# Patient Record
Sex: MALE | Race: OTHER | NOT HISPANIC OR LATINO
[De-identification: names, ages, dates, MRNs, and addresses within clinical notes are randomized per-mention and may not be internally consistent; named-entity substitution may affect disease eponyms.]

---

## 2018-11-19 ENCOUNTER — TRANSCRIPTION ENCOUNTER (OUTPATIENT)
Age: 48
End: 2018-11-19

## 2018-11-28 ENCOUNTER — TRANSCRIPTION ENCOUNTER (OUTPATIENT)
Age: 48
End: 2018-11-28

## 2018-11-29 ENCOUNTER — APPOINTMENT (OUTPATIENT)
Dept: CARDIOTHORACIC SURGERY | Facility: HOSPITAL | Age: 48
End: 2018-11-29
Payer: MEDICAID

## 2018-11-29 ENCOUNTER — INPATIENT (INPATIENT)
Facility: HOSPITAL | Age: 48
LOS: 42 days | Discharge: HOME | DRG: 3 | End: 2019-01-11
Attending: THORACIC SURGERY (CARDIOTHORACIC VASCULAR SURGERY) | Admitting: INTERNAL MEDICINE
Payer: MEDICAID

## 2018-11-29 VITALS
DIASTOLIC BLOOD PRESSURE: 76 MMHG | TEMPERATURE: 94 F | HEIGHT: 69 IN | WEIGHT: 199.96 LBS | HEART RATE: 108 BPM | SYSTOLIC BLOOD PRESSURE: 113 MMHG | OXYGEN SATURATION: 94 % | RESPIRATION RATE: 22 BRPM

## 2018-11-29 DIAGNOSIS — I49.01 VENTRICULAR FIBRILLATION: ICD-10-CM

## 2018-11-29 DIAGNOSIS — I21.3 ST ELEVATION (STEMI) MYOCARDIAL INFARCTION OF UNSPECIFIED SITE: ICD-10-CM

## 2018-11-29 DIAGNOSIS — I25.110 ATHEROSCLEROTIC HEART DISEASE OF NATIVE CORONARY ARTERY WITH UNSTABLE ANGINA PECTORIS: ICD-10-CM

## 2018-11-29 DIAGNOSIS — I25.42 CORONARY ARTERY DISSECTION: ICD-10-CM

## 2018-11-29 DIAGNOSIS — I21.02 ST ELEVATION (STEMI) MYOCARDIAL INFARCTION INVOLVING LEFT ANTERIOR DESCENDING CORONARY ARTERY: ICD-10-CM

## 2018-11-29 PROBLEM — Z00.00 ENCOUNTER FOR PREVENTIVE HEALTH EXAMINATION: Status: ACTIVE | Noted: 2018-11-29

## 2018-11-29 LAB
ABO RH CONFIRMATION: SIGNIFICANT CHANGE UP
ANION GAP SERPL CALC-SCNC: 13 MMOL/L — SIGNIFICANT CHANGE UP (ref 5–17)
APTT BLD: 28.1 SEC — SIGNIFICANT CHANGE UP (ref 27.5–36.3)
APTT BLD: 29.9 SEC — SIGNIFICANT CHANGE UP (ref 27.5–36.3)
BLD GP AB SCN SERPL QL: SIGNIFICANT CHANGE UP
BUN SERPL-MCNC: 11 MG/DL — SIGNIFICANT CHANGE UP (ref 8–20)
CALCIUM SERPL-MCNC: 9.3 MG/DL — SIGNIFICANT CHANGE UP (ref 8.6–10.2)
CHLORIDE SERPL-SCNC: 99 MMOL/L — SIGNIFICANT CHANGE UP (ref 98–107)
CO2 SERPL-SCNC: 24 MMOL/L — SIGNIFICANT CHANGE UP (ref 22–29)
CREAT SERPL-MCNC: 0.95 MG/DL — SIGNIFICANT CHANGE UP (ref 0.5–1.3)
FIBRINOGEN PPP-MCNC: 444 MG/DL — SIGNIFICANT CHANGE UP (ref 350–510)
GAS PNL BLDA: SIGNIFICANT CHANGE UP
GLUCOSE SERPL-MCNC: 141 MG/DL — HIGH (ref 70–115)
HCT VFR BLD CALC: 25.5 % — LOW (ref 42–52)
HCT VFR BLD CALC: 47.1 % — SIGNIFICANT CHANGE UP (ref 42–52)
HGB BLD-MCNC: 16.6 G/DL — SIGNIFICANT CHANGE UP (ref 14–18)
HGB BLD-MCNC: 8.7 G/DL — LOW (ref 14–18)
INR BLD: 0.98 RATIO — SIGNIFICANT CHANGE UP (ref 0.88–1.16)
INR BLD: 1.24 RATIO — HIGH (ref 0.88–1.16)
MCHC RBC-ENTMCNC: 29.6 PG — SIGNIFICANT CHANGE UP (ref 27–31)
MCHC RBC-ENTMCNC: 29.8 PG — SIGNIFICANT CHANGE UP (ref 27–31)
MCHC RBC-ENTMCNC: 34.1 G/DL — SIGNIFICANT CHANGE UP (ref 32–36)
MCHC RBC-ENTMCNC: 35.2 G/DL — SIGNIFICANT CHANGE UP (ref 32–36)
MCV RBC AUTO: 84.6 FL — SIGNIFICANT CHANGE UP (ref 80–94)
MCV RBC AUTO: 86.7 FL — SIGNIFICANT CHANGE UP (ref 80–94)
PLATELET # BLD AUTO: 209 K/UL — SIGNIFICANT CHANGE UP (ref 150–400)
PLATELET # BLD AUTO: 262 K/UL — SIGNIFICANT CHANGE UP (ref 150–400)
POTASSIUM SERPL-MCNC: 3.8 MMOL/L — SIGNIFICANT CHANGE UP (ref 3.5–5.3)
POTASSIUM SERPL-SCNC: 3.8 MMOL/L — SIGNIFICANT CHANGE UP (ref 3.5–5.3)
PROTHROM AB SERPL-ACNC: 11.3 SEC — SIGNIFICANT CHANGE UP (ref 10–12.9)
PROTHROM AB SERPL-ACNC: 14.4 SEC — HIGH (ref 10–12.9)
RBC # BLD: 2.94 M/UL — LOW (ref 4.6–6.2)
RBC # BLD: 5.57 M/UL — SIGNIFICANT CHANGE UP (ref 4.6–6.2)
RBC # FLD: 12.8 % — SIGNIFICANT CHANGE UP (ref 11–15.6)
RBC # FLD: 14.2 % — SIGNIFICANT CHANGE UP (ref 11–15.6)
SODIUM SERPL-SCNC: 136 MMOL/L — SIGNIFICANT CHANGE UP (ref 135–145)
TYPE + AB SCN PNL BLD: SIGNIFICANT CHANGE UP
WBC # BLD: 10.1 K/UL — SIGNIFICANT CHANGE UP (ref 4.8–10.8)
WBC # BLD: 6.3 K/UL — SIGNIFICANT CHANGE UP (ref 4.8–10.8)
WBC # FLD AUTO: 10.1 K/UL — SIGNIFICANT CHANGE UP (ref 4.8–10.8)
WBC # FLD AUTO: 6.3 K/UL — SIGNIFICANT CHANGE UP (ref 4.8–10.8)

## 2018-11-29 PROCEDURE — 33967 INSERT I-AORT PERCUT DEVICE: CPT | Mod: AS

## 2018-11-29 PROCEDURE — 92941 PRQ TRLML REVSC TOT OCCL AMI: CPT | Mod: LD

## 2018-11-29 PROCEDURE — 99223 1ST HOSP IP/OBS HIGH 75: CPT | Mod: 57

## 2018-11-29 PROCEDURE — 33990 INSJ PERQ VAD L HRT ARTERIAL: CPT

## 2018-11-29 PROCEDURE — 35500 HARVEST VEIN FOR BYPASS: CPT | Mod: AS

## 2018-11-29 PROCEDURE — 33956 ECMO/ECLS INSJ CTR CANNULA: CPT

## 2018-11-29 PROCEDURE — 71045 X-RAY EXAM CHEST 1 VIEW: CPT | Mod: 26,76

## 2018-11-29 PROCEDURE — 76937 US GUIDE VASCULAR ACCESS: CPT | Mod: 26

## 2018-11-29 PROCEDURE — 35500 HARVEST VEIN FOR BYPASS: CPT

## 2018-11-29 PROCEDURE — 33514 CABG VEIN FIVE: CPT | Mod: AS

## 2018-11-29 PROCEDURE — 92929: CPT | Mod: LD

## 2018-11-29 PROCEDURE — 99291 CRITICAL CARE FIRST HOUR: CPT

## 2018-11-29 PROCEDURE — 92960 CARDIOVERSION ELECTRIC EXT: CPT

## 2018-11-29 PROCEDURE — 33967 INSERT I-AORT PERCUT DEVICE: CPT

## 2018-11-29 PROCEDURE — 93458 L HRT ARTERY/VENTRICLE ANGIO: CPT | Mod: 26,59

## 2018-11-29 PROCEDURE — 93010 ELECTROCARDIOGRAM REPORT: CPT

## 2018-11-29 PROCEDURE — 33956 ECMO/ECLS INSJ CTR CANNULA: CPT | Mod: AS

## 2018-11-29 PROCEDURE — 33514 CABG VEIN FIVE: CPT

## 2018-11-29 PROCEDURE — 99152 MOD SED SAME PHYS/QHP 5/>YRS: CPT

## 2018-11-29 RX ORDER — CIPROFLOXACIN LACTATE 400MG/40ML
VIAL (ML) INTRAVENOUS
Qty: 0 | Refills: 0 | Status: DISCONTINUED | OUTPATIENT
Start: 2018-11-30 | End: 2018-12-04

## 2018-11-29 RX ORDER — SODIUM BICARBONATE 1 MEQ/ML
50 SYRINGE (ML) INTRAVENOUS
Qty: 0 | Refills: 0 | Status: COMPLETED | OUTPATIENT
Start: 2018-11-29 | End: 2018-11-30

## 2018-11-29 RX ORDER — POTASSIUM CHLORIDE 20 MEQ
10 PACKET (EA) ORAL
Qty: 0 | Refills: 0 | Status: DISCONTINUED | OUTPATIENT
Start: 2018-11-29 | End: 2018-12-06

## 2018-11-29 RX ORDER — VANCOMYCIN HCL 1 G
1000 VIAL (EA) INTRAVENOUS EVERY 12 HOURS
Qty: 0 | Refills: 0 | Status: DISCONTINUED | OUTPATIENT
Start: 2018-11-30 | End: 2018-12-04

## 2018-11-29 RX ORDER — CIPROFLOXACIN LACTATE 400MG/40ML
400 VIAL (ML) INTRAVENOUS EVERY 12 HOURS
Qty: 0 | Refills: 0 | Status: DISCONTINUED | OUTPATIENT
Start: 2018-11-30 | End: 2018-12-04

## 2018-11-29 RX ORDER — SODIUM CHLORIDE 9 MG/ML
1000 INJECTION INTRAMUSCULAR; INTRAVENOUS; SUBCUTANEOUS
Qty: 0 | Refills: 0 | Status: DISCONTINUED | OUTPATIENT
Start: 2018-11-29 | End: 2018-12-06

## 2018-11-29 RX ORDER — CLOPIDOGREL BISULFATE 75 MG/1
600 TABLET, FILM COATED ORAL ONCE
Qty: 0 | Refills: 0 | Status: DISCONTINUED | OUTPATIENT
Start: 2018-11-29 | End: 2018-11-29

## 2018-11-29 RX ORDER — POTASSIUM CHLORIDE 20 MEQ
10 PACKET (EA) ORAL
Qty: 0 | Refills: 0 | Status: COMPLETED | OUTPATIENT
Start: 2018-11-29 | End: 2018-11-30

## 2018-11-29 RX ORDER — PANTOPRAZOLE SODIUM 20 MG/1
40 TABLET, DELAYED RELEASE ORAL EVERY 12 HOURS
Qty: 0 | Refills: 0 | Status: DISCONTINUED | OUTPATIENT
Start: 2018-11-29 | End: 2018-11-30

## 2018-11-29 RX ORDER — AMIODARONE HYDROCHLORIDE 400 MG/1
1 TABLET ORAL
Qty: 900 | Refills: 0 | Status: DISCONTINUED | OUTPATIENT
Start: 2018-11-29 | End: 2018-11-30

## 2018-11-29 RX ORDER — MILRINONE LACTATE 1 MG/ML
0.38 INJECTION, SOLUTION INTRAVENOUS
Qty: 20 | Refills: 0 | Status: DISCONTINUED | OUTPATIENT
Start: 2018-11-29 | End: 2018-12-06

## 2018-11-29 RX ORDER — AMIODARONE HYDROCHLORIDE 400 MG/1
1 TABLET ORAL
Qty: 900 | Refills: 0 | Status: DISCONTINUED | OUTPATIENT
Start: 2018-11-29 | End: 2018-11-29

## 2018-11-29 RX ORDER — MEPERIDINE HYDROCHLORIDE 50 MG/ML
25 INJECTION INTRAMUSCULAR; INTRAVENOUS; SUBCUTANEOUS ONCE
Qty: 0 | Refills: 0 | Status: DISCONTINUED | OUTPATIENT
Start: 2018-11-29 | End: 2018-12-01

## 2018-11-29 RX ORDER — ASPIRIN/CALCIUM CARB/MAGNESIUM 324 MG
325 TABLET ORAL ONCE
Qty: 0 | Refills: 0 | Status: DISCONTINUED | OUTPATIENT
Start: 2018-11-29 | End: 2018-11-29

## 2018-11-29 RX ORDER — VECURONIUM BROMIDE 20 MG/1
10 INJECTION, POWDER, FOR SOLUTION INTRAVENOUS ONCE
Qty: 0 | Refills: 0 | Status: COMPLETED | OUTPATIENT
Start: 2018-11-29 | End: 2018-11-30

## 2018-11-29 RX ORDER — CHLORHEXIDINE GLUCONATE 213 G/1000ML
5 SOLUTION TOPICAL EVERY 4 HOURS
Qty: 0 | Refills: 0 | Status: DISCONTINUED | OUTPATIENT
Start: 2018-11-29 | End: 2018-12-06

## 2018-11-29 RX ORDER — SODIUM CHLORIDE 9 MG/ML
3 INJECTION INTRAMUSCULAR; INTRAVENOUS; SUBCUTANEOUS ONCE
Qty: 0 | Refills: 0 | Status: COMPLETED | OUTPATIENT
Start: 2018-11-29 | End: 2018-11-29

## 2018-11-29 RX ORDER — PETROLATUM,WHITE
1 JELLY (GRAM) TOPICAL
Qty: 0 | Refills: 0 | Status: DISCONTINUED | OUTPATIENT
Start: 2018-11-29 | End: 2018-12-06

## 2018-11-29 RX ORDER — EPINEPHRINE 0.3 MG/.3ML
0.03 INJECTION INTRAMUSCULAR; SUBCUTANEOUS
Qty: 4 | Refills: 0 | Status: DISCONTINUED | OUTPATIENT
Start: 2018-11-29 | End: 2018-12-01

## 2018-11-29 RX ORDER — NOREPINEPHRINE BITARTRATE/D5W 8 MG/250ML
0.05 PLASTIC BAG, INJECTION (ML) INTRAVENOUS
Qty: 8 | Refills: 0 | Status: DISCONTINUED | OUTPATIENT
Start: 2018-11-29 | End: 2018-12-04

## 2018-11-29 RX ORDER — AMIODARONE HYDROCHLORIDE 400 MG/1
150 TABLET ORAL ONCE
Qty: 0 | Refills: 0 | Status: COMPLETED | OUTPATIENT
Start: 2018-11-29 | End: 2018-11-29

## 2018-11-29 RX ORDER — PROPOFOL 10 MG/ML
30 INJECTION, EMULSION INTRAVENOUS
Qty: 1000 | Refills: 0 | Status: DISCONTINUED | OUTPATIENT
Start: 2018-11-29 | End: 2018-12-06

## 2018-11-29 RX ORDER — VANCOMYCIN HCL 1 G
1000 VIAL (EA) INTRAVENOUS EVERY 12 HOURS
Qty: 0 | Refills: 0 | Status: DISCONTINUED | OUTPATIENT
Start: 2018-11-29 | End: 2018-11-29

## 2018-11-29 RX ORDER — AMIODARONE HYDROCHLORIDE 400 MG/1
0.5 TABLET ORAL
Qty: 900 | Refills: 0 | Status: DISCONTINUED | OUTPATIENT
Start: 2018-11-29 | End: 2018-12-06

## 2018-11-29 RX ORDER — CIPROFLOXACIN LACTATE 400MG/40ML
400 VIAL (ML) INTRAVENOUS ONCE
Qty: 0 | Refills: 0 | Status: COMPLETED | OUTPATIENT
Start: 2018-11-29 | End: 2018-11-30

## 2018-11-29 RX ADMIN — Medication 50 MILLIEQUIVALENT(S): at 23:38

## 2018-11-29 RX ADMIN — Medication 50 MILLIEQUIVALENT(S): at 23:30

## 2018-11-29 NOTE — ED ADULT NURSE NOTE - OBJECTIVE STATEMENT
Patient c/o of chest pain and "pressure and heaviness to both shoulders" pt states onset this AM reports taking last dose of ABX for staph infection to buttocks and left groin area

## 2018-11-29 NOTE — ED ADULT NURSE REASSESSMENT NOTE - NS ED NURSE REASSESS COMMENT FT1
1113- Aspirin 324mg po administered as per order  1114- late entry Cath lab team at bedside   1116- Plavix 600 mg po administered as per order  1120- Pt transported to cath lab with primary RN , VELMA and cath lab team-

## 2018-11-29 NOTE — H&P PST ADULT - ASSESSMENT
This is a 48 y/o male with acute chest pain (10/10), found to have significant EKG changes; STEMI called; pt taken to the cath lab.

## 2018-11-29 NOTE — ED ADULT TRIAGE NOTE - CHIEF COMPLAINT QUOTE
"I am having chest pain and my chest feels heavy. " Pt states chest pain started an hour. Pt denies cardiac hx. Pt states he took a medication for itching before the chest pain started.

## 2018-11-29 NOTE — ED PROVIDER NOTE - CRITICAL CARE PROVIDED
documentation/direct patient care (not related to procedure)/35 MINUTE/interpretation of diagnostic studies/consultation with other physicians

## 2018-11-29 NOTE — ED PROVIDER NOTE - CONSTITUTIONAL, MLM
normal... ILL appearing, well nourished, awake, alert, oriented to person, place, time/situation and in MODERATE apparent distress.

## 2018-11-29 NOTE — ED PROVIDER NOTE - CRITICAL CARE INDICATION, MLM
patient was critically ill... Patient was critically ill with a high probability of imminent or life threatening deterioration. ACUTE STEMI  STAT IV LABS EKG CXR CARDIAC CONSULT MEDS ADMIT CATH LAB

## 2018-11-29 NOTE — CONSULT NOTE ADULT - SUBJECTIVE AND OBJECTIVE BOX
Surgeon: Arnold    Consult requesting by: Hermelinda    HISTORY OF PRESENT ILLNESS:  47M, no pmhx, recently treated with antibiotics (doxy)  for a groin cellulitis, p/w mid sternal CP x 2-3 days, became 10/10 this morning, pt vomited x1 drank water thinking it was reflux, however no improvement so wife brought pt to ED. In ED ST elevations in V1-V5 with reciprocal changes in inferior leads. ass and plavix 600 given. Code STEMI activated and pt taken to cath lab emergently. Attempted bifurcated stent to LAD and D2 with development of acute thrombus in LAD stent, attempted to re balloon with subsequent dissection of LM. Impella placed. VF arrest, shock x1 with return of NSR. pt to be emergently intubated by anesthesia and to be taken directly to cath lab to OR with Dr. Barrett.     PAST MEDICAL & SURGICAL HISTORY:  Staph infection  No significant past surgical history    MEDICATIONS  (STANDING):  aspirin 325 milliGRAM(s) Oral Once  clopidogrel Tablet 600 milliGRAM(s) Oral Once  sodium chloride 0.9% lock flush 3 milliLiter(s) IV Push once    MEDICATIONS  (PRN):    Antiplatelet therapy:   plavix 600, asa 325    Allergies: penicillins (Unknown)    SOCIAL HISTORY: (limited obtained due to patients acuity)  Smoker: denies   ETOH use: denies  Occupation:   Live with: wife    FAMILY HISTORY:  Family history of myocardial infarction (Mother): mother;     Review of Systems: UNABLE TO OBTAIN ROS due to acuity of pt (pt actively being intubated)   CONSTITUTIONAL:  Fevers[ ] chills[ ] sweats[ ] fatigue[ ] weight loss[ ] weight gain [ ]                                     NEGATIVE [ ]   NEURO:  parathesias[ ] seizures [ ]  syncope [ ]  confusion [ ]                                                                                NEGATIVE[ ]   EYES: glasses[ ]  blurry vision[ ]  discharge[ ] pain[ ] glaucoma [ ]                                                                          NEGATIVE[ ]   ENMT:  difficulty hearing [ ]  vertigo[ ]  dysphagia[ ] epistaxis[ ] recent dental work [ ]                                    NEGATIVE[ ]   CV:  chest pain[ ] palpitations[ ] SAMAYOA [ ] diaphoresis [ ]                                                                                           NEGATIVE[ ]   RESPIRATORY:  wheezing[ ] SOB[ ] cough [ ] sputum[ ] hemoptysis[ ]                                                                  NEGATIVE[ ]   GI:  nausea[ ]  vomiting [ ]  diarrhea[ ] constipation [ ] melena [ ]                                                                      NEGATIVE[ ]   : hematuria[ ]  dysuria[ ] urgency[ ] incontinence[ ]                                                                                            NEGATIVE[ ]   MUSKULOSKELETAL:  arthritis[ ]  joint swelling [ ] muscle weakness [ ]                                                                NEGATIVE[ ]   SKIN/BREAST:  rash[ ] itching [ ]  hair loss[ ] masses[ ]                                                                                              NEGATIVE[ ]   PSYCH:  dementia [ ] depression [ ] anxiety[ ]                                                                                                               NEGATIVE[ ]   HEME/LYMPH:  bruises easily[ ] enlarged lymph nodes[ ] tender lymph nodes[ ]                                               NEGATIVE[ ]   ENDOCRINE:  cold intolerance[ ] heat intolerance[ ] polydipsia[ ]                                                                          NEGATIVE[ ]     PHYSICAL EXAM  Vital Signs Last 24 Hrs  T(C): 34.7 (2018 10:50), Max: 34.7 (2018 10:50)  T(F): 94.4 (2018 10:50), Max: 94.4 (2018 10:50)  HR: 108 (2018 10:50) (108 - 108)  BP: 113/76 (2018 10:50) (113/76 - 113/76)  BP(mean): --  RR: 22 (2018 10:50) (22 - 22)  SpO2: 94% (2018 10:50) (94% - 94%)    UNABLE TO OBTAIN physical exam, pt is actively being intubated and on cath table with cath in progress still)  CONSTITUTIONAL:                                                           WNL[ ]   Neuro: WNL[ ] Normal exam oriented to person/place & time with no focal motor or sensory  deficits. Other __________                    Eyes: WNL[ ]   Normal exam of conjunctiva & lids, pupils equally reactive. Other______________________________     ENT: WNL[ ]    Normal exam of nasal/oral mucosa with absence of cyanosis. Other_____________________________  Neck: WNL[ ]  Normal exam of jugular veins, trachea & thyroid. Other_________________________________________  Chest: WNL[ ] Normal lung exam with good air movement absence of wheezes, rales, or rhonchi: Other_________________________________________                                                                                CV:  Auscultation: normal [ ] S3[ ] S4[ ] Irregular [ ] Rub[ ] Clicks[ ]    Murmurs none:[ ]systolic [ ]  diastolic [ ] holosystolic [ ]  Carotids: No Bruits[ ] Other____________ Abdominal Aorta: normal [ ] nonpalpable[ ]Other___________                                                                                      GI: WNL[ ] Normal exam of abdomen, liver & spleen with no noted masses or tenderness. Other______________________                                                                                                        Extremities: WNL[ ] Normal no evidence of cyanosis or deformity Edema: none[ ]trace[ ]1+[ ]2+[ ]3+[ ]4+[ ]  Lower Extremity Pulses: Right[ ] Left[ ]Varicosities[ ]  SKIN :WNL[ ] Normal exam to inspection & palation. Other:____________________                                                          LABS:                        16.6   6.3   )-----------( 209      ( 2018 11:19 )             47.1         136  |  99  |  11.0  ----------------------------<  141<H>  3.8   |  24.0  |  0.95    Ca    9.3      2018 11:19      PT/INR - ( 2018 11:19 )   PT: 11.3 sec;   INR: 0.98 ratio         PTT - ( 2018 11:19 )  PTT:29.9 sec

## 2018-11-29 NOTE — ED PROVIDER NOTE - OBJECTIVE STATEMENT
48 YO MALE WITH ABOVE CC. SUDDEN ONSET CP WHILE AT REST. PT DESCRIBES AS A HEAVY PRESSURE ON HIS CHEST ASSOCIATED WITH SOB AND RADIATION TO HIS SHOULDERS AND NECK. NO SMOKING OR FAMILY HISTORY OF CARDIAC ISSUE. HTN+ NO DM OR CHOLESTEROL.  PT ADMITS TO RECENT "STAPH" INFECTION IN GROIN REGION. ON AB X 10 DAYS.  MED HX HTN

## 2018-11-29 NOTE — BRIEF OPERATIVE NOTE - PROCEDURE
<<-----Click on this checkbox to enter Procedure ECMO (extracorporeal membrane oxygenation)  11/29/2018  Secondary to pre-op cardiogenic shock and inability to wean from CPB  Active  [email or web address]  CABG  11/29/2018  Emergent CABG X 4 all vein to the LAD, Diag, OM and PDA with inability to wean from CPB therefore requiring ECMO and IABP  Active  GLENN

## 2018-11-29 NOTE — PROGRESS NOTE ADULT - SUBJECTIVE AND OBJECTIVE BOX
This is a 48 y/o male no significant PMH; recent ABT (doxycycline) r/t cellulitis to right groin per pt. Pt presented to the ED with 10/10 chest pain that he reported started at 0930; he drank water with no relief; pain started to progress to B/L shoulders. Per pt spouse, she reports he has had chest pain x2-3 days and this morning he vomited which he attributed to reflux.  She brought him into the emergency department.    Pt has significant EKG changes ST elevations in leads V1-V5 with reciprocal changes in II, III, AVF.  Pt rec'd asa and plavix load in ED.     Pt found to have multiple occlusions; stent to LAD and D2; stent to LAD thrombosed, left main dissected; impella placed for support. CT surgeon, Dr. Wills called.  CT surgery team activated. Pt being brought to OR for CABG. This is a 46 y/o male no significant PMH; recent ABT (doxycycline) r/t cellulitis to right groin per pt. Pt presented to the ED with 10/10 chest pain that he reported started at 0930; he drank water with no relief; pain started to progress to B/L shoulders. Per pt spouse, she reports he has had chest pain x2-3 days and this morning he vomited which he attributed to reflux.  She brought him into the emergency department.    Pt has significant EKG changes ST elevations in leads V1-V5 with reciprocal changes in II, III, AVF.  Pt rec'd asa 325mg and plavix 600mg in ED.     Pt found to have multiple occlusions; stent to LAD and D2; stent to LAD thrombosed, left main dissected; impella placed for support. CT surgeon, Dr. Wills called.  CT surgery team activated. Pt being brought to OR for CABG. This is a 46 y/o male no significant PMH; recent ABT (doxycycline) r/t cellulitis to right groin per pt. Pt presented to the ED with 10/10 chest pain that he reported started at 0930; he drank water with no relief; pain started to progress to B/L shoulders. Per pt spouse, she reports he has had chest pain x2-3 days and this morning he vomited which he attributed to reflux.  She brought him into the emergency department.    Pt has significant EKG changes ST elevations in leads V1-V5 with reciprocal changes in II, III, AVF.  Pt rec'd asa 325mg and plavix 600mg in ED.     Pt found to have multiple occlusions; stent to LAD and D2; stent to LAD thrombosed ,LAD stent was recrossed with a 0.014 run through wire and angioplasty performed with no reflow however. Filling defect appeared in the left main that appeared to be a thrombus (likely proximal thrombus extension from LAD).  Impella placed for support. CT surgeon, Dr. Wills called.  CT surgery team activated. Pt being brought to OR for CABG.

## 2018-11-29 NOTE — CONSULT NOTE ADULT - ASSESSMENT
47M, no pmhx p/w 2-3 day h/o CP, code STEMI in ED, during cath, bifurcating stent of LAD/D2 with acute development of thrombus in LAD, during attempted opening of LAD thrombus, dissection of LM occurred, CT surgery called for emergent consult, impella placed, anesthesia called for emergent intubation, VF, shock x1 return to SR, pt to go emergently to OR for CABG;

## 2018-11-29 NOTE — H&P PST ADULT - HISTORY OF PRESENT ILLNESS
This is a 46 y/o male no significant PMH; recent ABT r/t cellulitis to right groin per pt. Pt presented to the ED with 10/10 chest pain that he reported started at 0930; he drank water with no relief; pain started to progress to B/L shoulders. Pt has significant EKG changes ST elevations in leads V1-V5 with reciprocal changes in II, III, AVF.  Pt rec'd asa and plavix load in ED. This is a 48 y/o male no significant PMH; recent ABT r/t cellulitis to right groin per pt. Pt presented to the ED with 10/10 chest pain that he reported started at 0930; he drank water with no relief; pain started to progress to B/L shoulders. Per pt spouse, she reports he has had chest pain x2-3 days and this morning he vomited which he attributed to reflux.  She brought him into the emergency department.    Pt has significant EKG changes ST elevations in leads V1-V5 with reciprocal changes in II, III, AVF.  Pt rec'd asa and plavix load in ED. This is a 46 y/o male no significant PMH; recent ABT (doxycycline) r/t cellulitis to right groin per pt. Pt presented to the ED with 10/10 chest pain that he reported started at 0930; he drank water with no relief; pain started to progress to B/L shoulders. Per pt spouse, she reports he has had chest pain x2-3 days and this morning he vomited which he attributed to reflux.  She brought him into the emergency department.    Pt has significant EKG changes ST elevations in leads V1-V5 with reciprocal changes in II, III, AVF.  Pt rec'd asa and plavix load in ED.

## 2018-11-30 DIAGNOSIS — B95.8 UNSPECIFIED STAPHYLOCOCCUS AS THE CAUSE OF DISEASES CLASSIFIED ELSEWHERE: ICD-10-CM

## 2018-11-30 LAB
ALBUMIN SERPL ELPH-MCNC: 3 G/DL — LOW (ref 3.3–5.2)
ALBUMIN SERPL ELPH-MCNC: 3.1 G/DL — LOW (ref 3.3–5.2)
ALP SERPL-CCNC: 32 U/L — LOW (ref 40–120)
ALP SERPL-CCNC: 43 U/L — SIGNIFICANT CHANGE UP (ref 40–120)
ALT FLD-CCNC: 35 U/L — SIGNIFICANT CHANGE UP
ALT FLD-CCNC: 42 U/L — HIGH
ANION GAP SERPL CALC-SCNC: 14 MMOL/L — SIGNIFICANT CHANGE UP (ref 5–17)
ANION GAP SERPL CALC-SCNC: 18 MMOL/L — HIGH (ref 5–17)
ANION GAP SERPL CALC-SCNC: 23 MMOL/L — HIGH (ref 5–17)
ANION GAP SERPL CALC-SCNC: 26 MMOL/L — HIGH (ref 5–17)
APTT BLD: 29.3 SEC — SIGNIFICANT CHANGE UP (ref 27.5–36.3)
APTT BLD: 30.5 SEC — SIGNIFICANT CHANGE UP (ref 27.5–36.3)
APTT BLD: 37 SEC — HIGH (ref 27.5–36.3)
AST SERPL-CCNC: 297 U/L — HIGH
AST SERPL-CCNC: 325 U/L — HIGH
BILIRUB SERPL-MCNC: 1.3 MG/DL — SIGNIFICANT CHANGE UP (ref 0.4–2)
BILIRUB SERPL-MCNC: 2 MG/DL — SIGNIFICANT CHANGE UP (ref 0.4–2)
BUN SERPL-MCNC: 12 MG/DL — SIGNIFICANT CHANGE UP (ref 8–20)
BUN SERPL-MCNC: 13 MG/DL — SIGNIFICANT CHANGE UP (ref 8–20)
CALCIUM SERPL-MCNC: 8.2 MG/DL — LOW (ref 8.6–10.2)
CALCIUM SERPL-MCNC: 8.4 MG/DL — LOW (ref 8.6–10.2)
CALCIUM SERPL-MCNC: 9 MG/DL — SIGNIFICANT CHANGE UP (ref 8.6–10.2)
CALCIUM SERPL-MCNC: 9.1 MG/DL — SIGNIFICANT CHANGE UP (ref 8.6–10.2)
CHLORIDE SERPL-SCNC: 104 MMOL/L — SIGNIFICANT CHANGE UP (ref 98–107)
CHLORIDE SERPL-SCNC: 104 MMOL/L — SIGNIFICANT CHANGE UP (ref 98–107)
CHLORIDE SERPL-SCNC: 106 MMOL/L — SIGNIFICANT CHANGE UP (ref 98–107)
CHLORIDE SERPL-SCNC: 106 MMOL/L — SIGNIFICANT CHANGE UP (ref 98–107)
CK MB CFR SERPL CALC: 514.9 NG/ML — HIGH (ref 0–6.7)
CK SERPL-CCNC: 4403 U/L — HIGH (ref 30–200)
CO2 SERPL-SCNC: 16 MMOL/L — LOW (ref 22–29)
CO2 SERPL-SCNC: 17 MMOL/L — LOW (ref 22–29)
CO2 SERPL-SCNC: 21 MMOL/L — LOW (ref 22–29)
CO2 SERPL-SCNC: 23 MMOL/L — SIGNIFICANT CHANGE UP (ref 22–29)
CREAT SERPL-MCNC: 0.94 MG/DL — SIGNIFICANT CHANGE UP (ref 0.5–1.3)
CREAT SERPL-MCNC: 0.97 MG/DL — SIGNIFICANT CHANGE UP (ref 0.5–1.3)
CREAT SERPL-MCNC: 1.04 MG/DL — SIGNIFICANT CHANGE UP (ref 0.5–1.3)
CREAT SERPL-MCNC: 1.07 MG/DL — SIGNIFICANT CHANGE UP (ref 0.5–1.3)
GAS PNL BLDA: SIGNIFICANT CHANGE UP
GAS PNL BLDV: SIGNIFICANT CHANGE UP
GLUCOSE BLDC GLUCOMTR-MCNC: 150 MG/DL — HIGH (ref 70–99)
GLUCOSE BLDC GLUCOMTR-MCNC: 151 MG/DL — HIGH (ref 70–99)
GLUCOSE BLDC GLUCOMTR-MCNC: 163 MG/DL — HIGH (ref 70–99)
GLUCOSE BLDC GLUCOMTR-MCNC: 166 MG/DL — HIGH (ref 70–99)
GLUCOSE BLDC GLUCOMTR-MCNC: 182 MG/DL — HIGH (ref 70–99)
GLUCOSE BLDC GLUCOMTR-MCNC: 188 MG/DL — HIGH (ref 70–99)
GLUCOSE BLDC GLUCOMTR-MCNC: 190 MG/DL — HIGH (ref 70–99)
GLUCOSE BLDC GLUCOMTR-MCNC: 190 MG/DL — HIGH (ref 70–99)
GLUCOSE BLDC GLUCOMTR-MCNC: 205 MG/DL — HIGH (ref 70–99)
GLUCOSE BLDC GLUCOMTR-MCNC: 217 MG/DL — HIGH (ref 70–99)
GLUCOSE BLDC GLUCOMTR-MCNC: 218 MG/DL — HIGH (ref 70–99)
GLUCOSE BLDC GLUCOMTR-MCNC: 220 MG/DL — HIGH (ref 70–99)
GLUCOSE BLDC GLUCOMTR-MCNC: 235 MG/DL — HIGH (ref 70–99)
GLUCOSE BLDC GLUCOMTR-MCNC: 236 MG/DL — HIGH (ref 70–99)
GLUCOSE BLDC GLUCOMTR-MCNC: 237 MG/DL — HIGH (ref 70–99)
GLUCOSE BLDC GLUCOMTR-MCNC: 247 MG/DL — HIGH (ref 70–99)
GLUCOSE SERPL-MCNC: 188 MG/DL — HIGH (ref 70–115)
GLUCOSE SERPL-MCNC: 195 MG/DL — HIGH (ref 70–115)
GLUCOSE SERPL-MCNC: 195 MG/DL — HIGH (ref 70–115)
GLUCOSE SERPL-MCNC: 269 MG/DL — HIGH (ref 70–115)
HBA1C BLD-MCNC: 5.7 % — HIGH (ref 4–5.6)
HCT VFR BLD CALC: 26.2 % — LOW (ref 42–52)
HCT VFR BLD CALC: 28.9 % — LOW (ref 42–52)
HCT VFR BLD CALC: 34.1 % — LOW (ref 42–52)
HGB BLD-MCNC: 10.2 G/DL — LOW (ref 14–18)
HGB BLD-MCNC: 11.5 G/DL — LOW (ref 14–18)
HGB BLD-MCNC: 9.5 G/DL — LOW (ref 14–18)
INR BLD: 1.27 RATIO — HIGH (ref 0.88–1.16)
INR BLD: 1.39 RATIO — HIGH (ref 0.88–1.16)
LDH SERPL L TO P-CCNC: 1010 U/L — HIGH (ref 98–192)
MAGNESIUM SERPL-MCNC: 1.6 MG/DL — LOW (ref 1.8–2.6)
MAGNESIUM SERPL-MCNC: 1.8 MG/DL — SIGNIFICANT CHANGE UP (ref 1.6–2.6)
MAGNESIUM SERPL-MCNC: 2 MG/DL — SIGNIFICANT CHANGE UP (ref 1.6–2.6)
MCHC RBC-ENTMCNC: 28.7 PG — SIGNIFICANT CHANGE UP (ref 27–31)
MCHC RBC-ENTMCNC: 29 PG — SIGNIFICANT CHANGE UP (ref 27–31)
MCHC RBC-ENTMCNC: 29.3 PG — SIGNIFICANT CHANGE UP (ref 27–31)
MCHC RBC-ENTMCNC: 33.7 G/DL — SIGNIFICANT CHANGE UP (ref 32–36)
MCHC RBC-ENTMCNC: 35.3 G/DL — SIGNIFICANT CHANGE UP (ref 32–36)
MCHC RBC-ENTMCNC: 36.3 G/DL — HIGH (ref 32–36)
MCV RBC AUTO: 80.9 FL — SIGNIFICANT CHANGE UP (ref 80–94)
MCV RBC AUTO: 81.4 FL — SIGNIFICANT CHANGE UP (ref 80–94)
MCV RBC AUTO: 86.1 FL — SIGNIFICANT CHANGE UP (ref 80–94)
PHOSPHATE SERPL-MCNC: 2.6 MG/DL — SIGNIFICANT CHANGE UP (ref 2.4–4.7)
PHOSPHATE SERPL-MCNC: 2.7 MG/DL — SIGNIFICANT CHANGE UP (ref 2.4–4.7)
PLATELET # BLD AUTO: 104 K/UL — LOW (ref 150–400)
PLATELET # BLD AUTO: 165 K/UL — SIGNIFICANT CHANGE UP (ref 150–400)
PLATELET # BLD AUTO: 205 K/UL — SIGNIFICANT CHANGE UP (ref 150–400)
POTASSIUM SERPL-MCNC: 3.3 MMOL/L — LOW (ref 3.5–5.3)
POTASSIUM SERPL-MCNC: 4 MMOL/L — SIGNIFICANT CHANGE UP (ref 3.5–5.3)
POTASSIUM SERPL-MCNC: 4.5 MMOL/L — SIGNIFICANT CHANGE UP (ref 3.5–5.3)
POTASSIUM SERPL-MCNC: 4.6 MMOL/L — SIGNIFICANT CHANGE UP (ref 3.5–5.3)
POTASSIUM SERPL-SCNC: 3.3 MMOL/L — LOW (ref 3.5–5.3)
POTASSIUM SERPL-SCNC: 4 MMOL/L — SIGNIFICANT CHANGE UP (ref 3.5–5.3)
POTASSIUM SERPL-SCNC: 4.5 MMOL/L — SIGNIFICANT CHANGE UP (ref 3.5–5.3)
POTASSIUM SERPL-SCNC: 4.6 MMOL/L — SIGNIFICANT CHANGE UP (ref 3.5–5.3)
PROT SERPL-MCNC: 4.7 G/DL — LOW (ref 6.6–8.7)
PROT SERPL-MCNC: 4.9 G/DL — LOW (ref 6.6–8.7)
PROTHROM AB SERPL-ACNC: 14.7 SEC — HIGH (ref 10–12.9)
PROTHROM AB SERPL-ACNC: 16.1 SEC — HIGH (ref 10–12.9)
RBC # BLD: 3.24 M/UL — LOW (ref 4.6–6.2)
RBC # BLD: 3.55 M/UL — LOW (ref 4.6–6.2)
RBC # BLD: 3.96 M/UL — LOW (ref 4.6–6.2)
RBC # FLD: 13.9 % — SIGNIFICANT CHANGE UP (ref 11–15.6)
RBC # FLD: 14.4 % — SIGNIFICANT CHANGE UP (ref 11–15.6)
RBC # FLD: 17.1 % — HIGH (ref 11–15.6)
SODIUM SERPL-SCNC: 143 MMOL/L — SIGNIFICANT CHANGE UP (ref 135–145)
SODIUM SERPL-SCNC: 144 MMOL/L — SIGNIFICANT CHANGE UP (ref 135–145)
SODIUM SERPL-SCNC: 145 MMOL/L — SIGNIFICANT CHANGE UP (ref 135–145)
SODIUM SERPL-SCNC: 146 MMOL/L — HIGH (ref 135–145)
TROPONIN T SERPL-MCNC: >25 NG/ML — SIGNIFICANT CHANGE UP (ref 0–0.06)
WBC # BLD: 10.4 K/UL — SIGNIFICANT CHANGE UP (ref 4.8–10.8)
WBC # BLD: 10.7 K/UL — SIGNIFICANT CHANGE UP (ref 4.8–10.8)
WBC # BLD: 13.3 K/UL — HIGH (ref 4.8–10.8)
WBC # FLD AUTO: 10.4 K/UL — SIGNIFICANT CHANGE UP (ref 4.8–10.8)
WBC # FLD AUTO: 10.7 K/UL — SIGNIFICANT CHANGE UP (ref 4.8–10.8)
WBC # FLD AUTO: 13.3 K/UL — HIGH (ref 4.8–10.8)

## 2018-11-30 PROCEDURE — 71045 X-RAY EXAM CHEST 1 VIEW: CPT | Mod: 26

## 2018-11-30 PROCEDURE — 71045 X-RAY EXAM CHEST 1 VIEW: CPT | Mod: 26,77

## 2018-11-30 PROCEDURE — 99291 CRITICAL CARE FIRST HOUR: CPT

## 2018-11-30 PROCEDURE — 93503 INSERT/PLACE HEART CATHETER: CPT

## 2018-11-30 PROCEDURE — 93010 ELECTROCARDIOGRAM REPORT: CPT

## 2018-11-30 PROCEDURE — 36556 INSERT NON-TUNNEL CV CATH: CPT | Mod: 58,59

## 2018-11-30 RX ORDER — FENTANYL CITRATE 50 UG/ML
50 INJECTION INTRAVENOUS
Qty: 0 | Refills: 0 | Status: DISCONTINUED | OUTPATIENT
Start: 2018-11-30 | End: 2018-11-30

## 2018-11-30 RX ORDER — POTASSIUM CHLORIDE 20 MEQ
10 PACKET (EA) ORAL
Qty: 0 | Refills: 0 | Status: COMPLETED | OUTPATIENT
Start: 2018-11-30 | End: 2018-11-30

## 2018-11-30 RX ORDER — HEPARIN SODIUM 5000 [USP'U]/ML
500 INJECTION INTRAVENOUS; SUBCUTANEOUS
Qty: 25000 | Refills: 0 | Status: DISCONTINUED | OUTPATIENT
Start: 2018-11-30 | End: 2018-12-03

## 2018-11-30 RX ORDER — SODIUM BICARBONATE 1 MEQ/ML
50 SYRINGE (ML) INTRAVENOUS
Qty: 0 | Refills: 0 | Status: DISCONTINUED | OUTPATIENT
Start: 2018-11-30 | End: 2018-11-30

## 2018-11-30 RX ORDER — PANTOPRAZOLE SODIUM 20 MG/1
8 TABLET, DELAYED RELEASE ORAL
Qty: 80 | Refills: 0 | Status: DISCONTINUED | OUTPATIENT
Start: 2018-11-30 | End: 2018-11-30

## 2018-11-30 RX ORDER — CALCIUM CHLORIDE
1000 POWDER (GRAM) MISCELLANEOUS ONCE
Qty: 0 | Refills: 0 | Status: COMPLETED | OUTPATIENT
Start: 2018-11-30 | End: 2018-11-30

## 2018-11-30 RX ORDER — PANTOPRAZOLE SODIUM 20 MG/1
8 TABLET, DELAYED RELEASE ORAL
Qty: 80 | Refills: 0 | Status: DISCONTINUED | OUTPATIENT
Start: 2018-11-30 | End: 2018-12-01

## 2018-11-30 RX ORDER — ALBUMIN HUMAN 25 %
250 VIAL (ML) INTRAVENOUS ONCE
Qty: 0 | Refills: 0 | Status: COMPLETED | OUTPATIENT
Start: 2018-11-30 | End: 2018-11-30

## 2018-11-30 RX ORDER — DEXTROSE 50 % IN WATER 50 %
50 SYRINGE (ML) INTRAVENOUS
Qty: 0 | Refills: 0 | Status: DISCONTINUED | OUTPATIENT
Start: 2018-11-30 | End: 2018-12-06

## 2018-11-30 RX ORDER — MAGNESIUM SULFATE 500 MG/ML
2 VIAL (ML) INJECTION
Qty: 0 | Refills: 0 | Status: COMPLETED | OUTPATIENT
Start: 2018-11-30 | End: 2018-11-30

## 2018-11-30 RX ORDER — MEPERIDINE HYDROCHLORIDE 50 MG/ML
25 INJECTION INTRAMUSCULAR; INTRAVENOUS; SUBCUTANEOUS ONCE
Qty: 0 | Refills: 0 | Status: DISCONTINUED | OUTPATIENT
Start: 2018-11-30 | End: 2018-11-30

## 2018-11-30 RX ORDER — POTASSIUM CHLORIDE 20 MEQ
20 PACKET (EA) ORAL
Qty: 0 | Refills: 0 | Status: COMPLETED | OUTPATIENT
Start: 2018-11-30 | End: 2018-11-30

## 2018-11-30 RX ORDER — FENTANYL CITRATE 50 UG/ML
50 INJECTION INTRAVENOUS ONCE
Qty: 0 | Refills: 0 | Status: DISCONTINUED | OUTPATIENT
Start: 2018-11-30 | End: 2018-11-30

## 2018-11-30 RX ORDER — MEPERIDINE HYDROCHLORIDE 50 MG/ML
50 INJECTION INTRAMUSCULAR; INTRAVENOUS; SUBCUTANEOUS ONCE
Qty: 0 | Refills: 0 | Status: DISCONTINUED | OUTPATIENT
Start: 2018-11-30 | End: 2018-11-30

## 2018-11-30 RX ORDER — SODIUM BICARBONATE 1 MEQ/ML
50 SYRINGE (ML) INTRAVENOUS
Qty: 0 | Refills: 0 | Status: COMPLETED | OUTPATIENT
Start: 2018-11-30 | End: 2018-11-30

## 2018-11-30 RX ORDER — PANTOPRAZOLE SODIUM 20 MG/1
40 TABLET, DELAYED RELEASE ORAL EVERY 12 HOURS
Qty: 0 | Refills: 0 | Status: DISCONTINUED | OUTPATIENT
Start: 2018-11-30 | End: 2018-11-30

## 2018-11-30 RX ORDER — SODIUM CHLORIDE 9 MG/ML
1000 INJECTION, SOLUTION INTRAVENOUS
Qty: 0 | Refills: 0 | Status: DISCONTINUED | OUTPATIENT
Start: 2018-11-30 | End: 2018-12-01

## 2018-11-30 RX ORDER — INSULIN HUMAN 100 [IU]/ML
2 INJECTION, SOLUTION SUBCUTANEOUS
Qty: 250 | Refills: 0 | Status: DISCONTINUED | OUTPATIENT
Start: 2018-11-30 | End: 2018-12-06

## 2018-11-30 RX ORDER — VECURONIUM BROMIDE 20 MG/1
10 INJECTION, POWDER, FOR SOLUTION INTRAVENOUS ONCE
Qty: 0 | Refills: 0 | Status: COMPLETED | OUTPATIENT
Start: 2018-11-30 | End: 2018-11-30

## 2018-11-30 RX ADMIN — Medication 50 MILLIEQUIVALENT(S): at 03:34

## 2018-11-30 RX ADMIN — VECURONIUM BROMIDE 10 MILLIGRAM(S): 20 INJECTION, POWDER, FOR SOLUTION INTRAVENOUS at 02:45

## 2018-11-30 RX ADMIN — Medication 50 MILLIEQUIVALENT(S): at 01:40

## 2018-11-30 RX ADMIN — CHLORHEXIDINE GLUCONATE 5 MILLILITER(S): 213 SOLUTION TOPICAL at 05:22

## 2018-11-30 RX ADMIN — MEPERIDINE HYDROCHLORIDE 50 MILLIGRAM(S): 50 INJECTION INTRAMUSCULAR; INTRAVENOUS; SUBCUTANEOUS at 11:10

## 2018-11-30 RX ADMIN — Medication 100 MILLIEQUIVALENT(S): at 02:08

## 2018-11-30 RX ADMIN — Medication 100 MILLIEQUIVALENT(S): at 08:12

## 2018-11-30 RX ADMIN — Medication 1 APPLICATION(S): at 22:11

## 2018-11-30 RX ADMIN — Medication 100 MILLIEQUIVALENT(S): at 21:45

## 2018-11-30 RX ADMIN — Medication 100 MILLIEQUIVALENT(S): at 02:23

## 2018-11-30 RX ADMIN — Medication 250 MILLIGRAM(S): at 01:45

## 2018-11-30 RX ADMIN — PROPOFOL 16.33 MICROGRAM(S)/KG/MIN: 10 INJECTION, EMULSION INTRAVENOUS at 00:43

## 2018-11-30 RX ADMIN — Medication 1000 MILLIGRAM(S): at 02:22

## 2018-11-30 RX ADMIN — Medication 50 GRAM(S): at 04:51

## 2018-11-30 RX ADMIN — Medication 50 GRAM(S): at 04:05

## 2018-11-30 RX ADMIN — VECURONIUM BROMIDE 10 MILLIGRAM(S): 20 INJECTION, POWDER, FOR SOLUTION INTRAVENOUS at 06:15

## 2018-11-30 RX ADMIN — CHLORHEXIDINE GLUCONATE 5 MILLILITER(S): 213 SOLUTION TOPICAL at 02:18

## 2018-11-30 RX ADMIN — Medication 100 MILLIEQUIVALENT(S): at 11:00

## 2018-11-30 RX ADMIN — Medication 125 MILLILITER(S): at 11:30

## 2018-11-30 RX ADMIN — Medication 200 MILLIGRAM(S): at 05:22

## 2018-11-30 RX ADMIN — FENTANYL CITRATE 50 MICROGRAM(S): 50 INJECTION INTRAVENOUS at 20:14

## 2018-11-30 RX ADMIN — Medication 100 MILLIEQUIVALENT(S): at 00:59

## 2018-11-30 RX ADMIN — FENTANYL CITRATE 50 MICROGRAM(S): 50 INJECTION INTRAVENOUS at 06:15

## 2018-11-30 RX ADMIN — FENTANYL CITRATE 50 MICROGRAM(S): 50 INJECTION INTRAVENOUS at 21:45

## 2018-11-30 RX ADMIN — Medication 100 MILLIEQUIVALENT(S): at 03:19

## 2018-11-30 RX ADMIN — Medication 100 MILLIEQUIVALENT(S): at 00:42

## 2018-11-30 RX ADMIN — Medication 8.5 MICROGRAM(S)/KG/MIN: at 00:43

## 2018-11-30 RX ADMIN — Medication 1000 MILLIGRAM(S): at 21:44

## 2018-11-30 RX ADMIN — Medication 100 MILLIEQUIVALENT(S): at 11:45

## 2018-11-30 RX ADMIN — Medication 1 DROP(S): at 05:22

## 2018-11-30 RX ADMIN — FENTANYL CITRATE 50 MICROGRAM(S): 50 INJECTION INTRAVENOUS at 19:59

## 2018-11-30 RX ADMIN — Medication 1 DROP(S): at 13:00

## 2018-11-30 RX ADMIN — Medication 100 MILLIEQUIVALENT(S): at 06:46

## 2018-11-30 RX ADMIN — Medication 200 MILLIGRAM(S): at 00:45

## 2018-11-30 RX ADMIN — CHLORHEXIDINE GLUCONATE 5 MILLILITER(S): 213 SOLUTION TOPICAL at 10:14

## 2018-11-30 RX ADMIN — AMIODARONE HYDROCHLORIDE 33.33 MG/MIN: 400 TABLET ORAL at 00:44

## 2018-11-30 RX ADMIN — Medication 100 MILLIEQUIVALENT(S): at 22:09

## 2018-11-30 RX ADMIN — HEPARIN SODIUM 5 UNIT(S)/HR: 5000 INJECTION INTRAVENOUS; SUBCUTANEOUS at 15:47

## 2018-11-30 RX ADMIN — FENTANYL CITRATE 50 MICROGRAM(S): 50 INJECTION INTRAVENOUS at 21:44

## 2018-11-30 RX ADMIN — MILRINONE LACTATE 13.61 MICROGRAM(S)/KG/MIN: 1 INJECTION, SOLUTION INTRAVENOUS at 00:43

## 2018-11-30 RX ADMIN — CHLORHEXIDINE GLUCONATE 5 MILLILITER(S): 213 SOLUTION TOPICAL at 22:09

## 2018-11-30 RX ADMIN — Medication 50 MILLIEQUIVALENT(S): at 03:33

## 2018-11-30 RX ADMIN — Medication 100 MILLIEQUIVALENT(S): at 20:43

## 2018-11-30 RX ADMIN — CHLORHEXIDINE GLUCONATE 5 MILLILITER(S): 213 SOLUTION TOPICAL at 14:43

## 2018-11-30 RX ADMIN — FENTANYL CITRATE 50 MICROGRAM(S): 50 INJECTION INTRAVENOUS at 03:04

## 2018-11-30 RX ADMIN — Medication 50 MILLIEQUIVALENT(S): at 01:41

## 2018-11-30 RX ADMIN — MEPERIDINE HYDROCHLORIDE 25 MILLIGRAM(S): 50 INJECTION INTRAMUSCULAR; INTRAVENOUS; SUBCUTANEOUS at 14:00

## 2018-11-30 RX ADMIN — Medication 1 DROP(S): at 01:00

## 2018-11-30 RX ADMIN — EPINEPHRINE 10 MICROGRAM(S)/KG/MIN: 0.3 INJECTION INTRAMUSCULAR; SUBCUTANEOUS at 00:43

## 2018-11-30 RX ADMIN — Medication 1 DROP(S): at 17:17

## 2018-11-30 RX ADMIN — Medication 1 DROP(S): at 22:13

## 2018-11-30 RX ADMIN — Medication 200 MILLIGRAM(S): at 17:14

## 2018-11-30 RX ADMIN — Medication 125 MILLILITER(S): at 04:04

## 2018-11-30 RX ADMIN — Medication 125 MILLILITER(S): at 14:40

## 2018-11-30 RX ADMIN — Medication 1 APPLICATION(S): at 19:04

## 2018-11-30 RX ADMIN — FENTANYL CITRATE 50 MICROGRAM(S): 50 INJECTION INTRAVENOUS at 02:48

## 2018-11-30 RX ADMIN — CHLORHEXIDINE GLUCONATE 5 MILLILITER(S): 213 SOLUTION TOPICAL at 17:16

## 2018-11-30 RX ADMIN — Medication 100 MILLIEQUIVALENT(S): at 10:16

## 2018-11-30 RX ADMIN — Medication 250 MILLIGRAM(S): at 14:39

## 2018-11-30 NOTE — PROCEDURE NOTE - PROCEDURE
<<-----Click on this checkbox to enter Procedure Royce Samuelz placement  11/30/2018    Active  [email or web address]  Central line placement  11/30/2018    Active  [email or web address]

## 2018-11-30 NOTE — DIETITIAN INITIAL EVALUATION ADULT. - NS AS NUTRI INTERV MEALS SNACK
If able to extubate advance po diet as medically feasible: clear liquid, cho cons -> cho cons, DASH/TLC.

## 2018-11-30 NOTE — PROGRESS NOTE ADULT - PROBLEM SELECTOR PLAN 1
CABG x 4 CABG x 4  ECMO and IABP in place, chest open  Continue paralytic and sedation while chest open  Continue inotropic support with epi, primacor and levophed  Continue amiodarone gtt, pt remains in NSR CABG x 5  ECMO and IABP in place, chest open  Continue paralytic and sedation while chest open  Continue inotropic support with epi, primacor and levophed  Continue amiodarone gtt, pt remains in NSR

## 2018-11-30 NOTE — DIETITIAN INITIAL EVALUATION ADULT. - NS AS NUTRI INTERV ENTERAL NUTRITION
If unable to extubate, initiate enteral feeds of Glucerna 1.5cal at 30ml/hr and increase by 10ml/hr every 8 hours to goal rate 80ml/hr x 20 hours to provide 2400kcal and 132 grams protein daily.

## 2018-11-30 NOTE — PROGRESS NOTE ADULT - PROBLEM SELECTOR PLAN 2
Repaired.  CABG x 4 with 2 SVGs to LAD  Monitor troponins, CKs, ECGs Repaired.  CABG x 5 with 2 SVGs to LAD  Monitor troponins, CKs, ECGs Repaired.  CABG x 4  Monitor troponins, CKs, ECGs

## 2018-11-30 NOTE — PROGRESS NOTE ADULT - SUBJECTIVE AND OBJECTIVE BOX
CARDIOLOGY PROGRESS NOTE   ([facility])                             Reason for follow up: Anterior STEMI , multivessel disease, s/p cardiac arrest, cardiogenic danae                            Overnight: CABG x 3 LAD,OM, Diagonal , RPDA all SVG, open chest, ECMO and IABP    Subjective: followed commands off sedation as per report, sedated currently  Review of symptoms: unable to obtain      Past medical history: No updates.     	  Vitals:  T(C): 37.2 (11-30-18 @ 16:00), Max: 37.2 (11-30-18 @ 16:00)  HR: 88 (11-30-18 @ 16:45) (55 - 104)  BP: 103/71 (11-30-18 @ 15:30) (89/55 - 121/83)  RR: 14 (11-30-18 @ 16:00) (7 - 20)  SpO2: 100% (11-30-18 @ 17:29) (98% - 100%)  Wt(kg): --  I&O's Summary    29 Nov 2018 07:01  -  30 Nov 2018 07:00  --------------------------------------------------------  IN: 3273.3 mL / OUT: 5865 mL / NET: -2591.7 mL    30 Nov 2018 07:01  -  30 Nov 2018 17:34  --------------------------------------------------------  IN: 2843.6 mL / OUT: 1825 mL / NET: 1018.6 mL      Weight (kg): 90.7 (11-29 @ 10:50)    PHYSICAL EXAM:  Appearance: intubated , sedated  HEENT:  Head and neck: Atraumatic. Normocephalic.  Normal oral mucosa  Neck: swan being placed.  Neurologic: sedated,   Lymphatic: No cervical lymphadenopathy  Cardiovascular: Open chest   Respiratory: unable to exam, open chest  Gastrointestinal:  soft , non distended  Lower Extremities: palpable pulses x 4   Psychiatry: Patient is calm. No agitation. Mood & affect appropriate  Skin: No rashes, No ecchymoses, No cyanosis    CURRENT MEDICATIONS:  amiodarone Infusion 1 mG/Min IV Continuous <Continuous>  amiodarone Infusion 0.5 mG/Min IV Continuous <Continuous>  amiodarone IVPB 150 milliGRAM(s) IV Intermittent once  EPINEPHrine    Infusion 0.029 MICROgram(s)/kG/Min IV Continuous <Continuous>  milrinone Infusion 0.5 MICROgram(s)/kG/Min IV Continuous <Continuous>  norepinephrine Infusion 0.05 MICROgram(s)/kG/Min IV Continuous <Continuous>    ciprofloxacin   IVPB  ciprofloxacin   IVPB  vancomycin  IVPB  meperidine     Injectable  propofol Infusion  pantoprazole Infusion  dextrose 50% Injectable  insulin Infusion  artificial  tears Solution  chlorhexidine 0.12% Liquid  heparin  Infusion  petrolatum Ophthalmic Ointment  potassium chloride  10 mEq/50 mL IVPB  potassium chloride  10 mEq/50 mL IVPB  potassium chloride  10 mEq/50 mL IVPB  sodium chloride 0.45%.  sodium chloride 0.9%.      LABS:	 	                            9.5    13.3  )-----------( 165      ( 30 Nov 2018 13:19 )             26.2     11-30    145  |  106  |  13.0  ----------------------------<  195<H>  4.5   |  21.0<L>  |  1.04    Ca    8.4<L>      30 Nov 2018 13:19  Phos  2.7     11-30  Mg     2.0     11-30    TPro  4.9<L>  /  Alb  3.0<L>  /  TBili  2.0  /  DBili  x   /  AST  325<H>  /  ALT  35  /  AlkPhos  43  11-29    proBNP:   Lipid Profile:   HgA1c: Hemoglobin A1C, Whole Blood: 5.7 %  TSH:       DIAGNOSTIC TESTING:  [ ] Echocardiogram:  [ ]  Catheterization:  LM 40%, %, CX 99%, RCA distal 99% , s/p PCI and ITA to LAD and D2 but LAD stent thrombosed following deployment of the diagonal stent. Then a proximal extension of LAD thrombus to the left main

## 2018-11-30 NOTE — DIETITIAN INITIAL EVALUATION ADULT. - OTHER INFO
Pt s/p emergent CABG, currently paralyzed and sedated with open chest, ECMO and IABP. NPO with NGT to suction.

## 2018-11-30 NOTE — PROGRESS NOTE ADULT - ASSESSMENT
46 y/o male presented with anterior STEMI , s/p coronary angiogram showing severe multivessel disease with LAD being culprit 100%.   Primary PCI to LAD and D2 attempted but LAD stent thrombosed following deployment of diagonal stent. This was followed by proximal thrombus extension into the left main   Impella CP placed and patient sent for emergency CABG . Had Vfib arrest in the lab shocked x 1 and another vfib arrest in OR managed by CPR followed by sternotomy, direct cardiac massage and then placed on Bypass  CABG with SVG x 4 to LAD, D2, OM and RPDA   ACute pulmonary edema during PCI and in OR ,   not able to come off pump and placed on ECMO with open chest (central cannulation)   Currently on epi, levophed, milrinone, amiodarone. and sedation/paralysis   Good UOP   Lactate 5.9  Followed commands in the morning   ECMO flow 4.5 L  Echo preliminary EF 15-20%   Has IABP through the 14Fr sheath    Plan:   continue ECMO , ventilatory support and inotropic/pressors support for the next 1-2 days  will take to OR on Monday for wash and probable decanulation if hemodynamically stable +/- Imeplla support if needed to wean from ECMO ( CP or 5.0 impella)   No ACEI or BB due to cardiogenic  ASA 81 if possible   discussed with Dr. Wills and CTICU team.  discussed with family critical prognosis and update on development since yesterday

## 2018-11-30 NOTE — PROGRESS NOTE ADULT - PROBLEM SELECTOR PLAN 4
Pt was being treated for groin staph infection with doxy prior to admission.  Rx was close to completion.  Currently no visible active groin infection.

## 2018-11-30 NOTE — PROGRESS NOTE ADULT - ASSESSMENT
48 yo Male c/o CP for 2-3 days prior to today, then 10/10 chest pain approximately 9:30 am today, followed by vomiting.  Wife drove pt to hospital, ruled in for STEMI, urgent cath with attempted bifurcating stent to LAD/D2.  Stent clotted, attempt to re stent 48 yo Male c/o CP for 2-3 days prior to today, then 10/10 chest pain approximately 9:30 am today, followed by vomiting.  Wife drove pt to hospital, ruled in for STEMI. Urgent Cath, pt found to have multiple occlusions; stent to LAD and D2; stent to LAD thrombosed, left main dissected; impella placed for support.  Pt then had episode of unstable VT, requiring CPR, Shock x 1 46 yo Male c/o CP for 2-3 days prior to today, then 10/10 chest pain approximately 9:30 am today, followed by vomiting.  Wife drove pt to hospital, ruled in for STEMI. Urgent Cath, pt found to have multiple occlusions; stent to LAD and D2; stent to LAD thrombosed, left main dissected; impella placed for support.  Pt then had VF arrest, shock x 1, return to NSR.  Upon transfer to OR for Urgent CABG, pt again VF arrest, chest opened intra-op with CPR in progress. 46 yo Male c/o CP for 2-3 days prior to today, then 10/10 chest pain approximately 9:30 am today, followed by vomiting.  Wife drove pt to hospital, ruled in for STEMI. Urgent Cath, pt found to have multiple occlusions; stent to LAD and D2; stent to LAD thrombosed, left main dissected; impella placed for support.  Pt then had VF arrest, shock x 1, return to NSR.  Upon transfer to OR for Urgent CABG, pt again VF arrest, chest opened intra-op with CPR in progress.    Patient then underwent ECMO (extracorporeal membrane oxygenation)  11/29/2018  Secondary to pre-op cardiogenic shock and inability to wean from CPB. In addition to 11/29/2018  Emergent CABG X 4 all vein to the LAD, Diag, OM and PDA with inability to wean from CPB therefore requiring ECMO and IABP.  Post-operatively, he has been stable.  4 units RBCs transfused for Hct 25.  Coags WNL, pt currently not bleeding, therefore no products given in ICU.  He is currently maintained on Epi, Primacor, Levophed and Amio gtts in NSR.  A persistent lactermia is now trending down.  Insulin gtt for glucose management delayed initially until severe hypokalemia was adequately treated.  Renal function remains normal with brisk urine output.  NGT output is consistent with dark blood, protonix gtt started. 48 yo Male c/o CP for 2-3 days prior to today, then 10/10 chest pain approximately 9:30 am today, followed by vomiting.  Wife drove pt to hospital, ruled in for STEMI. Urgent Cath, pt found to have multiple occlusions; stent to LAD and D2; stent to LAD thrombosed, left main dissected; impella placed for support.  Pt then had VF arrest, shock x 1, return to NSR.  Upon transfer to OR for Urgent CABG, pt again VF arrest, chest opened intra-op with CPR in progress.    Patient then underwent ECMO (extracorporeal membrane oxygenation)  11/29/2018  Secondary to pre-op cardiogenic shock and inability to wean from CPB. In addition to 11/29/2018  Emergent CABG X 4 all vein to the LAD, Diag, OM and PDA with inability to wean from CPB therefore requiring ECMO and IABP, followed by second SVG to LAD for total CABG x 5.  Post-operatively, he has been stable.  4 units RBCs transfused for Hct 25.  Coags WNL, pt currently not bleeding, therefore no products given in ICU.  He is currently maintained on Epi, Primacor, Levophed and Amio gtts in NSR.  A persistent lactermia is now trending down.  Insulin gtt for glucose management delayed initially until severe hypokalemia was adequately treated.  Renal function remains normal with brisk urine output.  NGT output is consistent with dark blood, protonix gtt started.

## 2018-11-30 NOTE — PROGRESS NOTE ADULT - SUBJECTIVE AND OBJECTIVE BOX
Subjective:  48yo Male s/p Emergent CABG today currently paralyzed and sedated with open chest, ECMO and IABP.    Past Medical History:  ST elevation myocardial infarction (STEMI)  Family history of myocardial infarction (Mother)  No pertinent family history in first degree relatives  MEWS Score  Staph infection  No pertinent past medical history  Cardiogenic shock  ST elevation myocardial infarction (STEMI), unspecified artery  Ventricular fibrillation  Coronary artery disease involving native coronary artery of native heart with unstable angina pectoris  Dissection of left main coronary artery  ST elevation myocardial infarction involving left anterior descending (LAD) coronary artery  STEMI (ST elevation myocardial infarction)  CABG  ECMO (extracorporeal membrane oxygenation)  No significant past surgical history  CHEST PAIN  90+        amiodarone Infusion 1 mG/Min IV Continuous <Continuous>  amiodarone Infusion 0.5 mG/Min IV Continuous <Continuous>  amiodarone IVPB 150 milliGRAM(s) IV Intermittent once  artificial  tears Solution 1 Drop(s) Both EYES four times a day  chlorhexidine 0.12% Liquid 5 milliLiter(s) Oral Mucosa every 4 hours  ciprofloxacin   IVPB 400 milliGRAM(s) IV Intermittent every 12 hours  ciprofloxacin   IVPB      dextrose 50% Injectable 50 milliLiter(s) IV Push every 15 minutes  EPINEPHrine    Infusion 0.029 MICROgram(s)/kG/Min IV Continuous <Continuous>  fentaNYL    Injectable 50 MICROGram(s) IV Push every 1 hour PRN  insulin Infusion 2 Unit(s)/Hr IV Continuous <Continuous>  meperidine     Injectable 25 milliGRAM(s) IV Push once  milrinone Infusion 0.5 MICROgram(s)/kG/Min IV Continuous <Continuous>  norepinephrine Infusion 0.05 MICROgram(s)/kG/Min IV Continuous <Continuous>  pantoprazole Infusion 8 mG/Hr IV Continuous <Continuous>  petrolatum white Ointment 1 Application(s) Topical four times a day PRN  potassium chloride  10 mEq/50 mL IVPB 10 milliEquivalent(s) IV Intermittent every 1 hour  potassium chloride  10 mEq/50 mL IVPB 10 milliEquivalent(s) IV Intermittent every 1 hour  potassium chloride  10 mEq/50 mL IVPB 10 milliEquivalent(s) IV Intermittent every 1 hour  propofol Infusion 30 MICROgram(s)/kG/Min IV Continuous <Continuous>  sodium chloride 0.9%. 1000 milliLiter(s) IV Continuous <Continuous>  vancomycin  IVPB 1000 milliGRAM(s) IV Intermittent every 12 hours  MEDICATIONS  (PRN):  fentaNYL    Injectable 50 MICROGram(s) IV Push every 1 hour PRN on request  petrolatum white Ointment 1 Application(s) Topical four times a day PRN dry lips    Height (cm): 175.26 (11-29 @ 10:50)  Weight (kg): 90.7 (11-29 @ 10:50)  BMI (kg/m2): 29.5 (11-29 @ 10:50)  BSA (m2): 2.07 (11-29 @ 10:50)Mode: AC/ CMV (Assist Control/ Continuous Mandatory Ventilation), RR (machine): 14, TV (machine): 800, FiO2: 40, PEEP: 5, MAP: 11, PIP: 29  Daily Height in cm: 175.26 (29 Nov 2018 10:50)    Daily     Mode: AC/ CMV (Assist Control/ Continuous Mandatory Ventilation), RR (machine): 14, TV (machine): 800, FiO2: 40, PEEP: 5, MAP: 11, PIP: 29    ABG - ( 30 Nov 2018 03:15 )  pH, Arterial: 7.30  pH, Blood: x     /  pCO2: 36    /  pO2: 376   / HCO3: 18    / Base Excess: -8.0  /  SaO2: 100                                     11.5   10.4  )-----------( 205      ( 30 Nov 2018 03:15 )             34.1   11-30    144  |  104  |  12.0  ----------------------------<  269<H>  4.6   |  17.0<L>  |  1.07    Ca    9.1      30 Nov 2018 03:15  Phos  2.6     11-29  Mg     1.6     11-30    TPro  4.9<L>  /  Alb  3.0<L>  /  TBili  2.0  /  DBili  x   /  AST  325<H>  /  ALT  35  /  AlkPhos  43  11-29    CARDIAC MARKERS ( 29 Nov 2018 23:24 )  x     / >25.00 ng/mL / 4403 U/L / x     / 514.9 ng/mL    PT/INR - ( 30 Nov 2018 03:15 )   PT: 14.7 sec;   INR: 1.27 ratio         PTT - ( 30 Nov 2018 03:15 )  PTT:30.5 sec      Objective:  T(C): 36.6 (11-30-18 @ 04:00), Max: 36.7 (11-29-18 @ 22:47)  HR: 73 (11-30-18 @ 04:30) (68 - 108)  BP: 111/77 (11-30-18 @ 04:15) (111/77 - 121/83)  RR: 14 (11-30-18 @ 04:30) (14 - 22)  SpO2: 100% (11-30-18 @ 04:30) (94% - 100%)  Wt(kg): --CAPILLARY BLOOD GLUCOSE      POCT Blood Glucose.: 220 mg/dL (30 Nov 2018 04:17)  POCT Blood Glucose.: 205 mg/dL (30 Nov 2018 00:01)  I&O's Summary    29 Nov 2018 07:01  -  30 Nov 2018 05:09  --------------------------------------------------------  IN: 2644.3 mL / OUT: 4675 mL / NET: -2030.7 mL        Lines:  Central ECMO, R Fem IABP through 14F Impella introducer, R Fem Venous sheath, L Fem intoducer, A-line transducing off IABP sideport    Linda:  yes    Physical Exam:  Neuro: Paralyzed and sedated, unable to assess  Pulm:  +BS  CV: ECMO, open chest, NSR  Abd: soft nt/nd  Ext: + palpable pulses x 4 exts,

## 2018-11-30 NOTE — PROCEDURE NOTE - PROCEDURE
<<-----Click on this checkbox to enter Procedure Arterial cannulation  11/30/2018  brandy right radial  Active  MPICCININI

## 2018-12-01 LAB
ALBUMIN SERPL ELPH-MCNC: 2.8 G/DL — LOW (ref 3.3–5.2)
ALP SERPL-CCNC: 37 U/L — LOW (ref 40–120)
ALT FLD-CCNC: 39 U/L — SIGNIFICANT CHANGE UP
ANION GAP SERPL CALC-SCNC: 11 MMOL/L — SIGNIFICANT CHANGE UP (ref 5–17)
ANION GAP SERPL CALC-SCNC: 14 MMOL/L — SIGNIFICANT CHANGE UP (ref 5–17)
APTT BLD: 34.7 SEC — SIGNIFICANT CHANGE UP (ref 27.5–36.3)
APTT BLD: 45.6 SEC — HIGH (ref 27.5–36.3)
AST SERPL-CCNC: 200 U/L — HIGH
BASE EXCESS BLDV CALC-SCNC: 3.2 MMOL/L — HIGH (ref -2–2)
BILIRUB DIRECT SERPL-MCNC: 0.3 MG/DL — SIGNIFICANT CHANGE UP (ref 0–0.3)
BILIRUB INDIRECT FLD-MCNC: 0.6 MG/DL — SIGNIFICANT CHANGE UP (ref 0.2–1)
BILIRUB SERPL-MCNC: 0.9 MG/DL — SIGNIFICANT CHANGE UP (ref 0.4–2)
BUN SERPL-MCNC: 11 MG/DL — SIGNIFICANT CHANGE UP (ref 8–20)
BUN SERPL-MCNC: 13 MG/DL — SIGNIFICANT CHANGE UP (ref 8–20)
CA-I SERPL-SCNC: 1.18 MMOL/L — SIGNIFICANT CHANGE UP (ref 1.15–1.33)
CALCIUM SERPL-MCNC: 8.1 MG/DL — LOW (ref 8.6–10.2)
CALCIUM SERPL-MCNC: 8.6 MG/DL — SIGNIFICANT CHANGE UP (ref 8.6–10.2)
CHLORIDE BLDV-SCNC: 106 MMOL/L — SIGNIFICANT CHANGE UP (ref 98–107)
CHLORIDE SERPL-SCNC: 104 MMOL/L — SIGNIFICANT CHANGE UP (ref 98–107)
CHLORIDE SERPL-SCNC: 107 MMOL/L — SIGNIFICANT CHANGE UP (ref 98–107)
CO2 SERPL-SCNC: 22 MMOL/L — SIGNIFICANT CHANGE UP (ref 22–29)
CO2 SERPL-SCNC: 24 MMOL/L — SIGNIFICANT CHANGE UP (ref 22–29)
CREAT SERPL-MCNC: 0.62 MG/DL — SIGNIFICANT CHANGE UP (ref 0.5–1.3)
CREAT SERPL-MCNC: 0.83 MG/DL — SIGNIFICANT CHANGE UP (ref 0.5–1.3)
GAS PNL BLDA: SIGNIFICANT CHANGE UP
GAS PNL BLDV: 139 MMOL/L — SIGNIFICANT CHANGE UP (ref 135–145)
GAS PNL BLDV: SIGNIFICANT CHANGE UP
GAS PNL BLDV: SIGNIFICANT CHANGE UP
GLUCOSE BLDC GLUCOMTR-MCNC: 115 MG/DL — HIGH (ref 70–99)
GLUCOSE BLDC GLUCOMTR-MCNC: 115 MG/DL — HIGH (ref 70–99)
GLUCOSE BLDC GLUCOMTR-MCNC: 118 MG/DL — HIGH (ref 70–99)
GLUCOSE BLDC GLUCOMTR-MCNC: 118 MG/DL — HIGH (ref 70–99)
GLUCOSE BLDC GLUCOMTR-MCNC: 126 MG/DL — HIGH (ref 70–99)
GLUCOSE BLDC GLUCOMTR-MCNC: 137 MG/DL — HIGH (ref 70–99)
GLUCOSE BLDC GLUCOMTR-MCNC: 140 MG/DL — HIGH (ref 70–99)
GLUCOSE BLDC GLUCOMTR-MCNC: 141 MG/DL — HIGH (ref 70–99)
GLUCOSE BLDC GLUCOMTR-MCNC: 149 MG/DL — HIGH (ref 70–99)
GLUCOSE BLDC GLUCOMTR-MCNC: 149 MG/DL — HIGH (ref 70–99)
GLUCOSE BLDC GLUCOMTR-MCNC: 153 MG/DL — HIGH (ref 70–99)
GLUCOSE BLDC GLUCOMTR-MCNC: 154 MG/DL — HIGH (ref 70–99)
GLUCOSE BLDC GLUCOMTR-MCNC: 156 MG/DL — HIGH (ref 70–99)
GLUCOSE BLDC GLUCOMTR-MCNC: 158 MG/DL — HIGH (ref 70–99)
GLUCOSE BLDC GLUCOMTR-MCNC: 170 MG/DL — HIGH (ref 70–99)
GLUCOSE BLDC GLUCOMTR-MCNC: 92 MG/DL — SIGNIFICANT CHANGE UP (ref 70–99)
GLUCOSE BLDV-MCNC: 148 MG/DL — HIGH (ref 70–99)
GLUCOSE SERPL-MCNC: 117 MG/DL — HIGH (ref 70–115)
GLUCOSE SERPL-MCNC: 153 MG/DL — HIGH (ref 70–115)
HCO3 BLDV-SCNC: 27 MMOL/L — SIGNIFICANT CHANGE UP (ref 21–29)
HCT VFR BLD CALC: 27.6 % — LOW (ref 42–52)
HCT VFR BLD CALC: 28.4 % — LOW (ref 42–52)
HCT VFR BLD CALC: 29.3 % — LOW (ref 42–52)
HCT VFR BLDA CALC: 31 — LOW (ref 39–50)
HGB BLD CALC-MCNC: 10.1 G/DL — LOW (ref 13–17)
HGB BLD-MCNC: 10.2 G/DL — LOW (ref 14–18)
HGB BLD-MCNC: 9.6 G/DL — LOW (ref 14–18)
HGB BLD-MCNC: 9.8 G/DL — LOW (ref 14–18)
INR BLD: 1.34 RATIO — HIGH (ref 0.88–1.16)
LACTATE BLDV-MCNC: 2.2 MMOL/L — HIGH (ref 0.5–2)
MAGNESIUM SERPL-MCNC: 1.9 MG/DL — SIGNIFICANT CHANGE UP (ref 1.6–2.6)
MAGNESIUM SERPL-MCNC: 2.3 MG/DL — SIGNIFICANT CHANGE UP (ref 1.8–2.6)
MCHC RBC-ENTMCNC: 28.3 PG — SIGNIFICANT CHANGE UP (ref 27–31)
MCHC RBC-ENTMCNC: 28.4 PG — SIGNIFICANT CHANGE UP (ref 27–31)
MCHC RBC-ENTMCNC: 29.1 PG — SIGNIFICANT CHANGE UP (ref 27–31)
MCHC RBC-ENTMCNC: 34.5 G/DL — SIGNIFICANT CHANGE UP (ref 32–36)
MCHC RBC-ENTMCNC: 34.8 G/DL — SIGNIFICANT CHANGE UP (ref 32–36)
MCHC RBC-ENTMCNC: 34.8 G/DL — SIGNIFICANT CHANGE UP (ref 32–36)
MCV RBC AUTO: 81.2 FL — SIGNIFICANT CHANGE UP (ref 80–94)
MCV RBC AUTO: 82.3 FL — SIGNIFICANT CHANGE UP (ref 80–94)
MCV RBC AUTO: 83.6 FL — SIGNIFICANT CHANGE UP (ref 80–94)
OTHER CELLS CSF MANUAL: 11 ML/DL — LOW (ref 18–22)
PCO2 BLDV: 44 MMHG — SIGNIFICANT CHANGE UP (ref 35–50)
PH BLDV: 7.42 — SIGNIFICANT CHANGE UP (ref 7.32–7.43)
PLATELET # BLD AUTO: 67 K/UL — LOW (ref 150–400)
PLATELET # BLD AUTO: 75 K/UL — LOW (ref 150–400)
PLATELET # BLD AUTO: 98 K/UL — LOW (ref 150–400)
PO2 BLDV: 42 MMHG — SIGNIFICANT CHANGE UP (ref 25–45)
POTASSIUM BLDV-SCNC: 4.1 MMOL/L — SIGNIFICANT CHANGE UP (ref 3.4–4.5)
POTASSIUM SERPL-MCNC: 4.1 MMOL/L — SIGNIFICANT CHANGE UP (ref 3.5–5.3)
POTASSIUM SERPL-MCNC: 4.6 MMOL/L — SIGNIFICANT CHANGE UP (ref 3.5–5.3)
POTASSIUM SERPL-SCNC: 4.1 MMOL/L — SIGNIFICANT CHANGE UP (ref 3.5–5.3)
POTASSIUM SERPL-SCNC: 4.6 MMOL/L — SIGNIFICANT CHANGE UP (ref 3.5–5.3)
PROT SERPL-MCNC: 4.7 G/DL — LOW (ref 6.6–8.7)
PROTHROM AB SERPL-ACNC: 15.6 SEC — HIGH (ref 10–12.9)
RBC # BLD: 3.3 M/UL — LOW (ref 4.6–6.2)
RBC # BLD: 3.45 M/UL — LOW (ref 4.6–6.2)
RBC # BLD: 3.61 M/UL — LOW (ref 4.6–6.2)
RBC # FLD: 17.2 % — HIGH (ref 11–15.6)
RBC # FLD: 17.3 % — HIGH (ref 11–15.6)
RBC # FLD: 17.3 % — HIGH (ref 11–15.6)
SAO2 % BLDV: 80 % — SIGNIFICANT CHANGE UP
SODIUM SERPL-SCNC: 139 MMOL/L — SIGNIFICANT CHANGE UP (ref 135–145)
SODIUM SERPL-SCNC: 143 MMOL/L — SIGNIFICANT CHANGE UP (ref 135–145)
TROPONIN T SERPL-MCNC: 5.51 NG/ML — HIGH (ref 0–0.06)
WBC # BLD: 10 K/UL — SIGNIFICANT CHANGE UP (ref 4.8–10.8)
WBC # BLD: 11.3 K/UL — HIGH (ref 4.8–10.8)
WBC # BLD: 9.1 K/UL — SIGNIFICANT CHANGE UP (ref 4.8–10.8)
WBC # FLD AUTO: 10 K/UL — SIGNIFICANT CHANGE UP (ref 4.8–10.8)
WBC # FLD AUTO: 11.3 K/UL — HIGH (ref 4.8–10.8)
WBC # FLD AUTO: 9.1 K/UL — SIGNIFICANT CHANGE UP (ref 4.8–10.8)

## 2018-12-01 PROCEDURE — 71045 X-RAY EXAM CHEST 1 VIEW: CPT | Mod: 26

## 2018-12-01 RX ORDER — MAGNESIUM SULFATE 500 MG/ML
2 VIAL (ML) INJECTION ONCE
Qty: 0 | Refills: 0 | Status: COMPLETED | OUTPATIENT
Start: 2018-12-01 | End: 2018-12-01

## 2018-12-01 RX ORDER — POTASSIUM CHLORIDE 20 MEQ
10 PACKET (EA) ORAL
Qty: 0 | Refills: 0 | Status: COMPLETED | OUTPATIENT
Start: 2018-12-01 | End: 2018-12-01

## 2018-12-01 RX ORDER — PANTOPRAZOLE SODIUM 20 MG/1
40 TABLET, DELAYED RELEASE ORAL EVERY 12 HOURS
Qty: 0 | Refills: 0 | Status: DISCONTINUED | OUTPATIENT
Start: 2018-12-01 | End: 2018-12-06

## 2018-12-01 RX ORDER — FENTANYL CITRATE 50 UG/ML
50 INJECTION INTRAVENOUS ONCE
Qty: 0 | Refills: 0 | Status: DISCONTINUED | OUTPATIENT
Start: 2018-12-01 | End: 2018-12-01

## 2018-12-01 RX ORDER — SODIUM CHLORIDE 9 MG/ML
1000 INJECTION INTRAMUSCULAR; INTRAVENOUS; SUBCUTANEOUS
Qty: 0 | Refills: 0 | Status: DISCONTINUED | OUTPATIENT
Start: 2018-12-01 | End: 2018-12-06

## 2018-12-01 RX ORDER — ASPIRIN/CALCIUM CARB/MAGNESIUM 324 MG
325 TABLET ORAL DAILY
Qty: 0 | Refills: 0 | Status: DISCONTINUED | OUTPATIENT
Start: 2018-12-01 | End: 2018-12-06

## 2018-12-01 RX ORDER — FENTANYL CITRATE 50 UG/ML
50 INJECTION INTRAVENOUS
Qty: 0 | Refills: 0 | Status: DISCONTINUED | OUTPATIENT
Start: 2018-12-01 | End: 2018-12-06

## 2018-12-01 RX ORDER — ATORVASTATIN CALCIUM 80 MG/1
80 TABLET, FILM COATED ORAL AT BEDTIME
Qty: 0 | Refills: 0 | Status: DISCONTINUED | OUTPATIENT
Start: 2018-12-01 | End: 2018-12-06

## 2018-12-01 RX ADMIN — Medication 250 MILLIGRAM(S): at 02:15

## 2018-12-01 RX ADMIN — Medication 1 DROP(S): at 05:06

## 2018-12-01 RX ADMIN — FENTANYL CITRATE 50 MICROGRAM(S): 50 INJECTION INTRAVENOUS at 18:05

## 2018-12-01 RX ADMIN — Medication 1 APPLICATION(S): at 14:14

## 2018-12-01 RX ADMIN — FENTANYL CITRATE 50 MICROGRAM(S): 50 INJECTION INTRAVENOUS at 18:45

## 2018-12-01 RX ADMIN — CHLORHEXIDINE GLUCONATE 5 MILLILITER(S): 213 SOLUTION TOPICAL at 18:52

## 2018-12-01 RX ADMIN — Medication 1 DROP(S): at 18:44

## 2018-12-01 RX ADMIN — Medication 250 MILLIGRAM(S): at 14:13

## 2018-12-01 RX ADMIN — FENTANYL CITRATE 50 MICROGRAM(S): 50 INJECTION INTRAVENOUS at 10:18

## 2018-12-01 RX ADMIN — PANTOPRAZOLE SODIUM 40 MILLIGRAM(S): 20 TABLET, DELAYED RELEASE ORAL at 18:49

## 2018-12-01 RX ADMIN — Medication 1 APPLICATION(S): at 05:08

## 2018-12-01 RX ADMIN — ATORVASTATIN CALCIUM 80 MILLIGRAM(S): 80 TABLET, FILM COATED ORAL at 22:15

## 2018-12-01 RX ADMIN — CHLORHEXIDINE GLUCONATE 5 MILLILITER(S): 213 SOLUTION TOPICAL at 05:09

## 2018-12-01 RX ADMIN — CHLORHEXIDINE GLUCONATE 5 MILLILITER(S): 213 SOLUTION TOPICAL at 02:15

## 2018-12-01 RX ADMIN — Medication 1 DROP(S): at 12:22

## 2018-12-01 RX ADMIN — Medication 50 GRAM(S): at 04:19

## 2018-12-01 RX ADMIN — FENTANYL CITRATE 50 MICROGRAM(S): 50 INJECTION INTRAVENOUS at 00:45

## 2018-12-01 RX ADMIN — FENTANYL CITRATE 50 MICROGRAM(S): 50 INJECTION INTRAVENOUS at 22:24

## 2018-12-01 RX ADMIN — Medication 100 MILLIEQUIVALENT(S): at 05:08

## 2018-12-01 RX ADMIN — FENTANYL CITRATE 50 MICROGRAM(S): 50 INJECTION INTRAVENOUS at 15:18

## 2018-12-01 RX ADMIN — FENTANYL CITRATE 50 MICROGRAM(S): 50 INJECTION INTRAVENOUS at 10:30

## 2018-12-01 RX ADMIN — FENTANYL CITRATE 50 MICROGRAM(S): 50 INJECTION INTRAVENOUS at 15:50

## 2018-12-01 RX ADMIN — FENTANYL CITRATE 50 MICROGRAM(S): 50 INJECTION INTRAVENOUS at 20:24

## 2018-12-01 RX ADMIN — Medication 325 MILLIGRAM(S): at 18:49

## 2018-12-01 RX ADMIN — Medication 100 MILLIEQUIVALENT(S): at 06:34

## 2018-12-01 RX ADMIN — Medication 100 MILLIEQUIVALENT(S): at 07:00

## 2018-12-01 RX ADMIN — Medication 100 MILLIEQUIVALENT(S): at 04:18

## 2018-12-01 RX ADMIN — Medication 200 MILLIGRAM(S): at 17:23

## 2018-12-01 RX ADMIN — Medication 200 MILLIGRAM(S): at 05:09

## 2018-12-01 RX ADMIN — FENTANYL CITRATE 50 MICROGRAM(S): 50 INJECTION INTRAVENOUS at 20:19

## 2018-12-01 RX ADMIN — CHLORHEXIDINE GLUCONATE 5 MILLILITER(S): 213 SOLUTION TOPICAL at 22:16

## 2018-12-01 RX ADMIN — CHLORHEXIDINE GLUCONATE 5 MILLILITER(S): 213 SOLUTION TOPICAL at 14:13

## 2018-12-01 RX ADMIN — Medication 1 APPLICATION(S): at 22:15

## 2018-12-01 RX ADMIN — CHLORHEXIDINE GLUCONATE 5 MILLILITER(S): 213 SOLUTION TOPICAL at 10:07

## 2018-12-01 RX ADMIN — FENTANYL CITRATE 50 MICROGRAM(S): 50 INJECTION INTRAVENOUS at 23:47

## 2018-12-01 NOTE — PROGRESS NOTE ADULT - PROBLEM SELECTOR PLAN 1
CABG x 4  ECMO and IABP in place, chest open  Continue paralytic and sedation while chest open  Continue inotropic support with epi, primacor and levophed  Continue amiodarone gtt, pt remains in NSR CABG X 4  ECMO and IABP in place, chest open  Paralytic d/c'd, continue gentle sedation while chest open  Continue inotropic support with epi, primacor, levophed, no plans to wean as yet  Continue Amiodarone gtt, patient remains in NSR

## 2018-12-01 NOTE — PROGRESS NOTE ADULT - PROBLEM SELECTOR PLAN 3
As above, continue to monitor closely. Pt was being treated for groin staph infection with doxy prior to admission.  Rx was close to completion.  No visible signs of groin infection at this time.

## 2018-12-01 NOTE — PROGRESS NOTE ADULT - SUBJECTIVE AND OBJECTIVE BOX
Subjective:  48yo Male s/p CABG/ECMO Post op day #1.  Pt has awoken, moved all extremities and followed commands    Past Medical History:  ST elevation myocardial infarction (STEMI)  Family history of myocardial infarction (Mother)  No pertinent family history in first degree relatives  Staph infection  No pertinent past medical history  Cardiogenic shock  ST elevation myocardial infarction (STEMI), unspecified artery  Staph infection  Ventricular fibrillation  Coronary artery disease involving native coronary artery of native heart with unstable angina pectoris  Dissection of left main coronary artery  ST elevation myocardial infarction involving left anterior descending (LAD) coronary artery  STEMI (ST elevation myocardial infarction)  Arterial cannulation  Central line placement  [place] Michelle placement  CABG  ECMO (extracorporeal membrane oxygenation)  No significant past surgical history  CHEST PAIN  90+        amiodarone Infusion 0.5 mG/Min IV Continuous <Continuous>  artificial  tears Solution 1 Drop(s) Both EYES four times a day  chlorhexidine 0.12% Liquid 5 milliLiter(s) Oral Mucosa every 4 hours  ciprofloxacin   IVPB 400 milliGRAM(s) IV Intermittent every 12 hours  ciprofloxacin   IVPB      dextrose 50% Injectable 50 milliLiter(s) IV Push every 15 minutes  EPINEPHrine    Infusion 0.029 MICROgram(s)/kG/Min IV Continuous <Continuous>  heparin  Infusion 500 Unit(s)/Hr IV Continuous <Continuous>  insulin Infusion 2 Unit(s)/Hr IV Continuous <Continuous>  meperidine     Injectable 25 milliGRAM(s) IV Push once  milrinone Infusion 0.5 MICROgram(s)/kG/Min IV Continuous <Continuous>  norepinephrine Infusion 0.05 MICROgram(s)/kG/Min IV Continuous <Continuous>  pantoprazole Infusion 8 mG/Hr IV Continuous <Continuous>  petrolatum Ophthalmic Ointment 1 Application(s) Both EYES every 8 hours  petrolatum white Ointment 1 Application(s) Topical four times a day PRN  potassium chloride  10 mEq/50 mL IVPB 10 milliEquivalent(s) IV Intermittent every 1 hour  potassium chloride  10 mEq/50 mL IVPB 10 milliEquivalent(s) IV Intermittent every 1 hour  potassium chloride  10 mEq/50 mL IVPB 10 milliEquivalent(s) IV Intermittent every 1 hour  propofol Infusion 30 MICROgram(s)/kG/Min IV Continuous <Continuous>  sodium chloride 0.45%. 1000 milliLiter(s) IV Continuous <Continuous>  sodium chloride 0.9%. 1000 milliLiter(s) IV Continuous <Continuous>  vancomycin  IVPB 1000 milliGRAM(s) IV Intermittent every 12 hours  MEDICATIONS  (PRN):  petrolatum white Ointment 1 Application(s) Topical four times a day PRN dry lips    Mode: AC/ CMV (Assist Control/ Continuous Mandatory Ventilation), RR (machine): 12, TV (machine): 750, FiO2: 40, PEEP: 5, MAP: 12, PIP: 32  Daily     Daily Weight in k.2 (2018 13:48)    Mode: AC/ CMV (Assist Control/ Continuous Mandatory Ventilation), RR (machine): 12, TV (machine): 750, FiO2: 40, PEEP: 5, MAP: 12, PIP: 32    ABG - ( 2018 22:03 )  pH, Arterial: 7.51  pH, Blood: x     /  pCO2: 32    /  pO2: 207   / HCO3: 27    / Base Excess: 2.8   /  SaO2: 100                                     10.2   10.7  )-----------( 104      ( 2018 20:34 )             28.9       143  |  106  |  12.0  ----------------------------<  188<H>  4.0   |  23.0  |  0.97    Ca    8.2<L>      2018 20:34  Phos  2.7       Mg     2.0         TPro  4.7<L>  /  Alb  3.1<L>  /  TBili  1.3  /  DBili  x   /  AST  297<H>  /  ALT  42<H>  /  AlkPhos  32<L>      CARDIAC MARKERS ( 2018 23:24 )  x     / >25.00 ng/mL / 4403 U/L / x     / 514.9 ng/mL    PT/INR - ( 2018 13:19 )   PT: 16.1 sec;   INR: 1.39 ratio         PTT - ( 2018 20:34 )  PTT:37.0 sec      Objective:  T(C): 36.7 (18 @ 01:00), Max: 37.2 (18 @ 16:00)  HR: 94 (18 @ 01:00) (55 - 104)  BP: 103/71 (18 @ 15:30) (89/55 - 121/83)  RR: 12 (18 @ 01:00) (7 - 20)  SpO2: 100% (18 @ 01:00) (98% - 100%)  Wt(kg): --CAPILLARY BLOOD GLUCOSE      POCT Blood Glucose.: 156 mg/dL (01 Dec 2018 00:57)  POCT Blood Glucose.: 153 mg/dL (01 Dec 2018 00:06)  POCT Blood Glucose.: 163 mg/dL (2018 23:22)  POCT Blood Glucose.: 190 mg/dL (2018 21:11)  POCT Blood Glucose.: 217 mg/dL (2018 18:53)  POCT Blood Glucose.: 237 mg/dL (2018 18:11)  POCT Blood Glucose.: 205 mg/dL (2018 16:56)  POCT Blood Glucose.: 218 mg/dL (2018 16:07)  POCT Blood Glucose.: 182 mg/dL (2018 15:02)  POCT Blood Glucose.: 166 mg/dL (2018 14:14)  POCT Blood Glucose.: 190 mg/dL (2018 13:11)  POCT Blood Glucose.: 150 mg/dL (2018 11:50)  POCT Blood Glucose.: 151 mg/dL (2018 10:43)  POCT Blood Glucose.: 205 mg/dL (2018 09:59)  POCT Blood Glucose.: 188 mg/dL (2018 09:07)  POCT Blood Glucose.: 247 mg/dL (2018 07:52)  POCT Blood Glucose.: 235 mg/dL (2018 06:54)  POCT Blood Glucose.: 236 mg/dL (2018 05:16)  POCT Blood Glucose.: 220 mg/dL (2018 04:17)  I&O's Summary    2018 07:01  -  2018 07:00  --------------------------------------------------------  IN: 3273.3 mL / OUT: 5865 mL / NET: -2591.7 mL    2018 07:01  -  01 Dec 2018 01:05  --------------------------------------------------------  IN: 3824.4 mL / OUT: 2965 mL / NET: 859.4 mL        Lines:  R groin impella sheath with IABP in sheath, R radial A line, JOVON green and jl, central ECMO    Linda:  yes    Physical Exam:  Neuro: has awoken, was appropriate and moving all exts, currently sedated with propofol  Pulm: CtA b/l Unlabored  CV: NSR, ECMO central, open chest   Abd: soft nt/nd  Ext: warm, no edema, well perfused

## 2018-12-01 NOTE — PROGRESS NOTE ADULT - ASSESSMENT
48 yo Male c/o CP for 2-3 days prior to today, then 10/10 chest pain approximately 9:30 am today, followed by vomiting.  Wife drove pt to hospital, ruled in for STEMI. Urgent Cath, pt found to have multiple occlusions; stent to LAD and D2; stent to LAD thrombosed, left main dissected; impella placed for support.  Pt then had VF arrest, shock x 1, return to NSR.  Upon transfer to OR for Urgent CABG, pt again VF arrest, chest opened intra-op with CPR in progress.    Patient then underwent ECMO (extracorporeal membrane oxygenation)  11/29/2018  Secondary to pre-op cardiogenic shock and inability to wean from CPB. In addition to 11/29/2018  Emergent CABG X 4 all vein to the LAD, Diag, OM and PDA with inability to wean from CPB therefore requiring ECMO and IABP.  Post-operatively, he has been stable.  4 units RBCs transfused for Hct 25.  Coags WNL, pt currently not bleeding, therefore no products given in ICU.  He is currently maintained on Epi, Primacor, Levophed and Amio gtts in NSR.  A persistent lactermia is now trending down.  Insulin gtt for glucose management delayed initially until severe hypokalemia was adequately treated.  Renal function remains normal with brisk urine output.  NGT output is consistent with dark blood, protonix gtt started. 46 yo Male c/o CP for 2-3 days prior to admission, then 10/10 chest pain approximately 9:30 am yesterday, followed by vomiting.  Wife drove pt to hospital, ruled in for STEMI. Urgent Cath, pt found to have multiple occlusions; stent to LAD and D2; stent to LAD thrombosed, left main dissected; impella placed for support.  Pt then had VF arrest, shock x 1, return to NSR.  Upon transfer to OR for Urgent CABG, pt again VF arrest, chest opened intra-op with CPR in progress.    Patient then underwent ECMO (extracorporeal membrane oxygenation)  11/29/2018  Secondary to pre-op cardiogenic shock and inability to wean from CPB. In addition to 11/29/2018  Emergent CABG X 4 all vein to the LAD, Diag, OM and PDA with inability to wean from CPB therefore requiring ECMO and IABP.  Post-operatively, he has been stable.  He is currently maintained on Epi, Primacor, Levophed and Amio gtts in NSR.  Renal function remains normal with brisk urine output.  NGT output minimal, still bloody, protonix gtt continued.  Patient has woken, followed commands and interacted with family.  ECMO flows at 4.6 L, IABP at 1:1, PA pressures 23/14.

## 2018-12-02 ENCOUNTER — TRANSCRIPTION ENCOUNTER (OUTPATIENT)
Age: 48
End: 2018-12-02

## 2018-12-02 DIAGNOSIS — J96.90 RESPIRATORY FAILURE, UNSPECIFIED, UNSPECIFIED WHETHER WITH HYPOXIA OR HYPERCAPNIA: ICD-10-CM

## 2018-12-02 DIAGNOSIS — R57.0 CARDIOGENIC SHOCK: ICD-10-CM

## 2018-12-02 LAB
ALBUMIN SERPL ELPH-MCNC: 3 G/DL — LOW (ref 3.3–5.2)
ALP SERPL-CCNC: 33 U/L — LOW (ref 40–120)
ALT FLD-CCNC: 34 U/L — SIGNIFICANT CHANGE UP
ANION GAP SERPL CALC-SCNC: 10 MMOL/L — SIGNIFICANT CHANGE UP (ref 5–17)
ANION GAP SERPL CALC-SCNC: 7 MMOL/L — SIGNIFICANT CHANGE UP (ref 5–17)
ANION GAP SERPL CALC-SCNC: 8 MMOL/L — SIGNIFICANT CHANGE UP (ref 5–17)
ANION GAP SERPL CALC-SCNC: 9 MMOL/L — SIGNIFICANT CHANGE UP (ref 5–17)
APTT BLD: 34.7 SEC — SIGNIFICANT CHANGE UP (ref 27.5–36.3)
APTT BLD: 34.9 SEC — SIGNIFICANT CHANGE UP (ref 27.5–36.3)
APTT BLD: 35.9 SEC — SIGNIFICANT CHANGE UP (ref 27.5–36.3)
APTT BLD: 35.9 SEC — SIGNIFICANT CHANGE UP (ref 27.5–36.3)
APTT BLD: 36.8 SEC — HIGH (ref 27.5–36.3)
AST SERPL-CCNC: 122 U/L — HIGH
BASE EXCESS BLDV CALC-SCNC: 3.5 MMOL/L — HIGH (ref -2–2)
BASE EXCESS BLDV CALC-SCNC: 5.3 MMOL/L — HIGH (ref -2–2)
BASE EXCESS BLDV CALC-SCNC: 5.4 MMOL/L — HIGH (ref -2–2)
BILIRUB SERPL-MCNC: 0.7 MG/DL — SIGNIFICANT CHANGE UP (ref 0.4–2)
BLD GP AB SCN SERPL QL: SIGNIFICANT CHANGE UP
BLOOD GAS COMMENTS, VENOUS: SIGNIFICANT CHANGE UP
BUN SERPL-MCNC: 12 MG/DL — SIGNIFICANT CHANGE UP (ref 8–20)
BUN SERPL-MCNC: 14 MG/DL — SIGNIFICANT CHANGE UP (ref 8–20)
BUN SERPL-MCNC: 14 MG/DL — SIGNIFICANT CHANGE UP (ref 8–20)
BUN SERPL-MCNC: 15 MG/DL — SIGNIFICANT CHANGE UP (ref 8–20)
CA-I SERPL-SCNC: 1.13 MMOL/L — LOW (ref 1.15–1.33)
CA-I SERPL-SCNC: 1.15 MMOL/L — SIGNIFICANT CHANGE UP (ref 1.15–1.33)
CALCIUM SERPL-MCNC: 7.8 MG/DL — LOW (ref 8.6–10.2)
CALCIUM SERPL-MCNC: 7.8 MG/DL — LOW (ref 8.6–10.2)
CALCIUM SERPL-MCNC: 7.9 MG/DL — LOW (ref 8.6–10.2)
CALCIUM SERPL-MCNC: 8.3 MG/DL — LOW (ref 8.6–10.2)
CHLORIDE BLDV-SCNC: 102 MMOL/L — SIGNIFICANT CHANGE UP (ref 98–107)
CHLORIDE BLDV-SCNC: 106 MMOL/L — SIGNIFICANT CHANGE UP (ref 98–107)
CHLORIDE SERPL-SCNC: 100 MMOL/L — SIGNIFICANT CHANGE UP (ref 98–107)
CHLORIDE SERPL-SCNC: 101 MMOL/L — SIGNIFICANT CHANGE UP (ref 98–107)
CHLORIDE SERPL-SCNC: 103 MMOL/L — SIGNIFICANT CHANGE UP (ref 98–107)
CHLORIDE SERPL-SCNC: 98 MMOL/L — SIGNIFICANT CHANGE UP (ref 98–107)
CK MB CFR SERPL CALC: 33.8 NG/ML — HIGH (ref 0–6.7)
CK MB CFR SERPL CALC: 57.7 NG/ML — HIGH (ref 0–6.7)
CK SERPL-CCNC: 3158 U/L — HIGH (ref 30–200)
CK SERPL-CCNC: 3469 U/L — HIGH (ref 30–200)
CO2 SERPL-SCNC: 26 MMOL/L — SIGNIFICANT CHANGE UP (ref 22–29)
CO2 SERPL-SCNC: 26 MMOL/L — SIGNIFICANT CHANGE UP (ref 22–29)
CO2 SERPL-SCNC: 27 MMOL/L — SIGNIFICANT CHANGE UP (ref 22–29)
CO2 SERPL-SCNC: 27 MMOL/L — SIGNIFICANT CHANGE UP (ref 22–29)
CREAT SERPL-MCNC: 0.62 MG/DL — SIGNIFICANT CHANGE UP (ref 0.5–1.3)
CREAT SERPL-MCNC: 0.63 MG/DL — SIGNIFICANT CHANGE UP (ref 0.5–1.3)
CREAT SERPL-MCNC: 0.68 MG/DL — SIGNIFICANT CHANGE UP (ref 0.5–1.3)
CREAT SERPL-MCNC: 0.72 MG/DL — SIGNIFICANT CHANGE UP (ref 0.5–1.3)
GAS PNL BLDA: SIGNIFICANT CHANGE UP
GAS PNL BLDV: 137 MMOL/L — SIGNIFICANT CHANGE UP (ref 135–145)
GAS PNL BLDV: 139 MMOL/L — SIGNIFICANT CHANGE UP (ref 135–145)
GAS PNL BLDV: SIGNIFICANT CHANGE UP
GLUCOSE BLDC GLUCOMTR-MCNC: 102 MG/DL — HIGH (ref 70–99)
GLUCOSE BLDC GLUCOMTR-MCNC: 107 MG/DL — HIGH (ref 70–99)
GLUCOSE BLDC GLUCOMTR-MCNC: 107 MG/DL — HIGH (ref 70–99)
GLUCOSE BLDC GLUCOMTR-MCNC: 112 MG/DL — HIGH (ref 70–99)
GLUCOSE BLDC GLUCOMTR-MCNC: 114 MG/DL — HIGH (ref 70–99)
GLUCOSE BLDC GLUCOMTR-MCNC: 118 MG/DL — HIGH (ref 70–99)
GLUCOSE BLDC GLUCOMTR-MCNC: 125 MG/DL — HIGH (ref 70–99)
GLUCOSE BLDC GLUCOMTR-MCNC: 126 MG/DL — HIGH (ref 70–99)
GLUCOSE BLDC GLUCOMTR-MCNC: 129 MG/DL — HIGH (ref 70–99)
GLUCOSE BLDC GLUCOMTR-MCNC: 129 MG/DL — HIGH (ref 70–99)
GLUCOSE BLDC GLUCOMTR-MCNC: 138 MG/DL — HIGH (ref 70–99)
GLUCOSE BLDC GLUCOMTR-MCNC: 98 MG/DL — SIGNIFICANT CHANGE UP (ref 70–99)
GLUCOSE BLDV-MCNC: 100 MG/DL — HIGH (ref 70–99)
GLUCOSE BLDV-MCNC: 137 MG/DL — HIGH (ref 70–99)
GLUCOSE SERPL-MCNC: 117 MG/DL — HIGH (ref 70–115)
GLUCOSE SERPL-MCNC: 122 MG/DL — HIGH (ref 70–115)
GLUCOSE SERPL-MCNC: 135 MG/DL — HIGH (ref 70–115)
GLUCOSE SERPL-MCNC: 137 MG/DL — HIGH (ref 70–115)
HCO3 BLDV-SCNC: 27 MMOL/L — SIGNIFICANT CHANGE UP (ref 21–29)
HCO3 BLDV-SCNC: 29 MMOL/L — SIGNIFICANT CHANGE UP (ref 21–29)
HCO3 BLDV-SCNC: 29 MMOL/L — SIGNIFICANT CHANGE UP (ref 21–29)
HCT VFR BLD CALC: 26 % — LOW (ref 42–52)
HCT VFR BLD CALC: 26.7 % — LOW (ref 42–52)
HCT VFR BLD CALC: 27.8 % — LOW (ref 42–52)
HCT VFR BLDA CALC: 25 — LOW (ref 39–50)
HCT VFR BLDA CALC: 30 — LOW (ref 39–50)
HGB BLD CALC-MCNC: 8.1 G/DL — LOW (ref 13–17)
HGB BLD CALC-MCNC: 9.8 G/DL — LOW (ref 13–17)
HGB BLD-MCNC: 8.8 G/DL — LOW (ref 14–18)
HGB BLD-MCNC: 9.2 G/DL — LOW (ref 14–18)
HGB BLD-MCNC: 9.2 G/DL — LOW (ref 14–18)
HOROWITZ INDEX BLDV+IHG-RTO: 40 — SIGNIFICANT CHANGE UP
LACTATE BLDV-MCNC: 1 MMOL/L — SIGNIFICANT CHANGE UP (ref 0.5–2)
LACTATE BLDV-MCNC: 1.2 MMOL/L — SIGNIFICANT CHANGE UP (ref 0.5–2)
MAGNESIUM SERPL-MCNC: 1.9 MG/DL — SIGNIFICANT CHANGE UP (ref 1.6–2.6)
MAGNESIUM SERPL-MCNC: 2.2 MG/DL — SIGNIFICANT CHANGE UP (ref 1.6–2.6)
MAGNESIUM SERPL-MCNC: 2.2 MG/DL — SIGNIFICANT CHANGE UP (ref 1.8–2.6)
MCHC RBC-ENTMCNC: 27.8 PG — SIGNIFICANT CHANGE UP (ref 27–31)
MCHC RBC-ENTMCNC: 28.3 PG — SIGNIFICANT CHANGE UP (ref 27–31)
MCHC RBC-ENTMCNC: 29.1 PG — SIGNIFICANT CHANGE UP (ref 27–31)
MCHC RBC-ENTMCNC: 33.1 G/DL — SIGNIFICANT CHANGE UP (ref 32–36)
MCHC RBC-ENTMCNC: 33.8 G/DL — SIGNIFICANT CHANGE UP (ref 32–36)
MCHC RBC-ENTMCNC: 34.5 G/DL — SIGNIFICANT CHANGE UP (ref 32–36)
MCV RBC AUTO: 83.6 FL — SIGNIFICANT CHANGE UP (ref 80–94)
MCV RBC AUTO: 84 FL — SIGNIFICANT CHANGE UP (ref 80–94)
MCV RBC AUTO: 84.5 FL — SIGNIFICANT CHANGE UP (ref 80–94)
OTHER CELLS CSF MANUAL: 10 ML/DL — LOW (ref 18–22)
OTHER CELLS CSF MANUAL: 7 ML/DL — LOW (ref 18–22)
PCO2 BLDV: 50 MMHG — SIGNIFICANT CHANGE UP (ref 35–50)
PCO2 BLDV: 55 MMHG — HIGH (ref 35–50)
PCO2 BLDV: 56 MMHG — HIGH (ref 35–50)
PH BLDV: 7.34 — SIGNIFICANT CHANGE UP (ref 7.32–7.43)
PH BLDV: 7.36 — SIGNIFICANT CHANGE UP (ref 7.32–7.43)
PH BLDV: 7.4 — SIGNIFICANT CHANGE UP (ref 7.32–7.43)
PLATELET # BLD AUTO: 64 K/UL — LOW (ref 150–400)
PLATELET # BLD AUTO: 83 K/UL — LOW (ref 150–400)
PLATELET # BLD AUTO: 92 K/UL — LOW (ref 150–400)
PO2 BLDV: 33 MMHG — SIGNIFICANT CHANGE UP (ref 25–45)
PO2 BLDV: 36 MMHG — SIGNIFICANT CHANGE UP (ref 25–45)
PO2 BLDV: 38 MMHG — SIGNIFICANT CHANGE UP (ref 25–45)
POTASSIUM BLDV-SCNC: 4.2 MMOL/L — SIGNIFICANT CHANGE UP (ref 3.4–4.5)
POTASSIUM BLDV-SCNC: 4.4 MMOL/L — SIGNIFICANT CHANGE UP (ref 3.4–4.5)
POTASSIUM SERPL-MCNC: 4.1 MMOL/L — SIGNIFICANT CHANGE UP (ref 3.5–5.3)
POTASSIUM SERPL-MCNC: 4.2 MMOL/L — SIGNIFICANT CHANGE UP (ref 3.5–5.3)
POTASSIUM SERPL-MCNC: 4.8 MMOL/L — SIGNIFICANT CHANGE UP (ref 3.5–5.3)
POTASSIUM SERPL-MCNC: 5.8 MMOL/L — HIGH (ref 3.5–5.3)
POTASSIUM SERPL-SCNC: 4.1 MMOL/L — SIGNIFICANT CHANGE UP (ref 3.5–5.3)
POTASSIUM SERPL-SCNC: 4.2 MMOL/L — SIGNIFICANT CHANGE UP (ref 3.5–5.3)
POTASSIUM SERPL-SCNC: 4.8 MMOL/L — SIGNIFICANT CHANGE UP (ref 3.5–5.3)
POTASSIUM SERPL-SCNC: 5.8 MMOL/L — HIGH (ref 3.5–5.3)
PROT SERPL-MCNC: 5.1 G/DL — LOW (ref 6.6–8.7)
RBC # BLD: 3.11 M/UL — LOW (ref 4.6–6.2)
RBC # BLD: 3.16 M/UL — LOW (ref 4.6–6.2)
RBC # BLD: 3.31 M/UL — LOW (ref 4.6–6.2)
RBC # FLD: 16.5 % — HIGH (ref 11–15.6)
RBC # FLD: 16.6 % — HIGH (ref 11–15.6)
RBC # FLD: 17.2 % — HIGH (ref 11–15.6)
SAO2 % BLDV: 61 % — SIGNIFICANT CHANGE UP
SAO2 % BLDV: 69 % — SIGNIFICANT CHANGE UP
SAO2 % BLDV: 75 % — SIGNIFICANT CHANGE UP
SODIUM SERPL-SCNC: 134 MMOL/L — LOW (ref 135–145)
SODIUM SERPL-SCNC: 135 MMOL/L — SIGNIFICANT CHANGE UP (ref 135–145)
SODIUM SERPL-SCNC: 135 MMOL/L — SIGNIFICANT CHANGE UP (ref 135–145)
SODIUM SERPL-SCNC: 138 MMOL/L — SIGNIFICANT CHANGE UP (ref 135–145)
TROPONIN T SERPL-MCNC: 3.68 NG/ML — HIGH (ref 0–0.06)
TROPONIN T SERPL-MCNC: 4.33 NG/ML — HIGH (ref 0–0.06)
TYPE + AB SCN PNL BLD: SIGNIFICANT CHANGE UP
VANCOMYCIN TROUGH SERPL-MCNC: 15.2 UG/ML — SIGNIFICANT CHANGE UP (ref 10–20)
WBC # BLD: 6.2 K/UL — SIGNIFICANT CHANGE UP (ref 4.8–10.8)
WBC # BLD: 6.6 K/UL — SIGNIFICANT CHANGE UP (ref 4.8–10.8)
WBC # BLD: 8.3 K/UL — SIGNIFICANT CHANGE UP (ref 4.8–10.8)
WBC # FLD AUTO: 6.2 K/UL — SIGNIFICANT CHANGE UP (ref 4.8–10.8)
WBC # FLD AUTO: 6.6 K/UL — SIGNIFICANT CHANGE UP (ref 4.8–10.8)
WBC # FLD AUTO: 8.3 K/UL — SIGNIFICANT CHANGE UP (ref 4.8–10.8)

## 2018-12-02 PROCEDURE — 71045 X-RAY EXAM CHEST 1 VIEW: CPT | Mod: 26

## 2018-12-02 RX ORDER — MAGNESIUM SULFATE 500 MG/ML
2 VIAL (ML) INJECTION ONCE
Qty: 0 | Refills: 0 | Status: COMPLETED | OUTPATIENT
Start: 2018-12-02 | End: 2018-12-02

## 2018-12-02 RX ORDER — POTASSIUM CHLORIDE 20 MEQ
10 PACKET (EA) ORAL
Qty: 0 | Refills: 0 | Status: COMPLETED | OUTPATIENT
Start: 2018-12-02 | End: 2018-12-02

## 2018-12-02 RX ORDER — MEPERIDINE HYDROCHLORIDE 50 MG/ML
25 INJECTION INTRAMUSCULAR; INTRAVENOUS; SUBCUTANEOUS ONCE
Qty: 0 | Refills: 0 | Status: DISCONTINUED | OUTPATIENT
Start: 2018-12-02 | End: 2018-12-02

## 2018-12-02 RX ORDER — ALBUTEROL 90 UG/1
2.5 AEROSOL, METERED ORAL ONCE
Qty: 0 | Refills: 0 | Status: DISCONTINUED | OUTPATIENT
Start: 2018-12-02 | End: 2018-12-02

## 2018-12-02 RX ORDER — DEXTROSE 50 % IN WATER 50 %
25 SYRINGE (ML) INTRAVENOUS ONCE
Qty: 0 | Refills: 0 | Status: DISCONTINUED | OUTPATIENT
Start: 2018-12-02 | End: 2018-12-02

## 2018-12-02 RX ORDER — SODIUM BICARBONATE 1 MEQ/ML
50 SYRINGE (ML) INTRAVENOUS ONCE
Qty: 0 | Refills: 0 | Status: COMPLETED | OUTPATIENT
Start: 2018-12-02 | End: 2018-12-02

## 2018-12-02 RX ORDER — INSULIN HUMAN 100 [IU]/ML
10 INJECTION, SOLUTION SUBCUTANEOUS ONCE
Qty: 0 | Refills: 0 | Status: DISCONTINUED | OUTPATIENT
Start: 2018-12-02 | End: 2018-12-02

## 2018-12-02 RX ORDER — CALCIUM GLUCONATE 100 MG/ML
1 VIAL (ML) INTRAVENOUS ONCE
Qty: 0 | Refills: 0 | Status: DISCONTINUED | OUTPATIENT
Start: 2018-12-02 | End: 2018-12-02

## 2018-12-02 RX ORDER — MIDAZOLAM HYDROCHLORIDE 1 MG/ML
2 INJECTION, SOLUTION INTRAMUSCULAR; INTRAVENOUS ONCE
Qty: 0 | Refills: 0 | Status: DISCONTINUED | OUTPATIENT
Start: 2018-12-02 | End: 2018-12-02

## 2018-12-02 RX ORDER — FUROSEMIDE 40 MG
2.5 TABLET ORAL
Qty: 500 | Refills: 0 | Status: DISCONTINUED | OUTPATIENT
Start: 2018-12-02 | End: 2018-12-02

## 2018-12-02 RX ORDER — POTASSIUM CHLORIDE 20 MEQ
10 PACKET (EA) ORAL
Qty: 0 | Refills: 0 | Status: DISCONTINUED | OUTPATIENT
Start: 2018-12-02 | End: 2018-12-02

## 2018-12-02 RX ORDER — FENTANYL CITRATE 50 UG/ML
100 INJECTION INTRAVENOUS ONCE
Qty: 0 | Refills: 0 | Status: DISCONTINUED | OUTPATIENT
Start: 2018-12-02 | End: 2018-12-02

## 2018-12-02 RX ORDER — FENTANYL CITRATE 50 UG/ML
0.5 INJECTION INTRAVENOUS
Qty: 2500 | Refills: 0 | Status: DISCONTINUED | OUTPATIENT
Start: 2018-12-02 | End: 2018-12-04

## 2018-12-02 RX ADMIN — PANTOPRAZOLE SODIUM 40 MILLIGRAM(S): 20 TABLET, DELAYED RELEASE ORAL at 17:09

## 2018-12-02 RX ADMIN — HEPARIN SODIUM 8 UNIT(S)/HR: 5000 INJECTION INTRAVENOUS; SUBCUTANEOUS at 12:45

## 2018-12-02 RX ADMIN — FENTANYL CITRATE 100 MICROGRAM(S): 50 INJECTION INTRAVENOUS at 09:57

## 2018-12-02 RX ADMIN — HEPARIN SODIUM 7 UNIT(S)/HR: 5000 INJECTION INTRAVENOUS; SUBCUTANEOUS at 08:19

## 2018-12-02 RX ADMIN — Medication 1 DROP(S): at 18:22

## 2018-12-02 RX ADMIN — MEPERIDINE HYDROCHLORIDE 25 MILLIGRAM(S): 50 INJECTION INTRAMUSCULAR; INTRAVENOUS; SUBCUTANEOUS at 11:46

## 2018-12-02 RX ADMIN — MIDAZOLAM HYDROCHLORIDE 2 MILLIGRAM(S): 1 INJECTION, SOLUTION INTRAMUSCULAR; INTRAVENOUS at 04:30

## 2018-12-02 RX ADMIN — MEPERIDINE HYDROCHLORIDE 25 MILLIGRAM(S): 50 INJECTION INTRAMUSCULAR; INTRAVENOUS; SUBCUTANEOUS at 01:01

## 2018-12-02 RX ADMIN — MILRINONE LACTATE 13.61 MICROGRAM(S)/KG/MIN: 1 INJECTION, SOLUTION INTRAVENOUS at 09:57

## 2018-12-02 RX ADMIN — Medication 200 MILLIGRAM(S): at 17:09

## 2018-12-02 RX ADMIN — FENTANYL CITRATE 100 MICROGRAM(S): 50 INJECTION INTRAVENOUS at 18:40

## 2018-12-02 RX ADMIN — Medication 1 DROP(S): at 00:28

## 2018-12-02 RX ADMIN — MEPERIDINE HYDROCHLORIDE 25 MILLIGRAM(S): 50 INJECTION INTRAMUSCULAR; INTRAVENOUS; SUBCUTANEOUS at 01:53

## 2018-12-02 RX ADMIN — Medication 2.5 MG/HR: at 07:40

## 2018-12-02 RX ADMIN — Medication 325 MILLIGRAM(S): at 12:05

## 2018-12-02 RX ADMIN — FENTANYL CITRATE 4.54 MICROGRAM(S)/KG/HR: 50 INJECTION INTRAVENOUS at 06:19

## 2018-12-02 RX ADMIN — Medication 50 GRAM(S): at 12:40

## 2018-12-02 RX ADMIN — MILRINONE LACTATE 13.61 MICROGRAM(S)/KG/MIN: 1 INJECTION, SOLUTION INTRAVENOUS at 16:01

## 2018-12-02 RX ADMIN — FENTANYL CITRATE 50 MICROGRAM(S): 50 INJECTION INTRAVENOUS at 03:20

## 2018-12-02 RX ADMIN — FENTANYL CITRATE 50 MICROGRAM(S): 50 INJECTION INTRAVENOUS at 02:35

## 2018-12-02 RX ADMIN — CHLORHEXIDINE GLUCONATE 5 MILLILITER(S): 213 SOLUTION TOPICAL at 11:43

## 2018-12-02 RX ADMIN — Medication 1 APPLICATION(S): at 13:14

## 2018-12-02 RX ADMIN — Medication 1 APPLICATION(S): at 23:00

## 2018-12-02 RX ADMIN — FENTANYL CITRATE 50 MICROGRAM(S): 50 INJECTION INTRAVENOUS at 20:25

## 2018-12-02 RX ADMIN — Medication 50 MILLIEQUIVALENT(S): at 18:10

## 2018-12-02 RX ADMIN — AMIODARONE HYDROCHLORIDE 16.67 MG/MIN: 400 TABLET ORAL at 16:01

## 2018-12-02 RX ADMIN — CHLORHEXIDINE GLUCONATE 5 MILLILITER(S): 213 SOLUTION TOPICAL at 17:09

## 2018-12-02 RX ADMIN — FENTANYL CITRATE 100 MICROGRAM(S): 50 INJECTION INTRAVENOUS at 10:15

## 2018-12-02 RX ADMIN — Medication 50 MILLIEQUIVALENT(S): at 18:36

## 2018-12-02 RX ADMIN — PROPOFOL 16.33 MICROGRAM(S)/KG/MIN: 10 INJECTION, EMULSION INTRAVENOUS at 12:06

## 2018-12-02 RX ADMIN — Medication 8.5 MICROGRAM(S)/KG/MIN: at 13:13

## 2018-12-02 RX ADMIN — PROPOFOL 16.33 MICROGRAM(S)/KG/MIN: 10 INJECTION, EMULSION INTRAVENOUS at 17:38

## 2018-12-02 RX ADMIN — Medication 200 MILLIGRAM(S): at 06:16

## 2018-12-02 RX ADMIN — MEPERIDINE HYDROCHLORIDE 25 MILLIGRAM(S): 50 INJECTION INTRAMUSCULAR; INTRAVENOUS; SUBCUTANEOUS at 04:40

## 2018-12-02 RX ADMIN — INSULIN HUMAN 2 UNIT(S)/HR: 100 INJECTION, SOLUTION SUBCUTANEOUS at 16:01

## 2018-12-02 RX ADMIN — Medication 250 MILLIGRAM(S): at 03:30

## 2018-12-02 RX ADMIN — HEPARIN SODIUM 9 UNIT(S)/HR: 5000 INJECTION INTRAVENOUS; SUBCUTANEOUS at 18:56

## 2018-12-02 RX ADMIN — Medication 1 DROP(S): at 15:18

## 2018-12-02 RX ADMIN — CHLORHEXIDINE GLUCONATE 5 MILLILITER(S): 213 SOLUTION TOPICAL at 06:17

## 2018-12-02 RX ADMIN — FENTANYL CITRATE 100 MICROGRAM(S): 50 INJECTION INTRAVENOUS at 03:35

## 2018-12-02 RX ADMIN — PROPOFOL 16.33 MICROGRAM(S)/KG/MIN: 10 INJECTION, EMULSION INTRAVENOUS at 16:01

## 2018-12-02 RX ADMIN — CHLORHEXIDINE GLUCONATE 5 MILLILITER(S): 213 SOLUTION TOPICAL at 22:41

## 2018-12-02 RX ADMIN — MEPERIDINE HYDROCHLORIDE 25 MILLIGRAM(S): 50 INJECTION INTRAMUSCULAR; INTRAVENOUS; SUBCUTANEOUS at 05:10

## 2018-12-02 RX ADMIN — Medication 250 MILLIGRAM(S): at 13:58

## 2018-12-02 RX ADMIN — FENTANYL CITRATE 100 MICROGRAM(S): 50 INJECTION INTRAVENOUS at 04:14

## 2018-12-02 RX ADMIN — PROPOFOL 16.33 MICROGRAM(S)/KG/MIN: 10 INJECTION, EMULSION INTRAVENOUS at 18:10

## 2018-12-02 RX ADMIN — PANTOPRAZOLE SODIUM 40 MILLIGRAM(S): 20 TABLET, DELAYED RELEASE ORAL at 06:19

## 2018-12-02 RX ADMIN — CHLORHEXIDINE GLUCONATE 5 MILLILITER(S): 213 SOLUTION TOPICAL at 01:50

## 2018-12-02 RX ADMIN — Medication 1 APPLICATION(S): at 06:17

## 2018-12-02 RX ADMIN — Medication 50 MILLIEQUIVALENT(S): at 10:03

## 2018-12-02 RX ADMIN — PROPOFOL 16.33 MICROGRAM(S)/KG/MIN: 10 INJECTION, EMULSION INTRAVENOUS at 07:41

## 2018-12-02 RX ADMIN — CHLORHEXIDINE GLUCONATE 5 MILLILITER(S): 213 SOLUTION TOPICAL at 13:14

## 2018-12-02 RX ADMIN — PROPOFOL 16.33 MICROGRAM(S)/KG/MIN: 10 INJECTION, EMULSION INTRAVENOUS at 23:49

## 2018-12-02 RX ADMIN — PROPOFOL 16.33 MICROGRAM(S)/KG/MIN: 10 INJECTION, EMULSION INTRAVENOUS at 09:57

## 2018-12-02 RX ADMIN — FENTANYL CITRATE 100 MICROGRAM(S): 50 INJECTION INTRAVENOUS at 18:39

## 2018-12-02 RX ADMIN — Medication 1 DROP(S): at 05:32

## 2018-12-02 RX ADMIN — PROPOFOL 16.33 MICROGRAM(S)/KG/MIN: 10 INJECTION, EMULSION INTRAVENOUS at 21:01

## 2018-12-02 RX ADMIN — ATORVASTATIN CALCIUM 80 MILLIGRAM(S): 80 TABLET, FILM COATED ORAL at 22:42

## 2018-12-02 RX ADMIN — FENTANYL CITRATE 50 MICROGRAM(S): 50 INJECTION INTRAVENOUS at 20:47

## 2018-12-02 RX ADMIN — FENTANYL CITRATE 50 MICROGRAM(S): 50 INJECTION INTRAVENOUS at 00:28

## 2018-12-02 NOTE — PROGRESS NOTE ADULT - PROBLEM SELECTOR PLAN 1
CABG X 4  ECMO and IABP in place, chest open  Continue gentle sedation while chest open  Continue inotropic support with epi, primacor, levophed, no plans to wean as yet  Continue Amiodarone gtt, patient remains in NSR  Plan for return to the OR Monday for washout and full cardiac assessment.

## 2018-12-02 NOTE — PROGRESS NOTE ADULT - PROBLEM SELECTOR PLAN 3
Pt was being treated for groin staph infection with doxy prior to admission.  Rx was close to completion.  No visible signs of groin infection at this time.

## 2018-12-02 NOTE — PROGRESS NOTE ADULT - ASSESSMENT
48 yo Male c/o CP for 2-3 days prior to admission, then 10/10 chest pain approximately 9:30 am yesterday, followed by vomiting.  Wife drove pt to hospital, ruled in for STEMI. Urgent Cath, pt found to have multiple occlusions; stent to LAD and D2; stent to LAD thrombosed,  impella placed for support.  Pt then had VF arrest, shock x 1, return to NSR.  Upon transfer to OR for Urgent CABG, pt again VF arrest, chest opened intra-op with CPR in progress.    Patient then underwent ECMO (extracorporeal membrane oxygenation)  11/29/2018  Secondary to pre-op cardiogenic shock and inability to wean from CPB. In addition to 11/29/2018  Emergent CABG X 4 all vein to the LAD, Diag, OM and PDA with inability to wean from CPB therefore requiring ECMO and IABP.  Post-operatively, he has been stable.  He is currently maintained on Epi, Primacor, Levophed and Amio gtts in NSR.  Renal function remains normal with brisk urine output.  NGT output minimal, still bloody, protonix gtt continued.  Patient has woken, followed commands and interacted with family.  ECMO flows at 4.6 L, IABP at 1:1, PA pressures 26/17.  Today patient remains stable, no change in hemodynamics.

## 2018-12-02 NOTE — PROGRESS NOTE ADULT - SUBJECTIVE AND OBJECTIVE BOX
Subjective:  48yo Male continues to be sedated with open chest on ECMO.    Past Medical History:  ST elevation myocardial infarction (STEMI)  Family history of myocardial infarction (Mother)  No pertinent family history in first degree relatives  Handoff  MEWS Score  Staph infection  No pertinent past medical history  Cardiogenic shock  Cardiogenic shock  ST elevation myocardial infarction (STEMI), unspecified artery  Staph infection  Ventricular fibrillation  Coronary artery disease involving native coronary artery of native heart with unstable angina pectoris  Dissection of left main coronary artery  ST elevation myocardial infarction involving left anterior descending (LAD) coronary artery  STEMI (ST elevation myocardial infarction)  Arterial cannulation  Central line placement  [place] Michelle placement  CABG  ECMO (extracorporeal membrane oxygenation)  No significant past surgical history  CHEST PAIN  90+        amiodarone Infusion 0.5 mG/Min IV Continuous <Continuous>  artificial  tears Solution 1 Drop(s) Both EYES four times a day  aspirin 325 milliGRAM(s) Oral daily  atorvastatin 80 milliGRAM(s) Oral at bedtime  chlorhexidine 0.12% Liquid 5 milliLiter(s) Oral Mucosa every 4 hours  ciprofloxacin   IVPB 400 milliGRAM(s) IV Intermittent every 12 hours  ciprofloxacin   IVPB      dextrose 50% Injectable 50 milliLiter(s) IV Push every 15 minutes  fentaNYL    Injectable 50 MICROGram(s) IV Push every 2 hours PRN  heparin  Infusion 500 Unit(s)/Hr IV Continuous <Continuous>  insulin Infusion 2 Unit(s)/Hr IV Continuous <Continuous>  milrinone Infusion 0.5 MICROgram(s)/kG/Min IV Continuous <Continuous>  norepinephrine Infusion 0.05 MICROgram(s)/kG/Min IV Continuous <Continuous>  pantoprazole  Injectable 40 milliGRAM(s) IV Push every 12 hours  petrolatum Ophthalmic Ointment 1 Application(s) Both EYES every 8 hours  petrolatum white Ointment 1 Application(s) Topical four times a day PRN  potassium chloride  10 mEq/50 mL IVPB 10 milliEquivalent(s) IV Intermittent every 1 hour  potassium chloride  10 mEq/50 mL IVPB 10 milliEquivalent(s) IV Intermittent every 1 hour  potassium chloride  10 mEq/50 mL IVPB 10 milliEquivalent(s) IV Intermittent every 1 hour  propofol Infusion 30 MICROgram(s)/kG/Min IV Continuous <Continuous>  sodium chloride 0.9%. 1000 milliLiter(s) IV Continuous <Continuous>  sodium chloride 0.9%. 1000 milliLiter(s) IV Continuous <Continuous>  vancomycin  IVPB 1000 milliGRAM(s) IV Intermittent every 12 hours  MEDICATIONS  (PRN):  fentaNYL    Injectable 50 MICROGram(s) IV Push every 2 hours PRN Moderate Pain (4 - 6)  petrolatum white Ointment 1 Application(s) Topical four times a day PRN dry lips    Mode: AC/ CMV (Assist Control/ Continuous Mandatory Ventilation), RR (machine): 10, TV (machine): 600, FiO2: 40, PEEP: 5, PS: 40, MAP: 9, PIP: 17  Daily     Daily     Mode: AC/ CMV (Assist Control/ Continuous Mandatory Ventilation), RR (machine): 10, TV (machine): 600, FiO2: 40, PEEP: 5, PS: 40, MAP: 9, PIP: 17    ABG - ( 02 Dec 2018 02:31 )  pH, Arterial: 7.46  pH, Blood: x     /  pCO2: 41    /  pO2: 177   / HCO3: 29    / Base Excess: 4.9   /  SaO2: 100                                     9.6    9.1   )-----------( 67       ( 01 Dec 2018 20:00 )             27.6   12-01    139  |  104  |  11.0  ----------------------------<  117<H>  4.6   |  24.0  |  0.62    Ca    8.1<L>      01 Dec 2018 20:00  Phos  2.7     11-30  Mg     2.3     12-01    TPro  4.7<L>  /  Alb  2.8<L>  /  TBili  0.9  /  DBili  0.3  /  AST  200<H>  /  ALT  39  /  AlkPhos  37<L>  12-01    CARDIAC MARKERS ( 01 Dec 2018 08:05 )  x     / 5.51 ng/mL / x     / x     / x        PT/INR - ( 01 Dec 2018 03:09 )   PT: 15.6 sec;   INR: 1.34 ratio         PTT - ( 02 Dec 2018 00:17 )  PTT:35.9 sec      Objective:  T(C): 36.9 (12-02-18 @ 01:00), Max: 37 (12-01-18 @ 05:00)  HR: 87 (12-02-18 @ 01:30) (81 - 94)  BP: --  RR: 17 (12-02-18 @ 01:30) (12 - 22)  SpO2: 100% (12-02-18 @ 01:30) (99% - 100%)  Wt(kg): --CAPILLARY BLOOD GLUCOSE      POCT Blood Glucose.: 118 mg/dL (02 Dec 2018 00:55)  POCT Blood Glucose.: 118 mg/dL (01 Dec 2018 23:54)  POCT Blood Glucose.: 118 mg/dL (01 Dec 2018 22:58)  POCT Blood Glucose.: 115 mg/dL (01 Dec 2018 22:02)  POCT Blood Glucose.: 115 mg/dL (01 Dec 2018 20:42)  POCT Blood Glucose.: 141 mg/dL (01 Dec 2018 18:38)  POCT Blood Glucose.: 126 mg/dL (01 Dec 2018 16:02)  POCT Blood Glucose.: 137 mg/dL (01 Dec 2018 14:48)  POCT Blood Glucose.: 140 mg/dL (01 Dec 2018 13:01)  POCT Blood Glucose.: 154 mg/dL (01 Dec 2018 11:55)  POCT Blood Glucose.: 158 mg/dL (01 Dec 2018 10:51)  POCT Blood Glucose.: 149 mg/dL (01 Dec 2018 10:04)  POCT Blood Glucose.: 92 mg/dL (01 Dec 2018 08:55)  POCT Blood Glucose.: 170 mg/dL (01 Dec 2018 05:48)  POCT Blood Glucose.: 149 mg/dL (01 Dec 2018 03:56)  I&O's Summary    30 Nov 2018 07:01  -  01 Dec 2018 07:00  --------------------------------------------------------  IN: 5352.2 mL / OUT: 4050 mL / NET: 1302.2 mL    01 Dec 2018 07:01  -  02 Dec 2018 03:05  --------------------------------------------------------  IN: 2142.4 mL / OUT: 3430 mL / NET: -1287.6 mL        Physical Exam:  Neuro:  Sedated, appears to follow commands with lightened sedation  Pulm: CtA b/l on full vent support  CV: s1/s2  reg, ECMO at 4000 rpm, 4.6 lpm  Abd: soft nt/nd  Ext: warm, 1-2+ edema, well perfused  chest open with central ECMO canulation

## 2018-12-03 ENCOUNTER — RESULT REVIEW (OUTPATIENT)
Age: 48
End: 2018-12-03

## 2018-12-03 ENCOUNTER — APPOINTMENT (OUTPATIENT)
Dept: CARDIOTHORACIC SURGERY | Facility: HOSPITAL | Age: 48
End: 2018-12-03
Payer: MEDICAID

## 2018-12-03 LAB
ALBUMIN SERPL ELPH-MCNC: 2.7 G/DL — LOW (ref 3.3–5.2)
ALBUMIN SERPL ELPH-MCNC: 3 G/DL — LOW (ref 3.3–5.2)
ALP SERPL-CCNC: 43 U/L — SIGNIFICANT CHANGE UP (ref 40–120)
ALP SERPL-CCNC: 57 U/L — SIGNIFICANT CHANGE UP (ref 40–120)
ALT FLD-CCNC: 23 U/L — SIGNIFICANT CHANGE UP
ALT FLD-CCNC: 24 U/L — SIGNIFICANT CHANGE UP
ANION GAP SERPL CALC-SCNC: 12 MMOL/L — SIGNIFICANT CHANGE UP (ref 5–17)
ANION GAP SERPL CALC-SCNC: 8 MMOL/L — SIGNIFICANT CHANGE UP (ref 5–17)
ANION GAP SERPL CALC-SCNC: 9 MMOL/L — SIGNIFICANT CHANGE UP (ref 5–17)
APTT BLD: 28.3 SEC — SIGNIFICANT CHANGE UP (ref 27.5–36.3)
APTT BLD: 35.8 SEC — SIGNIFICANT CHANGE UP (ref 27.5–36.3)
APTT BLD: 38.8 SEC — HIGH (ref 27.5–36.3)
APTT BLD: 40 SEC — HIGH (ref 27.5–36.3)
AST SERPL-CCNC: 54 U/L — HIGH
AST SERPL-CCNC: 60 U/L — HIGH
BASE EXCESS BLDA CALC-SCNC: 3.9 MMOL/L — HIGH (ref -2–2)
BASE EXCESS BLDV CALC-SCNC: 0.8 MMOL/L — SIGNIFICANT CHANGE UP (ref -2–2)
BASE EXCESS BLDV CALC-SCNC: 4.4 MMOL/L — HIGH (ref -2–2)
BILIRUB DIRECT SERPL-MCNC: 0.4 MG/DL — HIGH (ref 0–0.3)
BILIRUB INDIRECT FLD-MCNC: 0.5 MG/DL — SIGNIFICANT CHANGE UP (ref 0.2–1)
BILIRUB SERPL-MCNC: 0.9 MG/DL — SIGNIFICANT CHANGE UP (ref 0.4–2)
BILIRUB SERPL-MCNC: 1.3 MG/DL — SIGNIFICANT CHANGE UP (ref 0.4–2)
BLOOD GAS COMMENTS ARTERIAL: SIGNIFICANT CHANGE UP
BLOOD GAS COMMENTS, VENOUS: SIGNIFICANT CHANGE UP
BUN SERPL-MCNC: 16 MG/DL — SIGNIFICANT CHANGE UP (ref 8–20)
BUN SERPL-MCNC: 18 MG/DL — SIGNIFICANT CHANGE UP (ref 8–20)
BUN SERPL-MCNC: 18 MG/DL — SIGNIFICANT CHANGE UP (ref 8–20)
CA-I SERPL-SCNC: 1.13 MMOL/L — LOW (ref 1.15–1.33)
CALCIUM SERPL-MCNC: 7.8 MG/DL — LOW (ref 8.6–10.2)
CALCIUM SERPL-MCNC: 7.8 MG/DL — LOW (ref 8.6–10.2)
CALCIUM SERPL-MCNC: 8.3 MG/DL — LOW (ref 8.6–10.2)
CHLORIDE BLDV-SCNC: 103 MMOL/L — SIGNIFICANT CHANGE UP (ref 98–107)
CHLORIDE SERPL-SCNC: 100 MMOL/L — SIGNIFICANT CHANGE UP (ref 98–107)
CHLORIDE SERPL-SCNC: 103 MMOL/L — SIGNIFICANT CHANGE UP (ref 98–107)
CHLORIDE SERPL-SCNC: 103 MMOL/L — SIGNIFICANT CHANGE UP (ref 98–107)
CO2 SERPL-SCNC: 25 MMOL/L — SIGNIFICANT CHANGE UP (ref 22–29)
CO2 SERPL-SCNC: 26 MMOL/L — SIGNIFICANT CHANGE UP (ref 22–29)
CO2 SERPL-SCNC: 26 MMOL/L — SIGNIFICANT CHANGE UP (ref 22–29)
CREAT SERPL-MCNC: 0.68 MG/DL — SIGNIFICANT CHANGE UP (ref 0.5–1.3)
CREAT SERPL-MCNC: 0.75 MG/DL — SIGNIFICANT CHANGE UP (ref 0.5–1.3)
CREAT SERPL-MCNC: 0.8 MG/DL — SIGNIFICANT CHANGE UP (ref 0.5–1.3)
GAS PNL BLDA: SIGNIFICANT CHANGE UP
GAS PNL BLDV: 137 MMOL/L — SIGNIFICANT CHANGE UP (ref 135–145)
GAS PNL BLDV: SIGNIFICANT CHANGE UP
GLUCOSE BLDC GLUCOMTR-MCNC: 100 MG/DL — HIGH (ref 70–99)
GLUCOSE BLDC GLUCOMTR-MCNC: 103 MG/DL — HIGH (ref 70–99)
GLUCOSE BLDC GLUCOMTR-MCNC: 110 MG/DL — HIGH (ref 70–99)
GLUCOSE BLDC GLUCOMTR-MCNC: 85 MG/DL — SIGNIFICANT CHANGE UP (ref 70–99)
GLUCOSE BLDC GLUCOMTR-MCNC: 91 MG/DL — SIGNIFICANT CHANGE UP (ref 70–99)
GLUCOSE BLDC GLUCOMTR-MCNC: 95 MG/DL — SIGNIFICANT CHANGE UP (ref 70–99)
GLUCOSE BLDV-MCNC: 89 MG/DL — SIGNIFICANT CHANGE UP (ref 70–99)
GLUCOSE SERPL-MCNC: 104 MG/DL — SIGNIFICANT CHANGE UP (ref 70–115)
GLUCOSE SERPL-MCNC: 178 MG/DL — HIGH (ref 70–115)
GLUCOSE SERPL-MCNC: 93 MG/DL — SIGNIFICANT CHANGE UP (ref 70–115)
HCO3 BLDA-SCNC: 28 MMOL/L — HIGH (ref 20–26)
HCO3 BLDV-SCNC: 25 MMOL/L — SIGNIFICANT CHANGE UP (ref 21–29)
HCO3 BLDV-SCNC: 28 MMOL/L — SIGNIFICANT CHANGE UP (ref 21–29)
HCT VFR BLD CALC: 25.1 % — LOW (ref 42–52)
HCT VFR BLD CALC: 26.4 % — LOW (ref 42–52)
HCT VFR BLD CALC: 27 % — LOW (ref 42–52)
HCT VFR BLDA CALC: 28 — LOW (ref 39–50)
HGB BLD CALC-MCNC: 9 G/DL — LOW (ref 13–17)
HGB BLD-MCNC: 8.7 G/DL — LOW (ref 14–18)
HGB BLD-MCNC: 9.1 G/DL — LOW (ref 14–18)
HGB BLD-MCNC: 9.2 G/DL — LOW (ref 14–18)
INR BLD: 1.13 RATIO — SIGNIFICANT CHANGE UP (ref 0.88–1.16)
INR BLD: 1.33 RATIO — HIGH (ref 0.88–1.16)
LACTATE BLDV-MCNC: 0.8 MMOL/L — SIGNIFICANT CHANGE UP (ref 0.5–2)
MAGNESIUM SERPL-MCNC: 1.6 MG/DL — LOW (ref 1.8–2.6)
MAGNESIUM SERPL-MCNC: 2.1 MG/DL — SIGNIFICANT CHANGE UP (ref 1.8–2.6)
MCHC RBC-ENTMCNC: 28.2 PG — SIGNIFICANT CHANGE UP (ref 27–31)
MCHC RBC-ENTMCNC: 29 PG — SIGNIFICANT CHANGE UP (ref 27–31)
MCHC RBC-ENTMCNC: 29.3 PG — SIGNIFICANT CHANGE UP (ref 27–31)
MCHC RBC-ENTMCNC: 33.7 G/DL — SIGNIFICANT CHANGE UP (ref 32–36)
MCHC RBC-ENTMCNC: 34.7 G/DL — SIGNIFICANT CHANGE UP (ref 32–36)
MCHC RBC-ENTMCNC: 34.8 G/DL — SIGNIFICANT CHANGE UP (ref 32–36)
MCV RBC AUTO: 83.6 FL — SIGNIFICANT CHANGE UP (ref 80–94)
MCV RBC AUTO: 83.7 FL — SIGNIFICANT CHANGE UP (ref 80–94)
MCV RBC AUTO: 84.1 FL — SIGNIFICANT CHANGE UP (ref 80–94)
OTHER CELLS CSF MANUAL: 10 ML/DL — LOW (ref 18–22)
PCO2 BLDA: 45 MMHG — SIGNIFICANT CHANGE UP (ref 35–45)
PCO2 BLDV: 48 MMHG — SIGNIFICANT CHANGE UP (ref 35–50)
PCO2 BLDV: 48 MMHG — SIGNIFICANT CHANGE UP (ref 35–50)
PH BLDA: 7.42 — SIGNIFICANT CHANGE UP (ref 7.35–7.45)
PH BLDV: 7.35 — SIGNIFICANT CHANGE UP (ref 7.32–7.43)
PH BLDV: 7.4 — SIGNIFICANT CHANGE UP (ref 7.32–7.43)
PLATELET # BLD AUTO: 105 K/UL — LOW (ref 150–400)
PLATELET # BLD AUTO: 62 K/UL — LOW (ref 150–400)
PLATELET # BLD AUTO: 64 K/UL — LOW (ref 150–400)
PO2 BLDA: 382 MMHG — HIGH (ref 83–108)
PO2 BLDV: 40 MMHG — SIGNIFICANT CHANGE UP (ref 25–45)
PO2 BLDV: 43 MMHG — SIGNIFICANT CHANGE UP (ref 25–45)
POTASSIUM BLDV-SCNC: 3.8 MMOL/L — SIGNIFICANT CHANGE UP (ref 3.4–4.5)
POTASSIUM SERPL-MCNC: 3.9 MMOL/L — SIGNIFICANT CHANGE UP (ref 3.5–5.3)
POTASSIUM SERPL-MCNC: 4 MMOL/L — SIGNIFICANT CHANGE UP (ref 3.5–5.3)
POTASSIUM SERPL-MCNC: 4.2 MMOL/L — SIGNIFICANT CHANGE UP (ref 3.5–5.3)
POTASSIUM SERPL-SCNC: 3.9 MMOL/L — SIGNIFICANT CHANGE UP (ref 3.5–5.3)
POTASSIUM SERPL-SCNC: 4 MMOL/L — SIGNIFICANT CHANGE UP (ref 3.5–5.3)
POTASSIUM SERPL-SCNC: 4.2 MMOL/L — SIGNIFICANT CHANGE UP (ref 3.5–5.3)
PROT SERPL-MCNC: 4.8 G/DL — LOW (ref 6.6–8.7)
PROT SERPL-MCNC: 5.2 G/DL — LOW (ref 6.6–8.7)
PROTHROM AB SERPL-ACNC: 13 SEC — HIGH (ref 10–12.9)
PROTHROM AB SERPL-ACNC: 15.4 SEC — HIGH (ref 10–12.9)
RBC # BLD: 3 M/UL — LOW (ref 4.6–6.2)
RBC # BLD: 3.14 M/UL — LOW (ref 4.6–6.2)
RBC # BLD: 3.23 M/UL — LOW (ref 4.6–6.2)
RBC # FLD: 15.5 % — SIGNIFICANT CHANGE UP (ref 11–15.6)
RBC # FLD: 15.9 % — HIGH (ref 11–15.6)
RBC # FLD: 16.1 % — HIGH (ref 11–15.6)
SAO2 % BLDV: 71 % — SIGNIFICANT CHANGE UP
SAO2 % BLDV: 80 % — SIGNIFICANT CHANGE UP
SODIUM SERPL-SCNC: 137 MMOL/L — SIGNIFICANT CHANGE UP (ref 135–145)
SODIUM SERPL-SCNC: 137 MMOL/L — SIGNIFICANT CHANGE UP (ref 135–145)
SODIUM SERPL-SCNC: 138 MMOL/L — SIGNIFICANT CHANGE UP (ref 135–145)
TROPONIN T SERPL-MCNC: 4.65 NG/ML — HIGH (ref 0–0.06)
WBC # BLD: 10.2 K/UL — SIGNIFICANT CHANGE UP (ref 4.8–10.8)
WBC # BLD: 6.6 K/UL — SIGNIFICANT CHANGE UP (ref 4.8–10.8)
WBC # BLD: 6.8 K/UL — SIGNIFICANT CHANGE UP (ref 4.8–10.8)
WBC # FLD AUTO: 10.2 K/UL — SIGNIFICANT CHANGE UP (ref 4.8–10.8)
WBC # FLD AUTO: 6.6 K/UL — SIGNIFICANT CHANGE UP (ref 4.8–10.8)
WBC # FLD AUTO: 6.8 K/UL — SIGNIFICANT CHANGE UP (ref 4.8–10.8)

## 2018-12-03 PROCEDURE — 76376 3D RENDER W/INTRP POSTPROCES: CPT | Mod: 26

## 2018-12-03 PROCEDURE — 71045 X-RAY EXAM CHEST 1 VIEW: CPT | Mod: 26,77

## 2018-12-03 PROCEDURE — 36620 INSERTION CATHETER ARTERY: CPT | Mod: 58

## 2018-12-03 PROCEDURE — 93312 ECHO TRANSESOPHAGEAL: CPT | Mod: 26

## 2018-12-03 PROCEDURE — 93325 DOPPLER ECHO COLOR FLOW MAPG: CPT | Mod: 26

## 2018-12-03 PROCEDURE — 35820 EXPLORE CHEST VESSELS: CPT | Mod: 78

## 2018-12-03 PROCEDURE — 33990 INSJ PERQ VAD L HRT ARTERIAL: CPT | Mod: AS,78

## 2018-12-03 PROCEDURE — 33990 INSJ PERQ VAD L HRT ARTERIAL: CPT

## 2018-12-03 PROCEDURE — 33990 INSJ PERQ VAD L HRT ARTERIAL: CPT | Mod: 58

## 2018-12-03 PROCEDURE — 88300 SURGICAL PATH GROSS: CPT | Mod: 26

## 2018-12-03 PROCEDURE — 71045 X-RAY EXAM CHEST 1 VIEW: CPT | Mod: 26

## 2018-12-03 PROCEDURE — 93320 DOPPLER ECHO COMPLETE: CPT | Mod: 26

## 2018-12-03 PROCEDURE — 35820 EXPLORE CHEST VESSELS: CPT | Mod: AS,78

## 2018-12-03 RX ORDER — VASOPRESSIN 20 [USP'U]/ML
0.03 INJECTION INTRAVENOUS
Qty: 100 | Refills: 0 | Status: DISCONTINUED | OUTPATIENT
Start: 2018-12-03 | End: 2018-12-06

## 2018-12-03 RX ORDER — NOREPINEPHRINE BITARTRATE/D5W 8 MG/250ML
0.05 PLASTIC BAG, INJECTION (ML) INTRAVENOUS
Qty: 8 | Refills: 0 | Status: DISCONTINUED | OUTPATIENT
Start: 2018-12-03 | End: 2018-12-06

## 2018-12-03 RX ORDER — SODIUM CHLORIDE 9 MG/ML
500 INJECTION, SOLUTION INTRAVENOUS
Qty: 0 | Refills: 0 | Status: DISCONTINUED | OUTPATIENT
Start: 2018-12-03 | End: 2018-12-04

## 2018-12-03 RX ORDER — MEPERIDINE HYDROCHLORIDE 50 MG/ML
25 INJECTION INTRAMUSCULAR; INTRAVENOUS; SUBCUTANEOUS ONCE
Qty: 0 | Refills: 0 | Status: DISCONTINUED | OUTPATIENT
Start: 2018-12-03 | End: 2018-12-03

## 2018-12-03 RX ADMIN — PROPOFOL 16.33 MICROGRAM(S)/KG/MIN: 10 INJECTION, EMULSION INTRAVENOUS at 14:21

## 2018-12-03 RX ADMIN — FENTANYL CITRATE 50 MICROGRAM(S): 50 INJECTION INTRAVENOUS at 03:11

## 2018-12-03 RX ADMIN — PROPOFOL 16.33 MICROGRAM(S)/KG/MIN: 10 INJECTION, EMULSION INTRAVENOUS at 10:40

## 2018-12-03 RX ADMIN — Medication 1 DROP(S): at 00:58

## 2018-12-03 RX ADMIN — Medication 250 MILLIGRAM(S): at 01:46

## 2018-12-03 RX ADMIN — Medication 1 DROP(S): at 13:09

## 2018-12-03 RX ADMIN — Medication 200 MILLIGRAM(S): at 05:23

## 2018-12-03 RX ADMIN — Medication 250 MILLIGRAM(S): at 14:19

## 2018-12-03 RX ADMIN — FENTANYL CITRATE 50 MICROGRAM(S): 50 INJECTION INTRAVENOUS at 11:13

## 2018-12-03 RX ADMIN — Medication 1 APPLICATION(S): at 05:24

## 2018-12-03 RX ADMIN — CHLORHEXIDINE GLUCONATE 5 MILLILITER(S): 213 SOLUTION TOPICAL at 05:25

## 2018-12-03 RX ADMIN — HEPARIN SODIUM 9.5 UNIT(S)/HR: 5000 INJECTION INTRAVENOUS; SUBCUTANEOUS at 01:46

## 2018-12-03 RX ADMIN — FENTANYL CITRATE 4.54 MICROGRAM(S)/KG/HR: 50 INJECTION INTRAVENOUS at 05:24

## 2018-12-03 RX ADMIN — MILRINONE LACTATE 13.61 MICROGRAM(S)/KG/MIN: 1 INJECTION, SOLUTION INTRAVENOUS at 00:59

## 2018-12-03 RX ADMIN — CHLORHEXIDINE GLUCONATE 5 MILLILITER(S): 213 SOLUTION TOPICAL at 10:40

## 2018-12-03 RX ADMIN — PANTOPRAZOLE SODIUM 40 MILLIGRAM(S): 20 TABLET, DELAYED RELEASE ORAL at 05:24

## 2018-12-03 RX ADMIN — Medication 8.5 MICROGRAM(S)/KG/MIN: at 11:11

## 2018-12-03 RX ADMIN — MILRINONE LACTATE 13.61 MICROGRAM(S)/KG/MIN: 1 INJECTION, SOLUTION INTRAVENOUS at 09:06

## 2018-12-03 RX ADMIN — PROPOFOL 16.33 MICROGRAM(S)/KG/MIN: 10 INJECTION, EMULSION INTRAVENOUS at 05:23

## 2018-12-03 RX ADMIN — FENTANYL CITRATE 4.54 MICROGRAM(S)/KG/HR: 50 INJECTION INTRAVENOUS at 17:04

## 2018-12-03 RX ADMIN — CHLORHEXIDINE GLUCONATE 5 MILLILITER(S): 213 SOLUTION TOPICAL at 22:00

## 2018-12-03 RX ADMIN — FENTANYL CITRATE 50 MICROGRAM(S): 50 INJECTION INTRAVENOUS at 10:43

## 2018-12-03 RX ADMIN — PROPOFOL 16.33 MICROGRAM(S)/KG/MIN: 10 INJECTION, EMULSION INTRAVENOUS at 07:26

## 2018-12-03 RX ADMIN — MEPERIDINE HYDROCHLORIDE 25 MILLIGRAM(S): 50 INJECTION INTRAMUSCULAR; INTRAVENOUS; SUBCUTANEOUS at 14:11

## 2018-12-03 RX ADMIN — CHLORHEXIDINE GLUCONATE 5 MILLILITER(S): 213 SOLUTION TOPICAL at 02:59

## 2018-12-03 RX ADMIN — Medication 325 MILLIGRAM(S): at 11:06

## 2018-12-03 RX ADMIN — FENTANYL CITRATE 50 MICROGRAM(S): 50 INJECTION INTRAVENOUS at 02:56

## 2018-12-03 RX ADMIN — ATORVASTATIN CALCIUM 80 MILLIGRAM(S): 80 TABLET, FILM COATED ORAL at 23:00

## 2018-12-03 RX ADMIN — MEPERIDINE HYDROCHLORIDE 25 MILLIGRAM(S): 50 INJECTION INTRAMUSCULAR; INTRAVENOUS; SUBCUTANEOUS at 14:41

## 2018-12-03 RX ADMIN — Medication 1 APPLICATION(S): at 14:20

## 2018-12-03 RX ADMIN — CHLORHEXIDINE GLUCONATE 5 MILLILITER(S): 213 SOLUTION TOPICAL at 14:20

## 2018-12-03 NOTE — BRIEF OPERATIVE NOTE - OPERATION/FINDINGS
patient successfully weaned from ECMO to IMPELLA CP   official report pending; DARION intra op also performed

## 2018-12-03 NOTE — PROGRESS NOTE ADULT - SUBJECTIVE AND OBJECTIVE BOX
Pt s/p return to OR for sternal washout and closure, ecmo explant, impella implant. Aftab'd well. No obv anesth complications

## 2018-12-03 NOTE — PROCEDURE NOTE - NSINFORMCONSENT_GEN_A_CORE
son at bedside/Benefits, risks, and possible complications of procedure explained to patient/caregiver who verbalized understanding and gave verbal consent.

## 2018-12-03 NOTE — PROGRESS NOTE ADULT - PROBLEM SELECTOR PLAN 1
CABG X 4  ECMO and IABP in place, chest open  Continue gentle sedation while chest open  Continue inotropic support with  primacor, no plans to wean as yet  Continue Amiodarone gtt, patient remains in NSR  Plan for return to the OR Monday for washout and full cardiac assessment.

## 2018-12-03 NOTE — PROGRESS NOTE ADULT - ASSESSMENT
48 yo Male c/o CP for 2-3 days prior to admission, then 10/10 chest pain approximately 9:30 am yesterday, followed by vomiting.  Wife drove pt to hospital, ruled in for STEMI. Urgent Cath, pt found to have multiple occlusions; stent to LAD and D2; stent to LAD thrombosed,  impella placed for support.  Pt then had VF arrest, shock x 1, return to NSR.  Upon transfer to OR for Urgent CABG, pt again VF arrest, chest opened intra-op with CPR in progress.    Patient then underwent ECMO (extracorporeal membrane oxygenation)  11/29/2018  Secondary to pre-op cardiogenic shock and inability to wean from CPB. In addition to 11/29/2018  Emergent CABG X 4 all vein to the LAD, Diag, OM and PDA with inability to wean from CPB therefore requiring ECMO and IABP.     12/2 PRBC x1 and plt x1, levo weaned off    Pt is currently maintained on Primacor, and Amio gtts in NSR.  Renal function remains normal with brisk urine output.  NGT output minimal, still bloody.  Patient has woken, followed commands and interacted with family.  ECMO flows at 4.6 L, IABP at 1:1, PA pressures 21/11.  Today patient remains stable, no change in hemodynamics.

## 2018-12-03 NOTE — PROGRESS NOTE ADULT - SUBJECTIVE AND OBJECTIVE BOX
ECMO Day# 4    Subjective: Pt intubated and sedated unable to obtain       Vital Signs Last 24 Hrs  T(C): 37.2 (03 Dec 2018 00:00), Max: 37.9 (02 Dec 2018 08:00)  T(F): 99 (03 Dec 2018 00:00), Max: 100.2 (02 Dec 2018 08:00)  HR: 77 (03 Dec 2018 00:00) (77 - 98)  BP: --  BP(mean): --  RR: 10 (03 Dec 2018 00:00) (10 - 29)  SpO2: 100% (03 Dec 2018 00:00) (100% - 110%)      Adult Advanced Hemodynamics Last 24 Hrs  CVP(mm Hg): 9 (03 Dec 2018 00:00) (8 - 36)  CVP(cm H2O): --  CO: --  CI: --  PA: 19/11 (03 Dec 2018 00:00) (19/9 - 36/21)  PA(mean): 14 (03 Dec 2018 00:00) (13 - 27)  PCWP: --  SVR: --  SVRI: --  PVR: --  PVRI: --    I&O's Summary    01 Dec 2018 07:01  -  02 Dec 2018 07:00  --------------------------------------------------------  IN: 3055.4 mL / OUT: 4055 mL / NET: -999.6 mL    02 Dec 2018 07:01  -  03 Dec 2018 00:19  --------------------------------------------------------  IN: 2869.2 mL / OUT: 4110 mL / NET: -1240.8 mL          ECMO SETTINGS:  Type:				[ x ] Venoarterial  Cannulation Site(s): right femoral venous, left femoral arterial     Flow: 4.5 l/m  	RPM: 4000		    ABG - ( 02 Dec 2018 23:59 )  pH: 7.48  /  pCO2: 40    /  pO2: 245   / HCO3: 30    / Base Excess: 5.6   /  SaO2: 100                 Oxygenator:	Sweep:  3   L/min	FiO2:  100        VENTILATOR SETTINGS:     Mode: AC/ CMV (Assist Control/ Continuous Mandatory Ventilation)  RR (machine): 10  TV (machine): 600  FiO2: 40  PEEP: 5  MAP: 8  PIP: 27        LABS:                          8.8    6.2   )-----------( 83       ( 02 Dec 2018 18:17 )             26.0                          12-02    135  |  100  |  15.0  ----------------------------<  122<H>  4.2   |  27.0  |  0.72    Ca    7.8<L>      02 Dec 2018 18:17  Mg     2.2     12-02    TPro  5.1<L>  /  Alb  3.0<L>  /  TBili  0.7  /  DBili  x   /  AST  122<H>  /  ALT  34  /  AlkPhos  33<L>  12-02      LIVER FUNCTIONS - ( 02 Dec 2018 08:37 )  Alb: 3.0 g/dL / Pro: 5.1 g/dL / ALK PHOS: 33 U/L / ALT: 34 U/L / AST: 122 U/L / GGT: x             PT/INR - ( 01 Dec 2018 03:09 )   PT: 15.6 sec;   INR: 1.34 ratio         PTT - ( 02 Dec 2018 18:17 )  PTT:34.9 sec          ACTIVE MEDS:    MEDICATIONS  (STANDING):  amiodarone Infusion 0.5 mG/Min (16.667 mL/Hr) IV Continuous <Continuous>  artificial  tears Solution 1 Drop(s) Both EYES four times a day  aspirin 325 milliGRAM(s) Oral daily  atorvastatin 80 milliGRAM(s) Oral at bedtime  chlorhexidine 0.12% Liquid 5 milliLiter(s) Oral Mucosa every 4 hours  ciprofloxacin   IVPB 400 milliGRAM(s) IV Intermittent every 12 hours  ciprofloxacin   IVPB      dextrose 50% Injectable 50 milliLiter(s) IV Push every 15 minutes  fentaNYL   Infusion 0.5 MICROgram(s)/kG/Hr (4.535 mL/Hr) IV Continuous <Continuous>  heparin  Infusion 500 Unit(s)/Hr (9 mL/Hr) IV Continuous <Continuous>  insulin Infusion 2 Unit(s)/Hr (2 mL/Hr) IV Continuous <Continuous>  milrinone Infusion 0.5 MICROgram(s)/kG/Min (13.605 mL/Hr) IV Continuous <Continuous>  norepinephrine Infusion 0.05 MICROgram(s)/kG/Min (8.503 mL/Hr) IV Continuous <Continuous>  pantoprazole  Injectable 40 milliGRAM(s) IV Push every 12 hours  petrolatum Ophthalmic Ointment 1 Application(s) Both EYES every 8 hours  potassium chloride  10 mEq/50 mL IVPB 10 milliEquivalent(s) IV Intermittent every 1 hour  potassium chloride  10 mEq/50 mL IVPB 10 milliEquivalent(s) IV Intermittent every 1 hour  potassium chloride  10 mEq/50 mL IVPB 10 milliEquivalent(s) IV Intermittent every 1 hour  propofol Infusion 30 MICROgram(s)/kG/Min (16.326 mL/Hr) IV Continuous <Continuous>  sodium chloride 0.9%. 1000 milliLiter(s) (10 mL/Hr) IV Continuous <Continuous>  sodium chloride 0.9%. 1000 milliLiter(s) (5 mL/Hr) IV Continuous <Continuous>  vancomycin  IVPB 1000 milliGRAM(s) IV Intermittent every 12 hours          Physical Exam  Neuro: Sedated to RASS-2 at this time   HEENT:   PERRL, EOMI. No conjuctival edema or iIcterus, no thrush.  + ETT +OGT  Neck:  ROM intact, no JVD, no nodes or masses palpable, trachea midline, no tracheostomy  Pulm: CTA, good air entry, equal bilaterally, no rales/rhonchi/wheezing, no accessory muscle use noted  Chest:        mediastinal CT and right and left pleural CT all with dressings intact and no air leak, no subQ emphysema       +PW  CV:Unable to auscultate as ioban dressing and protective dressing covering open chest cavity   Abd: +BSx4. Soft, ondistended.  : augustin in situ to bedside drainage  Ext: 1+ edema, no cyanosis or clubbing, distal motor/neuro/circ intact  Skin: warm, dry, no rashes  Incisions: open chest cavity with protective dressing C/D/I/stable w/ dressing, LLE C/D/I w/ dressing and Ace wrap

## 2018-12-03 NOTE — BRIEF OPERATIVE NOTE - PROCEDURE
Intra-aortic balloon pump removal  12/03/2018  removed from R Femoral artery through 14 F sheath   Impella in R femoral artery through new 14 F sheath  Active  MHOERNING  Exploration and washout of mediastinum  12/03/2018    Active  MHOERNING  ECMO decannulation  12/03/2018  removal for central ECMO cannulation  Active  MHOERNING  Insertion of Impella device  12/03/2018  IMPELlA CP via R femoral artery via 14 F sheath (***purse string around sheath)  IABP removal from R femoral artery 14 F sheath   **performed by Dr Shen  Active  MHOERNING <<-----Click on this checkbox to enter Procedure

## 2018-12-04 PROBLEM — B95.8 UNSPECIFIED STAPHYLOCOCCUS AS THE CAUSE OF DISEASES CLASSIFIED ELSEWHERE: Chronic | Status: ACTIVE | Noted: 2018-11-29

## 2018-12-04 LAB
ALBUMIN SERPL ELPH-MCNC: 2.7 G/DL — LOW (ref 3.3–5.2)
ALBUMIN SERPL ELPH-MCNC: 2.8 G/DL — LOW (ref 3.3–5.2)
ALBUMIN SERPL ELPH-MCNC: 2.9 G/DL — LOW (ref 3.3–5.2)
ALBUMIN SERPL ELPH-MCNC: 2.9 G/DL — LOW (ref 3.3–5.2)
ALP SERPL-CCNC: 55 U/L — SIGNIFICANT CHANGE UP (ref 40–120)
ALP SERPL-CCNC: 56 U/L — SIGNIFICANT CHANGE UP (ref 40–120)
ALP SERPL-CCNC: 64 U/L — SIGNIFICANT CHANGE UP (ref 40–120)
ALP SERPL-CCNC: 70 U/L — SIGNIFICANT CHANGE UP (ref 40–120)
ALT FLD-CCNC: 141 U/L — HIGH
ALT FLD-CCNC: 151 U/L — HIGH
ALT FLD-CCNC: 22 U/L — SIGNIFICANT CHANGE UP
ALT FLD-CCNC: 35 U/L — SIGNIFICANT CHANGE UP
ANION GAP SERPL CALC-SCNC: 10 MMOL/L — SIGNIFICANT CHANGE UP (ref 5–17)
ANION GAP SERPL CALC-SCNC: 11 MMOL/L — SIGNIFICANT CHANGE UP (ref 5–17)
ANION GAP SERPL CALC-SCNC: 12 MMOL/L — SIGNIFICANT CHANGE UP (ref 5–17)
ANION GAP SERPL CALC-SCNC: 12 MMOL/L — SIGNIFICANT CHANGE UP (ref 5–17)
ANION GAP SERPL CALC-SCNC: 9 MMOL/L — SIGNIFICANT CHANGE UP (ref 5–17)
APPEARANCE UR: CLEAR — SIGNIFICANT CHANGE UP
APTT BLD: 30.2 SEC — SIGNIFICANT CHANGE UP (ref 27.5–36.3)
APTT BLD: 32.8 SEC — SIGNIFICANT CHANGE UP (ref 27.5–36.3)
APTT BLD: 45.8 SEC — HIGH (ref 27.5–36.3)
APTT BLD: 55 SEC — HIGH (ref 27.5–36.3)
AST SERPL-CCNC: 312 U/L — HIGH
AST SERPL-CCNC: 390 U/L — HIGH
AST SERPL-CCNC: 62 U/L — HIGH
AST SERPL-CCNC: 90 U/L — HIGH
BASE EXCESS BLDA CALC-SCNC: 0.2 MMOL/L — SIGNIFICANT CHANGE UP (ref -2–2)
BASE EXCESS BLDV CALC-SCNC: 0 MMOL/L — SIGNIFICANT CHANGE UP (ref -2–2)
BASE EXCESS BLDV CALC-SCNC: 0.1 MMOL/L — SIGNIFICANT CHANGE UP (ref -2–2)
BASE EXCESS BLDV CALC-SCNC: 0.7 MMOL/L — SIGNIFICANT CHANGE UP (ref -2–2)
BASE EXCESS BLDV CALC-SCNC: 2.4 MMOL/L — HIGH (ref -2–2)
BASE EXCESS BLDV CALC-SCNC: 3.4 MMOL/L — HIGH (ref -2–2)
BASE EXCESS BLDV CALC-SCNC: 3.7 MMOL/L — HIGH (ref -2–2)
BASE EXCESS BLDV CALC-SCNC: 4 MMOL/L — HIGH (ref -2–2)
BASE EXCESS BLDV CALC-SCNC: 5.1 MMOL/L — HIGH (ref -2–2)
BILIRUB DIRECT SERPL-MCNC: 0.7 MG/DL — HIGH (ref 0–0.3)
BILIRUB DIRECT SERPL-MCNC: 0.8 MG/DL — HIGH (ref 0–0.3)
BILIRUB INDIRECT FLD-MCNC: 0.7 MG/DL — SIGNIFICANT CHANGE UP (ref 0.2–1)
BILIRUB INDIRECT FLD-MCNC: 1.1 MG/DL — HIGH (ref 0.2–1)
BILIRUB SERPL-MCNC: 1.2 MG/DL — SIGNIFICANT CHANGE UP (ref 0.4–2)
BILIRUB SERPL-MCNC: 1.5 MG/DL — SIGNIFICANT CHANGE UP (ref 0.4–2)
BILIRUB SERPL-MCNC: 1.7 MG/DL — SIGNIFICANT CHANGE UP (ref 0.4–2)
BILIRUB SERPL-MCNC: 1.8 MG/DL — SIGNIFICANT CHANGE UP (ref 0.4–2)
BILIRUB UR-MCNC: NEGATIVE — SIGNIFICANT CHANGE UP
BLOOD GAS COMMENTS ARTERIAL: SIGNIFICANT CHANGE UP
BLOOD GAS COMMENTS, VENOUS: SIGNIFICANT CHANGE UP
BUN SERPL-MCNC: 17 MG/DL — SIGNIFICANT CHANGE UP (ref 8–20)
BUN SERPL-MCNC: 18 MG/DL — SIGNIFICANT CHANGE UP (ref 8–20)
BUN SERPL-MCNC: 19 MG/DL — SIGNIFICANT CHANGE UP (ref 8–20)
CA-I SERPL-SCNC: 0.97 MMOL/L — LOW (ref 1.15–1.33)
CA-I SERPL-SCNC: 1.01 MMOL/L — LOW (ref 1.15–1.33)
CA-I SERPL-SCNC: 1.02 MMOL/L — LOW (ref 1.15–1.33)
CA-I SERPL-SCNC: 1.03 MMOL/L — LOW (ref 1.15–1.33)
CA-I SERPL-SCNC: 1.04 MMOL/L — LOW (ref 1.15–1.33)
CA-I SERPL-SCNC: 1.08 MMOL/L — LOW (ref 1.15–1.33)
CALCIUM SERPL-MCNC: 7.3 MG/DL — LOW (ref 8.6–10.2)
CALCIUM SERPL-MCNC: 7.4 MG/DL — LOW (ref 8.6–10.2)
CALCIUM SERPL-MCNC: 7.5 MG/DL — LOW (ref 8.6–10.2)
CALCIUM SERPL-MCNC: 7.6 MG/DL — LOW (ref 8.6–10.2)
CALCIUM SERPL-MCNC: 7.9 MG/DL — LOW (ref 8.6–10.2)
CHLORIDE BLDV-SCNC: 101 MMOL/L — SIGNIFICANT CHANGE UP (ref 98–107)
CHLORIDE BLDV-SCNC: 102 MMOL/L — SIGNIFICANT CHANGE UP (ref 98–107)
CHLORIDE BLDV-SCNC: 103 MMOL/L — SIGNIFICANT CHANGE UP (ref 98–107)
CHLORIDE BLDV-SCNC: 104 MMOL/L — SIGNIFICANT CHANGE UP (ref 98–107)
CHLORIDE SERPL-SCNC: 100 MMOL/L — SIGNIFICANT CHANGE UP (ref 98–107)
CHLORIDE SERPL-SCNC: 102 MMOL/L — SIGNIFICANT CHANGE UP (ref 98–107)
CHLORIDE SERPL-SCNC: 97 MMOL/L — LOW (ref 98–107)
CK MB CFR SERPL CALC: 5.2 NG/ML — SIGNIFICANT CHANGE UP (ref 0–6.7)
CK MB CFR SERPL CALC: 6.8 NG/ML — HIGH (ref 0–6.7)
CK SERPL-CCNC: 1263 U/L — HIGH (ref 30–200)
CK SERPL-CCNC: 1356 U/L — HIGH (ref 30–200)
CO2 SERPL-SCNC: 23 MMOL/L — SIGNIFICANT CHANGE UP (ref 22–29)
CO2 SERPL-SCNC: 24 MMOL/L — SIGNIFICANT CHANGE UP (ref 22–29)
CO2 SERPL-SCNC: 25 MMOL/L — SIGNIFICANT CHANGE UP (ref 22–29)
COLOR SPEC: YELLOW — SIGNIFICANT CHANGE UP
CREAT SERPL-MCNC: 0.63 MG/DL — SIGNIFICANT CHANGE UP (ref 0.5–1.3)
CREAT SERPL-MCNC: 0.73 MG/DL — SIGNIFICANT CHANGE UP (ref 0.5–1.3)
CREAT SERPL-MCNC: 0.76 MG/DL — SIGNIFICANT CHANGE UP (ref 0.5–1.3)
CREAT SERPL-MCNC: 0.77 MG/DL — SIGNIFICANT CHANGE UP (ref 0.5–1.3)
CREAT SERPL-MCNC: 0.77 MG/DL — SIGNIFICANT CHANGE UP (ref 0.5–1.3)
DIFF PNL FLD: ABNORMAL
EPI CELLS # UR: SIGNIFICANT CHANGE UP
GAS PNL BLDA: SIGNIFICANT CHANGE UP
GAS PNL BLDV: 135 MMOL/L — SIGNIFICANT CHANGE UP (ref 135–145)
GAS PNL BLDV: 136 MMOL/L — SIGNIFICANT CHANGE UP (ref 135–145)
GAS PNL BLDV: 138 MMOL/L — SIGNIFICANT CHANGE UP (ref 135–145)
GAS PNL BLDV: SIGNIFICANT CHANGE UP
GLUCOSE BLDC GLUCOMTR-MCNC: 107 MG/DL — HIGH (ref 70–99)
GLUCOSE BLDC GLUCOMTR-MCNC: 116 MG/DL — HIGH (ref 70–99)
GLUCOSE BLDC GLUCOMTR-MCNC: 118 MG/DL — HIGH (ref 70–99)
GLUCOSE BLDC GLUCOMTR-MCNC: 120 MG/DL — HIGH (ref 70–99)
GLUCOSE BLDC GLUCOMTR-MCNC: 151 MG/DL — HIGH (ref 70–99)
GLUCOSE BLDV-MCNC: 101 MG/DL — HIGH (ref 70–99)
GLUCOSE BLDV-MCNC: 113 MG/DL — HIGH (ref 70–99)
GLUCOSE BLDV-MCNC: 122 MG/DL — HIGH (ref 70–99)
GLUCOSE BLDV-MCNC: 138 MG/DL — HIGH (ref 70–99)
GLUCOSE BLDV-MCNC: 160 MG/DL — HIGH (ref 70–99)
GLUCOSE BLDV-MCNC: 162 MG/DL — HIGH (ref 70–99)
GLUCOSE SERPL-MCNC: 102 MG/DL — SIGNIFICANT CHANGE UP (ref 70–115)
GLUCOSE SERPL-MCNC: 130 MG/DL — HIGH (ref 70–115)
GLUCOSE SERPL-MCNC: 137 MG/DL — HIGH (ref 70–115)
GLUCOSE SERPL-MCNC: 158 MG/DL — HIGH (ref 70–115)
GLUCOSE SERPL-MCNC: 159 MG/DL — HIGH (ref 70–115)
GLUCOSE UR QL: NEGATIVE MG/DL — SIGNIFICANT CHANGE UP
GRAM STN FLD: SIGNIFICANT CHANGE UP
HCO3 BLDA-SCNC: 25 MMOL/L — SIGNIFICANT CHANGE UP (ref 20–26)
HCO3 BLDV-SCNC: 24 MMOL/L — SIGNIFICANT CHANGE UP (ref 21–29)
HCO3 BLDV-SCNC: 26 MMOL/L — SIGNIFICANT CHANGE UP (ref 21–29)
HCO3 BLDV-SCNC: 27 MMOL/L — SIGNIFICANT CHANGE UP (ref 21–29)
HCO3 BLDV-SCNC: 28 MMOL/L — SIGNIFICANT CHANGE UP (ref 21–29)
HCT VFR BLD CALC: 24.7 % — LOW (ref 42–52)
HCT VFR BLD CALC: 25.1 % — LOW (ref 42–52)
HCT VFR BLD CALC: 27.6 % — LOW (ref 42–52)
HCT VFR BLD CALC: 28.8 % — LOW (ref 42–52)
HCT VFR BLDA CALC: 24 — LOW (ref 39–50)
HCT VFR BLDA CALC: 28 — LOW (ref 39–50)
HCT VFR BLDA CALC: 29 — LOW (ref 39–50)
HCT VFR BLDA CALC: 30 — LOW (ref 39–50)
HGB BLD CALC-MCNC: 7.8 G/DL — LOW (ref 13–17)
HGB BLD CALC-MCNC: 9.2 G/DL — LOW (ref 13–17)
HGB BLD CALC-MCNC: 9.4 G/DL — LOW (ref 13–17)
HGB BLD CALC-MCNC: 9.7 G/DL — LOW (ref 13–17)
HGB BLD CALC-MCNC: 9.7 G/DL — LOW (ref 13–17)
HGB BLD CALC-MCNC: 9.9 G/DL — LOW (ref 13–17)
HGB BLD-MCNC: 10.2 G/DL — LOW (ref 14–18)
HGB BLD-MCNC: 8.6 G/DL — LOW (ref 14–18)
HGB BLD-MCNC: 8.7 G/DL — LOW (ref 14–18)
HGB BLD-MCNC: 9.5 G/DL — LOW (ref 14–18)
HOROWITZ INDEX BLDA+IHG-RTO: 0.7 — SIGNIFICANT CHANGE UP
HOROWITZ INDEX BLDV+IHG-RTO: 0.7 — SIGNIFICANT CHANGE UP
HOROWITZ INDEX BLDV+IHG-RTO: 70 — SIGNIFICANT CHANGE UP
HOROWITZ INDEX BLDV+IHG-RTO: SIGNIFICANT CHANGE UP
HOROWITZ INDEX BLDV+IHG-RTO: SIGNIFICANT CHANGE UP
INR BLD: 1.24 RATIO — HIGH (ref 0.88–1.16)
INR BLD: 1.4 RATIO — HIGH (ref 0.88–1.16)
KETONES UR-MCNC: NEGATIVE — SIGNIFICANT CHANGE UP
LACTATE BLDV-MCNC: 1.3 MMOL/L — SIGNIFICANT CHANGE UP (ref 0.5–2)
LACTATE BLDV-MCNC: 1.5 MMOL/L — SIGNIFICANT CHANGE UP (ref 0.5–2)
LACTATE BLDV-MCNC: 1.6 MMOL/L — SIGNIFICANT CHANGE UP (ref 0.5–2)
LACTATE BLDV-MCNC: 1.7 MMOL/L — SIGNIFICANT CHANGE UP (ref 0.5–2)
LACTATE BLDV-MCNC: 1.8 MMOL/L — SIGNIFICANT CHANGE UP (ref 0.5–2)
LACTATE BLDV-MCNC: 1.9 MMOL/L — SIGNIFICANT CHANGE UP (ref 0.5–2)
LEUKOCYTE ESTERASE UR-ACNC: ABNORMAL
MAGNESIUM SERPL-MCNC: 1.9 MG/DL — SIGNIFICANT CHANGE UP (ref 1.6–2.6)
MAGNESIUM SERPL-MCNC: 2 MG/DL — SIGNIFICANT CHANGE UP (ref 1.6–2.6)
MAGNESIUM SERPL-MCNC: 2.3 MG/DL — SIGNIFICANT CHANGE UP (ref 1.6–2.6)
MAGNESIUM SERPL-MCNC: 2.3 MG/DL — SIGNIFICANT CHANGE UP (ref 1.6–2.6)
MCHC RBC-ENTMCNC: 28.7 PG — SIGNIFICANT CHANGE UP (ref 27–31)
MCHC RBC-ENTMCNC: 28.8 PG — SIGNIFICANT CHANGE UP (ref 27–31)
MCHC RBC-ENTMCNC: 29.4 PG — SIGNIFICANT CHANGE UP (ref 27–31)
MCHC RBC-ENTMCNC: 29.7 PG — SIGNIFICANT CHANGE UP (ref 27–31)
MCHC RBC-ENTMCNC: 34.3 G/DL — SIGNIFICANT CHANGE UP (ref 32–36)
MCHC RBC-ENTMCNC: 34.4 G/DL — SIGNIFICANT CHANGE UP (ref 32–36)
MCHC RBC-ENTMCNC: 35.2 G/DL — SIGNIFICANT CHANGE UP (ref 32–36)
MCHC RBC-ENTMCNC: 35.4 G/DL — SIGNIFICANT CHANGE UP (ref 32–36)
MCV RBC AUTO: 83.4 FL — SIGNIFICANT CHANGE UP (ref 80–94)
MCV RBC AUTO: 83.6 FL — SIGNIFICANT CHANGE UP (ref 80–94)
MCV RBC AUTO: 83.7 FL — SIGNIFICANT CHANGE UP (ref 80–94)
MCV RBC AUTO: 84 FL — SIGNIFICANT CHANGE UP (ref 80–94)
NITRITE UR-MCNC: NEGATIVE — SIGNIFICANT CHANGE UP
OTHER CELLS CSF MANUAL: 6 ML/DL — LOW (ref 18–22)
OTHER CELLS CSF MANUAL: 7 ML/DL — LOW (ref 18–22)
PCO2 BLDA: 43 MMHG — SIGNIFICANT CHANGE UP (ref 35–45)
PCO2 BLDV: 41 MMHG — SIGNIFICANT CHANGE UP (ref 35–50)
PCO2 BLDV: 42 MMHG — SIGNIFICANT CHANGE UP (ref 35–50)
PCO2 BLDV: 43 MMHG — SIGNIFICANT CHANGE UP (ref 35–50)
PCO2 BLDV: 46 MMHG — SIGNIFICANT CHANGE UP (ref 35–50)
PCO2 BLDV: 47 MMHG — SIGNIFICANT CHANGE UP (ref 35–50)
PCO2 BLDV: 48 MMHG — SIGNIFICANT CHANGE UP (ref 35–50)
PCO2 BLDV: 51 MMHG — HIGH (ref 35–50)
PCO2 BLDV: 52 MMHG — HIGH (ref 35–50)
PH BLDA: 7.38 — SIGNIFICANT CHANGE UP (ref 7.35–7.45)
PH BLDV: 7.34 — SIGNIFICANT CHANGE UP (ref 7.32–7.43)
PH BLDV: 7.34 — SIGNIFICANT CHANGE UP (ref 7.32–7.43)
PH BLDV: 7.35 — SIGNIFICANT CHANGE UP (ref 7.32–7.43)
PH BLDV: 7.35 — SIGNIFICANT CHANGE UP (ref 7.32–7.43)
PH BLDV: 7.4 — SIGNIFICANT CHANGE UP (ref 7.32–7.43)
PH BLDV: 7.43 — SIGNIFICANT CHANGE UP (ref 7.32–7.43)
PH BLDV: 7.45 — HIGH (ref 7.32–7.43)
PH BLDV: 7.46 — HIGH (ref 7.32–7.43)
PH UR: 6 — SIGNIFICANT CHANGE UP (ref 5–8)
PHOSPHATE SERPL-MCNC: 4 MG/DL — SIGNIFICANT CHANGE UP (ref 2.4–4.7)
PLATELET # BLD AUTO: 107 K/UL — LOW (ref 150–400)
PLATELET # BLD AUTO: 120 K/UL — LOW (ref 150–400)
PLATELET # BLD AUTO: 63 K/UL — LOW (ref 150–400)
PLATELET # BLD AUTO: 73 K/UL — LOW (ref 150–400)
PO2 BLDA: 125 MMHG — HIGH (ref 83–108)
PO2 BLDV: 26 MMHG — SIGNIFICANT CHANGE UP (ref 25–45)
PO2 BLDV: 28 MMHG — SIGNIFICANT CHANGE UP (ref 25–45)
PO2 BLDV: 28 MMHG — SIGNIFICANT CHANGE UP (ref 25–45)
PO2 BLDV: 29 MMHG — SIGNIFICANT CHANGE UP (ref 25–45)
PO2 BLDV: 29 MMHG — SIGNIFICANT CHANGE UP (ref 25–45)
PO2 BLDV: 31 MMHG — SIGNIFICANT CHANGE UP (ref 25–45)
POTASSIUM BLDV-SCNC: 3.2 MMOL/L — LOW (ref 3.4–4.5)
POTASSIUM BLDV-SCNC: 3.8 MMOL/L — SIGNIFICANT CHANGE UP (ref 3.4–4.5)
POTASSIUM BLDV-SCNC: 4 MMOL/L — SIGNIFICANT CHANGE UP (ref 3.4–4.5)
POTASSIUM BLDV-SCNC: 4 MMOL/L — SIGNIFICANT CHANGE UP (ref 3.4–4.5)
POTASSIUM BLDV-SCNC: 4.1 MMOL/L — SIGNIFICANT CHANGE UP (ref 3.4–4.5)
POTASSIUM BLDV-SCNC: 4.9 MMOL/L — HIGH (ref 3.4–4.5)
POTASSIUM SERPL-MCNC: 3.7 MMOL/L — SIGNIFICANT CHANGE UP (ref 3.5–5.3)
POTASSIUM SERPL-MCNC: 4.1 MMOL/L — SIGNIFICANT CHANGE UP (ref 3.5–5.3)
POTASSIUM SERPL-MCNC: 4.4 MMOL/L — SIGNIFICANT CHANGE UP (ref 3.5–5.3)
POTASSIUM SERPL-MCNC: 5.1 MMOL/L — SIGNIFICANT CHANGE UP (ref 3.5–5.3)
POTASSIUM SERPL-MCNC: 5.2 MMOL/L — SIGNIFICANT CHANGE UP (ref 3.5–5.3)
POTASSIUM SERPL-SCNC: 3.7 MMOL/L — SIGNIFICANT CHANGE UP (ref 3.5–5.3)
POTASSIUM SERPL-SCNC: 4.1 MMOL/L — SIGNIFICANT CHANGE UP (ref 3.5–5.3)
POTASSIUM SERPL-SCNC: 4.4 MMOL/L — SIGNIFICANT CHANGE UP (ref 3.5–5.3)
POTASSIUM SERPL-SCNC: 5.1 MMOL/L — SIGNIFICANT CHANGE UP (ref 3.5–5.3)
POTASSIUM SERPL-SCNC: 5.2 MMOL/L — SIGNIFICANT CHANGE UP (ref 3.5–5.3)
PROT SERPL-MCNC: 4.8 G/DL — LOW (ref 6.6–8.7)
PROT SERPL-MCNC: 4.9 G/DL — LOW (ref 6.6–8.7)
PROT SERPL-MCNC: 5.1 G/DL — LOW (ref 6.6–8.7)
PROT SERPL-MCNC: 5.1 G/DL — LOW (ref 6.6–8.7)
PROT UR-MCNC: 30 MG/DL
PROTHROM AB SERPL-ACNC: 14.3 SEC — HIGH (ref 10–12.9)
PROTHROM AB SERPL-ACNC: 16.3 SEC — HIGH (ref 10–12.9)
RBC # BLD: 2.96 M/UL — LOW (ref 4.6–6.2)
RBC # BLD: 3 M/UL — LOW (ref 4.6–6.2)
RBC # BLD: 3.3 M/UL — LOW (ref 4.6–6.2)
RBC # BLD: 3.43 M/UL — LOW (ref 4.6–6.2)
RBC # FLD: 14.9 % — SIGNIFICANT CHANGE UP (ref 11–15.6)
RBC # FLD: 14.9 % — SIGNIFICANT CHANGE UP (ref 11–15.6)
RBC # FLD: 15.3 % — SIGNIFICANT CHANGE UP (ref 11–15.6)
RBC # FLD: 15.3 % — SIGNIFICANT CHANGE UP (ref 11–15.6)
SAO2 % BLDA: 99 % — SIGNIFICANT CHANGE UP (ref 95–99)
SAO2 % BLDV: 46 % — SIGNIFICANT CHANGE UP
SAO2 % BLDV: 47 % — SIGNIFICANT CHANGE UP
SAO2 % BLDV: 50 % — SIGNIFICANT CHANGE UP
SAO2 % BLDV: 51 % — SIGNIFICANT CHANGE UP
SAO2 % BLDV: 53 % — SIGNIFICANT CHANGE UP
SAO2 % BLDV: 54 % — SIGNIFICANT CHANGE UP
SAO2 % BLDV: 56 % — SIGNIFICANT CHANGE UP
SAO2 % BLDV: 58 % — SIGNIFICANT CHANGE UP
SODIUM SERPL-SCNC: 134 MMOL/L — LOW (ref 135–145)
SODIUM SERPL-SCNC: 136 MMOL/L — SIGNIFICANT CHANGE UP (ref 135–145)
SODIUM SERPL-SCNC: 137 MMOL/L — SIGNIFICANT CHANGE UP (ref 135–145)
SP GR SPEC: 1.02 — SIGNIFICANT CHANGE UP (ref 1.01–1.02)
SPECIMEN SOURCE: SIGNIFICANT CHANGE UP
TROPONIN T SERPL-MCNC: 5.57 NG/ML — HIGH (ref 0–0.06)
TROPONIN T SERPL-MCNC: 7.22 NG/ML — HIGH (ref 0–0.06)
UROBILINOGEN FLD QL: 4 MG/DL
VANCOMYCIN TROUGH SERPL-MCNC: 5.2 UG/ML — LOW (ref 10–20)
WBC # BLD: 11.3 K/UL — HIGH (ref 4.8–10.8)
WBC # BLD: 8.1 K/UL — SIGNIFICANT CHANGE UP (ref 4.8–10.8)
WBC # BLD: 9.4 K/UL — SIGNIFICANT CHANGE UP (ref 4.8–10.8)
WBC # BLD: 9.8 K/UL — SIGNIFICANT CHANGE UP (ref 4.8–10.8)
WBC # FLD AUTO: 11.3 K/UL — HIGH (ref 4.8–10.8)
WBC # FLD AUTO: 8.1 K/UL — SIGNIFICANT CHANGE UP (ref 4.8–10.8)
WBC # FLD AUTO: 9.4 K/UL — SIGNIFICANT CHANGE UP (ref 4.8–10.8)
WBC # FLD AUTO: 9.8 K/UL — SIGNIFICANT CHANGE UP (ref 4.8–10.8)
WBC UR QL: SIGNIFICANT CHANGE UP

## 2018-12-04 PROCEDURE — 93010 ELECTROCARDIOGRAM REPORT: CPT | Mod: 76

## 2018-12-04 PROCEDURE — 99223 1ST HOSP IP/OBS HIGH 75: CPT

## 2018-12-04 PROCEDURE — 99291 CRITICAL CARE FIRST HOUR: CPT

## 2018-12-04 PROCEDURE — 71045 X-RAY EXAM CHEST 1 VIEW: CPT | Mod: 26

## 2018-12-04 RX ORDER — DOBUTAMINE HCL 250MG/20ML
2.5 VIAL (ML) INTRAVENOUS
Qty: 500 | Refills: 0 | Status: DISCONTINUED | OUTPATIENT
Start: 2018-12-04 | End: 2018-12-05

## 2018-12-04 RX ORDER — LACTOBACILLUS ACIDOPHILUS 100MM CELL
1 CAPSULE ORAL EVERY 8 HOURS
Qty: 0 | Refills: 0 | Status: DISCONTINUED | OUTPATIENT
Start: 2018-12-04 | End: 2018-12-06

## 2018-12-04 RX ORDER — ONDANSETRON 8 MG/1
4 TABLET, FILM COATED ORAL ONCE
Qty: 0 | Refills: 0 | Status: COMPLETED | OUTPATIENT
Start: 2018-12-04 | End: 2018-12-04

## 2018-12-04 RX ORDER — FENTANYL CITRATE 50 UG/ML
100 INJECTION INTRAVENOUS ONCE
Qty: 0 | Refills: 0 | Status: DISCONTINUED | OUTPATIENT
Start: 2018-12-04 | End: 2018-12-04

## 2018-12-04 RX ORDER — VASOPRESSIN 20 [USP'U]/ML
0.03 INJECTION INTRAVENOUS
Qty: 100 | Refills: 0 | Status: DISCONTINUED | OUTPATIENT
Start: 2018-12-04 | End: 2018-12-04

## 2018-12-04 RX ORDER — ACETAMINOPHEN 500 MG
1000 TABLET ORAL ONCE
Qty: 0 | Refills: 0 | Status: COMPLETED | OUTPATIENT
Start: 2018-12-04 | End: 2018-12-04

## 2018-12-04 RX ORDER — FUROSEMIDE 40 MG
2.5 TABLET ORAL
Qty: 500 | Refills: 0 | Status: DISCONTINUED | OUTPATIENT
Start: 2018-12-04 | End: 2018-12-06

## 2018-12-04 RX ORDER — ENOXAPARIN SODIUM 100 MG/ML
40 INJECTION SUBCUTANEOUS DAILY
Qty: 0 | Refills: 0 | Status: DISCONTINUED | OUTPATIENT
Start: 2018-12-04 | End: 2018-12-06

## 2018-12-04 RX ORDER — POTASSIUM CHLORIDE 20 MEQ
10 PACKET (EA) ORAL
Qty: 0 | Refills: 0 | Status: COMPLETED | OUTPATIENT
Start: 2018-12-04 | End: 2018-12-04

## 2018-12-04 RX ORDER — SODIUM CHLORIDE 9 MG/ML
500 INJECTION, SOLUTION INTRAVENOUS
Qty: 0 | Refills: 0 | Status: DISCONTINUED | OUTPATIENT
Start: 2018-12-04 | End: 2018-12-06

## 2018-12-04 RX ORDER — PIPERACILLIN AND TAZOBACTAM 4; .5 G/20ML; G/20ML
3.38 INJECTION, POWDER, LYOPHILIZED, FOR SOLUTION INTRAVENOUS EVERY 8 HOURS
Qty: 0 | Refills: 0 | Status: DISCONTINUED | OUTPATIENT
Start: 2018-12-04 | End: 2018-12-06

## 2018-12-04 RX ORDER — MAGNESIUM SULFATE 500 MG/ML
2 VIAL (ML) INJECTION ONCE
Qty: 0 | Refills: 0 | Status: COMPLETED | OUTPATIENT
Start: 2018-12-04 | End: 2018-12-04

## 2018-12-04 RX ORDER — VANCOMYCIN HCL 1 G
1000 VIAL (EA) INTRAVENOUS
Qty: 0 | Refills: 0 | Status: DISCONTINUED | OUTPATIENT
Start: 2018-12-04 | End: 2018-12-06

## 2018-12-04 RX ORDER — EPINEPHRINE 0.3 MG/.3ML
0.03 INJECTION INTRAMUSCULAR; SUBCUTANEOUS
Qty: 4 | Refills: 0 | Status: DISCONTINUED | OUTPATIENT
Start: 2018-12-04 | End: 2018-12-06

## 2018-12-04 RX ADMIN — PANTOPRAZOLE SODIUM 40 MILLIGRAM(S): 20 TABLET, DELAYED RELEASE ORAL at 06:00

## 2018-12-04 RX ADMIN — PROPOFOL 16.33 MICROGRAM(S)/KG/MIN: 10 INJECTION, EMULSION INTRAVENOUS at 08:05

## 2018-12-04 RX ADMIN — Medication 6.8 MICROGRAM(S)/KG/MIN: at 20:48

## 2018-12-04 RX ADMIN — Medication 1 DROP(S): at 06:00

## 2018-12-04 RX ADMIN — AMIODARONE HYDROCHLORIDE 16.67 MG/MIN: 400 TABLET ORAL at 03:17

## 2018-12-04 RX ADMIN — FENTANYL CITRATE 50 MICROGRAM(S): 50 INJECTION INTRAVENOUS at 19:59

## 2018-12-04 RX ADMIN — ATORVASTATIN CALCIUM 80 MILLIGRAM(S): 80 TABLET, FILM COATED ORAL at 20:49

## 2018-12-04 RX ADMIN — Medication 50 GRAM(S): at 01:15

## 2018-12-04 RX ADMIN — CHLORHEXIDINE GLUCONATE 5 MILLILITER(S): 213 SOLUTION TOPICAL at 20:50

## 2018-12-04 RX ADMIN — PROPOFOL 16.33 MICROGRAM(S)/KG/MIN: 10 INJECTION, EMULSION INTRAVENOUS at 03:18

## 2018-12-04 RX ADMIN — FENTANYL CITRATE 100 MICROGRAM(S): 50 INJECTION INTRAVENOUS at 23:05

## 2018-12-04 RX ADMIN — ENOXAPARIN SODIUM 40 MILLIGRAM(S): 100 INJECTION SUBCUTANEOUS at 13:30

## 2018-12-04 RX ADMIN — Medication 1 APPLICATION(S): at 13:31

## 2018-12-04 RX ADMIN — CHLORHEXIDINE GLUCONATE 5 MILLILITER(S): 213 SOLUTION TOPICAL at 12:13

## 2018-12-04 RX ADMIN — FENTANYL CITRATE 50 MICROGRAM(S): 50 INJECTION INTRAVENOUS at 21:35

## 2018-12-04 RX ADMIN — Medication 200 MILLIGRAM(S): at 06:00

## 2018-12-04 RX ADMIN — Medication 250 MILLIGRAM(S): at 20:48

## 2018-12-04 RX ADMIN — Medication 50 GRAM(S): at 00:15

## 2018-12-04 RX ADMIN — ONDANSETRON 4 MILLIGRAM(S): 8 TABLET, FILM COATED ORAL at 21:17

## 2018-12-04 RX ADMIN — Medication 100 MILLIEQUIVALENT(S): at 01:26

## 2018-12-04 RX ADMIN — FENTANYL CITRATE 50 MICROGRAM(S): 50 INJECTION INTRAVENOUS at 16:17

## 2018-12-04 RX ADMIN — Medication 325 MILLIGRAM(S): at 13:30

## 2018-12-04 RX ADMIN — Medication 400 MILLIGRAM(S): at 03:16

## 2018-12-04 RX ADMIN — FENTANYL CITRATE 50 MICROGRAM(S): 50 INJECTION INTRAVENOUS at 19:57

## 2018-12-04 RX ADMIN — FENTANYL CITRATE 50 MICROGRAM(S): 50 INJECTION INTRAVENOUS at 15:47

## 2018-12-04 RX ADMIN — CHLORHEXIDINE GLUCONATE 5 MILLILITER(S): 213 SOLUTION TOPICAL at 13:31

## 2018-12-04 RX ADMIN — MILRINONE LACTATE 13.61 MICROGRAM(S)/KG/MIN: 1 INJECTION, SOLUTION INTRAVENOUS at 12:12

## 2018-12-04 RX ADMIN — PANTOPRAZOLE SODIUM 40 MILLIGRAM(S): 20 TABLET, DELAYED RELEASE ORAL at 17:20

## 2018-12-04 RX ADMIN — CHLORHEXIDINE GLUCONATE 5 MILLILITER(S): 213 SOLUTION TOPICAL at 17:19

## 2018-12-04 RX ADMIN — Medication 8.5 MICROGRAM(S)/KG/MIN: at 08:04

## 2018-12-04 RX ADMIN — Medication 1 APPLICATION(S): at 01:16

## 2018-12-04 RX ADMIN — Medication 1 APPLICATION(S): at 06:00

## 2018-12-04 RX ADMIN — Medication 250 MILLIGRAM(S): at 13:42

## 2018-12-04 RX ADMIN — Medication 1 TABLET(S): at 21:17

## 2018-12-04 RX ADMIN — SODIUM CHLORIDE 10 MILLILITER(S): 9 INJECTION, SOLUTION INTRAVENOUS at 03:17

## 2018-12-04 RX ADMIN — Medication 100 MILLIEQUIVALENT(S): at 21:17

## 2018-12-04 RX ADMIN — Medication 100 MILLIEQUIVALENT(S): at 06:00

## 2018-12-04 RX ADMIN — Medication 1 DROP(S): at 12:58

## 2018-12-04 RX ADMIN — Medication 100 MILLIEQUIVALENT(S): at 03:00

## 2018-12-04 RX ADMIN — SODIUM CHLORIDE 10 MILLILITER(S): 9 INJECTION, SOLUTION INTRAVENOUS at 01:31

## 2018-12-04 RX ADMIN — FENTANYL CITRATE 50 MICROGRAM(S): 50 INJECTION INTRAVENOUS at 21:56

## 2018-12-04 RX ADMIN — PIPERACILLIN AND TAZOBACTAM 25 GRAM(S): 4; .5 INJECTION, POWDER, LYOPHILIZED, FOR SOLUTION INTRAVENOUS at 21:17

## 2018-12-04 RX ADMIN — CHLORHEXIDINE GLUCONATE 5 MILLILITER(S): 213 SOLUTION TOPICAL at 01:26

## 2018-12-04 RX ADMIN — FENTANYL CITRATE 4.54 MICROGRAM(S)/KG/HR: 50 INJECTION INTRAVENOUS at 03:18

## 2018-12-04 RX ADMIN — CHLORHEXIDINE GLUCONATE 5 MILLILITER(S): 213 SOLUTION TOPICAL at 08:55

## 2018-12-04 RX ADMIN — SODIUM CHLORIDE 10 MILLILITER(S): 9 INJECTION, SOLUTION INTRAVENOUS at 12:13

## 2018-12-04 RX ADMIN — Medication 250 MILLIGRAM(S): at 01:31

## 2018-12-04 RX ADMIN — Medication 1000 MILLIGRAM(S): at 03:16

## 2018-12-04 NOTE — PROGRESS NOTE ADULT - PROBLEM SELECTOR PLAN 1
CABG X 4  ECMO and IABP in place12/29, chest open since explanted and closed sternum 12/3  Continue gentle sedation while chest open  Continue inotropic support with epi, primacor, will discuss when okay to wean  Continue Amiodarone gtt, patient remains in NSR

## 2018-12-04 NOTE — CHART NOTE - NSCHARTNOTEFT_GEN_A_CORE
2:30 AM    pt acutely drop blood pressure 50/30 from 100/50 remained in  with PA pressures 25-30/15-20 mean 20-25 CVP 13 with impella flowing at 3.4 l/m. Pt found to have defect in manifold was not receiving IV gtt's at appropriate dose. Pt had levo connected to bolus line with albumin bolus x1 and pressure improved until new manifold connected. Pt pressure and hemodynamics improved shortly after.

## 2018-12-04 NOTE — PROGRESS NOTE ADULT - SUBJECTIVE AND OBJECTIVE BOX
Subjective: no c/o incisional pain at this time. Denies CP, SOB, palpitations, N/V, other c/o.    T(C): 38.4 (12-04-18 @ 01:00), Max: 38.4 (12-04-18 @ 01:00)  HR: 110 (12-04-18 @ 01:10) (64 - 110)  BP: --  ABP: 90/54 (12-04-18 @ 01:10) (81/59 - 125/79)  ABP(mean): 66 (12-04-18 @ 01:10) (66 - 91)  RR: 12 (12-04-18 @ 01:00) (10 - 13)  SpO2: 100% (12-04-18 @ 01:10) (70% - 100%)  Wt(kg): --  CVP(mm Hg): 15 (12-04-18 @ 01:10) (9 - 32)  CO: 7.5 (12-04-18 @ 01:10) (6.5 - 7.5)  CI: 3.6 (12-04-18 @ 01:10) (3.1 - 3.6)  PA: 30/16 (12-04-18 @ 01:10) (17/9 - 35/14)   Mode: AC/ CMV (Assist Control/ Continuous Mandatory Ventilation)  RR (machine): 12  TV (machine): 750  FiO2: 100  PEEP: 5  MAP: 12  PIP: 33      I&O's Detail    02 Dec 2018 07:01  -  03 Dec 2018 07:00  --------------------------------------------------------  IN:    amiodarone Infusion: 367.4 mL    fentaNYL  Infusion: 250.7 mL    furosemide Infusion: 7.5 mL    furosemide Infusion: 2.5 mL    heparin Infusion: 193 mL    insulin Infusion: 69 mL    milrinone  Infusion: 316.6 mL    norepinephrine Infusion: 52 mL    Packed Red Blood Cells: 534 mL    Platelets - Single Donor: 236 mL    propofol Infusion: 814.2 mL    sodium chloride 0.9%.: 230 mL    sodium chloride 0.9%.: 115 mL    Solution: 500 mL    Solution: 200 mL    Solution: 50 mL    Solution: 100 mL  Total IN: 4037.9 mL    OUT:    Chest Tube: 180 mL    Chest Tube: 170 mL    Chest Tube: 230 mL    Indwelling Catheter - Urethral: 4450 mL  Total OUT: 5030 mL    Total NET: -992.1 mL      03 Dec 2018 07:01  -  04 Dec 2018 01:13  --------------------------------------------------------  IN:    amiodarone Infusion: 233.8 mL    dextrose 5%: 180 mL    fentaNYL  Infusion: 199 mL    heparin Infusion: 19 mL    insulin Infusion: 33 mL    milrinone  Infusion: 192 mL    norepinephrine Infusion: 121 mL    Packed Red Blood Cells: 350 mL    propofol Infusion: 495.6 mL    sodium chloride 0.9%.: 70 mL    sodium chloride 0.9%.: 140 mL    Solution: 150 mL    Solution: 100 mL  Total IN: 2283.4 mL    OUT:    Chest Tube: 195 mL    Chest Tube: 250 mL    Chest Tube: 510 mL    Indwelling Catheter - Urethral: 3100 mL  Total OUT: 4055 mL    Total NET: -1771.6 mL          LABS: All Lab data reviewed and analyzed                        8.7    11.3  )-----------( 120      ( 04 Dec 2018 00:58 )             24.7     12-03    137  |  100  |  18.0  ----------------------------<  178<H>  3.9   |  25.0  |  0.68    Ca    8.3<L>      03 Dec 2018 20:54  Mg     1.6     12-03    TPro  5.2<L>  /  Alb  3.0<L>  /  TBili  1.3  /  DBili  x   /  AST  54<H>  /  ALT  23  /  AlkPhos  57  12-03    PT/INR - ( 03 Dec 2018 20:54 )   PT: 15.4 sec;   INR: 1.33 ratio         PTT - ( 03 Dec 2018 20:54 )  PTT:28.3 sec  CARDIAC MARKERS ( 03 Dec 2018 20:54 )  CKx     / CKMBx     / Troponin T4.65 ng/mL /      ABG - ( 04 Dec 2018 00:44 )  pH, Arterial: 7.41  pH, Blood: x     /  pCO2: 43    /  pO2: 148   / HCO3: 26    / Base Excess: 2.3   /  SaO2: 100               CAPILLARY BLOOD GLUCOSE      POCT Blood Glucose.: 91 mg/dL (03 Dec 2018 12:55)  POCT Blood Glucose.: 85 mg/dL (03 Dec 2018 12:11)  POCT Blood Glucose.: 103 mg/dL (03 Dec 2018 09:58)  POCT Blood Glucose.: 110 mg/dL (03 Dec 2018 07:55)  POCT Blood Glucose.: 95 mg/dL (03 Dec 2018 06:30)  POCT Blood Glucose.: 100 mg/dL (03 Dec 2018 04:45)           RADIOLOGY: - Reviewed and analyzed   CXR: pending    HOSPITAL MEDICATIONS: All medications reviewed and analyzed  MEDICATIONS  (STANDING):  amiodarone Infusion 0.5 mG/Min (16.667 mL/Hr) IV Continuous <Continuous>  artificial  tears Solution 1 Drop(s) Both EYES four times a day  aspirin 325 milliGRAM(s) Oral daily  atorvastatin 80 milliGRAM(s) Oral at bedtime  chlorhexidine 0.12% Liquid 5 milliLiter(s) Oral Mucosa every 4 hours  ciprofloxacin   IVPB 400 milliGRAM(s) IV Intermittent every 12 hours  ciprofloxacin   IVPB      dextrose 5% 500 milliLiter(s) (10 mL/Hr) IV Continuous <Continuous>  dextrose 50% Injectable 50 milliLiter(s) IV Push every 15 minutes  fentaNYL   Infusion 0.5 MICROgram(s)/kG/Hr (4.535 mL/Hr) IV Continuous <Continuous>  insulin Infusion 2 Unit(s)/Hr (2 mL/Hr) IV Continuous <Continuous>  magnesium sulfate  IVPB 2 Gram(s) IV Intermittent once  magnesium sulfate  IVPB 2 Gram(s) IV Intermittent once  milrinone Infusion 0.5 MICROgram(s)/kG/Min (13.605 mL/Hr) IV Continuous <Continuous>  norepinephrine Infusion 0.05 MICROgram(s)/kG/Min (8.503 mL/Hr) IV Continuous <Continuous>  norepinephrine Infusion 0.05 MICROgram(s)/kG/Min (8.503 mL/Hr) IV Continuous <Continuous>  pantoprazole  Injectable 40 milliGRAM(s) IV Push every 12 hours  petrolatum Ophthalmic Ointment 1 Application(s) Both EYES every 8 hours  potassium chloride  10 mEq/50 mL IVPB 10 milliEquivalent(s) IV Intermittent every 1 hour  potassium chloride  10 mEq/50 mL IVPB 10 milliEquivalent(s) IV Intermittent every 1 hour  potassium chloride  10 mEq/50 mL IVPB 10 milliEquivalent(s) IV Intermittent every 1 hour  propofol Infusion 30 MICROgram(s)/kG/Min (16.326 mL/Hr) IV Continuous <Continuous>  sodium chloride 0.9%. 1000 milliLiter(s) (10 mL/Hr) IV Continuous <Continuous>  sodium chloride 0.9%. 1000 milliLiter(s) (5 mL/Hr) IV Continuous <Continuous>  vancomycin  IVPB 1000 milliGRAM(s) IV Intermittent every 12 hours  vasopressin Infusion 0.033 Unit(s)/Min (2 mL/Hr) IV Continuous <Continuous>    MEDICATIONS  (PRN):  fentaNYL    Injectable 50 MICROGram(s) IV Push every 2 hours PRN Moderate Pain (4 - 6)  petrolatum white Ointment 1 Application(s) Topical four times a day PRN dry lips        Physical Exam  Neuro: Sedated to RASS-2 at this time   HEENT:   PERRL, EOMI. No conjuctival edema or iIcterus, no thrush.  + ETT +OGT  Neck:  ROM intact, no JVD, no nodes or masses palpable, trachea midline, no tracheostomy  Pulm: CTA, good air entry, equal bilaterally, no rales/rhonchi/wheezing, no accessory muscle use noted  Chest:        left , mediastinal CT and right and left pleural CT all with dressings intact and no air leak, no subQ emphysema       +PW  CV: RRR. positive S1/S2 pt flippin from NSR to bigeminy intermittently on tele  Abd: +BSx4. Soft, nondistended  : augustin in situ to bedside drainage  Ext: 1+ edema, no cyanosis or clubbing, distal motor/neuro/circ intact  Skin: warm, dry, no rashes  Incisions: sternal dressing C/D/I/stable  b/l lowe extremities C/D/I w/ dressing and Ace wrap    ==============================================================================================================  Critical care time spent: _35___minutes (reviewing chart including medications, labs and imaging results, discussions with interdisciplinary team, discussing goals of care/advanced directives, counseling patient and/or family, non-inclusive of procedures)    Case including assessment/plan of care discussed with   CT surgery attending.        47yMale with PMH     Insertion of Impella device  ECMO decannulation  Exploration and washout of mediastinum  Intra-aortic balloon pump removal  Arterial cannulation  Central line placement  [place] Michelle placement  CABG  ECMO (extracorporeal membrane oxygenation)      PAST MEDICAL & SURGICAL HISTORY:  Staph infection  No significant past surgical history

## 2018-12-04 NOTE — CONSULT NOTE ADULT - SUBJECTIVE AND OBJECTIVE BOX
[facility]  INFECTIOUS DISEASES AND INTERNAL MEDICINE at [place]  =======================================================  Perfecto Santizo MD  Diplomates American Board of Internal Medicine and Infectious Diseases  =======================================================    [id number]  PATRICK LYLE   This is a 47y  Male with no significant PMH admitted on 18 for CP for 2-3 days prior to admission, then 10/10 chest pain approximately 9:30 am 1 day before admission. He presented and found with STEMI. Urgent Cath, pt found to have multiple occlusions; stent to LAD and D2; stent to LAD thrombosed,  impella placed for support.  Pt then had VF arrest, shock x 1, return to NSR.  Upon transfer to OR for Urgent CABG, pt again VF arrest, chest opened intra-op with CPR in progress.    Patient then underwent ECMO (extracorporeal membrane oxygenation)  2018  Secondary to pre-op cardiogenic shock and inability to wean from CPB, also required IABP.     18  PRBC x1 and plt x1, levo weaned off; 12/3 ECMO explanted and Impella placed via right groin sheath.  Pt is currently maintained on Epi, Primacor, and Amio gtts in NSR intermittently with runs of bigemeny .    Post operative fever noted on 18 at 8AM, then 18 2AM onwards.  TEMP MAX in last 24h is 101.8. Blood culture x 2 sent and Sputum cx sent.   Patient had been on vanco and Cipro since 18.    He is currently intubated and cannot provide additional history.      =======================================================  Past Medical & Surgical Hx:  =====================  PAST MEDICAL & SURGICAL HISTORY:  Staph infection  No significant past surgical history      Problem List:  ==========  HEALTH ISSUES - PROBLEM Dx:  Respiratory failure: Respiratory failure  Cardiogenic shock: Cardiogenic shock  Staph infection: Staph infection  Ventricular fibrillation: Ventricular fibrillation  Coronary artery disease involving native coronary artery of native heart with unstable angina pectoris: Coronary artery disease involving native coronary artery of native heart with unstable angina pectoris  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  pt intubated.   =======================================================  Allergies  penicillins (Unknown)  ============    Antibiotics:  piperacillin/tazobactam IVPB. 3.375 Gram(s) IV Intermittent every 8 hours  vancomycin  IVPB 1000 milliGRAM(s) IV Intermittent <User Schedule>    Other medications:  amiodarone Infusion 0.5 mG/Min IV Continuous <Continuous>  artificial  tears Solution 1 Drop(s) Both EYES four times a day  aspirin 325 milliGRAM(s) Oral daily  atorvastatin 80 milliGRAM(s) Oral at bedtime  chlorhexidine 0.12% Liquid 5 milliLiter(s) Oral Mucosa every 4 hours  dextrose 5% 500 milliLiter(s) IV Continuous <Continuous>  dextrose 50% Injectable 50 milliLiter(s) IV Push every 15 minutes  DOBUTamine Infusion 2.5 MICROgram(s)/kG/Min IV Continuous <Continuous>  enoxaparin Injectable 40 milliGRAM(s) SubCutaneous daily  EPINEPHrine    Infusion 0.029 MICROgram(s)/kG/Min IV Continuous <Continuous>  furosemide Infusion 2.5 mG/Hr IV Continuous <Continuous>  insulin Infusion 2 Unit(s)/Hr IV Continuous <Continuous>  lactobacillus acidophilus 1 Tablet(s) Oral every 8 hours  milrinone Infusion 0.375 MICROgram(s)/kG/Min IV Continuous <Continuous>  norepinephrine Infusion 0.05 MICROgram(s)/kG/Min IV Continuous <Continuous>  pantoprazole  Injectable 40 milliGRAM(s) IV Push every 12 hours  petrolatum Ophthalmic Ointment 1 Application(s) Both EYES every 8 hours  potassium chloride  10 mEq/50 mL IVPB 10 milliEquivalent(s) IV Intermittent every 1 hour  potassium chloride  10 mEq/50 mL IVPB 10 milliEquivalent(s) IV Intermittent every 1 hour  potassium chloride  10 mEq/50 mL IVPB 10 milliEquivalent(s) IV Intermittent every 1 hour  potassium chloride  10 mEq/50 mL IVPB 10 milliEquivalent(s) IV Intermittent every 1 hour  propofol Infusion 30 MICROgram(s)/kG/Min IV Continuous <Continuous>  sodium chloride 0.9%. 1000 milliLiter(s) IV Continuous <Continuous>  sodium chloride 0.9%. 1000 milliLiter(s) IV Continuous <Continuous>  vasopressin Infusion 0.033 Unit(s)/Min IV Continuous <Continuous>     ciprofloxacin   IVPB   200 mL/Hr IV Intermittent (18 @ 00:45)    ciprofloxacin   IVPB   200 mL/Hr IV Intermittent (18 @ 05:22)   200 mL/Hr IV Intermittent (18 @ 17:14)   200 mL/Hr IV Intermittent (18 @ 05:09)   200 mL/Hr IV Intermittent (18 17:23)   200 mL/Hr IV Intermittent (18 @ 06:16)   200 mL/Hr IV Intermittent (18 @ 17:09)   200 mL/Hr IV Intermittent (18 @ 05:23)   200 mL/Hr IV Intermittent (18 @ 06:00)    vancomycin  IVPB   250 mL/Hr IV Intermittent (18 @ 01:45)   250 mL/Hr IV Intermittent (18 @ 14:39)   250 mL/Hr IV Intermittent (18 @ 02:15)   250 mL/Hr IV Intermittent (18 @ 14:13)   250 mL/Hr IV Intermittent (18 @ 03:30)   250 mL/Hr IV Intermittent (18 @ 13:58)   250 mL/Hr IV Intermittent (18 @ 01:46)   250 mL/Hr IV Intermittent (18 @ 14:19)   250 mL/Hr IV Intermittent (18 @ 01:31)   250 mL/Hr IV Intermittent (18 @ 13:42)        ======================================================  Physical Exam:  ============  T(F): 100.6 (04 Dec 2018 15:54), Max: 101.8 (04 Dec 2018 02:00)  HR: 111 (04 Dec 2018 17:00)  RR: 17 (04 Dec 2018 17:00)  SpO2: 97% (04 Dec 2018 17:00) (70% - 100%)    General:  intubated, able to track   Eye: Pupils are equal, round and reactive to light, Extraocular movements are intact, Normal conjunctiva.  HENT: Normocephalic, Oral mucosa is moist, ET Tube in place  Neck: Supple, No lymphadenopathy.  RIGHT neck IJ line  Respiratory: Lungs with fair air entry  midline chest with dressing in place.   + CHEST tubes  Cardiovascular: Normal rate, Regular rhythm, No murmur, Good pulses equal in all extremities, No edema.  Gastrointestinal: Soft, Non-tender, Non-distended, Normal bowel sounds.  Genitourinary: + Mckeon  RIGHT inguinal impella acess  Lymphatics: No lymphadenopathy neck,   Musculoskeletal: Normal range of motion,   Integumentary: No rash.  Neurologic: Alert, Cranial Nerves II-XII are grossly intact.      =======================================================  Labs:  ====  Labs:                        8.6    8.1   )-----------( 107      ( 04 Dec 2018 04:42 )             25.1         136  |  102  |  18.0  ----------------------------<  137<H>  4.4   |  24.0  |  0.73    Ca    7.3<L>      04 Dec 2018 12:38  Mg     2.3         TPro  4.9<L>  /  Alb  2.9<L>  /  TBili  1.7  /  DBili  x   /  AST  390<H>  /  ALT  141<H>  /  AlkPhos  64          Culture - Sputum (collected 18 @ 10:21)  Source: .Sputum  Gram Stain (18 @ 11:59):    Few White blood cells    No organisms seen    < from: Xray Chest 1 View- PORTABLE-Urgent (18 @ 05:57) >   EXAM:  XR CHEST PORTABLE URGENT 1V                          PROCEDURE DATE:  2018          INTERPRETATION:  CHEST AP PORTABLE:    History: s/p OHS.     Date and time of exam: 2018 4:59 AM    Technique: A single AP view of the chest was obtained.    Comparison exam: 12/3/2018 7:46 PM.    Findings:  No change in lines and tubes. Persistent pulmonary vascular congestion.   No evidence of pneumothorax..    Impression:  Stable exam without significant change since the previous study..        ZIA ELIZABETH M.D., ATTENDING RADIOLOGIST  This document has been electronically signed. Dec  4 2018  1:17PM            < end of copied text >

## 2018-12-04 NOTE — PROGRESS NOTE ADULT - ASSESSMENT
48 yo Male c/o CP for 2-3 days prior to admission, then 10/10 chest pain approximately 9:30 am yesterday, followed by vomiting.  Wife drove pt to hospital, ruled in for STEMI. Urgent Cath, pt found to have multiple occlusions; stent to LAD and D2; stent to LAD thrombosed,  impella placed for support.  Pt then had VF arrest, shock x 1, return to NSR.  Upon transfer to OR for Urgent CABG, pt again VF arrest, chest opened intra-op with CPR in progress.    Patient then underwent ECMO (extracorporeal membrane oxygenation)  11/29/2018  Secondary to pre-op cardiogenic shock and inability to wean from CPB. In addition to 11/29/2018  Emergent CABG X 4 all vein to the LAD, Diag, OM and PDA with inability to wean from CPB therefore requiring ECMO and IABP.     12/2 PRBC x1 and plt x1, levo weaned off  12/3 ECMO explanted and Impella placed via right groin sheath     Pt is currently maintained on Epi, Primacor, and Amio gtts in NSR intermittently with runs of bigemeny .

## 2018-12-04 NOTE — PROGRESS NOTE ADULT - ASSESSMENT
48 y/o male presented with anterior STEMI , s/p coronary angiogram showing severe multivessel disease with LAD being culprit 100%.   Primary PCI to LAD and D2 attempted but LAD stent thrombosed following deployment of diagonal stent. This was followed by proximal thrombus extension into the left main   Impella CP placed and patient sent for emergency CABG . Had Vfib arrest in the lab shocked x 1 and another vfib arrest in OR managed by CPR followed by sternotomy, direct cardiac massage and then placed on Bypass  CABG with SVG x 4 to LAD, D2, OM and RPDA   not able to come off pump after CABG and placed on ECMO with open chest (central cannulation)     12/2 ,   mediastinal wash , chest closure, placement of Impella CP throught 14 Fr right groin sheath  Had transient hypotension overnight due to interruption of pressors/inotropes drip due to defective mannifold        Plan:   continue Imeplla suport P8, weaning off pressors   wean sedation today to evaluate responsiveness   Ok for lasix drip to maintain PA diastolic pressure 15-18 (CXR showed evidence of pulmnary congestion  Limited echo tomorrow to evaluate impella location  No ACEI or BB due to cardiogenic shock requiring pressors.   Elevated AST/ALT presumably from transient hypotension

## 2018-12-04 NOTE — PROGRESS NOTE ADULT - SUBJECTIVE AND OBJECTIVE BOX
CARDIOLOGY PROGRESS NOTE   ([facility])                             Reason for follow up: Acute anterolateral STEMI, s/p Primary PCI, s/p cardiac arrest, cardiogenic shock                            Overnight: ECMO decanulation in OR , Placement of impella CP 3.5 , chest closure  Subjective: Open eyes , but still waking up from sedation   Review of symptoms: unable to obtain  Respiratory: on respiratory   Gastrointestinal: No diarrhea.     Past medical history: No updates.     	  Vitals:  T(C): 38.7 (12-04-18 @ 12:00), Max: 38.8 (12-04-18 @ 02:00)  HR: 114 (12-04-18 @ 12:30) (67 - 114)  BP: --  RR: 17 (12-04-18 @ 12:30) (10 - 20)  SpO2: 98% (12-04-18 @ 12:30) (70% - 100%)  Wt(kg): --  I&O's Summary    03 Dec 2018 07:01  -  04 Dec 2018 07:00  --------------------------------------------------------  IN: 4261.1 mL / OUT: 4685 mL / NET: -423.9 mL    04 Dec 2018 07:01  -  04 Dec 2018 13:23  --------------------------------------------------------  IN: 783 mL / OUT: 1145 mL / NET: -362 mL          PHYSICAL EXAM:  Appearance: intubated, waking up from sedation  HEENT:  intubated, right IJ  Neck: right IJ  Neurologic: waking up from sedation  Cardiovascular: Normal S1 S2, No murmur, rubs/gallops  Respiratory: coarse breath sounds anteriorly  Psychiatry: unable to obtain  Skin: No rashes, No ecchymoses, No cyanosis    CURRENT MEDICATIONS:  amiodarone Infusion 0.5 mG/Min IV Continuous <Continuous>  DOBUTamine Infusion 5 MICROgram(s)/kG/Min IV Continuous <Continuous>  furosemide Infusion 2.5 mG/Hr IV Continuous <Continuous>  milrinone Infusion 0.5 MICROgram(s)/kG/Min IV Continuous <Continuous>  norepinephrine Infusion 0.05 MICROgram(s)/kG/Min IV Continuous <Continuous>  cciprofloxacin   IVPB  ciprofloxacin   IVPB  vancomycin  IVPB  aspirin  propofol Infusion  pantoprazole  Injectable  atorvastatin  dextrose 50% Injectable  insulin Infusion  vasopressin Infusion  artificial  tears Solution  chlorhexidine 0.12% Liquid  dextrose 5%  enoxaparin Injectable  petrolatum Ophthalmic Ointment  potassium chloride  10 mEq/50 mL IVPB  potassium chloride  10 mEq/50 mL IVPB  potassium chloride  10 mEq/50 mL IVPB  potassium chloride  10 mEq/50 mL IVPB  sodium chloride 0.9%.  sodium chloride 0.9%.      LABS:	 	                          8.6    8.1   )-----------( 107      ( 04 Dec 2018 04:42 )             25.1     12-04    136  |  102  |  18.0  ----------------------------<  137<H>  4.4   |  24.0  |  0.73    Ca    7.3<L>      04 Dec 2018 12:38  Mg     2.3     12-04    TPro  4.9<L>  /  Alb  2.9<L>  /  TBili  1.7  /  DBili  x   /  AST  390<H>  /  ALT  141<H>  /  AlkPhos  64  12-04          DIAGNOSTIC TESTING:  [ ] Echocardiogram:  DARION in OR EF 25-30%   RV function borderline    [ ]  Catheterization:  < from: Cardiac Cath Lab - Adult (11.29.18 @ 11:22) >  LM:   --  Ostial LM: There was a 20 % stenosis.  LAD:   --  Proximal LAD: There was a tubular 80 % stenosis at a site with  no prior intervention, in the distal third of the vessel segment. The  lesion was hazy, eccentric, and associated with a moderate filling defect  consistent with thrombus. There was FAZAL grade 0 flow through the vessel  (no flow) and a large vascular territory distal to the lesion. This lesion  is a likely culprit for the patient's recent myocardial infarction. It  appears amenable to percutaneous intervention.  --  Mid LAD: There was a 100 % stenosis.  --  D1: The vessel was small to medium sized. There was a 100 % stenosis.  --  D2: The vessel was large sized. There was a 100 % stenosis.  CX:   --  Circumflex: The vessel was medium sized. There was a tubular 80 %  stenosis in the middle third of the vessel segment. The lesion was  eccentric.  --  OM1: The vessel was medium to large sized. There was a tubular 90 %  stenosis in the middle third of the vessel segment. The lesion was hazy,  concentric, and without evidence of thrombus. There was FAZAL grade 1 flow  through the vessel (slow flow without perfusion).  RCA:   --  RCA: The vessel was large sized (dominant).  --  Proximal RCA: There was a tubular 80 % stenosis in the middle third of  the vessel segment.  --  Distal RCA: There was a tubular 99 % stenosis.  --  RPDA: The vessel was medium sized. Angiography showed mild  atherosclerosis with no flow limiting lesions.  --  RPLS: The vessel was medium to large sized. Angiography showed mild  atherosclerosis with no flow limiting lesions. There was a tubular 99 %  stenosis in the middle third of thevessel segment.  COMPLICATIONS: Ventricular fibrillation occurred, developed after no reflow  in the LAD and after appearance of filling defect in the left main.  Impella CP 3.5 was already in placed and patient was shocked with 300 J to  NSR  DIAGNOSTIC IMPRESSIONS: Severe Multivessel CAD with LAD being culprit for  anterior STEMI  Severe LV systolic dysfunction  Elevated left ventricular filling pressure  DIAGNOSTIC RECOMMENDATIONS: Given anterior STEMI, decision was to proceed  with primary PCI of the LAD and diagonal 2 for more timely reperfusion of  the LAD territory.  INTERVENTIONAL IMPRESSIONS: 100% LAD lesion was crossed using 0.014  Runthrough wire, 100% D2 lesion was crossed using a 0.014 BMW universal  wire . Multiple balloon angioplasty of the LAD and D2 was performed using  a 1.5 , 2.0 (LAD and diagonal 2) and 2.5 balllon (LAD) . This appeared to  be a bifurcation lesion Mejia 1,1,1 with both LAD and diagonal 2 being  equally sized vessel. Kissing balloon angioplasty was performed. There  were temporary flow in both vessels but then no reflow.  ITA stenting of LAD using 2.75 x 28 mm stent was done resulting in flow in  the LAD but absence of flow D2. D2 lesion was recrossed with 0.014 whisper  wire and ITA stenting was placed in the D2 restoring Diagonal flow but  resulted in loss of flow in the LAD stent. LAD stent was recrossed with a  0.014 runthrough wire and angioplasty performed with no reflow however.  Filling defect appeared in the left main that appeared to be a thrombus (  likely proximal thrombus extension from LAD) . Decision was to placed  Impella CP 3.5 L support and transfer the patient to OR for emergency CABG  INTERVENTIONAL RECOMMENDATIONS: Emergency CABG with grafts to the LAD, D2,  OM1 and RPDA.    < end of copied text >    [ ] Stress Test:

## 2018-12-04 NOTE — CONSULT NOTE ADULT - ASSESSMENT
This is a 47y  Male with no significant PMH admitted on 11/29/18 for CP for 2-3 days prior to admission,   found with STEMI. Urgent Cath, pt found to have multiple occlusions; VF arrest, shock x 1, return to NSR.  Upon transfer to OR for Urgent CABG, pt again VF arrest, chest opened intra-op with CPR in progress.  s/p ECMO and Impella; 12/3 ECMO explanted and Impella placed via right groin sheath.    now developed fevers.    patient in hospital for > 48 hours, multiple interventions.   will broaden coverage for empiric hospital acquired infections    blood cultures and sputum cx sent  - will check urine legionella  - continue Vancomycin 1 gram Q8H  - d/c Cipro  - added Zosyn 3.375 gram Q8H; aware of prior unknown Penicillin allergy, intubated currently, will watch for side effects    - follow up all outstanding cultures  - trend temperature and WBC curve    - repeat cultures from blood and all sources if febrile.       will follow with you.

## 2018-12-05 ENCOUNTER — TRANSCRIPTION ENCOUNTER (OUTPATIENT)
Age: 48
End: 2018-12-05

## 2018-12-05 DIAGNOSIS — D50.0 IRON DEFICIENCY ANEMIA SECONDARY TO BLOOD LOSS (CHRONIC): ICD-10-CM

## 2018-12-05 DIAGNOSIS — D69.6 THROMBOCYTOPENIA, UNSPECIFIED: ICD-10-CM

## 2018-12-05 DIAGNOSIS — I50.21 ACUTE SYSTOLIC (CONGESTIVE) HEART FAILURE: ICD-10-CM

## 2018-12-05 DIAGNOSIS — R50.9 FEVER, UNSPECIFIED: ICD-10-CM

## 2018-12-05 LAB
ALBUMIN SERPL ELPH-MCNC: 2.6 G/DL — LOW (ref 3.3–5.2)
ALP SERPL-CCNC: 80 U/L — SIGNIFICANT CHANGE UP (ref 40–120)
ALT FLD-CCNC: 153 U/L — HIGH
ANION GAP SERPL CALC-SCNC: 11 MMOL/L — SIGNIFICANT CHANGE UP (ref 5–17)
ANION GAP SERPL CALC-SCNC: 11 MMOL/L — SIGNIFICANT CHANGE UP (ref 5–17)
ANION GAP SERPL CALC-SCNC: 13 MMOL/L — SIGNIFICANT CHANGE UP (ref 5–17)
APTT BLD: 42 SEC — HIGH (ref 27.5–36.3)
APTT BLD: 44.5 SEC — HIGH (ref 27.5–36.3)
AST SERPL-CCNC: 262 U/L — HIGH
BASE EXCESS BLDV CALC-SCNC: 6.6 MMOL/L — HIGH (ref -2–2)
BILIRUB SERPL-MCNC: 1.6 MG/DL — SIGNIFICANT CHANGE UP (ref 0.4–2)
BLD GP AB SCN SERPL QL: SIGNIFICANT CHANGE UP
BLOOD GAS COMMENTS, VENOUS: SIGNIFICANT CHANGE UP
BUN SERPL-MCNC: 20 MG/DL — SIGNIFICANT CHANGE UP (ref 8–20)
BUN SERPL-MCNC: 20 MG/DL — SIGNIFICANT CHANGE UP (ref 8–20)
BUN SERPL-MCNC: 22 MG/DL — HIGH (ref 8–20)
CA-I SERPL-SCNC: 1.03 MMOL/L — LOW (ref 1.15–1.33)
CALCIUM SERPL-MCNC: 7.3 MG/DL — LOW (ref 8.6–10.2)
CALCIUM SERPL-MCNC: 7.5 MG/DL — LOW (ref 8.6–10.2)
CALCIUM SERPL-MCNC: 7.6 MG/DL — LOW (ref 8.6–10.2)
CHLORIDE BLDV-SCNC: 100 MMOL/L — SIGNIFICANT CHANGE UP (ref 98–107)
CHLORIDE SERPL-SCNC: 100 MMOL/L — SIGNIFICANT CHANGE UP (ref 98–107)
CHLORIDE SERPL-SCNC: 100 MMOL/L — SIGNIFICANT CHANGE UP (ref 98–107)
CHLORIDE SERPL-SCNC: 102 MMOL/L — SIGNIFICANT CHANGE UP (ref 98–107)
CK MB CFR SERPL CALC: 1.8 NG/ML — SIGNIFICANT CHANGE UP (ref 0–6.7)
CK SERPL-CCNC: 846 U/L — HIGH (ref 30–200)
CO2 SERPL-SCNC: 25 MMOL/L — SIGNIFICANT CHANGE UP (ref 22–29)
CO2 SERPL-SCNC: 25 MMOL/L — SIGNIFICANT CHANGE UP (ref 22–29)
CO2 SERPL-SCNC: 26 MMOL/L — SIGNIFICANT CHANGE UP (ref 22–29)
CREAT SERPL-MCNC: 0.79 MG/DL — SIGNIFICANT CHANGE UP (ref 0.5–1.3)
CREAT SERPL-MCNC: 0.8 MG/DL — SIGNIFICANT CHANGE UP (ref 0.5–1.3)
CREAT SERPL-MCNC: 0.8 MG/DL — SIGNIFICANT CHANGE UP (ref 0.5–1.3)
GAS PNL BLDA: SIGNIFICANT CHANGE UP
GAS PNL BLDV: 135 MMOL/L — SIGNIFICANT CHANGE UP (ref 135–145)
GAS PNL BLDV: SIGNIFICANT CHANGE UP
GAS PNL BLDV: SIGNIFICANT CHANGE UP
GLUCOSE BLDC GLUCOMTR-MCNC: 130 MG/DL — HIGH (ref 70–99)
GLUCOSE BLDC GLUCOMTR-MCNC: 142 MG/DL — HIGH (ref 70–99)
GLUCOSE BLDC GLUCOMTR-MCNC: 155 MG/DL — HIGH (ref 70–99)
GLUCOSE BLDC GLUCOMTR-MCNC: 162 MG/DL — HIGH (ref 70–99)
GLUCOSE BLDC GLUCOMTR-MCNC: 188 MG/DL — HIGH (ref 70–99)
GLUCOSE BLDV-MCNC: 134 MG/DL — HIGH (ref 70–99)
GLUCOSE SERPL-MCNC: 138 MG/DL — HIGH (ref 70–115)
GLUCOSE SERPL-MCNC: 158 MG/DL — HIGH (ref 70–115)
GLUCOSE SERPL-MCNC: 166 MG/DL — HIGH (ref 70–115)
HCO3 BLDV-SCNC: 30 MMOL/L — HIGH (ref 21–29)
HCT VFR BLD CALC: 25.8 % — LOW (ref 42–52)
HCT VFR BLD CALC: 28.3 % — LOW (ref 42–52)
HCT VFR BLDA CALC: 31 — LOW (ref 39–50)
HGB BLD CALC-MCNC: 10 G/DL — LOW (ref 13–17)
HGB BLD-MCNC: 9 G/DL — LOW (ref 14–18)
HGB BLD-MCNC: 9.8 G/DL — LOW (ref 14–18)
HOROWITZ INDEX BLDV+IHG-RTO: SIGNIFICANT CHANGE UP
INR BLD: 1.3 RATIO — HIGH (ref 0.88–1.16)
INR BLD: 1.38 RATIO — HIGH (ref 0.88–1.16)
LACTATE BLDV-MCNC: 1.3 MMOL/L — SIGNIFICANT CHANGE UP (ref 0.5–2)
LEGIONELLA AG UR QL: NEGATIVE — SIGNIFICANT CHANGE UP
MAGNESIUM SERPL-MCNC: 2 MG/DL — SIGNIFICANT CHANGE UP (ref 1.6–2.6)
MCHC RBC-ENTMCNC: 29.1 PG — SIGNIFICANT CHANGE UP (ref 27–31)
MCHC RBC-ENTMCNC: 29.6 PG — SIGNIFICANT CHANGE UP (ref 27–31)
MCHC RBC-ENTMCNC: 34.6 G/DL — SIGNIFICANT CHANGE UP (ref 32–36)
MCHC RBC-ENTMCNC: 34.9 G/DL — SIGNIFICANT CHANGE UP (ref 32–36)
MCV RBC AUTO: 84 FL — SIGNIFICANT CHANGE UP (ref 80–94)
MCV RBC AUTO: 84.9 FL — SIGNIFICANT CHANGE UP (ref 80–94)
OTHER CELLS CSF MANUAL: 7 ML/DL — LOW (ref 18–22)
PCO2 BLDV: 42 MMHG — SIGNIFICANT CHANGE UP (ref 35–50)
PH BLDV: 7.48 — HIGH (ref 7.32–7.43)
PLATELET # BLD AUTO: 73 K/UL — LOW (ref 150–400)
PLATELET # BLD AUTO: 84 K/UL — LOW (ref 150–400)
PO2 BLDV: 28 MMHG — SIGNIFICANT CHANGE UP (ref 25–45)
POTASSIUM BLDV-SCNC: 3.4 MMOL/L — SIGNIFICANT CHANGE UP (ref 3.4–4.5)
POTASSIUM SERPL-MCNC: 3.7 MMOL/L — SIGNIFICANT CHANGE UP (ref 3.5–5.3)
POTASSIUM SERPL-MCNC: 3.7 MMOL/L — SIGNIFICANT CHANGE UP (ref 3.5–5.3)
POTASSIUM SERPL-MCNC: 3.9 MMOL/L — SIGNIFICANT CHANGE UP (ref 3.5–5.3)
POTASSIUM SERPL-SCNC: 3.7 MMOL/L — SIGNIFICANT CHANGE UP (ref 3.5–5.3)
POTASSIUM SERPL-SCNC: 3.7 MMOL/L — SIGNIFICANT CHANGE UP (ref 3.5–5.3)
POTASSIUM SERPL-SCNC: 3.9 MMOL/L — SIGNIFICANT CHANGE UP (ref 3.5–5.3)
PROT SERPL-MCNC: 5 G/DL — LOW (ref 6.6–8.7)
PROTHROM AB SERPL-ACNC: 15.1 SEC — HIGH (ref 10–12.9)
PROTHROM AB SERPL-ACNC: 16 SEC — HIGH (ref 10–12.9)
RBC # BLD: 3.04 M/UL — LOW (ref 4.6–6.2)
RBC # BLD: 3.37 M/UL — LOW (ref 4.6–6.2)
RBC # FLD: 15 % — SIGNIFICANT CHANGE UP (ref 11–15.6)
RBC # FLD: 15.1 % — SIGNIFICANT CHANGE UP (ref 11–15.6)
SAO2 % BLDV: 53 % — SIGNIFICANT CHANGE UP
SODIUM SERPL-SCNC: 137 MMOL/L — SIGNIFICANT CHANGE UP (ref 135–145)
SODIUM SERPL-SCNC: 138 MMOL/L — SIGNIFICANT CHANGE UP (ref 135–145)
SODIUM SERPL-SCNC: 138 MMOL/L — SIGNIFICANT CHANGE UP (ref 135–145)
TROPONIN T SERPL-MCNC: 4.55 NG/ML — HIGH (ref 0–0.06)
TYPE + AB SCN PNL BLD: SIGNIFICANT CHANGE UP
WBC # BLD: 10.3 K/UL — SIGNIFICANT CHANGE UP (ref 4.8–10.8)
WBC # BLD: 13 K/UL — HIGH (ref 4.8–10.8)
WBC # FLD AUTO: 10.3 K/UL — SIGNIFICANT CHANGE UP (ref 4.8–10.8)
WBC # FLD AUTO: 13 K/UL — HIGH (ref 4.8–10.8)

## 2018-12-05 PROCEDURE — 71045 X-RAY EXAM CHEST 1 VIEW: CPT | Mod: 26

## 2018-12-05 PROCEDURE — 93306 TTE W/DOPPLER COMPLETE: CPT | Mod: 26

## 2018-12-05 PROCEDURE — 99291 CRITICAL CARE FIRST HOUR: CPT | Mod: 24

## 2018-12-05 PROCEDURE — 99233 SBSQ HOSP IP/OBS HIGH 50: CPT

## 2018-12-05 RX ORDER — POTASSIUM CHLORIDE 20 MEQ
10 PACKET (EA) ORAL
Qty: 0 | Refills: 0 | Status: COMPLETED | OUTPATIENT
Start: 2018-12-05 | End: 2018-12-05

## 2018-12-05 RX ORDER — POTASSIUM CHLORIDE 20 MEQ
20 PACKET (EA) ORAL
Qty: 0 | Refills: 0 | Status: COMPLETED | OUTPATIENT
Start: 2018-12-05 | End: 2018-12-05

## 2018-12-05 RX ORDER — POTASSIUM CHLORIDE 20 MEQ
10 PACKET (EA) ORAL
Qty: 0 | Refills: 0 | Status: COMPLETED | OUTPATIENT
Start: 2018-12-04 | End: 2018-12-05

## 2018-12-05 RX ORDER — POTASSIUM CHLORIDE 20 MEQ
40 PACKET (EA) ORAL ONCE
Qty: 0 | Refills: 0 | Status: COMPLETED | OUTPATIENT
Start: 2018-12-05 | End: 2018-12-05

## 2018-12-05 RX ORDER — ACETAMINOPHEN 500 MG
1000 TABLET ORAL ONCE
Qty: 0 | Refills: 0 | Status: COMPLETED | OUTPATIENT
Start: 2018-12-05 | End: 2018-12-05

## 2018-12-05 RX ADMIN — Medication 100 MILLIEQUIVALENT(S): at 00:47

## 2018-12-05 RX ADMIN — Medication 100 MILLIEQUIVALENT(S): at 10:56

## 2018-12-05 RX ADMIN — Medication 325 MILLIGRAM(S): at 12:00

## 2018-12-05 RX ADMIN — FENTANYL CITRATE 50 MICROGRAM(S): 50 INJECTION INTRAVENOUS at 19:55

## 2018-12-05 RX ADMIN — FENTANYL CITRATE 50 MICROGRAM(S): 50 INJECTION INTRAVENOUS at 23:30

## 2018-12-05 RX ADMIN — FENTANYL CITRATE 50 MICROGRAM(S): 50 INJECTION INTRAVENOUS at 17:30

## 2018-12-05 RX ADMIN — FENTANYL CITRATE 50 MICROGRAM(S): 50 INJECTION INTRAVENOUS at 13:27

## 2018-12-05 RX ADMIN — PANTOPRAZOLE SODIUM 40 MILLIGRAM(S): 20 TABLET, DELAYED RELEASE ORAL at 17:29

## 2018-12-05 RX ADMIN — CHLORHEXIDINE GLUCONATE 5 MILLILITER(S): 213 SOLUTION TOPICAL at 17:30

## 2018-12-05 RX ADMIN — Medication 50 MILLIEQUIVALENT(S): at 20:50

## 2018-12-05 RX ADMIN — Medication 1 TABLET(S): at 13:55

## 2018-12-05 RX ADMIN — FENTANYL CITRATE 50 MICROGRAM(S): 50 INJECTION INTRAVENOUS at 10:00

## 2018-12-05 RX ADMIN — FENTANYL CITRATE 50 MICROGRAM(S): 50 INJECTION INTRAVENOUS at 15:30

## 2018-12-05 RX ADMIN — FENTANYL CITRATE 50 MICROGRAM(S): 50 INJECTION INTRAVENOUS at 03:20

## 2018-12-05 RX ADMIN — PIPERACILLIN AND TAZOBACTAM 25 GRAM(S): 4; .5 INJECTION, POWDER, LYOPHILIZED, FOR SOLUTION INTRAVENOUS at 13:52

## 2018-12-05 RX ADMIN — FENTANYL CITRATE 50 MICROGRAM(S): 50 INJECTION INTRAVENOUS at 16:50

## 2018-12-05 RX ADMIN — Medication 50 MILLIEQUIVALENT(S): at 20:10

## 2018-12-05 RX ADMIN — PIPERACILLIN AND TAZOBACTAM 25 GRAM(S): 4; .5 INJECTION, POWDER, LYOPHILIZED, FOR SOLUTION INTRAVENOUS at 07:53

## 2018-12-05 RX ADMIN — ENOXAPARIN SODIUM 40 MILLIGRAM(S): 100 INJECTION SUBCUTANEOUS at 12:00

## 2018-12-05 RX ADMIN — ATORVASTATIN CALCIUM 80 MILLIGRAM(S): 80 TABLET, FILM COATED ORAL at 21:44

## 2018-12-05 RX ADMIN — FENTANYL CITRATE 50 MICROGRAM(S): 50 INJECTION INTRAVENOUS at 07:22

## 2018-12-05 RX ADMIN — Medication 100 MILLIEQUIVALENT(S): at 07:55

## 2018-12-05 RX ADMIN — FENTANYL CITRATE 50 MICROGRAM(S): 50 INJECTION INTRAVENOUS at 17:25

## 2018-12-05 RX ADMIN — Medication 1 TABLET(S): at 10:54

## 2018-12-05 RX ADMIN — Medication 40 MILLIEQUIVALENT(S): at 21:02

## 2018-12-05 RX ADMIN — FENTANYL CITRATE 50 MICROGRAM(S): 50 INJECTION INTRAVENOUS at 19:40

## 2018-12-05 RX ADMIN — Medication 1 APPLICATION(S): at 13:53

## 2018-12-05 RX ADMIN — CHLORHEXIDINE GLUCONATE 5 MILLILITER(S): 213 SOLUTION TOPICAL at 13:51

## 2018-12-05 RX ADMIN — CHLORHEXIDINE GLUCONATE 5 MILLILITER(S): 213 SOLUTION TOPICAL at 21:18

## 2018-12-05 RX ADMIN — CHLORHEXIDINE GLUCONATE 5 MILLILITER(S): 213 SOLUTION TOPICAL at 06:26

## 2018-12-05 RX ADMIN — CHLORHEXIDINE GLUCONATE 5 MILLILITER(S): 213 SOLUTION TOPICAL at 03:20

## 2018-12-05 RX ADMIN — Medication 1 APPLICATION(S): at 21:45

## 2018-12-05 RX ADMIN — FENTANYL CITRATE 50 MICROGRAM(S): 50 INJECTION INTRAVENOUS at 03:00

## 2018-12-05 RX ADMIN — Medication 250 MILLIGRAM(S): at 13:55

## 2018-12-05 RX ADMIN — PANTOPRAZOLE SODIUM 40 MILLIGRAM(S): 20 TABLET, DELAYED RELEASE ORAL at 06:26

## 2018-12-05 RX ADMIN — PIPERACILLIN AND TAZOBACTAM 25 GRAM(S): 4; .5 INJECTION, POWDER, LYOPHILIZED, FOR SOLUTION INTRAVENOUS at 22:30

## 2018-12-05 RX ADMIN — Medication 400 MILLIGRAM(S): at 21:18

## 2018-12-05 RX ADMIN — FENTANYL CITRATE 50 MICROGRAM(S): 50 INJECTION INTRAVENOUS at 13:07

## 2018-12-05 RX ADMIN — Medication 1 TABLET(S): at 21:45

## 2018-12-05 RX ADMIN — Medication 1 DROP(S): at 13:51

## 2018-12-05 RX ADMIN — Medication 50 MILLIEQUIVALENT(S): at 23:05

## 2018-12-05 RX ADMIN — FENTANYL CITRATE 50 MICROGRAM(S): 50 INJECTION INTRAVENOUS at 10:57

## 2018-12-05 RX ADMIN — FENTANYL CITRATE 50 MICROGRAM(S): 50 INJECTION INTRAVENOUS at 12:17

## 2018-12-05 RX ADMIN — Medication 250 MILLIGRAM(S): at 05:25

## 2018-12-05 RX ADMIN — CHLORHEXIDINE GLUCONATE 5 MILLILITER(S): 213 SOLUTION TOPICAL at 10:55

## 2018-12-05 RX ADMIN — Medication 1000 MILLIGRAM(S): at 21:55

## 2018-12-05 RX ADMIN — FENTANYL CITRATE 50 MICROGRAM(S): 50 INJECTION INTRAVENOUS at 23:12

## 2018-12-05 RX ADMIN — Medication 100 MILLIEQUIVALENT(S): at 01:11

## 2018-12-05 RX ADMIN — Medication 250 MILLIGRAM(S): at 21:02

## 2018-12-05 NOTE — PROGRESS NOTE ADULT - PROBLEM SELECTOR PLAN 1
CABG X 4  ECMO and IABP in place12/29, chest open since explanted and closed sternum 12/3  Continue gentle sedation while chest open  Continue inotropic support with epi, primacor, will discuss when okay to wean  Continue Amiodarone gtt, patient remains in NSR s/p CABG  PRN fentanyl for pain/sedation as well as low dose diprivan  passive ROM of upper extremities and LLE by RNs (RLE with impella)  cont primacor and epi, slowly wean  off, levophed to maintain MAP 65-75,   cont glucerna tube feeds (goal 60), protonix BID for GI ppx  insulin gtt per protocol for tight glycemic control (no reported h/o DM)  lasix gtt for diuresis, maintain augustin for strict Is/Os, follow lytes closely and replete PRN  d/w Dr. Barrett in AM rounds

## 2018-12-05 NOTE — PROGRESS NOTE ADULT - SUBJECTIVE AND OBJECTIVE BOX
[facility]  INFECTIOUS DISEASES AND INTERNAL MEDICINE at [place]  =======================================================  Perfecto Santizo MD  Diplomates American Board of Internal Medicine and Infectious Diseases  =======================================================    [id number]  PATRICK LYLE   follow up for:  post operative fevers  patient seen and examined.  still with fevers.   cultures in process.   repeat cultures order  pt remains intubated.   No reactions to zosyn    ===================================================  REVIEW OF SYSTEMS:  as above  all other ROS negative    =======================================================  Allergies  penicillins (Unknown)    Antibiotics:  piperacillin/tazobactam IVPB. 3.375 Gram(s) IV Intermittent every 8 hours  vancomycin  IVPB 1000 milliGRAM(s) IV Intermittent <User Schedule>    Other medications:  amiodarone Infusion 0.5 mG/Min IV Continuous <Continuous>  aspirin 325 milliGRAM(s) Oral daily  atorvastatin 80 milliGRAM(s) Oral at bedtime  chlorhexidine 0.12% Liquid 5 milliLiter(s) Oral Mucosa every 4 hours  dextrose 5% 500 milliLiter(s) IV Continuous <Continuous>  dextrose 50% Injectable 50 milliLiter(s) IV Push every 15 minutes  enoxaparin Injectable 40 milliGRAM(s) SubCutaneous daily  EPINEPHrine    Infusion 0.029 MICROgram(s)/kG/Min IV Continuous <Continuous>  furosemide Infusion 2.5 mG/Hr IV Continuous <Continuous>  insulin Infusion 2 Unit(s)/Hr IV Continuous <Continuous>  lactobacillus acidophilus 1 Tablet(s) Oral every 8 hours  milrinone Infusion 0.375 MICROgram(s)/kG/Min IV Continuous <Continuous>  norepinephrine Infusion 0.05 MICROgram(s)/kG/Min IV Continuous <Continuous>  pantoprazole  Injectable 40 milliGRAM(s) IV Push every 12 hours  petrolatum Ophthalmic Ointment 1 Application(s) Both EYES every 8 hours  potassium chloride  10 mEq/50 mL IVPB 10 milliEquivalent(s) IV Intermittent every 1 hour  potassium chloride  10 mEq/50 mL IVPB 10 milliEquivalent(s) IV Intermittent every 1 hour  potassium chloride  10 mEq/50 mL IVPB 10 milliEquivalent(s) IV Intermittent every 1 hour  potassium chloride  20 mEq/100 mL IVPB 20 milliEquivalent(s) IV Intermittent every 1 hour  propofol Infusion 30 MICROgram(s)/kG/Min IV Continuous <Continuous>  sodium chloride 0.9%. 1000 milliLiter(s) IV Continuous <Continuous>  sodium chloride 0.9%. 1000 milliLiter(s) IV Continuous <Continuous>  vasopressin Infusion 0.033 Unit(s)/Min IV Continuous <Continuous>    ======================================================  Physical Exam:  ============  T(F): 100.4 (05 Dec 2018 07:00), Max: 101.8 (04 Dec 2018 02:00)  HR: 109 (05 Dec 2018 08:30)  RR: 25 (05 Dec 2018 08:30)  SpO2: 95% (05 Dec 2018 08:30) (70% - 100%)    General:  intubated, no distress  Eye: Pupils are equal, round and reactive to light, Extraocular movements are intact, Normal conjunctiva.  HENT: Normocephalic, Oral mucosa is moist, ET Tube in place  Neck: Supple, No lymphadenopathy.  RIGHT neck IJ line  Respiratory: Lungs with fair air entry  midline chest with dressing in place.   + CHEST tubes  Cardiovascular: Normal rate, Regular rhythm, No murmur, Good pulses equal in all extremities,  No edema.  Gastrointestinal: Soft, Non-tender, Non-distended, Normal bowel sounds.  Genitourinary: + Mckeon  RIGHT inguinal impella access  Lymphatics: No lymphadenopathy neck,   Musculoskeletal: Normal range of motion,   Integumentary: No rash.  Neurologic: Alert, Cranial Nerves II-XII are grossly intact.    =======================================================  Labs:  ====                        9.8    10.3  )-----------( 73       ( 05 Dec 2018 05:10 )             28.3     12-05    138  |  100  |  20.0  ----------------------------<  138<H>  3.7   |  25.0  |  0.80    Ca    7.6<L>      05 Dec 2018 05:10  Phos  4.0     12-04  Mg     2.0     12-05    TPro  5.0<L>  /  Alb  2.6<L>  /  TBili  1.6  /  DBili  x   /  AST  262<H>  /  ALT  153<H>  /  AlkPhos  80  12-05      Culture - Sputum (collected 12-04-18 @ 10:21)  Source: .Sputum  Gram Stain (12-04-18 @ 11:59):    Few White blood cells    No organisms seen

## 2018-12-05 NOTE — PROGRESS NOTE ADULT - ASSESSMENT
46 yo Male c/o CP for 2-3 days prior to admission, then 10/10 chest pain approximately 9:30 am yesterday, followed by vomiting.  Wife drove pt to hospital, ruled in for STEMI. Urgent Cath, pt found to have multiple occlusions; stent to LAD and D2; stent to LAD thrombosed,  impella placed for support.  Pt then had VF arrest, shock x 1, return to NSR.  Upon transfer to OR for Urgent CABG, pt with asystolic arrest, chest opened intra-op with CPR in progress. Pt underwent C4V (LAD, Diag, OM and PDA) with inability to wean from CPB therefore requiring VA ECMO (central cannulatin) and IABP. , he was transferred to CICU on multiple gtts, now s/p explant 12/3 with placement of impella via R groin sheath; 1

## 2018-12-05 NOTE — PROGRESS NOTE ADULT - PROBLEM SELECTOR PLAN 8
likely multifactoral in setting of ECMO, IABP and now impella  d/w Dr. Barrett if and when to send HIT/NEETU

## 2018-12-05 NOTE — PROGRESS NOTE ADULT - PROBLEM SELECTOR PLAN 4
pt with CP impella via right groin   Full inotropic support with Primacor and Epi improving  able to wean of VA ECMO improving  able to wean of VA ECMO 12/3  now on impella CP @ P8, possible decrease today, dobutamine slowly being weaned off, maintian epi and primacor at current rates  levo +/_ for MAP 65-75 improving  able to wean of VA ECMO 12/3  now on impella CP @ P8, possible decrease today, dobutamine slowly being weaned off, maintian epi and primacor at current rates to prevent end organ damage  levo +/_ for MAP 65-75

## 2018-12-05 NOTE — PROGRESS NOTE ADULT - ASSESSMENT
This is a 47y  Male with no significant PMH admitted on 11/29/18 for CP for 2-3 days prior to admission,   found with STEMI. Urgent Cath, pt found to have multiple occlusions; VF arrest, shock x 1, return to NSR.  Upon transfer to OR for Urgent CABG, pt again VF arrest, chest opened intra-op with CPR in progress.  s/p ECMO and Impella; 12/3 ECMO explanted and Impella placed via right groin sheath.    now developed fevers.    patient in hospital for > 48 hours, multiple interventions.   CONTINUE broaden coverage of antibiotics for empiric hospital acquired infections    blood cultures and sputum cx sent  - will check urine legionella  - continue Vancomycin 1 gram Q8H  - continue Zosyn 3.375 gram Q8H; aware of prior unknown Penicillin allergy, intubated currently, will watch for side effects    - follow up all outstanding cultures  - REPEAT cultuers today  - trend temperature and WBC curve    - repeat cultures from blood and all sources if febrile.       will follow with you.

## 2018-12-05 NOTE — PROGRESS NOTE ADULT - PROBLEM SELECTOR PLAN 9
pan cultured yesterday morning  f/u results  remains low grade temps overnight  ID consulted, cipro changed to zosyn, cont vanco

## 2018-12-05 NOTE — PROGRESS NOTE ADULT - PROBLEM SELECTOR PLAN 5
Full ventilator support intubated initially for airway protection in the setting of STEMI and emergent need for surgery  currently on vent support due ot HD instability, minimal o2 requirements  emil can start CPAP trials with plan to extubate when hemodynamic issues improve

## 2018-12-05 NOTE — PROGRESS NOTE ADULT - SUBJECTIVE AND OBJECTIVE BOX
Brief Hospital Course:   Admitted  as code STEMI, attempted to stent LAD, developed thrombus in LM, impella placed, VF requiring defib x1, intubated, taken to OR and emergently placed on cardiopulmonary bypass, underwent C4V, removal of impella (was not functioning), and transported to CICU on VA ECMO with central cannulation, 12/3 taken back to OR for chest wash out, ECMO decannulation, and placement of impella via R groin (sheath already in place from impella on ).    Significant recent/past 24 hr events:  improving hemodynamics, pt awake and attempting to follow commands, but having difficulty due to generalized weakness and remains on low dose diprivan    Subjective: unable     Review of Systems         Unable to obtain due to: intubated on low dose propofol    Constitutional: denies fever, chills, general malaise, fatigue, weight loss, weight gain, diaphoresis   HEENT: denies dry mouth, sore throat, runny nose, photophobia, blurry vision, double vision, discharge, eye pain, difficulty hearing, vertigo, dysphagia, epistaxis, recent dental work    Neck: denies pain or stiffness  Breasts: denies pain, masses, or nipple discharge  Respiratory: denies SOB, SAMAYOA, cough, phlegm, wheezing, hemoptysis  Cardiovascular: denies CP, palpitations, edema, diaphoresis   Gastrointestinal: denies nausea, vomiting or hematemesis, diarrhea, constipation, abdominal or epigastric pain, melena or hematochezia   Genitourinary: denies dysuria, frequency, urgency, incontinence, hematuria   Skin: denies rash, hives, itching, burning, masses  Musculoskeletal: denies myalgias, arthritis, joint swelling, muscle weakness  Neurologic: denies syncope, LOC, headache, weakness, dizziness, parasthesias, numbness, seizures, confusion, dementia   Psychiatric: denies feeling anxious, depressed, suicidal, homicidal  Endocrine: denies increased fingerstick glucoses, cold or heat intolerance, polydipsia, polyuria, polyphagia, hair loss  Hematology/Oncology: denies bruising, tender or enlarged lymph nodes   Allergy/immunologic: denies hives or eczema  ROS negative x 10 systems except as noted above    Patient is a 47y old  Male who presents with a chief complaint of Acute anterolateral STEMI (04 Dec 2018 13:22)    HPI:  This is a 46 y/o male no significant PMH; recent ABT (doxycycline) r/t cellulitis to right groin per pt. Pt presented to the ED with 10/10 chest pain that he reported started at 0930; he drank water with no relief; pain started to progress to B/L shoulders. Per pt spouse, she reports he has had chest pain x2-3 days and this morning he vomited which he attributed to reflux.  She brought him into the emergency department.    Pt has significant EKG changes ST elevations in leads V1-V5 with reciprocal changes in II, III, AVF.  Pt rec'd asa and plavix load in ED. (2018 11:32)    PAST MEDICAL & SURGICAL HISTORY:  Staph infection  No significant past surgical history    FAMILY HISTORY:  Family history of myocardial infarction (Mother): mother;     Vitals   ICU Vital Signs Last 24 Hrs  T(C): 38.3 (05 Dec 2018 01:00), Max: 38.8 (04 Dec 2018 02:00)  T(F): 100.9 (05 Dec 2018 01:00), Max: 101.8 (04 Dec 2018 02:00)  HR: 108 (05 Dec 2018 01:00) (99 - 121)  BP: --  BP(mean): --  ABP: 102/59 (05 Dec 2018 01:00) (64/46 - 134/83)  ABP(mean): 70 (05 Dec 2018 01:00) (52 - 96)  RR: 16 (05 Dec 2018 01:00) (12 - 20)  SpO2: 98% (05 Dec 2018 01:00) (96% - 100%)    VENT SETTINGS   Mode: AC/ CMV (Assist Control/ Continuous Mandatory Ventilation)  RR (machine): 12  TV (machine): 750  FiO2: 40  PEEP: 5  MAP: 12  PIP: 32    ABG - ( 04 Dec 2018 23:29 )  pH, Arterial: 7.49  pH, Blood: x     /  pCO2: 38    /  pO2: 100   / HCO3: 29    / Base Excess: 5.0   /  SaO2: 99        I&O's Detail    03 Dec 2018 07:01  -  04 Dec 2018 07:00  --------------------------------------------------------  IN:    amiodarone Infusion: 334 mL    dextrose 5%: 205.5 mL    DOBUTamine Infusion: 20.4 mL    fentaNYL  Infusion: 307.6 mL    furosemide Infusion: 10 mL    heparin Infusion: 19 mL    insulin Infusion: 54 mL    milrinone  Infusion: 273.6 mL    norepinephrine Infusion: 223 mL    norepinephrine Infusion: 70 mL    Packed Red Blood Cells: 912 mL    propofol Infusion: 708 mL    sodium chloride 0.9%.: 200 mL    sodium chloride 0.9%.: 100 mL    Solution: 200 mL    Solution: 150 mL    Solution: 100 mL    Solution: 100 mL    Solution: 250 mL    vasopressin Infusion: 24 mL  Total IN: 4261.1 mL    OUT:    Chest Tube: 195 mL    Chest Tube: 300 mL    Chest Tube: 630 mL    Indwelling Catheter - Urethral: 3460 mL    Nasoenteral Tube: 100 mL  Total OUT: 4685 mL    Total NET: -423.9 mL    04 Dec 2018 07:01  -  05 Dec 2018 01:36  --------------------------------------------------------  IN:    amiodarone Infusion: 446.9 mL    dextrose 5%: 192 mL    DOBUTamine Infusion: 54.4 mL    DOBUTamine Infusion: 55.8 mL    EPINEPHrine Infusion: 172 mL    furosemide Infusion: 42.5 mL    Glucerna 1.5: 20 mL    Glucerna 1.5: 180 mL    insulin Infusion: 85 mL    milrinone  Infusion: 95.2 mL    milrinone  Infusion: 115.8 mL    norepinephrine Infusion: 95 mL    ns in tub fed  [id number]: 20 mL    Packed Red Blood Cells: 612 mL    propofol Infusion: 21 mL    sodium chloride 0.9%.: 180 mL    sodium chloride 0.9%.: 90 mL    Solution: 500 mL    Solution: 100 mL    Solution: 200 mL    Solution: 100 mL    vasopressin Infusion: 30 mL  Total IN: 3407.6 mL    OUT:    Chest Tube: 270 mL    Chest Tube: 150 mL    Chest Tube: 120 mL    Indwelling Catheter - Urethral: 3075 mL  Total OUT: 3615 mL    Total NET: -207.4 mL    LABS                        10.2   9.4   )-----------( 63       ( 04 Dec 2018 23:29 )             28.8     12-04    134<L>  |  97<L>  |  19.0  ----------------------------<  130<H>  3.7   |  25.0  |  0.77    Ca    7.6<L>      04 Dec 2018 23:29  Phos  4.0       Mg     2.0         TPro  5.1<L>  /  Alb  2.8<L>  /  TBili  1.8  /  DBili  0.7<H>  /  AST  312<H>  /  ALT  151<H>  /  AlkPhos  70      LIVER FUNCTIONS - ( 04 Dec 2018 23:29 )  Alb: 2.8 g/dL / Pro: 5.1 g/dL / ALK PHOS: 70 U/L / ALT: 151 U/L / AST: 312 U/L / GGT: x         PT/INR - ( 04 Dec 2018 23:29 )   PT: 16.3 sec;   INR: 1.40 ratio    PTT - ( 04 Dec 2018 23:29 )  PTT:45.8 sec  CARDIAC MARKERS ( 04 Dec 2018 04:42 )  [id number] U/L / CKMB5.2 ng/mL / Troponin T7.22 ng/mL /    Urinalysis Basic - ( 04 Dec 2018 10:55 )    Color: Yellow / Appearance: Clear / S.020 / pH: x  Gluc: x / Ketone: Negative  / Bili: Negative / Urobili: 4 mg/dL   Blood: x / Protein: 30 mg/dL / Nitrite: Negative   Leuk Esterase: Trace / RBC: x / WBC 0-2   Sq Epi: x / Non Sq Epi: Occasional / Bacteria: x    POCT Blood Glucose.: 130 mg/dL (18 @ 00:04)  POCT Blood Glucose.: 151 mg/dL (18 @ 22:05)  POCT Blood Glucose.: 116 mg/dL (18 @ 20:15)  POCT Blood Glucose.: 120 mg/dL (18 @ 18:38)  POCT Blood Glucose.: 107 mg/dL (18 @ 15:59)  POCT Blood Glucose.: 118 mg/dL (18 @ 13:04)    MEDICATIONS  (STANDING):  amiodarone Infusion 0.5 mG/Min (16.667 mL/Hr) IV Continuous <Continuous>  aspirin 325 milliGRAM(s) Oral daily  atorvastatin 80 milliGRAM(s) Oral at bedtime  chlorhexidine 0.12% Liquid 5 milliLiter(s) Oral Mucosa every 4 hours  dextrose 5% 500 milliLiter(s) (10 mL/Hr) IV Continuous <Continuous>  dextrose 50% Injectable 50 milliLiter(s) IV Push every 15 minutes  DOBUTamine Infusion 2.5 MICROgram(s)/kG/Min (6.803 mL/Hr) IV Continuous <Continuous>  enoxaparin Injectable 40 milliGRAM(s) SubCutaneous daily  EPINEPHrine    Infusion 0.029 MICROgram(s)/kG/Min (10 mL/Hr) IV Continuous <Continuous>  furosemide Infusion 2.5 mG/Hr (1.25 mL/Hr) IV Continuous <Continuous>  insulin Infusion 2 Unit(s)/Hr (2 mL/Hr) IV Continuous <Continuous>  lactobacillus acidophilus 1 Tablet(s) Oral every 8 hours  milrinone Infusion 0.375 MICROgram(s)/kG/Min (10.204 mL/Hr) IV Continuous <Continuous>  norepinephrine Infusion 0.05 MICROgram(s)/kG/Min (8.503 mL/Hr) IV Continuous <Continuous>  pantoprazole  Injectable 40 milliGRAM(s) IV Push every 12 hours  petrolatum Ophthalmic Ointment 1 Application(s) Both EYES every 8 hours  piperacillin/tazobactam IVPB. 3.375 Gram(s) IV Intermittent every 8 hours  potassium chloride  10 mEq/50 mL IVPB 10 milliEquivalent(s) IV Intermittent every 1 hour  potassium chloride  10 mEq/50 mL IVPB 10 milliEquivalent(s) IV Intermittent every 1 hour  potassium chloride  10 mEq/50 mL IVPB 10 milliEquivalent(s) IV Intermittent every 1 hour  propofol Infusion 30 MICROgram(s)/kG/Min (16.326 mL/Hr) IV Continuous <Continuous>  sodium chloride 0.9%. 1000 milliLiter(s) (10 mL/Hr) IV Continuous <Continuous>  sodium chloride 0.9%. 1000 milliLiter(s) (5 mL/Hr) IV Continuous <Continuous>  vancomycin  IVPB 1000 milliGRAM(s) IV Intermittent <User Schedule>  vasopressin Infusion 0.033 Unit(s)/Min (2 mL/Hr) IV Continuous <Continuous>    MEDICATIONS  (PRN):  artificial  tears Solution 1 Drop(s) Both EYES four times a day PRN Dry Eyes  fentaNYL    Injectable 50 MICROGram(s) IV Push every 2 hours PRN Moderate Pain (4 - 6)  petrolatum white Ointment 1 Application(s) Topical four times a day PRN dry lips    Allergies:  penicillins (Unknown)    Physical Exam:   HEENT: PERRLA  Neck: No JVD  Respiratory: coarse BS b/l, no wheezing  Cardiovascular: S1S2 RRR  Gastrointestinal: hypoactive BS soft NT ND  Extremities: moderate pedal edema b/l, WWP,   Vascular: Equal and normal pulses: 2+ peripheral pulses throughout  Neurological: Alert, opens and closes eyes to commands, attempts to squeeze hands,   Skin: warm, dry, excoriations to anterior chest  lines/tubes: RIJ intro with swan, L radial brandy, V wires, augustin, ETT 7.5, OGT with TF running, R, med, L CT all to suction, no air leak, R groin impella, dsg with serosang drainage,     Code Status: full code    Critical care time spent: 35minutes (reviewing chart including medication, labs and imaging results, discussions with interdisciplinary team, discussing goals of care/advanced directives, counseling patient and/or family, non-inclusive of procedures)    Case including assessment/plan of care to be discussed with CT surgery attending in AM rounds Brief Hospital Course:   Admitted  as code STEMI, attempted to stent LAD, developed thrombus in LM, impella placed, VF requiring defib x1, intubated, taken to OR and emergently placed on cardiopulmonary bypass, underwent C4V, removal of impella (was not functioning), and transported to CICU on VA ECMO with central cannulation, 12/3 taken back to OR for chest wash out, ECMO decannulation, and placement of impella via R groin (sheath already in place from impella on ).    Significant recent/past 24 hr events:  improving hemodynamics, pt awake and attempting to follow commands, but having difficulty due to generalized weakness and remains on low dose diprivan    Subjective: unable     Review of Systems         Unable to obtain due to: intubated on low dose propofol    Patient is a 47y old  Male who presents with a chief complaint of Acute anterolateral STEMI (04 Dec 2018 13:22)    HPI:  This is a 46 y/o male no significant PMH; recent ABT (doxycycline) r/t cellulitis to right groin per pt. Pt presented to the ED with 10/10 chest pain that he reported started at 0930; he drank water with no relief; pain started to progress to B/L shoulders. Per pt spouse, she reports he has had chest pain x2-3 days and this morning he vomited which he attributed to reflux.  She brought him into the emergency department.    Pt has significant EKG changes ST elevations in leads V1-V5 with reciprocal changes in II, III, AVF.  Pt rec'd asa and plavix load in ED. (2018 11:32)    PAST MEDICAL & SURGICAL HISTORY:  Staph infection  No significant past surgical history    FAMILY HISTORY:  Family history of myocardial infarction (Mother): mother;     Vitals   ICU Vital Signs Last 24 Hrs  T(C): 38.3 (05 Dec 2018 01:00), Max: 38.8 (04 Dec 2018 02:00)  T(F): 100.9 (05 Dec 2018 01:00), Max: 101.8 (04 Dec 2018 02:00)  HR: 108 (05 Dec 2018 01:00) (99 - 121)  BP: --  BP(mean): --  ABP: 102/59 (05 Dec 2018 01:00) (64/46 - 134/83)  ABP(mean): 70 (05 Dec 2018 01:00) (52 - 96)  RR: 16 (05 Dec 2018 01:00) (12 - 20)  SpO2: 98% (05 Dec 2018 01:00) (96% - 100%)    VENT SETTINGS   Mode: AC/ CMV (Assist Control/ Continuous Mandatory Ventilation)  RR (machine): 12  TV (machine): 750  FiO2: 40  PEEP: 5  MAP: 12  PIP: 32    ABG - ( 04 Dec 2018 23:29 )  pH, Arterial: 7.49  pH, Blood: x     /  pCO2: 38    /  pO2: 100   / HCO3: 29    / Base Excess: 5.0   /  SaO2: 99        I&O's Detail    03 Dec 2018 07:01  -  04 Dec 2018 07:00  --------------------------------------------------------  IN:    amiodarone Infusion: 334 mL    dextrose 5%: 205.5 mL    DOBUTamine Infusion: 20.4 mL    fentaNYL  Infusion: 307.6 mL    furosemide Infusion: 10 mL    heparin Infusion: 19 mL    insulin Infusion: 54 mL    milrinone  Infusion: 273.6 mL    norepinephrine Infusion: 223 mL    norepinephrine Infusion: 70 mL    Packed Red Blood Cells: 912 mL    propofol Infusion: 708 mL    sodium chloride 0.9%.: 200 mL    sodium chloride 0.9%.: 100 mL    Solution: 200 mL    Solution: 150 mL    Solution: 100 mL    Solution: 100 mL    Solution: 250 mL    vasopressin Infusion: 24 mL  Total IN: 4261.1 mL    OUT:    Chest Tube: 195 mL    Chest Tube: 300 mL    Chest Tube: 630 mL    Indwelling Catheter - Urethral: 3460 mL    Nasoenteral Tube: 100 mL  Total OUT: 4685 mL    Total NET: -423.9 mL    04 Dec 2018 07:01  -  05 Dec 2018 01:36  --------------------------------------------------------  IN:    amiodarone Infusion: 446.9 mL    dextrose 5%: 192 mL    DOBUTamine Infusion: 54.4 mL    DOBUTamine Infusion: 55.8 mL    EPINEPHrine Infusion: 172 mL    furosemide Infusion: 42.5 mL    Glucerna 1.5: 20 mL    Glucerna 1.5: 180 mL    insulin Infusion: 85 mL    milrinone  Infusion: 95.2 mL    milrinone  Infusion: 115.8 mL    norepinephrine Infusion: 95 mL    ns in tub fed  [id number]: 20 mL    Packed Red Blood Cells: 612 mL    propofol Infusion: 21 mL    sodium chloride 0.9%.: 180 mL    sodium chloride 0.9%.: 90 mL    Solution: 500 mL    Solution: 100 mL    Solution: 200 mL    Solution: 100 mL    vasopressin Infusion: 30 mL  Total IN: 3407.6 mL    OUT:    Chest Tube: 270 mL    Chest Tube: 150 mL    Chest Tube: 120 mL    Indwelling Catheter - Urethral: 3075 mL  Total OUT: 3615 mL    Total NET: -207.4 mL    LABS                        10.2   9.4   )-----------( 63       ( 04 Dec 2018 23:29 )             28.8     12-    134<L>  |  97<L>  |  19.0  ----------------------------<  130<H>  3.7   |  25.0  |  0.77    Ca    7.6<L>      04 Dec 2018 23:29  Phos  4.0     12-  Mg     2.0     12-    TPro  5.1<L>  /  Alb  2.8<L>  /  TBili  1.8  /  DBili  0.7<H>  /  AST  312<H>  /  ALT  151<H>  /  AlkPhos  70  12-04    LIVER FUNCTIONS - ( 04 Dec 2018 23:29 )  Alb: 2.8 g/dL / Pro: 5.1 g/dL / ALK PHOS: 70 U/L / ALT: 151 U/L / AST: 312 U/L / GGT: x         PT/INR - ( 04 Dec 2018 23:29 )   PT: 16.3 sec;   INR: 1.40 ratio    PTT - ( 04 Dec 2018 23:29 )  PTT:45.8 sec  CARDIAC MARKERS ( 04 Dec 2018 04:42 )  [id number] U/L / CKMB5.2 ng/mL / Troponin T7.22 ng/mL /    Urinalysis Basic - ( 04 Dec 2018 10:55 )    Color: Yellow / Appearance: Clear / S.020 / pH: x  Gluc: x / Ketone: Negative  / Bili: Negative / Urobili: 4 mg/dL   Blood: x / Protein: 30 mg/dL / Nitrite: Negative   Leuk Esterase: Trace / RBC: x / WBC 0-2   Sq Epi: x / Non Sq Epi: Occasional / Bacteria: x    POCT Blood Glucose.: 130 mg/dL (18 @ 00:04)  POCT Blood Glucose.: 151 mg/dL (18 @ 22:05)  POCT Blood Glucose.: 116 mg/dL (18 @ 20:15)  POCT Blood Glucose.: 120 mg/dL (18 @ 18:38)  POCT Blood Glucose.: 107 mg/dL (18 @ 15:59)  POCT Blood Glucose.: 118 mg/dL (18 @ 13:04)    MEDICATIONS  (STANDING):  amiodarone Infusion 0.5 mG/Min (16.667 mL/Hr) IV Continuous <Continuous>  aspirin 325 milliGRAM(s) Oral daily  atorvastatin 80 milliGRAM(s) Oral at bedtime  chlorhexidine 0.12% Liquid 5 milliLiter(s) Oral Mucosa every 4 hours  dextrose 5% 500 milliLiter(s) (10 mL/Hr) IV Continuous <Continuous>  dextrose 50% Injectable 50 milliLiter(s) IV Push every 15 minutes  DOBUTamine Infusion 2.5 MICROgram(s)/kG/Min (6.803 mL/Hr) IV Continuous <Continuous>  enoxaparin Injectable 40 milliGRAM(s) SubCutaneous daily  EPINEPHrine    Infusion 0.029 MICROgram(s)/kG/Min (10 mL/Hr) IV Continuous <Continuous>  furosemide Infusion 2.5 mG/Hr (1.25 mL/Hr) IV Continuous <Continuous>  insulin Infusion 2 Unit(s)/Hr (2 mL/Hr) IV Continuous <Continuous>  lactobacillus acidophilus 1 Tablet(s) Oral every 8 hours  milrinone Infusion 0.375 MICROgram(s)/kG/Min (10.204 mL/Hr) IV Continuous <Continuous>  norepinephrine Infusion 0.05 MICROgram(s)/kG/Min (8.503 mL/Hr) IV Continuous <Continuous>  pantoprazole  Injectable 40 milliGRAM(s) IV Push every 12 hours  petrolatum Ophthalmic Ointment 1 Application(s) Both EYES every 8 hours  piperacillin/tazobactam IVPB. 3.375 Gram(s) IV Intermittent every 8 hours  potassium chloride  10 mEq/50 mL IVPB 10 milliEquivalent(s) IV Intermittent every 1 hour  potassium chloride  10 mEq/50 mL IVPB 10 milliEquivalent(s) IV Intermittent every 1 hour  potassium chloride  10 mEq/50 mL IVPB 10 milliEquivalent(s) IV Intermittent every 1 hour  propofol Infusion 30 MICROgram(s)/kG/Min (16.326 mL/Hr) IV Continuous <Continuous>  sodium chloride 0.9%. 1000 milliLiter(s) (10 mL/Hr) IV Continuous <Continuous>  sodium chloride 0.9%. 1000 milliLiter(s) (5 mL/Hr) IV Continuous <Continuous>  vancomycin  IVPB 1000 milliGRAM(s) IV Intermittent <User Schedule>  vasopressin Infusion 0.033 Unit(s)/Min (2 mL/Hr) IV Continuous <Continuous>    MEDICATIONS  (PRN):  artificial  tears Solution 1 Drop(s) Both EYES four times a day PRN Dry Eyes  fentaNYL    Injectable 50 MICROGram(s) IV Push every 2 hours PRN Moderate Pain (4 - 6)  petrolatum white Ointment 1 Application(s) Topical four times a day PRN dry lips    Allergies:  penicillins (Unknown)    Physical Exam:   HEENT: PERRLA  Neck: No JVD  Respiratory: coarse BS b/l, no wheezing  Cardiovascular: S1S2 RRR  Gastrointestinal: hypoactive BS soft NT ND  Extremities: moderate pedal edema b/l, WWP,   Vascular: Equal and normal pulses: 2+ peripheral pulses throughout  Neurological: Alert, opens and closes eyes to commands, attempts to squeeze hands,   Skin: warm, dry, excoriations to anterior chest  lines/tubes: RIJ intro with swan, L radial brandy, V wires, augustin, ETT 7.5, OGT with TF running, R, med, L CT all to suction, no air leak, R groin impella, dsg with serosang drainage,     Code Status: full code    Critical care time spent: 35minutes (reviewing chart including medication, labs and imaging results, discussions with interdisciplinary team, discussing goals of care/advanced directives, counseling patient and/or family, non-inclusive of procedures)    Case including assessment/plan of care to be discussed with CT surgery attending in AM rounds

## 2018-12-06 LAB
ALBUMIN SERPL ELPH-MCNC: 2.8 G/DL — LOW (ref 3.3–5.2)
ALP SERPL-CCNC: 168 U/L — HIGH (ref 40–120)
ALT FLD-CCNC: 201 U/L — HIGH
ANION GAP SERPL CALC-SCNC: 11 MMOL/L — SIGNIFICANT CHANGE UP (ref 5–17)
ANION GAP SERPL CALC-SCNC: 12 MMOL/L — SIGNIFICANT CHANGE UP (ref 5–17)
AST SERPL-CCNC: 229 U/L — HIGH
BASE EXCESS BLDV CALC-SCNC: 3.6 MMOL/L — HIGH (ref -2–2)
BILIRUB SERPL-MCNC: 1.7 MG/DL — SIGNIFICANT CHANGE UP (ref 0.4–2)
BLOOD GAS COMMENTS, VENOUS: SIGNIFICANT CHANGE UP
BUN SERPL-MCNC: 20 MG/DL — SIGNIFICANT CHANGE UP (ref 8–20)
BUN SERPL-MCNC: 21 MG/DL — HIGH (ref 8–20)
CA-I SERPL-SCNC: 1 MMOL/L — LOW (ref 1.15–1.33)
CALCIUM SERPL-MCNC: 7.3 MG/DL — LOW (ref 8.6–10.2)
CALCIUM SERPL-MCNC: 7.5 MG/DL — LOW (ref 8.6–10.2)
CHLORIDE BLDV-SCNC: 105 MMOL/L — SIGNIFICANT CHANGE UP (ref 98–107)
CHLORIDE SERPL-SCNC: 102 MMOL/L — SIGNIFICANT CHANGE UP (ref 98–107)
CHLORIDE SERPL-SCNC: 104 MMOL/L — SIGNIFICANT CHANGE UP (ref 98–107)
CO2 SERPL-SCNC: 23 MMOL/L — SIGNIFICANT CHANGE UP (ref 22–29)
CO2 SERPL-SCNC: 25 MMOL/L — SIGNIFICANT CHANGE UP (ref 22–29)
CREAT SERPL-MCNC: 0.79 MG/DL — SIGNIFICANT CHANGE UP (ref 0.5–1.3)
CREAT SERPL-MCNC: 0.96 MG/DL — SIGNIFICANT CHANGE UP (ref 0.5–1.3)
CULTURE RESULTS: SIGNIFICANT CHANGE UP
GAS PNL BLDA: SIGNIFICANT CHANGE UP
GAS PNL BLDV: 140 MMOL/L — SIGNIFICANT CHANGE UP (ref 135–145)
GAS PNL BLDV: SIGNIFICANT CHANGE UP
GAS PNL BLDV: SIGNIFICANT CHANGE UP
GLUCOSE BLDC GLUCOMTR-MCNC: 112 MG/DL — HIGH (ref 70–99)
GLUCOSE BLDC GLUCOMTR-MCNC: 112 MG/DL — HIGH (ref 70–99)
GLUCOSE BLDC GLUCOMTR-MCNC: 119 MG/DL — HIGH (ref 70–99)
GLUCOSE BLDC GLUCOMTR-MCNC: 125 MG/DL — HIGH (ref 70–99)
GLUCOSE BLDC GLUCOMTR-MCNC: 132 MG/DL — HIGH (ref 70–99)
GLUCOSE BLDC GLUCOMTR-MCNC: 133 MG/DL — HIGH (ref 70–99)
GLUCOSE BLDC GLUCOMTR-MCNC: 139 MG/DL — HIGH (ref 70–99)
GLUCOSE BLDC GLUCOMTR-MCNC: 140 MG/DL — HIGH (ref 70–99)
GLUCOSE BLDC GLUCOMTR-MCNC: 142 MG/DL — HIGH (ref 70–99)
GLUCOSE BLDC GLUCOMTR-MCNC: 162 MG/DL — HIGH (ref 70–99)
GLUCOSE BLDC GLUCOMTR-MCNC: 185 MG/DL — HIGH (ref 70–99)
GLUCOSE BLDV-MCNC: 145 MG/DL — HIGH (ref 70–99)
GLUCOSE SERPL-MCNC: 134 MG/DL — HIGH (ref 70–115)
GLUCOSE SERPL-MCNC: 145 MG/DL — HIGH (ref 70–115)
HCO3 BLDV-SCNC: 27 MMOL/L — SIGNIFICANT CHANGE UP (ref 21–29)
HCT VFR BLD CALC: 26.6 % — LOW (ref 42–52)
HCT VFR BLD CALC: 27.6 % — LOW (ref 42–52)
HCT VFR BLDA CALC: 12 — CRITICAL LOW (ref 39–50)
HGB BLD CALC-MCNC: <4.8 G/DL — SIGNIFICANT CHANGE UP (ref 13–17)
HGB BLD-MCNC: 8.8 G/DL — LOW (ref 14–18)
HGB BLD-MCNC: 9.2 G/DL — LOW (ref 14–18)
HOROWITZ INDEX BLDV+IHG-RTO: 0.5 — SIGNIFICANT CHANGE UP
LACTATE BLDV-MCNC: 1 MMOL/L — SIGNIFICANT CHANGE UP (ref 0.5–2)
MAGNESIUM SERPL-MCNC: 2 MG/DL — SIGNIFICANT CHANGE UP (ref 1.6–2.6)
MAGNESIUM SERPL-MCNC: 2.1 MG/DL — SIGNIFICANT CHANGE UP (ref 1.6–2.6)
MCHC RBC-ENTMCNC: 28.5 PG — SIGNIFICANT CHANGE UP (ref 27–31)
MCHC RBC-ENTMCNC: 28.7 PG — SIGNIFICANT CHANGE UP (ref 27–31)
MCHC RBC-ENTMCNC: 33.1 G/DL — SIGNIFICANT CHANGE UP (ref 32–36)
MCHC RBC-ENTMCNC: 33.3 G/DL — SIGNIFICANT CHANGE UP (ref 32–36)
MCV RBC AUTO: 85.4 FL — SIGNIFICANT CHANGE UP (ref 80–94)
MCV RBC AUTO: 86.6 FL — SIGNIFICANT CHANGE UP (ref 80–94)
OTHER CELLS CSF MANUAL: 2 ML/DL — LOW (ref 18–22)
PCO2 BLDV: 39 MMHG — SIGNIFICANT CHANGE UP (ref 35–50)
PH BLDV: 7.46 — HIGH (ref 7.32–7.43)
PLATELET # BLD AUTO: 101 K/UL — LOW (ref 150–400)
PLATELET # BLD AUTO: 109 K/UL — LOW (ref 150–400)
PO2 BLDV: 25 MMHG — SIGNIFICANT CHANGE UP (ref 25–45)
POTASSIUM BLDV-SCNC: 3.4 MMOL/L — SIGNIFICANT CHANGE UP (ref 3.4–4.5)
POTASSIUM SERPL-MCNC: 4 MMOL/L — SIGNIFICANT CHANGE UP (ref 3.5–5.3)
POTASSIUM SERPL-MCNC: 4.1 MMOL/L — SIGNIFICANT CHANGE UP (ref 3.5–5.3)
POTASSIUM SERPL-SCNC: 4 MMOL/L — SIGNIFICANT CHANGE UP (ref 3.5–5.3)
POTASSIUM SERPL-SCNC: 4.1 MMOL/L — SIGNIFICANT CHANGE UP (ref 3.5–5.3)
PROT SERPL-MCNC: 5.3 G/DL — LOW (ref 6.6–8.7)
RBC # BLD: 3.07 M/UL — LOW (ref 4.6–6.2)
RBC # BLD: 3.23 M/UL — LOW (ref 4.6–6.2)
RBC # FLD: 15.2 % — SIGNIFICANT CHANGE UP (ref 11–15.6)
RBC # FLD: 15.2 % — SIGNIFICANT CHANGE UP (ref 11–15.6)
SAO2 % BLDV: 44 % — SIGNIFICANT CHANGE UP
SODIUM SERPL-SCNC: 138 MMOL/L — SIGNIFICANT CHANGE UP (ref 135–145)
SODIUM SERPL-SCNC: 139 MMOL/L — SIGNIFICANT CHANGE UP (ref 135–145)
SPECIMEN SOURCE: SIGNIFICANT CHANGE UP
VANCOMYCIN TROUGH SERPL-MCNC: 12 UG/ML — SIGNIFICANT CHANGE UP (ref 10–20)
WBC # BLD: 15.1 K/UL — HIGH (ref 4.8–10.8)
WBC # BLD: 16.9 K/UL — HIGH (ref 4.8–10.8)
WBC # FLD AUTO: 15.1 K/UL — HIGH (ref 4.8–10.8)
WBC # FLD AUTO: 16.9 K/UL — HIGH (ref 4.8–10.8)

## 2018-12-06 PROCEDURE — 36140 INTRO NDL ICATH UPR/LXTR ART: CPT | Mod: 59

## 2018-12-06 PROCEDURE — 37221: CPT

## 2018-12-06 PROCEDURE — 75630 X-RAY AORTA LEG ARTERIES: CPT | Mod: 26,59

## 2018-12-06 PROCEDURE — 71045 X-RAY EXAM CHEST 1 VIEW: CPT | Mod: 26

## 2018-12-06 PROCEDURE — 34201 REMOVAL OF ARTERY CLOT: CPT

## 2018-12-06 PROCEDURE — 36200 PLACE CATHETER IN AORTA: CPT | Mod: 59

## 2018-12-06 PROCEDURE — 37220: CPT | Mod: 59

## 2018-12-06 RX ORDER — CLOPIDOGREL BISULFATE 75 MG/1
75 TABLET, FILM COATED ORAL DAILY
Qty: 0 | Refills: 0 | Status: DISCONTINUED | OUTPATIENT
Start: 2018-12-07 | End: 2018-12-10

## 2018-12-06 RX ORDER — FUROSEMIDE 40 MG
2.5 TABLET ORAL
Qty: 500 | Refills: 0 | Status: DISCONTINUED | OUTPATIENT
Start: 2018-12-06 | End: 2018-12-07

## 2018-12-06 RX ORDER — VASOPRESSIN 20 [USP'U]/ML
0.03 INJECTION INTRAVENOUS
Qty: 100 | Refills: 0 | Status: DISCONTINUED | OUTPATIENT
Start: 2018-12-06 | End: 2018-12-07

## 2018-12-06 RX ORDER — ASPIRIN/CALCIUM CARB/MAGNESIUM 324 MG
325 TABLET ORAL DAILY
Qty: 0 | Refills: 0 | Status: DISCONTINUED | OUTPATIENT
Start: 2018-12-06 | End: 2018-12-10

## 2018-12-06 RX ORDER — ARGATROBAN 50 MG/50ML
0.05 INJECTION, SOLUTION INTRAVENOUS
Qty: 250 | Refills: 0 | Status: DISCONTINUED | OUTPATIENT
Start: 2018-12-06 | End: 2018-12-06

## 2018-12-06 RX ORDER — POTASSIUM CHLORIDE 20 MEQ
40 PACKET (EA) ORAL ONCE
Qty: 0 | Refills: 0 | Status: COMPLETED | OUTPATIENT
Start: 2018-12-06 | End: 2018-12-06

## 2018-12-06 RX ORDER — SODIUM CHLORIDE 9 MG/ML
1000 INJECTION INTRAMUSCULAR; INTRAVENOUS; SUBCUTANEOUS
Qty: 0 | Refills: 0 | Status: DISCONTINUED | OUTPATIENT
Start: 2018-12-06 | End: 2018-12-10

## 2018-12-06 RX ORDER — MIDAZOLAM HYDROCHLORIDE 1 MG/ML
4 INJECTION, SOLUTION INTRAMUSCULAR; INTRAVENOUS ONCE
Qty: 0 | Refills: 0 | Status: DISCONTINUED | OUTPATIENT
Start: 2018-12-06 | End: 2018-12-06

## 2018-12-06 RX ORDER — MAGNESIUM SULFATE 500 MG/ML
2 VIAL (ML) INJECTION ONCE
Qty: 0 | Refills: 0 | Status: COMPLETED | OUTPATIENT
Start: 2018-12-06 | End: 2018-12-06

## 2018-12-06 RX ORDER — PETROLATUM,WHITE
1 JELLY (GRAM) TOPICAL
Qty: 0 | Refills: 0 | Status: DISCONTINUED | OUTPATIENT
Start: 2018-12-06 | End: 2018-12-10

## 2018-12-06 RX ORDER — DEXTROSE 50 % IN WATER 50 %
50 SYRINGE (ML) INTRAVENOUS
Qty: 0 | Refills: 0 | Status: DISCONTINUED | OUTPATIENT
Start: 2018-12-06 | End: 2018-12-10

## 2018-12-06 RX ORDER — EPINEPHRINE 0.3 MG/.3ML
0.02 INJECTION INTRAMUSCULAR; SUBCUTANEOUS
Qty: 4 | Refills: 0 | Status: DISCONTINUED | OUTPATIENT
Start: 2018-12-06 | End: 2018-12-10

## 2018-12-06 RX ORDER — VECURONIUM BROMIDE 20 MG/1
10 INJECTION, POWDER, FOR SOLUTION INTRAVENOUS ONCE
Qty: 0 | Refills: 0 | Status: DISCONTINUED | OUTPATIENT
Start: 2018-12-06 | End: 2018-12-07

## 2018-12-06 RX ORDER — PIPERACILLIN AND TAZOBACTAM 4; .5 G/20ML; G/20ML
3.38 INJECTION, POWDER, LYOPHILIZED, FOR SOLUTION INTRAVENOUS EVERY 8 HOURS
Qty: 0 | Refills: 0 | Status: DISCONTINUED | OUTPATIENT
Start: 2018-12-06 | End: 2018-12-08

## 2018-12-06 RX ORDER — ATORVASTATIN CALCIUM 80 MG/1
80 TABLET, FILM COATED ORAL AT BEDTIME
Qty: 0 | Refills: 0 | Status: DISCONTINUED | OUTPATIENT
Start: 2018-12-06 | End: 2018-12-10

## 2018-12-06 RX ORDER — SODIUM CHLORIDE 9 MG/ML
500 INJECTION, SOLUTION INTRAVENOUS
Qty: 0 | Refills: 0 | Status: DISCONTINUED | OUTPATIENT
Start: 2018-12-06 | End: 2018-12-07

## 2018-12-06 RX ORDER — PANTOPRAZOLE SODIUM 20 MG/1
40 TABLET, DELAYED RELEASE ORAL EVERY 12 HOURS
Qty: 0 | Refills: 0 | Status: DISCONTINUED | OUTPATIENT
Start: 2018-12-06 | End: 2018-12-10

## 2018-12-06 RX ORDER — AMIODARONE HYDROCHLORIDE 400 MG/1
0.5 TABLET ORAL
Qty: 900 | Refills: 0 | Status: DISCONTINUED | OUTPATIENT
Start: 2018-12-06 | End: 2018-12-07

## 2018-12-06 RX ORDER — PROPOFOL 10 MG/ML
30 INJECTION, EMULSION INTRAVENOUS
Qty: 1000 | Refills: 0 | Status: DISCONTINUED | OUTPATIENT
Start: 2018-12-06 | End: 2018-12-10

## 2018-12-06 RX ORDER — CALCIUM CHLORIDE
1000 POWDER (GRAM) MISCELLANEOUS ONCE
Qty: 0 | Refills: 0 | Status: COMPLETED | OUTPATIENT
Start: 2018-12-06 | End: 2018-12-06

## 2018-12-06 RX ORDER — VANCOMYCIN HCL 1 G
1000 VIAL (EA) INTRAVENOUS EVERY 12 HOURS
Qty: 0 | Refills: 0 | Status: DISCONTINUED | OUTPATIENT
Start: 2018-12-06 | End: 2018-12-07

## 2018-12-06 RX ORDER — ENOXAPARIN SODIUM 100 MG/ML
40 INJECTION SUBCUTANEOUS DAILY
Qty: 0 | Refills: 0 | Status: DISCONTINUED | OUTPATIENT
Start: 2018-12-07 | End: 2018-12-10

## 2018-12-06 RX ORDER — NOREPINEPHRINE BITARTRATE/D5W 8 MG/250ML
0.05 PLASTIC BAG, INJECTION (ML) INTRAVENOUS
Qty: 8 | Refills: 0 | Status: DISCONTINUED | OUTPATIENT
Start: 2018-12-06 | End: 2018-12-10

## 2018-12-06 RX ORDER — ACETAZOLAMIDE 250 MG/1
500 TABLET ORAL ONCE
Qty: 0 | Refills: 0 | Status: COMPLETED | OUTPATIENT
Start: 2018-12-06 | End: 2018-12-06

## 2018-12-06 RX ORDER — POTASSIUM CHLORIDE 20 MEQ
10 PACKET (EA) ORAL
Qty: 0 | Refills: 0 | Status: DISCONTINUED | OUTPATIENT
Start: 2018-12-06 | End: 2018-12-09

## 2018-12-06 RX ORDER — CLOPIDOGREL BISULFATE 75 MG/1
75 TABLET, FILM COATED ORAL ONCE
Qty: 0 | Refills: 0 | Status: COMPLETED | OUTPATIENT
Start: 2018-12-06 | End: 2018-12-06

## 2018-12-06 RX ORDER — FENTANYL CITRATE 50 UG/ML
50 INJECTION INTRAVENOUS
Qty: 0 | Refills: 0 | Status: DISCONTINUED | OUTPATIENT
Start: 2018-12-06 | End: 2018-12-10

## 2018-12-06 RX ORDER — INSULIN HUMAN 100 [IU]/ML
2 INJECTION, SOLUTION SUBCUTANEOUS
Qty: 250 | Refills: 0 | Status: DISCONTINUED | OUTPATIENT
Start: 2018-12-06 | End: 2018-12-10

## 2018-12-06 RX ORDER — POTASSIUM CHLORIDE 20 MEQ
20 PACKET (EA) ORAL ONCE
Qty: 0 | Refills: 0 | Status: COMPLETED | OUTPATIENT
Start: 2018-12-06 | End: 2018-12-06

## 2018-12-06 RX ORDER — CHLORHEXIDINE GLUCONATE 213 G/1000ML
5 SOLUTION TOPICAL EVERY 4 HOURS
Qty: 0 | Refills: 0 | Status: DISCONTINUED | OUTPATIENT
Start: 2018-12-06 | End: 2018-12-10

## 2018-12-06 RX ORDER — MILRINONE LACTATE 1 MG/ML
0.25 INJECTION, SOLUTION INTRAVENOUS
Qty: 20 | Refills: 0 | Status: DISCONTINUED | OUTPATIENT
Start: 2018-12-06 | End: 2018-12-10

## 2018-12-06 RX ORDER — POTASSIUM CHLORIDE 20 MEQ
10 PACKET (EA) ORAL
Qty: 0 | Refills: 0 | Status: COMPLETED | OUTPATIENT
Start: 2018-12-06 | End: 2018-12-06

## 2018-12-06 RX ORDER — LACTOBACILLUS ACIDOPHILUS 100MM CELL
1 CAPSULE ORAL EVERY 8 HOURS
Qty: 0 | Refills: 0 | Status: DISCONTINUED | OUTPATIENT
Start: 2018-12-06 | End: 2018-12-10

## 2018-12-06 RX ADMIN — Medication 40 MILLIEQUIVALENT(S): at 05:47

## 2018-12-06 RX ADMIN — ATORVASTATIN CALCIUM 80 MILLIGRAM(S): 80 TABLET, FILM COATED ORAL at 21:46

## 2018-12-06 RX ADMIN — Medication 1000 MILLIGRAM(S): at 19:00

## 2018-12-06 RX ADMIN — Medication 100 MILLIEQUIVALENT(S): at 01:45

## 2018-12-06 RX ADMIN — Medication 1 TABLET(S): at 21:46

## 2018-12-06 RX ADMIN — CHLORHEXIDINE GLUCONATE 5 MILLILITER(S): 213 SOLUTION TOPICAL at 08:16

## 2018-12-06 RX ADMIN — FENTANYL CITRATE 50 MICROGRAM(S): 50 INJECTION INTRAVENOUS at 12:44

## 2018-12-06 RX ADMIN — PIPERACILLIN AND TAZOBACTAM 25 GRAM(S): 4; .5 INJECTION, POWDER, LYOPHILIZED, FOR SOLUTION INTRAVENOUS at 21:47

## 2018-12-06 RX ADMIN — Medication 100 MILLIEQUIVALENT(S): at 19:00

## 2018-12-06 RX ADMIN — Medication 1 APPLICATION(S): at 18:06

## 2018-12-06 RX ADMIN — Medication 250 MILLIGRAM(S): at 04:47

## 2018-12-06 RX ADMIN — FENTANYL CITRATE 50 MICROGRAM(S): 50 INJECTION INTRAVENOUS at 21:55

## 2018-12-06 RX ADMIN — Medication 1 TABLET(S): at 05:48

## 2018-12-06 RX ADMIN — FENTANYL CITRATE 50 MICROGRAM(S): 50 INJECTION INTRAVENOUS at 08:15

## 2018-12-06 RX ADMIN — FENTANYL CITRATE 50 MICROGRAM(S): 50 INJECTION INTRAVENOUS at 04:15

## 2018-12-06 RX ADMIN — PANTOPRAZOLE SODIUM 40 MILLIGRAM(S): 20 TABLET, DELAYED RELEASE ORAL at 18:07

## 2018-12-06 RX ADMIN — FENTANYL CITRATE 50 MICROGRAM(S): 50 INJECTION INTRAVENOUS at 04:30

## 2018-12-06 RX ADMIN — PIPERACILLIN AND TAZOBACTAM 25 GRAM(S): 4; .5 INJECTION, POWDER, LYOPHILIZED, FOR SOLUTION INTRAVENOUS at 05:48

## 2018-12-06 RX ADMIN — CHLORHEXIDINE GLUCONATE 5 MILLILITER(S): 213 SOLUTION TOPICAL at 12:55

## 2018-12-06 RX ADMIN — CHLORHEXIDINE GLUCONATE 5 MILLILITER(S): 213 SOLUTION TOPICAL at 18:07

## 2018-12-06 RX ADMIN — Medication 50 GRAM(S): at 21:38

## 2018-12-06 RX ADMIN — PROPOFOL 16.33 MICROGRAM(S)/KG/MIN: 10 INJECTION, EMULSION INTRAVENOUS at 23:15

## 2018-12-06 RX ADMIN — FENTANYL CITRATE 50 MICROGRAM(S): 50 INJECTION INTRAVENOUS at 21:38

## 2018-12-06 RX ADMIN — Medication 100 MILLIEQUIVALENT(S): at 03:14

## 2018-12-06 RX ADMIN — Medication 40 MILLIEQUIVALENT(S): at 21:38

## 2018-12-06 RX ADMIN — CHLORHEXIDINE GLUCONATE 5 MILLILITER(S): 213 SOLUTION TOPICAL at 21:46

## 2018-12-06 RX ADMIN — ENOXAPARIN SODIUM 40 MILLIGRAM(S): 100 INJECTION SUBCUTANEOUS at 12:50

## 2018-12-06 RX ADMIN — Medication 325 MILLIGRAM(S): at 12:50

## 2018-12-06 RX ADMIN — Medication 1 APPLICATION(S): at 12:55

## 2018-12-06 RX ADMIN — Medication 100 MILLIEQUIVALENT(S): at 01:00

## 2018-12-06 RX ADMIN — CHLORHEXIDINE GLUCONATE 5 MILLILITER(S): 213 SOLUTION TOPICAL at 05:48

## 2018-12-06 RX ADMIN — PANTOPRAZOLE SODIUM 40 MILLIGRAM(S): 20 TABLET, DELAYED RELEASE ORAL at 05:48

## 2018-12-06 RX ADMIN — FENTANYL CITRATE 50 MICROGRAM(S): 50 INJECTION INTRAVENOUS at 12:30

## 2018-12-06 RX ADMIN — FENTANYL CITRATE 50 MICROGRAM(S): 50 INJECTION INTRAVENOUS at 02:00

## 2018-12-06 RX ADMIN — PIPERACILLIN AND TAZOBACTAM 25 GRAM(S): 4; .5 INJECTION, POWDER, LYOPHILIZED, FOR SOLUTION INTRAVENOUS at 12:55

## 2018-12-06 RX ADMIN — Medication 325 MILLIGRAM(S): at 21:46

## 2018-12-06 RX ADMIN — Medication 1 APPLICATION(S): at 05:48

## 2018-12-06 RX ADMIN — Medication 1 DROP(S): at 12:55

## 2018-12-06 RX ADMIN — FENTANYL CITRATE 50 MICROGRAM(S): 50 INJECTION INTRAVENOUS at 01:45

## 2018-12-06 RX ADMIN — CHLORHEXIDINE GLUCONATE 5 MILLILITER(S): 213 SOLUTION TOPICAL at 01:36

## 2018-12-06 RX ADMIN — ACETAZOLAMIDE 110 MILLIGRAM(S): 250 TABLET ORAL at 08:16

## 2018-12-06 RX ADMIN — Medication 1 APPLICATION(S): at 21:46

## 2018-12-06 NOTE — PROGRESS NOTE ADULT - SUBJECTIVE AND OBJECTIVE BOX
46 yo Male c/o CP for 2-3 days prior to admission, then 10/10 chest pain approximately 9:30 am yesterday, followed by vomiting.  Wife drove pt to hospital, ruled in for STEMI. Urgent Cath, pt found to have multiple occlusions; stent to LAD and D2; stent to LAD thrombosed,  impella placed for support.  Pt then had VF arrest, shock x 1, return to NSR.  Upon transfer to OR for Urgent CABG, pt with asystolic arrest, chest opened intra-op with CPR in progress. Pt underwent C4V (LAD, Diag, OM and PDA) with inability to wean from CPB therefore requiring VA ECMO (central cannulatin) and IABP. Patient is s/p removal of Right femoral arterial sheath repair of arteriotomy, angiogram and placement of right external iliac stent for dissection    Intubated sedated.   remain On levophed , amiodarone and milrinone gtt.  RLE: Groin with dressing c/d/i, no hematoma or swelling appreciated, compartments are soft, +2 DP pulses.     H/H stable     A/P 47Ym with STEMI s/p removal of Right femoral arterial sheath repair of arteriotomy, angiogram and placement of right external iliac stent for dissection    - would leave dressing for 4 days, relayed that to the nurse at bedside.  - continue with ASA and plavix.   - Rest of care per primary team.

## 2018-12-06 NOTE — PROGRESS NOTE ADULT - ASSESSMENT
46 yo Male c/o CP for 2-3 days prior to admission, then 10/10 chest pain approximately 9:30 am yesterday, followed by vomiting.  Wife drove pt to hospital, ruled in for STEMI. Urgent Cath, pt found to have multiple occlusions; stent to LAD and D2; stent to LAD thrombosed,  impella placed for support.  Pt then had VF arrest, shock x 1, return to NSR.  Upon transfer to OR for Urgent CABG, pt with asystolic arrest, chest opened intra-op with CPR in progress. Pt underwent C4V (LAD, Diag, OM and PDA) with inability to wean from CPB therefore requiring VA ECMO (central cannulatin) and IABP. , he was transferred to CICU on multiple gtts, now s/p explant 12/3 with placement of impella via R groin sheath;    12/4 ID consulted started on broad spectrum abx, TF started  12/5 Impella weaned, echo unchanged with plan for removal of impella 12/6

## 2018-12-06 NOTE — BRIEF OPERATIVE NOTE - PROCEDURE
<<-----Click on this checkbox to enter Procedure Exploration of femoral artery  12/06/2018  Right side with removal of sheath , repair of enterotomy, angiogram and stent placement of right external iliac artery  Active  JRIAD

## 2018-12-06 NOTE — PROGRESS NOTE ADULT - PROBLEM SELECTOR PLAN 1
s/p CABG  PRN fentanyl for pain/sedation as well as low dose diprivan  passive ROM of upper extremities and LLE by RNs (RLE with impella)  cont primacor and epi, slowly wean  off, levophed to maintain MAP 65-75,   cont glucerna tube feeds (goal 60), protonix BID for GI ppx  insulin gtt per protocol for tight glycemic control (no reported h/o DM)  lasix gtt for diuresis, maintain augustin for strict Is/Os, follow lytes closely and replete PRN  d/w Dr. Barrett in AM rounds

## 2018-12-06 NOTE — BRIEF OPERATIVE NOTE - OPERATION/FINDINGS
sheath removed and right femoral arteriotomy was repaired primarily, weak pulses and flow found , angiogram performed that showed dissection in right external iliac artery most likely from the previous emergent impalla placement. we placed a stent and final angiogram showed adequate flow. sheath removed and right femoral arteriotomy was repaired primarily, weak pulses and flow found , angiogram performed that showed dissection in right external iliac artery. we placed a stent and final angiogram showed adequate flow.

## 2018-12-06 NOTE — PROGRESS NOTE ADULT - ASSESSMENT
A/P: 47 year old man s/p CABG x4 off of ECMO and IABP currently scheduled for Impala removal today.    -OR with Vascular Surgery for Impala removal  -Groin incision checks  -will continue to monitor postoperatively  -rest of care per primary team

## 2018-12-06 NOTE — PROGRESS NOTE ADULT - SUBJECTIVE AND OBJECTIVE BOX
Subjective: Pt resting in bed comfortably. No acute events overnight. Pt denies any headache syncope, chest pain, palpitations, SOB, difficulty breathing, nausea, vomiting, fevers, chills, abdominal discomfort, urinary retention.     T(C): 38.3 (12-06-18 @ 01:00), Max: 38.8 (12-05-18 @ 20:00)  HR: 103 (12-06-18 @ 01:15) (101 - 114)  BP: --  ABP: 115/58 (12-06-18 @ 01:15) (79/49 - 121/69)  ABP(mean): 74 (12-06-18 @ 01:15) (56 - 82)  RR: 8 (12-06-18 @ 01:15) (8 - 35)  SpO2: 96% (12-06-18 @ 01:15) (90% - 100%)  Wt(kg): --  CVP(mm Hg): 11 (12-06-18 @ 01:15) (5 - 13)  CO: 7.5 (12-05-18 @ 23:00) (6.3 - 8)  CI: 3.6 (12-05-18 @ 23:00) (3 - 3.9)  PA: 32/5 (12-06-18 @ 01:15) (21/8 - 36/6)  Mode: AC/ CMV (Assist Control/ Continuous Mandatory Ventilation)  RR (machine): 10  TV (machine): 600  FiO2: 50  PEEP: 5  MAP: 13  PIP: 30      12-05    138  |  102  |  22.0<H>  ----------------------------<  166<H>  3.9   |  25.0  |  0.79    Ca    7.3<L>      05 Dec 2018 20:39  Phos  4.0     12-04  Mg     2.0     12-05    TPro  5.0<L>  /  Alb  2.6<L>  /  TBili  1.6  /  DBili  x   /  AST  262<H>  /  ALT  153<H>  /  AlkPhos  80  12-05                               9.0    13.0  )-----------( 84       ( 05 Dec 2018 20:39 )             25.8        PT/INR - ( 05 Dec 2018 21:33 )   PT: 15.1 sec;   INR: 1.30 ratio         PTT - ( 05 Dec 2018 21:33 )  PTT:44.5 sec  ABG - ( 06 Dec 2018 00:05 )  pH, Arterial: 7.54  pH, Blood: x     /  pCO2: 34    /  pO2: 84    / HCO3: 30    / Base Excess: 5.8   /  SaO2: 98                                   CAPILLARY BLOOD GLUCOSE      POCT Blood Glucose.: 162 mg/dL (06 Dec 2018 01:11)  POCT Blood Glucose.: 185 mg/dL (05 Dec 2018 23:05)  POCT Blood Glucose.: 162 mg/dL (05 Dec 2018 22:06)  POCT Blood Glucose.: 155 mg/dL (05 Dec 2018 11:24)  POCT Blood Glucose.: 188 mg/dL (05 Dec 2018 08:04)  POCT Blood Glucose.: 142 mg/dL (05 Dec 2018 02:10)       CXR: pending imaging and review by radiology in AM     I&O's Detail    04 Dec 2018 07:01  -  05 Dec 2018 07:00  --------------------------------------------------------  IN:    amiodarone Infusion: 547.1 mL    dextrose 5%: 300 mL    DOBUTamine Infusion: 61.8 mL    DOBUTamine Infusion: 54.4 mL    EPINEPHrine Infusion: 220 mL    furosemide Infusion: 57.5 mL    Glucerna 1.5: 20 mL    Glucerna 1.5: 390 mL    insulin Infusion: 115 mL    milrinone  Infusion: 177 mL    milrinone  Infusion: 95.2 mL    norepinephrine Infusion: 95 mL    ns in tub fed  [id number]: 20 mL    Packed Red Blood Cells: 612 mL    propofol Infusion: 51 mL    sodium chloride 0.9%.: 240 mL    sodium chloride 0.9%.: 120 mL    Solution: 500 mL    Solution: 100 mL    Solution: 200 mL    Solution: 100 mL    vasopressin Infusion: 30 mL  Total IN: 4106 mL    OUT:    Chest Tube: 180 mL    Chest Tube: 330 mL    Chest Tube: 150 mL    Indwelling Catheter - Urethral: 3900 mL  Total OUT: 4560 mL    Total NET: -454 mL      05 Dec 2018 07:01  -  06 Dec 2018 01:32  --------------------------------------------------------  IN:    Albumin 5%  - 250 mL: 250 mL    amiodarone Infusion: 317.3 mL    dextrose 5%: 342 mL    EPINEPHrine Infusion: 152 mL    furosemide Infusion: 47.5 mL    Glucerna 1.5: 1030 mL    insulin Infusion: 95 mL    milrinone  Infusion: 183.6 mL    norepinephrine Infusion: 82.5 mL    Oral Fluid: 120 mL    propofol Infusion: 228.6 mL    sodium chloride 0.9%.: 95 mL    sodium chloride 0.9%.: 190 mL    Solution: 300 mL    Solution: 500 mL    Solution: 350 mL    Solution: 100 mL  Total IN: 4383.5 mL    OUT:    Chest Tube: 10 mL    Chest Tube: 150 mL    Chest Tube: 20 mL    Chest Tube: 35 mL    Indwelling Catheter - Urethral: 2130 mL  Total OUT: 2345 mL    Total NET: 2038.5 mL        Drug Dosing Weight  Height (cm): 175.26 (29 Nov 2018 10:50)  Weight (kg): 90.7 (29 Nov 2018 10:50)  BMI (kg/m2): 29.5 (29 Nov 2018 10:50)  BSA (m2): 2.07 (29 Nov 2018 10:50)    MEDICATIONS  (STANDING):  amiodarone Infusion 0.5 mG/Min (16.667 mL/Hr) IV Continuous <Continuous>  aspirin 325 milliGRAM(s) Oral daily  atorvastatin 80 milliGRAM(s) Oral at bedtime  chlorhexidine 0.12% Liquid 5 milliLiter(s) Oral Mucosa every 4 hours  dextrose 5% 500 milliLiter(s) (10 mL/Hr) IV Continuous <Continuous>  dextrose 50% Injectable 50 milliLiter(s) IV Push every 15 minutes  enoxaparin Injectable 40 milliGRAM(s) SubCutaneous daily  EPINEPHrine    Infusion 0.029 MICROgram(s)/kG/Min (10 mL/Hr) IV Continuous <Continuous>  furosemide Infusion 2.5 mG/Hr (1.25 mL/Hr) IV Continuous <Continuous>  insulin Infusion 2 Unit(s)/Hr (2 mL/Hr) IV Continuous <Continuous>  lactobacillus acidophilus 1 Tablet(s) Oral every 8 hours  milrinone Infusion 0.375 MICROgram(s)/kG/Min (10.204 mL/Hr) IV Continuous <Continuous>  norepinephrine Infusion 0.05 MICROgram(s)/kG/Min (8.503 mL/Hr) IV Continuous <Continuous>  pantoprazole  Injectable 40 milliGRAM(s) IV Push every 12 hours  petrolatum Ophthalmic Ointment 1 Application(s) Both EYES every 8 hours  piperacillin/tazobactam IVPB. 3.375 Gram(s) IV Intermittent every 8 hours  potassium chloride  10 mEq/50 mL IVPB 10 milliEquivalent(s) IV Intermittent every 1 hour  potassium chloride  10 mEq/50 mL IVPB 10 milliEquivalent(s) IV Intermittent every 1 hour  potassium chloride  10 mEq/50 mL IVPB 10 milliEquivalent(s) IV Intermittent every 1 hour  potassium chloride  10 mEq/50 mL IVPB 10 milliEquivalent(s) IV Intermittent every 1 hour  propofol Infusion 30 MICROgram(s)/kG/Min (16.326 mL/Hr) IV Continuous <Continuous>  sodium chloride 0.9%. 1000 milliLiter(s) (10 mL/Hr) IV Continuous <Continuous>  sodium chloride 0.9%. 1000 milliLiter(s) (5 mL/Hr) IV Continuous <Continuous>  vancomycin  IVPB 1000 milliGRAM(s) IV Intermittent <User Schedule>  vasopressin Infusion 0.033 Unit(s)/Min (2 mL/Hr) IV Continuous <Continuous>    MEDICATIONS  (PRN):  artificial  tears Solution 1 Drop(s) Both EYES four times a day PRN Dry Eyes  fentaNYL    Injectable 50 MICROGram(s) IV Push every 2 hours PRN Moderate Pain (4 - 6)  petrolatum white Ointment 1 Application(s) Topical four times a day PRN dry lips      Physical Exam  Neuro: AAOx3 in NAD  Pulm: Coarse breath sounds b/l   CV: RRR, positive S1/S2  Abd: NT/ND, positive bowel sounds  Extremities: pulses 2+ b/l lower extremities , no pitting edema   Incision(s): sternal wound c/d/i , right groin impella site c.d.i Subjective: Pt sedated to RASS-2    T(C): 38.3 (12-06-18 @ 01:00), Max: 38.8 (12-05-18 @ 20:00)  HR: 103 (12-06-18 @ 01:15) (101 - 114)  BP: --  ABP: 115/58 (12-06-18 @ 01:15) (79/49 - 121/69)  ABP(mean): 74 (12-06-18 @ 01:15) (56 - 82)  RR: 8 (12-06-18 @ 01:15) (8 - 35)  SpO2: 96% (12-06-18 @ 01:15) (90% - 100%)  Wt(kg): --  CVP(mm Hg): 11 (12-06-18 @ 01:15) (5 - 13)  CO: 7.5 (12-05-18 @ 23:00) (6.3 - 8)  CI: 3.6 (12-05-18 @ 23:00) (3 - 3.9)  PA: 32/5 (12-06-18 @ 01:15) (21/8 - 36/6)  Mode: AC/ CMV (Assist Control/ Continuous Mandatory Ventilation)  RR (machine): 10  TV (machine): 600  FiO2: 50  PEEP: 5  MAP: 13  PIP: 30      12-05    138  |  102  |  22.0<H>  ----------------------------<  166<H>  3.9   |  25.0  |  0.79    Ca    7.3<L>      05 Dec 2018 20:39  Phos  4.0     12-04  Mg     2.0     12-05    TPro  5.0<L>  /  Alb  2.6<L>  /  TBili  1.6  /  DBili  x   /  AST  262<H>  /  ALT  153<H>  /  AlkPhos  80  12-05                               9.0    13.0  )-----------( 84       ( 05 Dec 2018 20:39 )             25.8        PT/INR - ( 05 Dec 2018 21:33 )   PT: 15.1 sec;   INR: 1.30 ratio         PTT - ( 05 Dec 2018 21:33 )  PTT:44.5 sec  ABG - ( 06 Dec 2018 00:05 )  pH, Arterial: 7.54  pH, Blood: x     /  pCO2: 34    /  pO2: 84    / HCO3: 30    / Base Excess: 5.8   /  SaO2: 98                                   CAPILLARY BLOOD GLUCOSE      POCT Blood Glucose.: 162 mg/dL (06 Dec 2018 01:11)  POCT Blood Glucose.: 185 mg/dL (05 Dec 2018 23:05)  POCT Blood Glucose.: 162 mg/dL (05 Dec 2018 22:06)  POCT Blood Glucose.: 155 mg/dL (05 Dec 2018 11:24)  POCT Blood Glucose.: 188 mg/dL (05 Dec 2018 08:04)  POCT Blood Glucose.: 142 mg/dL (05 Dec 2018 02:10)       CXR: pending imaging and review by radiology in AM     I&O's Detail    04 Dec 2018 07:01  -  05 Dec 2018 07:00  --------------------------------------------------------  IN:    amiodarone Infusion: 547.1 mL    dextrose 5%: 300 mL    DOBUTamine Infusion: 61.8 mL    DOBUTamine Infusion: 54.4 mL    EPINEPHrine Infusion: 220 mL    furosemide Infusion: 57.5 mL    Glucerna 1.5: 20 mL    Glucerna 1.5: 390 mL    insulin Infusion: 115 mL    milrinone  Infusion: 177 mL    milrinone  Infusion: 95.2 mL    norepinephrine Infusion: 95 mL    ns in tub fed  [id number]: 20 mL    Packed Red Blood Cells: 612 mL    propofol Infusion: 51 mL    sodium chloride 0.9%.: 240 mL    sodium chloride 0.9%.: 120 mL    Solution: 500 mL    Solution: 100 mL    Solution: 200 mL    Solution: 100 mL    vasopressin Infusion: 30 mL  Total IN: 4106 mL    OUT:    Chest Tube: 180 mL    Chest Tube: 330 mL    Chest Tube: 150 mL    Indwelling Catheter - Urethral: 3900 mL  Total OUT: 4560 mL    Total NET: -454 mL      05 Dec 2018 07:01  -  06 Dec 2018 01:32  --------------------------------------------------------  IN:    Albumin 5%  - 250 mL: 250 mL    amiodarone Infusion: 317.3 mL    dextrose 5%: 342 mL    EPINEPHrine Infusion: 152 mL    furosemide Infusion: 47.5 mL    Glucerna 1.5: 1030 mL    insulin Infusion: 95 mL    milrinone  Infusion: 183.6 mL    norepinephrine Infusion: 82.5 mL    Oral Fluid: 120 mL    propofol Infusion: 228.6 mL    sodium chloride 0.9%.: 95 mL    sodium chloride 0.9%.: 190 mL    Solution: 300 mL    Solution: 500 mL    Solution: 350 mL    Solution: 100 mL  Total IN: 4383.5 mL    OUT:    Chest Tube: 10 mL    Chest Tube: 150 mL    Chest Tube: 20 mL    Chest Tube: 35 mL    Indwelling Catheter - Urethral: 2130 mL  Total OUT: 2345 mL    Total NET: 2038.5 mL        Drug Dosing Weight  Height (cm): 175.26 (29 Nov 2018 10:50)  Weight (kg): 90.7 (29 Nov 2018 10:50)  BMI (kg/m2): 29.5 (29 Nov 2018 10:50)  BSA (m2): 2.07 (29 Nov 2018 10:50)    MEDICATIONS  (STANDING):  amiodarone Infusion 0.5 mG/Min (16.667 mL/Hr) IV Continuous <Continuous>  aspirin 325 milliGRAM(s) Oral daily  atorvastatin 80 milliGRAM(s) Oral at bedtime  chlorhexidine 0.12% Liquid 5 milliLiter(s) Oral Mucosa every 4 hours  dextrose 5% 500 milliLiter(s) (10 mL/Hr) IV Continuous <Continuous>  dextrose 50% Injectable 50 milliLiter(s) IV Push every 15 minutes  enoxaparin Injectable 40 milliGRAM(s) SubCutaneous daily  EPINEPHrine    Infusion 0.029 MICROgram(s)/kG/Min (10 mL/Hr) IV Continuous <Continuous>  furosemide Infusion 2.5 mG/Hr (1.25 mL/Hr) IV Continuous <Continuous>  insulin Infusion 2 Unit(s)/Hr (2 mL/Hr) IV Continuous <Continuous>  lactobacillus acidophilus 1 Tablet(s) Oral every 8 hours  milrinone Infusion 0.375 MICROgram(s)/kG/Min (10.204 mL/Hr) IV Continuous <Continuous>  norepinephrine Infusion 0.05 MICROgram(s)/kG/Min (8.503 mL/Hr) IV Continuous <Continuous>  pantoprazole  Injectable 40 milliGRAM(s) IV Push every 12 hours  petrolatum Ophthalmic Ointment 1 Application(s) Both EYES every 8 hours  piperacillin/tazobactam IVPB. 3.375 Gram(s) IV Intermittent every 8 hours  potassium chloride  10 mEq/50 mL IVPB 10 milliEquivalent(s) IV Intermittent every 1 hour  potassium chloride  10 mEq/50 mL IVPB 10 milliEquivalent(s) IV Intermittent every 1 hour  potassium chloride  10 mEq/50 mL IVPB 10 milliEquivalent(s) IV Intermittent every 1 hour  potassium chloride  10 mEq/50 mL IVPB 10 milliEquivalent(s) IV Intermittent every 1 hour  propofol Infusion 30 MICROgram(s)/kG/Min (16.326 mL/Hr) IV Continuous <Continuous>  sodium chloride 0.9%. 1000 milliLiter(s) (10 mL/Hr) IV Continuous <Continuous>  sodium chloride 0.9%. 1000 milliLiter(s) (5 mL/Hr) IV Continuous <Continuous>  vancomycin  IVPB 1000 milliGRAM(s) IV Intermittent <User Schedule>  vasopressin Infusion 0.033 Unit(s)/Min (2 mL/Hr) IV Continuous <Continuous>    MEDICATIONS  (PRN):  artificial  tears Solution 1 Drop(s) Both EYES four times a day PRN Dry Eyes  fentaNYL    Injectable 50 MICROGram(s) IV Push every 2 hours PRN Moderate Pain (4 - 6)  petrolatum white Ointment 1 Application(s) Topical four times a day PRN dry lips      Physical Exam  Neuro: AAOx3 in NAD  Pulm: Coarse breath sounds b/l   CV: RRR, positive S1/S2  Abd: NT/ND, positive bowel sounds  Extremities: pulses 2+ b/l lower extremities , no pitting edema   Incision(s): sternal wound c/d/i , right groin impella site c.d.i

## 2018-12-06 NOTE — PROGRESS NOTE ADULT - SUBJECTIVE AND OBJECTIVE BOX
Patient off of ECMO and IABP currently awaiting removal of Impala which is scheduled for today.    MEDICATIONS  (STANDING):  amiodarone Infusion 0.5 mG/Min (16.667 mL/Hr) IV Continuous <Continuous>  aspirin 325 milliGRAM(s) Oral daily  atorvastatin 80 milliGRAM(s) Oral at bedtime  chlorhexidine 0.12% Liquid 5 milliLiter(s) Oral Mucosa every 4 hours  dextrose 5% 500 milliLiter(s) (10 mL/Hr) IV Continuous <Continuous>  dextrose 50% Injectable 50 milliLiter(s) IV Push every 15 minutes  enoxaparin Injectable 40 milliGRAM(s) SubCutaneous daily  EPINEPHrine    Infusion 0.029 MICROgram(s)/kG/Min (10 mL/Hr) IV Continuous <Continuous>  furosemide Infusion 2.5 mG/Hr (1.25 mL/Hr) IV Continuous <Continuous>  insulin Infusion 2 Unit(s)/Hr (2 mL/Hr) IV Continuous <Continuous>  lactobacillus acidophilus 1 Tablet(s) Oral every 8 hours  midazolam Injectable 4 milliGRAM(s) IV Push once  milrinone Infusion 0.375 MICROgram(s)/kG/Min (10.204 mL/Hr) IV Continuous <Continuous>  norepinephrine Infusion 0.05 MICROgram(s)/kG/Min (8.503 mL/Hr) IV Continuous <Continuous>  pantoprazole  Injectable 40 milliGRAM(s) IV Push every 12 hours  petrolatum Ophthalmic Ointment 1 Application(s) Both EYES every 8 hours  piperacillin/tazobactam IVPB. 3.375 Gram(s) IV Intermittent every 8 hours  potassium chloride  10 mEq/50 mL IVPB 10 milliEquivalent(s) IV Intermittent every 1 hour  potassium chloride  10 mEq/50 mL IVPB 10 milliEquivalent(s) IV Intermittent every 1 hour  potassium chloride  10 mEq/50 mL IVPB 10 milliEquivalent(s) IV Intermittent every 1 hour  propofol Infusion 30 MICROgram(s)/kG/Min (16.326 mL/Hr) IV Continuous <Continuous>  sodium chloride 0.9%. 1000 milliLiter(s) (10 mL/Hr) IV Continuous <Continuous>  sodium chloride 0.9%. 1000 milliLiter(s) (5 mL/Hr) IV Continuous <Continuous>  vancomycin  IVPB 1000 milliGRAM(s) IV Intermittent <User Schedule>  vasopressin Infusion 0.033 Unit(s)/Min (2 mL/Hr) IV Continuous <Continuous>    MEDICATIONS  (PRN):  artificial  tears Solution 1 Drop(s) Both EYES four times a day PRN Dry Eyes  fentaNYL    Injectable 50 MICROGram(s) IV Push every 2 hours PRN Moderate Pain (4 - 6)  petrolatum white Ointment 1 Application(s) Topical four times a day PRN dry lips      Vital Signs Last 24 Hrs  T(C): 38 (06 Dec 2018 13:16), Max: 38.8 (05 Dec 2018 20:00)  T(F): 100.4 (06 Dec 2018 13:16), Max: 101.8 (05 Dec 2018 20:00)  HR: 96 (06 Dec 2018 13:16) (95 - 114)  BP: 99/51 (06 Dec 2018 13:16) (99/51 - 99/51)  BP(mean): --  RR: 24 (06 Dec 2018 13:16) (19 - 38)  SpO2: 100% (06 Dec 2018 12:45) (90% - 100%)    Physical Exam  Neuro: AAOx3 in NAD  Pulm: Coarse breath sounds b/l   CV: RRR, positive S1/S2  Abd: Soft, non-distended and non-tender  Extremities: pulses 2+ b/l lower extremities , no pitting edema   Incision(s): sternal wound c/d/i , right groin impella site c.d.i       I&O's Detail    05 Dec 2018 07:01  -  06 Dec 2018 07:00  --------------------------------------------------------  IN:    Albumin 5%  - 250 mL: 250 mL    amiodarone Infusion: 400.8 mL    dextrose 5%: 426.7 mL    EPINEPHrine Infusion: 192 mL    furosemide Infusion: 60 mL    Glucerna 1.5: 1030 mL    insulin Infusion: 120 mL    milrinone  Infusion: 244.8 mL    norepinephrine Infusion: 183.5 mL    Oral Fluid: 200 mL    propofol Infusion: 343 mL    sodium chloride 0.9%.: 240 mL    sodium chloride 0.9%.: 120 mL    Solution: 750 mL    Solution: 400 mL    Solution: 500 mL    Solution: 100 mL  Total IN: 5560.8 mL    OUT:    Chest Tube: 30 mL    Chest Tube: 50 mL    Chest Tube: 270 mL    Chest Tube: 10 mL    Indwelling Catheter - Urethral: 3705 mL  Total OUT: 4065 mL    Total NET: 1495.8 mL      06 Dec 2018 07:01  -  06 Dec 2018 14:35  --------------------------------------------------------  IN:    Albumin 5%  - 250 mL: 250 mL    amiodarone Infusion: 100.2 mL    EPINEPHrine Infusion: 48 mL    furosemide Infusion: 15 mL    insulin Infusion: 17 mL    milrinone  Infusion: 61.2 mL    norepinephrine Infusion: 69 mL    Packed Red Blood Cells: 350 mL    propofol Infusion: 60 mL    sodium chloride 0.9%.: 30 mL    sodium chloride 0.9%.: 60 mL  Total IN: 1060.4 mL    OUT:    Chest Tube: 40 mL    Chest Tube: 30 mL    Indwelling Catheter - Urethral: 975 mL  Total OUT: 1045 mL    Total NET: 15.4 mL          LABS:                        9.2    16.9  )-----------( 101      ( 06 Dec 2018 03:39 )             27.6     12-06    139  |  102  |  20.0  ----------------------------<  145<H>  4.1   |  25.0  |  0.79    Ca    7.5<L>      06 Dec 2018 03:39  Phos  4.0     12-04  Mg     2.0     12-06    TPro  5.3<L>  /  Alb  2.8<L>  /  TBili  1.7  /  DBili  x   /  AST  229<H>  /  ALT  201<H>  /  AlkPhos  168<H>  12-06    PT/INR - ( 05 Dec 2018 21:33 )   PT: 15.1 sec;   INR: 1.30 ratio         PTT - ( 05 Dec 2018 21:33 )  PTT:44.5 sec      RADIOLOGY & ADDITIONAL STUDIES:

## 2018-12-06 NOTE — PROGRESS NOTE ADULT - PROBLEM SELECTOR PLAN 4
improving  able to wean of VA ECMO 12/3  now on impella CP @ P8, possible decrease today, dobutamine slowly being weaned off, maintian epi and primacor at current rates to prevent end organ damage  levo +/_ for MAP 65-75

## 2018-12-06 NOTE — PROGRESS NOTE ADULT - PROBLEM SELECTOR PLAN 5
intubated initially for airway protection in the setting of STEMI and emergent need for surgery  currently on vent support due ot HD instability, minimal o2 requirements  emil can start CPAP trials with plan to extubate when hemodynamic issues improve

## 2018-12-07 LAB
ALBUMIN SERPL ELPH-MCNC: 2.7 G/DL — LOW (ref 3.3–5.2)
ALP SERPL-CCNC: 148 U/L — HIGH (ref 40–120)
ALT FLD-CCNC: 140 U/L — HIGH
ANION GAP SERPL CALC-SCNC: 13 MMOL/L — SIGNIFICANT CHANGE UP (ref 5–17)
APPEARANCE UR: CLEAR — SIGNIFICANT CHANGE UP
AST SERPL-CCNC: 91 U/L — HIGH
BILIRUB DIRECT SERPL-MCNC: 1 MG/DL — HIGH (ref 0–0.3)
BILIRUB INDIRECT FLD-MCNC: 0.8 MG/DL — SIGNIFICANT CHANGE UP (ref 0.2–1)
BILIRUB SERPL-MCNC: 1.8 MG/DL — SIGNIFICANT CHANGE UP (ref 0.4–2)
BILIRUB UR-MCNC: NEGATIVE — SIGNIFICANT CHANGE UP
BUN SERPL-MCNC: 21 MG/DL — HIGH (ref 8–20)
CALCIUM SERPL-MCNC: 7.5 MG/DL — LOW (ref 8.6–10.2)
CHLORIDE SERPL-SCNC: 105 MMOL/L — SIGNIFICANT CHANGE UP (ref 98–107)
CO2 SERPL-SCNC: 19 MMOL/L — LOW (ref 22–29)
COLOR SPEC: YELLOW — SIGNIFICANT CHANGE UP
CREAT SERPL-MCNC: 0.89 MG/DL — SIGNIFICANT CHANGE UP (ref 0.5–1.3)
DIFF PNL FLD: ABNORMAL
EPI CELLS # UR: SIGNIFICANT CHANGE UP
GAS PNL BLDA: SIGNIFICANT CHANGE UP
GAS PNL BLDA: SIGNIFICANT CHANGE UP
GLUCOSE BLDC GLUCOMTR-MCNC: 100 MG/DL — HIGH (ref 70–99)
GLUCOSE BLDC GLUCOMTR-MCNC: 127 MG/DL — HIGH (ref 70–99)
GLUCOSE BLDC GLUCOMTR-MCNC: 128 MG/DL — HIGH (ref 70–99)
GLUCOSE BLDC GLUCOMTR-MCNC: 130 MG/DL — HIGH (ref 70–99)
GLUCOSE BLDC GLUCOMTR-MCNC: 132 MG/DL — HIGH (ref 70–99)
GLUCOSE BLDC GLUCOMTR-MCNC: 141 MG/DL — HIGH (ref 70–99)
GLUCOSE BLDC GLUCOMTR-MCNC: 141 MG/DL — HIGH (ref 70–99)
GLUCOSE BLDC GLUCOMTR-MCNC: 147 MG/DL — HIGH (ref 70–99)
GLUCOSE BLDC GLUCOMTR-MCNC: 152 MG/DL — HIGH (ref 70–99)
GLUCOSE BLDC GLUCOMTR-MCNC: 157 MG/DL — HIGH (ref 70–99)
GLUCOSE BLDC GLUCOMTR-MCNC: 167 MG/DL — HIGH (ref 70–99)
GLUCOSE BLDC GLUCOMTR-MCNC: 168 MG/DL — HIGH (ref 70–99)
GLUCOSE BLDC GLUCOMTR-MCNC: 177 MG/DL — HIGH (ref 70–99)
GLUCOSE BLDC GLUCOMTR-MCNC: 195 MG/DL — HIGH (ref 70–99)
GLUCOSE SERPL-MCNC: 173 MG/DL — HIGH (ref 70–115)
GLUCOSE UR QL: NEGATIVE MG/DL — SIGNIFICANT CHANGE UP
HCT VFR BLD CALC: 29.2 % — LOW (ref 42–52)
HGB BLD-MCNC: 9.5 G/DL — LOW (ref 14–18)
KETONES UR-MCNC: NEGATIVE — SIGNIFICANT CHANGE UP
LEUKOCYTE ESTERASE UR-ACNC: NEGATIVE — SIGNIFICANT CHANGE UP
MAGNESIUM SERPL-MCNC: 2.3 MG/DL — SIGNIFICANT CHANGE UP (ref 1.6–2.6)
MCHC RBC-ENTMCNC: 28.5 PG — SIGNIFICANT CHANGE UP (ref 27–31)
MCHC RBC-ENTMCNC: 32.5 G/DL — SIGNIFICANT CHANGE UP (ref 32–36)
MCV RBC AUTO: 87.7 FL — SIGNIFICANT CHANGE UP (ref 80–94)
NITRITE UR-MCNC: NEGATIVE — SIGNIFICANT CHANGE UP
PH UR: 6 — SIGNIFICANT CHANGE UP (ref 5–8)
PLATELET # BLD AUTO: 118 K/UL — LOW (ref 150–400)
POTASSIUM SERPL-MCNC: 4.2 MMOL/L — SIGNIFICANT CHANGE UP (ref 3.5–5.3)
POTASSIUM SERPL-SCNC: 4.2 MMOL/L — SIGNIFICANT CHANGE UP (ref 3.5–5.3)
PROT SERPL-MCNC: 5.2 G/DL — LOW (ref 6.6–8.7)
PROT UR-MCNC: 15 MG/DL
RBC # BLD: 3.33 M/UL — LOW (ref 4.6–6.2)
RBC # FLD: 15.1 % — SIGNIFICANT CHANGE UP (ref 11–15.6)
RBC CASTS # UR COMP ASSIST: ABNORMAL /HPF (ref 0–4)
SODIUM SERPL-SCNC: 137 MMOL/L — SIGNIFICANT CHANGE UP (ref 135–145)
SP GR SPEC: 1.01 — SIGNIFICANT CHANGE UP (ref 1.01–1.02)
UROBILINOGEN FLD QL: 1 MG/DL
VANCOMYCIN TROUGH SERPL-MCNC: 8.9 UG/ML — LOW (ref 10–20)
WBC # BLD: 12.8 K/UL — HIGH (ref 4.8–10.8)
WBC # FLD AUTO: 12.8 K/UL — HIGH (ref 4.8–10.8)
WBC UR QL: NEGATIVE — SIGNIFICANT CHANGE UP

## 2018-12-07 PROCEDURE — 71045 X-RAY EXAM CHEST 1 VIEW: CPT | Mod: 26

## 2018-12-07 PROCEDURE — 99233 SBSQ HOSP IP/OBS HIGH 50: CPT

## 2018-12-07 PROCEDURE — 71045 X-RAY EXAM CHEST 1 VIEW: CPT | Mod: 26,77

## 2018-12-07 RX ORDER — FENTANYL CITRATE 50 UG/ML
100 INJECTION INTRAVENOUS ONCE
Qty: 0 | Refills: 0 | Status: DISCONTINUED | OUTPATIENT
Start: 2018-12-07 | End: 2018-12-07

## 2018-12-07 RX ORDER — MIDAZOLAM HYDROCHLORIDE 1 MG/ML
2 INJECTION, SOLUTION INTRAMUSCULAR; INTRAVENOUS ONCE
Qty: 0 | Refills: 0 | Status: DISCONTINUED | OUTPATIENT
Start: 2018-12-07 | End: 2018-12-07

## 2018-12-07 RX ORDER — VANCOMYCIN HCL 1 G
1000 VIAL (EA) INTRAVENOUS
Qty: 0 | Refills: 0 | Status: DISCONTINUED | OUTPATIENT
Start: 2018-12-07 | End: 2018-12-08

## 2018-12-07 RX ORDER — FUROSEMIDE 40 MG
20 TABLET ORAL EVERY 12 HOURS
Qty: 0 | Refills: 0 | Status: DISCONTINUED | OUTPATIENT
Start: 2018-12-07 | End: 2018-12-08

## 2018-12-07 RX ORDER — AMIODARONE HYDROCHLORIDE 400 MG/1
200 TABLET ORAL DAILY
Qty: 0 | Refills: 0 | Status: DISCONTINUED | OUTPATIENT
Start: 2018-12-08 | End: 2018-12-10

## 2018-12-07 RX ORDER — AMIODARONE HYDROCHLORIDE 400 MG/1
400 TABLET ORAL EVERY 8 HOURS
Qty: 0 | Refills: 0 | Status: COMPLETED | OUTPATIENT
Start: 2018-12-07 | End: 2018-12-08

## 2018-12-07 RX ORDER — POTASSIUM CHLORIDE 20 MEQ
40 PACKET (EA) ORAL ONCE
Qty: 0 | Refills: 0 | Status: COMPLETED | OUTPATIENT
Start: 2018-12-07 | End: 2018-12-07

## 2018-12-07 RX ORDER — FENTANYL CITRATE 50 UG/ML
50 INJECTION INTRAVENOUS ONCE
Qty: 0 | Refills: 0 | Status: DISCONTINUED | OUTPATIENT
Start: 2018-12-07 | End: 2018-12-07

## 2018-12-07 RX ADMIN — Medication 325 MILLIGRAM(S): at 18:06

## 2018-12-07 RX ADMIN — Medication 250 MILLIGRAM(S): at 18:07

## 2018-12-07 RX ADMIN — Medication 1 APPLICATION(S): at 06:11

## 2018-12-07 RX ADMIN — FENTANYL CITRATE 50 MICROGRAM(S): 50 INJECTION INTRAVENOUS at 07:13

## 2018-12-07 RX ADMIN — FENTANYL CITRATE 50 MICROGRAM(S): 50 INJECTION INTRAVENOUS at 04:14

## 2018-12-07 RX ADMIN — PIPERACILLIN AND TAZOBACTAM 25 GRAM(S): 4; .5 INJECTION, POWDER, LYOPHILIZED, FOR SOLUTION INTRAVENOUS at 13:09

## 2018-12-07 RX ADMIN — FENTANYL CITRATE 50 MICROGRAM(S): 50 INJECTION INTRAVENOUS at 07:15

## 2018-12-07 RX ADMIN — FENTANYL CITRATE 50 MICROGRAM(S): 50 INJECTION INTRAVENOUS at 09:24

## 2018-12-07 RX ADMIN — FENTANYL CITRATE 100 MICROGRAM(S): 50 INJECTION INTRAVENOUS at 20:10

## 2018-12-07 RX ADMIN — ATORVASTATIN CALCIUM 80 MILLIGRAM(S): 80 TABLET, FILM COATED ORAL at 22:20

## 2018-12-07 RX ADMIN — PIPERACILLIN AND TAZOBACTAM 25 GRAM(S): 4; .5 INJECTION, POWDER, LYOPHILIZED, FOR SOLUTION INTRAVENOUS at 22:20

## 2018-12-07 RX ADMIN — MIDAZOLAM HYDROCHLORIDE 2 MILLIGRAM(S): 1 INJECTION, SOLUTION INTRAMUSCULAR; INTRAVENOUS at 11:00

## 2018-12-07 RX ADMIN — CHLORHEXIDINE GLUCONATE 5 MILLILITER(S): 213 SOLUTION TOPICAL at 09:24

## 2018-12-07 RX ADMIN — Medication 1 TABLET(S): at 06:11

## 2018-12-07 RX ADMIN — AMIODARONE HYDROCHLORIDE 400 MILLIGRAM(S): 400 TABLET ORAL at 18:05

## 2018-12-07 RX ADMIN — PANTOPRAZOLE SODIUM 40 MILLIGRAM(S): 20 TABLET, DELAYED RELEASE ORAL at 18:05

## 2018-12-07 RX ADMIN — CHLORHEXIDINE GLUCONATE 5 MILLILITER(S): 213 SOLUTION TOPICAL at 22:20

## 2018-12-07 RX ADMIN — PIPERACILLIN AND TAZOBACTAM 25 GRAM(S): 4; .5 INJECTION, POWDER, LYOPHILIZED, FOR SOLUTION INTRAVENOUS at 07:14

## 2018-12-07 RX ADMIN — CHLORHEXIDINE GLUCONATE 5 MILLILITER(S): 213 SOLUTION TOPICAL at 06:11

## 2018-12-07 RX ADMIN — Medication 250 MILLIGRAM(S): at 06:10

## 2018-12-07 RX ADMIN — Medication 250 MILLIGRAM(S): at 22:20

## 2018-12-07 RX ADMIN — FENTANYL CITRATE 100 MICROGRAM(S): 50 INJECTION INTRAVENOUS at 19:55

## 2018-12-07 RX ADMIN — FENTANYL CITRATE 50 MICROGRAM(S): 50 INJECTION INTRAVENOUS at 04:30

## 2018-12-07 RX ADMIN — Medication 1 TABLET(S): at 14:01

## 2018-12-07 RX ADMIN — FENTANYL CITRATE 100 MICROGRAM(S): 50 INJECTION INTRAVENOUS at 23:30

## 2018-12-07 RX ADMIN — FENTANYL CITRATE 50 MICROGRAM(S): 50 INJECTION INTRAVENOUS at 00:45

## 2018-12-07 RX ADMIN — PANTOPRAZOLE SODIUM 40 MILLIGRAM(S): 20 TABLET, DELAYED RELEASE ORAL at 06:11

## 2018-12-07 RX ADMIN — ENOXAPARIN SODIUM 40 MILLIGRAM(S): 100 INJECTION SUBCUTANEOUS at 12:01

## 2018-12-07 RX ADMIN — Medication 40 MILLIEQUIVALENT(S): at 06:14

## 2018-12-07 RX ADMIN — Medication 1 APPLICATION(S): at 16:02

## 2018-12-07 RX ADMIN — Medication 250 MILLIGRAM(S): at 00:32

## 2018-12-07 RX ADMIN — Medication 20 MILLIGRAM(S): at 14:05

## 2018-12-07 RX ADMIN — CHLORHEXIDINE GLUCONATE 5 MILLILITER(S): 213 SOLUTION TOPICAL at 04:14

## 2018-12-07 RX ADMIN — FENTANYL CITRATE 50 MICROGRAM(S): 50 INJECTION INTRAVENOUS at 05:10

## 2018-12-07 RX ADMIN — CLOPIDOGREL BISULFATE 75 MILLIGRAM(S): 75 TABLET, FILM COATED ORAL at 00:32

## 2018-12-07 RX ADMIN — CLOPIDOGREL BISULFATE 75 MILLIGRAM(S): 75 TABLET, FILM COATED ORAL at 18:06

## 2018-12-07 RX ADMIN — FENTANYL CITRATE 50 MICROGRAM(S): 50 INJECTION INTRAVENOUS at 05:25

## 2018-12-07 RX ADMIN — FENTANYL CITRATE 100 MICROGRAM(S): 50 INJECTION INTRAVENOUS at 23:45

## 2018-12-07 RX ADMIN — CHLORHEXIDINE GLUCONATE 5 MILLILITER(S): 213 SOLUTION TOPICAL at 18:06

## 2018-12-07 RX ADMIN — Medication 1 TABLET(S): at 22:20

## 2018-12-07 RX ADMIN — FENTANYL CITRATE 50 MICROGRAM(S): 50 INJECTION INTRAVENOUS at 00:30

## 2018-12-07 NOTE — CHART NOTE - NSCHARTNOTEFT_GEN_A_CORE
Source: Patient [ ]  Family [ ]   other [x ]    Enteral /Parenteral Nutrition: Diet, NPO with Tube Feed:   Tube Feeding Modality: Orogastric  Glucerna 1.5 Danielito  Total Volume for 24 Hours (mL): 1320  Continuous  Starting Tube Feed Rate {mL per Hour}: 10  Increase Tube Feed Rate by (mL): 10     Every 3 hours  Until Goal Tube Feed Rate (mL per Hour): 55  Tube Feed Duration (in Hours): 24  Tube Feed Start Time: 14:00 (12-06-18 @ 17:16)    Current Weight:   12/6: 90kg  11/29: 90kg     % Weight Change N/A    Pertinent Medications: MEDICATIONS  (STANDING):  amiodarone Infusion 0.5 mG/Min (16.667 mL/Hr) IV Continuous <Continuous>  aspirin 325 milliGRAM(s) Oral daily  atorvastatin 80 milliGRAM(s) Oral at bedtime  chlorhexidine 0.12% Liquid 5 milliLiter(s) Oral Mucosa every 4 hours  clopidogrel Tablet 75 milliGRAM(s) Oral daily  dextrose 5% 500 milliLiter(s) (10 mL/Hr) IV Continuous <Continuous>  dextrose 50% Injectable 50 milliLiter(s) IV Push every 15 minutes  enoxaparin Injectable 40 milliGRAM(s) SubCutaneous daily  EPINEPHrine    Infusion 0.029 MICROgram(s)/kG/Min (10 mL/Hr) IV Continuous <Continuous>  furosemide Infusion 2.5 mG/Hr (1.25 mL/Hr) IV Continuous <Continuous>  insulin Infusion 2 Unit(s)/Hr (2 mL/Hr) IV Continuous <Continuous>  lactobacillus acidophilus 1 Tablet(s) Oral every 8 hours  milrinone Infusion 0.375 MICROgram(s)/kG/Min (10.204 mL/Hr) IV Continuous <Continuous>  norepinephrine Infusion 0.05 MICROgram(s)/kG/Min (8.503 mL/Hr) IV Continuous <Continuous>  pantoprazole  Injectable 40 milliGRAM(s) IV Push every 12 hours  petrolatum Ophthalmic Ointment 1 Application(s) Both EYES every 8 hours  piperacillin/tazobactam IVPB. 3.375 Gram(s) IV Intermittent every 8 hours  potassium chloride  10 mEq/50 mL IVPB 10 milliEquivalent(s) IV Intermittent every 1 hour  potassium chloride  10 mEq/50 mL IVPB 10 milliEquivalent(s) IV Intermittent every 1 hour  potassium chloride  10 mEq/50 mL IVPB 10 milliEquivalent(s) IV Intermittent every 1 hour  propofol Infusion 30 MICROgram(s)/kG/Min (16.326 mL/Hr) IV Continuous <Continuous>  sodium chloride 0.9%. 1000 milliLiter(s) (5 mL/Hr) IV Continuous <Continuous>  sodium chloride 0.9%. 1000 milliLiter(s) (10 mL/Hr) IV Continuous <Continuous>  vancomycin  IVPB 1000 milliGRAM(s) IV Intermittent every 12 hours  vasopressin Infusion 0.033 Unit(s)/Min (2 mL/Hr) IV Continuous <Continuous>    MEDICATIONS  (PRN):  artificial  tears Solution 1 Drop(s) Both EYES four times a day PRN Dry Eyes  fentaNYL    Injectable 50 MICROGram(s) IV Push every 2 hours PRN Moderate Pain (4 - 6)  petrolatum white Ointment 1 Application(s) Topical four times a day PRN dry lips    Pertinent Labs: CBC Full  -  ( 07 Dec 2018 02:56 )  WBC Count : 12.8 K/uL  Hemoglobin : 9.5 g/dL  Hematocrit : 29.2 %  Platelet Count - Automated : 118 K/uL  Mean Cell Volume : 87.7 fl  Mean Cell Hemoglobin : 28.5 pg  Mean Cell Hemoglobin Concentration : 32.5 g/dL  Auto Neutrophil # : x  Auto Lymphocyte # : x  Auto Monocyte # : x  Auto Eosinophil # : x  Auto Basophil # : x  Auto Neutrophil % : x  Auto Lymphocyte % : x  Auto Monocyte % : x  Auto Eosinophil % : x  Auto Basophil % : x        Skin: Surgical sites     Nutrition focused physical exam conducted - found signs of malnutrition [x ]absent [ ]present    Subcutaneous fat loss: [ ] Orbital fat pads region, [ ]Buccal fat region, [ ]Triceps region,  [ ]Ribs region    Muscle wasting: [ ]Temples region, [ ]Clavicle region, [ ]Shoulder region, [ ]Scapula region, [ ]Interosseous region,  [ ]thigh region, [ ]Calf region    Estimated Needs:   [x ] no change since previous assessment  [ ] recalculated:     Current Nutrition Diagnosis:  Pt remains at high nutrition risk secondary to increased nutrient needs related to increased physiologic demand to promote healing as evidenced by recent STEMI, recently on ECMO and Impella (now explanted off ECMO and removal of Impella), remains on vent support. Pt receiving enteral feeds @ 50ml/hr (goal of 60ml/hr) at goal will provide x 20 hours 1200ml/day 1800kcal, 100gm protein, meets estimated nutrition needs at this time. Recommendations below:     Recommendations:   1. Rx: MVI and Vit. C daily (500mg)   2. Check wt daily to monitor trends   3. monitor glucose levels, H/H  4. Continue with enteral regime at this time.     Monitoring and Evaluation:   [ ] PO intake [x ] Tolerance to diet prescription [X] Weights  [X] Follow up per protocol [X] Labs:

## 2018-12-07 NOTE — PROGRESS NOTE ADULT - ASSESSMENT
This is a 47y  Male with no significant PMH admitted on 11/29/18 for CP for 2-3 days prior to admission,   found with STEMI. Urgent Cath, pt found to have multiple occlusions; VF arrest, shock x 1, return to NSR.  Upon transfer to OR for Urgent CABG, pt again VF arrest, chest opened intra-op with CPR in progress.  s/p ECMO and Impella; 12/3 ECMO explanted and Impella placed via right groin sheath.    now developed fevers.    patient in hospital for > 48 hours, multiple interventions.   CONTINUE broaden coverage of antibiotics for empiric hospital acquired infections    blood cultures and sputum cx sent  urine legionella is NEGATIVE  - continue Vancomycin 1 gram Q8H  - continue Zosyn 3.375 gram Q8H; aware of prior unknown Penicillin allergy, intubated currently, will watch for side effects    - follow up all outstanding cultures   - trend temperature and WBC curve  - repeat cultures from blood and all sources if febrile.       IF CULTURES REMAIN NEGATIVE, LIKELY 5 DAY COURSE OF ANTIBIOTICS AND STOP/ OBSERVE THEREAFTER  will follow with you.

## 2018-12-07 NOTE — PROGRESS NOTE ADULT - ASSESSMENT
48 yo Male c/o CP for 2-3 days prior to admission, then 10/10 chest pain approximately 9:30 am yesterday, followed by vomiting.  Wife drove pt to hospital, ruled in for STEMI. Urgent Cath, pt found to have multiple occlusions; stent to LAD and D2; stent to LAD thrombosed,  impella placed for support.  Pt then had VF arrest, shock x 1, return to NSR.  Upon transfer to OR for Urgent CABG, pt with asystolic arrest, chest opened intra-op with CPR in progress. Pt underwent C4V (LAD, Diag, OM and PDA) with inability to wean from CPB therefore requiring VA ECMO (central cannulatin) and IABP. , he was transferred to CICU on multiple gtts, now s/p explant 12/3 with placement of impella via R groin sheath;    12/4 ID consulted started on broad spectrum abx, TF started  12/5 Impella weaned, echo unchanged   12/6 Impella removal via right femoral cutdown with primary repair of femoral artery and stent placed to external illiac artery for dissection, PRBC x1

## 2018-12-07 NOTE — PROGRESS NOTE ADULT - SUBJECTIVE AND OBJECTIVE BOX
Subjective: Pt resting in bed comfortably. No acute events overnight. Pt denies any headache syncope, chest pain, palpitations, SOB, difficulty breathing, nausea, vomiting, fevers, chills, abdominal discomfort, urinary retention.     T(C): 37.9 (12-07-18 @ 01:00), Max: 38.4 (12-07-18 @ 00:00)  HR: 91 (12-07-18 @ 01:15) (89 - 109)  BP: 99/51 (12-06-18 @ 13:16) (99/51 - 99/51)  ABP: 95/53 (12-07-18 @ 01:15) (84/50 - 148/70)  ABP(mean): 65 (12-07-18 @ 01:15) (59 - 90)  RR: 25 (12-07-18 @ 01:15) (16 - 38)  SpO2: 99% (12-07-18 @ 01:15) (95% - 100%)  Wt(kg): --  CVP(mm Hg): 34 (12-07-18 @ 01:15) (6 - 34)  CO: 5.7 (12-06-18 @ 22:00) (4.7 - 5.7)  CI: 2.8 (12-06-18 @ 22:00) (2.3 - 2.8)  PA: 31/17 (12-07-18 @ 01:15) (24/10 - 136/15)  Mode: AC/ CMV (Assist Control/ Continuous Mandatory Ventilation)  RR (machine): 12  TV (machine): 600  FiO2: 60  PEEP: 5  MAP: 12  PIP: 29      12-06    138  |  104  |  21.0<H>  ----------------------------<  134<H>  4.0   |  23.0  |  0.96    Ca    7.3<L>      06 Dec 2018 18:22  Mg     2.1     12-06    TPro  5.3<L>  /  Alb  2.8<L>  /  TBili  1.7  /  DBili  x   /  AST  229<H>  /  ALT  201<H>  /  AlkPhos  168<H>  12-06                               8.8    15.1  )-----------( 109      ( 06 Dec 2018 18:22 )             26.6        PT/INR - ( 05 Dec 2018 21:33 )   PT: 15.1 sec;   INR: 1.30 ratio         PTT - ( 05 Dec 2018 21:33 )  PTT:44.5 sec  ABG - ( 06 Dec 2018 18:12 )  pH, Arterial: 7.45  pH, Blood: x     /  pCO2: 36    /  pO2: 243   / HCO3: 25    / Base Excess: 1.0   /  SaO2: 100                                  CAPILLARY BLOOD GLUCOSE      POCT Blood Glucose.: 157 mg/dL (07 Dec 2018 00:07)  POCT Blood Glucose.: 140 mg/dL (06 Dec 2018 23:13)  POCT Blood Glucose.: 119 mg/dL (06 Dec 2018 22:09)  POCT Blood Glucose.: 112 mg/dL (06 Dec 2018 21:05)  POCT Blood Glucose.: 112 mg/dL (06 Dec 2018 20:15)  POCT Blood Glucose.: 133 mg/dL (06 Dec 2018 17:58)  POCT Blood Glucose.: 132 mg/dL (06 Dec 2018 12:41)  POCT Blood Glucose.: 125 mg/dL (06 Dec 2018 05:05)  POCT Blood Glucose.: 139 mg/dL (06 Dec 2018 03:20)  POCT Blood Glucose.: 142 mg/dL (06 Dec 2018 02:10)       CXR: Pending imaging and review by radiology.     I&O's Detail    05 Dec 2018 07:01  -  06 Dec 2018 07:00  --------------------------------------------------------  IN:    Albumin 5%  - 250 mL: 250 mL    amiodarone Infusion: 400.8 mL    dextrose 5%: 426.7 mL    EPINEPHrine Infusion: 192 mL    furosemide Infusion: 60 mL    Glucerna 1.5: 1030 mL    insulin Infusion: 120 mL    milrinone  Infusion: 244.8 mL    norepinephrine Infusion: 183.5 mL    Oral Fluid: 200 mL    propofol Infusion: 343 mL    sodium chloride 0.9%: 240 mL    sodium chloride 0.9%: 120 mL    Solution: 750 mL    Solution: 400 mL    Solution: 500 mL    Solution: 100 mL  Total IN: 5560.8 mL    OUT:    Chest Tube: 30 mL    Chest Tube: 50 mL    Chest Tube: 270 mL    Chest Tube: 10 mL    Indwelling Catheter - Urethral: 3705 mL  Total OUT: 4065 mL    Total NET: 1495.8 mL      06 Dec 2018 07:01  -  07 Dec 2018 01:27  --------------------------------------------------------  IN:    Albumin 5%  - 250 mL: 250 mL    amiodarone Infusion: 100.2 mL    amiodarone Infusion: 133.6 mL    EPINEPHrine Infusion: 48 mL    EPINEPHrine Infusion: 92 mL    furosemide Infusion: 22.5 mL    furosemide Infusion: 15 mL    insulin Infusion: 17 mL    insulin Infusion: 12 mL    milrinone  Infusion: 61.2 mL    milrinone  Infusion: 91.4 mL    norepinephrine Infusion: 69 mL    norepinephrine Infusion: 48.9 mL    Oral Fluid: 100 mL    Packed Red Blood Cells: 700 mL    propofol Infusion: 106.1 mL    propofol Infusion: 60 mL    sodium chloride 0.9%: 60 mL    sodium chloride 0.9%: 30 mL    sodium chloride 0.9%.: 40 mL    sodium chloride 0.9%.: 80 mL    Solution: 100 mL    Solution: 50 mL    Solution: 100 mL    Solution: 250 mL  Total IN: 2636.9 mL    OUT:    Chest Tube: 55 mL    Chest Tube: 120 mL    Indwelling Catheter - Urethral: 3300 mL  Total OUT: 3475 mL    Total NET: -838.1 mL        Drug Dosing Weight  Height (cm): 175.26 (29 Nov 2018 10:50)  Weight (kg): 90.7 (06 Dec 2018 13:28)  BMI (kg/m2): 29.5 (06 Dec 2018 13:28)  BSA (m2): 2.07 (06 Dec 2018 13:28)    MEDICATIONS  (STANDING):  amiodarone Infusion 0.5 mG/Min (16.667 mL/Hr) IV Continuous <Continuous>  aspirin 325 milliGRAM(s) Oral daily  atorvastatin 80 milliGRAM(s) Oral at bedtime  chlorhexidine 0.12% Liquid 5 milliLiter(s) Oral Mucosa every 4 hours  clopidogrel Tablet 75 milliGRAM(s) Oral daily  dextrose 5% 500 milliLiter(s) (10 mL/Hr) IV Continuous <Continuous>  dextrose 50% Injectable 50 milliLiter(s) IV Push every 15 minutes  enoxaparin Injectable 40 milliGRAM(s) SubCutaneous daily  EPINEPHrine    Infusion 0.029 MICROgram(s)/kG/Min (10 mL/Hr) IV Continuous <Continuous>  furosemide Infusion 2.5 mG/Hr (1.25 mL/Hr) IV Continuous <Continuous>  insulin Infusion 2 Unit(s)/Hr (2 mL/Hr) IV Continuous <Continuous>  lactobacillus acidophilus 1 Tablet(s) Oral every 8 hours  milrinone Infusion 0.375 MICROgram(s)/kG/Min (10.204 mL/Hr) IV Continuous <Continuous>  norepinephrine Infusion 0.05 MICROgram(s)/kG/Min (8.503 mL/Hr) IV Continuous <Continuous>  pantoprazole  Injectable 40 milliGRAM(s) IV Push every 12 hours  petrolatum Ophthalmic Ointment 1 Application(s) Both EYES every 8 hours  piperacillin/tazobactam IVPB. 3.375 Gram(s) IV Intermittent every 8 hours  potassium chloride  10 mEq/50 mL IVPB 10 milliEquivalent(s) IV Intermittent every 1 hour  potassium chloride  10 mEq/50 mL IVPB 10 milliEquivalent(s) IV Intermittent every 1 hour  potassium chloride  10 mEq/50 mL IVPB 10 milliEquivalent(s) IV Intermittent every 1 hour  propofol Infusion 30 MICROgram(s)/kG/Min (16.326 mL/Hr) IV Continuous <Continuous>  sodium chloride 0.9%. 1000 milliLiter(s) (5 mL/Hr) IV Continuous <Continuous>  sodium chloride 0.9%. 1000 milliLiter(s) (10 mL/Hr) IV Continuous <Continuous>  vancomycin  IVPB 1000 milliGRAM(s) IV Intermittent every 12 hours  vasopressin Infusion 0.033 Unit(s)/Min (2 mL/Hr) IV Continuous <Continuous>  vecuronium Injectable 10 milliGRAM(s) IV Push once    MEDICATIONS  (PRN):  artificial  tears Solution 1 Drop(s) Both EYES four times a day PRN Dry Eyes  fentaNYL    Injectable 50 MICROGram(s) IV Push every 2 hours PRN Moderate Pain (4 - 6)  petrolatum white Ointment 1 Application(s) Topical four times a day PRN dry lips      Physical Exam  Neuro: AAOx3 in NAD  Pulm: CTA b/l  CV: RRR, normal S1/S2  Abd: NT/ND, positive bowel sounds  Extremities: DP 2+, no pitting edema   Incision(s): sternal wound c/d/i Subjective: Pt sedated to RASS-2    T(C): 37.9 (12-07-18 @ 01:00), Max: 38.4 (12-07-18 @ 00:00)  HR: 91 (12-07-18 @ 01:15) (89 - 109)  BP: 99/51 (12-06-18 @ 13:16) (99/51 - 99/51)  ABP: 95/53 (12-07-18 @ 01:15) (84/50 - 148/70)  ABP(mean): 65 (12-07-18 @ 01:15) (59 - 90)  RR: 25 (12-07-18 @ 01:15) (16 - 38)  SpO2: 99% (12-07-18 @ 01:15) (95% - 100%)  Wt(kg): --  CVP(mm Hg): 34 (12-07-18 @ 01:15) (6 - 34)  CO: 5.7 (12-06-18 @ 22:00) (4.7 - 5.7)  CI: 2.8 (12-06-18 @ 22:00) (2.3 - 2.8)  PA: 31/17 (12-07-18 @ 01:15) (24/10 - 136/15)  Mode: AC/ CMV (Assist Control/ Continuous Mandatory Ventilation)  RR (machine): 12  TV (machine): 600  FiO2: 60  PEEP: 5  MAP: 12  PIP: 29      12-06    138  |  104  |  21.0<H>  ----------------------------<  134<H>  4.0   |  23.0  |  0.96    Ca    7.3<L>      06 Dec 2018 18:22  Mg     2.1     12-06    TPro  5.3<L>  /  Alb  2.8<L>  /  TBili  1.7  /  DBili  x   /  AST  229<H>  /  ALT  201<H>  /  AlkPhos  168<H>  12-06                               8.8    15.1  )-----------( 109      ( 06 Dec 2018 18:22 )             26.6        PT/INR - ( 05 Dec 2018 21:33 )   PT: 15.1 sec;   INR: 1.30 ratio         PTT - ( 05 Dec 2018 21:33 )  PTT:44.5 sec  ABG - ( 06 Dec 2018 18:12 )  pH, Arterial: 7.45  pH, Blood: x     /  pCO2: 36    /  pO2: 243   / HCO3: 25    / Base Excess: 1.0   /  SaO2: 100                                  CAPILLARY BLOOD GLUCOSE      POCT Blood Glucose.: 157 mg/dL (07 Dec 2018 00:07)  POCT Blood Glucose.: 140 mg/dL (06 Dec 2018 23:13)  POCT Blood Glucose.: 119 mg/dL (06 Dec 2018 22:09)  POCT Blood Glucose.: 112 mg/dL (06 Dec 2018 21:05)  POCT Blood Glucose.: 112 mg/dL (06 Dec 2018 20:15)  POCT Blood Glucose.: 133 mg/dL (06 Dec 2018 17:58)  POCT Blood Glucose.: 132 mg/dL (06 Dec 2018 12:41)  POCT Blood Glucose.: 125 mg/dL (06 Dec 2018 05:05)  POCT Blood Glucose.: 139 mg/dL (06 Dec 2018 03:20)  POCT Blood Glucose.: 142 mg/dL (06 Dec 2018 02:10)       CXR: Pending imaging and review by radiology.     I&O's Detail    05 Dec 2018 07:01  -  06 Dec 2018 07:00  --------------------------------------------------------  IN:    Albumin 5%  - 250 mL: 250 mL    amiodarone Infusion: 400.8 mL    dextrose 5%: 426.7 mL    EPINEPHrine Infusion: 192 mL    furosemide Infusion: 60 mL    Glucerna 1.5: 1030 mL    insulin Infusion: 120 mL    milrinone  Infusion: 244.8 mL    norepinephrine Infusion: 183.5 mL    Oral Fluid: 200 mL    propofol Infusion: 343 mL    sodium chloride 0.9%: 240 mL    sodium chloride 0.9%: 120 mL    Solution: 750 mL    Solution: 400 mL    Solution: 500 mL    Solution: 100 mL  Total IN: 5560.8 mL    OUT:    Chest Tube: 30 mL    Chest Tube: 50 mL    Chest Tube: 270 mL    Chest Tube: 10 mL    Indwelling Catheter - Urethral: 3705 mL  Total OUT: 4065 mL    Total NET: 1495.8 mL      06 Dec 2018 07:01  -  07 Dec 2018 01:27  --------------------------------------------------------  IN:    Albumin 5%  - 250 mL: 250 mL    amiodarone Infusion: 100.2 mL    amiodarone Infusion: 133.6 mL    EPINEPHrine Infusion: 48 mL    EPINEPHrine Infusion: 92 mL    furosemide Infusion: 22.5 mL    furosemide Infusion: 15 mL    insulin Infusion: 17 mL    insulin Infusion: 12 mL    milrinone  Infusion: 61.2 mL    milrinone  Infusion: 91.4 mL    norepinephrine Infusion: 69 mL    norepinephrine Infusion: 48.9 mL    Oral Fluid: 100 mL    Packed Red Blood Cells: 700 mL    propofol Infusion: 106.1 mL    propofol Infusion: 60 mL    sodium chloride 0.9%: 60 mL    sodium chloride 0.9%: 30 mL    sodium chloride 0.9%.: 40 mL    sodium chloride 0.9%.: 80 mL    Solution: 100 mL    Solution: 50 mL    Solution: 100 mL    Solution: 250 mL  Total IN: 2636.9 mL    OUT:    Chest Tube: 55 mL    Chest Tube: 120 mL    Indwelling Catheter - Urethral: 3300 mL  Total OUT: 3475 mL    Total NET: -838.1 mL        Drug Dosing Weight  Height (cm): 175.26 (29 Nov 2018 10:50)  Weight (kg): 90.7 (06 Dec 2018 13:28)  BMI (kg/m2): 29.5 (06 Dec 2018 13:28)  BSA (m2): 2.07 (06 Dec 2018 13:28)    MEDICATIONS  (STANDING):  amiodarone Infusion 0.5 mG/Min (16.667 mL/Hr) IV Continuous <Continuous>  aspirin 325 milliGRAM(s) Oral daily  atorvastatin 80 milliGRAM(s) Oral at bedtime  chlorhexidine 0.12% Liquid 5 milliLiter(s) Oral Mucosa every 4 hours  clopidogrel Tablet 75 milliGRAM(s) Oral daily  dextrose 5% 500 milliLiter(s) (10 mL/Hr) IV Continuous <Continuous>  dextrose 50% Injectable 50 milliLiter(s) IV Push every 15 minutes  enoxaparin Injectable 40 milliGRAM(s) SubCutaneous daily  EPINEPHrine    Infusion 0.029 MICROgram(s)/kG/Min (10 mL/Hr) IV Continuous <Continuous>  furosemide Infusion 2.5 mG/Hr (1.25 mL/Hr) IV Continuous <Continuous>  insulin Infusion 2 Unit(s)/Hr (2 mL/Hr) IV Continuous <Continuous>  lactobacillus acidophilus 1 Tablet(s) Oral every 8 hours  milrinone Infusion 0.375 MICROgram(s)/kG/Min (10.204 mL/Hr) IV Continuous <Continuous>  norepinephrine Infusion 0.05 MICROgram(s)/kG/Min (8.503 mL/Hr) IV Continuous <Continuous>  pantoprazole  Injectable 40 milliGRAM(s) IV Push every 12 hours  petrolatum Ophthalmic Ointment 1 Application(s) Both EYES every 8 hours  piperacillin/tazobactam IVPB. 3.375 Gram(s) IV Intermittent every 8 hours  potassium chloride  10 mEq/50 mL IVPB 10 milliEquivalent(s) IV Intermittent every 1 hour  potassium chloride  10 mEq/50 mL IVPB 10 milliEquivalent(s) IV Intermittent every 1 hour  potassium chloride  10 mEq/50 mL IVPB 10 milliEquivalent(s) IV Intermittent every 1 hour  propofol Infusion 30 MICROgram(s)/kG/Min (16.326 mL/Hr) IV Continuous <Continuous>  sodium chloride 0.9%. 1000 milliLiter(s) (5 mL/Hr) IV Continuous <Continuous>  sodium chloride 0.9%. 1000 milliLiter(s) (10 mL/Hr) IV Continuous <Continuous>  vancomycin  IVPB 1000 milliGRAM(s) IV Intermittent every 12 hours  vasopressin Infusion 0.033 Unit(s)/Min (2 mL/Hr) IV Continuous <Continuous>  vecuronium Injectable 10 milliGRAM(s) IV Push once    MEDICATIONS  (PRN):  artificial  tears Solution 1 Drop(s) Both EYES four times a day PRN Dry Eyes  fentaNYL    Injectable 50 MICROGram(s) IV Push every 2 hours PRN Moderate Pain (4 - 6)  petrolatum white Ointment 1 Application(s) Topical four times a day PRN dry lips      Physical Exam  Neuro: AAOx3 in NAD  Pulm: CTA b/l, + ET in place .  CV: RRR, normal S1/S2  Abd: NT/ND, positive bowel sounds, + OGT in place with TF running   Extremities: lower extremity pulses 2+ warm and well perfused,  1+edema   Incision(s): sternal wound c/d/i , right femoral cutdown site c/d/i

## 2018-12-07 NOTE — PROGRESS NOTE ADULT - PROBLEM SELECTOR PLAN 4
improving  able to wean of VA ECMO 12/3 Impella removed 12/6   epi and primacor at current rates to prevent end organ damage  levo +/_ for MAP 65-75

## 2018-12-07 NOTE — PROGRESS NOTE ADULT - SUBJECTIVE AND OBJECTIVE BOX
[facility]  INFECTIOUS DISEASES AND INTERNAL MEDICINE at [place]  =======================================================  Perfecto Santizo MD  Diplomates American Board of Internal Medicine and Infectious Diseases  =======================================================    [facility]-[id number]  PATRICK LYLE   follow up for:  post operative fevers  patient seen and examined.  still with fevers.     repeat cultures order  pt remains intubated.   s/p removal of Impella on 12/6/18  cultures remain negative to date    ===================================================  REVIEW OF SYSTEMS:  as above  all other ROS negative  =======================================================  Antibiotics:  piperacillin/tazobactam IVPB. 3.375 Gram(s) IV Intermittent every 8 hours  vancomycin  IVPB 1000 milliGRAM(s) IV Intermittent <User Schedule>    Other medications:  amiodarone    Tablet 400 milliGRAM(s) Oral every 8 hours  aspirin 325 milliGRAM(s) Oral daily  atorvastatin 80 milliGRAM(s) Oral at bedtime  chlorhexidine 0.12% Liquid 5 milliLiter(s) Oral Mucosa every 4 hours  clopidogrel Tablet 75 milliGRAM(s) Oral daily  dextrose 50% Injectable 50 milliLiter(s) IV Push every 15 minutes  enoxaparin Injectable 40 milliGRAM(s) SubCutaneous daily  EPINEPHrine    Infusion 0.026 MICROgram(s)/kG/Min IV Continuous <Continuous>  furosemide   Injectable 20 milliGRAM(s) IV Push every 12 hours  insulin Infusion 2 Unit(s)/Hr IV Continuous <Continuous>  lactobacillus acidophilus 1 Tablet(s) Oral every 8 hours  milrinone Infusion 0.25 MICROgram(s)/kG/Min IV Continuous <Continuous>  norepinephrine Infusion 0.05 MICROgram(s)/kG/Min IV Continuous <Continuous>  pantoprazole  Injectable 40 milliGRAM(s) IV Push every 12 hours  petrolatum Ophthalmic Ointment 1 Application(s) Both EYES every 8 hours  potassium chloride  10 mEq/50 mL IVPB 10 milliEquivalent(s) IV Intermittent every 1 hour  potassium chloride  10 mEq/50 mL IVPB 10 milliEquivalent(s) IV Intermittent every 1 hour  potassium chloride  10 mEq/50 mL IVPB 10 milliEquivalent(s) IV Intermittent every 1 hour  propofol Infusion 30 MICROgram(s)/kG/Min IV Continuous <Continuous>  sodium chloride 0.9%. 1000 milliLiter(s) IV Continuous <Continuous>  sodium chloride 0.9%. 1000 milliLiter(s) IV Continuous <Continuous>    =======================================================    Physical Exam:  T(F): 100.4 (07 Dec 2018 13:30), Max: 101.8 (05 Dec 2018 20:00)  HR: 86 (07 Dec 2018 13:30)  BP: 99/51 (06 Dec 2018 13:16)  RR: 35 (07 Dec 2018 13:30)  SpO2: 99% (07 Dec 2018 13:30) (89% - 100%)    General:  intubated, no distress  Eye: Pupils are equal, round and reactive to light, Extraocular movements are intact, Normal conjunctiva.  HENT: Normocephalic, Oral mucosa is moist, ET Tube in place  Neck: Supple, No lymphadenopathy.  RIGHT neck IJ line  Respiratory: Lungs with fair air entry  midline chest with dressing in place.   + CHEST tubes in place  Cardiovascular: Normal rate, Regular rhythm, No murmur, Good pulses equal in all extremities,  No edema.  Gastrointestinal: Soft, Non-tender, Non-distended, Normal bowel sounds.  Genitourinary: + Mckeon  Lymphatics: No lymphadenopathy neck,   Musculoskeletal: Normal range of motion,   Integumentary: No rash.  Neurologic: Alert, Cranial Nerves II-XII are grossly intact.    =======================================================  Labs:  ====  Labs:                        9.5    12.8  )-----------( 118      ( 07 Dec 2018 02:56 )             29.2     12-07    137  |  105  |  21.0<H>  ----------------------------<  173<H>  4.2   |  19.0<L>  |  0.89    Ca    7.5<L>      07 Dec 2018 02:56  Mg     2.3     12-07    TPro  5.2<L>  /  Alb  2.7<L>  /  TBili  1.8  /  DBili  1.0<H>  /  AST  91<H>  /  ALT  140<H>  /  AlkPhos  148<H>  12-07        Culture - Blood (collected 12-05-18 @ 10:21)  Source: .Blood    Culture - Blood (collected 12-05-18 @ 10:21)  Source: .Blood    Culture - Blood (collected 12-04-18 @ 10:22)  Source: .Blood    Culture - Blood (collected 12-04-18 @ 10:22)  Source: .Blood    Culture - Sputum (collected 12-04-18 @ 10:21)  Source: .Sputum  Gram Stain (12-04-18 @ 11:59):    Few White blood cells    No organisms seen  Final Report (12-06-18 @ 14:00):    Few Candida albicans    No Routine respiratory keara present

## 2018-12-07 NOTE — PROGRESS NOTE ADULT - SUBJECTIVE AND OBJECTIVE BOX
INTERVAL HPI/OVERNIGHT EVENTS:  No acute overnight events reported. Patient seen sitting up on bed, intubated. Right Groin incision was with dressing over, c/d/i. Dressing will be removed on 12/10.    MEDICATIONS  (STANDING):  amiodarone    Tablet 400 milliGRAM(s) Oral every 8 hours  aspirin 325 milliGRAM(s) Oral daily  atorvastatin 80 milliGRAM(s) Oral at bedtime  chlorhexidine 0.12% Liquid 5 milliLiter(s) Oral Mucosa every 4 hours  clopidogrel Tablet 75 milliGRAM(s) Oral daily  dextrose 50% Injectable 50 milliLiter(s) IV Push every 15 minutes  enoxaparin Injectable 40 milliGRAM(s) SubCutaneous daily  EPINEPHrine    Infusion 0.026 MICROgram(s)/kG/Min (9 mL/Hr) IV Continuous <Continuous>  furosemide   Injectable 20 milliGRAM(s) IV Push every 12 hours  insulin Infusion 2 Unit(s)/Hr (2 mL/Hr) IV Continuous <Continuous>  lactobacillus acidophilus 1 Tablet(s) Oral every 8 hours  milrinone Infusion 0.25 MICROgram(s)/kG/Min (6.803 mL/Hr) IV Continuous <Continuous>  norepinephrine Infusion 0.05 MICROgram(s)/kG/Min (8.503 mL/Hr) IV Continuous <Continuous>  pantoprazole  Injectable 40 milliGRAM(s) IV Push every 12 hours  petrolatum Ophthalmic Ointment 1 Application(s) Both EYES every 8 hours  piperacillin/tazobactam IVPB. 3.375 Gram(s) IV Intermittent every 8 hours  potassium chloride  10 mEq/50 mL IVPB 10 milliEquivalent(s) IV Intermittent every 1 hour  potassium chloride  10 mEq/50 mL IVPB 10 milliEquivalent(s) IV Intermittent every 1 hour  potassium chloride  10 mEq/50 mL IVPB 10 milliEquivalent(s) IV Intermittent every 1 hour  propofol Infusion 30 MICROgram(s)/kG/Min (16.326 mL/Hr) IV Continuous <Continuous>  sodium chloride 0.9%. 1000 milliLiter(s) (5 mL/Hr) IV Continuous <Continuous>  sodium chloride 0.9%. 1000 milliLiter(s) (10 mL/Hr) IV Continuous <Continuous>  vancomycin  IVPB 1000 milliGRAM(s) IV Intermittent <User Schedule>    MEDICATIONS  (PRN):  artificial  tears Solution 1 Drop(s) Both EYES four times a day PRN Dry Eyes  fentaNYL    Injectable 50 MICROGram(s) IV Push every 2 hours PRN Moderate Pain (4 - 6)  petrolatum white Ointment 1 Application(s) Topical four times a day PRN dry lips      Vital Signs Last 24 Hrs  T(C): 38 (07 Dec 2018 13:30), Max: 38.4 (07 Dec 2018 00:00)  T(F): 100.4 (07 Dec 2018 13:30), Max: 101.1 (07 Dec 2018 00:00)  HR: 86 (07 Dec 2018 13:30) (85 - 119)  BP: --  BP(mean): --  RR: 35 (07 Dec 2018 13:30) (16 - 69)  SpO2: 99% (07 Dec 2018 13:30) (89% - 100%)    Physical Exam  Neuro: AAOx3 in NAD  Pulm: CTA b/l, + ET in place .  CV: RRR, normal S1/S2  Abd: NT/ND, positive bowel sounds, + OGT in place with TF running   Extremities:  right femoral cutdown site c/d/i, lower extremity pulses 2+ warm and well perfused,  1+edema         I&O's Detail    06 Dec 2018 07:01  -  07 Dec 2018 07:00  --------------------------------------------------------  IN:    Albumin 5%  - 250 mL: 250 mL    amiodarone Infusion: 100.2 mL    amiodarone Infusion: 217.1 mL    EPINEPHrine Infusion: 48 mL    EPINEPHrine Infusion: 142 mL    furosemide Infusion: 35 mL    furosemide Infusion: 15 mL    Glucerna 1.5: 400 mL    insulin Infusion: 41 mL    insulin Infusion: 17 mL    milrinone  Infusion: 142.4 mL    milrinone  Infusion: 61.2 mL    norepinephrine Infusion: 69 mL    norepinephrine Infusion: 91.4 mL    Oral Fluid: 100 mL    Packed Red Blood Cells: 700 mL    propofol Infusion: 168.6 mL    propofol Infusion: 60 mL    sodium chloride 0.9%: 60 mL    sodium chloride 0.9%: 30 mL    sodium chloride 0.9%.: 65 mL    sodium chloride 0.9%.: 130 mL    Solution: 100 mL    Solution: 50 mL    Solution: 100 mL    Solution: 250 mL  Total IN: 3442.9 mL    OUT:    Chest Tube: 70 mL    Chest Tube: 160 mL    Indwelling Catheter - Urethral: 4025 mL  Total OUT: 4255 mL    Total NET: -812.1 mL      07 Dec 2018 07:01  -  07 Dec 2018 16:01  --------------------------------------------------------  IN:    amiodarone Infusion: 100.2 mL    EPINEPHrine Infusion: 59 mL    EPINEPHrine Infusion: 9 mL    furosemide Infusion: 15 mL    Glucerna 1.5: 300 mL    insulin Infusion: 22 mL    milrinone  Infusion: 57.8 mL    milrinone  Infusion: 6.8 mL    norepinephrine Infusion: 10 mL    propofol Infusion: 60.2 mL    sodium chloride 0.9%.: 30 mL    sodium chloride 0.9%.: 60 mL  Total IN: 730 mL    OUT:    Chest Tube: 15 mL    Chest Tube: 20 mL    Indwelling Catheter - Urethral: 600 mL  Total OUT: 635 mL    Total NET: 95 mL          LABS:                        9.5    12.8  )-----------( 118      ( 07 Dec 2018 02:56 )             29.2     12-07    137  |  105  |  21.0<H>  ----------------------------<  173<H>  4.2   |  19.0<L>  |  0.89    Ca    7.5<L>      07 Dec 2018 02:56  Mg     2.3     12-07    TPro  5.2<L>  /  Alb  2.7<L>  /  TBili  1.8  /  DBili  1.0<H>  /  AST  91<H>  /  ALT  140<H>  /  AlkPhos  148<H>  12-07    PT/INR - ( 05 Dec 2018 21:33 )   PT: 15.1 sec;   INR: 1.30 ratio         PTT - ( 05 Dec 2018 21:33 )  PTT:44.5 sec      RADIOLOGY & ADDITIONAL STUDIES:

## 2018-12-07 NOTE — PROGRESS NOTE ADULT - ASSESSMENT
A/P 47Ym with STEMI s/p removal of Right femoral arterial sheath repair of arteriotomy, angiogram and placement of right external iliac stent for dissection    - Right groin dressing can be removed on 12/10  - continue with ASA and plavix.   - Rest of care per primary team.

## 2018-12-07 NOTE — CHART NOTE - NSCHARTNOTEFT_GEN_A_CORE
initial rounding done received pt on : ac12/400/70%/+8 sat 99 initial rounding done received pt on :ac 10/600/60/+5 initial rounding done received pt on :ac 10/600/40/+5

## 2018-12-08 LAB
ALBUMIN SERPL ELPH-MCNC: 2.8 G/DL — LOW (ref 3.3–5.2)
ALP SERPL-CCNC: 227 U/L — HIGH (ref 40–120)
ALT FLD-CCNC: 103 U/L — HIGH
ANION GAP SERPL CALC-SCNC: 11 MMOL/L — SIGNIFICANT CHANGE UP (ref 5–17)
ANION GAP SERPL CALC-SCNC: 13 MMOL/L — SIGNIFICANT CHANGE UP (ref 5–17)
AST SERPL-CCNC: 57 U/L — HIGH
BASE EXCESS BLDV CALC-SCNC: -0.3 MMOL/L — SIGNIFICANT CHANGE UP (ref -2–2)
BILIRUB SERPL-MCNC: 2.1 MG/DL — HIGH (ref 0.4–2)
BUN SERPL-MCNC: 23 MG/DL — HIGH (ref 8–20)
BUN SERPL-MCNC: 26 MG/DL — HIGH (ref 8–20)
CA-I SERPL-SCNC: 1.04 MMOL/L — LOW (ref 1.15–1.33)
CALCIUM SERPL-MCNC: 7.7 MG/DL — LOW (ref 8.6–10.2)
CALCIUM SERPL-MCNC: 7.7 MG/DL — LOW (ref 8.6–10.2)
CHLORIDE BLDV-SCNC: 111 MMOL/L — HIGH (ref 98–107)
CHLORIDE SERPL-SCNC: 106 MMOL/L — SIGNIFICANT CHANGE UP (ref 98–107)
CHLORIDE SERPL-SCNC: 107 MMOL/L — SIGNIFICANT CHANGE UP (ref 98–107)
CO2 SERPL-SCNC: 20 MMOL/L — LOW (ref 22–29)
CO2 SERPL-SCNC: 20 MMOL/L — LOW (ref 22–29)
CREAT SERPL-MCNC: 0.78 MG/DL — SIGNIFICANT CHANGE UP (ref 0.5–1.3)
CREAT SERPL-MCNC: 0.86 MG/DL — SIGNIFICANT CHANGE UP (ref 0.5–1.3)
CULTURE RESULTS: NO GROWTH — SIGNIFICANT CHANGE UP
GAS PNL BLDA: SIGNIFICANT CHANGE UP
GAS PNL BLDV: 139 MMOL/L — SIGNIFICANT CHANGE UP (ref 135–145)
GAS PNL BLDV: SIGNIFICANT CHANGE UP
GAS PNL BLDV: SIGNIFICANT CHANGE UP
GLUCOSE BLDC GLUCOMTR-MCNC: 114 MG/DL — HIGH (ref 70–99)
GLUCOSE BLDC GLUCOMTR-MCNC: 120 MG/DL — HIGH (ref 70–99)
GLUCOSE BLDC GLUCOMTR-MCNC: 121 MG/DL — HIGH (ref 70–99)
GLUCOSE BLDC GLUCOMTR-MCNC: 122 MG/DL — HIGH (ref 70–99)
GLUCOSE BLDC GLUCOMTR-MCNC: 124 MG/DL — HIGH (ref 70–99)
GLUCOSE BLDC GLUCOMTR-MCNC: 127 MG/DL — HIGH (ref 70–99)
GLUCOSE BLDC GLUCOMTR-MCNC: 127 MG/DL — HIGH (ref 70–99)
GLUCOSE BLDC GLUCOMTR-MCNC: 128 MG/DL — HIGH (ref 70–99)
GLUCOSE BLDC GLUCOMTR-MCNC: 131 MG/DL — HIGH (ref 70–99)
GLUCOSE BLDC GLUCOMTR-MCNC: 131 MG/DL — HIGH (ref 70–99)
GLUCOSE BLDC GLUCOMTR-MCNC: 143 MG/DL — HIGH (ref 70–99)
GLUCOSE BLDC GLUCOMTR-MCNC: 157 MG/DL — HIGH (ref 70–99)
GLUCOSE BLDC GLUCOMTR-MCNC: 163 MG/DL — HIGH (ref 70–99)
GLUCOSE BLDC GLUCOMTR-MCNC: 176 MG/DL — HIGH (ref 70–99)
GLUCOSE BLDV-MCNC: 149 MG/DL — HIGH (ref 70–99)
GLUCOSE SERPL-MCNC: 119 MG/DL — HIGH (ref 70–115)
GLUCOSE SERPL-MCNC: 132 MG/DL — HIGH (ref 70–115)
HCO3 BLDV-SCNC: 23 MMOL/L — SIGNIFICANT CHANGE UP (ref 21–29)
HCT VFR BLD CALC: 27.6 % — LOW (ref 42–52)
HCT VFR BLD CALC: 30.4 % — LOW (ref 42–52)
HCT VFR BLDA CALC: 49 — SIGNIFICANT CHANGE UP (ref 39–50)
HGB BLD CALC-MCNC: 16 G/DL — SIGNIFICANT CHANGE UP (ref 13–17)
HGB BLD-MCNC: 9.2 G/DL — LOW (ref 14–18)
HGB BLD-MCNC: 9.9 G/DL — LOW (ref 14–18)
LACTATE BLDV-MCNC: 1 MMOL/L — SIGNIFICANT CHANGE UP (ref 0.5–2)
MAGNESIUM SERPL-MCNC: 2.1 MG/DL — SIGNIFICANT CHANGE UP (ref 1.6–2.6)
MAGNESIUM SERPL-MCNC: 2.2 MG/DL — SIGNIFICANT CHANGE UP (ref 1.6–2.6)
MCHC RBC-ENTMCNC: 28.6 PG — SIGNIFICANT CHANGE UP (ref 27–31)
MCHC RBC-ENTMCNC: 29.2 PG — SIGNIFICANT CHANGE UP (ref 27–31)
MCHC RBC-ENTMCNC: 32.6 G/DL — SIGNIFICANT CHANGE UP (ref 32–36)
MCHC RBC-ENTMCNC: 33.3 G/DL — SIGNIFICANT CHANGE UP (ref 32–36)
MCV RBC AUTO: 87.6 FL — SIGNIFICANT CHANGE UP (ref 80–94)
MCV RBC AUTO: 87.9 FL — SIGNIFICANT CHANGE UP (ref 80–94)
OTHER CELLS CSF MANUAL: 12 ML/DL — LOW (ref 18–22)
PCO2 BLDV: 37 MMHG — SIGNIFICANT CHANGE UP (ref 35–50)
PH BLDV: 7.42 — SIGNIFICANT CHANGE UP (ref 7.32–7.43)
PHOSPHATE SERPL-MCNC: 3.4 MG/DL — SIGNIFICANT CHANGE UP (ref 2.4–4.7)
PLATELET # BLD AUTO: 159 K/UL — SIGNIFICANT CHANGE UP (ref 150–400)
PLATELET # BLD AUTO: 168 K/UL — SIGNIFICANT CHANGE UP (ref 150–400)
PO2 BLDV: 29 MMHG — SIGNIFICANT CHANGE UP (ref 25–45)
POTASSIUM BLDV-SCNC: 3.3 MMOL/L — LOW (ref 3.4–4.5)
POTASSIUM SERPL-MCNC: 3.7 MMOL/L — SIGNIFICANT CHANGE UP (ref 3.5–5.3)
POTASSIUM SERPL-MCNC: 3.8 MMOL/L — SIGNIFICANT CHANGE UP (ref 3.5–5.3)
POTASSIUM SERPL-SCNC: 3.7 MMOL/L — SIGNIFICANT CHANGE UP (ref 3.5–5.3)
POTASSIUM SERPL-SCNC: 3.8 MMOL/L — SIGNIFICANT CHANGE UP (ref 3.5–5.3)
PROT SERPL-MCNC: 5.8 G/DL — LOW (ref 6.6–8.7)
RBC # BLD: 3.15 M/UL — LOW (ref 4.6–6.2)
RBC # BLD: 3.46 M/UL — LOW (ref 4.6–6.2)
RBC # FLD: 15.1 % — SIGNIFICANT CHANGE UP (ref 11–15.6)
RBC # FLD: 15.2 % — SIGNIFICANT CHANGE UP (ref 11–15.6)
SAO2 % BLDV: 53 % — SIGNIFICANT CHANGE UP
SODIUM SERPL-SCNC: 138 MMOL/L — SIGNIFICANT CHANGE UP (ref 135–145)
SODIUM SERPL-SCNC: 139 MMOL/L — SIGNIFICANT CHANGE UP (ref 135–145)
SPECIMEN SOURCE: SIGNIFICANT CHANGE UP
WBC # BLD: 12 K/UL — HIGH (ref 4.8–10.8)
WBC # BLD: 13.3 K/UL — HIGH (ref 4.8–10.8)
WBC # FLD AUTO: 12 K/UL — HIGH (ref 4.8–10.8)
WBC # FLD AUTO: 13.3 K/UL — HIGH (ref 4.8–10.8)

## 2018-12-08 PROCEDURE — 71045 X-RAY EXAM CHEST 1 VIEW: CPT | Mod: 26

## 2018-12-08 PROCEDURE — 99233 SBSQ HOSP IP/OBS HIGH 50: CPT

## 2018-12-08 PROCEDURE — 71250 CT THORAX DX C-: CPT | Mod: 26

## 2018-12-08 PROCEDURE — 70450 CT HEAD/BRAIN W/O DYE: CPT | Mod: 26

## 2018-12-08 RX ORDER — FENTANYL CITRATE 50 UG/ML
100 INJECTION INTRAVENOUS ONCE
Qty: 0 | Refills: 0 | Status: DISCONTINUED | OUTPATIENT
Start: 2018-12-08 | End: 2018-12-08

## 2018-12-08 RX ORDER — POTASSIUM CHLORIDE 20 MEQ
10 PACKET (EA) ORAL
Qty: 0 | Refills: 0 | Status: COMPLETED | OUTPATIENT
Start: 2018-12-08 | End: 2018-12-08

## 2018-12-08 RX ORDER — CALCIUM GLUCONATE 100 MG/ML
2 VIAL (ML) INTRAVENOUS ONCE
Qty: 0 | Refills: 0 | Status: COMPLETED | OUTPATIENT
Start: 2018-12-08 | End: 2018-12-09

## 2018-12-08 RX ORDER — DEXMEDETOMIDINE HYDROCHLORIDE IN 0.9% SODIUM CHLORIDE 4 UG/ML
0.7 INJECTION INTRAVENOUS
Qty: 200 | Refills: 0 | Status: DISCONTINUED | OUTPATIENT
Start: 2018-12-08 | End: 2018-12-09

## 2018-12-08 RX ORDER — FUROSEMIDE 40 MG
20 TABLET ORAL EVERY 8 HOURS
Qty: 0 | Refills: 0 | Status: DISCONTINUED | OUTPATIENT
Start: 2018-12-08 | End: 2018-12-09

## 2018-12-08 RX ORDER — FERROUS SULFATE 325(65) MG
325 TABLET ORAL DAILY
Qty: 0 | Refills: 0 | Status: DISCONTINUED | OUTPATIENT
Start: 2018-12-08 | End: 2018-12-10

## 2018-12-08 RX ORDER — PIPERACILLIN AND TAZOBACTAM 4; .5 G/20ML; G/20ML
3.38 INJECTION, POWDER, LYOPHILIZED, FOR SOLUTION INTRAVENOUS EVERY 8 HOURS
Qty: 0 | Refills: 0 | Status: DISCONTINUED | OUTPATIENT
Start: 2018-12-08 | End: 2018-12-10

## 2018-12-08 RX ORDER — VANCOMYCIN HCL 1 G
1000 VIAL (EA) INTRAVENOUS
Qty: 0 | Refills: 0 | Status: DISCONTINUED | OUTPATIENT
Start: 2018-12-08 | End: 2018-12-09

## 2018-12-08 RX ORDER — ASCORBIC ACID 60 MG
500 TABLET,CHEWABLE ORAL DAILY
Qty: 0 | Refills: 0 | Status: DISCONTINUED | OUTPATIENT
Start: 2018-12-08 | End: 2018-12-10

## 2018-12-08 RX ORDER — FOLIC ACID 0.8 MG
1 TABLET ORAL DAILY
Qty: 0 | Refills: 0 | Status: DISCONTINUED | OUTPATIENT
Start: 2018-12-08 | End: 2018-12-10

## 2018-12-08 RX ADMIN — Medication 250 MILLIGRAM(S): at 15:58

## 2018-12-08 RX ADMIN — Medication 1 TABLET(S): at 22:29

## 2018-12-08 RX ADMIN — Medication 325 MILLIGRAM(S): at 11:41

## 2018-12-08 RX ADMIN — Medication 100 MILLIEQUIVALENT(S): at 07:05

## 2018-12-08 RX ADMIN — Medication 1 TABLET(S): at 05:02

## 2018-12-08 RX ADMIN — CHLORHEXIDINE GLUCONATE 5 MILLILITER(S): 213 SOLUTION TOPICAL at 02:32

## 2018-12-08 RX ADMIN — FENTANYL CITRATE 100 MICROGRAM(S): 50 INJECTION INTRAVENOUS at 04:25

## 2018-12-08 RX ADMIN — CHLORHEXIDINE GLUCONATE 5 MILLILITER(S): 213 SOLUTION TOPICAL at 05:01

## 2018-12-08 RX ADMIN — Medication 100 MILLIEQUIVALENT(S): at 22:42

## 2018-12-08 RX ADMIN — DEXMEDETOMIDINE HYDROCHLORIDE IN 0.9% SODIUM CHLORIDE 15.87 MICROGRAM(S)/KG/HR: 4 INJECTION INTRAVENOUS at 07:33

## 2018-12-08 RX ADMIN — EPINEPHRINE 9 MICROGRAM(S)/KG/MIN: 0.3 INJECTION INTRAMUSCULAR; SUBCUTANEOUS at 15:29

## 2018-12-08 RX ADMIN — Medication 8.5 MICROGRAM(S)/KG/MIN: at 15:28

## 2018-12-08 RX ADMIN — Medication 0.5 MILLIGRAM(S): at 22:28

## 2018-12-08 RX ADMIN — PANTOPRAZOLE SODIUM 40 MILLIGRAM(S): 20 TABLET, DELAYED RELEASE ORAL at 17:59

## 2018-12-08 RX ADMIN — Medication 100 MILLIEQUIVALENT(S): at 06:14

## 2018-12-08 RX ADMIN — Medication 20 MILLIGRAM(S): at 13:21

## 2018-12-08 RX ADMIN — EPINEPHRINE 9 MICROGRAM(S)/KG/MIN: 0.3 INJECTION INTRAMUSCULAR; SUBCUTANEOUS at 07:34

## 2018-12-08 RX ADMIN — PROPOFOL 16.33 MICROGRAM(S)/KG/MIN: 10 INJECTION, EMULSION INTRAVENOUS at 07:33

## 2018-12-08 RX ADMIN — CHLORHEXIDINE GLUCONATE 5 MILLILITER(S): 213 SOLUTION TOPICAL at 13:21

## 2018-12-08 RX ADMIN — INSULIN HUMAN 2 UNIT(S)/HR: 100 INJECTION, SOLUTION SUBCUTANEOUS at 15:28

## 2018-12-08 RX ADMIN — DEXMEDETOMIDINE HYDROCHLORIDE IN 0.9% SODIUM CHLORIDE 15.87 MICROGRAM(S)/KG/HR: 4 INJECTION INTRAVENOUS at 17:58

## 2018-12-08 RX ADMIN — PANTOPRAZOLE SODIUM 40 MILLIGRAM(S): 20 TABLET, DELAYED RELEASE ORAL at 05:01

## 2018-12-08 RX ADMIN — AMIODARONE HYDROCHLORIDE 400 MILLIGRAM(S): 400 TABLET ORAL at 02:22

## 2018-12-08 RX ADMIN — Medication 250 MILLIGRAM(S): at 05:01

## 2018-12-08 RX ADMIN — Medication 100 MILLIEQUIVALENT(S): at 20:19

## 2018-12-08 RX ADMIN — DEXMEDETOMIDINE HYDROCHLORIDE IN 0.9% SODIUM CHLORIDE 15.87 MICROGRAM(S)/KG/HR: 4 INJECTION INTRAVENOUS at 19:12

## 2018-12-08 RX ADMIN — MILRINONE LACTATE 6.8 MICROGRAM(S)/KG/MIN: 1 INJECTION, SOLUTION INTRAVENOUS at 02:48

## 2018-12-08 RX ADMIN — FENTANYL CITRATE 100 MICROGRAM(S): 50 INJECTION INTRAVENOUS at 10:47

## 2018-12-08 RX ADMIN — AMIODARONE HYDROCHLORIDE 200 MILLIGRAM(S): 400 TABLET ORAL at 11:41

## 2018-12-08 RX ADMIN — DEXMEDETOMIDINE HYDROCHLORIDE IN 0.9% SODIUM CHLORIDE 15.87 MICROGRAM(S)/KG/HR: 4 INJECTION INTRAVENOUS at 22:28

## 2018-12-08 RX ADMIN — CHLORHEXIDINE GLUCONATE 5 MILLILITER(S): 213 SOLUTION TOPICAL at 11:41

## 2018-12-08 RX ADMIN — PIPERACILLIN AND TAZOBACTAM 25 GRAM(S): 4; .5 INJECTION, POWDER, LYOPHILIZED, FOR SOLUTION INTRAVENOUS at 15:29

## 2018-12-08 RX ADMIN — CLOPIDOGREL BISULFATE 75 MILLIGRAM(S): 75 TABLET, FILM COATED ORAL at 11:41

## 2018-12-08 RX ADMIN — Medication 20 MILLIGRAM(S): at 22:43

## 2018-12-08 RX ADMIN — Medication 100 MILLIEQUIVALENT(S): at 05:10

## 2018-12-08 RX ADMIN — DEXMEDETOMIDINE HYDROCHLORIDE IN 0.9% SODIUM CHLORIDE 15.87 MICROGRAM(S)/KG/HR: 4 INJECTION INTRAVENOUS at 03:08

## 2018-12-08 RX ADMIN — INSULIN HUMAN 2 UNIT(S)/HR: 100 INJECTION, SOLUTION SUBCUTANEOUS at 02:48

## 2018-12-08 RX ADMIN — Medication 250 MILLIGRAM(S): at 22:28

## 2018-12-08 RX ADMIN — CHLORHEXIDINE GLUCONATE 5 MILLILITER(S): 213 SOLUTION TOPICAL at 18:03

## 2018-12-08 RX ADMIN — EPINEPHRINE 9 MICROGRAM(S)/KG/MIN: 0.3 INJECTION INTRAMUSCULAR; SUBCUTANEOUS at 02:49

## 2018-12-08 RX ADMIN — FENTANYL CITRATE 50 MICROGRAM(S): 50 INJECTION INTRAVENOUS at 07:04

## 2018-12-08 RX ADMIN — PROPOFOL 16.33 MICROGRAM(S)/KG/MIN: 10 INJECTION, EMULSION INTRAVENOUS at 17:58

## 2018-12-08 RX ADMIN — PROPOFOL 16.33 MICROGRAM(S)/KG/MIN: 10 INJECTION, EMULSION INTRAVENOUS at 15:26

## 2018-12-08 RX ADMIN — SODIUM CHLORIDE 5 MILLILITER(S): 9 INJECTION INTRAMUSCULAR; INTRAVENOUS; SUBCUTANEOUS at 15:28

## 2018-12-08 RX ADMIN — DEXMEDETOMIDINE HYDROCHLORIDE IN 0.9% SODIUM CHLORIDE 15.87 MICROGRAM(S)/KG/HR: 4 INJECTION INTRAVENOUS at 15:27

## 2018-12-08 RX ADMIN — INSULIN HUMAN 2 UNIT(S)/HR: 100 INJECTION, SOLUTION SUBCUTANEOUS at 07:33

## 2018-12-08 RX ADMIN — Medication 8.5 MICROGRAM(S)/KG/MIN: at 07:32

## 2018-12-08 RX ADMIN — FENTANYL CITRATE 100 MICROGRAM(S): 50 INJECTION INTRAVENOUS at 04:17

## 2018-12-08 RX ADMIN — PROPOFOL 16.33 MICROGRAM(S)/KG/MIN: 10 INJECTION, EMULSION INTRAVENOUS at 19:13

## 2018-12-08 RX ADMIN — CHLORHEXIDINE GLUCONATE 5 MILLILITER(S): 213 SOLUTION TOPICAL at 22:29

## 2018-12-08 RX ADMIN — SODIUM CHLORIDE 10 MILLILITER(S): 9 INJECTION INTRAMUSCULAR; INTRAVENOUS; SUBCUTANEOUS at 07:34

## 2018-12-08 RX ADMIN — PIPERACILLIN AND TAZOBACTAM 25 GRAM(S): 4; .5 INJECTION, POWDER, LYOPHILIZED, FOR SOLUTION INTRAVENOUS at 22:30

## 2018-12-08 RX ADMIN — ATORVASTATIN CALCIUM 80 MILLIGRAM(S): 80 TABLET, FILM COATED ORAL at 22:29

## 2018-12-08 RX ADMIN — Medication 1 TABLET(S): at 13:21

## 2018-12-08 RX ADMIN — Medication 100 MILLIEQUIVALENT(S): at 22:30

## 2018-12-08 RX ADMIN — MILRINONE LACTATE 6.8 MICROGRAM(S)/KG/MIN: 1 INJECTION, SOLUTION INTRAVENOUS at 15:28

## 2018-12-08 RX ADMIN — PIPERACILLIN AND TAZOBACTAM 25 GRAM(S): 4; .5 INJECTION, POWDER, LYOPHILIZED, FOR SOLUTION INTRAVENOUS at 05:02

## 2018-12-08 RX ADMIN — FENTANYL CITRATE 100 MICROGRAM(S): 50 INJECTION INTRAVENOUS at 10:51

## 2018-12-08 RX ADMIN — ENOXAPARIN SODIUM 40 MILLIGRAM(S): 100 INJECTION SUBCUTANEOUS at 11:41

## 2018-12-08 RX ADMIN — MILRINONE LACTATE 6.8 MICROGRAM(S)/KG/MIN: 1 INJECTION, SOLUTION INTRAVENOUS at 07:33

## 2018-12-08 RX ADMIN — SODIUM CHLORIDE 10 MILLILITER(S): 9 INJECTION INTRAMUSCULAR; INTRAVENOUS; SUBCUTANEOUS at 15:27

## 2018-12-08 RX ADMIN — Medication 8.5 MICROGRAM(S)/KG/MIN: at 02:48

## 2018-12-08 RX ADMIN — FENTANYL CITRATE 50 MICROGRAM(S): 50 INJECTION INTRAVENOUS at 07:31

## 2018-12-08 RX ADMIN — Medication 20 MILLIGRAM(S): at 05:01

## 2018-12-08 RX ADMIN — PROPOFOL 16.33 MICROGRAM(S)/KG/MIN: 10 INJECTION, EMULSION INTRAVENOUS at 02:47

## 2018-12-08 NOTE — PROGRESS NOTE ADULT - PROBLEM SELECTOR PLAN 5
intubated initially for airway protection in the setting of STEMI and emergent need for surgery  currently on vent support due ot HD instability, minimal o2 requirements  started CPAP trials with plan to extubate when hemodynamic issues improve

## 2018-12-08 NOTE — PROGRESS NOTE ADULT - ASSESSMENT
48 yo Male c/o CP for 2-3 days prior to admission, then 10/10 chest pain approximately 9:30 am yesterday, followed by vomiting.  Wife drove pt to hospital, ruled in for STEMI. Urgent Cath, pt found to have multiple occlusions; stent to LAD and D2; stent to LAD thrombosed,  impella placed for support.  Pt then had VF arrest, shock x 1, return to NSR.  Upon transfer to OR for Urgent CABG, pt with asystolic arrest, chest opened intra-op with CPR in progress. Pt underwent C4V (LAD, Diag, OM and PDA) with inability to wean from CPB therefore requiring VA ECMO (central cannulatin) and IABP. , he was transferred to CICU on multiple gtts, now s/p explant 12/3 with placement of impella via R groin sheath;    12/4 ID consulted started on broad spectrum abx, TF started  12/5 Impella weaned, echo unchanged   12/6 Impella removal via right femoral cutdown with primary repair of femoral artery and stent placed to external illiac artery for dissection, PRBC x1  12/8 Pt underwent head CT, no acute intracranial process noted, chest CT b/l pneumothoraces R-20%, L-10%, b/l chest tubes in place, confirmed infiltrates on right lower and left upper lung.  ABX Vanco and Zosyn course extended, f/u sputum Cx.

## 2018-12-08 NOTE — PROGRESS NOTE ADULT - PROBLEM SELECTOR PLAN 4
improving  VA ECMO removed 12/3, Impella removed 12/6  levo +/_ for MAP 65-75, continue primacor, slowly wean epi as above

## 2018-12-08 NOTE — PROGRESS NOTE ADULT - SUBJECTIVE AND OBJECTIVE BOX
[facility]  INFECTIOUS DISEASES AND INTERNAL MEDICINE at [place]  =======================================================  Perfecto Santizo MD  Diplomates American Board of Internal Medicine and Infectious Diseases  =======================================================    [id number]  PATRICK LYLE   follow up for:  post operative fevers  patient seen and examined.  still with fevers.   remains intubated.   CT chest and CT head done.   pending results.       ===================================================  REVIEW OF SYSTEMS:  as above  all other ROS negative  =======================================================  =======================================================  Antibiotics:  piperacillin/tazobactam IVPB. 3.375 Gram(s) IV Intermittent every 8 hours  vancomycin  IVPB 1000 milliGRAM(s) IV Intermittent <User Schedule>    Other medications:  amiodarone    Tablet 200 milliGRAM(s) Oral daily  aspirin 325 milliGRAM(s) Oral daily  atorvastatin 80 milliGRAM(s) Oral at bedtime  chlorhexidine 0.12% Liquid 5 milliLiter(s) Oral Mucosa every 4 hours  clopidogrel Tablet 75 milliGRAM(s) Oral daily  dexmedetomidine Infusion 0.7 MICROgram(s)/kG/Hr IV Continuous <Continuous>  dextrose 50% Injectable 50 milliLiter(s) IV Push every 15 minutes  enoxaparin Injectable 40 milliGRAM(s) SubCutaneous daily  EPINEPHrine    Infusion 0.026 MICROgram(s)/kG/Min IV Continuous <Continuous>  furosemide   Injectable 20 milliGRAM(s) IV Push every 8 hours  insulin Infusion 2 Unit(s)/Hr IV Continuous <Continuous>  lactobacillus acidophilus 1 Tablet(s) Oral every 8 hours  milrinone Infusion 0.25 MICROgram(s)/kG/Min IV Continuous <Continuous>  norepinephrine Infusion 0.05 MICROgram(s)/kG/Min IV Continuous <Continuous>  pantoprazole  Injectable 40 milliGRAM(s) IV Push every 12 hours  potassium chloride  10 mEq/50 mL IVPB 10 milliEquivalent(s) IV Intermittent every 1 hour  potassium chloride  10 mEq/50 mL IVPB 10 milliEquivalent(s) IV Intermittent every 1 hour  potassium chloride  10 mEq/50 mL IVPB 10 milliEquivalent(s) IV Intermittent every 1 hour  propofol Infusion 30 MICROgram(s)/kG/Min IV Continuous <Continuous>  sodium chloride 0.9%. 1000 milliLiter(s) IV Continuous <Continuous>  sodium chloride 0.9%. 1000 milliLiter(s) IV Continuous <Continuous>    =======================================================    Physical Exam:  T(F): 101.3 (08 Dec 2018 10:00), Max: 101.5 (08 Dec 2018 09:00)  HR: 89 (08 Dec 2018 10:45)  BP: 99/51 (06 Dec 2018 13:16)  RR: 23 (08 Dec 2018 10:45)  SpO2: 99% (08 Dec 2018 10:45) (89% - 100%)    General:  intubated, no distress  Eye: Pupils are equal, round and reactive to light,   HENT: Normocephalic, Oral mucosa is moist, ET Tube in place  Neck: Supple, No lymphadenopathy.  RIGHT neck IJ line  Respiratory: Lungs with fair air entry  midline chest with dressing in place.   + CHEST tubes in place  Cardiovascular: Normal rate, Regular rhythm, No murmur, Good pulses equal in all extremities,  No edema.  Gastrointestinal: Soft, Non-tender, Non-distended, Normal bowel sounds.  Genitourinary: + Mckeon  Lymphatics: No lymphadenopathy neck,   Musculoskeletal: Normal range of motion,   Integumentary: No rash.  Neurologic: Alert, Cranial Nerves II-XII are grossly intact.    =======================================================  Labs:                        9.9    12.0  )-----------( 159      ( 08 Dec 2018 04:35 )             30.4     12-08    138  |  107  |  26.0<H>  ----------------------------<  132<H>  3.8   |  20.0<L>  |  0.86    Ca    7.7<L>      08 Dec 2018 04:35  Mg     2.2     12-08    TPro  5.8<L>  /  Alb  2.8<L>  /  TBili  2.1<H>  /  DBili  x   /  AST  57<H>  /  ALT  103<H>  /  AlkPhos  227<H>  12-08        Culture - Blood (collected 12-05-18 @ 10:21)  Source: .Blood    Culture - Blood (collected 12-05-18 @ 10:21)  Source: .Blood    Culture - Blood (collected 12-04-18 @ 10:22)  Source: .Blood    Culture - Blood (collected 12-04-18 @ 10:22)  Source: .Blood    Culture - Sputum (collected 12-04-18 @ 10:21)  Source: .Sputum  Gram Stain (12-04-18 @ 11:59):    Few White blood cells    No organisms seen  Final Report (12-06-18 @ 14:00):    Few Candida albicans    No Routine respiratory keara present

## 2018-12-08 NOTE — PROGRESS NOTE ADULT - ASSESSMENT
This is a 47y  Male with no significant PMH admitted on 11/29/18 for CP for 2-3 days prior to admission,   found with STEMI. Urgent Cath, pt found to have multiple occlusions; VF arrest, shock x 1, return to NSR.  Upon transfer to OR for Urgent CABG, pt again VF arrest, chest opened intra-op with CPR in progress.  s/p ECMO and Impella; 12/3 ECMO explanted and Impella placed via right groin sheath.    now developed fevers.    patient in hospital for > 48 hours, multiple interventions.   blood cultures and sputum cx sent  urine legionella is NEGATIVE  - continue Vancomycin 1 gram Q8H  - continue Zosyn 3.375 gram Q8H; aware of prior unknown Penicillin allergy, intubated currently, will watch for side effects    CULTURES REMAIN NEGATIVE,  patient to complete 5 DAY COURSE OF ANTIBIOTICS today AND STOP/ OBSERVE THEREAFTER  Follow up on CT chest scan results     - trend temperature and WBC curve  - repeat cultures from blood and all sources if febrile.     will follow with you.

## 2018-12-08 NOTE — PROGRESS NOTE ADULT - SUBJECTIVE AND OBJECTIVE BOX
Subjective:  48yo Male more alert and responsive each day.  Currently with decreased mobility left arm and b/l legs.    Past Medical History:  ST elevation myocardial infarction (STEMI)  Family history of myocardial infarction (Mother)  No pertinent family history in first degree relatives  Handoff  MEWS Score  Staph infection  No pertinent past medical history  Cardiogenic shock  Cardiogenic shock  ST elevation myocardial infarction (STEMI), unspecified artery  Fever, unspecified fever cause  Thrombocytopenia  Anemia due to blood loss  Acute systolic heart failure  Respiratory failure  Cardiogenic shock  Staph infection  Ventricular fibrillation  Coronary artery disease involving native coronary artery of native heart with unstable angina pectoris  Dissection of left main coronary artery  ST elevation myocardial infarction involving left anterior descending (LAD) coronary artery  STEMI (ST elevation myocardial infarction)  Exploration of femoral artery  Insertion of Impella device  ECMO decannulation  Exploration and washout of mediastinum  Intra-aortic balloon pump removal  Arterial cannulation  Central line placement  [place] placement  CABG  ECMO (extracorporeal membrane oxygenation)  No significant past surgical history  CHEST PAIN  90+        amiodarone    Tablet 200 milliGRAM(s) Oral daily  artificial  tears Solution 1 Drop(s) Both EYES four times a day PRN  aspirin 325 milliGRAM(s) Oral daily  atorvastatin 80 milliGRAM(s) Oral at bedtime  chlorhexidine 0.12% Liquid 5 milliLiter(s) Oral Mucosa every 4 hours  clopidogrel Tablet 75 milliGRAM(s) Oral daily  dexmedetomidine Infusion 0.7 MICROgram(s)/kG/Hr IV Continuous <Continuous>  dextrose 50% Injectable 50 milliLiter(s) IV Push every 15 minutes  enoxaparin Injectable 40 milliGRAM(s) SubCutaneous daily  EPINEPHrine    Infusion 0.026 MICROgram(s)/kG/Min IV Continuous <Continuous>  fentaNYL    Injectable 50 MICROGram(s) IV Push every 2 hours PRN  furosemide   Injectable 20 milliGRAM(s) IV Push every 8 hours  insulin Infusion 2 Unit(s)/Hr IV Continuous <Continuous>  lactobacillus acidophilus 1 Tablet(s) Oral every 8 hours  milrinone Infusion 0.25 MICROgram(s)/kG/Min IV Continuous <Continuous>  norepinephrine Infusion 0.05 MICROgram(s)/kG/Min IV Continuous <Continuous>  pantoprazole  Injectable 40 milliGRAM(s) IV Push every 12 hours  petrolatum white Ointment 1 Application(s) Topical four times a day PRN  piperacillin/tazobactam IVPB. 3.375 Gram(s) IV Intermittent every 8 hours  potassium chloride  10 mEq/50 mL IVPB 10 milliEquivalent(s) IV Intermittent every 1 hour  potassium chloride  10 mEq/50 mL IVPB 10 milliEquivalent(s) IV Intermittent every 1 hour  potassium chloride  10 mEq/50 mL IVPB 10 milliEquivalent(s) IV Intermittent every 1 hour  propofol Infusion 30 MICROgram(s)/kG/Min IV Continuous <Continuous>  sodium chloride 0.9%. 1000 milliLiter(s) IV Continuous <Continuous>  sodium chloride 0.9%. 1000 milliLiter(s) IV Continuous <Continuous>  vancomycin  IVPB 1000 milliGRAM(s) IV Intermittent <User Schedule>  MEDICATIONS  (PRN):  artificial  tears Solution 1 Drop(s) Both EYES four times a day PRN Dry Eyes  fentaNYL    Injectable 50 MICROGram(s) IV Push every 2 hours PRN Moderate Pain (4 - 6)  petrolatum white Ointment 1 Application(s) Topical four times a day PRN dry lips    Mode: AC/ CMV (Assist Control/ Continuous Mandatory Ventilation), RR (machine): 10, TV (machine): 600, FiO2: 50, PEEP: 5, MAP: 13, PIP: 28  Daily     Daily     Mode: AC/ CMV (Assist Control/ Continuous Mandatory Ventilation), RR (machine): 10, TV (machine): 600, FiO2: 50, PEEP: 5, MAP: 13, PIP: 28    ABG - ( 08 Dec 2018 09:16 )  pH, Arterial: 7.48  pH, Blood: x     /  pCO2: 29    /  pO2: 108   / HCO3: 24    / Base Excess: -0.9  /  SaO2: 99                                      9.9    12.0  )-----------( 159      ( 08 Dec 2018 04:35 )             30.4   12-08    138  |  107  |  26.0<H>  ----------------------------<  132<H>  3.8   |  20.0<L>  |  0.86    Ca    7.7<L>      08 Dec 2018 04:35  Mg     2.2     12-08    TPro  5.8<L>  /  Alb  2.8<L>  /  TBili  2.1<H>  /  DBili  x   /  AST  57<H>  /  ALT  103<H>  /  AlkPhos  227<H>  12-08            Objective:  T(C): 38.1 (12-08-18 @ 13:00), Max: 38.6 (12-08-18 @ 09:00)  HR: 84 (12-08-18 @ 14:00) (76 - 106)  BP: --  RR: 23 (12-08-18 @ 14:00) (8 - 46)  SpO2: 99% (12-08-18 @ 14:00) (90% - 100%)  Wt(kg): --CAPILLARY BLOOD GLUCOSE      POCT Blood Glucose.: 157 mg/dL (08 Dec 2018 13:56)  POCT Blood Glucose.: 131 mg/dL (08 Dec 2018 12:09)  POCT Blood Glucose.: 131 mg/dL (08 Dec 2018 10:07)  POCT Blood Glucose.: 143 mg/dL (08 Dec 2018 08:17)  POCT Blood Glucose.: 163 mg/dL (08 Dec 2018 07:00)  POCT Blood Glucose.: 176 mg/dL (08 Dec 2018 06:06)  POCT Blood Glucose.: 128 mg/dL (08 Dec 2018 04:07)  POCT Blood Glucose.: 122 mg/dL (08 Dec 2018 03:19)  POCT Blood Glucose.: 114 mg/dL (08 Dec 2018 01:50)  POCT Blood Glucose.: 127 mg/dL (08 Dec 2018 00:58)  POCT Blood Glucose.: 147 mg/dL (07 Dec 2018 23:55)  POCT Blood Glucose.: 141 mg/dL (07 Dec 2018 22:12)  POCT Blood Glucose.: 168 mg/dL (07 Dec 2018 21:06)  POCT Blood Glucose.: 100 mg/dL (07 Dec 2018 16:55)  I&O's Summary    07 Dec 2018 07:01  -  08 Dec 2018 07:00  --------------------------------------------------------  IN: 3794.5 mL / OUT: 3470 mL / NET: 324.5 mL    08 Dec 2018 07:01  -  08 Dec 2018 15:22  --------------------------------------------------------  IN: 856.5 mL / OUT: 1112 mL / NET: -255.5 mL        Lines: CATRACHITA Briseno:  yes    Physical Exam:  Neuro: A0X3, non focal  Pulm: CtA b/l Unlabored  CV: s1/s2 + PW  reg  Abd: soft nt/nd  Ext: warm, no edema, well perfused  Inc: c/d/i Subjective:  46yo Male more alert and responsive each day.  Currently with decreased mobility left arm and b/l legs.    Past Medical History:  ST elevation myocardial infarction (STEMI)  Family history of myocardial infarction (Mother)  No pertinent family history in first degree relatives  Handoff  MEWS Score  Staph infection  No pertinent past medical history  Cardiogenic shock  Cardiogenic shock  ST elevation myocardial infarction (STEMI), unspecified artery  Fever, unspecified fever cause  Thrombocytopenia  Anemia due to blood loss  Acute systolic heart failure  Respiratory failure  Cardiogenic shock  Staph infection  Ventricular fibrillation  Coronary artery disease involving native coronary artery of native heart with unstable angina pectoris  Dissection of left main coronary artery  ST elevation myocardial infarction involving left anterior descending (LAD) coronary artery  STEMI (ST elevation myocardial infarction)  Exploration of femoral artery  Insertion of Impella device  ECMO decannulation  Exploration and washout of mediastinum  Intra-aortic balloon pump removal  Arterial cannulation  Central line placement  [place] Michelle placement  CABG  ECMO (extracorporeal membrane oxygenation)  No significant past surgical history  CHEST PAIN  90+        amiodarone    Tablet 200 milliGRAM(s) Oral daily  artificial  tears Solution 1 Drop(s) Both EYES four times a day PRN  aspirin 325 milliGRAM(s) Oral daily  atorvastatin 80 milliGRAM(s) Oral at bedtime  chlorhexidine 0.12% Liquid 5 milliLiter(s) Oral Mucosa every 4 hours  clopidogrel Tablet 75 milliGRAM(s) Oral daily  dexmedetomidine Infusion 0.7 MICROgram(s)/kG/Hr IV Continuous <Continuous>  dextrose 50% Injectable 50 milliLiter(s) IV Push every 15 minutes  enoxaparin Injectable 40 milliGRAM(s) SubCutaneous daily  EPINEPHrine    Infusion 0.026 MICROgram(s)/kG/Min IV Continuous <Continuous>  fentaNYL    Injectable 50 MICROGram(s) IV Push every 2 hours PRN  furosemide   Injectable 20 milliGRAM(s) IV Push every 8 hours  insulin Infusion 2 Unit(s)/Hr IV Continuous <Continuous>  lactobacillus acidophilus 1 Tablet(s) Oral every 8 hours  milrinone Infusion 0.25 MICROgram(s)/kG/Min IV Continuous <Continuous>  norepinephrine Infusion 0.05 MICROgram(s)/kG/Min IV Continuous <Continuous>  pantoprazole  Injectable 40 milliGRAM(s) IV Push every 12 hours  petrolatum white Ointment 1 Application(s) Topical four times a day PRN  piperacillin/tazobactam IVPB. 3.375 Gram(s) IV Intermittent every 8 hours  potassium chloride  10 mEq/50 mL IVPB 10 milliEquivalent(s) IV Intermittent every 1 hour  potassium chloride  10 mEq/50 mL IVPB 10 milliEquivalent(s) IV Intermittent every 1 hour  potassium chloride  10 mEq/50 mL IVPB 10 milliEquivalent(s) IV Intermittent every 1 hour  propofol Infusion 30 MICROgram(s)/kG/Min IV Continuous <Continuous>  sodium chloride 0.9%. 1000 milliLiter(s) IV Continuous <Continuous>  sodium chloride 0.9%. 1000 milliLiter(s) IV Continuous <Continuous>  vancomycin  IVPB 1000 milliGRAM(s) IV Intermittent <User Schedule>  MEDICATIONS  (PRN):  artificial  tears Solution 1 Drop(s) Both EYES four times a day PRN Dry Eyes  fentaNYL    Injectable 50 MICROGram(s) IV Push every 2 hours PRN Moderate Pain (4 - 6)  petrolatum white Ointment 1 Application(s) Topical four times a day PRN dry lips    Mode: AC/ CMV (Assist Control/ Continuous Mandatory Ventilation), RR (machine): 10, TV (machine): 600, FiO2: 50, PEEP: 5, MAP: 13, PIP: 28  Daily     Daily     Mode: AC/ CMV (Assist Control/ Continuous Mandatory Ventilation), RR (machine): 10, TV (machine): 600, FiO2: 50, PEEP: 5, MAP: 13, PIP: 28    ABG - ( 08 Dec 2018 09:16 )  pH, Arterial: 7.48  pH, Blood: x     /  pCO2: 29    /  pO2: 108   / HCO3: 24    / Base Excess: -0.9  /  SaO2: 99                                      9.9    12.0  )-----------( 159      ( 08 Dec 2018 04:35 )             30.4   12-08    138  |  107  |  26.0<H>  ----------------------------<  132<H>  3.8   |  20.0<L>  |  0.86    Ca    7.7<L>      08 Dec 2018 04:35  Mg     2.2     12-08    TPro  5.8<L>  /  Alb  2.8<L>  /  TBili  2.1<H>  /  DBili  x   /  AST  57<H>  /  ALT  103<H>  /  AlkPhos  227<H>  12-08            Objective:  T(C): 38.1 (12-08-18 @ 13:00), Max: 38.6 (12-08-18 @ 09:00)  HR: 84 (12-08-18 @ 14:00) (76 - 106)  BP: --  RR: 23 (12-08-18 @ 14:00) (8 - 46)  SpO2: 99% (12-08-18 @ 14:00) (90% - 100%)  Wt(kg): --CAPILLARY BLOOD GLUCOSE      POCT Blood Glucose.: 157 mg/dL (08 Dec 2018 13:56)  POCT Blood Glucose.: 131 mg/dL (08 Dec 2018 12:09)  POCT Blood Glucose.: 131 mg/dL (08 Dec 2018 10:07)  POCT Blood Glucose.: 143 mg/dL (08 Dec 2018 08:17)  POCT Blood Glucose.: 163 mg/dL (08 Dec 2018 07:00)  POCT Blood Glucose.: 176 mg/dL (08 Dec 2018 06:06)  POCT Blood Glucose.: 128 mg/dL (08 Dec 2018 04:07)  POCT Blood Glucose.: 122 mg/dL (08 Dec 2018 03:19)  POCT Blood Glucose.: 114 mg/dL (08 Dec 2018 01:50)  POCT Blood Glucose.: 127 mg/dL (08 Dec 2018 00:58)  POCT Blood Glucose.: 147 mg/dL (07 Dec 2018 23:55)  POCT Blood Glucose.: 141 mg/dL (07 Dec 2018 22:12)  POCT Blood Glucose.: 168 mg/dL (07 Dec 2018 21:06)  POCT Blood Glucose.: 100 mg/dL (07 Dec 2018 16:55)  I&O's Summary    07 Dec 2018 07:01  -  08 Dec 2018 07:00  --------------------------------------------------------  IN: 3794.5 mL / OUT: 3470 mL / NET: 324.5 mL    08 Dec 2018 07:01  -  08 Dec 2018 15:22  --------------------------------------------------------  IN: 856.5 mL / OUT: 1112 mL / NET: -255.5 mL        Lines: CATRACHITA Briseno:  yes    Physical Exam:  Neuro: Alert and appropriate at times. L arm weakness, shrugs shoulder, b/l lower ext weakness, intuated  Pulm: CtA b/l Unlabored, full vent support  CV: s1/s2   reg  Abd: soft nt/nd  Ext: warm, mild edema, well perfused  Inc: c/d/i

## 2018-12-08 NOTE — PROGRESS NOTE ADULT - SUBJECTIVE AND OBJECTIVE BOX
Brief note:    CT chest reviewed  confirmed infiltrates on right lower and left upper lung.   - will extend course of antibiotics - vanco and Zosyn.   - follow up on sputum culture.

## 2018-12-08 NOTE — PROGRESS NOTE ADULT - PROBLEM SELECTOR PLAN 1
s/p CABG  PRN fentanyl for pain/sedation as well as low dose diprivan  passive ROM of upper extremities and LLE by RNs   cont primacor, slow wean on epi 1cc/q 2 hrs to 4cc, levophed to maintain MAP 65-75,   cont glucerna tube feeds (goal 60), protonix BID for GI ppx  insulin gtt per protocol for tight glycemic control (no reported h/o DM)  lasix gtt for diuresis, maintain augustin for strict Is/Os, follow lytes closely and replete PRN  d/w Dr. Barrett in AM rounds

## 2018-12-09 ENCOUNTER — TRANSCRIPTION ENCOUNTER (OUTPATIENT)
Age: 48
End: 2018-12-09

## 2018-12-09 LAB
ALBUMIN SERPL ELPH-MCNC: 2.7 G/DL — LOW (ref 3.3–5.2)
ALBUMIN SERPL ELPH-MCNC: 2.7 G/DL — LOW (ref 3.3–5.2)
ALBUMIN SERPL ELPH-MCNC: 2.9 G/DL — LOW (ref 3.3–5.2)
ALP SERPL-CCNC: 204 U/L — HIGH (ref 40–120)
ALP SERPL-CCNC: 225 U/L — HIGH (ref 40–120)
ALP SERPL-CCNC: 274 U/L — HIGH (ref 40–120)
ALT FLD-CCNC: 60 U/L — HIGH
ALT FLD-CCNC: 74 U/L — HIGH
ALT FLD-CCNC: 78 U/L — HIGH
ANION GAP SERPL CALC-SCNC: 12 MMOL/L — SIGNIFICANT CHANGE UP (ref 5–17)
ANION GAP SERPL CALC-SCNC: 12 MMOL/L — SIGNIFICANT CHANGE UP (ref 5–17)
ANION GAP SERPL CALC-SCNC: 13 MMOL/L — SIGNIFICANT CHANGE UP (ref 5–17)
ANION GAP SERPL CALC-SCNC: 14 MMOL/L — SIGNIFICANT CHANGE UP (ref 5–17)
APTT BLD: 25.6 SEC — LOW (ref 27.5–36.3)
AST SERPL-CCNC: 43 U/L — HIGH
AST SERPL-CCNC: 44 U/L — HIGH
AST SERPL-CCNC: 74 U/L — HIGH
BASE EXCESS BLDV CALC-SCNC: 0.3 MMOL/L — SIGNIFICANT CHANGE UP (ref -2–2)
BASE EXCESS BLDV CALC-SCNC: 0.5 MMOL/L — SIGNIFICANT CHANGE UP (ref -2–2)
BASE EXCESS BLDV CALC-SCNC: 0.8 MMOL/L — SIGNIFICANT CHANGE UP (ref -2–2)
BASE EXCESS BLDV CALC-SCNC: 0.9 MMOL/L — SIGNIFICANT CHANGE UP (ref -2–2)
BASE EXCESS BLDV CALC-SCNC: 1.8 MMOL/L — SIGNIFICANT CHANGE UP (ref -2–2)
BASE EXCESS BLDV CALC-SCNC: 2 MMOL/L — SIGNIFICANT CHANGE UP (ref -2–2)
BILIRUB SERPL-MCNC: 2.2 MG/DL — HIGH (ref 0.4–2)
BILIRUB SERPL-MCNC: 2.3 MG/DL — HIGH (ref 0.4–2)
BILIRUB SERPL-MCNC: 2.7 MG/DL — HIGH (ref 0.4–2)
BLD GP AB SCN SERPL QL: SIGNIFICANT CHANGE UP
BLOOD GAS COMMENTS, VENOUS: SIGNIFICANT CHANGE UP
BLOOD GAS COMMENTS, VENOUS: SIGNIFICANT CHANGE UP
BUN SERPL-MCNC: 21 MG/DL — HIGH (ref 8–20)
BUN SERPL-MCNC: 25 MG/DL — HIGH (ref 8–20)
C DIFF BY PCR RESULT: SIGNIFICANT CHANGE UP
C DIFF TOX GENS STL QL NAA+PROBE: SIGNIFICANT CHANGE UP
CA-I SERPL-SCNC: 1.03 MMOL/L — LOW (ref 1.15–1.33)
CA-I SERPL-SCNC: 1.04 MMOL/L — LOW (ref 1.15–1.33)
CA-I SERPL-SCNC: 1.07 MMOL/L — LOW (ref 1.15–1.33)
CA-I SERPL-SCNC: 1.08 MMOL/L — LOW (ref 1.15–1.33)
CALCIUM SERPL-MCNC: 7.6 MG/DL — LOW (ref 8.6–10.2)
CALCIUM SERPL-MCNC: 7.7 MG/DL — LOW (ref 8.6–10.2)
CALCIUM SERPL-MCNC: 8.1 MG/DL — LOW (ref 8.6–10.2)
CALCIUM SERPL-MCNC: 8.1 MG/DL — LOW (ref 8.6–10.2)
CHLORIDE BLDV-SCNC: 106 MMOL/L — SIGNIFICANT CHANGE UP (ref 98–107)
CHLORIDE BLDV-SCNC: 108 MMOL/L — HIGH (ref 98–107)
CHLORIDE SERPL-SCNC: 101 MMOL/L — SIGNIFICANT CHANGE UP (ref 98–107)
CHLORIDE SERPL-SCNC: 104 MMOL/L — SIGNIFICANT CHANGE UP (ref 98–107)
CHLORIDE SERPL-SCNC: 105 MMOL/L — SIGNIFICANT CHANGE UP (ref 98–107)
CHLORIDE SERPL-SCNC: 105 MMOL/L — SIGNIFICANT CHANGE UP (ref 98–107)
CK MB CFR SERPL CALC: 2.2 NG/ML — SIGNIFICANT CHANGE UP (ref 0–6.7)
CK MB CFR SERPL CALC: 2.5 NG/ML — SIGNIFICANT CHANGE UP (ref 0–6.7)
CK SERPL-CCNC: 180 U/L — SIGNIFICANT CHANGE UP (ref 30–200)
CK SERPL-CCNC: 214 U/L — HIGH (ref 30–200)
CO2 SERPL-SCNC: 20 MMOL/L — LOW (ref 22–29)
CO2 SERPL-SCNC: 21 MMOL/L — LOW (ref 22–29)
CO2 SERPL-SCNC: 22 MMOL/L — SIGNIFICANT CHANGE UP (ref 22–29)
CO2 SERPL-SCNC: 22 MMOL/L — SIGNIFICANT CHANGE UP (ref 22–29)
CREAT SERPL-MCNC: 0.73 MG/DL — SIGNIFICANT CHANGE UP (ref 0.5–1.3)
CREAT SERPL-MCNC: 0.89 MG/DL — SIGNIFICANT CHANGE UP (ref 0.5–1.3)
CREAT SERPL-MCNC: 0.89 MG/DL — SIGNIFICANT CHANGE UP (ref 0.5–1.3)
CREAT SERPL-MCNC: 0.92 MG/DL — SIGNIFICANT CHANGE UP (ref 0.5–1.3)
CULTURE RESULTS: SIGNIFICANT CHANGE UP
CULTURE RESULTS: SIGNIFICANT CHANGE UP
GAS PNL BLDA: SIGNIFICANT CHANGE UP
GAS PNL BLDV: 138 MMOL/L — SIGNIFICANT CHANGE UP (ref 135–145)
GAS PNL BLDV: 138 MMOL/L — SIGNIFICANT CHANGE UP (ref 135–145)
GAS PNL BLDV: 139 MMOL/L — SIGNIFICANT CHANGE UP (ref 135–145)
GAS PNL BLDV: 140 MMOL/L — SIGNIFICANT CHANGE UP (ref 135–145)
GAS PNL BLDV: SIGNIFICANT CHANGE UP
GLUCOSE BLDC GLUCOMTR-MCNC: 117 MG/DL — HIGH (ref 70–99)
GLUCOSE BLDC GLUCOMTR-MCNC: 120 MG/DL — HIGH (ref 70–99)
GLUCOSE BLDC GLUCOMTR-MCNC: 126 MG/DL — HIGH (ref 70–99)
GLUCOSE BLDC GLUCOMTR-MCNC: 129 MG/DL — HIGH (ref 70–99)
GLUCOSE BLDC GLUCOMTR-MCNC: 131 MG/DL — HIGH (ref 70–99)
GLUCOSE BLDC GLUCOMTR-MCNC: 137 MG/DL — HIGH (ref 70–99)
GLUCOSE BLDC GLUCOMTR-MCNC: 138 MG/DL — HIGH (ref 70–99)
GLUCOSE BLDC GLUCOMTR-MCNC: 139 MG/DL — HIGH (ref 70–99)
GLUCOSE BLDC GLUCOMTR-MCNC: 145 MG/DL — HIGH (ref 70–99)
GLUCOSE BLDC GLUCOMTR-MCNC: 147 MG/DL — HIGH (ref 70–99)
GLUCOSE BLDC GLUCOMTR-MCNC: 158 MG/DL — HIGH (ref 70–99)
GLUCOSE BLDC GLUCOMTR-MCNC: 169 MG/DL — HIGH (ref 70–99)
GLUCOSE BLDC GLUCOMTR-MCNC: 172 MG/DL — HIGH (ref 70–99)
GLUCOSE BLDC GLUCOMTR-MCNC: 172 MG/DL — HIGH (ref 70–99)
GLUCOSE BLDV-MCNC: 116 MG/DL — HIGH (ref 70–99)
GLUCOSE BLDV-MCNC: 118 MG/DL — HIGH (ref 70–99)
GLUCOSE BLDV-MCNC: 118 MG/DL — HIGH (ref 70–99)
GLUCOSE BLDV-MCNC: 138 MG/DL — HIGH (ref 70–99)
GLUCOSE BLDV-MCNC: 140 MG/DL — HIGH (ref 70–99)
GLUCOSE BLDV-MCNC: 143 MG/DL — HIGH (ref 70–99)
GLUCOSE SERPL-MCNC: 136 MG/DL — HIGH (ref 70–115)
GLUCOSE SERPL-MCNC: 136 MG/DL — HIGH (ref 70–115)
GLUCOSE SERPL-MCNC: 141 MG/DL — HIGH (ref 70–115)
GLUCOSE SERPL-MCNC: 173 MG/DL — HIGH (ref 70–115)
GRAM STN FLD: SIGNIFICANT CHANGE UP
HCO3 BLDV-SCNC: 24 MMOL/L — SIGNIFICANT CHANGE UP (ref 21–29)
HCO3 BLDV-SCNC: 25 MMOL/L — SIGNIFICANT CHANGE UP (ref 21–29)
HCO3 BLDV-SCNC: 26 MMOL/L — SIGNIFICANT CHANGE UP (ref 21–29)
HCT VFR BLD CALC: 28.5 % — LOW (ref 42–52)
HCT VFR BLD CALC: 29.5 % — LOW (ref 42–52)
HCT VFR BLDA CALC: 26 — LOW (ref 39–50)
HCT VFR BLDA CALC: 28 — LOW (ref 39–50)
HCT VFR BLDA CALC: 29 — LOW (ref 39–50)
HCT VFR BLDA CALC: 29 — LOW (ref 39–50)
HCT VFR BLDA CALC: 31 — LOW (ref 39–50)
HCT VFR BLDA CALC: 31 — LOW (ref 39–50)
HGB BLD CALC-MCNC: 10.1 G/DL — LOW (ref 13–17)
HGB BLD CALC-MCNC: 10.2 G/DL — LOW (ref 13–17)
HGB BLD CALC-MCNC: 8.4 G/DL — LOW (ref 13–17)
HGB BLD CALC-MCNC: 9.2 G/DL — LOW (ref 13–17)
HGB BLD CALC-MCNC: 9.3 G/DL — LOW (ref 13–17)
HGB BLD CALC-MCNC: 9.5 G/DL — LOW (ref 13–17)
HGB BLD-MCNC: 9.3 G/DL — LOW (ref 14–18)
HGB BLD-MCNC: 9.6 G/DL — LOW (ref 14–18)
HOROWITZ INDEX BLDV+IHG-RTO: 100 — SIGNIFICANT CHANGE UP
HOROWITZ INDEX BLDV+IHG-RTO: 40 — SIGNIFICANT CHANGE UP
HOROWITZ INDEX BLDV+IHG-RTO: 50 — SIGNIFICANT CHANGE UP
HOROWITZ INDEX BLDV+IHG-RTO: 50 — SIGNIFICANT CHANGE UP
HOROWITZ INDEX BLDV+IHG-RTO: 60 — SIGNIFICANT CHANGE UP
HOROWITZ INDEX BLDV+IHG-RTO: 60 — SIGNIFICANT CHANGE UP
INR BLD: 1.33 RATIO — HIGH (ref 0.88–1.16)
LACTATE BLDV-MCNC: 0.8 MMOL/L — SIGNIFICANT CHANGE UP (ref 0.5–2)
LACTATE BLDV-MCNC: 0.9 MMOL/L — SIGNIFICANT CHANGE UP (ref 0.5–2)
MAGNESIUM SERPL-MCNC: 1.8 MG/DL — SIGNIFICANT CHANGE UP (ref 1.6–2.6)
MAGNESIUM SERPL-MCNC: 2.1 MG/DL — SIGNIFICANT CHANGE UP (ref 1.8–2.6)
MCHC RBC-ENTMCNC: 28.5 PG — SIGNIFICANT CHANGE UP (ref 27–31)
MCHC RBC-ENTMCNC: 28.7 PG — SIGNIFICANT CHANGE UP (ref 27–31)
MCHC RBC-ENTMCNC: 32.5 G/DL — SIGNIFICANT CHANGE UP (ref 32–36)
MCHC RBC-ENTMCNC: 32.6 G/DL — SIGNIFICANT CHANGE UP (ref 32–36)
MCV RBC AUTO: 87.4 FL — SIGNIFICANT CHANGE UP (ref 80–94)
MCV RBC AUTO: 88.1 FL — SIGNIFICANT CHANGE UP (ref 80–94)
OTHER CELLS CSF MANUAL: 5 ML/DL — LOW (ref 18–22)
OTHER CELLS CSF MANUAL: 7 ML/DL — LOW (ref 18–22)
OTHER CELLS CSF MANUAL: 8 ML/DL — LOW (ref 18–22)
OTHER CELLS CSF MANUAL: 9 ML/DL — LOW (ref 18–22)
PCO2 BLDV: 36 MMHG — SIGNIFICANT CHANGE UP (ref 35–50)
PCO2 BLDV: 36 MMHG — SIGNIFICANT CHANGE UP (ref 35–50)
PCO2 BLDV: 38 MMHG — SIGNIFICANT CHANGE UP (ref 35–50)
PCO2 BLDV: 40 MMHG — SIGNIFICANT CHANGE UP (ref 35–50)
PCO2 BLDV: 41 MMHG — SIGNIFICANT CHANGE UP (ref 35–50)
PCO2 BLDV: 44 MMHG — SIGNIFICANT CHANGE UP (ref 35–50)
PH BLDV: 7.38 — SIGNIFICANT CHANGE UP (ref 7.32–7.43)
PH BLDV: 7.4 — SIGNIFICANT CHANGE UP (ref 7.32–7.43)
PH BLDV: 7.41 — SIGNIFICANT CHANGE UP (ref 7.32–7.43)
PH BLDV: 7.44 — HIGH (ref 7.32–7.43)
PH BLDV: 7.44 — HIGH (ref 7.32–7.43)
PH BLDV: 7.46 — HIGH (ref 7.32–7.43)
PHOSPHATE SERPL-MCNC: 4 MG/DL — SIGNIFICANT CHANGE UP (ref 2.4–4.7)
PLATELET # BLD AUTO: 193 K/UL — SIGNIFICANT CHANGE UP (ref 150–400)
PLATELET # BLD AUTO: 240 K/UL — SIGNIFICANT CHANGE UP (ref 150–400)
PO2 BLDV: 26 MMHG — SIGNIFICANT CHANGE UP (ref 25–45)
PO2 BLDV: 29 MMHG — SIGNIFICANT CHANGE UP (ref 25–45)
PO2 BLDV: 30 MMHG — SIGNIFICANT CHANGE UP (ref 25–45)
PO2 BLDV: 32 MMHG — SIGNIFICANT CHANGE UP (ref 25–45)
PO2 BLDV: 35 MMHG — SIGNIFICANT CHANGE UP (ref 25–45)
PO2 BLDV: 36 MMHG — SIGNIFICANT CHANGE UP (ref 25–45)
POTASSIUM BLDV-SCNC: 3.4 MMOL/L — SIGNIFICANT CHANGE UP (ref 3.4–4.5)
POTASSIUM BLDV-SCNC: 3.7 MMOL/L — SIGNIFICANT CHANGE UP (ref 3.4–4.5)
POTASSIUM BLDV-SCNC: 3.8 MMOL/L — SIGNIFICANT CHANGE UP (ref 3.4–4.5)
POTASSIUM BLDV-SCNC: 3.9 MMOL/L — SIGNIFICANT CHANGE UP (ref 3.4–4.5)
POTASSIUM BLDV-SCNC: 3.9 MMOL/L — SIGNIFICANT CHANGE UP (ref 3.4–4.5)
POTASSIUM BLDV-SCNC: 4.2 MMOL/L — SIGNIFICANT CHANGE UP (ref 3.4–4.5)
POTASSIUM SERPL-MCNC: 3.4 MMOL/L — LOW (ref 3.5–5.3)
POTASSIUM SERPL-MCNC: 3.8 MMOL/L — SIGNIFICANT CHANGE UP (ref 3.5–5.3)
POTASSIUM SERPL-MCNC: 3.8 MMOL/L — SIGNIFICANT CHANGE UP (ref 3.5–5.3)
POTASSIUM SERPL-MCNC: 3.9 MMOL/L — SIGNIFICANT CHANGE UP (ref 3.5–5.3)
POTASSIUM SERPL-SCNC: 3.4 MMOL/L — LOW (ref 3.5–5.3)
POTASSIUM SERPL-SCNC: 3.8 MMOL/L — SIGNIFICANT CHANGE UP (ref 3.5–5.3)
POTASSIUM SERPL-SCNC: 3.8 MMOL/L — SIGNIFICANT CHANGE UP (ref 3.5–5.3)
POTASSIUM SERPL-SCNC: 3.9 MMOL/L — SIGNIFICANT CHANGE UP (ref 3.5–5.3)
PROT SERPL-MCNC: 5.7 G/DL — LOW (ref 6.6–8.7)
PROT SERPL-MCNC: 5.8 G/DL — LOW (ref 6.6–8.7)
PROT SERPL-MCNC: 6.1 G/DL — LOW (ref 6.6–8.7)
PROTHROM AB SERPL-ACNC: 15.4 SEC — HIGH (ref 10–12.9)
RBC # BLD: 3.26 M/UL — LOW (ref 4.6–6.2)
RBC # BLD: 3.35 M/UL — LOW (ref 4.6–6.2)
RBC # FLD: 15 % — SIGNIFICANT CHANGE UP (ref 11–15.6)
RBC # FLD: 15.3 % — SIGNIFICANT CHANGE UP (ref 11–15.6)
SAO2 % BLDV: 47 % — SIGNIFICANT CHANGE UP
SAO2 % BLDV: 54 % — SIGNIFICANT CHANGE UP
SAO2 % BLDV: 58 % — SIGNIFICANT CHANGE UP
SAO2 % BLDV: 60 % — SIGNIFICANT CHANGE UP
SAO2 % BLDV: 65 % — SIGNIFICANT CHANGE UP
SAO2 % BLDV: 65 % — SIGNIFICANT CHANGE UP
SODIUM SERPL-SCNC: 135 MMOL/L — SIGNIFICANT CHANGE UP (ref 135–145)
SODIUM SERPL-SCNC: 138 MMOL/L — SIGNIFICANT CHANGE UP (ref 135–145)
SODIUM SERPL-SCNC: 139 MMOL/L — SIGNIFICANT CHANGE UP (ref 135–145)
SODIUM SERPL-SCNC: 139 MMOL/L — SIGNIFICANT CHANGE UP (ref 135–145)
SPECIMEN SOURCE: SIGNIFICANT CHANGE UP
TROPONIN T SERPL-MCNC: 2.19 NG/ML — HIGH (ref 0–0.06)
TROPONIN T SERPL-MCNC: 3.24 NG/ML — HIGH (ref 0–0.06)
TYPE + AB SCN PNL BLD: SIGNIFICANT CHANGE UP
VANCOMYCIN TROUGH SERPL-MCNC: 12.6 UG/ML — SIGNIFICANT CHANGE UP (ref 10–20)
VANCOMYCIN TROUGH SERPL-MCNC: 8.9 UG/ML — LOW (ref 10–20)
WBC # BLD: 14 K/UL — HIGH (ref 4.8–10.8)
WBC # BLD: 18.2 K/UL — HIGH (ref 4.8–10.8)
WBC # FLD AUTO: 14 K/UL — HIGH (ref 4.8–10.8)
WBC # FLD AUTO: 18.2 K/UL — HIGH (ref 4.8–10.8)

## 2018-12-09 PROCEDURE — 71045 X-RAY EXAM CHEST 1 VIEW: CPT | Mod: 26,76

## 2018-12-09 PROCEDURE — 99233 SBSQ HOSP IP/OBS HIGH 50: CPT

## 2018-12-09 PROCEDURE — 93010 ELECTROCARDIOGRAM REPORT: CPT

## 2018-12-09 PROCEDURE — 93306 TTE W/DOPPLER COMPLETE: CPT | Mod: 26

## 2018-12-09 RX ORDER — FUROSEMIDE 40 MG
10 TABLET ORAL
Qty: 500 | Refills: 0 | Status: DISCONTINUED | OUTPATIENT
Start: 2018-12-09 | End: 2018-12-09

## 2018-12-09 RX ORDER — VECURONIUM BROMIDE 20 MG/1
10 INJECTION, POWDER, FOR SOLUTION INTRAVENOUS ONCE
Qty: 0 | Refills: 0 | Status: COMPLETED | OUTPATIENT
Start: 2018-12-09 | End: 2018-12-09

## 2018-12-09 RX ORDER — FUROSEMIDE 40 MG
40 TABLET ORAL ONCE
Qty: 0 | Refills: 0 | Status: COMPLETED | OUTPATIENT
Start: 2018-12-09 | End: 2018-12-09

## 2018-12-09 RX ORDER — FUROSEMIDE 40 MG
5 TABLET ORAL
Qty: 500 | Refills: 0 | Status: DISCONTINUED | OUTPATIENT
Start: 2018-12-09 | End: 2018-12-10

## 2018-12-09 RX ORDER — FENTANYL CITRATE 50 UG/ML
50 INJECTION INTRAVENOUS ONCE
Qty: 0 | Refills: 0 | Status: DISCONTINUED | OUTPATIENT
Start: 2018-12-09 | End: 2018-12-09

## 2018-12-09 RX ORDER — POTASSIUM CHLORIDE 20 MEQ
20 PACKET (EA) ORAL ONCE
Qty: 0 | Refills: 0 | Status: COMPLETED | OUTPATIENT
Start: 2018-12-09 | End: 2018-12-09

## 2018-12-09 RX ORDER — VANCOMYCIN HCL 1 G
1250 VIAL (EA) INTRAVENOUS
Qty: 0 | Refills: 0 | Status: DISCONTINUED | OUTPATIENT
Start: 2018-12-09 | End: 2018-12-10

## 2018-12-09 RX ORDER — ERYTHROMYCIN BASE 5 MG/GRAM
1 OINTMENT (GRAM) OPHTHALMIC (EYE)
Qty: 0 | Refills: 0 | Status: DISCONTINUED | OUTPATIENT
Start: 2018-12-09 | End: 2018-12-09

## 2018-12-09 RX ORDER — MAGNESIUM SULFATE 500 MG/ML
2 VIAL (ML) INJECTION ONCE
Qty: 0 | Refills: 0 | Status: COMPLETED | OUTPATIENT
Start: 2018-12-09 | End: 2018-12-09

## 2018-12-09 RX ORDER — POTASSIUM CHLORIDE 20 MEQ
10 PACKET (EA) ORAL
Qty: 0 | Refills: 0 | Status: COMPLETED | OUTPATIENT
Start: 2018-12-09 | End: 2018-12-09

## 2018-12-09 RX ORDER — ACETAMINOPHEN 500 MG
1000 TABLET ORAL ONCE
Qty: 0 | Refills: 0 | Status: COMPLETED | OUTPATIENT
Start: 2018-12-09 | End: 2018-12-09

## 2018-12-09 RX ORDER — VANCOMYCIN HCL 1 G
500 VIAL (EA) INTRAVENOUS EVERY 6 HOURS
Qty: 0 | Refills: 0 | Status: DISCONTINUED | OUTPATIENT
Start: 2018-12-09 | End: 2018-12-10

## 2018-12-09 RX ORDER — ALBUMIN HUMAN 25 %
250 VIAL (ML) INTRAVENOUS
Qty: 0 | Refills: 0 | Status: COMPLETED | OUTPATIENT
Start: 2018-12-09 | End: 2018-12-09

## 2018-12-09 RX ORDER — MIDAZOLAM HYDROCHLORIDE 1 MG/ML
4 INJECTION, SOLUTION INTRAMUSCULAR; INTRAVENOUS ONCE
Qty: 0 | Refills: 0 | Status: DISCONTINUED | OUTPATIENT
Start: 2018-12-09 | End: 2018-12-09

## 2018-12-09 RX ORDER — NITROGLYCERIN 6.5 MG
66.67 CAPSULE, EXTENDED RELEASE ORAL
Qty: 50 | Refills: 0 | Status: DISCONTINUED | OUTPATIENT
Start: 2018-12-09 | End: 2018-12-09

## 2018-12-09 RX ADMIN — PIPERACILLIN AND TAZOBACTAM 25 GRAM(S): 4; .5 INJECTION, POWDER, LYOPHILIZED, FOR SOLUTION INTRAVENOUS at 05:02

## 2018-12-09 RX ADMIN — ATORVASTATIN CALCIUM 80 MILLIGRAM(S): 80 TABLET, FILM COATED ORAL at 21:39

## 2018-12-09 RX ADMIN — ENOXAPARIN SODIUM 40 MILLIGRAM(S): 100 INJECTION SUBCUTANEOUS at 11:24

## 2018-12-09 RX ADMIN — FENTANYL CITRATE 50 MICROGRAM(S): 50 INJECTION INTRAVENOUS at 18:12

## 2018-12-09 RX ADMIN — FENTANYL CITRATE 50 MICROGRAM(S): 50 INJECTION INTRAVENOUS at 08:20

## 2018-12-09 RX ADMIN — Medication 500 MILLIGRAM(S): at 11:23

## 2018-12-09 RX ADMIN — VECURONIUM BROMIDE 10 MILLIGRAM(S): 20 INJECTION, POWDER, FOR SOLUTION INTRAVENOUS at 09:15

## 2018-12-09 RX ADMIN — PANTOPRAZOLE SODIUM 40 MILLIGRAM(S): 20 TABLET, DELAYED RELEASE ORAL at 17:18

## 2018-12-09 RX ADMIN — FENTANYL CITRATE 50 MICROGRAM(S): 50 INJECTION INTRAVENOUS at 20:18

## 2018-12-09 RX ADMIN — Medication 325 MILLIGRAM(S): at 11:23

## 2018-12-09 RX ADMIN — Medication 400 MILLIGRAM(S): at 18:45

## 2018-12-09 RX ADMIN — FENTANYL CITRATE 50 MICROGRAM(S): 50 INJECTION INTRAVENOUS at 22:56

## 2018-12-09 RX ADMIN — Medication 1 MILLIGRAM(S): at 11:23

## 2018-12-09 RX ADMIN — FENTANYL CITRATE 50 MICROGRAM(S): 50 INJECTION INTRAVENOUS at 19:23

## 2018-12-09 RX ADMIN — Medication 100 MILLIEQUIVALENT(S): at 12:27

## 2018-12-09 RX ADMIN — INSULIN HUMAN 2 UNIT(S)/HR: 100 INJECTION, SOLUTION SUBCUTANEOUS at 10:51

## 2018-12-09 RX ADMIN — EPINEPHRINE 8 MICROGRAM(S)/KG/MIN: 0.3 INJECTION INTRAMUSCULAR; SUBCUTANEOUS at 10:51

## 2018-12-09 RX ADMIN — Medication 100 MILLIEQUIVALENT(S): at 17:08

## 2018-12-09 RX ADMIN — Medication 20 MICROGRAM(S)/MIN: at 10:52

## 2018-12-09 RX ADMIN — Medication 500 MILLIGRAM(S): at 17:28

## 2018-12-09 RX ADMIN — Medication 100 MILLIEQUIVALENT(S): at 04:30

## 2018-12-09 RX ADMIN — CHLORHEXIDINE GLUCONATE 5 MILLILITER(S): 213 SOLUTION TOPICAL at 17:18

## 2018-12-09 RX ADMIN — Medication 400 MILLIGRAM(S): at 00:36

## 2018-12-09 RX ADMIN — FENTANYL CITRATE 50 MICROGRAM(S): 50 INJECTION INTRAVENOUS at 11:35

## 2018-12-09 RX ADMIN — MILRINONE LACTATE 10.2 MICROGRAM(S)/KG/MIN: 1 INJECTION, SOLUTION INTRAVENOUS at 12:27

## 2018-12-09 RX ADMIN — FENTANYL CITRATE 50 MICROGRAM(S): 50 INJECTION INTRAVENOUS at 11:38

## 2018-12-09 RX ADMIN — EPINEPHRINE 8 MICROGRAM(S)/KG/MIN: 0.3 INJECTION INTRAMUSCULAR; SUBCUTANEOUS at 12:27

## 2018-12-09 RX ADMIN — CHLORHEXIDINE GLUCONATE 5 MILLILITER(S): 213 SOLUTION TOPICAL at 21:39

## 2018-12-09 RX ADMIN — Medication 1 TABLET(S): at 13:06

## 2018-12-09 RX ADMIN — PIPERACILLIN AND TAZOBACTAM 25 GRAM(S): 4; .5 INJECTION, POWDER, LYOPHILIZED, FOR SOLUTION INTRAVENOUS at 14:26

## 2018-12-09 RX ADMIN — FENTANYL CITRATE 50 MICROGRAM(S): 50 INJECTION INTRAVENOUS at 09:14

## 2018-12-09 RX ADMIN — Medication 0.25 MILLIGRAM(S): at 08:08

## 2018-12-09 RX ADMIN — Medication 5 MG/HR: at 10:52

## 2018-12-09 RX ADMIN — Medication 1 TABLET(S): at 21:39

## 2018-12-09 RX ADMIN — FENTANYL CITRATE 50 MICROGRAM(S): 50 INJECTION INTRAVENOUS at 13:35

## 2018-12-09 RX ADMIN — MIDAZOLAM HYDROCHLORIDE 4 MILLIGRAM(S): 1 INJECTION, SOLUTION INTRAMUSCULAR; INTRAVENOUS at 22:01

## 2018-12-09 RX ADMIN — AMIODARONE HYDROCHLORIDE 200 MILLIGRAM(S): 400 TABLET ORAL at 05:01

## 2018-12-09 RX ADMIN — Medication 20 MILLIGRAM(S): at 05:01

## 2018-12-09 RX ADMIN — FENTANYL CITRATE 50 MICROGRAM(S): 50 INJECTION INTRAVENOUS at 09:51

## 2018-12-09 RX ADMIN — Medication 100 MILLIEQUIVALENT(S): at 18:46

## 2018-12-09 RX ADMIN — SODIUM CHLORIDE 10 MILLILITER(S): 9 INJECTION INTRAMUSCULAR; INTRAVENOUS; SUBCUTANEOUS at 10:51

## 2018-12-09 RX ADMIN — Medication 8.5 MICROGRAM(S)/KG/MIN: at 10:50

## 2018-12-09 RX ADMIN — Medication 1 TABLET(S): at 11:23

## 2018-12-09 RX ADMIN — Medication 1000 MILLIGRAM(S): at 18:46

## 2018-12-09 RX ADMIN — PROPOFOL 16.33 MICROGRAM(S)/KG/MIN: 10 INJECTION, EMULSION INTRAVENOUS at 01:13

## 2018-12-09 RX ADMIN — DEXMEDETOMIDINE HYDROCHLORIDE IN 0.9% SODIUM CHLORIDE 15.87 MICROGRAM(S)/KG/HR: 4 INJECTION INTRAVENOUS at 10:50

## 2018-12-09 RX ADMIN — Medication 125 MILLILITER(S): at 10:11

## 2018-12-09 RX ADMIN — CHLORHEXIDINE GLUCONATE 5 MILLILITER(S): 213 SOLUTION TOPICAL at 13:06

## 2018-12-09 RX ADMIN — FENTANYL CITRATE 50 MICROGRAM(S): 50 INJECTION INTRAVENOUS at 09:44

## 2018-12-09 RX ADMIN — PROPOFOL 16.33 MICROGRAM(S)/KG/MIN: 10 INJECTION, EMULSION INTRAVENOUS at 10:50

## 2018-12-09 RX ADMIN — PANTOPRAZOLE SODIUM 40 MILLIGRAM(S): 20 TABLET, DELAYED RELEASE ORAL at 05:01

## 2018-12-09 RX ADMIN — Medication 1 TABLET(S): at 05:14

## 2018-12-09 RX ADMIN — MILRINONE LACTATE 13.61 MICROGRAM(S)/KG/MIN: 1 INJECTION, SOLUTION INTRAVENOUS at 10:51

## 2018-12-09 RX ADMIN — CHLORHEXIDINE GLUCONATE 5 MILLILITER(S): 213 SOLUTION TOPICAL at 10:52

## 2018-12-09 RX ADMIN — Medication 50 GRAM(S): at 17:13

## 2018-12-09 RX ADMIN — CHLORHEXIDINE GLUCONATE 5 MILLILITER(S): 213 SOLUTION TOPICAL at 02:37

## 2018-12-09 RX ADMIN — Medication 100 MILLIEQUIVALENT(S): at 18:07

## 2018-12-09 RX ADMIN — Medication 40 MILLIGRAM(S): at 09:15

## 2018-12-09 RX ADMIN — Medication 250 MILLIGRAM(S): at 05:13

## 2018-12-09 RX ADMIN — Medication 166.67 MILLIGRAM(S): at 22:12

## 2018-12-09 RX ADMIN — Medication 100 MILLIEQUIVALENT(S): at 03:37

## 2018-12-09 RX ADMIN — PROPOFOL 16.33 MICROGRAM(S)/KG/MIN: 10 INJECTION, EMULSION INTRAVENOUS at 15:46

## 2018-12-09 RX ADMIN — FENTANYL CITRATE 50 MICROGRAM(S): 50 INJECTION INTRAVENOUS at 23:27

## 2018-12-09 RX ADMIN — DEXMEDETOMIDINE HYDROCHLORIDE IN 0.9% SODIUM CHLORIDE 15.87 MICROGRAM(S)/KG/HR: 4 INJECTION INTRAVENOUS at 05:02

## 2018-12-09 RX ADMIN — Medication 100 MILLIEQUIVALENT(S): at 10:58

## 2018-12-09 RX ADMIN — PIPERACILLIN AND TAZOBACTAM 25 GRAM(S): 4; .5 INJECTION, POWDER, LYOPHILIZED, FOR SOLUTION INTRAVENOUS at 23:44

## 2018-12-09 RX ADMIN — CHLORHEXIDINE GLUCONATE 5 MILLILITER(S): 213 SOLUTION TOPICAL at 05:03

## 2018-12-09 RX ADMIN — PROPOFOL 16.33 MICROGRAM(S)/KG/MIN: 10 INJECTION, EMULSION INTRAVENOUS at 12:27

## 2018-12-09 RX ADMIN — Medication 166.67 MILLIGRAM(S): at 13:07

## 2018-12-09 RX ADMIN — Medication 125 MILLILITER(S): at 10:07

## 2018-12-09 RX ADMIN — Medication 100 MILLIEQUIVALENT(S): at 11:24

## 2018-12-09 RX ADMIN — Medication 200 GRAM(S): at 00:34

## 2018-12-09 RX ADMIN — Medication 100 MILLIEQUIVALENT(S): at 03:40

## 2018-12-09 RX ADMIN — Medication 500 MILLIGRAM(S): at 13:28

## 2018-12-09 RX ADMIN — CLOPIDOGREL BISULFATE 75 MILLIGRAM(S): 75 TABLET, FILM COATED ORAL at 11:23

## 2018-12-09 RX ADMIN — SODIUM CHLORIDE 5 MILLILITER(S): 9 INJECTION INTRAMUSCULAR; INTRAVENOUS; SUBCUTANEOUS at 10:51

## 2018-12-09 RX ADMIN — Medication 1000 MILLIGRAM(S): at 00:36

## 2018-12-09 RX ADMIN — DEXMEDETOMIDINE HYDROCHLORIDE IN 0.9% SODIUM CHLORIDE 15.87 MICROGRAM(S)/KG/HR: 4 INJECTION INTRAVENOUS at 01:02

## 2018-12-09 RX ADMIN — FENTANYL CITRATE 50 MICROGRAM(S): 50 INJECTION INTRAVENOUS at 20:36

## 2018-12-09 RX ADMIN — FENTANYL CITRATE 50 MICROGRAM(S): 50 INJECTION INTRAVENOUS at 13:28

## 2018-12-09 NOTE — PROGRESS NOTE ADULT - SUBJECTIVE AND OBJECTIVE BOX
SUBJECTIVE       INTERIM HISTORY SIGNIFICANT FOR   STEMI  +cath LM thrombus   arrest w/ CPR   emergent impella placement for cardiogenic shock   Impella clot on way to OR --> emergent CABG   peripheral ECMO placed in OR   ecmo weaned / IABP changed to impella   dissection illiac artery s.p imella removal     of most recently CT head without acute findings  CT chest sig for b/l PTX/ b/l consolidation         Patient is a 47y old  Male who presents with a chief complaint of Chest Pain (08 Dec 2018 15:21)    HPI:  This is a 48 y/o male no significant PMH; recent ABT (doxycycline) r/t cellulitis to right groin per pt. Pt presented to the ED with 10/10 chest pain that he reported started at 0930; he drank water with no relief; pain started to progress to B/L shoulders. Per pt spouse, she reports he has had chest pain x2-3 days and this morning he vomited which he attributed to reflux.  She brought him into the emergency department.    Pt has significant EKG changes ST elevations in leads V1-V5 with reciprocal changes in II, III, AVF.  Pt rec'd asa and plavix load in ED. (29 Nov 2018 11:32)    OBJECTIVE  PAST MEDICAL & SURGICAL HISTORY:  Staph infection  No significant past surgical history    penicillins (Unknown)    Home Medications:    VITALS  Currently in sinus rhythm with vitals as below  ICU Vital Signs Last 24 Hrs  T(C): 37.5 (09 Dec 2018 05:00), Max: 38.8 (08 Dec 2018 23:00)  T(F): 99.5 (09 Dec 2018 05:00), Max: 101.8 (08 Dec 2018 23:00)  HR: 84 (09 Dec 2018 05:15) (77 - 106)  BP: --  BP(mean): --  ABP: 142/70 (09 Dec 2018 05:15) (103/55 - 151/70)  ABP(mean): 87 (09 Dec 2018 05:15) (58 - 129)  RR: 30 (09 Dec 2018 05:15) (12 - 40)  SpO2: 100% (09 Dec 2018 05:15) (96% - 100%)    Adult Advanced Hemodynamics Last 24 Hrs  CVP(mm Hg): 18 (09 Dec 2018 05:15) (-32 - 30)  CVP(cm H2O): --  CO: 4.7 (08 Dec 2018 08:00) (4.7 - 4.7)  CI: 2.2 (08 Dec 2018 08:00) (2.2 - 2.2)  PA: -8/-37 (08 Dec 2018 14:45) (-43/-43 - 59/34)  PA(mean): -26 (08 Dec 2018 14:45) (-43 - 43)  PCWP: --  SVR: 952 (08 Dec 2018 08:00) (952 - 952)  SVRI: 2033 (08 Dec 2018 08:00) (2033 - 2033)  PVR: --  PVRI: --  LABS                        9.6    14.0  )-----------( 193      ( 09 Dec 2018 01:44 )             29.5     Culture - Urine (collected 07 Dec 2018 22:28)  Source: .Urine  Final Report (08 Dec 2018 14:39):    No growth    12-09    139  |  105  |  25.0<H>  ----------------------------<  136<H>  3.8   |  22.0  |  0.89    Ca    8.1<L>      09 Dec 2018 01:44  Phos  3.4     12-08  Mg     2.1     12-09    TPro  5.7<L>  /  Alb  2.7<L>  /  TBili  2.3<H>  /  DBili  x   /  AST  44<H>  /  ALT  74<H>  /  AlkPhos  225<H>  12-09  CAPILLARY BLOOD GLUCOSE      POCT Blood Glucose.: 129 mg/dL (09 Dec 2018 02:54)      ABG - ( 09 Dec 2018 03:56 )  pH, Arterial: 7.48  pH, Blood: x     /  pCO2: 30    /  pO2: 156   / HCO3: 24    / Base Excess: -0.3  /  SaO2: 100               Mode: AC/ CMV (Assist Control/ Continuous Mandatory Ventilation)  RR (machine): 10  TV (machine): 500  FiO2: 50  PEEP: 5  MAP: 11  PIP: 20    IN/OUT    12-07-18 @ 07:01  -  12-08-18 @ 07:00  --------------------------------------------------------  IN: 3794.5 mL / OUT: 3470 mL / NET: 324.5 mL    12-08-18 @ 07:01  -  12-09-18 @ 05:48  --------------------------------------------------------  IN: 3496.5 mL / OUT: 3330 mL / NET: 166.5 mL      IMAGING  personally reviewed imaging   Xray Chest 1 View- PORTABLE-Urgent:    EXAM:  XR CHEST PORTABLE URGENT 1V                          PROCEDURE DATE:  12/08/2018          INTERPRETATION:  CHEST AP PORTABLE:    History: s/p central line.     Date and time of exam: 12/8/2018 1:48 PM.    Technique: A single AP view of the chest was obtained.    Comparison exam: 12/8/2018 4:23 AM.    Findings:  Small bilateral pneumothoraces, unchanged. The [place]-Michelle catheter has   been withdrawn from the right IJ central line. Left IJ central line has   been inserted. The tip is located in the region of the innominate vein.   Other lines and tubes are unchanged..    Impression:  Insertion of left IJ central line since the prior study. Small bilateral   pneumothoraces, unchanged..                ZIA ELIZABETH M.D., ATTENDING RADIOLOGIST  This document has been electronically signed. Dec  8 2018  2:37PM               (12-08-18 @ 14:31)    CURRENT MEDICATIONS  MEDICATIONS  (STANDING):  amiodarone    Tablet 200 milliGRAM(s) Oral daily  ascorbic acid 500 milliGRAM(s) Oral daily  aspirin 325 milliGRAM(s) Oral daily  atorvastatin 80 milliGRAM(s) Oral at bedtime  chlorhexidine 0.12% Liquid 5 milliLiter(s) Oral Mucosa every 4 hours  clopidogrel Tablet 75 milliGRAM(s) Oral daily  dexmedetomidine Infusion 0.7 MICROgram(s)/kG/Hr (15.872 mL/Hr) IV Continuous <Continuous>  dextrose 50% Injectable 50 milliLiter(s) IV Push every 15 minutes  enoxaparin Injectable 40 milliGRAM(s) SubCutaneous daily  EPINEPHrine    Infusion 0.026 MICROgram(s)/kG/Min (9 mL/Hr) IV Continuous <Continuous>  ferrous    sulfate 325 milliGRAM(s) Oral daily  folic acid 1 milliGRAM(s) Oral daily  furosemide   Injectable 20 milliGRAM(s) IV Push every 8 hours  insulin Infusion 2 Unit(s)/Hr (2 mL/Hr) IV Continuous <Continuous>  lactobacillus acidophilus 1 Tablet(s) Oral every 8 hours  milrinone Infusion 0.25 MICROgram(s)/kG/Min (6.803 mL/Hr) IV Continuous <Continuous>  multivitamin 1 Tablet(s) Oral daily  norepinephrine Infusion 0.05 MICROgram(s)/kG/Min (8.503 mL/Hr) IV Continuous <Continuous>  pantoprazole  Injectable 40 milliGRAM(s) IV Push every 12 hours  piperacillin/tazobactam IVPB. 3.375 Gram(s) IV Intermittent every 8 hours  potassium chloride  10 mEq/50 mL IVPB 10 milliEquivalent(s) IV Intermittent every 1 hour  potassium chloride  10 mEq/50 mL IVPB 10 milliEquivalent(s) IV Intermittent every 1 hour  potassium chloride  10 mEq/50 mL IVPB 10 milliEquivalent(s) IV Intermittent every 1 hour  propofol Infusion 30 MICROgram(s)/kG/Min (16.326 mL/Hr) IV Continuous <Continuous>  sodium chloride 0.9%. 1000 milliLiter(s) (5 mL/Hr) IV Continuous <Continuous>  sodium chloride 0.9%. 1000 milliLiter(s) (10 mL/Hr) IV Continuous <Continuous>  vancomycin  IVPB 1000 milliGRAM(s) IV Intermittent <User Schedule>    MEDICATIONS  (PRN):  artificial  tears Solution 1 Drop(s) Both EYES four times a day PRN Dry Eyes  fentaNYL    Injectable 50 MICROGram(s) IV Push every 2 hours PRN Moderate Pain (4 - 6)  petrolatum white Ointment 1 Application(s) Topical four times a day PRN dry lips      Invasive Lines & Indwelling Catheters  LIJ, L radial

## 2018-12-09 NOTE — PROGRESS NOTE ADULT - ASSESSMENT
This is a 47y  Male with no significant PMH admitted on 11/29/18 for CP for 2-3 days prior to admission,   found with STEMI. Urgent Cath, pt found to have multiple occlusions; VF arrest, shock x 1, return to NSR.  Upon transfer to OR for Urgent CABG, pt again VF arrest, chest opened intra-op with CPR in progress.  s/p ECMO and Impella; 12/3 ECMO explanted and Impella placed via right groin sheath.    now developed fevers.    patient in hospital for > 48 hours, multiple interventions.   blood cultures and sputum cx sent  urine legionella is NEGATIVE    CT Chest with PNA in right lower and left upper lung  - continue Vancomycin 1 gram Q8H  - continue Zosyn 3.375 gram Q8H;    Diarrhea today  - ? Cdiff; will check  - start empiric NGT vancomycin - will stop only if negative      - follow up all outstanding cultures  - trend temperature and WBC curve  - repeat cultures from blood and all sources if febrile.

## 2018-12-09 NOTE — CHART NOTE - NSCHARTNOTEFT_GEN_A_CORE
Patient is a 46 yo male s/p emergent cardiac cath for stemi, s/p VT arrest, CABG x 4, ECMO, Impella who had been weaned from all cardiac devices and was being weaned slowly from inotropic support with full ventilator assistance.  This am pt was placed on CPAP and appeared to be tolerating well for 1.5 hours then suddenly became agitated, hypertensive requiring transient nitroglycerin gtt and hypoxic with 02 sats dipping into the 70s.  Patient was then immediately bagged and placed back on full vent support.  CXR revealed fulminate pulmonary edema.  Inotropic support with primacor and epi infusions were increased.  Lasix gtt was initiated at 5cc/hr and swan lisha was placed.  CO/CI were 4.8/2.3 respectively with an SVR of 915.  ABG was 7.30/46/63/21/-0.3 on FIO2 100%.  Currently he is on Epi at 8cc, Primacor at 0.25, just reduced from 0.5 mcg/kg/min, Levo is titrating down to 11cc from 20cc and lasix gtt is at 5 mg/hr (2.5cc).  Hemodynamically, HR is  NSR, /45 with mean 71, PAs 31/12 and CVP 15.  Urgent echo performed, LV Fxn < 20%.  ECG unchanged.  CK and Trop 214/3.24 respectively.  Hct 29.5 and renal Fxn 25/0.89.     Patient is now gently sedated with intermittent doses of fentanyl 50 mg and diuresing 150cc/hr urine.  VS have been stable with CO/CI stable at 6.2/3.0.  ID recommends to continue Vanco/Zosyn IV and add Vanco PO while C Diff sent for diarrhea.  TFs are at goal.  Dr.'s Connors and Arnold aware and agree with above. Patient is a 46 yo male s/p emergent cardiac cath for stemi, s/p VT arrest, CABG x 4, ECMO, Impella who had been weaned from all cardiac devices and was being weaned slowly from inotropic support with full ventilator assistance.  This am pt was placed on CPAP and appeared to be tolerating well for 1.5 hours then suddenly became agitated, hypertensive requiring transient nitroglycerin gtt and hypoxic with 02 sats dipping into the 70s.  Patient was then immediately bagged and placed back on full vent support.  CXR revealed fulminate pulmonary edema.  Inotropic support with primacor and epi infusions were increased.  Lasix gtt was initiated at 5cc/hr and [place] Michelle was placed.  CO/CI were 4.8/2.3 respectively with an SVR of 915.  ABG was 7.30/46/63/21/-0.3 on FIO2 100%.  Currently he is on Epi at 8cc, Primacor at 0.25, just reduced from 0.5 mcg/kg/min, Levo is titrating down to 11cc from 20cc and lasix gtt is at 5 mg/hr (2.5cc).  Hemodynamically, HR is  NSR, /45 with mean 71, PAs 31/12 and CVP 15.  Urgent echo performed, LV Fxn < 20%.  ECG unchanged.  CK and Trop 214/3.24 respectively.  Hct 29.5 and renal Fxn 25/0.89.     Patient is now gently sedated with intermittent doses of fentanyl 50 mg and diuresing 150cc/hr urine.  VS have been stable with CO/CI stable at 6.2/3.0.  ID recommends to continue Vanco/Zosyn IV and add Vanco PO while C Diff sent for diarrhea.  TFs are at goal.  Dr.'s Connors and Arnold aware and agree with above.

## 2018-12-09 NOTE — PROGRESS NOTE ADULT - SUBJECTIVE AND OBJECTIVE BOX
[facility]  INFECTIOUS DISEASES AND INTERNAL MEDICINE at [place]  =======================================================  Perfecto Santizo MD  Diplomates American Board of Internal Medicine and Infectious Diseases  =======================================================    [facility]-[id number]  PATRICK LYLE   follow up for:  post operative fevers  patient seen and examined.  failed CPAP this AM  remains intubated; on Pressors    diarrhea this Am  ===================================================  REVIEW OF SYSTEMS:  unable to obtain due to medical condition  =======================================================  Antibiotics:  piperacillin/tazobactam IVPB. 3.375 Gram(s) IV Intermittent every 8 hours  vancomycin    Solution 500 milliGRAM(s) Enteral Tube every 6 hours  vancomycin  IVPB 1250 milliGRAM(s) IV Intermittent <User Schedule>    Other medications:  amiodarone    Tablet 200 milliGRAM(s) Oral daily  ascorbic acid 500 milliGRAM(s) Oral daily  aspirin 325 milliGRAM(s) Oral daily  atorvastatin 80 milliGRAM(s) Oral at bedtime  chlorhexidine 0.12% Liquid 5 milliLiter(s) Oral Mucosa every 4 hours  clopidogrel Tablet 75 milliGRAM(s) Oral daily  dexmedetomidine Infusion 0.7 MICROgram(s)/kG/Hr IV Continuous <Continuous>  dextrose 50% Injectable 50 milliLiter(s) IV Push every 15 minutes  enoxaparin Injectable 40 milliGRAM(s) SubCutaneous daily  EPINEPHrine    Infusion 0.024 MICROgram(s)/kG/Min IV Continuous <Continuous>  ferrous    sulfate 325 milliGRAM(s) Oral daily  folic acid 1 milliGRAM(s) Oral daily  furosemide   Injectable 20 milliGRAM(s) IV Push every 8 hours  furosemide Infusion 10 mG/Hr IV Continuous <Continuous>  insulin Infusion 2 Unit(s)/Hr IV Continuous <Continuous>  lactobacillus acidophilus 1 Tablet(s) Oral every 8 hours  milrinone Infusion 0.375 MICROgram(s)/kG/Min IV Continuous <Continuous>  multivitamin 1 Tablet(s) Oral daily  nitroglycerin  Infusion 66.667 MICROgram(s)/Min IV Continuous <Continuous>  norepinephrine Infusion 0.05 MICROgram(s)/kG/Min IV Continuous <Continuous>  pantoprazole  Injectable 40 milliGRAM(s) IV Push every 12 hours  potassium chloride  10 mEq/50 mL IVPB 10 milliEquivalent(s) IV Intermittent every 1 hour  potassium chloride  10 mEq/50 mL IVPB 10 milliEquivalent(s) IV Intermittent every 1 hour  potassium chloride  10 mEq/50 mL IVPB 10 milliEquivalent(s) IV Intermittent every 1 hour  potassium chloride  10 mEq/50 mL IVPB 10 milliEquivalent(s) IV Intermittent every 1 hour  propofol Infusion 30 MICROgram(s)/kG/Min IV Continuous <Continuous>  sodium chloride 0.9%. 1000 milliLiter(s) IV Continuous <Continuous>  sodium chloride 0.9%. 1000 milliLiter(s) IV Continuous <Continuous>    =======================================================    Physical Exam:  T(F): 99.5 (09 Dec 2018 11:00), Max: 101.8 (08 Dec 2018 23:00)  HR: 90 (09 Dec 2018 11:15)  RR: 36 (09 Dec 2018 11:15)  SpO2: 100% (09 Dec 2018 11:15) (83% - 100%)    General:  intubated, no distress  Eye: Pupils are equal, round and reactive to light,   HENT: Normocephalic, Oral mucosa is moist, ET Tube in place  Neck: Supple, No lymphadenopathy.  RIGHT neck IJ line  Respiratory: Lungs with fair air entry  midline chest with dressing in place.   + CHEST tubes in place  Cardiovascular: Normal rate, Regular rhythm, No murmur, Good pulses equal in all extremities,  No edema.  Gastrointestinal: Soft, Non-tender, Non-distended, Normal bowel sounds.  Genitourinary: + Mckeon with light color urine  Lymphatics: No lymphadenopathy neck,   Musculoskeletal: Normal range of motion,   Integumentary: No rash.  Neurologic: Alert, Cranial Nerves II-XII are grossly intact.    =======================================================  Labs:                        9.6    14.0  )-----------( 193      ( 09 Dec 2018 01:44 )             29.5     12-09    138  |  104  |  25.0<H>  ----------------------------<  173<H>  3.9   |  21.0<L>  |  0.92    Ca    7.7<L>      09 Dec 2018 09:27  Phos  3.4     12-08  Mg     2.1     12-09    TPro  6.1<L>  /  Alb  2.7<L>  /  TBili  2.7<H>  /  DBili  x   /  AST  74<H>  /  ALT  78<H>  /  AlkPhos  274<H>  12-09        Culture - Urine (collected 12-07-18 @ 22:28)  Source: .Urine  Final Report (12-08-18 @ 14:39):    No growth    Culture - Blood (collected 12-05-18 @ 10:21)  Source: .Blood    Culture - Blood (collected 12-05-18 @ 10:21)  Source: .Blood    Culture - Blood (collected 12-04-18 @ 10:22)  Source: .Blood  Final Report (12-09-18 @ 11:01):    No growth at 5 days.    Culture - Blood (collected 12-04-18 @ 10:22)  Source: .Blood  Final Report (12-09-18 @ 11:01):    No growth at 5 days.    Culture - Sputum (collected 12-04-18 @ 10:21)  Source: .Sputum  Gram Stain (12-04-18 @ 11:59):    Few White blood cells    No organisms seen  Final Report (12-06-18 @ 14:00):    Few Candida albicans    No Routine respiratory keara present

## 2018-12-09 NOTE — PROGRESS NOTE ADULT - MENTAL STATUS
follow commands  weakness of left upper arm and right lower leg   without gross defect   moves all ext spontaneously

## 2018-12-10 ENCOUNTER — APPOINTMENT (OUTPATIENT)
Dept: THORACIC SURGERY | Facility: HOSPITAL | Age: 48
End: 2018-12-10

## 2018-12-10 LAB
ALBUMIN SERPL ELPH-MCNC: 3.2 G/DL — LOW (ref 3.3–5.2)
ALP SERPL-CCNC: 193 U/L — HIGH (ref 40–120)
ALT FLD-CCNC: 49 U/L — HIGH
ANION GAP SERPL CALC-SCNC: 13 MMOL/L — SIGNIFICANT CHANGE UP (ref 5–17)
APTT BLD: 29.2 SEC — SIGNIFICANT CHANGE UP (ref 27.5–36.3)
AST SERPL-CCNC: 33 U/L — SIGNIFICANT CHANGE UP
BASE EXCESS BLDV CALC-SCNC: 1.7 MMOL/L — SIGNIFICANT CHANGE UP (ref -2–2)
BASE EXCESS BLDV CALC-SCNC: 1.8 MMOL/L — SIGNIFICANT CHANGE UP (ref -2–2)
BILIRUB SERPL-MCNC: 2.3 MG/DL — HIGH (ref 0.4–2)
BLD GP AB SCN SERPL QL: SIGNIFICANT CHANGE UP
BLOOD GAS COMMENTS, VENOUS: SIGNIFICANT CHANGE UP
BLOOD GAS COMMENTS, VENOUS: SIGNIFICANT CHANGE UP
BUN SERPL-MCNC: 20 MG/DL — SIGNIFICANT CHANGE UP (ref 8–20)
CALCIUM SERPL-MCNC: 7.8 MG/DL — LOW (ref 8.6–10.2)
CHLORIDE SERPL-SCNC: 106 MMOL/L — SIGNIFICANT CHANGE UP (ref 98–107)
CK MB CFR SERPL CALC: 2.2 NG/ML — SIGNIFICANT CHANGE UP (ref 0–6.7)
CK SERPL-CCNC: 187 U/L — SIGNIFICANT CHANGE UP (ref 30–200)
CO2 SERPL-SCNC: 23 MMOL/L — SIGNIFICANT CHANGE UP (ref 22–29)
CREAT SERPL-MCNC: 0.63 MG/DL — SIGNIFICANT CHANGE UP (ref 0.5–1.3)
CULTURE RESULTS: SIGNIFICANT CHANGE UP
CULTURE RESULTS: SIGNIFICANT CHANGE UP
GAS PNL BLDA: SIGNIFICANT CHANGE UP
GAS PNL BLDA: SIGNIFICANT CHANGE UP
GAS PNL BLDV: SIGNIFICANT CHANGE UP
GAS PNL BLDV: SIGNIFICANT CHANGE UP
GLUCOSE BLDC GLUCOMTR-MCNC: 112 MG/DL — HIGH (ref 70–99)
GLUCOSE BLDC GLUCOMTR-MCNC: 112 MG/DL — HIGH (ref 70–99)
GLUCOSE BLDC GLUCOMTR-MCNC: 120 MG/DL — HIGH (ref 70–99)
GLUCOSE BLDC GLUCOMTR-MCNC: 120 MG/DL — HIGH (ref 70–99)
GLUCOSE BLDC GLUCOMTR-MCNC: 126 MG/DL — HIGH (ref 70–99)
GLUCOSE BLDC GLUCOMTR-MCNC: 126 MG/DL — HIGH (ref 70–99)
GLUCOSE BLDC GLUCOMTR-MCNC: 128 MG/DL — HIGH (ref 70–99)
GLUCOSE BLDC GLUCOMTR-MCNC: 136 MG/DL — HIGH (ref 70–99)
GLUCOSE BLDC GLUCOMTR-MCNC: 142 MG/DL — HIGH (ref 70–99)
GLUCOSE BLDC GLUCOMTR-MCNC: 144 MG/DL — HIGH (ref 70–99)
GLUCOSE SERPL-MCNC: 115 MG/DL — SIGNIFICANT CHANGE UP (ref 70–115)
HCO3 BLDV-SCNC: 25 MMOL/L — SIGNIFICANT CHANGE UP (ref 21–29)
HCO3 BLDV-SCNC: 26 MMOL/L — SIGNIFICANT CHANGE UP (ref 21–29)
HCT VFR BLD CALC: 27.8 % — LOW (ref 42–52)
HGB BLD-MCNC: 8.8 G/DL — LOW (ref 14–18)
HOROWITZ INDEX BLDV+IHG-RTO: 40 — SIGNIFICANT CHANGE UP
HOROWITZ INDEX BLDV+IHG-RTO: 60 — SIGNIFICANT CHANGE UP
INR BLD: 1.35 RATIO — HIGH (ref 0.88–1.16)
MAGNESIUM SERPL-MCNC: 2.1 MG/DL — SIGNIFICANT CHANGE UP (ref 1.6–2.6)
MCHC RBC-ENTMCNC: 28.2 PG — SIGNIFICANT CHANGE UP (ref 27–31)
MCHC RBC-ENTMCNC: 31.7 G/DL — LOW (ref 32–36)
MCV RBC AUTO: 89.1 FL — SIGNIFICANT CHANGE UP (ref 80–94)
PCO2 BLDV: 44 MMHG — SIGNIFICANT CHANGE UP (ref 35–50)
PCO2 BLDV: 45 MMHG — SIGNIFICANT CHANGE UP (ref 35–50)
PH BLDV: 7.39 — SIGNIFICANT CHANGE UP (ref 7.32–7.43)
PH BLDV: 7.4 — SIGNIFICANT CHANGE UP (ref 7.32–7.43)
PHOSPHATE SERPL-MCNC: 4.1 MG/DL — SIGNIFICANT CHANGE UP (ref 2.4–4.7)
PLATELET # BLD AUTO: 265 K/UL — SIGNIFICANT CHANGE UP (ref 150–400)
PO2 BLDV: 37 MMHG — SIGNIFICANT CHANGE UP (ref 25–45)
PO2 BLDV: 38 MMHG — SIGNIFICANT CHANGE UP (ref 25–45)
POTASSIUM SERPL-MCNC: 4.3 MMOL/L — SIGNIFICANT CHANGE UP (ref 3.5–5.3)
POTASSIUM SERPL-SCNC: 4.3 MMOL/L — SIGNIFICANT CHANGE UP (ref 3.5–5.3)
PROT SERPL-MCNC: 6 G/DL — LOW (ref 6.6–8.7)
PROTHROM AB SERPL-ACNC: 15.7 SEC — HIGH (ref 10–12.9)
RBC # BLD: 3.12 M/UL — LOW (ref 4.6–6.2)
RBC # FLD: 15.3 % — SIGNIFICANT CHANGE UP (ref 11–15.6)
SAO2 % BLDV: 67 % — SIGNIFICANT CHANGE UP
SAO2 % BLDV: 68 % — SIGNIFICANT CHANGE UP
SODIUM SERPL-SCNC: 142 MMOL/L — SIGNIFICANT CHANGE UP (ref 135–145)
SPECIMEN SOURCE: SIGNIFICANT CHANGE UP
SPECIMEN SOURCE: SIGNIFICANT CHANGE UP
SURGICAL PATHOLOGY FINAL REPORT - CH: SIGNIFICANT CHANGE UP
TRIGL SERPL-MCNC: 196 MG/DL — SIGNIFICANT CHANGE UP (ref 10–200)
TROPONIN T SERPL-MCNC: 2.4 NG/ML — HIGH (ref 0–0.06)
TYPE + AB SCN PNL BLD: SIGNIFICANT CHANGE UP
WBC # BLD: 15.1 K/UL — HIGH (ref 4.8–10.8)
WBC # FLD AUTO: 15.1 K/UL — HIGH (ref 4.8–10.8)

## 2018-12-10 PROCEDURE — 99291 CRITICAL CARE FIRST HOUR: CPT | Mod: 24

## 2018-12-10 PROCEDURE — 71045 X-RAY EXAM CHEST 1 VIEW: CPT | Mod: 26,77

## 2018-12-10 PROCEDURE — 99232 SBSQ HOSP IP/OBS MODERATE 35: CPT

## 2018-12-10 PROCEDURE — 31624 DX BRONCHOSCOPE/LAVAGE: CPT | Mod: 58

## 2018-12-10 PROCEDURE — 71045 X-RAY EXAM CHEST 1 VIEW: CPT | Mod: 26

## 2018-12-10 PROCEDURE — 31600 PLANNED TRACHEOSTOMY: CPT | Mod: 58

## 2018-12-10 PROCEDURE — 99291 CRITICAL CARE FIRST HOUR: CPT

## 2018-12-10 RX ORDER — FERROUS SULFATE 325(65) MG
325 TABLET ORAL DAILY
Qty: 0 | Refills: 0 | Status: DISCONTINUED | OUTPATIENT
Start: 2018-12-10 | End: 2018-12-23

## 2018-12-10 RX ORDER — ALBUMIN HUMAN 25 %
250 VIAL (ML) INTRAVENOUS
Qty: 0 | Refills: 0 | Status: COMPLETED | OUTPATIENT
Start: 2018-12-10 | End: 2018-12-10

## 2018-12-10 RX ORDER — FUROSEMIDE 40 MG
5 TABLET ORAL
Qty: 500 | Refills: 0 | Status: DISCONTINUED | OUTPATIENT
Start: 2018-12-10 | End: 2018-12-21

## 2018-12-10 RX ORDER — POTASSIUM CHLORIDE 20 MEQ
10 PACKET (EA) ORAL
Qty: 0 | Refills: 0 | Status: COMPLETED | OUTPATIENT
Start: 2018-12-10 | End: 2018-12-10

## 2018-12-10 RX ORDER — FENTANYL CITRATE 50 UG/ML
50 INJECTION INTRAVENOUS ONCE
Qty: 0 | Refills: 0 | Status: DISCONTINUED | OUTPATIENT
Start: 2018-12-10 | End: 2018-12-10

## 2018-12-10 RX ORDER — AMIODARONE HYDROCHLORIDE 400 MG/1
200 TABLET ORAL DAILY
Qty: 0 | Refills: 0 | Status: DISCONTINUED | OUTPATIENT
Start: 2018-12-10 | End: 2018-12-21

## 2018-12-10 RX ORDER — ACETAMINOPHEN 500 MG
1000 TABLET ORAL ONCE
Qty: 0 | Refills: 0 | Status: COMPLETED | OUTPATIENT
Start: 2018-12-10 | End: 2018-12-10

## 2018-12-10 RX ORDER — PIPERACILLIN AND TAZOBACTAM 4; .5 G/20ML; G/20ML
3.38 INJECTION, POWDER, LYOPHILIZED, FOR SOLUTION INTRAVENOUS EVERY 8 HOURS
Qty: 0 | Refills: 0 | Status: DISCONTINUED | OUTPATIENT
Start: 2018-12-10 | End: 2018-12-12

## 2018-12-10 RX ORDER — VANCOMYCIN HCL 1 G
1250 VIAL (EA) INTRAVENOUS EVERY 8 HOURS
Qty: 0 | Refills: 0 | Status: DISCONTINUED | OUTPATIENT
Start: 2018-12-10 | End: 2018-12-12

## 2018-12-10 RX ORDER — SODIUM CHLORIDE 9 MG/ML
1000 INJECTION INTRAMUSCULAR; INTRAVENOUS; SUBCUTANEOUS
Qty: 0 | Refills: 0 | Status: DISCONTINUED | OUTPATIENT
Start: 2018-12-10 | End: 2018-12-16

## 2018-12-10 RX ORDER — ASPIRIN/CALCIUM CARB/MAGNESIUM 324 MG
325 TABLET ORAL DAILY
Qty: 0 | Refills: 0 | Status: DISCONTINUED | OUTPATIENT
Start: 2018-12-10 | End: 2018-12-19

## 2018-12-10 RX ORDER — VANCOMYCIN HCL 1 G
500 VIAL (EA) INTRAVENOUS ONCE
Qty: 0 | Refills: 0 | Status: COMPLETED | OUTPATIENT
Start: 2018-12-10 | End: 2018-12-10

## 2018-12-10 RX ORDER — PETROLATUM,WHITE
1 JELLY (GRAM) TOPICAL
Qty: 0 | Refills: 0 | Status: DISCONTINUED | OUTPATIENT
Start: 2018-12-10 | End: 2018-12-23

## 2018-12-10 RX ORDER — CLOPIDOGREL BISULFATE 75 MG/1
75 TABLET, FILM COATED ORAL DAILY
Qty: 0 | Refills: 0 | Status: DISCONTINUED | OUTPATIENT
Start: 2018-12-10 | End: 2018-12-19

## 2018-12-10 RX ORDER — CHLORHEXIDINE GLUCONATE 213 G/1000ML
5 SOLUTION TOPICAL EVERY 4 HOURS
Qty: 0 | Refills: 0 | Status: DISCONTINUED | OUTPATIENT
Start: 2018-12-10 | End: 2019-01-03

## 2018-12-10 RX ORDER — PANTOPRAZOLE SODIUM 20 MG/1
40 TABLET, DELAYED RELEASE ORAL EVERY 12 HOURS
Qty: 0 | Refills: 0 | Status: DISCONTINUED | OUTPATIENT
Start: 2018-12-10 | End: 2018-12-19

## 2018-12-10 RX ORDER — FENTANYL CITRATE 50 UG/ML
100 INJECTION INTRAVENOUS ONCE
Qty: 0 | Refills: 0 | Status: DISCONTINUED | OUTPATIENT
Start: 2018-12-10 | End: 2018-12-10

## 2018-12-10 RX ORDER — FENTANYL CITRATE 50 UG/ML
50 INJECTION INTRAVENOUS
Qty: 0 | Refills: 0 | Status: DISCONTINUED | OUTPATIENT
Start: 2018-12-10 | End: 2018-12-12

## 2018-12-10 RX ORDER — PROPOFOL 10 MG/ML
30 INJECTION, EMULSION INTRAVENOUS
Qty: 1000 | Refills: 0 | Status: DISCONTINUED | OUTPATIENT
Start: 2018-12-10 | End: 2018-12-12

## 2018-12-10 RX ORDER — ENOXAPARIN SODIUM 100 MG/ML
40 INJECTION SUBCUTANEOUS DAILY
Qty: 0 | Refills: 0 | Status: DISCONTINUED | OUTPATIENT
Start: 2018-12-10 | End: 2018-12-19

## 2018-12-10 RX ORDER — ATORVASTATIN CALCIUM 80 MG/1
80 TABLET, FILM COATED ORAL AT BEDTIME
Qty: 0 | Refills: 0 | Status: DISCONTINUED | OUTPATIENT
Start: 2018-12-10 | End: 2018-12-21

## 2018-12-10 RX ORDER — MILRINONE LACTATE 1 MG/ML
0.12 INJECTION, SOLUTION INTRAVENOUS
Qty: 20 | Refills: 0 | Status: DISCONTINUED | OUTPATIENT
Start: 2018-12-10 | End: 2018-12-13

## 2018-12-10 RX ORDER — INSULIN HUMAN 100 [IU]/ML
2 INJECTION, SOLUTION SUBCUTANEOUS
Qty: 250 | Refills: 0 | Status: DISCONTINUED | OUTPATIENT
Start: 2018-12-10 | End: 2018-12-13

## 2018-12-10 RX ORDER — ASCORBIC ACID 60 MG
500 TABLET,CHEWABLE ORAL DAILY
Qty: 0 | Refills: 0 | Status: DISCONTINUED | OUTPATIENT
Start: 2018-12-10 | End: 2019-01-06

## 2018-12-10 RX ORDER — NOREPINEPHRINE BITARTRATE/D5W 8 MG/250ML
0.05 PLASTIC BAG, INJECTION (ML) INTRAVENOUS
Qty: 8 | Refills: 0 | Status: DISCONTINUED | OUTPATIENT
Start: 2018-12-10 | End: 2018-12-13

## 2018-12-10 RX ORDER — EPINEPHRINE 0.3 MG/.3ML
0.02 INJECTION INTRAMUSCULAR; SUBCUTANEOUS
Qty: 4 | Refills: 0 | Status: DISCONTINUED | OUTPATIENT
Start: 2018-12-10 | End: 2018-12-11

## 2018-12-10 RX ORDER — FOLIC ACID 0.8 MG
1 TABLET ORAL DAILY
Qty: 0 | Refills: 0 | Status: DISCONTINUED | OUTPATIENT
Start: 2018-12-10 | End: 2019-01-05

## 2018-12-10 RX ORDER — LACTOBACILLUS ACIDOPHILUS 100MM CELL
1 CAPSULE ORAL EVERY 8 HOURS
Qty: 0 | Refills: 0 | Status: DISCONTINUED | OUTPATIENT
Start: 2018-12-10 | End: 2018-12-29

## 2018-12-10 RX ADMIN — Medication 8.5 MICROGRAM(S)/KG/MIN: at 11:13

## 2018-12-10 RX ADMIN — Medication 50 MILLIEQUIVALENT(S): at 02:19

## 2018-12-10 RX ADMIN — Medication 50 MILLIEQUIVALENT(S): at 02:21

## 2018-12-10 RX ADMIN — PANTOPRAZOLE SODIUM 40 MILLIGRAM(S): 20 TABLET, DELAYED RELEASE ORAL at 18:18

## 2018-12-10 RX ADMIN — Medication 1000 MILLIGRAM(S): at 08:56

## 2018-12-10 RX ADMIN — Medication 2 MILLIGRAM(S): at 08:55

## 2018-12-10 RX ADMIN — PANTOPRAZOLE SODIUM 40 MILLIGRAM(S): 20 TABLET, DELAYED RELEASE ORAL at 06:20

## 2018-12-10 RX ADMIN — PIPERACILLIN AND TAZOBACTAM 25 GRAM(S): 4; .5 INJECTION, POWDER, LYOPHILIZED, FOR SOLUTION INTRAVENOUS at 14:21

## 2018-12-10 RX ADMIN — FENTANYL CITRATE 50 MICROGRAM(S): 50 INJECTION INTRAVENOUS at 06:27

## 2018-12-10 RX ADMIN — FENTANYL CITRATE 100 MICROGRAM(S): 50 INJECTION INTRAVENOUS at 01:18

## 2018-12-10 RX ADMIN — Medication 125 MILLILITER(S): at 01:19

## 2018-12-10 RX ADMIN — FENTANYL CITRATE 50 MICROGRAM(S): 50 INJECTION INTRAVENOUS at 05:24

## 2018-12-10 RX ADMIN — PROPOFOL 16.33 MICROGRAM(S)/KG/MIN: 10 INJECTION, EMULSION INTRAVENOUS at 18:52

## 2018-12-10 RX ADMIN — Medication 1 TABLET(S): at 13:28

## 2018-12-10 RX ADMIN — Medication 1 TABLET(S): at 21:09

## 2018-12-10 RX ADMIN — EPINEPHRINE 8 MICROGRAM(S)/KG/MIN: 0.3 INJECTION INTRAMUSCULAR; SUBCUTANEOUS at 11:14

## 2018-12-10 RX ADMIN — FENTANYL CITRATE 50 MICROGRAM(S): 50 INJECTION INTRAVENOUS at 20:40

## 2018-12-10 RX ADMIN — FENTANYL CITRATE 50 MICROGRAM(S): 50 INJECTION INTRAVENOUS at 03:15

## 2018-12-10 RX ADMIN — CHLORHEXIDINE GLUCONATE 5 MILLILITER(S): 213 SOLUTION TOPICAL at 02:27

## 2018-12-10 RX ADMIN — FENTANYL CITRATE 50 MICROGRAM(S): 50 INJECTION INTRAVENOUS at 04:02

## 2018-12-10 RX ADMIN — Medication 1000 MILLIGRAM(S): at 20:33

## 2018-12-10 RX ADMIN — FENTANYL CITRATE 50 MICROGRAM(S): 50 INJECTION INTRAVENOUS at 23:16

## 2018-12-10 RX ADMIN — FENTANYL CITRATE 100 MICROGRAM(S): 50 INJECTION INTRAVENOUS at 11:00

## 2018-12-10 RX ADMIN — SODIUM CHLORIDE 10 MILLILITER(S): 9 INJECTION INTRAMUSCULAR; INTRAVENOUS; SUBCUTANEOUS at 11:15

## 2018-12-10 RX ADMIN — FENTANYL CITRATE 50 MICROGRAM(S): 50 INJECTION INTRAVENOUS at 06:26

## 2018-12-10 RX ADMIN — Medication 166.67 MILLIGRAM(S): at 13:30

## 2018-12-10 RX ADMIN — Medication 400 MILLIGRAM(S): at 08:56

## 2018-12-10 RX ADMIN — FENTANYL CITRATE 50 MICROGRAM(S): 50 INJECTION INTRAVENOUS at 01:18

## 2018-12-10 RX ADMIN — FENTANYL CITRATE 50 MICROGRAM(S): 50 INJECTION INTRAVENOUS at 06:51

## 2018-12-10 RX ADMIN — CHLORHEXIDINE GLUCONATE 5 MILLILITER(S): 213 SOLUTION TOPICAL at 21:09

## 2018-12-10 RX ADMIN — CHLORHEXIDINE GLUCONATE 5 MILLILITER(S): 213 SOLUTION TOPICAL at 10:02

## 2018-12-10 RX ADMIN — MILRINONE LACTATE 6.8 MICROGRAM(S)/KG/MIN: 1 INJECTION, SOLUTION INTRAVENOUS at 11:14

## 2018-12-10 RX ADMIN — Medication 166.67 MILLIGRAM(S): at 21:10

## 2018-12-10 RX ADMIN — PIPERACILLIN AND TAZOBACTAM 25 GRAM(S): 4; .5 INJECTION, POWDER, LYOPHILIZED, FOR SOLUTION INTRAVENOUS at 21:09

## 2018-12-10 RX ADMIN — CHLORHEXIDINE GLUCONATE 5 MILLILITER(S): 213 SOLUTION TOPICAL at 06:20

## 2018-12-10 RX ADMIN — ATORVASTATIN CALCIUM 80 MILLIGRAM(S): 80 TABLET, FILM COATED ORAL at 21:09

## 2018-12-10 RX ADMIN — INSULIN HUMAN 2 UNIT(S)/HR: 100 INJECTION, SOLUTION SUBCUTANEOUS at 11:14

## 2018-12-10 RX ADMIN — AMIODARONE HYDROCHLORIDE 200 MILLIGRAM(S): 400 TABLET ORAL at 06:20

## 2018-12-10 RX ADMIN — FENTANYL CITRATE 50 MICROGRAM(S): 50 INJECTION INTRAVENOUS at 05:03

## 2018-12-10 RX ADMIN — Medication 325 MILLIGRAM(S): at 12:20

## 2018-12-10 RX ADMIN — FENTANYL CITRATE 50 MICROGRAM(S): 50 INJECTION INTRAVENOUS at 02:49

## 2018-12-10 RX ADMIN — Medication 1 TABLET(S): at 12:25

## 2018-12-10 RX ADMIN — SODIUM CHLORIDE 5 MILLILITER(S): 9 INJECTION INTRAMUSCULAR; INTRAVENOUS; SUBCUTANEOUS at 11:14

## 2018-12-10 RX ADMIN — Medication 500 MILLIGRAM(S): at 12:20

## 2018-12-10 RX ADMIN — CHLORHEXIDINE GLUCONATE 5 MILLILITER(S): 213 SOLUTION TOPICAL at 18:18

## 2018-12-10 RX ADMIN — Medication 400 MILLIGRAM(S): at 19:48

## 2018-12-10 RX ADMIN — Medication 1 MILLIGRAM(S): at 12:20

## 2018-12-10 RX ADMIN — Medication 166.67 MILLIGRAM(S): at 05:22

## 2018-12-10 RX ADMIN — Medication 2 MILLIGRAM(S): at 11:00

## 2018-12-10 RX ADMIN — FENTANYL CITRATE 50 MICROGRAM(S): 50 INJECTION INTRAVENOUS at 20:22

## 2018-12-10 RX ADMIN — Medication 500 MILLIGRAM(S): at 02:26

## 2018-12-10 RX ADMIN — FENTANYL CITRATE 50 MICROGRAM(S): 50 INJECTION INTRAVENOUS at 07:07

## 2018-12-10 RX ADMIN — FENTANYL CITRATE 100 MICROGRAM(S): 50 INJECTION INTRAVENOUS at 08:56

## 2018-12-10 RX ADMIN — FENTANYL CITRATE 50 MICROGRAM(S): 50 INJECTION INTRAVENOUS at 23:08

## 2018-12-10 RX ADMIN — FENTANYL CITRATE 100 MICROGRAM(S): 50 INJECTION INTRAVENOUS at 13:54

## 2018-12-10 RX ADMIN — PIPERACILLIN AND TAZOBACTAM 25 GRAM(S): 4; .5 INJECTION, POWDER, LYOPHILIZED, FOR SOLUTION INTRAVENOUS at 06:52

## 2018-12-10 RX ADMIN — Medication 1 TABLET(S): at 06:21

## 2018-12-10 RX ADMIN — FENTANYL CITRATE 50 MICROGRAM(S): 50 INJECTION INTRAVENOUS at 04:15

## 2018-12-10 RX ADMIN — FENTANYL CITRATE 50 MICROGRAM(S): 50 INJECTION INTRAVENOUS at 00:43

## 2018-12-10 RX ADMIN — Medication 2 MILLIGRAM(S): at 08:39

## 2018-12-10 RX ADMIN — FENTANYL CITRATE 100 MICROGRAM(S): 50 INJECTION INTRAVENOUS at 08:55

## 2018-12-10 RX ADMIN — FENTANYL CITRATE 50 MICROGRAM(S): 50 INJECTION INTRAVENOUS at 18:32

## 2018-12-10 RX ADMIN — CHLORHEXIDINE GLUCONATE 5 MILLILITER(S): 213 SOLUTION TOPICAL at 13:29

## 2018-12-10 RX ADMIN — FENTANYL CITRATE 50 MICROGRAM(S): 50 INJECTION INTRAVENOUS at 17:00

## 2018-12-10 RX ADMIN — PROPOFOL 16.33 MICROGRAM(S)/KG/MIN: 10 INJECTION, EMULSION INTRAVENOUS at 11:13

## 2018-12-10 NOTE — PROGRESS NOTE ADULT - PROBLEM SELECTOR PLAN 1
s/p CABG  PRN fentanyl for pain/sedation as well as low dose diprivan  passive ROM of upper extremities and LLE by RNs   maintain primacor and epinephrine at current levels  wean pressors as tolerated to maintain MAP 65-75   tube feeds restarted at goal, continue protonix  insulin gtt per protocol for tight glycemic control (no reported h/o DM)  lasix gtt for diuresis, maintain augustin for strict Is/Os, follow lytes closely and replete PRN

## 2018-12-10 NOTE — PROGRESS NOTE ADULT - SUBJECTIVE AND OBJECTIVE BOX
CARDIOLOGY PROGRESS NOTE   ([facility])                             Reason for follow up: Anterior STEMI, MVD , failed PCI to LAD/D1, cardiogenic shock, Vfib arrest, CABG x4                            Overnight: No new events.     Subjective: intubated, appears distressed on ventilator , tachypneic,   Review of symptoms : unable to review as patient is intubated.      Past medical history: No updates.     	  Vitals:  T(C): 37.9 (12-10-18 @ 16:00), Max: 38.6 (12-10-18 @ 09:00)  HR: 111 (12-10-18 @ 16:02) (97 - 130)  BP: --  RR: 18 (12-10-18 @ 16:00) (18 - 80)  SpO2: 92% (12-10-18 @ 16:02) (79% - 100%)  Wt(kg): --  I&O's Summary    09 Dec 2018 07:01  -  10 Dec 2018 07:00  --------------------------------------------------------  IN: 5158.6 mL / OUT: 4375 mL / NET: 783.6 mL    10 Dec 2018 07:01  -  10 Dec 2018 16:31  --------------------------------------------------------  IN: 1024.2 mL / OUT: 1405 mL / NET: -380.8 mL      Weight (kg): 90.7 (12-06 @ 13:28)    PHYSICAL EXAM:  Appearance: distressed on vent  HEENT:  Head and neck: Atraumatic. . intubated  Neck: Supple, No JVD, no carotid bruit  Neurologic: LUE flaccid, doesn't move RLE  Lymphatic: No cervical lymphadenopathy  Cardiovascular: Normal S1 S2, No murmur, rubs/gallops  Respiratory: Lungs clear to auscultation anteriorly  Gastrointestinal:  Soft, Non-tender, + BS, no HSM  Lower Extremities: No edema, DP and PT +2  Psychiatry: Patient is calm. No agitation. Mood & affect appropriate  Skin: No rashes, No ecchymoses, No cyanosis    CURRENT MEDICATIONS:  MEDICATIONS  (STANDING):      PRN:                         8.8    15.1  )-----------( 265      ( 10 Dec 2018 04:03 )             27.8     12-10    142  |  106  |  20.0  ----------------------------<  115  4.3   |  23.0  |  0.63    Ca    7.8<L>      10 Dec 2018 04:03  Phos  4.1     12-10  Mg     2.1     12-10    TPro  6.0<L>  /  Alb  3.2<L>  /  TBili  2.3<H>  /  DBili  x   /  AST  33  /  ALT  49<H>  /  AlkPhos  193<H>  12-10    proBNP:   Lipid Profile: Date: 12-10 @ 04:03  Total cholesterol --; Direct LDL: --; HDL: --; Triglycerides:196      TELEMETRY: 	  no events      < from: TTE Echo Complete w/Doppler (12.09.18 @ 08:53) >   1. Left ventricular ejection fraction, by visual estimation, is <20%.   2. Severely decreased global left ventricular systolic function.   3. Normal left ventricular internal cavitysize.   4. Endocardial visualization was enhanced with intravenous echo contrast.            < end of copied text >  DIAGNOSTIC TESTING:

## 2018-12-10 NOTE — PROGRESS NOTE ADULT - ASSESSMENT
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 2+ palpable right foot pulses.    Plan:  -Continue dual antiplatelet therapy  -Right groin dressing removed  -Staples may be removed 12/17  -Rest of medical management as per primary team  -Vascular surgery will sign off at this time. Please reconsult as needed.

## 2018-12-10 NOTE — PROGRESS NOTE ADULT - SUBJECTIVE AND OBJECTIVE BOX
Brief Hospital Course:    STEMI, emergently to cath lab, left main thrombus, cardiac arrest requiring defib, intubated in cath lab, impella placed which clotted off on way to OR and was subsequently removed in OR. Crashed onto CPB for CABG d/t hemodynamic collapse. TO CTICU post CABG with central ECMO and open chest.  12/3 ECMO explanted and IABP removed, impella inserted   impella removed via R femoral cutdown with repair and external iliac stent  12/10 s/p trach for prolonged respiratory failure      Subjective/review of Systems: unable to obtain due to sedated and agitation      Patient is a 48y old  Male who presents with a chief complaint of Anterior STEMI, cardiogenic shock, MVD (10 Dec 2018 16:31)    HPI:  This is a 46 y/o male no significant PMH; recent ABT (doxycycline) r/t cellulitis to right groin per pt. Pt presented to the ED with 10/10 chest pain that he reported started at 0930; he drank water with no relief; pain started to progress to B/L shoulders. Per pt spouse, she reports he has had chest pain x2-3 days and this morning he vomited which he attributed to reflux.  She brought him into the emergency department.    Pt has significant EKG changes ST elevations in leads V1-V5 with reciprocal changes in II, III, AVF.  Pt rec'd asa and plavix load in ED. (2018 11:32)    PAST MEDICAL & SURGICAL HISTORY:  Staph infection  No significant past surgical history    FAMILY HISTORY:  Family history of myocardial infarction (Mother): mother;       Vitals   ICU Vital Signs Last 24 Hrs  T(C): 37.9 (10 Dec 2018 18:00), Max: 38.6 (10 Dec 2018 09:00)  T(F): 100.2 (10 Dec 2018 18:00), Max: 101.5 (10 Dec 2018 09:00)  HR: 117 (10 Dec 2018 18:30) (104 - 130), ST  ABP: 155/64 (10 Dec 2018 18:30) (96/61 - 155/64)  ABP(mean): 90 (10 Dec 2018 18:30) (63 - 100)  RR: 39 (10 Dec 2018 18:30) (6 - 80)  SpO2: 95% (10 Dec 2018 18:30) (79% - 100%)    VENT SETTINGS   Mode: AC/ CMV (Assist Control/ Continuous Mandatory Ventilation)  RR (machine): 12  TV (machine): 500  FiO2: 50  PEEP: 5  MAP: 12  PIP: 32    ABG - ( 10 Dec 2018 03:59 )  pH, Arterial: 7.44  /  pCO2: 38    /  pO2: 92    / HCO3: 25    / Base Excess: 1.1   /  SaO2: 98          I&O's Detail    09 Dec 2018 07:  -  10 Dec 2018 07:00  --------------------------------------------------------  Total IN: 5158.6 mL  Total OUT: 4375 mL  Total NET: 783.6 mL    10 Dec 2018 07:01  -  10 Dec 2018 18:36  --------------------------------------------------------  IN:    EPINEPHrine Infusion: 80 mL    EPINEPHrine Infusion: 8 mL    furosemide Infusion: 13 mL    furosemide Infusion: 1.3 mL    Glucerna 1.5: 55 mL    insulin Infusion: 35 mL    insulin Infusion: 3.5 mL    milrinone  Infusion: 6.8 mL    milrinone  Infusion: 68 mL    norepinephrine Infusion: 104 mL    norepinephrine Infusion: 8 mL    propofol Infusion: 272 mL    propofol Infusion: 27.2 mL    sodium chloride 0.9%: 70 mL    sodium chloride 0.9%: 35 mL    sodium chloride 0.9%.: 5 mL    sodium chloride 0.9%.: 10 mL    Solution: 100 mL    Solution: 250 mL  Total IN: 1151.8 mL    OUT:    Chest Tube: 270 mL    Chest Tube: 330 mL    Indwelling Catheter - Urethral: 990 mL  Total OUT: 1590 mL    Total NET: -438.2 mL      LABS                        8.8    15.1  )-----------( 265      ( 10 Dec 2018 04:03 )             27.8     12-10    142  |  106  |  20.0  ----------------------------<  115  4.3   |  23.0  |  0.63    Ca    7.8<L>      10 Dec 2018 04:03  Phos  4.1     12-10  Mg     2.1     12-10    TPro  6.0<L>  /  Alb  3.2<L>  /  TBili  2.3<H>  /  DBili  x   /  AST  33  /  ALT  49<H>  /  AlkPhos  193<H>  12-10    PT/INR - ( 10 Dec 2018 04:03 )   PT: 15.7 sec;   INR: 1.35 ratio    PTT - ( 10 Dec 2018 04:03 )  PTT:29.2 sec    CARDIAC MARKERS ( 10 Dec 2018 04:03 )   U/L / CKMB2.2 ng/mL / Troponin T2.40 ng/mL /    POCT Blood Glucose.: 136 mg/dL (12-10-18 @ 18:10)  POCT Blood Glucose.: 144 mg/dL (12-10-18 @ 16:28)  POCT Blood Glucose.: 142 mg/dL (12-10-18 @ 13:59)  POCT Blood Glucose.: 120 mg/dL (12-10-18 @ 11:45)  POCT Blood Glucose.: 126 mg/dL (12-10-18 @ 09:55)  POCT Blood Glucose.: 120 mg/dL (12-10-18 @ 07:52)  POCT Blood Glucose.: 128 mg/dL (12-10-18 @ 05:58)  POCT Blood Glucose.: 120 mg/dL (18 @ 21:47)        MEDICATIONS  (STANDING):  amiodarone    Tablet 200 milliGRAM(s) Oral daily  ascorbic acid 500 milliGRAM(s) Oral daily  aspirin 325 milliGRAM(s) Oral daily  atorvastatin 80 milliGRAM(s) Oral at bedtime  chlorhexidine 0.12% Liquid 5 milliLiter(s) Oral Mucosa every 4 hours  clopidogrel Tablet 75 milliGRAM(s) Oral daily  enoxaparin Injectable 40 milliGRAM(s) SubCutaneous daily  EPINEPHrine    Infusion 0.024 MICROgram(s)/kG/Min (8 mL/Hr) IV Continuous <Continuous>  ferrous    sulfate 325 milliGRAM(s) Oral daily  folic acid 1 milliGRAM(s) Oral daily  furosemide Infusion 5 mG/Hr (2.5 mL/Hr) IV Continuous <Continuous>  insulin Infusion 2 Unit(s)/Hr (2 mL/Hr) IV Continuous <Continuous>  lactobacillus acidophilus 1 Tablet(s) Oral every 8 hours  milrinone Infusion 0.25 MICROgram(s)/kG/Min (6.803 mL/Hr) IV Continuous <Continuous>  multivitamin 1 Tablet(s) Oral daily  norepinephrine Infusion 0.05 MICROgram(s)/kG/Min (8.503 mL/Hr) IV Continuous <Continuous>  pantoprazole  Injectable 40 milliGRAM(s) IV Push every 12 hours  piperacillin/tazobactam IVPB. 3.375 Gram(s) IV Intermittent every 8 hours  propofol Infusion 30 MICROgram(s)/kG/Min (16.326 mL/Hr) IV Continuous <Continuous>  sodium chloride 0.9%. 1000 milliLiter(s) (5 mL/Hr) IV Continuous <Continuous>  sodium chloride 0.9%. 1000 milliLiter(s) (10 mL/Hr) IV Continuous <Continuous>  vancomycin  IVPB 1250 milliGRAM(s) IV Intermittent every 8 hours    MEDICATIONS  (PRN):  artificial  tears Solution 1 Drop(s) Both EYES four times a day PRN Dry Eyes  fentaNYL    Injectable 50 MICROGram(s) IV Push every 2 hours PRN Moderate Pain (4 - 6)  petrolatum white Ointment 1 Application(s) Topical four times a day PRN dry lips    Allergies: penicillins (Unknown)      Physical Exam:   Neuro: sedated, overbreaths vent, agitated, when awake is weak x 4 extremities  HEENT: PERRL, ETT and OGT changed to trach and NGT  Neck: LIJ introducer with site C/D/I.   CV: tachcardic, regular rhythm, +S1S2  Pulm/chest: coarse throughout, bilat CTs with air leak or subQ air noted. MSI C/D/I with dresssing  Abd: soft, ND  Ext: anasarca      Code Status: full code      Critical care time spent: __41__minutes (reviewing chart including medication, labs and imaging results, discussions with interdisciplinary team, discussing goals of care/advanced directives, counseling patient and/or family, non-inclusive of procedures)

## 2018-12-10 NOTE — PROGRESS NOTE ADULT - PROBLEM SELECTOR PLAN 9
SC with GNR, continue zosyn for now  PO vanco d/c'd as c-diff was negative  blood cx remain negative, likely can d/c vanco (will d/w ID and Dr Barrett)

## 2018-12-10 NOTE — PROGRESS NOTE ADULT - ASSESSMENT
This is a 47y  Male with no significant PMH admitted on 11/29/18 for CP for 2-3 days prior to admission,   found with STEMI. Urgent Cath, pt found to have multiple occlusions; VF arrest, shock x 1, return to NSR.  Upon transfer to OR for Urgent CABG, pt again VF arrest, chest opened intra-op with CPR in progress.  s/p ECMO and Impella; 12/3 ECMO explanted and Impella placed via right groin sheath.    now developed fevers.    patient in hospital for > 48 hours, multiple interventions.   blood cultures and sputum cx sent  urine legionella is NEGATIVE    CT Chest with PNA in right lower and left upper lung  - continue Vancomycin 1 gram Q8H  - continue Zosyn 3.375 gram Q8H;    Diarrhea RESOLVED     Cdiff;  NEG    FOR TRACH TODAY     - follow up all outstanding cultures  - trend temperature and WBC curve  - repeat cultures from blood and all sources if febrile.

## 2018-12-10 NOTE — PROGRESS NOTE ADULT - ASSESSMENT
46 y/o male presented with anterior STEMI , s/p coronary angiogram showing severe multivessel disease with LAD being culprit 100%.   Primary PCI to LAD and D2 attempted but LAD stent thrombosed following deployment of diagonal stent. This was followed by proximal thrombus extension into the left main   Impella CP placed and patient sent for emergency CABG . Had Vfib arrest in the lab shocked x 1 and another vfib arrest in OR managed by CPR followed by sternotomy, direct cardiac massage and then placed on Bypass  CABG with SVG x 4 to LAD, D2, OM and RPDA   not able to come off pump after CABG and placed on ECMO with open chest (central cannulation)   s/p decanulation of ECMO, chest closure, Impella removal , right iliac stenting due to dissection   Remains in acute respiratory failure with unable to be weaned from vent (agitation, respiratory distress on CPAP and pulmonary edema)   Febrile at times, with possible bilateral pneumonia on CT   CT brain negative for stroke ( LUE and RLE weakness noticed)     Plan:   Lasix IV drip ( negative 1 L goal)   continue epi, levophed  Trach today  Trial of weaning daily  No ACEI or BB due to cardiogenic shock requiring pressors.   Ischemic CM EF < 20% on inotropic support.

## 2018-12-10 NOTE — PROGRESS NOTE ADULT - ASSESSMENT
48 yo Male c/o CP for 2-3 days prior to admission, then 10/10 chest pain approximately 9:30 am yesterday, followed by vomiting.  Wife drove pt to hospital, ruled in for STEMI. Urgent Cath, pt found to have multiple occlusions; stent to LAD and D2; stent to LAD thrombosed,  impella placed for support.  Pt then had VF arrest, shock x 1, return to NSR.  Upon transfer to OR for Urgent CABG, pt with asystolic arrest, chest opened intra-op with CPR in progress. Pt underwent C4V (LAD, Diag, OM and PDA) with inability to wean from CPB therefore requiring VA ECMO (central cannulation) and IABP. , he was transferred to CICU on multiple gtts, now s/p explant 12/3 with placement of impella via R groin sheath. 12/4 ID consulted started on broad spectrum abx, 12/5 Impella weaned. 12/6 Impella removal via right femoral cutdown with primary repair of femoral artery and stent placed to external illiac artery for dissection, PRBC x1. 12/8 Pt underwent head CT, no acute intracranial process noted, chest CT b/l pneumothoraces R-20%, L-10%, b/l chest tubes in place, confirmed infiltrates on right lower and left upper lung.  ABX Vanco and Zosyn course extended, f/u sputum Cx.    Hospital course notable for persistent cardiogenic shock/LV failure, vent dependent respiratory failure (inability to wean also contributed to by severe agitation, now s/p trach), sepsis likely d/y GNR VAP.

## 2018-12-10 NOTE — PROGRESS NOTE ADULT - SUBJECTIVE AND OBJECTIVE BOX
INTERVAL HPI/OVERNIGHT EVENTS: No acute vascular events overnight. Continued palpable right DP and PT pulse. History limited by intubation and sedation.     MEDICATIONS  (STANDING):  amiodarone    Tablet 200 milliGRAM(s) Oral daily  ascorbic acid 500 milliGRAM(s) Oral daily  aspirin 325 milliGRAM(s) Oral daily  atorvastatin 80 milliGRAM(s) Oral at bedtime  chlorhexidine 0.12% Liquid 5 milliLiter(s) Oral Mucosa every 4 hours  clopidogrel Tablet 75 milliGRAM(s) Oral daily  dextrose 50% Injectable 50 milliLiter(s) IV Push every 15 minutes  enoxaparin Injectable 40 milliGRAM(s) SubCutaneous daily  EPINEPHrine    Infusion 0.024 MICROgram(s)/kG/Min (8 mL/Hr) IV Continuous <Continuous>  ferrous    sulfate 325 milliGRAM(s) Oral daily  folic acid 1 milliGRAM(s) Oral daily  furosemide Infusion 5 mG/Hr (2.5 mL/Hr) IV Continuous <Continuous>  insulin Infusion 2 Unit(s)/Hr (2 mL/Hr) IV Continuous <Continuous>  lactobacillus acidophilus 1 Tablet(s) Oral every 8 hours  LORazepam   Injectable 2 milliGRAM(s) IV Push once  milrinone Infusion 0.25 MICROgram(s)/kG/Min (6.803 mL/Hr) IV Continuous <Continuous>  multivitamin 1 Tablet(s) Oral daily  norepinephrine Infusion 0.05 MICROgram(s)/kG/Min (8.503 mL/Hr) IV Continuous <Continuous>  pantoprazole  Injectable 40 milliGRAM(s) IV Push every 12 hours  piperacillin/tazobactam IVPB. 3.375 Gram(s) IV Intermittent every 8 hours  propofol Infusion 30 MICROgram(s)/kG/Min (16.326 mL/Hr) IV Continuous <Continuous>  sodium chloride 0.9%. 1000 milliLiter(s) (5 mL/Hr) IV Continuous <Continuous>  sodium chloride 0.9%. 1000 milliLiter(s) (10 mL/Hr) IV Continuous <Continuous>  vancomycin  IVPB 1250 milliGRAM(s) IV Intermittent <User Schedule>    MEDICATIONS  (PRN):  artificial  tears Solution 1 Drop(s) Both EYES four times a day PRN Dry Eyes  fentaNYL    Injectable 50 MICROGram(s) IV Push every 2 hours PRN Moderate Pain (4 - 6)  petrolatum white Ointment 1 Application(s) Topical four times a day PRN dry lips      Vital Signs Last 24 Hrs  T(C): 37.6 (10 Dec 2018 04:00), Max: 38.4 (09 Dec 2018 18:00)  T(F): 99.6 (10 Dec 2018 04:00), Max: 101.1 (09 Dec 2018 18:00)  HR: 130 (10 Dec 2018 08:00) (86 - 130)  BP: --  BP(mean): --  RR: 44 (10 Dec 2018 08:00) (14 - 80)  SpO2: 96% (10 Dec 2018 08:00) (79% - 100%)    Physical exam:  General: NAD, sedated, intubated  HEENT: PERRLA, EOMI  Neck: supple, nontender  Respiratory: no respiratory distress, lungs CTAB, intubated on mechanical ventilation  Heart: regular rate and rhythm, no murmurs  Abdomen: soft, nontender, nondistended. Normal bowel sounds. No guarding or rebound. Right groin wound clean, dry, intact.  Extremities: no peripheral edema. Normal ROM.  Pulse exam: 2+ DP, PT right foot    I&O's Detail    09 Dec 2018 07:01  -  10 Dec 2018 07:00  --------------------------------------------------------  IN:    Albumin 5%  - 250 mL: 500 mL    dexmedetomidine Infusion: 68.1 mL    EPINEPHrine Infusion: 168 mL    EPINEPHrine Infusion: 20 mL    furosemide Infusion: 7.5 mL    furosemide Infusion: 36.6 mL    Glucerna 1.5: 990 mL    insulin Infusion: 85 mL    milrinone  Infusion: 108.8 mL    milrinone  Infusion: 40.8 mL    milrinone  Infusion: 10.2 mL    milrinone  Infusion: 40.8 mL    norepinephrine Infusion: 300 mL    Oral Fluid: 20 mL    propofol Infusion: 652.8 mL    sodium chloride 0.9%.: 120 mL    sodium chloride 0.9%.: 240 mL    Solution: 750 mL    Solution: 250 mL    Solution: 600 mL    Solution: 50 mL    Solution: 100 mL  Total IN: 5158.6 mL    OUT:    Chest Tube: 160 mL    Chest Tube: 40 mL    Indwelling Catheter - Urethral: 4175 mL  Total OUT: 4375 mL    Total NET: 783.6 mL      10 Dec 2018 07:01  -  10 Dec 2018 08:29  --------------------------------------------------------  IN:    EPINEPHrine Infusion: 8 mL    furosemide Infusion: 1.3 mL    Glucerna 1.5: 55 mL    insulin Infusion: 3.5 mL    milrinone  Infusion: 6.8 mL    norepinephrine Infusion: 8 mL    propofol Infusion: 27.2 mL    sodium chloride 0.9%.: 10 mL    sodium chloride 0.9%.: 5 mL  Total IN: 124.8 mL    OUT:    Chest Tube: 60 mL    Chest Tube: 50 mL    Indwelling Catheter - Urethral: 75 mL  Total OUT: 185 mL    Total NET: -60.2 mL          LABS:                        8.8    15.1  )-----------( 265      ( 10 Dec 2018 04:03 )             27.8     12-10    142  |  106  |  20.0  ----------------------------<  115  4.3   |  23.0  |  0.63    Ca    7.8<L>      10 Dec 2018 04:03  Phos  4.1     12-10  Mg     2.1     12-10    TPro  6.0<L>  /  Alb  3.2<L>  /  TBili  2.3<H>  /  DBili  x   /  AST  33  /  ALT  49<H>  /  AlkPhos  193<H>  12-10    PT/INR - ( 10 Dec 2018 04:03 )   PT: 15.7 sec;   INR: 1.35 ratio         PTT - ( 10 Dec 2018 04:03 )  PTT:29.2 sec

## 2018-12-10 NOTE — PROGRESS NOTE ADULT - SUBJECTIVE AND OBJECTIVE BOX
[facility]  INFECTIOUS DISEASES AND INTERNAL MEDICINE at [place]  =======================================================  Perfecto Santizo MD  Diplomates American Board of Internal Medicine and Infectious Diseases  =======================================================    [facility]-[id number]  PATRICK LYLE   follow up for:  post operative fevers  patient seen and examined.  failed CPAP this AM  remains intubated; on Pressors    diarrhea this Am C DIFF NEG  ===================================================  REVIEW OF SYSTEMS:  unable to obtain due to medical condition  =======================================================  Antibiotics:  piperacillin/tazobactam IVPB. 3.375 Gram(s) IV Intermittent every 8 hours  vancomycin    Solution 500 milliGRAM(s) Enteral Tube every 6 hours  vancomycin  IVPB 1250 milliGRAM(s) IV Intermittent <User Schedule>    Other medications:  amiodarone    Tablet 200 milliGRAM(s) Oral daily  ascorbic acid 500 milliGRAM(s) Oral daily  aspirin 325 milliGRAM(s) Oral daily  atorvastatin 80 milliGRAM(s) Oral at bedtime  chlorhexidine 0.12% Liquid 5 milliLiter(s) Oral Mucosa every 4 hours  clopidogrel Tablet 75 milliGRAM(s) Oral daily  dexmedetomidine Infusion 0.7 MICROgram(s)/kG/Hr IV Continuous <Continuous>  dextrose 50% Injectable 50 milliLiter(s) IV Push every 15 minutes  enoxaparin Injectable 40 milliGRAM(s) SubCutaneous daily  EPINEPHrine    Infusion 0.024 MICROgram(s)/kG/Min IV Continuous <Continuous>  ferrous    sulfate 325 milliGRAM(s) Oral daily  folic acid 1 milliGRAM(s) Oral daily  furosemide   Injectable 20 milliGRAM(s) IV Push every 8 hours  furosemide Infusion 10 mG/Hr IV Continuous <Continuous>  insulin Infusion 2 Unit(s)/Hr IV Continuous <Continuous>  lactobacillus acidophilus 1 Tablet(s) Oral every 8 hours  milrinone Infusion 0.375 MICROgram(s)/kG/Min IV Continuous <Continuous>  multivitamin 1 Tablet(s) Oral daily  nitroglycerin  Infusion 66.667 MICROgram(s)/Min IV Continuous <Continuous>  norepinephrine Infusion 0.05 MICROgram(s)/kG/Min IV Continuous <Continuous>  pantoprazole  Injectable 40 milliGRAM(s) IV Push every 12 hours  potassium chloride  10 mEq/50 mL IVPB 10 milliEquivalent(s) IV Intermittent every 1 hour  potassium chloride  10 mEq/50 mL IVPB 10 milliEquivalent(s) IV Intermittent every 1 hour  potassium chloride  10 mEq/50 mL IVPB 10 milliEquivalent(s) IV Intermittent every 1 hour  potassium chloride  10 mEq/50 mL IVPB 10 milliEquivalent(s) IV Intermittent every 1 hour  propofol Infusion 30 MICROgram(s)/kG/Min IV Continuous <Continuous>  sodium chloride 0.9%. 1000 milliLiter(s) IV Continuous <Continuous>  sodium chloride 0.9%. 1000 milliLiter(s) IV Continuous <Continuous>    =======================================================    Physical Exam:   Vital Signs Last 24 Hrs  T(C): 38.6 (10 Dec 2018 09:00), Max: 38.6 (10 Dec 2018 09:00)  T(F): 101.5 (10 Dec 2018 09:00), Max: 101.5 (10 Dec 2018 09:00)  HR: 122 (10 Dec 2018 09:00) (86 - 130)  BP: --  BP(mean): --  RR: 34 (10 Dec 2018 09:00) (17 - 80)  SpO2: 94% (10 Dec 2018 09:00) (79% - 100%)    General:  intubated, no distress  Eye: Pupils are equal, round and reactive to light,   HENT: Normocephalic, Oral mucosa is moist, ET Tube in place  Neck: Supple, No lymphadenopathy.  RIGHT neck IJ line  Respiratory: Lungs with fair air entry  midline chest with dressing in place.   + CHEST tubes in place  Cardiovascular: Normal rate, Regular rhythm, No murmur, Good pulses equal in all extremities,  No edema.  Gastrointestinal: Soft, Non-tender, Non-distended, Normal bowel sounds.  Genitourinary: + Mckeon with light color urine  Lymphatics: No lymphadenopathy neck,   Musculoskeletal: Normal range of motion,   Integumentary: No rash.  Neurologic: Alert, Cranial Nerves II-XII are grossly intact.    =======================================================  Labs:                                  8.8    15.1  )-----------( 265      ( 10 Dec 2018 04:03 )             27.8   12-10    142  |  106  |  20.0  ----------------------------<  115  4.3   |  23.0  |  0.63    Ca    7.8<L>      10 Dec 2018 04:03  Phos  4.1     12-10  Mg     2.1     12-10    TPro  6.0<L>  /  Alb  3.2<L>  /  TBili  2.3<H>  /  DBili  x   /  AST  33  /  ALT  49<H>  /  AlkPhos  193<H>  12-10        Culture - Urine (collected 12-07-18 @ 22:28)  Source: .Urine  Final Report (12-08-18 @ 14:39):    No growth    Culture - Blood (collected 12-05-18 @ 10:21)  Source: .Blood    Culture - Blood (collected 12-05-18 @ 10:21)  Source: .Blood    Culture - Blood (collected 12-04-18 @ 10:22)  Source: .Blood  Final Report (12-09-18 @ 11:01):    No growth at 5 days.    Culture - Blood (collected 12-04-18 @ 10:22)  Source: .Blood  Final Report (12-09-18 @ 11:01):    No growth at 5 days.    Culture - Sputum (collected 12-04-18 @ 10:21)  Source: .Sputum  Gram Stain (12-04-18 @ 11:59):    Few White blood cells    No organisms seen  Final Report (12-06-18 @ 14:00):    Few Candida albicans    No Routine respiratory keara present

## 2018-12-11 DIAGNOSIS — J96.01 ACUTE RESPIRATORY FAILURE WITH HYPOXIA: ICD-10-CM

## 2018-12-11 DIAGNOSIS — G93.49 OTHER ENCEPHALOPATHY: ICD-10-CM

## 2018-12-11 LAB
-  AMIKACIN: SIGNIFICANT CHANGE UP
-  AMPICILLIN/SULBACTAM: SIGNIFICANT CHANGE UP
-  AMPICILLIN: SIGNIFICANT CHANGE UP
-  AZTREONAM: SIGNIFICANT CHANGE UP
-  CEFAZOLIN: SIGNIFICANT CHANGE UP
-  CEFEPIME: SIGNIFICANT CHANGE UP
-  CEFOXITIN: SIGNIFICANT CHANGE UP
-  CEFTRIAXONE: SIGNIFICANT CHANGE UP
-  CIPROFLOXACIN: SIGNIFICANT CHANGE UP
-  ERTAPENEM: SIGNIFICANT CHANGE UP
-  GENTAMICIN: SIGNIFICANT CHANGE UP
-  IMIPENEM: SIGNIFICANT CHANGE UP
-  LEVOFLOXACIN: SIGNIFICANT CHANGE UP
-  MEROPENEM: SIGNIFICANT CHANGE UP
-  PIPERACILLIN/TAZOBACTAM: SIGNIFICANT CHANGE UP
-  TOBRAMYCIN: SIGNIFICANT CHANGE UP
-  TRIMETHOPRIM/SULFAMETHOXAZOLE: SIGNIFICANT CHANGE UP
ANION GAP SERPL CALC-SCNC: 13 MMOL/L — SIGNIFICANT CHANGE UP (ref 5–17)
ANION GAP SERPL CALC-SCNC: 14 MMOL/L — SIGNIFICANT CHANGE UP (ref 5–17)
BUN SERPL-MCNC: 24 MG/DL — HIGH (ref 8–20)
BUN SERPL-MCNC: 28 MG/DL — HIGH (ref 8–20)
CALCIUM SERPL-MCNC: 7.7 MG/DL — LOW (ref 8.6–10.2)
CALCIUM SERPL-MCNC: 7.9 MG/DL — LOW (ref 8.6–10.2)
CHLORIDE SERPL-SCNC: 105 MMOL/L — SIGNIFICANT CHANGE UP (ref 98–107)
CHLORIDE SERPL-SCNC: 106 MMOL/L — SIGNIFICANT CHANGE UP (ref 98–107)
CK SERPL-CCNC: 150 U/L — SIGNIFICANT CHANGE UP (ref 30–200)
CO2 SERPL-SCNC: 22 MMOL/L — SIGNIFICANT CHANGE UP (ref 22–29)
CO2 SERPL-SCNC: 23 MMOL/L — SIGNIFICANT CHANGE UP (ref 22–29)
CREAT SERPL-MCNC: 0.65 MG/DL — SIGNIFICANT CHANGE UP (ref 0.5–1.3)
CREAT SERPL-MCNC: 0.73 MG/DL — SIGNIFICANT CHANGE UP (ref 0.5–1.3)
CULTURE RESULTS: SIGNIFICANT CHANGE UP
GAS PNL BLDA: SIGNIFICANT CHANGE UP
GLUCOSE BLDC GLUCOMTR-MCNC: 101 MG/DL — HIGH (ref 70–99)
GLUCOSE BLDC GLUCOMTR-MCNC: 105 MG/DL — HIGH (ref 70–99)
GLUCOSE BLDC GLUCOMTR-MCNC: 123 MG/DL — HIGH (ref 70–99)
GLUCOSE BLDC GLUCOMTR-MCNC: 126 MG/DL — HIGH (ref 70–99)
GLUCOSE BLDC GLUCOMTR-MCNC: 136 MG/DL — HIGH (ref 70–99)
GLUCOSE BLDC GLUCOMTR-MCNC: 151 MG/DL — HIGH (ref 70–99)
GLUCOSE BLDC GLUCOMTR-MCNC: 163 MG/DL — HIGH (ref 70–99)
GLUCOSE BLDC GLUCOMTR-MCNC: 165 MG/DL — HIGH (ref 70–99)
GLUCOSE BLDC GLUCOMTR-MCNC: 166 MG/DL — HIGH (ref 70–99)
GLUCOSE BLDC GLUCOMTR-MCNC: 171 MG/DL — HIGH (ref 70–99)
GLUCOSE BLDC GLUCOMTR-MCNC: 173 MG/DL — HIGH (ref 70–99)
GLUCOSE BLDC GLUCOMTR-MCNC: 175 MG/DL — HIGH (ref 70–99)
GLUCOSE BLDC GLUCOMTR-MCNC: 99 MG/DL — SIGNIFICANT CHANGE UP (ref 70–99)
GLUCOSE SERPL-MCNC: 129 MG/DL — HIGH (ref 70–115)
GLUCOSE SERPL-MCNC: 159 MG/DL — HIGH (ref 70–115)
GRAM STN FLD: SIGNIFICANT CHANGE UP
HCT VFR BLD CALC: 26 % — LOW (ref 42–52)
HCT VFR BLD CALC: 26.4 % — LOW (ref 42–52)
HGB BLD-MCNC: 8.3 G/DL — LOW (ref 14–18)
HGB BLD-MCNC: 8.5 G/DL — LOW (ref 14–18)
INR BLD: 1.35 RATIO — HIGH (ref 0.88–1.16)
MAGNESIUM SERPL-MCNC: 2 MG/DL — SIGNIFICANT CHANGE UP (ref 1.6–2.6)
MAGNESIUM SERPL-MCNC: 2.3 MG/DL — SIGNIFICANT CHANGE UP (ref 1.6–2.6)
MCHC RBC-ENTMCNC: 28.7 PG — SIGNIFICANT CHANGE UP (ref 27–31)
MCHC RBC-ENTMCNC: 28.8 PG — SIGNIFICANT CHANGE UP (ref 27–31)
MCHC RBC-ENTMCNC: 31.9 G/DL — LOW (ref 32–36)
MCHC RBC-ENTMCNC: 32.2 G/DL — SIGNIFICANT CHANGE UP (ref 32–36)
MCV RBC AUTO: 89.2 FL — SIGNIFICANT CHANGE UP (ref 80–94)
MCV RBC AUTO: 90.3 FL — SIGNIFICANT CHANGE UP (ref 80–94)
METHOD TYPE: SIGNIFICANT CHANGE UP
ORGANISM # SPEC MICROSCOPIC CNT: SIGNIFICANT CHANGE UP
ORGANISM # SPEC MICROSCOPIC CNT: SIGNIFICANT CHANGE UP
PLATELET # BLD AUTO: 324 K/UL — SIGNIFICANT CHANGE UP (ref 150–400)
PLATELET # BLD AUTO: 377 K/UL — SIGNIFICANT CHANGE UP (ref 150–400)
POTASSIUM SERPL-MCNC: 4 MMOL/L — SIGNIFICANT CHANGE UP (ref 3.5–5.3)
POTASSIUM SERPL-MCNC: 4.4 MMOL/L — SIGNIFICANT CHANGE UP (ref 3.5–5.3)
POTASSIUM SERPL-SCNC: 4 MMOL/L — SIGNIFICANT CHANGE UP (ref 3.5–5.3)
POTASSIUM SERPL-SCNC: 4.4 MMOL/L — SIGNIFICANT CHANGE UP (ref 3.5–5.3)
PROTHROM AB SERPL-ACNC: 15.7 SEC — HIGH (ref 10–12.9)
RBC # BLD: 2.88 M/UL — LOW (ref 4.6–6.2)
RBC # BLD: 2.96 M/UL — LOW (ref 4.6–6.2)
RBC # FLD: 15.1 % — SIGNIFICANT CHANGE UP (ref 11–15.6)
RBC # FLD: 15.3 % — SIGNIFICANT CHANGE UP (ref 11–15.6)
SODIUM SERPL-SCNC: 141 MMOL/L — SIGNIFICANT CHANGE UP (ref 135–145)
SODIUM SERPL-SCNC: 142 MMOL/L — SIGNIFICANT CHANGE UP (ref 135–145)
SPECIMEN SOURCE: SIGNIFICANT CHANGE UP
SPECIMEN SOURCE: SIGNIFICANT CHANGE UP
TROPONIN T SERPL-MCNC: 1.93 NG/ML — HIGH (ref 0–0.06)
VANCOMYCIN TROUGH SERPL-MCNC: 12.4 UG/ML — SIGNIFICANT CHANGE UP (ref 10–20)
WBC # BLD: 13.2 K/UL — HIGH (ref 4.8–10.8)
WBC # BLD: 13.3 K/UL — HIGH (ref 4.8–10.8)
WBC # FLD AUTO: 13.2 K/UL — HIGH (ref 4.8–10.8)
WBC # FLD AUTO: 13.3 K/UL — HIGH (ref 4.8–10.8)

## 2018-12-11 PROCEDURE — 99233 SBSQ HOSP IP/OBS HIGH 50: CPT

## 2018-12-11 PROCEDURE — 71045 X-RAY EXAM CHEST 1 VIEW: CPT | Mod: 26

## 2018-12-11 RX ORDER — QUETIAPINE FUMARATE 200 MG/1
25 TABLET, FILM COATED ORAL AT BEDTIME
Qty: 0 | Refills: 0 | Status: DISCONTINUED | OUTPATIENT
Start: 2018-12-11 | End: 2018-12-12

## 2018-12-11 RX ORDER — POTASSIUM CHLORIDE 20 MEQ
20 PACKET (EA) ORAL ONCE
Qty: 0 | Refills: 0 | Status: COMPLETED | OUTPATIENT
Start: 2018-12-11 | End: 2018-12-11

## 2018-12-11 RX ORDER — FENTANYL CITRATE 50 UG/ML
100 INJECTION INTRAVENOUS ONCE
Qty: 0 | Refills: 0 | Status: DISCONTINUED | OUTPATIENT
Start: 2018-12-11 | End: 2018-12-11

## 2018-12-11 RX ORDER — QUETIAPINE FUMARATE 200 MG/1
50 TABLET, FILM COATED ORAL
Qty: 0 | Refills: 0 | Status: DISCONTINUED | OUTPATIENT
Start: 2018-12-11 | End: 2018-12-11

## 2018-12-11 RX ORDER — FENTANYL CITRATE 50 UG/ML
50 INJECTION INTRAVENOUS ONCE
Qty: 0 | Refills: 0 | Status: DISCONTINUED | OUTPATIENT
Start: 2018-12-11 | End: 2018-12-11

## 2018-12-11 RX ORDER — POTASSIUM CHLORIDE 20 MEQ
10 PACKET (EA) ORAL
Qty: 0 | Refills: 0 | Status: COMPLETED | OUTPATIENT
Start: 2018-12-11 | End: 2018-12-11

## 2018-12-11 RX ORDER — MIDAZOLAM HYDROCHLORIDE 1 MG/ML
2 INJECTION, SOLUTION INTRAMUSCULAR; INTRAVENOUS ONCE
Qty: 0 | Refills: 0 | Status: DISCONTINUED | OUTPATIENT
Start: 2018-12-11 | End: 2018-12-11

## 2018-12-11 RX ORDER — ALBUMIN HUMAN 25 %
250 VIAL (ML) INTRAVENOUS ONCE
Qty: 0 | Refills: 0 | Status: COMPLETED | OUTPATIENT
Start: 2018-12-11 | End: 2018-12-11

## 2018-12-11 RX ORDER — DEXMEDETOMIDINE HYDROCHLORIDE IN 0.9% SODIUM CHLORIDE 4 UG/ML
1.4 INJECTION INTRAVENOUS
Qty: 200 | Refills: 0 | Status: DISCONTINUED | OUTPATIENT
Start: 2018-12-11 | End: 2018-12-21

## 2018-12-11 RX ORDER — MORPHINE SULFATE 50 MG/1
4 CAPSULE, EXTENDED RELEASE ORAL ONCE
Qty: 0 | Refills: 0 | Status: DISCONTINUED | OUTPATIENT
Start: 2018-12-11 | End: 2018-12-11

## 2018-12-11 RX ORDER — ACETAMINOPHEN 500 MG
1000 TABLET ORAL ONCE
Qty: 0 | Refills: 0 | Status: COMPLETED | OUTPATIENT
Start: 2018-12-11 | End: 2018-12-11

## 2018-12-11 RX ORDER — MIDAZOLAM HYDROCHLORIDE 1 MG/ML
0.05 INJECTION, SOLUTION INTRAMUSCULAR; INTRAVENOUS
Qty: 100 | Refills: 0 | Status: DISCONTINUED | OUTPATIENT
Start: 2018-12-11 | End: 2018-12-12

## 2018-12-11 RX ORDER — IPRATROPIUM/ALBUTEROL SULFATE 18-103MCG
3 AEROSOL WITH ADAPTER (GRAM) INHALATION EVERY 6 HOURS
Qty: 0 | Refills: 0 | Status: DISCONTINUED | OUTPATIENT
Start: 2018-12-11 | End: 2018-12-23

## 2018-12-11 RX ORDER — EPINEPHRINE 0.3 MG/.3ML
0.01 INJECTION INTRAMUSCULAR; SUBCUTANEOUS
Qty: 4 | Refills: 0 | Status: DISCONTINUED | OUTPATIENT
Start: 2018-12-11 | End: 2018-12-14

## 2018-12-11 RX ORDER — CALCIUM GLUCONATE 100 MG/ML
2 VIAL (ML) INTRAVENOUS ONCE
Qty: 0 | Refills: 0 | Status: COMPLETED | OUTPATIENT
Start: 2018-12-11 | End: 2018-12-11

## 2018-12-11 RX ORDER — QUETIAPINE FUMARATE 200 MG/1
25 TABLET, FILM COATED ORAL EVERY 6 HOURS
Qty: 0 | Refills: 0 | Status: DISCONTINUED | OUTPATIENT
Start: 2018-12-11 | End: 2018-12-12

## 2018-12-11 RX ADMIN — CLOPIDOGREL BISULFATE 75 MILLIGRAM(S): 75 TABLET, FILM COATED ORAL at 12:57

## 2018-12-11 RX ADMIN — FENTANYL CITRATE 50 MICROGRAM(S): 50 INJECTION INTRAVENOUS at 14:00

## 2018-12-11 RX ADMIN — FENTANYL CITRATE 50 MICROGRAM(S): 50 INJECTION INTRAVENOUS at 19:00

## 2018-12-11 RX ADMIN — DEXMEDETOMIDINE HYDROCHLORIDE IN 0.9% SODIUM CHLORIDE 15.87 MICROGRAM(S)/KG/HR: 4 INJECTION INTRAVENOUS at 14:30

## 2018-12-11 RX ADMIN — Medication 2 MILLIGRAM(S): at 02:11

## 2018-12-11 RX ADMIN — CHLORHEXIDINE GLUCONATE 5 MILLILITER(S): 213 SOLUTION TOPICAL at 06:22

## 2018-12-11 RX ADMIN — FENTANYL CITRATE 50 MICROGRAM(S): 50 INJECTION INTRAVENOUS at 10:52

## 2018-12-11 RX ADMIN — FENTANYL CITRATE 50 MICROGRAM(S): 50 INJECTION INTRAVENOUS at 20:46

## 2018-12-11 RX ADMIN — Medication 325 MILLIGRAM(S): at 13:04

## 2018-12-11 RX ADMIN — Medication 100 MILLIEQUIVALENT(S): at 15:00

## 2018-12-11 RX ADMIN — Medication 200 GRAM(S): at 21:09

## 2018-12-11 RX ADMIN — FENTANYL CITRATE 50 MICROGRAM(S): 50 INJECTION INTRAVENOUS at 03:10

## 2018-12-11 RX ADMIN — Medication 400 MILLIGRAM(S): at 02:42

## 2018-12-11 RX ADMIN — MIDAZOLAM HYDROCHLORIDE 2 MILLIGRAM(S): 1 INJECTION, SOLUTION INTRAMUSCULAR; INTRAVENOUS at 12:46

## 2018-12-11 RX ADMIN — ATORVASTATIN CALCIUM 80 MILLIGRAM(S): 80 TABLET, FILM COATED ORAL at 23:06

## 2018-12-11 RX ADMIN — FENTANYL CITRATE 100 MICROGRAM(S): 50 INJECTION INTRAVENOUS at 00:15

## 2018-12-11 RX ADMIN — MIDAZOLAM HYDROCHLORIDE 0.5 MG/KG/HR: 1 INJECTION, SOLUTION INTRAMUSCULAR; INTRAVENOUS at 19:57

## 2018-12-11 RX ADMIN — FENTANYL CITRATE 50 MICROGRAM(S): 50 INJECTION INTRAVENOUS at 03:20

## 2018-12-11 RX ADMIN — FENTANYL CITRATE 50 MICROGRAM(S): 50 INJECTION INTRAVENOUS at 18:45

## 2018-12-11 RX ADMIN — Medication 1 TABLET(S): at 15:52

## 2018-12-11 RX ADMIN — Medication 50 MILLIEQUIVALENT(S): at 00:45

## 2018-12-11 RX ADMIN — Medication 1 TABLET(S): at 18:46

## 2018-12-11 RX ADMIN — FENTANYL CITRATE 50 MICROGRAM(S): 50 INJECTION INTRAVENOUS at 05:40

## 2018-12-11 RX ADMIN — FENTANYL CITRATE 50 MICROGRAM(S): 50 INJECTION INTRAVENOUS at 05:15

## 2018-12-11 RX ADMIN — Medication 1000 MILLIGRAM(S): at 03:10

## 2018-12-11 RX ADMIN — CHLORHEXIDINE GLUCONATE 5 MILLILITER(S): 213 SOLUTION TOPICAL at 01:51

## 2018-12-11 RX ADMIN — CHLORHEXIDINE GLUCONATE 5 MILLILITER(S): 213 SOLUTION TOPICAL at 18:51

## 2018-12-11 RX ADMIN — Medication 500 MILLIGRAM(S): at 12:56

## 2018-12-11 RX ADMIN — Medication 50 MILLIEQUIVALENT(S): at 22:05

## 2018-12-11 RX ADMIN — FENTANYL CITRATE 100 MICROGRAM(S): 50 INJECTION INTRAVENOUS at 04:45

## 2018-12-11 RX ADMIN — FENTANYL CITRATE 100 MICROGRAM(S): 50 INJECTION INTRAVENOUS at 12:57

## 2018-12-11 RX ADMIN — FENTANYL CITRATE 100 MICROGRAM(S): 50 INJECTION INTRAVENOUS at 00:00

## 2018-12-11 RX ADMIN — ENOXAPARIN SODIUM 40 MILLIGRAM(S): 100 INJECTION SUBCUTANEOUS at 12:57

## 2018-12-11 RX ADMIN — MIDAZOLAM HYDROCHLORIDE 2 MILLIGRAM(S): 1 INJECTION, SOLUTION INTRAMUSCULAR; INTRAVENOUS at 18:53

## 2018-12-11 RX ADMIN — Medication 166.67 MILLIGRAM(S): at 17:30

## 2018-12-11 RX ADMIN — DEXMEDETOMIDINE HYDROCHLORIDE IN 0.9% SODIUM CHLORIDE 15.87 MICROGRAM(S)/KG/HR: 4 INJECTION INTRAVENOUS at 13:02

## 2018-12-11 RX ADMIN — FENTANYL CITRATE 100 MICROGRAM(S): 50 INJECTION INTRAVENOUS at 04:30

## 2018-12-11 RX ADMIN — Medication 166.67 MILLIGRAM(S): at 22:05

## 2018-12-11 RX ADMIN — FENTANYL CITRATE 50 MICROGRAM(S): 50 INJECTION INTRAVENOUS at 01:10

## 2018-12-11 RX ADMIN — FENTANYL CITRATE 50 MICROGRAM(S): 50 INJECTION INTRAVENOUS at 01:21

## 2018-12-11 RX ADMIN — Medication 50 MILLIEQUIVALENT(S): at 01:20

## 2018-12-11 RX ADMIN — QUETIAPINE FUMARATE 25 MILLIGRAM(S): 200 TABLET, FILM COATED ORAL at 12:45

## 2018-12-11 RX ADMIN — QUETIAPINE FUMARATE 25 MILLIGRAM(S): 200 TABLET, FILM COATED ORAL at 23:08

## 2018-12-11 RX ADMIN — Medication 1 MILLIGRAM(S): at 13:05

## 2018-12-11 RX ADMIN — Medication 1 TABLET(S): at 06:22

## 2018-12-11 RX ADMIN — PANTOPRAZOLE SODIUM 40 MILLIGRAM(S): 20 TABLET, DELAYED RELEASE ORAL at 06:22

## 2018-12-11 RX ADMIN — Medication 166.67 MILLIGRAM(S): at 09:53

## 2018-12-11 RX ADMIN — PIPERACILLIN AND TAZOBACTAM 25 GRAM(S): 4; .5 INJECTION, POWDER, LYOPHILIZED, FOR SOLUTION INTRAVENOUS at 06:22

## 2018-12-11 RX ADMIN — PIPERACILLIN AND TAZOBACTAM 25 GRAM(S): 4; .5 INJECTION, POWDER, LYOPHILIZED, FOR SOLUTION INTRAVENOUS at 15:52

## 2018-12-11 RX ADMIN — FENTANYL CITRATE 50 MICROGRAM(S): 50 INJECTION INTRAVENOUS at 21:29

## 2018-12-11 RX ADMIN — Medication 3 MILLILITER(S): at 01:16

## 2018-12-11 RX ADMIN — AMIODARONE HYDROCHLORIDE 200 MILLIGRAM(S): 400 TABLET ORAL at 06:22

## 2018-12-11 RX ADMIN — CHLORHEXIDINE GLUCONATE 5 MILLILITER(S): 213 SOLUTION TOPICAL at 10:53

## 2018-12-11 RX ADMIN — Medication 1 TABLET(S): at 23:06

## 2018-12-11 RX ADMIN — FENTANYL CITRATE 50 MICROGRAM(S): 50 INJECTION INTRAVENOUS at 07:30

## 2018-12-11 RX ADMIN — FENTANYL CITRATE 100 MICROGRAM(S): 50 INJECTION INTRAVENOUS at 13:38

## 2018-12-11 RX ADMIN — CHLORHEXIDINE GLUCONATE 5 MILLILITER(S): 213 SOLUTION TOPICAL at 23:09

## 2018-12-11 RX ADMIN — PANTOPRAZOLE SODIUM 40 MILLIGRAM(S): 20 TABLET, DELAYED RELEASE ORAL at 18:51

## 2018-12-11 RX ADMIN — FENTANYL CITRATE 50 MICROGRAM(S): 50 INJECTION INTRAVENOUS at 09:52

## 2018-12-11 RX ADMIN — Medication 125 MILLILITER(S): at 02:07

## 2018-12-11 RX ADMIN — FENTANYL CITRATE 50 MICROGRAM(S): 50 INJECTION INTRAVENOUS at 07:23

## 2018-12-11 RX ADMIN — Medication 50 MILLIEQUIVALENT(S): at 02:07

## 2018-12-11 RX ADMIN — FENTANYL CITRATE 100 MICROGRAM(S): 50 INJECTION INTRAVENOUS at 13:00

## 2018-12-11 RX ADMIN — QUETIAPINE FUMARATE 25 MILLIGRAM(S): 200 TABLET, FILM COATED ORAL at 23:09

## 2018-12-11 RX ADMIN — FENTANYL CITRATE 50 MICROGRAM(S): 50 INJECTION INTRAVENOUS at 10:39

## 2018-12-11 RX ADMIN — Medication 325 MILLIGRAM(S): at 12:57

## 2018-12-11 RX ADMIN — QUETIAPINE FUMARATE 25 MILLIGRAM(S): 200 TABLET, FILM COATED ORAL at 19:03

## 2018-12-11 RX ADMIN — CHLORHEXIDINE GLUCONATE 5 MILLILITER(S): 213 SOLUTION TOPICAL at 15:52

## 2018-12-11 RX ADMIN — Medication 50 MILLIEQUIVALENT(S): at 21:05

## 2018-12-11 RX ADMIN — PIPERACILLIN AND TAZOBACTAM 25 GRAM(S): 4; .5 INJECTION, POWDER, LYOPHILIZED, FOR SOLUTION INTRAVENOUS at 23:06

## 2018-12-11 RX ADMIN — Medication 50 MILLIEQUIVALENT(S): at 20:35

## 2018-12-11 NOTE — PROGRESS NOTE ADULT - SUBJECTIVE AND OBJECTIVE BOX
Brief summary:  48yMale POD#     Overnight events:      Past Medical History:  ST elevation myocardial infarction (STEMI)  Family history of myocardial infarction (Mother)  No pertinent family history in first degree relatives  Handoff  MEWS Score  Staph infection  No pertinent past medical history  Chronic respiratory failure with hypoxia  Cardiogenic shock  Chronic respiratory failure with hypoxia  Cardiogenic shock  ST elevation myocardial infarction (STEMI), unspecified artery  Fever, unspecified fever cause  Thrombocytopenia  Anemia due to blood loss  Acute systolic heart failure  Respiratory failure  Cardiogenic shock  Staph infection  Ventricular fibrillation  Coronary artery disease involving native coronary artery of native heart with unstable angina pectoris  Dissection of left main coronary artery  ST elevation myocardial infarction involving left anterior descending (LAD) coronary artery  STEMI (ST elevation myocardial infarction)  Tracheostomy  Exploration of femoral artery  Insertion of Impella device  ECMO decannulation  Exploration and washout of mediastinum  Intra-aortic balloon pump removal  Arterial cannulation  Central line placement  Randolph Michelle placement  CABG  ECMO (extracorporeal membrane oxygenation)  No significant past surgical history  CHEST PAIN  90+        ALBUTerol/ipratropium for Nebulization 3 milliLiter(s) Nebulizer every 6 hours PRN  amiodarone    Tablet 200 milliGRAM(s) Oral daily  artificial  tears Solution 1 Drop(s) Both EYES four times a day PRN  ascorbic acid 500 milliGRAM(s) Oral daily  aspirin 325 milliGRAM(s) Oral daily  atorvastatin 80 milliGRAM(s) Oral at bedtime  chlorhexidine 0.12% Liquid 5 milliLiter(s) Oral Mucosa every 4 hours  clopidogrel Tablet 75 milliGRAM(s) Oral daily  enoxaparin Injectable 40 milliGRAM(s) SubCutaneous daily  EPINEPHrine    Infusion 0.024 MICROgram(s)/kG/Min IV Continuous <Continuous>  fentaNYL    Injectable 50 MICROGram(s) IV Push every 2 hours PRN  ferrous    sulfate 325 milliGRAM(s) Oral daily  folic acid 1 milliGRAM(s) Oral daily  furosemide Infusion 5 mG/Hr IV Continuous <Continuous>  insulin Infusion 2 Unit(s)/Hr IV Continuous <Continuous>  lactobacillus acidophilus 1 Tablet(s) Oral every 8 hours  milrinone Infusion 0.25 MICROgram(s)/kG/Min IV Continuous <Continuous>  multivitamin 1 Tablet(s) Oral daily  norepinephrine Infusion 0.05 MICROgram(s)/kG/Min IV Continuous <Continuous>  pantoprazole  Injectable 40 milliGRAM(s) IV Push every 12 hours  petrolatum white Ointment 1 Application(s) Topical four times a day PRN  piperacillin/tazobactam IVPB. 3.375 Gram(s) IV Intermittent every 8 hours  potassium chloride  10 mEq/50 mL IVPB 10 milliEquivalent(s) IV Intermittent every 1 hour  propofol Infusion 30 MICROgram(s)/kG/Min IV Continuous <Continuous>  sodium chloride 0.9%. 1000 milliLiter(s) IV Continuous <Continuous>  sodium chloride 0.9%. 1000 milliLiter(s) IV Continuous <Continuous>  vancomycin  IVPB 1250 milliGRAM(s) IV Intermittent every 8 hours  MEDICATIONS  (PRN):  ALBUTerol/ipratropium for Nebulization 3 milliLiter(s) Nebulizer every 6 hours PRN Shortness of Breath and/or Wheezing  artificial  tears Solution 1 Drop(s) Both EYES four times a day PRN Dry Eyes  fentaNYL    Injectable 50 MICROGram(s) IV Push every 2 hours PRN Moderate Pain (4 - 6)  petrolatum white Ointment 1 Application(s) Topical four times a day PRN dry lips    Mode: AC/ CMV (Assist Control/ Continuous Mandatory Ventilation), RR (machine): 12, TV (machine): 500, FiO2: 40, PEEP: 5, MAP: 11, PIP: 25  Daily     Daily       ABG - ( 11 Dec 2018 00:16 )  pH, Arterial: 7.45  pH, Blood: x     /  pCO2: 36    /  pO2: 103   / HCO3: 26    / Base Excess: 1.3   /  SaO2: 98                                      8.8    15.1  )-----------( 265      ( 10 Dec 2018 04:03 )             27.8   12-10    142  |  106  |  20.0  ----------------------------<  115  4.3   |  23.0  |  0.63    Ca    7.8<L>      10 Dec 2018 04:03  Phos  4.1     12-10  Mg     2.1     12-10    TPro  6.0<L>  /  Alb  3.2<L>  /  TBili  2.3<H>  /  DBili  x   /  AST  33  /  ALT  49<H>  /  AlkPhos  193<H>  12-10    CARDIAC MARKERS ( 10 Dec 2018 04:03 )  x     / 2.40 ng/mL / 187 U/L / x     / 2.2 ng/mL  CARDIAC MARKERS ( 09 Dec 2018 16:14 )  x     / 2.19 ng/mL / 180 U/L / x     / 2.5 ng/mL  CARDIAC MARKERS ( 09 Dec 2018 09:27 )  x     / 3.24 ng/mL / 214 U/L / x     / 2.2 ng/mL    PT/INR - ( 10 Dec 2018 04:03 )   PT: 15.7 sec;   INR: 1.35 ratio         PTT - ( 10 Dec 2018 04:03 )  PTT:29.2 sec      Objective:  T(C): 38.4 (12-11-18 @ 01:00), Max: 38.6 (12-10-18 @ 09:00)  HR: 111 (12-11-18 @ 01:45) (104 - 130)  BP: --  RR: 29 (12-11-18 @ 01:45) (6 - 45)  SpO2: 97% (12-11-18 @ 01:45) (90% - 100%)  Wt(kg): --CAPILLARY BLOOD GLUCOSE      POCT Blood Glucose.: 123 mg/dL (10 Dec 2018 23:48)  POCT Blood Glucose.: 112 mg/dL (10 Dec 2018 22:49)  POCT Blood Glucose.: 112 mg/dL (10 Dec 2018 22:05)  POCT Blood Glucose.: 126 mg/dL (10 Dec 2018 20:02)  POCT Blood Glucose.: 136 mg/dL (10 Dec 2018 18:10)  POCT Blood Glucose.: 144 mg/dL (10 Dec 2018 16:28)  POCT Blood Glucose.: 142 mg/dL (10 Dec 2018 13:59)  POCT Blood Glucose.: 120 mg/dL (10 Dec 2018 11:45)  POCT Blood Glucose.: 126 mg/dL (10 Dec 2018 09:55)  POCT Blood Glucose.: 120 mg/dL (10 Dec 2018 07:52)  POCT Blood Glucose.: 128 mg/dL (10 Dec 2018 05:58)  I&O's Summary    09 Dec 2018 07:01  -  10 Dec 2018 07:00  --------------------------------------------------------  IN: 5158.6 mL / OUT: 4375 mL / NET: 783.6 mL    10 Dec 2018 07:01  -  11 Dec 2018 01:58  --------------------------------------------------------  IN: 2975.4 mL / OUT: 2295 mL / NET: 680.4 mL        Physical Exam  Neuro: A+O x 3, non-focal, speech clear and intact  Pulm: CTA, equal bilaterally  CV:   RRR, irregularly irregular, +S1S2  Abd: soft, NT, ND, +BS  Ext: +DP Pulses b/l, +PT pulses, no edema  Inc: MSI C/D/I/stable w/ dsg, ___ C/D/I w/ dsg/Ace wrap Overnight events:  no acute events overnight    Past Medical History:  ST elevation myocardial infarction (STEMI)  Family history of myocardial infarction (Mother)  MEWS Score  Staph infection  No pertinent past medical history  Chronic respiratory failure with hypoxia  Cardiogenic shock  Chronic respiratory failure with hypoxia  Cardiogenic shock  ST elevation myocardial infarction (STEMI), unspecified artery  Fever, unspecified fever cause  Thrombocytopenia  Anemia due to blood loss  Acute systolic heart failure  Respiratory failure  Cardiogenic shock  Staph infection  Ventricular fibrillation  Coronary artery disease involving native coronary artery of native heart with unstable angina pectoris  Dissection of left main coronary artery  ST elevation myocardial infarction involving left anterior descending (LAD) coronary artery  STEMI (ST elevation myocardial infarction)  Tracheostomy  Exploration of femoral artery  Insertion of Impella device  ECMO decannulation  Exploration and washout of mediastinum  Intra-aortic balloon pump removal  Arterial cannulation  Central line placement  [place] Michelle placement  CABG  ECMO (extracorporeal membrane oxygenation)  No significant past surgical history  CHEST PAIN  90+    Medications:  ·	ALBUTerol/ipratropium for Nebulization 3 milliLiter(s) Nebulizer every 6 hours PRN  ·	amiodarone    Tablet 200 milliGRAM(s) Oral daily  ·	artificial  tears Solution 1 Drop(s) Both EYES four times a day PRN  ·	ascorbic acid 500 milliGRAM(s) Oral daily  ·	aspirin 325 milliGRAM(s) Oral daily  ·	atorvastatin 80 milliGRAM(s) Oral at bedtime  ·	chlorhexidine 0.12% Liquid 5 milliLiter(s) Oral Mucosa every 4 hours  ·	clopidogrel Tablet 75 milliGRAM(s) Oral daily  ·	enoxaparin Injectable 40 milliGRAM(s) SubCutaneous daily  ·	EPINEPHrine    Infusion 0.024 MICROgram(s)/kG/Min IV Continuous <Continuous>  ·	fentaNYL    Injectable 50 MICROGram(s) IV Push every 2 hours PRN  ·	ferrous    sulfate 325 milliGRAM(s) Oral daily  ·	folic acid 1 milliGRAM(s) Oral daily  ·	furosemide Infusion 5 mG/Hr IV Continuous <Continuous>  ·	insulin Infusion 2 Unit(s)/Hr IV Continuous <Continuous>  ·	lactobacillus acidophilus 1 Tablet(s) Oral every 8 hours  ·	milrinone Infusion 0.25 MICROgram(s)/kG/Min IV Continuous <Continuous>  ·	multivitamin 1 Tablet(s) Oral daily  ·	norepinephrine Infusion 0.05 MICROgram(s)/kG/Min IV Continuous <Continuous>  ·	pantoprazole  Injectable 40 milliGRAM(s) IV Push every 12 hours  ·	petrolatum white Ointment 1 Application(s) Topical four times a day PRN  ·	piperacillin/tazobactam IVPB. 3.375 Gram(s) IV Intermittent every 8 hours  ·	potassium chloride  10 mEq/50 mL IVPB 10 milliEquivalent(s) IV Intermittent every 1 hour  ·	propofol Infusion 30 MICROgram(s)/kG/Min IV Continuous <Continuous>  ·	vancomycin  IVPB 1250 milliGRAM(s) IV Intermittent every 8 hours    MEDICATIONS  (PRN):  ·	ALBUTerol/ipratropium for Nebulization 3 milliLiter(s) Nebulizer every 6 hours PRN Shortness of Breath and/or Wheezing  ·	artificial  tears Solution 1 Drop(s) Both EYES four times a day PRN Dry Eyes  ·	fentaNYL    Injectable 50 MICROGram(s) IV Push every 2 hours PRN Moderate Pain (4 - 6)  ·	petrolatum white Ointment 1 Application(s) Topical four times a day PRN dry lips    Mode: AC/ CMV (Assist Control/ Continuous Mandatory Ventilation), RR (machine): 12, TV (machine): 500, FiO2: 40, PEEP: 5, MAP: 11, PIP: 25    ABG - ( 11 Dec 2018 00:16 )  pH, Arterial: 7.45  pH, Blood: x     /  pCO2: 36    /  pO2: 103   / HCO3: 26    / Base Excess: 1.3   /  SaO2: 98                              8.8    15.1  )-----------( 265      ( 10 Dec 2018 04:03 )             27.8   12-10    142  |  106  |  20.0  ----------------------------<  115  4.3   |  23.0  |  0.63    Ca    7.8<L>      10 Dec 2018 04:03  Phos  4.1     12-10  Mg     2.1     12-10    TPro  6.0<L>  /  Alb  3.2<L>  /  TBili  2.3<H>  /  DBili  x   /  AST  33  /  ALT  49<H>  /  AlkPhos  193<H>  12-10    CARDIAC MARKERS ( 10 Dec 2018 04:03 )  x     / 2.40 ng/mL / 187 U/L / x     / 2.2 ng/mL  CARDIAC MARKERS ( 09 Dec 2018 16:14 )  x     / 2.19 ng/mL / 180 U/L / x     / 2.5 ng/mL  CARDIAC MARKERS ( 09 Dec 2018 09:27 )  x     / 3.24 ng/mL / 214 U/L / x     / 2.2 ng/mL    PT/INR - ( 10 Dec 2018 04:03 )   PT: 15.7 sec;   INR: 1.35 ratio    PTT - ( 10 Dec 2018 04:03 )  PTT:29.2 sec      Objective:  T(C): 38.4 (12-11-18 @ 01:00), Max: 38.6 (12-10-18 @ 09:00)  HR: 111 (12-11-18 @ 01:45) (104 - 130)  BP: --  RR: 29 (12-11-18 @ 01:45) (6 - 45)  SpO2: 97% (12-11-18 @ 01:45) (90% - 100%)  Wt(kg): --CAPILLARY BLOOD GLUCOSE      POCT Blood Glucose.: 123 mg/dL (10 Dec 2018 23:48)  POCT Blood Glucose.: 112 mg/dL (10 Dec 2018 22:49)  POCT Blood Glucose.: 112 mg/dL (10 Dec 2018 22:05)  POCT Blood Glucose.: 126 mg/dL (10 Dec 2018 20:02)  POCT Blood Glucose.: 136 mg/dL (10 Dec 2018 18:10)  POCT Blood Glucose.: 144 mg/dL (10 Dec 2018 16:28)  POCT Blood Glucose.: 142 mg/dL (10 Dec 2018 13:59)  POCT Blood Glucose.: 120 mg/dL (10 Dec 2018 11:45)  POCT Blood Glucose.: 126 mg/dL (10 Dec 2018 09:55)  POCT Blood Glucose.: 120 mg/dL (10 Dec 2018 07:52)  POCT Blood Glucose.: 128 mg/dL (10 Dec 2018 05:58)    I&O's Summary    09 Dec 2018 07:01  -  10 Dec 2018 07:00  --------------------------------------------------------  IN: 5158.6 mL / OUT: 4375 mL / NET: 783.6 mL    10 Dec 2018 07:01  -  11 Dec 2018 01:58  --------------------------------------------------------  IN: 2975.4 mL / OUT: 2295 mL / NET: 680.4 mL        Physical Exam  Neuro: A+O x 3, non-focal, speech clear and intact  Pulm: CTA, equal bilaterally  CV: RRR, no murmurs, +S1S2  Abd: soft, NT, ND, +BS  Ext: +DP Pulses b/l, +PT pulses, no edema  Inc: MSI C/D/I/stable w/ dressing

## 2018-12-11 NOTE — OCCUPATIONAL THERAPY INITIAL EVALUATION ADULT - GENERAL OBSERVATIONS, REHAB EVAL
+IV line, +2 chest tubes, + prevena, +catheter uagustin, +NG tube, +A-line, +trach, +vent, +gaymar pump

## 2018-12-11 NOTE — CONSULT NOTE ADULT - ASSESSMENT
Pt is a 48 YOM s/p cardiac arrest, STEMI, CABG, ECMO, cardiogenic shock with agitated delerium, possible anoxic brain injury

## 2018-12-11 NOTE — CONSULT NOTE ADULT - SUBJECTIVE AND OBJECTIVE BOX
Pt is a 47 YOM initially admitted with STEMI/CP.  Pt has had a prolonged and complicated hospitalization including CABGx4, cardiac arrest, cardiogenic shock, ECMO, IABP, Impella pump.  Pt ultimately had tracheostomy for prolonged respiratory failure.  ICU is being asked to consult for pt today with severe agitated delerium. Pt is a 47 YOM initially admitted with STEMI/CP.  Pt has had a prolonged and complicated hospitalization including CABGx4, cardiac arrest, cardiogenic shock, ECMO, IABP, Impella pump.  Pt ultimately had tracheostomy for prolonged respiratory failure.  ICU is being asked to consult for pt today with severe agitated delerium.  Pt is currently on AC ventilation with TV of 500, Epi gtt, Milrinone gtt, Propofol gtt at 25mcg/kg and receiving multiple fentanyl pushes.  Earlier in day pt was observed thrashing around in bed and spoke with CTS PA and recommended trying precedex gtt and seroquel which were given.  At present pt on Precedex gtt at 1.4 which can be done for short periods of time.  Pt is calm, sleeping at present, not responding to stimulation.  At this point would recommend weaning off propofol altogether and if needed would just use overnight to help with sleep/wake cycles.  Pt can get seroquel 25mg every 6 hours as needed for agitation but should receive standing dose every night at qHS.  Would consider MRI of brain to r/o underlying anoxic injury as per CTS PA pt is only moving RUE. Attempts to promote sleep wake cycles are important ie quieter bed with less traffic, lights out at night, no family stimulation at night, even OOB to chair if can tolerate.    REASON FOR CONSULT: agitated dererium in complex critically ill pt  CONSULT REQUESTED BY: Arnold    Patient is a 48y old  Male who presents with a chief complaint of STEMI (11 Dec 2018 17:09)      BRIEF HOSPITAL COURSE: as above        PAST MEDICAL & SURGICAL HISTORY:  Staph infection  No significant past surgical history    Allergies    penicillins (Unknown)    Intolerances      FAMILY HISTORY:  Family history of myocardial infarction (Mother): mother;       Review of Systems:  Pt unable to answer questions in ros      Medications:  piperacillin/tazobactam IVPB. 3.375 Gram(s) IV Intermittent every 8 hours  vancomycin  IVPB 1250 milliGRAM(s) IV Intermittent every 8 hours    amiodarone    Tablet 200 milliGRAM(s) Oral daily  EPINEPHrine    Infusion 0.024 MICROgram(s)/kG/Min IV Continuous <Continuous>  furosemide Infusion 5 mG/Hr IV Continuous <Continuous>  milrinone Infusion 0.2 MICROgram(s)/kG/Min IV Continuous <Continuous>  norepinephrine Infusion 0.05 MICROgram(s)/kG/Min IV Continuous <Continuous>    ALBUTerol/ipratropium for Nebulization 3 milliLiter(s) Nebulizer every 6 hours PRN    aspirin 325 milliGRAM(s) Oral daily  dexmedetomidine Infusion 0.7 MICROgram(s)/kG/Hr IV Continuous <Continuous>  fentaNYL    Injectable 50 MICROGram(s) IV Push every 2 hours PRN  fentaNYL    Injectable 50 MICROGram(s) IV Push once  morphine  - Injectable 4 milliGRAM(s) IV Push once  propofol Infusion 30 MICROgram(s)/kG/Min IV Continuous <Continuous>  QUEtiapine 25 milliGRAM(s) Oral every 6 hours      clopidogrel Tablet 75 milliGRAM(s) Oral daily  enoxaparin Injectable 40 milliGRAM(s) SubCutaneous daily    pantoprazole  Injectable 40 milliGRAM(s) IV Push every 12 hours      atorvastatin 80 milliGRAM(s) Oral at bedtime  insulin Infusion 2 Unit(s)/Hr IV Continuous <Continuous>    ascorbic acid 500 milliGRAM(s) Oral daily  ferrous    sulfate 325 milliGRAM(s) Oral daily  folic acid 1 milliGRAM(s) Oral daily  multivitamin 1 Tablet(s) Oral daily  sodium chloride 0.9%. 1000 milliLiter(s) IV Continuous <Continuous>  sodium chloride 0.9%. 1000 milliLiter(s) IV Continuous <Continuous>      artificial  tears Solution 1 Drop(s) Both EYES four times a day PRN  chlorhexidine 0.12% Liquid 5 milliLiter(s) Oral Mucosa every 4 hours  petrolatum white Ointment 1 Application(s) Topical four times a day PRN    lactobacillus acidophilus 1 Tablet(s) Oral every 8 hours      Mode: AC/ CMV (Assist Control/ Continuous Mandatory Ventilation)  RR (machine): 12  TV (machine): 500  FiO2: 50  PEEP: 5  MAP: 15  PIP: 33      ICU Vital Signs Last 24 Hrs  T(C): 38.4 (11 Dec 2018 16:00), Max: 39.2 (11 Dec 2018 12:00)  T(F): 101.1 (11 Dec 2018 16:00), Max: 102.6 (11 Dec 2018 12:00)  HR: 91 (11 Dec 2018 17:45) (84 - 126)  BP: 123/72 (11 Dec 2018 05:00) (101/62 - 123/72)  BP(mean): 93 (11 Dec 2018 05:00) (76 - 93)  ABP: 106/59 (11 Dec 2018 17:45) (89/52 - 162/73)  ABP(mean): 72 (11 Dec 2018 17:45) (63 - 99)  RR: 32 (11 Dec 2018 17:45) (0 - 54)  SpO2: 96% (11 Dec 2018 17:45) (87% - 100%)    Vital Signs Last 24 Hrs  T(C): 38.4 (11 Dec 2018 16:00), Max: 39.2 (11 Dec 2018 12:00)  T(F): 101.1 (11 Dec 2018 16:00), Max: 102.6 (11 Dec 2018 12:00)  HR: 91 (11 Dec 2018 17:45) (84 - 126)  BP: 123/72 (11 Dec 2018 05:00) (101/62 - 123/72)  BP(mean): 93 (11 Dec 2018 05:00) (76 - 93)  RR: 32 (11 Dec 2018 17:45) (0 - 54)  SpO2: 96% (11 Dec 2018 17:45) (87% - 100%)    ABG - ( 11 Dec 2018 13:19 )  pH, Arterial: 7.44  pH, Blood: x     /  pCO2: 39    /  pO2: 74    / HCO3: 26    / Base Excess: 1.9   /  SaO2: 95                  I&O's Detail    10 Dec 2018 07:01  -  11 Dec 2018 07:00  --------------------------------------------------------  IN:    Albumin 5%  - 250 mL: 250 mL    Enteral Tube Flush: 120 mL    EPINEPHrine Infusion: 112 mL    EPINEPHrine Infusion: 80 mL    furosemide Infusion: 3.9 mL    furosemide Infusion: 13 mL    furosemide Infusion: 25 mL    furosemide Infusion: 15 mL    Glucerna 1.5: 555 mL    insulin Infusion: 43.5 mL    insulin Infusion: 35 mL    milrinone  Infusion: 95.2 mL    milrinone  Infusion: 68 mL    norepinephrine Infusion: 104 mL    norepinephrine Infusion: 91 mL    propofol Infusion: 272 mL    propofol Infusion: 380.8 mL    sodium chloride 0.9%: 70 mL    sodium chloride 0.9%: 35 mL    sodium chloride 0.9%.: 140 mL    sodium chloride 0.9%.: 70 mL    Solution: 225 mL    Solution: 500 mL    Solution: 150 mL    Solution: 300 mL  Total IN: 3753.4 mL    OUT:    Chest Tube: 470 mL    Chest Tube: 370 mL    Indwelling Catheter - Urethral: 2850 mL  Total OUT: 3690 mL    Total NET: 63.4 mL      11 Dec 2018 07:01  -  11 Dec 2018 18:27  --------------------------------------------------------  IN:    dexmedetomidine Infusion: 190.2 mL    Enteral Tube Flush: 100 mL    EPINEPHrine Infusion: 31 mL    EPINEPHrine Infusion: 37 mL    furosemide Infusion: 5 mL    furosemide Infusion: 30.9 mL    Glucerna 1.5: 550 mL    insulin Infusion: 40 mL    milrinone  Infusion: 6.8 mL    milrinone  Infusion: 61.2 mL    norepinephrine Infusion: 2 mL    propofol Infusion: 272.5 mL    sodium chloride 0.9%.: 50 mL    sodium chloride 0.9%.: 100 mL    Solution: 250 mL    Solution: 100 mL  Total IN: 1826.6 mL    OUT:    Chest Tube: 110 mL    Chest Tube: 150 mL    Indwelling Catheter - Urethral: 1600 mL  Total OUT: 1860 mL    Total NET: -33.4 mL            LABS:                        8.3    13.2  )-----------( 324      ( 11 Dec 2018 04:11 )             26.0         142  |  106  |  24.0<H>  ----------------------------<  159<H>  4.4   |  22.0  |  0.65    Ca    7.7<L>      11 Dec 2018 04:11  Phos  4.1     12-10  Mg     2.0         TPro  6.0<L>  /  Alb  3.2<L>  /  TBili  2.3<H>  /  DBili  x   /  AST  33  /  ALT  49<H>  /  AlkPhos  193<H>  12-10      CARDIAC MARKERS ( 10 Dec 2018 04:03 )  x     / 2.40 ng/mL / 187 U/L / x     / 2.2 ng/mL      CAPILLARY BLOOD GLUCOSE      POCT Blood Glucose.: 166 mg/dL (11 Dec 2018 11:02)    PT/INR - ( 10 Dec 2018 04:03 )   PT: 15.7 sec;   INR: 1.35 ratio         PTT - ( 10 Dec 2018 04:03 )  PTT:29.2 sec    CULTURES:  C Diff by PCR Result: NotDetec ( @ 13:40)  Culture Results:   Moderate Klebsiella pneumoniae  No Routine respiratory keara present ( @ 19:40)  Culture Results:   No growth ( @ 22:28)  Culture Results:   No growth at 48 hours ( @ 22:26)  Culture Results:   No growth at 48 hours ( @ 22:25)  Culture Results:   No growth at 5 days. ( @ 10:21)  Culture Results:   No growth at 5 days. ( @ 10:21)      Physical Examination:    General: sedate, trach/vent    HEENT: Pupils equal, reactive to light 2mm b/l  Symmetric.    PULM: diminished at bases, course diffusely bilaterally, tracheostomy    CVS: Regular rate and rhythm, no murmurs, rubs, or gallops, chest wound vac in place, still has mediastinal CT in place    ABD: Soft, mildly distended,hypoactive bowel sounds, no masses    EXT: positive diffuse edema, b/l LE wound dressings c/d/i    SKIN: Warm and well perfused, no rashes noted.    RADIOLOGY: ***    CRITICAL CARE TIME SPENT: ***

## 2018-12-11 NOTE — PROGRESS NOTE ADULT - SUBJECTIVE AND OBJECTIVE BOX
[facility]  INFECTIOUS DISEASES AND INTERNAL MEDICINE at [place]  =======================================================  Perfecto Santizo MD  Diplomates American Board of Internal Medicine and Infectious Diseases  =======================================================    [id number]  PATRICK LYLE   follow up for:  post operative fevers  patient seen and examined.  S/P TRACH YESTERDAY      diarrhea this Am C DIFF NEG  ===================================================  REVIEW OF SYSTEMS:  unable to obtain due to medical condition  =======================================================  Antibiotics:  piperacillin/tazobactam IVPB. 3.375 Gram(s) IV Intermittent every 8 hours  vancomycin    Solution 500 milliGRAM(s) Enteral Tube every 6 hours  vancomycin  IVPB 1250 milliGRAM(s) IV Intermittent <User Schedule>    Other medications:  amiodarone    Tablet 200 milliGRAM(s) Oral daily  ascorbic acid 500 milliGRAM(s) Oral daily  aspirin 325 milliGRAM(s) Oral daily  atorvastatin 80 milliGRAM(s) Oral at bedtime  chlorhexidine 0.12% Liquid 5 milliLiter(s) Oral Mucosa every 4 hours  clopidogrel Tablet 75 milliGRAM(s) Oral daily  dexmedetomidine Infusion 0.7 MICROgram(s)/kG/Hr IV Continuous <Continuous>  dextrose 50% Injectable 50 milliLiter(s) IV Push every 15 minutes  enoxaparin Injectable 40 milliGRAM(s) SubCutaneous daily  EPINEPHrine    Infusion 0.024 MICROgram(s)/kG/Min IV Continuous <Continuous>  ferrous    sulfate 325 milliGRAM(s) Oral daily  folic acid 1 milliGRAM(s) Oral daily  furosemide   Injectable 20 milliGRAM(s) IV Push every 8 hours  furosemide Infusion 10 mG/Hr IV Continuous <Continuous>  insulin Infusion 2 Unit(s)/Hr IV Continuous <Continuous>  lactobacillus acidophilus 1 Tablet(s) Oral every 8 hours  milrinone Infusion 0.375 MICROgram(s)/kG/Min IV Continuous <Continuous>  multivitamin 1 Tablet(s) Oral daily  nitroglycerin  Infusion 66.667 MICROgram(s)/Min IV Continuous <Continuous>  norepinephrine Infusion 0.05 MICROgram(s)/kG/Min IV Continuous <Continuous>  pantoprazole  Injectable 40 milliGRAM(s) IV Push every 12 hours  potassium chloride  10 mEq/50 mL IVPB 10 milliEquivalent(s) IV Intermittent every 1 hour  potassium chloride  10 mEq/50 mL IVPB 10 milliEquivalent(s) IV Intermittent every 1 hour  potassium chloride  10 mEq/50 mL IVPB 10 milliEquivalent(s) IV Intermittent every 1 hour  potassium chloride  10 mEq/50 mL IVPB 10 milliEquivalent(s) IV Intermittent every 1 hour  propofol Infusion 30 MICROgram(s)/kG/Min IV Continuous <Continuous>  sodium chloride 0.9%. 1000 milliLiter(s) IV Continuous <Continuous>  sodium chloride 0.9%. 1000 milliLiter(s) IV Continuous <Continuous>    =======================================================    Physical Exam:    Vital Signs Last 24 Hrs  T(C): 39 (11 Dec 2018 09:00), Max: 39 (11 Dec 2018 09:00)  T(F): 102.2 (11 Dec 2018 09:00), Max: 102.2 (11 Dec 2018 09:00)  HR: 123 (11 Dec 2018 09:30) (104 - 126)  BP: 123/72 (11 Dec 2018 05:00) (101/62 - 123/72)  BP(mean): 93 (11 Dec 2018 05:00) (76 - 93)  RR: 33 (11 Dec 2018 09:30) (6 - 54)  SpO2: 93% (11 Dec 2018 09:30) (90% - 100%)    General:  intubated, no distress MORE AWAKE TODAY  Eye: Pupils are equal, round and reactive to light,   HENT: Normocephalic, Oral mucosa is moist, TRACH ON VNET   Neck: Supple, No lymphadenopathy.  RIGHT neck IJ line  Respiratory: Lungs with fair air entry  midline chest with dressing in place.   + CHEST tubes in place  Cardiovascular: Normal rate, Regular rhythm, No murmur, Good pulses equal in all extremities,  No edema.  Gastrointestinal: Soft, Non-tender, Non-distended, Normal bowel sounds.  Genitourinary: + Mckeon with light color urine  Lymphatics: No lymphadenopathy neck,   Musculoskeletal: Normal range of motion,   Integumentary: No rash.  Neurologic: Alert, Cranial Nerves II-XII are grossly intact.    =======================================================  Labs:                                                        8.3    13.2  )-----------( 324      ( 11 Dec 2018 04:11 )             26.0   12-11    142  |  106  |  24.0<H>  ----------------------------<  159<H>  4.4   |  22.0  |  0.65    Ca    7.7<L>      11 Dec 2018 04:11  Phos  4.1     12-10  Mg     2.0     12-11    TPro  6.0<L>  /  Alb  3.2<L>  /  TBili  2.3<H>  /  DBili  x   /  AST  33  /  ALT  49<H>  /  AlkPhos  193<H>  12-10    Final Report (12-08-18 @ 14:39):    No growth    Culture - Blood (collected 12-05-18 @ 10:21)  Source: .Blood    Culture - Blood (collected 12-05-18 @ 10:21)  Source: .Blood    Culture - Blood (collected 12-04-18 @ 10:22)  Source: .Blood  Final Report (12-09-18 @ 11:01):    No growth at 5 days.    Culture - Blood (collected 12-04-18 @ 10:22)  Source: .Blood  Final Report (12-09-18 @ 11:01):    No growth at 5 days.    Culture - Sputum (collected 12-04-18 @ 10:21)  Source: .Sputum  Gram Stain (12-04-18 @ 11:59):    Few White blood cells    No organisms seen  Final Report (12-06-18 @ 14:00):    Few Candida albicans    No Routine respiratory keara present

## 2018-12-11 NOTE — PROGRESS NOTE ADULT - ASSESSMENT
48 yo Male c/o CP for 2-3 days prior to admission, then 10/10 chest pain approximately 9:30 am yesterday, followed by vomiting.  Wife drove pt to hospital, ruled in for STEMI. Urgent Cath, pt found to have multiple occlusions; stent to LAD and D2; stent to LAD thrombosed,  impella placed for support.  Pt then had VF arrest, shock x 1, return to NSR.  Upon transfer to OR for Urgent CABG, pt with asystolic arrest, chest opened intra-op with CPR in progress. Pt underwent C4V (LAD, Diag, OM and PDA) with inability to wean from CPB therefore requiring VA ECMO (central cannulation) and IABP. , he was transferred to CICU on multiple gtts, now s/p explant 12/3 with placement of impella via R groin sheath. 12/4 ID consulted started on broad spectrum abx, 12/5 Impella weaned. 12/6 Impella removal via right femoral cutdown with primary repair of femoral artery and stent placed to external illiac artery for dissection, PRBC x1. 12/8 Pt underwent head CT, no acute intracranial process noted, chest CT b/l pneumothoraces R-20%, L-10%, b/l chest tubes in place, confirmed infiltrates on right lower and left upper lung.  ABX Vanco and Zosyn course extended, sputum culture pending. Currently, patient has low grade temps, awaiting repeat cultures.     Hospital course notable for persistent cardiogenic shock/LV failure, vent dependent respiratory failure (inability to wean also contributed to by severe agitation, now s/p trach), sepsis likely d/y GNR VAP.

## 2018-12-11 NOTE — OCCUPATIONAL THERAPY INITIAL EVALUATION ADULT - PLANNED THERAPY INTERVENTIONS, OT EVAL
ADL retraining/bed mobility training/transfer training/balance training/ROM/cognitive, visual perceptual/neuromuscular re-education

## 2018-12-11 NOTE — PROGRESS NOTE ADULT - ASSESSMENT
This is a 47y  Male with no significant PMH admitted on 11/29/18 for CP for 2-3 days prior to admission,   found with STEMI. Urgent Cath, pt found to have multiple occlusions; VF arrest, shock x 1, return to NSR.  Upon transfer to OR for Urgent CABG, pt again VF arrest, chest opened intra-op with CPR in progress.  s/p ECMO and Impella; 12/3 ECMO explanted and Impella placed via right groin sheath.    now developed fevers.    patient in hospital for > 48 hours, multiple interventions.   blood cultures and sputum cx sent  urine legionella is NEGATIVE    CT Chest with PNA in right lower and left upper lung  - continue Vancomycin 1 gram Q8H  - continue Zosyn 3.375 gram Q8H;    Diarrhea RESOLVED     Cdiff;  NEG     S/P TRACH  CONTINUE FEVERS DESPITE IV ABX  BLOOD CX NEG SO FAR   IF FEVERS   SUGGEST CT CHEST ABD PELVIS TO LOOK FOR  SOURCE OF FEVERS  WILL FOLLOW UP

## 2018-12-11 NOTE — PHYSICAL THERAPY INITIAL EVALUATION ADULT - IMPAIRMENTS FOUND, PT EVAL
aerobic capacity/endurance/neuromotor development and sensory integration/arousal, attention, and cognition

## 2018-12-11 NOTE — CONSULT NOTE ADULT - PROBLEM SELECTOR PROBLEM 3
Coronary artery disease involving native coronary artery of native heart with unstable angina pectoris
STEMI (ST elevation myocardial infarction)

## 2018-12-12 DIAGNOSIS — I50.41 ACUTE COMBINED SYSTOLIC (CONGESTIVE) AND DIASTOLIC (CONGESTIVE) HEART FAILURE: ICD-10-CM

## 2018-12-12 DIAGNOSIS — R45.1 RESTLESSNESS AND AGITATION: ICD-10-CM

## 2018-12-12 DIAGNOSIS — J81.1 CHRONIC PULMONARY EDEMA: ICD-10-CM

## 2018-12-12 DIAGNOSIS — J15.0 PNEUMONIA DUE TO KLEBSIELLA PNEUMONIAE: ICD-10-CM

## 2018-12-12 LAB
ALBUMIN SERPL ELPH-MCNC: 2.8 G/DL — LOW (ref 3.3–5.2)
ALP SERPL-CCNC: 173 U/L — HIGH (ref 40–120)
ALT FLD-CCNC: 29 U/L — SIGNIFICANT CHANGE UP
ANION GAP SERPL CALC-SCNC: 13 MMOL/L — SIGNIFICANT CHANGE UP (ref 5–17)
ANION GAP SERPL CALC-SCNC: 14 MMOL/L — SIGNIFICANT CHANGE UP (ref 5–17)
APTT BLD: 29.7 SEC — SIGNIFICANT CHANGE UP (ref 27.5–36.3)
AST SERPL-CCNC: 31 U/L — SIGNIFICANT CHANGE UP
BILIRUB SERPL-MCNC: 1.7 MG/DL — SIGNIFICANT CHANGE UP (ref 0.4–2)
BUN SERPL-MCNC: 31 MG/DL — HIGH (ref 8–20)
BUN SERPL-MCNC: 34 MG/DL — HIGH (ref 8–20)
C DIFF BY PCR RESULT: SIGNIFICANT CHANGE UP
C DIFF TOX GENS STL QL NAA+PROBE: SIGNIFICANT CHANGE UP
CALCIUM SERPL-MCNC: 7.7 MG/DL — LOW (ref 8.6–10.2)
CALCIUM SERPL-MCNC: 8 MG/DL — LOW (ref 8.6–10.2)
CHLORIDE SERPL-SCNC: 105 MMOL/L — SIGNIFICANT CHANGE UP (ref 98–107)
CHLORIDE SERPL-SCNC: 106 MMOL/L — SIGNIFICANT CHANGE UP (ref 98–107)
CO2 SERPL-SCNC: 22 MMOL/L — SIGNIFICANT CHANGE UP (ref 22–29)
CO2 SERPL-SCNC: 24 MMOL/L — SIGNIFICANT CHANGE UP (ref 22–29)
CREAT SERPL-MCNC: 0.7 MG/DL — SIGNIFICANT CHANGE UP (ref 0.5–1.3)
CREAT SERPL-MCNC: 0.72 MG/DL — SIGNIFICANT CHANGE UP (ref 0.5–1.3)
CULTURE RESULTS: SIGNIFICANT CHANGE UP
CULTURE RESULTS: SIGNIFICANT CHANGE UP
GAS PNL BLDA: SIGNIFICANT CHANGE UP
GLUCOSE BLDC GLUCOMTR-MCNC: 109 MG/DL — HIGH (ref 70–99)
GLUCOSE BLDC GLUCOMTR-MCNC: 113 MG/DL — HIGH (ref 70–99)
GLUCOSE BLDC GLUCOMTR-MCNC: 116 MG/DL — HIGH (ref 70–99)
GLUCOSE BLDC GLUCOMTR-MCNC: 122 MG/DL — HIGH (ref 70–99)
GLUCOSE BLDC GLUCOMTR-MCNC: 124 MG/DL — HIGH (ref 70–99)
GLUCOSE BLDC GLUCOMTR-MCNC: 130 MG/DL — HIGH (ref 70–99)
GLUCOSE BLDC GLUCOMTR-MCNC: 132 MG/DL — HIGH (ref 70–99)
GLUCOSE BLDC GLUCOMTR-MCNC: 139 MG/DL — HIGH (ref 70–99)
GLUCOSE BLDC GLUCOMTR-MCNC: 141 MG/DL — HIGH (ref 70–99)
GLUCOSE BLDC GLUCOMTR-MCNC: 149 MG/DL — HIGH (ref 70–99)
GLUCOSE BLDC GLUCOMTR-MCNC: 149 MG/DL — HIGH (ref 70–99)
GLUCOSE BLDC GLUCOMTR-MCNC: 153 MG/DL — HIGH (ref 70–99)
GLUCOSE BLDC GLUCOMTR-MCNC: 155 MG/DL — HIGH (ref 70–99)
GLUCOSE BLDC GLUCOMTR-MCNC: 157 MG/DL — HIGH (ref 70–99)
GLUCOSE BLDC GLUCOMTR-MCNC: 162 MG/DL — HIGH (ref 70–99)
GLUCOSE BLDC GLUCOMTR-MCNC: 163 MG/DL — HIGH (ref 70–99)
GLUCOSE BLDC GLUCOMTR-MCNC: 167 MG/DL — HIGH (ref 70–99)
GLUCOSE BLDC GLUCOMTR-MCNC: 168 MG/DL — HIGH (ref 70–99)
GLUCOSE BLDC GLUCOMTR-MCNC: 171 MG/DL — HIGH (ref 70–99)
GLUCOSE BLDC GLUCOMTR-MCNC: 172 MG/DL — HIGH (ref 70–99)
GLUCOSE SERPL-MCNC: 170 MG/DL — HIGH (ref 70–115)
GLUCOSE SERPL-MCNC: 172 MG/DL — HIGH (ref 70–115)
GRAM STN FLD: SIGNIFICANT CHANGE UP
HCT VFR BLD CALC: 27 % — LOW (ref 42–52)
HGB BLD-MCNC: 8.5 G/DL — LOW (ref 14–18)
INR BLD: 1.33 RATIO — HIGH (ref 0.88–1.16)
MAGNESIUM SERPL-MCNC: 2 MG/DL — SIGNIFICANT CHANGE UP (ref 1.8–2.6)
MCHC RBC-ENTMCNC: 28.1 PG — SIGNIFICANT CHANGE UP (ref 27–31)
MCHC RBC-ENTMCNC: 31.5 G/DL — LOW (ref 32–36)
MCV RBC AUTO: 89.1 FL — SIGNIFICANT CHANGE UP (ref 80–94)
NIGHT BLUE STAIN TISS: SIGNIFICANT CHANGE UP
PHOSPHATE SERPL-MCNC: 2.9 MG/DL — SIGNIFICANT CHANGE UP (ref 2.4–4.7)
PLATELET # BLD AUTO: 381 K/UL — SIGNIFICANT CHANGE UP (ref 150–400)
POTASSIUM SERPL-MCNC: 3.6 MMOL/L — SIGNIFICANT CHANGE UP (ref 3.5–5.3)
POTASSIUM SERPL-MCNC: 4.1 MMOL/L — SIGNIFICANT CHANGE UP (ref 3.5–5.3)
POTASSIUM SERPL-SCNC: 3.6 MMOL/L — SIGNIFICANT CHANGE UP (ref 3.5–5.3)
POTASSIUM SERPL-SCNC: 4.1 MMOL/L — SIGNIFICANT CHANGE UP (ref 3.5–5.3)
PROT SERPL-MCNC: 5.9 G/DL — LOW (ref 6.6–8.7)
PROTHROM AB SERPL-ACNC: 15.4 SEC — HIGH (ref 10–12.9)
RBC # BLD: 3.03 M/UL — LOW (ref 4.6–6.2)
RBC # FLD: 15.2 % — SIGNIFICANT CHANGE UP (ref 11–15.6)
SODIUM SERPL-SCNC: 142 MMOL/L — SIGNIFICANT CHANGE UP (ref 135–145)
SODIUM SERPL-SCNC: 142 MMOL/L — SIGNIFICANT CHANGE UP (ref 135–145)
SPECIMEN SOURCE: SIGNIFICANT CHANGE UP
TRIGL SERPL-MCNC: 187 MG/DL — SIGNIFICANT CHANGE UP (ref 10–200)
WBC # BLD: 10.6 K/UL — SIGNIFICANT CHANGE UP (ref 4.8–10.8)
WBC # FLD AUTO: 10.6 K/UL — SIGNIFICANT CHANGE UP (ref 4.8–10.8)

## 2018-12-12 PROCEDURE — 99233 SBSQ HOSP IP/OBS HIGH 50: CPT

## 2018-12-12 PROCEDURE — 93010 ELECTROCARDIOGRAM REPORT: CPT

## 2018-12-12 PROCEDURE — 71045 X-RAY EXAM CHEST 1 VIEW: CPT | Mod: 26

## 2018-12-12 PROCEDURE — 99223 1ST HOSP IP/OBS HIGH 75: CPT

## 2018-12-12 RX ORDER — INSULIN LISPRO 100/ML
VIAL (ML) SUBCUTANEOUS EVERY 4 HOURS
Qty: 0 | Refills: 0 | Status: DISCONTINUED | OUTPATIENT
Start: 2018-12-13 | End: 2018-12-20

## 2018-12-12 RX ORDER — QUETIAPINE FUMARATE 200 MG/1
50 TABLET, FILM COATED ORAL
Qty: 0 | Refills: 0 | Status: DISCONTINUED | OUTPATIENT
Start: 2018-12-12 | End: 2018-12-19

## 2018-12-12 RX ORDER — PROPOFOL 10 MG/ML
7 INJECTION, EMULSION INTRAVENOUS
Qty: 1000 | Refills: 0 | Status: DISCONTINUED | OUTPATIENT
Start: 2018-12-12 | End: 2018-12-20

## 2018-12-12 RX ORDER — PROPOFOL 10 MG/ML
10 INJECTION, EMULSION INTRAVENOUS
Qty: 1000 | Refills: 0 | Status: DISCONTINUED | OUTPATIENT
Start: 2018-12-12 | End: 2018-12-12

## 2018-12-12 RX ORDER — HYDROMORPHONE HYDROCHLORIDE 2 MG/ML
2 INJECTION INTRAMUSCULAR; INTRAVENOUS; SUBCUTANEOUS
Qty: 0 | Refills: 0 | Status: DISCONTINUED | OUTPATIENT
Start: 2018-12-12 | End: 2018-12-19

## 2018-12-12 RX ORDER — INSULIN GLARGINE 100 [IU]/ML
30 INJECTION, SOLUTION SUBCUTANEOUS AT BEDTIME
Qty: 0 | Refills: 0 | Status: DISCONTINUED | OUTPATIENT
Start: 2018-12-12 | End: 2018-12-14

## 2018-12-12 RX ORDER — POTASSIUM CHLORIDE 20 MEQ
10 PACKET (EA) ORAL
Qty: 0 | Refills: 0 | Status: COMPLETED | OUTPATIENT
Start: 2018-12-12 | End: 2018-12-12

## 2018-12-12 RX ORDER — FENTANYL CITRATE 50 UG/ML
50 INJECTION INTRAVENOUS ONCE
Qty: 0 | Refills: 0 | Status: DISCONTINUED | OUTPATIENT
Start: 2018-12-12 | End: 2018-12-12

## 2018-12-12 RX ORDER — HYDROMORPHONE HYDROCHLORIDE 2 MG/ML
4 INJECTION INTRAMUSCULAR; INTRAVENOUS; SUBCUTANEOUS
Qty: 0 | Refills: 0 | Status: DISCONTINUED | OUTPATIENT
Start: 2018-12-12 | End: 2018-12-19

## 2018-12-12 RX ORDER — QUETIAPINE FUMARATE 200 MG/1
25 TABLET, FILM COATED ORAL
Qty: 0 | Refills: 0 | Status: DISCONTINUED | OUTPATIENT
Start: 2018-12-12 | End: 2018-12-14

## 2018-12-12 RX ORDER — ACETAMINOPHEN 500 MG
650 TABLET ORAL EVERY 6 HOURS
Qty: 0 | Refills: 0 | Status: DISCONTINUED | OUTPATIENT
Start: 2018-12-12 | End: 2019-01-07

## 2018-12-12 RX ADMIN — FENTANYL CITRATE 50 MICROGRAM(S): 50 INJECTION INTRAVENOUS at 08:45

## 2018-12-12 RX ADMIN — Medication 1 APPLICATION(S): at 15:28

## 2018-12-12 RX ADMIN — Medication 50 MILLIEQUIVALENT(S): at 23:00

## 2018-12-12 RX ADMIN — HYDROMORPHONE HYDROCHLORIDE 4 MILLIGRAM(S): 2 INJECTION INTRAMUSCULAR; INTRAVENOUS; SUBCUTANEOUS at 16:38

## 2018-12-12 RX ADMIN — PIPERACILLIN AND TAZOBACTAM 25 GRAM(S): 4; .5 INJECTION, POWDER, LYOPHILIZED, FOR SOLUTION INTRAVENOUS at 07:04

## 2018-12-12 RX ADMIN — Medication 8.5 MICROGRAM(S)/KG/MIN: at 07:47

## 2018-12-12 RX ADMIN — FENTANYL CITRATE 50 MICROGRAM(S): 50 INJECTION INTRAVENOUS at 08:30

## 2018-12-12 RX ADMIN — SODIUM CHLORIDE 10 MILLILITER(S): 9 INJECTION INTRAMUSCULAR; INTRAVENOUS; SUBCUTANEOUS at 15:49

## 2018-12-12 RX ADMIN — FENTANYL CITRATE 50 MICROGRAM(S): 50 INJECTION INTRAVENOUS at 07:45

## 2018-12-12 RX ADMIN — INSULIN HUMAN 2 UNIT(S)/HR: 100 INJECTION, SOLUTION SUBCUTANEOUS at 09:10

## 2018-12-12 RX ADMIN — Medication 325 MILLIGRAM(S): at 11:08

## 2018-12-12 RX ADMIN — HYDROMORPHONE HYDROCHLORIDE 4 MILLIGRAM(S): 2 INJECTION INTRAMUSCULAR; INTRAVENOUS; SUBCUTANEOUS at 12:00

## 2018-12-12 RX ADMIN — FENTANYL CITRATE 50 MICROGRAM(S): 50 INJECTION INTRAVENOUS at 07:34

## 2018-12-12 RX ADMIN — Medication 1 TABLET(S): at 21:41

## 2018-12-12 RX ADMIN — Medication 1 TABLET(S): at 11:10

## 2018-12-12 RX ADMIN — DEXMEDETOMIDINE HYDROCHLORIDE IN 0.9% SODIUM CHLORIDE 31.75 MICROGRAM(S)/KG/HR: 4 INJECTION INTRAVENOUS at 12:25

## 2018-12-12 RX ADMIN — Medication 2.5 MG/HR: at 07:41

## 2018-12-12 RX ADMIN — ENOXAPARIN SODIUM 40 MILLIGRAM(S): 100 INJECTION SUBCUTANEOUS at 11:08

## 2018-12-12 RX ADMIN — Medication 50 MILLIEQUIVALENT(S): at 22:00

## 2018-12-12 RX ADMIN — AMIODARONE HYDROCHLORIDE 200 MILLIGRAM(S): 400 TABLET ORAL at 05:36

## 2018-12-12 RX ADMIN — DEXMEDETOMIDINE HYDROCHLORIDE IN 0.9% SODIUM CHLORIDE 31.75 MICROGRAM(S)/KG/HR: 4 INJECTION INTRAVENOUS at 15:45

## 2018-12-12 RX ADMIN — MIDAZOLAM HYDROCHLORIDE 4.54 MG/KG/HR: 1 INJECTION, SOLUTION INTRAMUSCULAR; INTRAVENOUS at 07:48

## 2018-12-12 RX ADMIN — HYDROMORPHONE HYDROCHLORIDE 4 MILLIGRAM(S): 2 INJECTION INTRAMUSCULAR; INTRAVENOUS; SUBCUTANEOUS at 22:20

## 2018-12-12 RX ADMIN — FENTANYL CITRATE 50 MICROGRAM(S): 50 INJECTION INTRAVENOUS at 11:15

## 2018-12-12 RX ADMIN — PROPOFOL 3.81 MICROGRAM(S)/KG/MIN: 10 INJECTION, EMULSION INTRAVENOUS at 23:13

## 2018-12-12 RX ADMIN — Medication 50 MILLIEQUIVALENT(S): at 05:20

## 2018-12-12 RX ADMIN — CHLORHEXIDINE GLUCONATE 5 MILLILITER(S): 213 SOLUTION TOPICAL at 02:33

## 2018-12-12 RX ADMIN — Medication 50 MILLIEQUIVALENT(S): at 05:33

## 2018-12-12 RX ADMIN — SODIUM CHLORIDE 5 MILLILITER(S): 9 INJECTION INTRAMUSCULAR; INTRAVENOUS; SUBCUTANEOUS at 15:49

## 2018-12-12 RX ADMIN — CHLORHEXIDINE GLUCONATE 5 MILLILITER(S): 213 SOLUTION TOPICAL at 05:36

## 2018-12-12 RX ADMIN — CHLORHEXIDINE GLUCONATE 5 MILLILITER(S): 213 SOLUTION TOPICAL at 09:09

## 2018-12-12 RX ADMIN — ATORVASTATIN CALCIUM 80 MILLIGRAM(S): 80 TABLET, FILM COATED ORAL at 21:41

## 2018-12-12 RX ADMIN — Medication 1 TABLET(S): at 05:37

## 2018-12-12 RX ADMIN — EPINEPHRINE 4 MICROGRAM(S)/KG/MIN: 0.3 INJECTION INTRAMUSCULAR; SUBCUTANEOUS at 07:53

## 2018-12-12 RX ADMIN — Medication 50 MILLIEQUIVALENT(S): at 21:00

## 2018-12-12 RX ADMIN — PANTOPRAZOLE SODIUM 40 MILLIGRAM(S): 20 TABLET, DELAYED RELEASE ORAL at 05:36

## 2018-12-12 RX ADMIN — Medication 1 MILLIGRAM(S): at 11:07

## 2018-12-12 RX ADMIN — Medication 650 MILLIGRAM(S): at 11:07

## 2018-12-12 RX ADMIN — Medication 500 MILLIGRAM(S): at 11:08

## 2018-12-12 RX ADMIN — HYDROMORPHONE HYDROCHLORIDE 4 MILLIGRAM(S): 2 INJECTION INTRAMUSCULAR; INTRAVENOUS; SUBCUTANEOUS at 16:35

## 2018-12-12 RX ADMIN — Medication 50 MILLIEQUIVALENT(S): at 04:10

## 2018-12-12 RX ADMIN — MIDAZOLAM HYDROCHLORIDE 4.54 MG/KG/HR: 1 INJECTION, SOLUTION INTRAMUSCULAR; INTRAVENOUS at 09:05

## 2018-12-12 RX ADMIN — CHLORHEXIDINE GLUCONATE 5 MILLILITER(S): 213 SOLUTION TOPICAL at 13:05

## 2018-12-12 RX ADMIN — DEXMEDETOMIDINE HYDROCHLORIDE IN 0.9% SODIUM CHLORIDE 31.75 MICROGRAM(S)/KG/HR: 4 INJECTION INTRAVENOUS at 09:17

## 2018-12-12 RX ADMIN — DEXMEDETOMIDINE HYDROCHLORIDE IN 0.9% SODIUM CHLORIDE 31.75 MICROGRAM(S)/KG/HR: 4 INJECTION INTRAVENOUS at 17:13

## 2018-12-12 RX ADMIN — Medication 166.67 MILLIGRAM(S): at 05:00

## 2018-12-12 RX ADMIN — FENTANYL CITRATE 50 MICROGRAM(S): 50 INJECTION INTRAVENOUS at 11:02

## 2018-12-12 RX ADMIN — DEXMEDETOMIDINE HYDROCHLORIDE IN 0.9% SODIUM CHLORIDE 31.75 MICROGRAM(S)/KG/HR: 4 INJECTION INTRAVENOUS at 11:24

## 2018-12-12 RX ADMIN — INSULIN GLARGINE 30 UNIT(S): 100 INJECTION, SOLUTION SUBCUTANEOUS at 22:10

## 2018-12-12 RX ADMIN — DEXMEDETOMIDINE HYDROCHLORIDE IN 0.9% SODIUM CHLORIDE 31.75 MICROGRAM(S)/KG/HR: 4 INJECTION INTRAVENOUS at 07:37

## 2018-12-12 RX ADMIN — DEXMEDETOMIDINE HYDROCHLORIDE IN 0.9% SODIUM CHLORIDE 31.75 MICROGRAM(S)/KG/HR: 4 INJECTION INTRAVENOUS at 18:49

## 2018-12-12 RX ADMIN — HYDROMORPHONE HYDROCHLORIDE 4 MILLIGRAM(S): 2 INJECTION INTRAMUSCULAR; INTRAVENOUS; SUBCUTANEOUS at 23:13

## 2018-12-12 RX ADMIN — CHLORHEXIDINE GLUCONATE 5 MILLILITER(S): 213 SOLUTION TOPICAL at 21:24

## 2018-12-12 RX ADMIN — HYDROMORPHONE HYDROCHLORIDE 4 MILLIGRAM(S): 2 INJECTION INTRAMUSCULAR; INTRAVENOUS; SUBCUTANEOUS at 11:07

## 2018-12-12 RX ADMIN — INSULIN HUMAN 2 UNIT(S)/HR: 100 INJECTION, SOLUTION SUBCUTANEOUS at 07:38

## 2018-12-12 RX ADMIN — EPINEPHRINE 4 MICROGRAM(S)/KG/MIN: 0.3 INJECTION INTRAMUSCULAR; SUBCUTANEOUS at 09:09

## 2018-12-12 RX ADMIN — Medication 1 TABLET(S): at 13:04

## 2018-12-12 RX ADMIN — CLOPIDOGREL BISULFATE 75 MILLIGRAM(S): 75 TABLET, FILM COATED ORAL at 11:08

## 2018-12-12 RX ADMIN — MILRINONE LACTATE 5.44 MICROGRAM(S)/KG/MIN: 1 INJECTION, SOLUTION INTRAVENOUS at 07:46

## 2018-12-12 RX ADMIN — CHLORHEXIDINE GLUCONATE 5 MILLILITER(S): 213 SOLUTION TOPICAL at 15:29

## 2018-12-12 RX ADMIN — QUETIAPINE FUMARATE 50 MILLIGRAM(S): 200 TABLET, FILM COATED ORAL at 20:04

## 2018-12-12 RX ADMIN — QUETIAPINE FUMARATE 25 MILLIGRAM(S): 200 TABLET, FILM COATED ORAL at 05:36

## 2018-12-12 RX ADMIN — Medication 8.5 MICROGRAM(S)/KG/MIN: at 16:16

## 2018-12-12 RX ADMIN — PANTOPRAZOLE SODIUM 40 MILLIGRAM(S): 20 TABLET, DELAYED RELEASE ORAL at 17:02

## 2018-12-12 RX ADMIN — QUETIAPINE FUMARATE 25 MILLIGRAM(S): 200 TABLET, FILM COATED ORAL at 13:04

## 2018-12-12 RX ADMIN — Medication 650 MILLIGRAM(S): at 23:35

## 2018-12-12 NOTE — PROGRESS NOTE ADULT - ASSESSMENT
49 yo male presented initially to the hospital 11/ with chest pain secondary to STEMI, required emergent CABG after LHC, suffered VF arrest in OR prior to being placed on CPB, post CABG required ECMO and IABP due to inability to wean from CPB, decannulated from central ECMO on 12/3 and placed on impella, impella removal on 12/6 with placement of right external iliac stent due to dissection, underwent tracheostomy on 12/10.   MICU team asked to consult on this patient due to his inability to wean from vent.      When sedation is lightened he becomes tachypneic and agitated, however he seems to be able to draw fairly large tidal volumes on minimal pressure support.  He is persistently febrile, which is causing high minute ventilation requirements.      Recommendations:  - would limit benzodiazepine use as much as possible  - he responds well to opioids for dyspnea, instead of fentanyl we can use a longer acting agent such as hydromorphone prn dyspnea  - daily trach mask or t-piece trials, even if he only lasts for 5-10 minutes each day.  the duration of trach mask trials can be increased daily.  - agree with diuresis to maintain net fluid balance   Ultimately - until his fevers improve, he will be ventilator dependent.  Once his fevers resolve we will have an easier time weaning him from the vent. 49 yo male presented initially to the hospital 11/ with chest pain secondary to STEMI, required emergent CABG after LHC, suffered VF arrest in OR prior to being placed on CPB, post CABG required ECMO and IABP due to inability to wean from CPB, decannulated from central ECMO on 12/3 and placed on impella, impella removal on 12/6 with placement of right external iliac stent due to dissection, underwent tracheostomy on 12/10.   MICU team asked to consult on this patient due to his inability to wean from vent.      When sedation is lightened he becomes tachypneic and agitated, however he seems to be able to draw fairly large tidal volumes on minimal pressure support.  He is persistently febrile, which is causing high minute ventilation requirements.      Impression  1) CAD s/p CABG  2) Resolving cardiogenic shock s/p ECMO, impella, IABP  3) Chronic respiratory failure - difficulty weaning from vent  4) CHFrEF (EF 20%)  5) Pneumonia  6) Fevers    Recommendations:  - would limit benzodiazepine use as much as possible  - he responds well to opioids for dyspnea, instead of fentanyl we can use a longer acting agent such as hydromorphone or mophine prn dyspnea  - daily trach mask or t-piece trials, even if he only lasts for 5-10 minutes each day,  the duration of trach mask trials can be increased daily.  - agree with diuresis to maintain net fluid balance   Ultimately - until his fevers improve, he will be ventilator dependent.  Once his fevers resolve we will have an easier time weaning him from the vent.

## 2018-12-12 NOTE — PROGRESS NOTE ADULT - PROBLEM SELECTOR PLAN 1
s/p CABG  +versed, increased precedex, added seroquel   passive ROM of upper extremities and LLE by RNs   maintain primacor and epinephrine at current levels  wean pressors as tolerated to maintain MAP 65-75   tube feeds restarted at goal, continue protonix  insulin gtt per protocol for tight glycemic control (no reported h/o DM)  lasix gtt for diuresis, maintain augustin for strict Is/Os, follow lytes closely and replete PRN

## 2018-12-12 NOTE — PROGRESS NOTE ADULT - ASSESSMENT
This is a 47y  Male with no significant PMH admitted on 11/29/18 for CP for 2-3 days prior to admission,   found with STEMI. Urgent Cath, pt found to have multiple occlusions; VF arrest, shock x 1, return to NSR.  Upon transfer to OR for Urgent CABG, pt again VF arrest, chest opened intra-op with CPR in progress.  s/p ECMO and Impella; 12/3 ECMO explanted and Impella placed via right groin sheath.    now developed fevers.    patient in hospital for > 48 hours, multiple interventions.   blood cultures and sputum cx sent  urine legionella is NEGATIVE    CT Chest with PNA in right lower and left upper lung  -    Diarrhea      Cdiff;  NEG     S/P TRACH  CONTINUE FEVERS DESPITE IV ABX  BLOOD CX NEG SO FAR   UNCLEAR SOURCE   D/W CT ICU TEAM   WOULD SUGGEST D/C ALL ABX AND REPEAT   CX OFF ABX   IF CHANGE IN STATUS CAN RESTART EMPIRIC REGIMEN

## 2018-12-12 NOTE — CONSULT NOTE ADULT - ASSESSMENT
48 year old male with unknown PMH admitted with STEMI s/p CBAG complicated by cardiac arrest, LV failure and cardiogenic shock, respiratory failure requiring trach and vent suppport.  He has had moderate hyperglycemia on tube feeds requiring the use of insulin gtt.  His A1c is elevated in prediabetes range, however this could be falsely low as it was drawn in setting of acute anemia and possible blood loss from surgery.      1.  DM vs pre-DM:  given that he is on stable tube feeds and has stable insulin gtt requirements, reasonable to switch him to subcutaneous insulin regimen.  Would suggest starting dose of Lantus 30 units qhs as well as moderate dose sliding scale humalog q 4 hours.   Would strive for glycemic control in the 120- 180 mg/dl range while in ICU.   Will follow with you.   2.  CAD-  as per CTICU team.  On statin, ASA and PLavix.  3.  Cardiogenic shock- as per ICU team.    4.  Resp failure-  as per ICU team.

## 2018-12-12 NOTE — PROGRESS NOTE ADULT - PROBLEM SELECTOR PLAN 9
SC with GNR--> kelbsiella, continue zosyn for now  PO vanco d/c'd as c-diff was negative  blood cx remain negative, likely can d/c vanco (will d/w ID and Dr Barrett)

## 2018-12-12 NOTE — PROGRESS NOTE ADULT - SUBJECTIVE AND OBJECTIVE BOX
SUBJECTIVE   unable to offer   currently resting on vent     INTERIM HISTORY SIGNIFICANT FOR   florid pulmonary edema now on lasix gtt (varying 5 mg--> 7.5 mg) with acute on chronic hypoxic respiratory failure now s/p trach;  MICU consulted for multidisciplinary team approach; hx significant for agitation/ anxiety while on vent --> rec increase precedex/ add versed and seroquel; goals to normalize circadian rhythm   still with intermittent fevers req cooling blanket     Patient is a 48y old  Male who presents with a chief complaint of STEMI (11 Dec 2018 17:09)    HPI:  This is a 48 y/o male no significant PMH; recent ABT (doxycycline) r/t cellulitis to right groin per pt. Pt presented to the ED with 10/10 chest pain that he reported started at 0930; he drank water with no relief; pain started to progress to B/L shoulders. Per pt spouse, she reports he has had chest pain x2-3 days and this morning he vomited which he attributed to reflux.  She brought him into the emergency department.    Pt has significant EKG changes ST elevations in leads V1-V5 with reciprocal changes in II, III, AVF.  Pt rec'd asa and plavix load in ED. (29 Nov 2018 11:32)    OBJECTIVE  PAST MEDICAL & SURGICAL HISTORY:  Staph infection  No significant past surgical history    penicillins (Unknown)    Home Medications:    VITALS  Currently in sinus rhythm with vitals as below  ICU Vital Signs Last 24 Hrs  T(C): 37.7 (12 Dec 2018 05:00), Max: 39.2 (11 Dec 2018 12:00)  T(F): 99.9 (12 Dec 2018 05:00), Max: 102.6 (11 Dec 2018 12:00)  HR: 89 (12 Dec 2018 05:00) (81 - 126)  BP: --  BP(mean): --  ABP: 110/63 (12 Dec 2018 05:00) (93/55 - 162/73)  ABP(mean): 77 (12 Dec 2018 05:00) (65 - 99)  RR: 34 (12 Dec 2018 05:00) (0 - 54)  SpO2: 99% (12 Dec 2018 05:00) (87% - 100%)    Adult Advanced Hemodynamics Last 24 Hrs  CVP(mm Hg): 22 (12 Dec 2018 05:00) (12 - 131)  CVP(cm H2O): --  CO: --  CI: --  PA: --  PA(mean): --  PCWP: --  SVR: --  SVRI: --  PVR: --  PVRI: --  LABS                        8.5    10.6  )-----------( 381      ( 12 Dec 2018 03:24 )             27.0     Culture - Bronchial (collected 11 Dec 2018 10:49)  Source: .Broncial bronchial fluid  Gram Stain (11 Dec 2018 13:11):    Numerous white blood cells    No organisms seen    Culture - Blood (collected 09 Dec 2018 18:56)  Source: .Blood  Preliminary Report (11 Dec 2018 19:01):    No growth at 48 hours    Culture - Blood (collected 09 Dec 2018 18:56)  Source: .Blood  Preliminary Report (11 Dec 2018 19:01):    No growth at 48 hours    PT/INR - ( 12 Dec 2018 03:24 )   PT: 15.4 sec;   INR: 1.33 ratio         PTT - ( 12 Dec 2018 03:24 )  PTT:29.7 [id number]    142  |  105  |  31.0<H>  ----------------------------<  172<H>  3.6   |  24.0  |  0.70    Ca    8.0<L>      12 Dec 2018 03:24  Phos  2.9     12-12  Mg     2.0     12-12    TPro  5.9<L>  /  Alb  2.8<L>  /  TBili  1.7  /  DBili  x   /  AST  31  /  ALT  29  /  AlkPhos  173<H>  12-12  CAPILLARY BLOOD GLUCOSE      POCT Blood Glucose.: 132 mg/dL (12 Dec 2018 05:15)  CARDIAC MARKERS ( 11 Dec 2018 21:10 )  x     / 1.93 ng/mL / 150 U/L / x     / x          ABG - ( 11 Dec 2018 20:19 )  pH, Arterial: 7.52  pH, Blood: x     /  pCO2: 31    /  pO2: 102   / HCO3: 27    / Base Excess: 2.8   /  SaO2: 99                Mode: AC/ CMV (Assist Control/ Continuous Mandatory Ventilation)  RR (machine): 12  TV (machine): 500  FiO2: 50  PEEP: 5  MAP: 13  PIP: 30    IN/OUT    12-10-18 @ 07:01  -  12-11-18 @ 07:00  --------------------------------------------------------  IN: 3753.4 mL / OUT: 3690 mL / NET: 63.4 mL    12-11-18 @ 07:01  -  12-12-18 @ 05:35  --------------------------------------------------------  IN: 4177.6 mL / OUT: 3540 mL / NET: 637.6 mL      IMAGING  personally reviewed imaging     CURRENT MEDICATIONS  MEDICATIONS  (STANDING):  amiodarone    Tablet 200 milliGRAM(s) Oral daily  ascorbic acid 500 milliGRAM(s) Oral daily  aspirin 325 milliGRAM(s) Oral daily  atorvastatin 80 milliGRAM(s) Oral at bedtime  chlorhexidine 0.12% Liquid 5 milliLiter(s) Oral Mucosa every 4 hours  clopidogrel Tablet 75 milliGRAM(s) Oral daily  dexmedetomidine Infusion 1.4 MICROgram(s)/kG/Hr (31.745 mL/Hr) IV Continuous <Continuous>  enoxaparin Injectable 40 milliGRAM(s) SubCutaneous daily  EPINEPHrine    Infusion 0.012 MICROgram(s)/kG/Min (4 mL/Hr) IV Continuous <Continuous>  ferrous    sulfate 325 milliGRAM(s) Oral daily  folic acid 1 milliGRAM(s) Oral daily  furosemide Infusion 5 mG/Hr (2.5 mL/Hr) IV Continuous <Continuous>  insulin Infusion 2 Unit(s)/Hr (2 mL/Hr) IV Continuous <Continuous>  lactobacillus acidophilus 1 Tablet(s) Oral every 8 hours  midazolam Infusion 0.05 mG/kG/Hr (4.535 mL/Hr) IV Continuous <Continuous>  milrinone Infusion 0.2 MICROgram(s)/kG/Min (5.442 mL/Hr) IV Continuous <Continuous>  multivitamin 1 Tablet(s) Oral daily  norepinephrine Infusion 0.05 MICROgram(s)/kG/Min (8.503 mL/Hr) IV Continuous <Continuous>  pantoprazole  Injectable 40 milliGRAM(s) IV Push every 12 hours  piperacillin/tazobactam IVPB. 3.375 Gram(s) IV Intermittent every 8 hours  potassium chloride  10 mEq/50 mL IVPB 10 milliEquivalent(s) IV Intermittent every 1 hour  propofol Infusion 30 MICROgram(s)/kG/Min (16.326 mL/Hr) IV Continuous <Continuous>  QUEtiapine 25 milliGRAM(s) Oral every 6 hours  sodium chloride 0.9%. 1000 milliLiter(s) (5 mL/Hr) IV Continuous <Continuous>  sodium chloride 0.9%. 1000 milliLiter(s) (10 mL/Hr) IV Continuous <Continuous>  vancomycin  IVPB 1250 milliGRAM(s) IV Intermittent every 8 hours    MEDICATIONS  (PRN):  ALBUTerol/ipratropium for Nebulization 3 milliLiter(s) Nebulizer every 6 hours PRN Shortness of Breath and/or Wheezing  artificial  tears Solution 1 Drop(s) Both EYES four times a day PRN Dry Eyes  fentaNYL    Injectable 50 MICROGram(s) IV Push every 2 hours PRN Moderate Pain (4 - 6)  petrolatum white Ointment 1 Application(s) Topical four times a day PRN dry lips  QUEtiapine 25 milliGRAM(s) Oral at bedtime PRN add 25 to night time dose please

## 2018-12-12 NOTE — PROGRESS NOTE ADULT - SUBJECTIVE AND OBJECTIVE BOX
[facility]  INFECTIOUS DISEASES AND INTERNAL MEDICINE at [place]  =======================================================  Perfecto Santizo MD  Diplomates American Board of Internal Medicine and Infectious Diseases  =======================================================    [id number]  PATRICK LYLE   follow up for:  post operative fevers  patient seen and examined.  S/P TRACH    MORE AWAKE TODAY      diarrhea this Am C DIFF NEG  ===================================================  REVIEW OF SYSTEMS:  unable to obtain due to medical condition  =======================================================  Antibiotics:       Other medications:  amiodarone    Tablet 200 milliGRAM(s) Oral daily  ascorbic acid 500 milliGRAM(s) Oral daily  aspirin 325 milliGRAM(s) Oral daily  atorvastatin 80 milliGRAM(s) Oral at bedtime  chlorhexidine 0.12% Liquid 5 milliLiter(s) Oral Mucosa every 4 hours  clopidogrel Tablet 75 milliGRAM(s) Oral daily  dexmedetomidine Infusion 0.7 MICROgram(s)/kG/Hr IV Continuous <Continuous>  dextrose 50% Injectable 50 milliLiter(s) IV Push every 15 minutes  enoxaparin Injectable 40 milliGRAM(s) SubCutaneous daily  EPINEPHrine    Infusion 0.024 MICROgram(s)/kG/Min IV Continuous <Continuous>  ferrous    sulfate 325 milliGRAM(s) Oral daily  folic acid 1 milliGRAM(s) Oral daily  furosemide   Injectable 20 milliGRAM(s) IV Push every 8 hours  furosemide Infusion 10 mG/Hr IV Continuous <Continuous>  insulin Infusion 2 Unit(s)/Hr IV Continuous <Continuous>  lactobacillus acidophilus 1 Tablet(s) Oral every 8 hours  milrinone Infusion 0.375 MICROgram(s)/kG/Min IV Continuous <Continuous>  multivitamin 1 Tablet(s) Oral daily  nitroglycerin  Infusion 66.667 MICROgram(s)/Min IV Continuous <Continuous>  norepinephrine Infusion 0.05 MICROgram(s)/kG/Min IV Continuous <Continuous>  pantoprazole  Injectable 40 milliGRAM(s) IV Push every 12 hours  potassium chloride  10 mEq/50 mL IVPB 10 milliEquivalent(s) IV Intermittent every 1 hour  potassium chloride  10 mEq/50 mL IVPB 10 milliEquivalent(s) IV Intermittent every 1 hour  potassium chloride  10 mEq/50 mL IVPB 10 milliEquivalent(s) IV Intermittent every 1 hour  potassium chloride  10 mEq/50 mL IVPB 10 milliEquivalent(s) IV Intermittent every 1 hour  propofol Infusion 30 MICROgram(s)/kG/Min IV Continuous <Continuous>  sodium chloride 0.9%. 1000 milliLiter(s) IV Continuous <Continuous>  sodium chloride 0.9%. 1000 milliLiter(s) IV Continuous <Continuous>    =======================================================    Physical Exam:    Vital Signs Last 24 Hrs  T(C): 38.7 (12 Dec 2018 09:36), Max: 38.7 (12 Dec 2018 09:36)  T(F): 101.7 (12 Dec 2018 09:36), Max: 101.7 (12 Dec 2018 09:36)  HR: 93 (12 Dec 2018 09:36) (81 - 123)  BP: --  BP(mean): --  RR: 40 (12 Dec 2018 10:50) (0 - 46)  SpO2: 100% (12 Dec 2018 10:50) (88% - 100%)    General:  intubated, no distress MORE AWAKE TODAY  Eye: Pupils are equal, round and reactive to light,   HENT: Normocephalic, Oral mucosa is moist, TRACH ON VNET   Neck: Supple, No lymphadenopathy.  RIGHT neck IJ line  Respiratory: Lungs with fair air entry  midline chest with dressing in place.   + CHEST tubes in place  Cardiovascular: Normal rate, Regular rhythm, No murmur, Good pulses equal in all extremities,  No edema.  Gastrointestinal: Soft, Non-tender, Non-distended, Normal bowel sounds.  Genitourinary: + Mckeon with light color urine  Lymphatics: No lymphadenopathy neck,   Musculoskeletal: Normal range of motion,   Integumentary: No rash.  Neurologic: Alert, Cranial Nerves II-XII are grossly intact.    =======================================================  Labs:                                                                  8.5    10.6  )-----------( 381      ( 12 Dec 2018 03:24 )             27.0   12-12    142  |  105  |  31.0<H>  ----------------------------<  172<H>  3.6   |  24.0  |  0.70    Ca    8.0<L>      12 Dec 2018 03:24  Phos  2.9     12-12  Mg     2.0     12-12    TPro  5.9<L>  /  Alb  2.8<L>  /  TBili  1.7  /  DBili  x   /  AST  31  /  ALT  29  /  AlkPhos  173<H>  12-12      No growth      Culture - Blood (collected 12-05-18 @ 10:21)  Source: .Blood    Culture - Blood (collected 12-05-18 @ 10:21)  Source: .Blood    Culture - Blood (collected 12-04-18 @ 10:22)  Source: .Blood  Final Report (12-09-18 @ 11:01):    No growth at 5 days.    Culture - Blood (collected 12-04-18 @ 10:22)  Source: .Blood  Final Report (12-09-18 @ 11:01):    No growth at 5 days.    Culture - Sputum (collected 12-04-18 @ 10:21)  Source: .Sputum  Gram Stain (12-04-18 @ 11:59):    Few White blood cells    No organisms seen  Final Report (12-06-18 @ 14:00):    Few Candida albicans    No Routine respiratory keara present

## 2018-12-12 NOTE — PROGRESS NOTE ADULT - ASSESSMENT
46 yo Male c/o CP for 2-3 days prior to admission, then 10/10 chest pain approximately 9:30 am yesterday, followed by vomiting.  Wife drove pt to hospital, ruled in for STEMI. Urgent Cath, pt found to have multiple occlusions; stent to LAD and D2; stent to LAD thrombosed,  impella placed for support.  Pt then had VF arrest, shock x 1, return to NSR.  Upon transfer to OR for Urgent CABG, pt with asystolic arrest, chest opened intra-op with CPR in progress. Pt underwent C4V (LAD, Diag, OM and PDA) with inability to wean from CPB therefore requiring VA ECMO (central cannulation) and IABP. , he was transferred to CICU on multiple gtts, now s/p explant 12/3 with placement of impella via R groin sheath. 12/4 ID consulted started on broad spectrum abx, 12/5 Impella weaned. 12/6 Impella removal via right femoral cutdown with primary repair of femoral artery and stent placed to external illiac artery for dissection, PRBC x1. 12/8 Pt underwent head CT, no acute intracranial process noted, chest CT b/l pneumothoraces R-20%, L-10%, b/l chest tubes in place, confirmed infiltrates on right lower and left upper lung.  ABX Vanco and Zosyn course extended, sputum + Klebsiella pending sensitivity    Hospital course notable for persistent cardiogenic shock/LV failure, vent dependent respiratory failure (inability to wean also contributed to by severe agitation, now s/p trach), sepsis secondary to kelbsiella PNA currently on vanco/zosyn

## 2018-12-12 NOTE — PROGRESS NOTE ADULT - SUBJECTIVE AND OBJECTIVE BOX
INTERVAL HPI: S/p trach dec 10, becomes very tachypneic when weaned from sedation.    On Admission  11-29-18 (13d)  HPI:  This is a 46 y/o male no significant PMH; recent ABT (doxycycline) r/t cellulitis to right groin per pt. Pt presented to the ED with 10/10 chest pain that he reported started at 0930; he drank water with no relief; pain started to progress to B/L shoulders. Per pt spouse, she reports he has had chest pain x2-3 days and this morning he vomited which he attributed to reflux.  She brought him into the emergency department.    Pt has significant EKG changes ST elevations in leads V1-V5 with reciprocal changes in II, III, AVF.  Pt rec'd asa and plavix load in ED. (29 Nov 2018 11:32)    PAST MEDICAL & SURGICAL HISTORY:  Staph infection  No significant past surgical history      Antimicrobial:    Cardiovascular:  amiodarone    Tablet 200 milliGRAM(s) Oral daily  EPINEPHrine    Infusion 0.012 MICROgram(s)/kG/Min IV Continuous <Continuous>  furosemide Infusion 5 mG/Hr IV Continuous <Continuous>  milrinone Infusion 0.2 MICROgram(s)/kG/Min IV Continuous <Continuous>  norepinephrine Infusion 0.05 MICROgram(s)/kG/Min IV Continuous <Continuous>    Pulmonary:  ALBUTerol/ipratropium for Nebulization 3 milliLiter(s) Nebulizer every 6 hours PRN    Hematalogic:  clopidogrel Tablet 75 milliGRAM(s) Oral daily  enoxaparin Injectable 40 milliGRAM(s) SubCutaneous daily    Other:  acetaminophen    Suspension .. 650 milliGRAM(s) Oral every 6 hours PRN  artificial  tears Solution 1 Drop(s) Both EYES four times a day PRN  ascorbic acid 500 milliGRAM(s) Oral daily  aspirin 325 milliGRAM(s) Oral daily  atorvastatin 80 milliGRAM(s) Oral at bedtime  chlorhexidine 0.12% Liquid 5 milliLiter(s) Oral Mucosa every 4 hours  dexmedetomidine Infusion 1.4 MICROgram(s)/kG/Hr IV Continuous <Continuous>  fentaNYL    Injectable 50 MICROGram(s) IV Push every 2 hours PRN  ferrous    sulfate 325 milliGRAM(s) Oral daily  folic acid 1 milliGRAM(s) Oral daily  HYDROmorphone   Tablet 2 milliGRAM(s) Oral every 3 hours PRN  HYDROmorphone   Tablet 4 milliGRAM(s) Oral every 3 hours PRN  insulin Infusion 2 Unit(s)/Hr IV Continuous <Continuous>  lactobacillus acidophilus 1 Tablet(s) Oral every 8 hours  multivitamin 1 Tablet(s) Oral daily  pantoprazole  Injectable 40 milliGRAM(s) IV Push every 12 hours  petrolatum white Ointment 1 Application(s) Topical four times a day PRN  QUEtiapine 25 milliGRAM(s) Oral <User Schedule>  QUEtiapine 50 milliGRAM(s) Oral <User Schedule>  sodium chloride 0.9%. 1000 milliLiter(s) IV Continuous <Continuous>  sodium chloride 0.9%. 1000 milliLiter(s) IV Continuous <Continuous>      Drug Dosing Weight  Height (cm): 175.26 (29 Nov 2018 10:50)  Weight (kg): 90.7 (06 Dec 2018 13:28)  BMI (kg/m2): 29.5 (06 Dec 2018 13:28)  BSA (m2): 2.07 (06 Dec 2018 13:28)    T(C): 38.7 (12-12-18 @ 09:36), Max: 39.2 (12-11-18 @ 12:00)  HR: 93 (12-12-18 @ 09:36)  BP: --  BP(mean): --  ABP: 114/51 (12-12-18 @ 09:36)  ABP(mean): 68 (12-12-18 @ 09:36)  RR: 40 (12-12-18 @ 10:50)  SpO2: 100% (12-12-18 @ 10:50)    ABG - ( 11 Dec 2018 20:19 )  pH, Arterial: 7.52  pH, Blood: x     /  pCO2: 31    /  pO2: 102   / HCO3: 27    / Base Excess: 2.8   /  SaO2: 99                    12-11 @ 07:01  -  12-12 @ 07:00  --------------------------------------------------------  IN: 4701.8 mL / OUT: 3910 mL / NET: 791.8 mL        Mode: AC/ CMV (Assist Control/ Continuous Mandatory Ventilation)  RR (machine): 12  TV (machine): 500  FiO2: 50  PEEP: 5      PHYSICAL EXAM:    GENERAL: Lying in bed, anxious and tachypneic  HEAD:  Atraumatic, normocephalic  EYES: EOMI, PERRL  ENMT:  MMM, #8 trach in place, copious secretions   NECK:  Supple, normal appearance, trachea midline  NERVOUS SYSTEM:  Alert & following commands, profound weakness in all 4 extremities   CHEST/LUNG: Bilateral air entry, good tidal volumes on pressure support of 10 cmH2O  HEART:  regular rhythm, tachycardic, sinus   ABDOMEN: Soft, Nontender  EXTREMITIES:  bilat edema   LYMPH:  No lymphadenopathy noted  SKIN: good capillary refill    LABS:  CBC Full  -  ( 12 Dec 2018 03:24 )  WBC Count : 10.6 K/uL  Hemoglobin : 8.5 g/dL  Hematocrit : 27.0 %  Platelet Count - Automated : 381 K/uL  Mean Cell Volume : 89.1 fl  Mean Cell Hemoglobin : 28.1 pg  Mean Cell Hemoglobin Concentration : 31.5 g/dL  Auto Neutrophil # : x  Auto Lymphocyte # : x  Auto Monocyte # : x  Auto Eosinophil # : x  Auto Basophil # : x  Auto Neutrophil % : x  Auto Lymphocyte % : x  Auto Monocyte % : x  Auto Eosinophil % : x  Auto Basophil % : x    12-12    142  |  105  |  31.0<H>  ----------------------------<  172<H>  3.6   |  24.0  |  0.70    Ca    8.0<L>      12 Dec 2018 03:24  Phos  2.9     12-12  Mg     2.0     12-12    TPro  5.9<L>  /  Alb  2.8<L>  /  TBili  1.7  /  DBili  x   /  AST  31  /  ALT  29  /  AlkPhos  173<H>  12-12    PT/INR - ( 12 Dec 2018 03:24 )   PT: 15.4 sec;   INR: 1.33 ratio         PTT - ( 12 Dec 2018 03:24 )  PTT:29.7 sec    Culture Results:   Culture in progress (12-11 @ 10:49)  Culture Results:   No growth at 48 hours (12-09 @ 18:56)  Culture Results:   No growth at 48 hours (12-09 @ 18:56)      RADIOLOGY personally reviewed

## 2018-12-12 NOTE — CONSULT NOTE ADULT - SUBJECTIVE AND OBJECTIVE BOX
HPI:  Patient is a 48 year old male with no know PMH who presented to [facility] on 11/29/18 with chest pain.  The chest pain was acute onset and of 2-3 days duration and substernal in location, severe in intensity and associated with vomiting.   Work up revealed STEMI.  Initially had cath complicated by V fib arrest and then had urgent 4V CABG.  His hospital course has been complicated by cardiogenic shock and poor LV function requiring ECMO and IABP, respiratory failure and inability to wean from vent s/p trach, PNA, pneumothorax with b/l chest tubes in place.  Patient has been hyperglycemic in ICU and requiring insulin infusion of 2-3 units per hour.  He is currently on continuous tube feeds with Glucerna.    Further history is not available from patient due to mental status and being on vent, however there is no documented history of prior DM.  He did have an A1c done after admission which was slightly elevated at 5.7%, however this was drawn with acute anemia after presumed blood loss from surgery, which may have influenced the accuracy of this value.      PAST MEDICAL & SURGICAL HISTORY:  Staph infection  No significant past surgical history    Allergies  penicillins (Unknown)    MEDICATIONS  (STANDING):  amiodarone    Tablet 200 milliGRAM(s) Oral daily  ascorbic acid 500 milliGRAM(s) Oral daily  aspirin 325 milliGRAM(s) Oral daily  atorvastatin 80 milliGRAM(s) Oral at bedtime  chlorhexidine 0.12% Liquid 5 milliLiter(s) Oral Mucosa every 4 hours  clopidogrel Tablet 75 milliGRAM(s) Oral daily  dexmedetomidine Infusion 1.4 MICROgram(s)/kG/Hr (31.745 mL/Hr) IV Continuous <Continuous>  enoxaparin Injectable 40 milliGRAM(s) SubCutaneous daily  EPINEPHrine    Infusion 0.012 MICROgram(s)/kG/Min (4 mL/Hr) IV Continuous <Continuous>  ferrous    sulfate 325 milliGRAM(s) Oral daily  folic acid 1 milliGRAM(s) Oral daily  furosemide Infusion 5 mG/Hr (2.5 mL/Hr) IV Continuous <Continuous>  insulin Infusion 2 Unit(s)/Hr (2 mL/Hr) IV Continuous <Continuous>  lactobacillus acidophilus 1 Tablet(s) Oral every 8 hours  milrinone Infusion 0.125 MICROgram(s)/kG/Min (3.401 mL/Hr) IV Continuous <Continuous>  multivitamin 1 Tablet(s) Oral daily  norepinephrine Infusion 0.05 MICROgram(s)/kG/Min (8.503 mL/Hr) IV Continuous <Continuous>  pantoprazole  Injectable 40 milliGRAM(s) IV Push every 12 hours  QUEtiapine 25 milliGRAM(s) Oral <User Schedule>  QUEtiapine 50 milliGRAM(s) Oral <User Schedule>  sodium chloride 0.9%. 1000 milliLiter(s) (5 mL/Hr) IV Continuous <Continuous>  sodium chloride 0.9%. 1000 milliLiter(s) (10 mL/Hr) IV Continuous <Continuous>    SH:  No hx of smoking,     FH:  Unable to ascertain if any FHx of DM    ROS:  UTO due to mental status.    Vital Signs Last 24 Hrs  T(C): 39.3 (12 Dec 2018 14:00), Max: 39.3 (12 Dec 2018 14:00)  T(F): 102.7 (12 Dec 2018 14:00), Max: 102.7 (12 Dec 2018 14:00)  HR: 107 (12 Dec 2018 14:15) (81 - 125)  BP: 104/60 (12 Dec 2018 11:30) (104/60 - 104/60)  BP(mean): 77 (12 Dec 2018 11:30) (77 - 77)  RR: 38 (12 Dec 2018 14:15) (0 - 48)  SpO2: 97% (12 Dec 2018 14:15) (88% - 100%)    PE:  Gen: patient is awake and alert on vent through trach, NAD  HEENT:  sclera anicteric, MMM  Neck:  no LAD, + Trach  CV:  nl S1 + S2, RRR, no m/r/g  Resp:  slightly tachypeic, CTA b/l  GI/ Abd: soft, NT/ ND, BS +  Neuro:  No tremor, left sided weakness  MS:  diffuse anasarca, decreased muscle tone b/l.   Skin:  no foot ulcers, no rashes    LABS:  Hemoglobin A1C, Whole Blood: 5.7 % (11.30.18 @ 03:15)    12-12    142  |  105  |  31.0<H>  ----------------------------<  172<H>  3.6   |  24.0  |  0.70    Ca    8.0<L>      12 Dec 2018 03:24  Phos  2.9     12-12  Mg     2.0     12-12    TPro  5.9<L>  /  Alb  2.8<L>  /  TBili  1.7  /  DBili  x   /  AST  31  /  ALT  29  /  AlkPhos  173<H>  12-12                          8.5    10.6  )-----------( 381      ( 12 Dec 2018 03:24 )             27.0     CAPILLARY BLOOD GLUCOSE  POCT Blood Glucose.: 149 mg/dL (12 Dec 2018 14:49)  POCT Blood Glucose.: 149 mg/dL (12 Dec 2018 14:12)  POCT Blood Glucose.: 139 mg/dL (12 Dec 2018 12:59)  POCT Blood Glucose.: 162 mg/dL (12 Dec 2018 11:56)  POCT Blood Glucose.: 163 mg/dL (12 Dec 2018 11:23)  POCT Blood Glucose.: 141 mg/dL (12 Dec 2018 08:55)  POCT Blood Glucose.: 124 mg/dL (12 Dec 2018 06:50)  POCT Blood Glucose.: 122 mg/dL (12 Dec 2018 05:52)  POCT Blood Glucose.: 132 mg/dL (12 Dec 2018 05:15)  POCT Blood Glucose.: 172 mg/dL (12 Dec 2018 03:54)  POCT Blood Glucose.: 168 mg/dL (12 Dec 2018 02:59)  POCT Blood Glucose.: 155 mg/dL (12 Dec 2018 01:54)  POCT Blood Glucose.: 116 mg/dL (11 Dec 2018 23:59)  POCT Blood Glucose.: 99 mg/dL (11 Dec 2018 23:02)  POCT Blood Glucose.: 105 mg/dL (11 Dec 2018 21:50)  POCT Blood Glucose.: 101 mg/dL (11 Dec 2018 20:57)  POCT Blood Glucose.: 126 mg/dL (11 Dec 2018 18:38)

## 2018-12-13 LAB
ANION GAP SERPL CALC-SCNC: 13 MMOL/L — SIGNIFICANT CHANGE UP (ref 5–17)
ANION GAP SERPL CALC-SCNC: 14 MMOL/L — SIGNIFICANT CHANGE UP (ref 5–17)
BLD GP AB SCN SERPL QL: SIGNIFICANT CHANGE UP
BUN SERPL-MCNC: 29 MG/DL — HIGH (ref 8–20)
BUN SERPL-MCNC: 35 MG/DL — HIGH (ref 8–20)
CALCIUM SERPL-MCNC: 7.9 MG/DL — LOW (ref 8.6–10.2)
CALCIUM SERPL-MCNC: 8.3 MG/DL — LOW (ref 8.6–10.2)
CHLORIDE SERPL-SCNC: 107 MMOL/L — SIGNIFICANT CHANGE UP (ref 98–107)
CHLORIDE SERPL-SCNC: 108 MMOL/L — HIGH (ref 98–107)
CO2 SERPL-SCNC: 22 MMOL/L — SIGNIFICANT CHANGE UP (ref 22–29)
CO2 SERPL-SCNC: 23 MMOL/L — SIGNIFICANT CHANGE UP (ref 22–29)
CREAT SERPL-MCNC: 0.77 MG/DL — SIGNIFICANT CHANGE UP (ref 0.5–1.3)
CREAT SERPL-MCNC: 0.79 MG/DL — SIGNIFICANT CHANGE UP (ref 0.5–1.3)
CULTURE RESULTS: SIGNIFICANT CHANGE UP
GAS PNL BLDA: SIGNIFICANT CHANGE UP
GLUCOSE BLDC GLUCOMTR-MCNC: 111 MG/DL — HIGH (ref 70–99)
GLUCOSE BLDC GLUCOMTR-MCNC: 123 MG/DL — HIGH (ref 70–99)
GLUCOSE BLDC GLUCOMTR-MCNC: 124 MG/DL — HIGH (ref 70–99)
GLUCOSE BLDC GLUCOMTR-MCNC: 145 MG/DL — HIGH (ref 70–99)
GLUCOSE BLDC GLUCOMTR-MCNC: 158 MG/DL — HIGH (ref 70–99)
GLUCOSE SERPL-MCNC: 127 MG/DL — HIGH (ref 70–115)
GLUCOSE SERPL-MCNC: 159 MG/DL — HIGH (ref 70–115)
HCT VFR BLD CALC: 29.4 % — LOW (ref 42–52)
HCT VFR BLD CALC: 32 % — LOW (ref 42–52)
HGB BLD-MCNC: 10.1 G/DL — LOW (ref 14–18)
HGB BLD-MCNC: 9.4 G/DL — LOW (ref 14–18)
MAGNESIUM SERPL-MCNC: 2.1 MG/DL — SIGNIFICANT CHANGE UP (ref 1.6–2.6)
MAGNESIUM SERPL-MCNC: 2.2 MG/DL — SIGNIFICANT CHANGE UP (ref 1.6–2.6)
MCHC RBC-ENTMCNC: 28.4 PG — SIGNIFICANT CHANGE UP (ref 27–31)
MCHC RBC-ENTMCNC: 28.5 PG — SIGNIFICANT CHANGE UP (ref 27–31)
MCHC RBC-ENTMCNC: 31.6 G/DL — LOW (ref 32–36)
MCHC RBC-ENTMCNC: 32 G/DL — SIGNIFICANT CHANGE UP (ref 32–36)
MCV RBC AUTO: 89.1 FL — SIGNIFICANT CHANGE UP (ref 80–94)
MCV RBC AUTO: 89.9 FL — SIGNIFICANT CHANGE UP (ref 80–94)
PHOSPHATE SERPL-MCNC: 3.7 MG/DL — SIGNIFICANT CHANGE UP (ref 2.4–4.7)
PLATELET # BLD AUTO: 420 K/UL — HIGH (ref 150–400)
PLATELET # BLD AUTO: 513 K/UL — HIGH (ref 150–400)
POTASSIUM SERPL-MCNC: 4 MMOL/L — SIGNIFICANT CHANGE UP (ref 3.5–5.3)
POTASSIUM SERPL-MCNC: 4 MMOL/L — SIGNIFICANT CHANGE UP (ref 3.5–5.3)
POTASSIUM SERPL-SCNC: 4 MMOL/L — SIGNIFICANT CHANGE UP (ref 3.5–5.3)
POTASSIUM SERPL-SCNC: 4 MMOL/L — SIGNIFICANT CHANGE UP (ref 3.5–5.3)
PREALB SERPL-MCNC: 10 MG/DL — LOW (ref 18–38)
RBC # BLD: 3.3 M/UL — LOW (ref 4.6–6.2)
RBC # BLD: 3.56 M/UL — LOW (ref 4.6–6.2)
RBC # FLD: 15.8 % — HIGH (ref 11–15.6)
RBC # FLD: 15.9 % — HIGH (ref 11–15.6)
SODIUM SERPL-SCNC: 143 MMOL/L — SIGNIFICANT CHANGE UP (ref 135–145)
SODIUM SERPL-SCNC: 144 MMOL/L — SIGNIFICANT CHANGE UP (ref 135–145)
SPECIMEN SOURCE: SIGNIFICANT CHANGE UP
TYPE + AB SCN PNL BLD: SIGNIFICANT CHANGE UP
WBC # BLD: 11.3 K/UL — HIGH (ref 4.8–10.8)
WBC # BLD: 11.5 K/UL — HIGH (ref 4.8–10.8)
WBC # FLD AUTO: 11.3 K/UL — HIGH (ref 4.8–10.8)
WBC # FLD AUTO: 11.5 K/UL — HIGH (ref 4.8–10.8)

## 2018-12-13 PROCEDURE — 93306 TTE W/DOPPLER COMPLETE: CPT | Mod: 26

## 2018-12-13 PROCEDURE — 99233 SBSQ HOSP IP/OBS HIGH 50: CPT

## 2018-12-13 PROCEDURE — 93010 ELECTROCARDIOGRAM REPORT: CPT

## 2018-12-13 PROCEDURE — 71045 X-RAY EXAM CHEST 1 VIEW: CPT | Mod: 26

## 2018-12-13 PROCEDURE — 99232 SBSQ HOSP IP/OBS MODERATE 35: CPT

## 2018-12-13 RX ORDER — POTASSIUM CHLORIDE 20 MEQ
20 PACKET (EA) ORAL ONCE
Qty: 0 | Refills: 0 | Status: COMPLETED | OUTPATIENT
Start: 2018-12-13 | End: 2018-12-13

## 2018-12-13 RX ORDER — POTASSIUM CHLORIDE 20 MEQ
10 PACKET (EA) ORAL
Qty: 0 | Refills: 0 | Status: COMPLETED | OUTPATIENT
Start: 2018-12-13 | End: 2018-12-14

## 2018-12-13 RX ORDER — MILRINONE LACTATE 1 MG/ML
0.06 INJECTION, SOLUTION INTRAVENOUS
Qty: 20 | Refills: 0 | Status: DISCONTINUED | OUTPATIENT
Start: 2018-12-13 | End: 2018-12-15

## 2018-12-13 RX ORDER — CHLORPROMAZINE HCL 10 MG
25 TABLET ORAL THREE TIMES A DAY
Qty: 0 | Refills: 0 | Status: DISCONTINUED | OUTPATIENT
Start: 2018-12-13 | End: 2018-12-17

## 2018-12-13 RX ORDER — PSYLLIUM SEED (WITH DEXTROSE)
1 POWDER (GRAM) ORAL
Qty: 0 | Refills: 0 | Status: DISCONTINUED | OUTPATIENT
Start: 2018-12-13 | End: 2018-12-14

## 2018-12-13 RX ORDER — POTASSIUM CHLORIDE 20 MEQ
10 PACKET (EA) ORAL
Qty: 0 | Refills: 0 | Status: COMPLETED | OUTPATIENT
Start: 2018-12-13 | End: 2018-12-13

## 2018-12-13 RX ORDER — CHLORHEXIDINE GLUCONATE 213 G/1000ML
1 SOLUTION TOPICAL ONCE
Qty: 0 | Refills: 0 | Status: COMPLETED | OUTPATIENT
Start: 2018-12-13 | End: 2018-12-13

## 2018-12-13 RX ADMIN — Medication 1 PACKET(S): at 20:17

## 2018-12-13 RX ADMIN — AMIODARONE HYDROCHLORIDE 200 MILLIGRAM(S): 400 TABLET ORAL at 05:51

## 2018-12-13 RX ADMIN — ATORVASTATIN CALCIUM 80 MILLIGRAM(S): 80 TABLET, FILM COATED ORAL at 21:11

## 2018-12-13 RX ADMIN — CLOPIDOGREL BISULFATE 75 MILLIGRAM(S): 75 TABLET, FILM COATED ORAL at 12:20

## 2018-12-13 RX ADMIN — Medication 1 TABLET(S): at 12:19

## 2018-12-13 RX ADMIN — CHLORHEXIDINE GLUCONATE 5 MILLILITER(S): 213 SOLUTION TOPICAL at 05:51

## 2018-12-13 RX ADMIN — QUETIAPINE FUMARATE 25 MILLIGRAM(S): 200 TABLET, FILM COATED ORAL at 15:24

## 2018-12-13 RX ADMIN — HYDROMORPHONE HYDROCHLORIDE 4 MILLIGRAM(S): 2 INJECTION INTRAMUSCULAR; INTRAVENOUS; SUBCUTANEOUS at 12:45

## 2018-12-13 RX ADMIN — Medication 1 TABLET(S): at 12:22

## 2018-12-13 RX ADMIN — Medication 25 MILLIGRAM(S): at 21:20

## 2018-12-13 RX ADMIN — CHLORHEXIDINE GLUCONATE 5 MILLILITER(S): 213 SOLUTION TOPICAL at 19:23

## 2018-12-13 RX ADMIN — HYDROMORPHONE HYDROCHLORIDE 4 MILLIGRAM(S): 2 INJECTION INTRAMUSCULAR; INTRAVENOUS; SUBCUTANEOUS at 14:00

## 2018-12-13 RX ADMIN — CHLORHEXIDINE GLUCONATE 5 MILLILITER(S): 213 SOLUTION TOPICAL at 10:12

## 2018-12-13 RX ADMIN — INSULIN GLARGINE 30 UNIT(S): 100 INJECTION, SOLUTION SUBCUTANEOUS at 21:11

## 2018-12-13 RX ADMIN — Medication 50 MILLIEQUIVALENT(S): at 07:00

## 2018-12-13 RX ADMIN — ENOXAPARIN SODIUM 40 MILLIGRAM(S): 100 INJECTION SUBCUTANEOUS at 12:19

## 2018-12-13 RX ADMIN — QUETIAPINE FUMARATE 50 MILLIGRAM(S): 200 TABLET, FILM COATED ORAL at 20:17

## 2018-12-13 RX ADMIN — PANTOPRAZOLE SODIUM 40 MILLIGRAM(S): 20 TABLET, DELAYED RELEASE ORAL at 19:23

## 2018-12-13 RX ADMIN — QUETIAPINE FUMARATE 25 MILLIGRAM(S): 200 TABLET, FILM COATED ORAL at 02:16

## 2018-12-13 RX ADMIN — Medication 325 MILLIGRAM(S): at 12:20

## 2018-12-13 RX ADMIN — Medication 650 MILLIGRAM(S): at 00:50

## 2018-12-13 RX ADMIN — HYDROMORPHONE HYDROCHLORIDE 4 MILLIGRAM(S): 2 INJECTION INTRAMUSCULAR; INTRAVENOUS; SUBCUTANEOUS at 19:25

## 2018-12-13 RX ADMIN — Medication 1 APPLICATION(S): at 21:24

## 2018-12-13 RX ADMIN — PANTOPRAZOLE SODIUM 40 MILLIGRAM(S): 20 TABLET, DELAYED RELEASE ORAL at 05:52

## 2018-12-13 RX ADMIN — HYDROMORPHONE HYDROCHLORIDE 4 MILLIGRAM(S): 2 INJECTION INTRAMUSCULAR; INTRAVENOUS; SUBCUTANEOUS at 19:28

## 2018-12-13 RX ADMIN — Medication 100 MILLIEQUIVALENT(S): at 20:20

## 2018-12-13 RX ADMIN — Medication 650 MILLIGRAM(S): at 14:05

## 2018-12-13 RX ADMIN — CHLORHEXIDINE GLUCONATE 5 MILLILITER(S): 213 SOLUTION TOPICAL at 21:11

## 2018-12-13 RX ADMIN — Medication 1 PACKET(S): at 13:00

## 2018-12-13 RX ADMIN — Medication 325 MILLIGRAM(S): at 12:19

## 2018-12-13 RX ADMIN — Medication 1 TABLET(S): at 05:51

## 2018-12-13 RX ADMIN — Medication 50 MILLIEQUIVALENT(S): at 05:51

## 2018-12-13 RX ADMIN — Medication 1 TABLET(S): at 21:11

## 2018-12-13 RX ADMIN — Medication 650 MILLIGRAM(S): at 14:03

## 2018-12-13 RX ADMIN — CHLORHEXIDINE GLUCONATE 5 MILLILITER(S): 213 SOLUTION TOPICAL at 02:21

## 2018-12-13 RX ADMIN — CHLORHEXIDINE GLUCONATE 5 MILLILITER(S): 213 SOLUTION TOPICAL at 14:17

## 2018-12-13 RX ADMIN — Medication 1 MILLIGRAM(S): at 12:19

## 2018-12-13 RX ADMIN — Medication 500 MILLIGRAM(S): at 12:19

## 2018-12-13 RX ADMIN — CHLORHEXIDINE GLUCONATE 1 APPLICATION(S): 213 SOLUTION TOPICAL at 22:21

## 2018-12-13 RX ADMIN — QUETIAPINE FUMARATE 25 MILLIGRAM(S): 200 TABLET, FILM COATED ORAL at 08:40

## 2018-12-13 NOTE — PROGRESS NOTE ADULT - SUBJECTIVE AND OBJECTIVE BOX
SUBJECTIVE   unable to offer     INTERIM HISTORY SIGNIFICANT FOR   reevaluated by MICU for agitation --> versed discontinued; patient maintained on precedex throughout the day with seroquel; PRN diluadid PO for steady state pain control; propofol overnight  CPAP 18/5 for 6 hours today without additional decompensation  still with persistent fevers; +cooling blanket to no avail; ID recc observe off ABX     Patient is a 48y old  Male who presents with a chief complaint of MI (12 Dec 2018 14:55)    HPI:  This is a 48 y/o male no significant PMH; recent ABT (doxycycline) r/t cellulitis to right groin per pt. Pt presented to the ED with 10/10 chest pain that he reported started at 0930; he drank water with no relief; pain started to progress to B/L shoulders. Per pt spouse, she reports he has had chest pain x2-3 days and this morning he vomited which he attributed to reflux.  She brought him into the emergency department.    Pt has significant EKG changes ST elevations in leads V1-V5 with reciprocal changes in II, III, AVF.  Pt rec'd asa and plavix load in ED. (29 Nov 2018 11:32)    OBJECTIVE  PAST MEDICAL & SURGICAL HISTORY:  Staph infection  No significant past surgical history    penicillins (Unknown)    Home Medications:    VITALS  Currently in sinus rhythm with vitals as below  ICU Vital Signs Last 24 Hrs  T(C): 38.9 (13 Dec 2018 01:00), Max: 39.3 (12 Dec 2018 14:00)  T(F): 102 (13 Dec 2018 01:00), Max: 102.7 (12 Dec 2018 14:00)  HR: 85 (13 Dec 2018 01:00) (81 - 125)  BP: 104/60 (12 Dec 2018 11:30) (104/60 - 104/60)  BP(mean): 77 (12 Dec 2018 11:30) (77 - 77)  ABP: 96/57 (13 Dec 2018 01:00) (94/45 - 162/84)  ABP(mean): 69 (13 Dec 2018 01:00) (60 - 108)  RR: 23 (13 Dec 2018 01:00) (7 - 48)  SpO2: 100% (13 Dec 2018 01:00) (88% - 100%)    Adult Advanced Hemodynamics Last 24 Hrs  CVP(mm Hg): 18 (13 Dec 2018 01:00) (10 - 33)  CVP(cm H2O): --  CO: --  CI: --  PA: --  PA(mean): --  PCWP: --  SVR: --  SVRI: --  PVR: --  PVRI: --  LABS                        8.5    10.6  )-----------( 381      ( 12 Dec 2018 03:24 )             27.0     Culture - Sputum (collected 12 Dec 2018 16:19)  Source: .Sputum  Gram Stain (12 Dec 2018 19:54):    Few White blood cells    Few Gram Positive Cocci in Pairs and Chains    Culture - Acid Fast - Bronchial w/Smear (collected 11 Dec 2018 20:24)  Source: .Broncial bronchial fluid    Culture - Bronchial (collected 11 Dec 2018 10:49)  Source: .Broncial bronchial fluid  Gram Stain (11 Dec 2018 13:11):    Numerous white blood cells    No organisms seen  Preliminary Report (12 Dec 2018 11:12):    Culture in progress    PT/INR - ( 12 Dec 2018 03:24 )   PT: 15.4 sec;   INR: 1.33 ratio         PTT - ( 12 Dec 2018 03:24 )  PTT:29.7 gxx03-77    142  |  106  |  34.0<H>  ----------------------------<  170<H>  4.1   |  22.0  |  0.72    Ca    7.7<L>      12 Dec 2018 17:24  Phos  2.9     12-12  Mg     2.0     12-12    TPro  5.9<L>  /  Alb  2.8<L>  /  TBili  1.7  /  DBili  x   /  AST  31  /  ALT  29  /  AlkPhos  173<H>  12-12  CAPILLARY BLOOD GLUCOSE      POCT Blood Glucose.: 111 mg/dL (13 Dec 2018 00:42)  CARDIAC MARKERS ( 11 Dec 2018 21:10 )  x     / 1.93 ng/mL / 150 U/L / x     / x          ABG - ( 12 Dec 2018 20:05 )  pH, Arterial: 7.53  pH, Blood: x     /  pCO2: 31    /  pO2: 109   / HCO3: 27    / Base Excess: 3.0   /  SaO2: 99                Mode: AC/ CMV (Assist Control/ Continuous Mandatory Ventilation)  RR (machine): 12  TV (machine): 600  FiO2: 35  PEEP: 5  MAP: 15  PIP: 34    IN/OUT    12-11-18 @ 07:01  -  12-12-18 @ 07:00  --------------------------------------------------------  IN: 4701.8 mL / OUT: 3910 mL / NET: 791.8 mL    12-12-18 @ 07:01  -  12-13-18 @ 01:47  --------------------------------------------------------  IN: 3162.8 mL / OUT: 2895 mL / NET: 267.8 mL      IMAGING  personally reviewed imaging   Xray Chest 1 View- PORTABLE-Routine:    EXAM:  XR CHEST PORTABLE ROUTINE 1V                          PROCEDURE DATE:  12/12/2018          INTERPRETATION:  TECHNIQUE: Single portable view of the chest.    COMPARISON: 12/11/2018    CLINICAL HISTORY: Abnormal chest sounds, status post openheart surgery.     FINDINGS:    Single frontal view of the chest demonstrates moderate CHF, unchanged.   Tubes are unchanged. The cardiomediastinal silhouette is large. No acute   osseous abnormalities. Overlying EKG leads and wires are noted.   Left-sided central venous catheter tip in the mid left subclavian   vein/distal left internal jugular vein junction.    IMPRESSION: Moderate CHF, unchanged.                KIM HOLBROOK M.D., ATTENDING RADIOLOGIST  This document has been electronically signed. Dec 12 2018  1:49PM               (12-12-18 @ 05:28)    CURRENT MEDICATIONS  MEDICATIONS  (STANDING):  amiodarone    Tablet 200 milliGRAM(s) Oral daily  ascorbic acid 500 milliGRAM(s) Oral daily  aspirin 325 milliGRAM(s) Oral daily  atorvastatin 80 milliGRAM(s) Oral at bedtime  chlorhexidine 0.12% Liquid 5 milliLiter(s) Oral Mucosa every 4 hours  clopidogrel Tablet 75 milliGRAM(s) Oral daily  dexmedetomidine Infusion 1.4 MICROgram(s)/kG/Hr (31.745 mL/Hr) IV Continuous <Continuous>  enoxaparin Injectable 40 milliGRAM(s) SubCutaneous daily  EPINEPHrine    Infusion 0.012 MICROgram(s)/kG/Min (4 mL/Hr) IV Continuous <Continuous>  ferrous    sulfate 325 milliGRAM(s) Oral daily  folic acid 1 milliGRAM(s) Oral daily  furosemide Infusion 5 mG/Hr (2.5 mL/Hr) IV Continuous <Continuous>  insulin glargine Injectable (LANTUS) 30 Unit(s) SubCutaneous at bedtime  insulin lispro (HumaLOG) corrective regimen sliding scale   SubCutaneous every 4 hours  lactobacillus acidophilus 1 Tablet(s) Oral every 8 hours  milrinone Infusion 0.125 MICROgram(s)/kG/Min (3.401 mL/Hr) IV Continuous <Continuous>  multivitamin 1 Tablet(s) Oral daily  norepinephrine Infusion 0.05 MICROgram(s)/kG/Min (8.503 mL/Hr) IV Continuous <Continuous>  pantoprazole  Injectable 40 milliGRAM(s) IV Push every 12 hours  propofol Infusion 7 MICROgram(s)/kG/Min (3.809 mL/Hr) IV Continuous <Continuous>  QUEtiapine 25 milliGRAM(s) Oral <User Schedule>  QUEtiapine 50 milliGRAM(s) Oral <User Schedule>  sodium chloride 0.9%. 1000 milliLiter(s) (5 mL/Hr) IV Continuous <Continuous>  sodium chloride 0.9%. 1000 milliLiter(s) (10 mL/Hr) IV Continuous <Continuous>    MEDICATIONS  (PRN):  acetaminophen    Suspension .. 650 milliGRAM(s) Oral every 6 hours PRN Temp greater or equal to 38.5C (101.3F)  ALBUTerol/ipratropium for Nebulization 3 milliLiter(s) Nebulizer every 6 hours PRN Shortness of Breath and/or Wheezing  artificial  tears Solution 1 Drop(s) Both EYES four times a day PRN Dry Eyes  HYDROmorphone   Tablet 2 milliGRAM(s) Oral every 3 hours PRN Moderate Pain (4 - 6)  HYDROmorphone   Tablet 4 milliGRAM(s) Oral every 3 hours PRN Severe Pain (7 - 10)  petrolatum white Ointment 1 Application(s) Topical four times a day PRN dry lips      Invasive Lines & Indwelling Catheters  left IJ, L radial   + PW

## 2018-12-13 NOTE — PROGRESS NOTE ADULT - ASSESSMENT
48 year old male with unknown PMH admitted with STEMI s/p CBAG complicated by cardiac arrest, LV failure and cardiogenic shock, respiratory failure requiring trach and vent support.  He developed hyperglycemia in the ICU while on pressors and tube feeds and his A1c is elevated in prediabetes range, however this could be falsely low as it was drawn in setting of acute anemia and possible blood loss from surgery.      1.  DM vs pre-DM:  Glycemic control seems acceptable on current regimen of Lantus and humalog.  If tube feeds are held, would hold Lantus and treat with sliding scale alone.  Will follow.   2.  CAD-  as per CTICU team.  On statin, ASA and Plavix.  3.  Cardiogenic shock- improving, as per ICU team.    4.  Resp failure-  as per ICU team.

## 2018-12-13 NOTE — PROGRESS NOTE ADULT - SUBJECTIVE AND OBJECTIVE BOX
[facility]  INFECTIOUS DISEASES AND INTERNAL MEDICINE at [place]  =======================================================  Perfecto Santizo MD  Diplomates American Board of Internal Medicine and Infectious Diseases  =======================================================    [id number]  PATRICK LYLE   follow up for:  post operative fevers  patient seen and examined.  S/P TRACH    MORE AWAKE TODAY  WITH FEVERS OFF ABX      diarrhea this Am C DIFF NEG  ===================================================  REVIEW OF SYSTEMS:  unable to obtain due to medical condition  =======================================================  Antibiotics:       Other medications:  amiodarone    Tablet 200 milliGRAM(s) Oral daily  ascorbic acid 500 milliGRAM(s) Oral daily  aspirin 325 milliGRAM(s) Oral daily  atorvastatin 80 milliGRAM(s) Oral at bedtime  chlorhexidine 0.12% Liquid 5 milliLiter(s) Oral Mucosa every 4 hours  clopidogrel Tablet 75 milliGRAM(s) Oral daily  dexmedetomidine Infusion 0.7 MICROgram(s)/kG/Hr IV Continuous <Continuous>  dextrose 50% Injectable 50 milliLiter(s) IV Push every 15 minutes  enoxaparin Injectable 40 milliGRAM(s) SubCutaneous daily  EPINEPHrine    Infusion 0.024 MICROgram(s)/kG/Min IV Continuous <Continuous>  ferrous    sulfate 325 milliGRAM(s) Oral daily  folic acid 1 milliGRAM(s) Oral daily  furosemide   Injectable 20 milliGRAM(s) IV Push every 8 hours  furosemide Infusion 10 mG/Hr IV Continuous <Continuous>  insulin Infusion 2 Unit(s)/Hr IV Continuous <Continuous>  lactobacillus acidophilus 1 Tablet(s) Oral every 8 hours  milrinone Infusion 0.375 MICROgram(s)/kG/Min IV Continuous <Continuous>  multivitamin 1 Tablet(s) Oral daily  nitroglycerin  Infusion 66.667 MICROgram(s)/Min IV Continuous <Continuous>  norepinephrine Infusion 0.05 MICROgram(s)/kG/Min IV Continuous <Continuous>  pantoprazole  Injectable 40 milliGRAM(s) IV Push every 12 hours  potassium chloride  10 mEq/50 mL IVPB 10 milliEquivalent(s) IV Intermittent every 1 hour  potassium chloride  10 mEq/50 mL IVPB 10 milliEquivalent(s) IV Intermittent every 1 hour  potassium chloride  10 mEq/50 mL IVPB 10 milliEquivalent(s) IV Intermittent every 1 hour  potassium chloride  10 mEq/50 mL IVPB 10 milliEquivalent(s) IV Intermittent every 1 hour  propofol Infusion 30 MICROgram(s)/kG/Min IV Continuous <Continuous>  sodium chloride 0.9%. 1000 milliLiter(s) IV Continuous <Continuous>  sodium chloride 0.9%. 1000 milliLiter(s) IV Continuous <Continuous>    =======================================================    Physical Exam:    ICU Vital Signs Last 24 Hrs  T(C): 38.4 (13 Dec 2018 08:00), Max: 39.3 (12 Dec 2018 14:00)  T(F): 101.1 (13 Dec 2018 08:00), Max: 102.7 (12 Dec 2018 14:00)  HR: 92 (13 Dec 2018 09:00) (81 - 125)  BP: 104/60 (12 Dec 2018 11:30) (104/60 - 104/60)  BP(mean): 77 (12 Dec 2018 11:30) (77 - 77)  ABP: 105/59 (13 Dec 2018 09:00) (95/54 - 162/84)  ABP(mean): 73 (13 Dec 2018 09:00) (63 - 108)  RR: 3 (13 Dec 2018 09:00) (0 - 45)  SpO2: 100% (13 Dec 2018 09:00) (89% - 100%)    General:  ON TRACH MORE AWAKE TODAY  Eye: Pupils are equal, round and reactive to light,   HENT: Normocephalic, Oral mucosa is moist, TRACH ON VNET   Neck: Supple, No lymphadenopathy.  RIGHT neck IJ line  Respiratory: Lungs with fair air entry  midline chest with dressing in place.   + CHEST tubes in place  Cardiovascular: Normal rate, Regular rhythm, No murmur, Good pulses equal in all extremities,  No edema.  Gastrointestinal: Soft, Non-tender, Non-distended, Normal bowel sounds.  Genitourinary: + Mckeon with light color urine  Lymphatics: No lymphadenopathy neck,   Musculoskeletal: Normal range of motion,   Integumentary: No rash.  Neurologic: Alert,    =======================================================  Labs:                                                                                      9.4    11.3  )-----------( 420      ( 13 Dec 2018 02:14 )             29.4   12-13    143  |  108<H>  |  29.0<H>  ----------------------------<  127<H>  4.0   |  22.0  |  0.79    Ca    7.9<L>      13 Dec 2018 02:14  Phos  3.7     12-13  Mg     2.1     12-13    TPro  5.9<L>  /  Alb  2.8<L>  /  TBili  1.7  /  DBili  x   /  AST  31  /  ALT  29  /  AlkPhos  173<H>  12-12    Culture - Blood (collected 12-05-18 @ 10:21)  Source: .Blood    Culture - Blood (collected 12-04-18 @ 10:22)  Source: .Blood  Final Report (12-09-18 @ 11:01):    No growth at 5 days.    Culture - Blood (collected 12-04-18 @ 10:22)  Source: .Blood  Final Report (12-09-18 @ 11:01):    No growth at 5 days.    Culture - Sputum (collected 12-04-18 @ 10:21)  Source: .Sputum  Gram Stain (12-04-18 @ 11:59):    Few White blood cells    No organisms seen  Final Report (12-06-18 @ 14:00):    Few Candida albicans    No Routine respiratory keara present

## 2018-12-13 NOTE — PROGRESS NOTE ADULT - SUBJECTIVE AND OBJECTIVE BOX
Patient seen for follow up of hyperglycemia    Interval HPI/ Overnight Events:  Patient was weaned off insulin gtt last night and now on Lantus and humalog.      MEDICATIONS  (STANDING):  amiodarone    Tablet 200 milliGRAM(s) Oral daily  ascorbic acid 500 milliGRAM(s) Oral daily  aspirin 325 milliGRAM(s) Oral daily  atorvastatin 80 milliGRAM(s) Oral at bedtime  chlorhexidine 0.12% Liquid 5 milliLiter(s) Oral Mucosa every 4 hours  chlorhexidine 4% Liquid 1 Application(s) Topical once  clopidogrel Tablet 75 milliGRAM(s) Oral daily  dexmedetomidine Infusion 1.4 MICROgram(s)/kG/Hr (31.745 mL/Hr) IV Continuous <Continuous>  enoxaparin Injectable 40 milliGRAM(s) SubCutaneous daily  EPINEPHrine    Infusion 0.012 MICROgram(s)/kG/Min (4 mL/Hr) IV Continuous <Continuous>  ferrous    sulfate 325 milliGRAM(s) Oral daily  folic acid 1 milliGRAM(s) Oral daily  furosemide Infusion 7.5 mG/Hr (3.75 mL/Hr) IV Continuous <Continuous>  insulin glargine Injectable (LANTUS) 30 Unit(s) SubCutaneous at bedtime  insulin lispro (HumaLOG) corrective regimen sliding scale   SubCutaneous every 4 hours  lactobacillus acidophilus 1 Tablet(s) Oral every 8 hours  milrinone Infusion 0.0625 MICROgram(s)/kG/Min (1.701 mL/Hr) IV Continuous <Continuous>  multivitamin 1 Tablet(s) Oral daily  norepinephrine Infusion 0.05 MICROgram(s)/kG/Min (8.503 mL/Hr) IV Continuous <Continuous>  pantoprazole  Injectable 40 milliGRAM(s) IV Push every 12 hours  potassium chloride  20 mEq/100 mL IVPB 20 milliEquivalent(s) IV Intermittent once  propofol Infusion 7 MICROgram(s)/kG/Min (3.809 mL/Hr) IV Continuous <Continuous>  psyllium Powder 1 Packet(s) Oral two times a day  QUEtiapine 25 milliGRAM(s) Oral <User Schedule>  QUEtiapine 50 milliGRAM(s) Oral <User Schedule>  sodium chloride 0.9%. 1000 milliLiter(s) (5 mL/Hr) IV Continuous <Continuous>  sodium chloride 0.9%. 1000 milliLiter(s) (10 mL/Hr) IV Continuous <Continuous>    Vital Signs Last 24 Hrs  T(C): 38.3 (13 Dec 2018 19:30), Max: 39 (12 Dec 2018 23:00)  T(F): 100.9 (13 Dec 2018 19:30), Max: 102.2 (12 Dec 2018 23:00)  HR: 87 (13 Dec 2018 19:30) (81 - 114)  RR: 24 (13 Dec 2018 19:30) (0 - 35)  SpO2: 100% (13 Dec 2018 19:30) (72% - 100%)    PE:  Gen: +trach on vent, NAD  HEENT:  sclera anicteric, MMM  Neck:  tracheosotomy, no LAD  CV:  nl S1 + S2, RRR, no m/r/g  Resp:  breathing comfortably on vent, CTA b/l  GI/ Abd: soft, distended, nontender, BS hypoactive.   Skin:  diffuse anasarca  Psych:  awake and alert today.      Labs:  12-13    144  |  107  |  35.0<H>  ----------------------------<  159<H>  4.0   |  23.0  |  0.77    Ca    8.3<L>      13 Dec 2018 16:15  Phos  3.7     12-13  Mg     2.2     12-13    TPro  5.9<L>  /  Alb  2.8<L>  /  TBili  1.7  /  DBili  x   /  AST  31  /  ALT  29  /  AlkPhos  173<H>  12-12                 10.1   11.5  )-----------( 513      ( 13 Dec 2018 16:15 )             32.0       CAPILLARY BLOOD GLUCOSE  POCT Blood Glucose.: 158 mg/dL (13 Dec 2018 15:36)  POCT Blood Glucose.: 124 mg/dL (13 Dec 2018 07:42)  POCT Blood Glucose.: 123 mg/dL (13 Dec 2018 04:10)  POCT Blood Glucose.: 111 mg/dL (13 Dec 2018 00:42)  POCT Blood Glucose.: 109 mg/dL (12 Dec 2018 23:17)  POCT Blood Glucose.: 113 mg/dL (12 Dec 2018 22:08)  POCT Blood Glucose.: 130 mg/dL (12 Dec 2018 21:08)

## 2018-12-13 NOTE — PROGRESS NOTE ADULT - ASSESSMENT
49 yo male presented initially to the hospital 11/ with chest pain secondary to STEMI, required emergent CABG after LHC, suffered VF arrest in OR prior to being placed on CPB, post CABG required ECMO and IABP due to inability to wean from CPB, decannulated from central ECMO on 12/3 and placed on impella, impella removal on 12/6 with placement of right external iliac stent due to dissection, underwent tracheostomy on 12/10.   MICU team asked to consult on this patient due to his inability to wean from vent.    Now seems more comfortable on current regimen.     Impression  1) CAD s/p CABG  2) Resolving cardiogenic shock s/p ECMO, impella, IABP  3) Chronic respiratory failure   4) CHFrEF (EF 20%)  5) Pneumonia  6) Fevers    Recommendations:  - start weaning off seroquel  - would continue PRN opioids for dyspnea, can start to taper over next few days  - patient is comfortable on pressure support of 18, PEEP 5,  would place on daily trach mask trials  - diuresis to maintain negative fluid balance

## 2018-12-13 NOTE — PROGRESS NOTE ADULT - SUBJECTIVE AND OBJECTIVE BOX
INTERVAL HPI/OVERNIGHT EVENTS: Much more comfortable appearing today. On pressure support in AM.     On Admission  11-29-18 (14d)  HPI:  This is a 48 y/o male no significant PMH; recent ABT (doxycycline) r/t cellulitis to right groin per pt. Pt presented to the ED with 10/10 chest pain that he reported started at 0930; he drank water with no relief; pain started to progress to B/L shoulders. Per pt spouse, she reports he has had chest pain x2-3 days and this morning he vomited which he attributed to reflux.  She brought him into the emergency department.    Pt has significant EKG changes ST elevations in leads V1-V5 with reciprocal changes in II, III, AVF.  Pt rec'd asa and plavix load in ED. (29 Nov 2018 11:32)    PAST MEDICAL & SURGICAL HISTORY:  Staph infection  No significant past surgical history      Antimicrobial:    Cardiovascular:  amiodarone    Tablet 200 milliGRAM(s) Oral daily  EPINEPHrine    Infusion 0.012 MICROgram(s)/kG/Min IV Continuous <Continuous>  furosemide Infusion 5 mG/Hr IV Continuous <Continuous>  milrinone Infusion 0.0625 MICROgram(s)/kG/Min IV Continuous <Continuous>  norepinephrine Infusion 0.05 MICROgram(s)/kG/Min IV Continuous <Continuous>    Pulmonary:  ALBUTerol/ipratropium for Nebulization 3 milliLiter(s) Nebulizer every 6 hours PRN    Hematalogic:  clopidogrel Tablet 75 milliGRAM(s) Oral daily  enoxaparin Injectable 40 milliGRAM(s) SubCutaneous daily    Other:  acetaminophen    Suspension .. 650 milliGRAM(s) Oral every 6 hours PRN  artificial  tears Solution 1 Drop(s) Both EYES four times a day PRN  ascorbic acid 500 milliGRAM(s) Oral daily  aspirin 325 milliGRAM(s) Oral daily  atorvastatin 80 milliGRAM(s) Oral at bedtime  chlorhexidine 0.12% Liquid 5 milliLiter(s) Oral Mucosa every 4 hours  dexmedetomidine Infusion 1.4 MICROgram(s)/kG/Hr IV Continuous <Continuous>  ferrous    sulfate 325 milliGRAM(s) Oral daily  folic acid 1 milliGRAM(s) Oral daily  HYDROmorphone   Tablet 2 milliGRAM(s) Oral every 3 hours PRN  HYDROmorphone   Tablet 4 milliGRAM(s) Oral every 3 hours PRN  insulin glargine Injectable (LANTUS) 30 Unit(s) SubCutaneous at bedtime  insulin lispro (HumaLOG) corrective regimen sliding scale   SubCutaneous every 4 hours  lactobacillus acidophilus 1 Tablet(s) Oral every 8 hours  multivitamin 1 Tablet(s) Oral daily  pantoprazole  Injectable 40 milliGRAM(s) IV Push every 12 hours  petrolatum white Ointment 1 Application(s) Topical four times a day PRN  propofol Infusion 7 MICROgram(s)/kG/Min IV Continuous <Continuous>  psyllium Powder 1 Packet(s) Oral two times a day  QUEtiapine 25 milliGRAM(s) Oral <User Schedule>  QUEtiapine 50 milliGRAM(s) Oral <User Schedule>  sodium chloride 0.9%. 1000 milliLiter(s) IV Continuous <Continuous>  sodium chloride 0.9%. 1000 milliLiter(s) IV Continuous <Continuous>      Drug Dosing Weight  Height (cm): 175.26 (29 Nov 2018 10:50)  Weight (kg): 90.7 (06 Dec 2018 13:28)  BMI (kg/m2): 29.5 (06 Dec 2018 13:28)  BSA (m2): 2.07 (06 Dec 2018 13:28)    T(C): 38.7 (12-13-18 @ 12:00), Max: 39 (12-12-18 @ 23:00)  HR: 110 (12-13-18 @ 14:30)  BP: --  BP(mean): --  ABP: 134/64 (12-13-18 @ 14:30)  ABP(mean): 84 (12-13-18 @ 14:30)  RR: 33 (12-13-18 @ 14:30)  SpO2: 100% (12-13-18 @ 14:30)    ABG - ( 13 Dec 2018 12:13 )  pH, Arterial: 7.55  pH, Blood: x     /  pCO2: 29    /  pO2: 112   / HCO3: 27    / Base Excess: 2.8   /  SaO2: 99                    12-12 @ 07:01  -  12-13 @ 07:00  --------------------------------------------------------  IN: 4083 mL / OUT: 3865 mL / NET: 218 mL        Mode: AC/ CMV (Assist Control/ Continuous Mandatory Ventilation)  RR (machine): 12  TV (machine): 600  FiO2: 35  PEEP: 5  MAP: 16  PIP: 34      PHYSICAL EXAM:    GENERAL: NAD  HEAD:  Atraumatic, normocephalic  EYES: EOMI, PERRL  ENMT:  MMM, No oropharyngeal erythema or exudates  NECK:  Supple, normal appearance, trachea midline, trach in place  NERVOUS SYSTEM:  Alert & Oriented, follows commands, able to move all extremities   CHEST/LUNG: Bilateral air entry, no chest deformity, no crackles or wheeze anteriorly   HEART: Regular rate, regular rhythm  ABDOMEN: Soft, Nontender, Nondistended; g-tube present   EXTREMITIES:  bilat edema   LYMPH:  No lymphadenopathy noted  SKIN:  No visible rashes or skin lesions, good capillary refill  PSYCH: appropriate affect and behavior    LABS:  CBC Full  -  ( 13 Dec 2018 16:15 )  WBC Count : 11.5 K/uL  Hemoglobin : 10.1 g/dL  Hematocrit : 32.0 %  Platelet Count - Automated : 513 K/uL  Mean Cell Volume : 89.9 fl  Mean Cell Hemoglobin : 28.4 pg  Mean Cell Hemoglobin Concentration : 31.6 g/dL  Auto Neutrophil # : x  Auto Lymphocyte # : x  Auto Monocyte # : x  Auto Eosinophil # : x  Auto Basophil # : x  Auto Neutrophil % : x  Auto Lymphocyte % : x  Auto Monocyte % : x  Auto Eosinophil % : x  Auto Basophil % : x    12-13    143  |  108<H>  |  29.0<H>  ----------------------------<  127<H>  4.0   |  22.0  |  0.79    Ca    7.9<L>      13 Dec 2018 02:14  Phos  3.7     12-13  Mg     2.1     12-13    TPro  5.9<L>  /  Alb  2.8<L>  /  TBili  1.7  /  DBili  x   /  AST  31  /  ALT  29  /  AlkPhos  173<H>  12-12    PT/INR - ( 12 Dec 2018 03:24 )   PT: 15.4 sec;   INR: 1.33 ratio         PTT - ( 12 Dec 2018 03:24 )  PTT:29.7 sec    Culture Results:   Rare Candida albicans  Rare Routine respiratory keara present (12-11 @ 10:49)

## 2018-12-13 NOTE — CHART NOTE - NSCHARTNOTEFT_GEN_A_CORE
ЮЛИЯ Joya made aware by RN prevena vac no long holding suction at this time      Prevena vac troubleshot without resolution   Decision made to take vac down, clean and evaluate sternum and redress with [place] dressing     Wound noted to be clean dry and intact without drainage, + staples to sternum  Surrounding skin cleaned with chloroprep, allowed to dry   [place] dressing sterilely applied  without incident    Will discuss with team when staples can come out of sternum / how often dressing should be changed

## 2018-12-13 NOTE — CHART NOTE - NSCHARTNOTEFT_GEN_A_CORE
Patient received on cpap 5/ pressure support 18 at 35%, placed on AC 12/600/+5/35% . Tolerated well no signs of respiratory distress noted. Routine trach care performed, suction prn [id number]%, Hr 81bpm, Etco2 30mmgh. Will continue to monitor.

## 2018-12-13 NOTE — PROGRESS NOTE ADULT - ASSESSMENT
This is a 47y  Male with no significant PMH admitted on 11/29/18 for CP for 2-3 days prior to admission,   found with STEMI. Urgent Cath, pt found to have multiple occlusions; VF arrest, shock x 1, return to NSR.  Upon transfer to OR for Urgent CABG, pt again VF arrest, chest opened intra-op with CPR in progress.  s/p ECMO and Impella; 12/3 ECMO explanted and Impella placed via right groin sheath.      developed fevers.  patient in hospital for > 48 hours, multiple interventions.   blood cultures and sputum cx sent  urine legionella is NEGATIVE  CT Chest with PNA in right lower and left upper lung   Diarrhea      Cdiff;  NEG   S/P TRACH    ALL ABX DISCONTINUED    RECURRENT FEVER LSAT NIGHT  WILL OBSERVE OFF ABX   IF CLINICAL CHANGE THEN CAN RESTART  EMPIRIC ABX  D/W CTICU TEAM

## 2018-12-13 NOTE — PROGRESS NOTE ADULT - PROBLEM SELECTOR PLAN 1
s/p CABG  passive ROM of upper extremities and LLE by RNs   maintain primacor and epinephrine at current levels  wean pressors as tolerated to maintain MAP 65-75   tube feeds restarted at goal, continue protonix  insulin gtt per protocol for tight glycemic control (no reported h/o DM)  lasix gtt for diuresis, maintain augustin for strict Is/Os, follow lytes closely and replete PRN

## 2018-12-14 DIAGNOSIS — I51.9 HEART DISEASE, UNSPECIFIED: ICD-10-CM

## 2018-12-14 DIAGNOSIS — E46 UNSPECIFIED PROTEIN-CALORIE MALNUTRITION: ICD-10-CM

## 2018-12-14 LAB
ANION GAP SERPL CALC-SCNC: 13 MMOL/L — SIGNIFICANT CHANGE UP (ref 5–17)
BUN SERPL-MCNC: 42 MG/DL — HIGH (ref 8–20)
CALCIUM SERPL-MCNC: 8.4 MG/DL — LOW (ref 8.6–10.2)
CHLORIDE SERPL-SCNC: 109 MMOL/L — HIGH (ref 98–107)
CO2 SERPL-SCNC: 24 MMOL/L — SIGNIFICANT CHANGE UP (ref 22–29)
CREAT SERPL-MCNC: 0.85 MG/DL — SIGNIFICANT CHANGE UP (ref 0.5–1.3)
CULTURE RESULTS: SIGNIFICANT CHANGE UP
GAS PNL BLDA: SIGNIFICANT CHANGE UP
GAS PNL BLDA: SIGNIFICANT CHANGE UP
GLUCOSE BLDC GLUCOMTR-MCNC: 150 MG/DL — HIGH (ref 70–99)
GLUCOSE BLDC GLUCOMTR-MCNC: 150 MG/DL — HIGH (ref 70–99)
GLUCOSE BLDC GLUCOMTR-MCNC: 160 MG/DL — HIGH (ref 70–99)
GLUCOSE BLDC GLUCOMTR-MCNC: 182 MG/DL — HIGH (ref 70–99)
GLUCOSE BLDC GLUCOMTR-MCNC: 189 MG/DL — HIGH (ref 70–99)
GLUCOSE SERPL-MCNC: 184 MG/DL — HIGH (ref 70–115)
HCT VFR BLD CALC: 29.5 % — LOW (ref 42–52)
HGB BLD-MCNC: 9.4 G/DL — LOW (ref 14–18)
LACTATE BLDV-MCNC: 1.5 MMOL/L — SIGNIFICANT CHANGE UP (ref 0.5–2)
MAGNESIUM SERPL-MCNC: 2.5 MG/DL — SIGNIFICANT CHANGE UP (ref 1.6–2.6)
MCHC RBC-ENTMCNC: 29 PG — SIGNIFICANT CHANGE UP (ref 27–31)
MCHC RBC-ENTMCNC: 31.9 G/DL — LOW (ref 32–36)
MCV RBC AUTO: 91 FL — SIGNIFICANT CHANGE UP (ref 80–94)
PHOSPHATE SERPL-MCNC: 5 MG/DL — HIGH (ref 2.4–4.7)
PLATELET # BLD AUTO: 450 K/UL — HIGH (ref 150–400)
POTASSIUM SERPL-MCNC: 4.7 MMOL/L — SIGNIFICANT CHANGE UP (ref 3.5–5.3)
POTASSIUM SERPL-SCNC: 4.7 MMOL/L — SIGNIFICANT CHANGE UP (ref 3.5–5.3)
RBC # BLD: 3.24 M/UL — LOW (ref 4.6–6.2)
RBC # FLD: 15.6 % — SIGNIFICANT CHANGE UP (ref 11–15.6)
SODIUM SERPL-SCNC: 146 MMOL/L — HIGH (ref 135–145)
SPECIMEN SOURCE: SIGNIFICANT CHANGE UP
WBC # BLD: 9.9 K/UL — SIGNIFICANT CHANGE UP (ref 4.8–10.8)
WBC # FLD AUTO: 9.9 K/UL — SIGNIFICANT CHANGE UP (ref 4.8–10.8)

## 2018-12-14 PROCEDURE — 99233 SBSQ HOSP IP/OBS HIGH 50: CPT

## 2018-12-14 PROCEDURE — 74018 RADEX ABDOMEN 1 VIEW: CPT | Mod: 26

## 2018-12-14 PROCEDURE — 71045 X-RAY EXAM CHEST 1 VIEW: CPT | Mod: 26

## 2018-12-14 PROCEDURE — 99232 SBSQ HOSP IP/OBS MODERATE 35: CPT

## 2018-12-14 PROCEDURE — 93010 ELECTROCARDIOGRAM REPORT: CPT

## 2018-12-14 PROCEDURE — 36556 INSERT NON-TUNNEL CV CATH: CPT | Mod: 58

## 2018-12-14 RX ORDER — POTASSIUM CHLORIDE 20 MEQ
20 PACKET (EA) ORAL ONCE
Qty: 0 | Refills: 0 | Status: COMPLETED | OUTPATIENT
Start: 2018-12-14 | End: 2018-12-14

## 2018-12-14 RX ORDER — METOPROLOL TARTRATE 50 MG
2.5 TABLET ORAL ONCE
Qty: 0 | Refills: 0 | Status: DISCONTINUED | OUTPATIENT
Start: 2018-12-14 | End: 2018-12-14

## 2018-12-14 RX ORDER — ONDANSETRON 8 MG/1
4 TABLET, FILM COATED ORAL ONCE
Qty: 0 | Refills: 0 | Status: COMPLETED | OUTPATIENT
Start: 2018-12-14 | End: 2018-12-18

## 2018-12-14 RX ORDER — METOCLOPRAMIDE HCL 10 MG
10 TABLET ORAL EVERY 8 HOURS
Qty: 0 | Refills: 0 | Status: COMPLETED | OUTPATIENT
Start: 2018-12-14 | End: 2018-12-15

## 2018-12-14 RX ORDER — ALBUMIN HUMAN 25 %
250 VIAL (ML) INTRAVENOUS ONCE
Qty: 0 | Refills: 0 | Status: COMPLETED | OUTPATIENT
Start: 2018-12-14 | End: 2018-12-14

## 2018-12-14 RX ORDER — ACETAMINOPHEN 500 MG
1000 TABLET ORAL ONCE
Qty: 0 | Refills: 0 | Status: COMPLETED | OUTPATIENT
Start: 2018-12-14 | End: 2018-12-14

## 2018-12-14 RX ORDER — QUETIAPINE FUMARATE 200 MG/1
25 TABLET, FILM COATED ORAL DAILY
Qty: 0 | Refills: 0 | Status: DISCONTINUED | OUTPATIENT
Start: 2018-12-14 | End: 2018-12-19

## 2018-12-14 RX ORDER — NOREPINEPHRINE BITARTRATE/D5W 8 MG/250ML
0.03 PLASTIC BAG, INJECTION (ML) INTRAVENOUS
Qty: 8 | Refills: 0 | Status: DISCONTINUED | OUTPATIENT
Start: 2018-12-14 | End: 2018-12-15

## 2018-12-14 RX ADMIN — CHLORHEXIDINE GLUCONATE 5 MILLILITER(S): 213 SOLUTION TOPICAL at 09:29

## 2018-12-14 RX ADMIN — DEXMEDETOMIDINE HYDROCHLORIDE IN 0.9% SODIUM CHLORIDE 31.75 MICROGRAM(S)/KG/HR: 4 INJECTION INTRAVENOUS at 09:28

## 2018-12-14 RX ADMIN — Medication 500 MILLIGRAM(S): at 12:20

## 2018-12-14 RX ADMIN — Medication 1 TABLET(S): at 12:21

## 2018-12-14 RX ADMIN — Medication 50 MILLIEQUIVALENT(S): at 00:04

## 2018-12-14 RX ADMIN — Medication 10 MILLIGRAM(S): at 06:15

## 2018-12-14 RX ADMIN — Medication 100 MILLIEQUIVALENT(S): at 17:43

## 2018-12-14 RX ADMIN — CHLORHEXIDINE GLUCONATE 5 MILLILITER(S): 213 SOLUTION TOPICAL at 06:04

## 2018-12-14 RX ADMIN — CHLORHEXIDINE GLUCONATE 5 MILLILITER(S): 213 SOLUTION TOPICAL at 17:44

## 2018-12-14 RX ADMIN — CHLORHEXIDINE GLUCONATE 5 MILLILITER(S): 213 SOLUTION TOPICAL at 21:44

## 2018-12-14 RX ADMIN — Medication 10 MILLIGRAM(S): at 12:22

## 2018-12-14 RX ADMIN — PANTOPRAZOLE SODIUM 40 MILLIGRAM(S): 20 TABLET, DELAYED RELEASE ORAL at 06:04

## 2018-12-14 RX ADMIN — Medication 400 MILLIGRAM(S): at 21:44

## 2018-12-14 RX ADMIN — Medication 1000 MILLIGRAM(S): at 22:10

## 2018-12-14 RX ADMIN — Medication 10 MILLIGRAM(S): at 21:23

## 2018-12-14 RX ADMIN — Medication 1 TABLET(S): at 06:04

## 2018-12-14 RX ADMIN — MILRINONE LACTATE 1.7 MICROGRAM(S)/KG/MIN: 1 INJECTION, SOLUTION INTRAVENOUS at 12:19

## 2018-12-14 RX ADMIN — Medication 325 MILLIGRAM(S): at 12:21

## 2018-12-14 RX ADMIN — AMIODARONE HYDROCHLORIDE 200 MILLIGRAM(S): 400 TABLET ORAL at 06:04

## 2018-12-14 RX ADMIN — Medication 1 PACKET(S): at 06:04

## 2018-12-14 RX ADMIN — Medication 100 MILLIEQUIVALENT(S): at 15:44

## 2018-12-14 RX ADMIN — Medication 2: at 09:42

## 2018-12-14 RX ADMIN — HYDROMORPHONE HYDROCHLORIDE 4 MILLIGRAM(S): 2 INJECTION INTRAMUSCULAR; INTRAVENOUS; SUBCUTANEOUS at 06:30

## 2018-12-14 RX ADMIN — EPINEPHRINE 4 MICROGRAM(S)/KG/MIN: 0.3 INJECTION INTRAMUSCULAR; SUBCUTANEOUS at 12:18

## 2018-12-14 RX ADMIN — CLOPIDOGREL BISULFATE 75 MILLIGRAM(S): 75 TABLET, FILM COATED ORAL at 12:21

## 2018-12-14 RX ADMIN — Medication 125 MILLILITER(S): at 14:27

## 2018-12-14 RX ADMIN — DEXMEDETOMIDINE HYDROCHLORIDE IN 0.9% SODIUM CHLORIDE 31.75 MICROGRAM(S)/KG/HR: 4 INJECTION INTRAVENOUS at 17:43

## 2018-12-14 RX ADMIN — CHLORHEXIDINE GLUCONATE 5 MILLILITER(S): 213 SOLUTION TOPICAL at 12:21

## 2018-12-14 RX ADMIN — Medication 2: at 04:21

## 2018-12-14 RX ADMIN — CHLORHEXIDINE GLUCONATE 5 MILLILITER(S): 213 SOLUTION TOPICAL at 02:44

## 2018-12-14 RX ADMIN — Medication 3 MILLILITER(S): at 08:06

## 2018-12-14 RX ADMIN — Medication 1 MILLIGRAM(S): at 12:20

## 2018-12-14 RX ADMIN — Medication 50 MILLIEQUIVALENT(S): at 01:00

## 2018-12-14 RX ADMIN — DEXMEDETOMIDINE HYDROCHLORIDE IN 0.9% SODIUM CHLORIDE 31.75 MICROGRAM(S)/KG/HR: 4 INJECTION INTRAVENOUS at 14:21

## 2018-12-14 RX ADMIN — Medication 325 MILLIGRAM(S): at 12:20

## 2018-12-14 RX ADMIN — HYDROMORPHONE HYDROCHLORIDE 4 MILLIGRAM(S): 2 INJECTION INTRAMUSCULAR; INTRAVENOUS; SUBCUTANEOUS at 06:04

## 2018-12-14 RX ADMIN — Medication 50 MILLIEQUIVALENT(S): at 02:00

## 2018-12-14 RX ADMIN — PROPOFOL 3.81 MICROGRAM(S)/KG/MIN: 10 INJECTION, EMULSION INTRAVENOUS at 12:19

## 2018-12-14 RX ADMIN — ENOXAPARIN SODIUM 40 MILLIGRAM(S): 100 INJECTION SUBCUTANEOUS at 12:20

## 2018-12-14 RX ADMIN — DEXMEDETOMIDINE HYDROCHLORIDE IN 0.9% SODIUM CHLORIDE 31.75 MICROGRAM(S)/KG/HR: 4 INJECTION INTRAVENOUS at 12:19

## 2018-12-14 RX ADMIN — QUETIAPINE FUMARATE 25 MILLIGRAM(S): 200 TABLET, FILM COATED ORAL at 02:45

## 2018-12-14 RX ADMIN — Medication 2: at 00:04

## 2018-12-14 RX ADMIN — PANTOPRAZOLE SODIUM 40 MILLIGRAM(S): 20 TABLET, DELAYED RELEASE ORAL at 17:44

## 2018-12-14 NOTE — CHART NOTE - NSCHARTNOTEFT_GEN_A_CORE
Source: Patient [ ]  Family [ ]   other [x ] EMR and staff     Enteral /Parenteral Nutrition: Diet, NPO with Tube Feed:   Tube Feeding Modality: Orogastric  Glucerna 1.5 Danielito  Total Volume for 24 Hours (mL): 1440  Continuous  Starting Tube Feed Rate {mL per Hour}: 60  Until Goal Tube Feed Rate (mL per Hour): 60  Tube Feed Duration (in Hours): 24  Tube Feed Start Time: 22:11 (12-13-18 @ 22:10)    Current Weight:   12/13: 112kg  12/6: 90.7kg     % Weight Change: (19%) 48# wt gain over past week, unsure of accuracy, however +fluid accumulation/ edema noted in all 4 extremities      Pertinent Medications: MEDICATIONS  (STANDING):  amiodarone    Tablet 200 milliGRAM(s) Oral daily  ascorbic acid 500 milliGRAM(s) Oral daily  aspirin 325 milliGRAM(s) Oral daily  atorvastatin 80 milliGRAM(s) Oral at bedtime  chlorhexidine 0.12% Liquid 5 milliLiter(s) Oral Mucosa every 4 hours  clopidogrel Tablet 75 milliGRAM(s) Oral daily  dexmedetomidine Infusion 1.4 MICROgram(s)/kG/Hr (31.745 mL/Hr) IV Continuous <Continuous>  enoxaparin Injectable 40 milliGRAM(s) SubCutaneous daily  EPINEPHrine    Infusion 0.012 MICROgram(s)/kG/Min (4 mL/Hr) IV Continuous <Continuous>  ferrous    sulfate 325 milliGRAM(s) Oral daily  folic acid 1 milliGRAM(s) Oral daily  furosemide Infusion 7.5 mG/Hr (3.75 mL/Hr) IV Continuous <Continuous>  insulin glargine Injectable (LANTUS) 30 Unit(s) SubCutaneous at bedtime  insulin lispro (HumaLOG) corrective regimen sliding scale   SubCutaneous every 4 hours  lactobacillus acidophilus 1 Tablet(s) Oral every 8 hours  metoclopramide Injectable 10 milliGRAM(s) IV Push every 8 hours  milrinone Infusion 0.0625 MICROgram(s)/kG/Min (1.701 mL/Hr) IV Continuous <Continuous>  multivitamin 1 Tablet(s) Oral daily  ondansetron Injectable 4 milliGRAM(s) IV Push once  pantoprazole  Injectable 40 milliGRAM(s) IV Push every 12 hours  propofol Infusion 7 MICROgram(s)/kG/Min (3.809 mL/Hr) IV Continuous <Continuous>  QUEtiapine 25 milliGRAM(s) Oral <User Schedule>  QUEtiapine 50 milliGRAM(s) Oral <User Schedule>  sodium chloride 0.9%. 1000 milliLiter(s) (5 mL/Hr) IV Continuous <Continuous>  sodium chloride 0.9%. 1000 milliLiter(s) (10 mL/Hr) IV Continuous <Continuous>    MEDICATIONS  (PRN):  acetaminophen    Suspension .. 650 milliGRAM(s) Oral every 6 hours PRN Temp greater or equal to 38.5C (101.3F)  ALBUTerol/ipratropium for Nebulization 3 milliLiter(s) Nebulizer every 6 hours PRN Shortness of Breath and/or Wheezing  artificial  tears Solution 1 Drop(s) Both EYES four times a day PRN Dry Eyes  chlorproMAZINE    Tablet 25 milliGRAM(s) Oral three times a day PRN hiccups  HYDROmorphone   Tablet 2 milliGRAM(s) Oral every 3 hours PRN Moderate Pain (4 - 6)  HYDROmorphone   Tablet 4 milliGRAM(s) Oral every 3 hours PRN Severe Pain (7 - 10)  petrolatum white Ointment 1 Application(s) Topical four times a day PRN dry lips    Pertinent Labs: CBC Full  -  ( 14 Dec 2018 03:09 )  WBC Count : 9.9 K/uL  Hemoglobin : 9.4 g/dL  Hematocrit : 29.5 %  Platelet Count - Automated : 450 K/uL  Mean Cell Volume : 91.0 fl  Mean Cell Hemoglobin : 29.0 pg  Mean Cell Hemoglobin Concentration : 31.9 g/dL  Auto Neutrophil # : x  Auto Lymphocyte # : x  Auto Monocyte # : x  Auto Eosinophil # : x  Auto Basophil # : x  Auto Neutrophil % : x  Auto Lymphocyte % : x  Auto Monocyte % : x  Auto Eosinophil % : x  Auto Basophil % : x        Skin: surgical wound (sternum and leg)     Nutrition focused physical exam conducted - found signs of malnutrition [ ]absent [ ]present    Subcutaneous fat loss: [ ] Orbital fat pads region, [ ]Buccal fat region, [x ]Triceps region,  [ ]Ribs region    Mild Muscle wasting: [x ]Temples region, [ ]Clavicle region, [ ]Shoulder region, [ ]Scapula region, [ ]Interosseous region,  [ ]thigh region, [ ]Calf region    Estimated Needs:   [ ] no change since previous assessment  [x ] recalculated: 83 kg (IBW)   4428-2109 kcal   1.5gm /kg 124gm protein     Current Nutrition Diagnosis:  Pt remains at high nutrition risk secondary to moderate-acute protein calorie malnutrition related to increased nutrient needs in setting recent STEMI, emergent C4V with ECMO and IABP (now removed), hypoxic resp failure, s/p trach as evidenced by +fluid accumulation in all 4 extremities, meeting approximately 75% estimated nutrition needs > 7 days. Pt was receiving Glucerna @ 55ml/hr ([id number]) 1100ml/day 1650kcal, 92gm protein. Pt started having increased amount of loose stool 48 hours ago, Metamucil started yesterday, loose stool improved, however now pt with distended abdomen. Recommendations below:     Recommendations:   1. Rx: MVI and VIt. C daily (500mg)   2. Check wt daily to monitor trends   3. D/C metamucil   4. Hold feeds at this time.   5. Consider abd xray, before re-starting feeds   6. RD to remain available with further recommendations post abd xray     Monitoring and Evaluation:   [ ] PO intake [x ] Tolerance to diet prescription [X] Weights  [X] Follow up per protocol [X] Labs:

## 2018-12-14 NOTE — PROGRESS NOTE ADULT - ASSESSMENT
46 y/o male presented with anterior STEMI , s/p coronary angiogram showing severe multivessel disease with LAD being culprit 100%.   Primary PCI to LAD and D2 attempted but LAD stent thrombosed following deployment of diagonal stent. This was followed by proximal thrombus extension into the left main (There was no LM dissection during the procedure)   Impella CP placed and patient sent for emergency CABG . Had Vfib arrest in the lab shocked x 1 and another vfib arrest in OR managed by CPR followed by sternotomy, direct cardiac massage and then placed on Bypass  CABG with SVG x 4 to LAD, D2, OM and RPDA   not able to come off pump after CABG and placed on ECMO with open chest (central cannulation)   s/p decanulation of ECMO ,Impella CP placement chest closure,   s/p Impella removal , right iliac stenting due to dissection   Acute respiratory failure with unable to be weaned from vent (agitation, respiratory distress on CPAP and pulmonary edema)   With oral seroquel and change of sedation protocol , patient doing better, awake, responsive and able to wean from pressors/inotropes and vent support  able to move all 4 extremities    Plan:   can transition to lasix 40 mg IV twice daily  Start oral ACEI , preferably short acting captopril 6.25 mg three times daily and increase dose as tolerated   Once off pressors, can start carvedilol 3.125 mg twice daily  daily trach mask trial

## 2018-12-14 NOTE — PROGRESS NOTE ADULT - ASSESSMENT
48 year old male with unknown PMH admitted with STEMI s/p CBAG complicated by cardiac arrest, LV failure and cardiogenic shock, respiratory failure requiring trach and vent support.  He developed hyperglycemia in the ICU while on pressors and tube feeds and his A1c is elevated in prediabetes range, however this could be falsely low as it was drawn in setting of acute anemia and possible blood loss from surgery.  He now has a bowel obstruction and tube feeds are on hold.      1.  DM vs pre-DM:    BG reasonable, however given that tube feeds have been held, would hold Lantus dose tonight and leave on humalog sliding scale q 4 hours.  If unable to control glucose, could place back on insulin gtt while critically ill.  Could consider resuming Lantus if tube feeds are restarted.   2.  CAD-  as per cardiac team.  On statin, ASA and Plavix.  3.  Cardiogenic shock- as per ICU team.    4.  Respiratory failure-  as per ICU team.

## 2018-12-14 NOTE — PROGRESS NOTE ADULT - PROBLEM SELECTOR PLAN 5
s/p trach  high pressure support CPAP as tolerated no ace/arb or beta blocker at this time due to pressor and inotrope requirements

## 2018-12-14 NOTE — PROCEDURE NOTE - NSINDICATIONS_GEN_A_CORE
monitoring purposes/critical patient/cannulation purposes/arterial puncture to obtain ABG's
critical patient/cannulation purposes
hemodynamic monitoring/critical illness
critical illness/hemodynamic monitoring
hemodynamic monitoring/critical illness/venous access/hypertonic/irritant infusion

## 2018-12-14 NOTE — CONSULT NOTE ADULT - SUBJECTIVE AND OBJECTIVE BOX
SUBJECTIVE:  47M p/w mid sternal CP x 2-3 days on Nov 29th, 2018. In ED ST elevations in V1-V5 with reciprocal changes in inferior leads. Code STEMI activated and pt was taken to cath lab emergently. Attempted bifurcated stent to LAD and D2 with development of acute thrombus in LAD stent, attempted to re balloon with subsequent dissection of LM. Impella placed. VF arrest, shock x1 with return of NSR. Pt was emergently intubated, received CABG x 4 all vein to the LAD, Diag, OM and PDA with inability to wean from CPB therefore requiring ECMO and IABP. ECMO was explanted on 12/3. Had VDRF, requiring trach placement. Was also being treated for PNA    PAST MEDICAL & SURGICAL HISTORY:  Staph infection  No significant past surgical history    MEDICATIONS  (STANDING):  amiodarone    Tablet 200 milliGRAM(s) Oral daily  ascorbic acid 500 milliGRAM(s) Oral daily  aspirin 325 milliGRAM(s) Oral daily  atorvastatin 80 milliGRAM(s) Oral at bedtime  chlorhexidine 0.12% Liquid 5 milliLiter(s) Oral Mucosa every 4 hours  clopidogrel Tablet 75 milliGRAM(s) Oral daily  dexmedetomidine Infusion 1.4 MICROgram(s)/kG/Hr (31.745 mL/Hr) IV Continuous <Continuous>  enoxaparin Injectable 40 milliGRAM(s) SubCutaneous daily  ferrous    sulfate 325 milliGRAM(s) Oral daily  folic acid 1 milliGRAM(s) Oral daily  furosemide Infusion 5 mG/Hr (2.5 mL/Hr) IV Continuous <Continuous>  insulin glargine Injectable (LANTUS) 30 Unit(s) SubCutaneous at bedtime  insulin lispro (HumaLOG) corrective regimen sliding scale   SubCutaneous every 4 hours  lactobacillus acidophilus 1 Tablet(s) Oral every 8 hours  metoclopramide Injectable 10 milliGRAM(s) IV Push every 8 hours  milrinone Infusion 0.0625 MICROgram(s)/kG/Min (1.701 mL/Hr) IV Continuous <Continuous>  multivitamin 1 Tablet(s) Oral daily  norepinephrine Infusion 0.029 MICROgram(s)/kG/Min (5 mL/Hr) IV Continuous <Continuous>  ondansetron Injectable 4 milliGRAM(s) IV Push once  pantoprazole  Injectable 40 milliGRAM(s) IV Push every 12 hours  potassium chloride  20 mEq/100 mL IVPB 20 milliEquivalent(s) IV Intermittent once  propofol Infusion 7 MICROgram(s)/kG/Min (3.809 mL/Hr) IV Continuous <Continuous>  QUEtiapine 25 milliGRAM(s) Oral daily  QUEtiapine 50 milliGRAM(s) Oral <User Schedule>  sodium chloride 0.9%. 1000 milliLiter(s) (5 mL/Hr) IV Continuous <Continuous>  sodium chloride 0.9%. 1000 milliLiter(s) (10 mL/Hr) IV Continuous <Continuous>    MEDICATIONS  (PRN):  acetaminophen    Suspension .. 650 milliGRAM(s) Oral every 6 hours PRN Temp greater or equal to 38.5C (101.3F)  ALBUTerol/ipratropium for Nebulization 3 milliLiter(s) Nebulizer every 6 hours PRN Shortness of Breath and/or Wheezing  artificial  tears Solution 1 Drop(s) Both EYES four times a day PRN Dry Eyes  chlorproMAZINE    Tablet 25 milliGRAM(s) Oral three times a day PRN hiccups  HYDROmorphone   Tablet 2 milliGRAM(s) Oral every 3 hours PRN Moderate Pain (4 - 6)  HYDROmorphone   Tablet 4 milliGRAM(s) Oral every 3 hours PRN Severe Pain (7 - 10)  petrolatum white Ointment 1 Application(s) Topical four times a day PRN dry lips      Vital Signs Last 24 Hrs  T(C): 37.6 (14 Dec 2018 16:00), Max: 38.5 (13 Dec 2018 17:00)  T(F): 99.7 (14 Dec 2018 16:00), Max: 101.3 (13 Dec 2018 17:00)  HR: 93 (14 Dec 2018 16:22) (72 - 104)  BP: 94/61 (14 Dec 2018 04:00) (90/58 - 107/73)  BP(mean): 72 (14 Dec 2018 04:00) (68 - 81)  RR: 19 (14 Dec 2018 16:00) (0 - 26)  SpO2: 100% (14 Dec 2018 16:22) (80% - 100%)    PE  Gen:  Pulm:  CV:  Abd:  :  Ext:  Vasc:  Neuro:      I&O's Detail    13 Dec 2018 07:01  -  14 Dec 2018 07:00  --------------------------------------------------------  IN:    dexmedetomidine Infusion: 760.8 mL    Enteral Tube Flush: 250 mL    EPINEPHrine Infusion: 92 mL    furosemide Infusion: 27.5 mL    furosemide Infusion: 49 mL    Glucerna 1.5: 1245 mL    milrinone  Infusion: 30.6 mL    milrinone  Infusion: 23.8 mL    norepinephrine Infusion: 20 mL    propofol Infusion: 54.3 mL    sodium chloride 0.9%.: 120 mL    sodium chloride 0.9%.: 240 mL    Solution: 250 mL  Total IN: 3163 mL    OUT:    Chest Tube: 40 mL    Chest Tube: 90 mL    Indwelling Catheter - Urethral: 2650 mL    Rectal Tube: 25 mL  Total OUT: 2805 mL    Total NET: 358 mL      14 Dec 2018 07:01  -  14 Dec 2018 16:38  --------------------------------------------------------  IN:    Albumin 5%  - 250 mL: 250 mL    dexmedetomidine Infusion: 158.7 mL    EPINEPHrine Infusion: 6 mL    furosemide Infusion: 22.8 mL    furosemide Infusion: 7.5 mL    Glucerna 1.5: 60 mL    milrinone  Infusion: 15.3 mL    norepinephrine Infusion: 7 mL    propofol Infusion: 20 mL    sodium chloride 0.9%.: 45 mL    sodium chloride 0.9%.: 90 mL    Solution: 100 mL  Total IN: 782.3 mL    OUT:    Indwelling Catheter - Urethral: 1275 mL    Nasoenteral Tube: 1300 mL  Total OUT: 2575 mL    Total NET: -1792.7 mL          LABS:                        9.4    9.9   )-----------( 450      ( 14 Dec 2018 03:09 )             29.5     12-14    146<H>  |  109<H>  |  42.0<H>  ----------------------------<  184<H>  4.7   |  24.0  |  0.85    Ca    8.4<L>      14 Dec 2018 03:09  Phos  5.0     12-14  Mg     2.5     12-14            RADIOLOGY & ADDITIONAL STUDIES: SUBJECTIVE:  47M p/w mid sternal CP x 2-3 days on Nov 29th, 2018. In ED ST elevations in V1-V5 with reciprocal changes in inferior leads. Code STEMI activated and pt was taken to cath lab emergently. Attempted bifurcated stent to LAD and D2 with development of acute thrombus in LAD stent, attempted to re balloon with subsequent dissection of LM. Impella placed. VF arrest, shock x1 with return of NSR. Pt was emergently intubated, received CABG x 4 all vein to the LAD, Diag, OM and PDA with inability to wean from CPB therefore requiring ECMO and IABP. ECMO was explanted on 12/3. Had VDRF, requiring trach placement. Was also being treated for PNA, however, Abx were stopped due to patient experiencing diarrhea. Flexiseal was placed. Surgery was consulted because the patient started to experience abdominal distension yesterday with pain. Pain is localized to mid and lower abdomen. Crampy in quality. Never experienced this type of pain before. Having bowel function - diarrhea. Abd XR was done which revealed dilated small bowel loops concerning for SBO. Also was receiving tube feeds through an NG tube (60cc/hr), however, this has since been turned off and NG was placed to suction drawing back around 1400 cc of feculent output.     PAST MEDICAL & SURGICAL HISTORY:  Staph infection  No significant past surgical history: No abdominal surgeries in the past  Meds: No meds prior to this admission  Allergies: PCN  SH:       MEDICATIONS  (STANDING):  amiodarone    Tablet 200 milliGRAM(s) Oral daily  ascorbic acid 500 milliGRAM(s) Oral daily  aspirin 325 milliGRAM(s) Oral daily  atorvastatin 80 milliGRAM(s) Oral at bedtime  chlorhexidine 0.12% Liquid 5 milliLiter(s) Oral Mucosa every 4 hours  clopidogrel Tablet 75 milliGRAM(s) Oral daily  dexmedetomidine Infusion 1.4 MICROgram(s)/kG/Hr (31.745 mL/Hr) IV Continuous <Continuous>  enoxaparin Injectable 40 milliGRAM(s) SubCutaneous daily  ferrous    sulfate 325 milliGRAM(s) Oral daily  folic acid 1 milliGRAM(s) Oral daily  furosemide Infusion 5 mG/Hr (2.5 mL/Hr) IV Continuous <Continuous>  insulin glargine Injectable (LANTUS) 30 Unit(s) SubCutaneous at bedtime  insulin lispro (HumaLOG) corrective regimen sliding scale   SubCutaneous every 4 hours  lactobacillus acidophilus 1 Tablet(s) Oral every 8 hours  metoclopramide Injectable 10 milliGRAM(s) IV Push every 8 hours  milrinone Infusion 0.0625 MICROgram(s)/kG/Min (1.701 mL/Hr) IV Continuous <Continuous>  multivitamin 1 Tablet(s) Oral daily  norepinephrine Infusion 0.029 MICROgram(s)/kG/Min (5 mL/Hr) IV Continuous <Continuous>  ondansetron Injectable 4 milliGRAM(s) IV Push once  pantoprazole  Injectable 40 milliGRAM(s) IV Push every 12 hours  potassium chloride  20 mEq/100 mL IVPB 20 milliEquivalent(s) IV Intermittent once  propofol Infusion 7 MICROgram(s)/kG/Min (3.809 mL/Hr) IV Continuous <Continuous>  QUEtiapine 25 milliGRAM(s) Oral daily  QUEtiapine 50 milliGRAM(s) Oral <User Schedule>  sodium chloride 0.9%. 1000 milliLiter(s) (5 mL/Hr) IV Continuous <Continuous>  sodium chloride 0.9%. 1000 milliLiter(s) (10 mL/Hr) IV Continuous <Continuous>    MEDICATIONS  (PRN):  acetaminophen    Suspension .. 650 milliGRAM(s) Oral every 6 hours PRN Temp greater or equal to 38.5C (101.3F)  ALBUTerol/ipratropium for Nebulization 3 milliLiter(s) Nebulizer every 6 hours PRN Shortness of Breath and/or Wheezing  artificial  tears Solution 1 Drop(s) Both EYES four times a day PRN Dry Eyes  chlorproMAZINE    Tablet 25 milliGRAM(s) Oral three times a day PRN hiccups  HYDROmorphone   Tablet 2 milliGRAM(s) Oral every 3 hours PRN Moderate Pain (4 - 6)  HYDROmorphone   Tablet 4 milliGRAM(s) Oral every 3 hours PRN Severe Pain (7 - 10)  petrolatum white Ointment 1 Application(s) Topical four times a day PRN dry lips      Vital Signs Last 24 Hrs  T(C): 37.6 (14 Dec 2018 16:00), Max: 38.5 (13 Dec 2018 17:00)  T(F): 99.7 (14 Dec 2018 16:00), Max: 101.3 (13 Dec 2018 17:00)  HR: 93 (14 Dec 2018 16:22) (72 - 104)  BP: 94/61 (14 Dec 2018 04:00) (90/58 - 107/73)  BP(mean): 72 (14 Dec 2018 04:00) (68 - 81)  RR: 19 (14 Dec 2018 16:00) (0 - 26)  SpO2: 100% (14 Dec 2018 16:22) (80% - 100%)    PE  Gen:  Pulm:  CV:  Abd:  :  Ext:  Vasc:  Neuro:      I&O's Detail    13 Dec 2018 07:01  -  14 Dec 2018 07:00  --------------------------------------------------------  IN:    dexmedetomidine Infusion: 760.8 mL    Enteral Tube Flush: 250 mL    EPINEPHrine Infusion: 92 mL    furosemide Infusion: 27.5 mL    furosemide Infusion: 49 mL    Glucerna 1.5: 1245 mL    milrinone  Infusion: 30.6 mL    milrinone  Infusion: 23.8 mL    norepinephrine Infusion: 20 mL    propofol Infusion: 54.3 mL    sodium chloride 0.9%.: 120 mL    sodium chloride 0.9%.: 240 mL    Solution: 250 mL  Total IN: 3163 mL    OUT:    Chest Tube: 40 mL    Chest Tube: 90 mL    Indwelling Catheter - Urethral: 2650 mL    Rectal Tube: 25 mL  Total OUT: 2805 mL    Total NET: 358 mL      14 Dec 2018 07:01  -  14 Dec 2018 16:38  --------------------------------------------------------  IN:    Albumin 5%  - 250 mL: 250 mL    dexmedetomidine Infusion: 158.7 mL    EPINEPHrine Infusion: 6 mL    furosemide Infusion: 22.8 mL    furosemide Infusion: 7.5 mL    Glucerna 1.5: 60 mL    milrinone  Infusion: 15.3 mL    norepinephrine Infusion: 7 mL    propofol Infusion: 20 mL    sodium chloride 0.9%.: 45 mL    sodium chloride 0.9%.: 90 mL    Solution: 100 mL  Total IN: 782.3 mL    OUT:    Indwelling Catheter - Urethral: 1275 mL    Nasoenteral Tube: 1300 mL  Total OUT: 2575 mL    Total NET: -1792.7 mL          LABS:                        9.4    9.9   )-----------( 450      ( 14 Dec 2018 03:09 )             29.5     12-14    146<H>  |  109<H>  |  42.0<H>  ----------------------------<  184<H>  4.7   |  24.0  |  0.85    Ca    8.4<L>      14 Dec 2018 03:09  Phos  5.0     12-14  Mg     2.5     12-14            RADIOLOGY & ADDITIONAL STUDIES: SUBJECTIVE:  47M p/w mid sternal CP x 2-3 days on Nov 29th, 2018. In ED ST elevations in V1-V5 with reciprocal changes in inferior leads. Code STEMI activated and pt was taken to cath lab emergently. Attempted bifurcated stent to LAD and D2 with development of acute thrombus in LAD stent, attempted to re balloon with subsequent dissection of LM. Impella placed. VF arrest, shock x1 with return of NSR. Pt was emergently intubated, received CABG x 4 all vein to the LAD, Diag, OM and PDA with inability to wean from CPB therefore requiring ECMO and IABP. ECMO was explanted on 12/3. Had VDRF, requiring trach placement. Was also being treated for PNA, however, Abx were stopped due to patient experiencing diarrhea. Flexiseal was placed. Surgery was consulted because the patient started to experience abdominal distension yesterday with pain. Pain is localized to mid and lower abdomen. Crampy in quality. Never experienced this type of pain before. Having bowel function - diarrhea. Abd XR was done which revealed dilated small bowel loops concerning for SBO. Also was receiving tube feeds through an NG tube (60cc/hr), however, this has since been turned off and NG was placed to suction drawing back around 1400 cc of feculent output.     PAST MEDICAL & SURGICAL HISTORY:  Staph infection  No significant past surgical history: No abdominal surgeries in the past  Meds: No meds prior to this admission  Allergies: PCN  SH:       MEDICATIONS  (STANDING):  amiodarone    Tablet 200 milliGRAM(s) Oral daily  ascorbic acid 500 milliGRAM(s) Oral daily  aspirin 325 milliGRAM(s) Oral daily  atorvastatin 80 milliGRAM(s) Oral at bedtime  chlorhexidine 0.12% Liquid 5 milliLiter(s) Oral Mucosa every 4 hours  clopidogrel Tablet 75 milliGRAM(s) Oral daily  dexmedetomidine Infusion 1.4 MICROgram(s)/kG/Hr (31.745 mL/Hr) IV Continuous <Continuous>  enoxaparin Injectable 40 milliGRAM(s) SubCutaneous daily  ferrous    sulfate 325 milliGRAM(s) Oral daily  folic acid 1 milliGRAM(s) Oral daily  furosemide Infusion 5 mG/Hr (2.5 mL/Hr) IV Continuous <Continuous>  insulin glargine Injectable (LANTUS) 30 Unit(s) SubCutaneous at bedtime  insulin lispro (HumaLOG) corrective regimen sliding scale   SubCutaneous every 4 hours  lactobacillus acidophilus 1 Tablet(s) Oral every 8 hours  metoclopramide Injectable 10 milliGRAM(s) IV Push every 8 hours  milrinone Infusion 0.0625 MICROgram(s)/kG/Min (1.701 mL/Hr) IV Continuous <Continuous>  multivitamin 1 Tablet(s) Oral daily  norepinephrine Infusion 0.029 MICROgram(s)/kG/Min (5 mL/Hr) IV Continuous <Continuous>  ondansetron Injectable 4 milliGRAM(s) IV Push once  pantoprazole  Injectable 40 milliGRAM(s) IV Push every 12 hours  potassium chloride  20 mEq/100 mL IVPB 20 milliEquivalent(s) IV Intermittent once  propofol Infusion 7 MICROgram(s)/kG/Min (3.809 mL/Hr) IV Continuous <Continuous>  QUEtiapine 25 milliGRAM(s) Oral daily  QUEtiapine 50 milliGRAM(s) Oral <User Schedule>  sodium chloride 0.9%. 1000 milliLiter(s) (5 mL/Hr) IV Continuous <Continuous>  sodium chloride 0.9%. 1000 milliLiter(s) (10 mL/Hr) IV Continuous <Continuous>    MEDICATIONS  (PRN):  acetaminophen    Suspension .. 650 milliGRAM(s) Oral every 6 hours PRN Temp greater or equal to 38.5C (101.3F)  ALBUTerol/ipratropium for Nebulization 3 milliLiter(s) Nebulizer every 6 hours PRN Shortness of Breath and/or Wheezing  artificial  tears Solution 1 Drop(s) Both EYES four times a day PRN Dry Eyes  chlorproMAZINE    Tablet 25 milliGRAM(s) Oral three times a day PRN hiccups  HYDROmorphone   Tablet 2 milliGRAM(s) Oral every 3 hours PRN Moderate Pain (4 - 6)  HYDROmorphone   Tablet 4 milliGRAM(s) Oral every 3 hours PRN Severe Pain (7 - 10)  petrolatum white Ointment 1 Application(s) Topical four times a day PRN dry lips      Vital Signs Last 24 Hrs  T(C): 37.6 (14 Dec 2018 16:00), Max: 38.5 (13 Dec 2018 17:00)  T(F): 99.7 (14 Dec 2018 16:00), Max: 101.3 (13 Dec 2018 17:00)  HR: 93 (14 Dec 2018 16:22) (72 - 104)  BP: 94/61 (14 Dec 2018 04:00) (90/58 - 107/73)  BP(mean): 72 (14 Dec 2018 04:00) (68 - 81)  RR: 19 (14 Dec 2018 16:00) (0 - 26)  SpO2: 100% (14 Dec 2018 16:22) (80% - 100%)    PE  Gen: AOX3, mild acute distress, Trach in place  Pulm: Non labored breathing, on CPAP, chest tube x 2 in place  CV: RRR  Abd: Soft, moderate distension, +TTP in left side of abdomen, no rebound or guarding  Ext: +2 edema        I&O's Detail    13 Dec 2018 07:01  -  14 Dec 2018 07:00  --------------------------------------------------------  IN:    dexmedetomidine Infusion: 760.8 mL    Enteral Tube Flush: 250 mL    EPINEPHrine Infusion: 92 mL    furosemide Infusion: 27.5 mL    furosemide Infusion: 49 mL    Glucerna 1.5: 1245 mL    milrinone  Infusion: 30.6 mL    milrinone  Infusion: 23.8 mL    norepinephrine Infusion: 20 mL    propofol Infusion: 54.3 mL    sodium chloride 0.9%.: 120 mL    sodium chloride 0.9%.: 240 mL    Solution: 250 mL  Total IN: 3163 mL    OUT:    Chest Tube: 40 mL    Chest Tube: 90 mL    Indwelling Catheter - Urethral: 2650 mL    Rectal Tube: 25 mL  Total OUT: 2805 mL    Total NET: 358 mL      14 Dec 2018 07:01  -  14 Dec 2018 16:38  --------------------------------------------------------  IN:    Albumin 5%  - 250 mL: 250 mL    dexmedetomidine Infusion: 158.7 mL    EPINEPHrine Infusion: 6 mL    furosemide Infusion: 22.8 mL    furosemide Infusion: 7.5 mL    Glucerna 1.5: 60 mL    milrinone  Infusion: 15.3 mL    norepinephrine Infusion: 7 mL    propofol Infusion: 20 mL    sodium chloride 0.9%.: 45 mL    sodium chloride 0.9%.: 90 mL    Solution: 100 mL  Total IN: 782.3 mL    OUT:    Indwelling Catheter - Urethral: 1275 mL    Nasoenteral Tube: 1300 mL  Total OUT: 2575 mL    Total NET: -1792.7 mL          LABS:                        9.4    9.9   )-----------( 450      ( 14 Dec 2018 03:09 )             29.5     12-14    146<H>  |  109<H>  |  42.0<H>  ----------------------------<  184<H>  4.7   |  24.0  |  0.85    Ca    8.4<L>      14 Dec 2018 03:09  Phos  5.0     12-14  Mg     2.5     12-14            RADIOLOGY & ADDITIONAL STUDIES:

## 2018-12-14 NOTE — PROGRESS NOTE ADULT - SUBJECTIVE AND OBJECTIVE BOX
Overnight events:  No acute events overnight    Past Medical History:  ·	ST elevation myocardial infarction (STEMI)  ·	Family history of myocardial infarction (Mother)  ·	No pertinent family history in first degree relatives  ·	Handoff  ·	MEWS Score  ·	Staph infection  ·	No pertinent past medical history  ·	Chronic respiratory failure with hypoxia  ·	Cardiogenic shock  ·	Chronic respiratory failure with hypoxia  ·	Cardiogenic shock  ·	ST elevation myocardial infarction (STEMI), unspecified artery  ·	Acute overt combined systolic and diastolic congestive heart failure  ·	Klebsiella pneumonia  ·	Pulmonary edema  ·	Agitation  ·	Acute respiratory failure with hypoxia  ·	Other encephalopathy  ·	Fever, unspecified fever cause  ·	Thrombocytopenia  ·	Anemia due to blood loss  ·	Acute systolic heart failure  ·	Respiratory failure  ·	Cardiogenic shock  ·	Staph infection  ·	Ventricular fibrillation  ·	Coronary artery disease involving native coronary artery of native heart with unstable angina pectoris  ·	Dissection of left main coronary artery  ·	ST elevation myocardial infarction involving left anterior descending (LAD) coronary artery  ·	STEMI (ST elevation myocardial infarction)  ·	Tracheostomy  ·	Exploration of femoral artery  ·	Insertion of Impella device  ·	ECMO decannulation  ·	Exploration and washout of mediastinum  ·	Intra-aortic balloon pump removal  ·	Arterial cannulation  ·	Central line placement  ·	[place] Michelle placement  ·	CABG  ·	ECMO (extracorporeal membrane oxygenation)  ·	No significant past surgical history  ·	CHEST PAIN  ·	90+    Medications  acetaminophen    Suspension .. 650 milliGRAM(s) Oral every 6 hours PRN  ALBUTerol/ipratropium for Nebulization 3 milliLiter(s) Nebulizer every 6 hours PRN  amiodarone    Tablet 200 milliGRAM(s) Oral daily  artificial  tears Solution 1 Drop(s) Both EYES four times a day PRN  ascorbic acid 500 milliGRAM(s) Oral daily  aspirin 325 milliGRAM(s) Oral daily  atorvastatin 80 milliGRAM(s) Oral at bedtime  chlorhexidine 0.12% Liquid 5 milliLiter(s) Oral Mucosa every 4 hours  chlorproMAZINE    Tablet 25 milliGRAM(s) Oral three times a day PRN  clopidogrel Tablet 75 milliGRAM(s) Oral daily  dexmedetomidine Infusion 1.4 MICROgram(s)/kG/Hr IV Continuous <Continuous>  enoxaparin Injectable 40 milliGRAM(s) SubCutaneous daily  EPINEPHrine    Infusion 0.012 MICROgram(s)/kG/Min IV Continuous <Continuous>  ferrous    sulfate 325 milliGRAM(s) Oral daily  folic acid 1 milliGRAM(s) Oral daily  furosemide Infusion 7.5 mG/Hr IV Continuous <Continuous>  HYDROmorphone   Tablet 2 milliGRAM(s) Oral every 3 hours PRN  HYDROmorphone   Tablet 4 milliGRAM(s) Oral every 3 hours PRN  insulin glargine Injectable (LANTUS) 30 Unit(s) SubCutaneous at bedtime  insulin lispro (HumaLOG) corrective regimen sliding scale   SubCutaneous every 4 hours  lactobacillus acidophilus 1 Tablet(s) Oral every 8 hours  milrinone Infusion 0.0625 MICROgram(s)/kG/Min IV Continuous <Continuous>  multivitamin 1 Tablet(s) Oral daily  pantoprazole  Injectable 40 milliGRAM(s) IV Push every 12 hours  petrolatum white Ointment 1 Application(s) Topical four times a day PRN  potassium chloride  10 mEq/50 mL IVPB 10 milliEquivalent(s) IV Intermittent every 1 hour  propofol Infusion 7 MICROgram(s)/kG/Min IV Continuous <Continuous>  psyllium Powder 1 Packet(s) Oral two times a day  QUEtiapine 25 milliGRAM(s) Oral <User Schedule>  QUEtiapine 50 milliGRAM(s) Oral <User Schedule>  sodium chloride 0.9%. 1000 milliLiter(s) IV Continuous <Continuous>  sodium chloride 0.9%. 1000 milliLiter(s) IV Continuous <Continuous>    MEDICATIONS  (PRN):  acetaminophen    Suspension .. 650 milliGRAM(s) Oral every 6 hours PRN Temp greater or equal to 38.5C (101.3F)  ALBUTerol/ipratropium for Nebulization 3 milliLiter(s) Nebulizer every 6 hours PRN Shortness of Breath and/or Wheezing  artificial  tears Solution 1 Drop(s) Both EYES four times a day PRN Dry Eyes  chlorproMAZINE    Tablet 25 milliGRAM(s) Oral three times a day PRN hiccups  HYDROmorphone   Tablet 2 milliGRAM(s) Oral every 3 hours PRN Moderate Pain (4 - 6)  HYDROmorphone   Tablet 4 milliGRAM(s) Oral every 3 hours PRN Severe Pain (7 - 10)  petrolatum white Ointment 1 Application(s) Topical four times a day PRN dry lips    Mode: AC/ CMV (Assist Control/ Continuous Mandatory Ventilation), RR (machine): 12, TV (machine): 600, FiO2: 35, PEEP: 5, MAP: 13, PIP: 33  Daily     Daily Weight in k.5 (13 Dec 2018 06:00)      ABG - ( 13 Dec 2018 23:24 )  pH, Arterial: 7.51  pH, Blood: x     /  pCO2: 32    /  pO2: 136   / HCO3: 27    / Base Excess: 2.6   /  SaO2: 100                             10.1   11.5  )-----------( 513      ( 13 Dec 2018 16:15 )             32.0       144  |  107  |  35.0<H>  ----------------------------<  159<H>  4.0   |  23.0  |  0.77    Ca    8.3<L>      13 Dec 2018 16:15  Phos  3.7       Mg     2.2         TPro  5.9<L>  /  Alb  2.8<L>  /  TBili  1.7  /  DBili  x   /  AST  31  /  ALT  29  /  AlkPhos  173<H>        PT/INR - ( 12 Dec 2018 03:24 )   PT: 15.4 sec;   INR: 1.33 ratio         PTT - ( 12 Dec 2018 03:24 )  PTT:29.7 sec      Objective:  T(C): 37.1 (18 @ 01:00), Max: 38.7 (18 @ 12:00)  HR: 76 (18 @ 01:30) (76 - 114)  BP: 91/59 (18 @ 01:00) (90/58 - 107/73)  RR: 21 (18 @ 01:30) (0 - 35)  SpO2: 100% (18 @ 01:30) (72% - 100%)  Wt(kg): --CAPILLARY BLOOD GLUCOSE      POCT Blood Glucose.: 189 mg/dL (14 Dec 2018 00:00)  POCT Blood Glucose.: 145 mg/dL (13 Dec 2018 20:13)  POCT Blood Glucose.: 158 mg/dL (13 Dec 2018 15:36)  POCT Blood Glucose.: 124 mg/dL (13 Dec 2018 07:42)  POCT Blood Glucose.: 123 mg/dL (13 Dec 2018 04:10)  I&O's Summary    12 Dec 2018 07:01  -  13 Dec 2018 07:00  --------------------------------------------------------  IN: 4083 mL / OUT: 3865 mL / NET: 218 mL    13 Dec 2018 07:01  -  14 Dec 2018 02:04  --------------------------------------------------------  IN: 2231.7 mL / OUT: 1990 mL / NET: 241.7 mL        Physical Exam  Neuro: non-focal, speech clear and intact  Pulm: CTA, rhonchi left > right, + TRACH  CV: RRR, no murmurs, +S1S2  Abd: soft, NT, ND, +BS  Ext: +DP Pulses b/l, +PT pulses, no edema  Inc: MSI C/D/I/stable

## 2018-12-14 NOTE — PROCEDURE NOTE - NSPOSTCAREGUIDE_GEN_A_CORE
Verbal/written post procedure instructions were given to patient/caregiver
Care for catheter as per unit/ICU protocols
Care for catheter as per unit/ICU protocols

## 2018-12-14 NOTE — PROGRESS NOTE ADULT - PROBLEM SELECTOR PLAN 9
hold ABX at this time as per ID  mahesh aware  will observe off appreciate MICU input  currently attempting to resume circadian rhythm with Propofol at night and precedex during the day  seroquel QID, will discuss with MICU weaning parameters

## 2018-12-14 NOTE — PROCEDURE NOTE - NSSITEPREP_SKIN_A_CORE
Adherence to aseptic technique: hand hygiene prior to donning barriers (gown, gloves), don cap and mask, sterile drape over patient/chlorhexidine
Adherence to aseptic technique: hand hygiene prior to donning barriers (gown, gloves), don cap and mask, sterile drape over patient/chlorhexidine
chlorhexidine

## 2018-12-14 NOTE — PROGRESS NOTE ADULT - PROBLEM SELECTOR PLAN 7
PRN transfusions for hemodynamics  as per attending transfuse for Hct less than 27 PRN transfusions for hemodynamics  as per attending transfuse for Hct less than 27 if volume required

## 2018-12-14 NOTE — PROGRESS NOTE ADULT - SUBJECTIVE AND OBJECTIVE BOX
Patient seen for follow up of hyperglycemia    Interval HPI/ Overnight Events:  Tube feeds were D/C'ed due to bowel obstruction.  Patient more somnolent today.  Family is at bedside.    MEDICATIONS  (STANDING):  amiodarone    Tablet 200 milliGRAM(s) Oral daily  ascorbic acid 500 milliGRAM(s) Oral daily  aspirin 325 milliGRAM(s) Oral daily  atorvastatin 80 milliGRAM(s) Oral at bedtime  chlorhexidine 0.12% Liquid 5 milliLiter(s) Oral Mucosa every 4 hours  clopidogrel Tablet 75 milliGRAM(s) Oral daily  dexmedetomidine Infusion 1.4 MICROgram(s)/kG/Hr (31.745 mL/Hr) IV Continuous <Continuous>  enoxaparin Injectable 40 milliGRAM(s) SubCutaneous daily  ferrous    sulfate 325 milliGRAM(s) Oral daily  folic acid 1 milliGRAM(s) Oral daily  furosemide Infusion 5 mG/Hr (2.5 mL/Hr) IV Continuous <Continuous>  insulin glargine Injectable (LANTUS) 30 Unit(s) SubCutaneous at bedtime  insulin lispro (HumaLOG) corrective regimen sliding scale   SubCutaneous every 4 hours  lactobacillus acidophilus 1 Tablet(s) Oral every 8 hours  metoclopramide Injectable 10 milliGRAM(s) IV Push every 8 hours  milrinone Infusion 0.0625 MICROgram(s)/kG/Min (1.701 mL/Hr) IV Continuous <Continuous>  multivitamin 1 Tablet(s) Oral daily  norepinephrine Infusion 0.029 MICROgram(s)/kG/Min (5 mL/Hr) IV Continuous <Continuous>  ondansetron Injectable 4 milliGRAM(s) IV Push once  pantoprazole  Injectable 40 milliGRAM(s) IV Push every 12 hours  potassium chloride  20 mEq/100 mL IVPB 20 milliEquivalent(s) IV Intermittent once  potassium chloride  20 mEq/100 mL IVPB 20 milliEquivalent(s) IV Intermittent once  propofol Infusion 7 MICROgram(s)/kG/Min (3.809 mL/Hr) IV Continuous <Continuous>  QUEtiapine 25 milliGRAM(s) Oral daily  QUEtiapine 50 milliGRAM(s) Oral <User Schedule>  sodium chloride 0.9%. 1000 milliLiter(s) (5 mL/Hr) IV Continuous <Continuous>  sodium chloride 0.9%. 1000 milliLiter(s) (10 mL/Hr) IV Continuous <Continuous>    Vital Signs Last 24 Hrs  T(C): 37.4 (14 Dec 2018 14:00), Max: 38.5 (13 Dec 2018 17:00)  T(F): 99.3 (14 Dec 2018 14:00), Max: 101.3 (13 Dec 2018 17:00)  HR: 76 (14 Dec 2018 14:00) (72 - 108)  BP: 94/61 (14 Dec 2018 04:00) (90/58 - 107/73)  BP(mean): 72 (14 Dec 2018 04:00) (68 - 81)  RR: 18 (14 Dec 2018 14:00) (0 - 31)  SpO2: 100% (14 Dec 2018 14:00) (72% - 100%)    PE:  Gen:  patient on vent through trach, somnolent, NAD  HEENT:  sclera anicteric, MMM  Neck:  tracheostomy, no LAD  CV:  nl S1 + S2, RRR, no m/r/g  Resp:  nl respiratory effort, CTA b/l  GI/ Abd: soft, less distended, BS hypoactive.  Skin:  diffuse anasarca    LABS:  12-14    146<H>  |  109<H>  |  42.0<H>  ----------------------------<  184<H>  4.7   |  24.0  |  0.85    Ca    8.4<L>      14 Dec 2018 03:09  Phos  5.0     12-14  Mg     2.5     12-14                          9.4    9.9   )-----------( 450      ( 14 Dec 2018 03:09 )             29.5     CAPILLARY BLOOD GLUCOSE  POCT Blood Glucose.: 150 mg/dL (14 Dec 2018 14:19)  POCT Blood Glucose.: 160 mg/dL (14 Dec 2018 09:37)  POCT Blood Glucose.: 182 mg/dL (14 Dec 2018 04:01)  POCT Blood Glucose.: 189 mg/dL (14 Dec 2018 00:00)  POCT Blood Glucose.: 145 mg/dL (13 Dec 2018 20:13)  POCT Blood Glucose.: 158 mg/dL (13 Dec 2018 15:36)

## 2018-12-14 NOTE — CHART NOTE - NSCHARTNOTEFT_GEN_A_CORE
Upon Nutritional Assessment by the Registered Dietitian your patient was determined to meet criteria / has evidence of the following diagnosis/diagnoses:          [ ]  Mild Protein Calorie Malnutrition        [x ]  Moderate Protein Calorie Malnutrition        [ ] Severe Protein Calorie Malnutrition        [ ] Unspecified Protein Calorie Malnutrition        [ ] Underweight / BMI <19        [ ] Morbid Obesity / BMI > 40      Findings as based on:  •  Comprehensive nutrition assessment and consultation  •  Calorie counts (nutrient intake analysis)  •  Food acceptance and intake status from observations by staff  •  Follow up  •  Patient education  •  Intervention secondary to interdisciplinary rounds  •   concerns      Treatment:    The following diet has been recommended:      PROVIDER Section:     By signing this assessment you are acknowledging and agree with the diagnosis/diagnoses assigned by the Registered Dietitian    Comments:

## 2018-12-14 NOTE — PROGRESS NOTE ADULT - PROBLEM SELECTOR PLAN 8
resolved hold ABX at this time as per ID given patient is spiking temps although blood cultures are negative  cooling blanket and tylenol PRN  mahesh aware

## 2018-12-14 NOTE — PROGRESS NOTE ADULT - ASSESSMENT
46 yo Male c/o CP for 2-3 days prior to admission, then 10/10 chest pain approximately 9:30 am yesterday, followed by vomiting.  Wife drove pt to hospital, ruled in for STEMI. Urgent Cath, pt found to have multiple occlusions; stent to LAD and D2; stent to LAD thrombosed,  impella placed for support.  Pt then had VF arrest, shock x 1, return to NSR.  Upon transfer to OR for Urgent CABG, pt with asystolic arrest, chest opened intra-op with CPR in progress. Pt underwent C4V (LAD, Diag, OM and PDA) with inability to wean from CPB therefore requiring VA ECMO (central cannulation) and IABP. , he was transferred to CICU on multiple gtts, now s/p explant 12/3 with placement of impella via R groin sheath. 12/4 ID consulted started on broad spectrum abx, 12/5 Impella weaned. 12/6 Impella removal via right femoral cutdown with primary repair of femoral artery and stent placed to external illiac artery for dissection, PRBC x1. 12/8 Pt underwent head CT, no acute intracranial process noted, chest CT b/l pneumothoraces R-20%, L-10%, b/l chest tubes in place, confirmed infiltrates on right lower and left upper lung.  ABX Vanco and Zosyn course extended, sputum + Klebsiella pending sensitivity; significant agitation causing profound increase in LVEDP now controlled with precedex, seroquel, and QHS propofol. He intermittently tolerates CPAP. Repeat TTE 12/14 done after CPAP was not tolerated showed EF 20-25%.     Hospital course notable for persistent cardiogenic shock/LV failure, vent dependent respiratory failure (inability to wean also contributed to by severe agitation, now s/p trach), sepsis secondary to klebsiella PNA completed abx, now off abx.

## 2018-12-14 NOTE — PROGRESS NOTE ADULT - SUBJECTIVE AND OBJECTIVE BOX
CARDIOLOGY PROGRESS NOTE   ([facility])                             Reason for follow up: Acute anterior STEMI, cardiogenic shock, s/p CABG x 4 , respiratory failure                            Overnight: No acute events overnight    Subjective: Patient wake communicating by head movements , smiling at times, no respiratory distress,   Review of symptoms: no chest pain  Respiratory:no + cough  Gastrointestinal: No diarrhea. No abdominal pain. No bleeding.     Past medical history: No updates.     	  Vitals:  T(C): 37.4 (12-14-18 @ 14:00), Max: 38.5 (12-13-18 @ 17:00)  HR: 76 (12-14-18 @ 14:00) (72 - 108)  BP: 94/61 (12-14-18 @ 04:00) (90/58 - 107/73)  RR: 18 (12-14-18 @ 14:00) (0 - 32)  SpO2: 100% (12-14-18 @ 14:00) (72% - 100%)  Wt(kg): --  I&O's Summary    13 Dec 2018 07:01  -  14 Dec 2018 07:00  --------------------------------------------------------  IN: 3163 mL / OUT: 2805 mL / NET: 358 mL    14 Dec 2018 07:01  -  14 Dec 2018 14:51  --------------------------------------------------------  IN: 605.1 mL / OUT: 2300 mL / NET: -1694.9 mL          PHYSICAL EXAM:  Appearance: No acute distress  HEENT:  Trach.  Neurologic: moves all 4 extremities  Lymphatic: No cervical lymphadenopathy  Cardiovascular: Normal S1 S2, No murmur, rubs/gallops  Respiratory: Lungs bilateral coarse breath sounds  Gastrointestinal:  Soft, Non-tender, + BS, no HSM  Lower Extremities: + 1 edema b/l   Psychiatry: Patient is calm. No agitation. Mood & affect appropriate      CURRENT MEDICATIONS:  amiodarone    Tablet 200 milliGRAM(s) Oral daily  furosemide Infusion 5 mG/Hr IV Continuous <Continuous>  milrinone Infusion 0.0625 MICROgram(s)/kG/Min IV Continuous <Continuous>  norepinephrine Infusion 0.029 MICROgram(s)/kG/Min IV Continuous <Continuous>  aspirin  dexmedetomidine Infusion  metoclopramide Injectable  ondansetron Injectable  propofol Infusion  QUEtiapine  QUEtiapine  pantoprazole  Injectable  atorvastatin  insulin glargine Injectable (LANTUS)  insulin lispro (HumaLOG) corrective regimen sliding scale  ascorbic acid  chlorhexidine 0.12% Liquid  clopidogrel Tablet  enoxaparin Injectable  ferrous    sulfate  folic acid  multivitamin  sodium chloride 0.9%.  sodium chloride 0.9%.      LABS:	 	                          9.4    9.9   )-----------( 450      ( 14 Dec 2018 03:09 )             29.5     12-14    146<H>  |  109<H>  |  42.0<H>  ----------------------------<  184<H>  4.7   |  24.0  |  0.85    Ca    8.4<L>      14 Dec 2018 03:09  Phos  5.0     12-14  Mg     2.5     12-14      proBNP:   Lipid Profile: Date: 12-12 @ 03:24  Total cholesterol --; Direct LDL: --; HDL: --; Triglycerides:187  Date: 12-10 @ 04:03  Total cholesterol --; Direct LDL: --; HDL: --; Triglycerides:196    HgA1c: TSH:     TELEMETRY: 	  no events  ECG:  	    DIAGNOSTIC TESTING:  [ ] Echocardiogram:  < from: TTE Echo Complete w/Doppler (12.13.18 @ 11:08) >  Summary:   1. Technically difficult study. Limited information is available.   2. Endocardial visualization was enhanced with intravenous echo contrast.   3. Severely decreased global left ventricular systolic function.   4. Left ventricular ejection fraction, by visual estimation, is 20 to   25%.   5. Moderately reduced RV systolic function.   6. There is no evidence of pericardial effusion.   7. There is no evidence of LV apical thrombus.    < end of copied text >    [ ]  Catheterization:    < from: Cardiac Cath Lab - Adult (11.29.18 @ 11:22) >  CORONARY VESSELS: The coronary circulation is right dominant.  LM:   --  Ostial LM: There was a 20 % stenosis.  LAD:   --  Proximal LAD: There was a tubular 80 % stenosis at a site with  no prior intervention, in the distal third of the vessel segment. The  lesion was hazy, eccentric, and associated with a moderate filling defect  consistent with thrombus. There was FAZAL grade 0 flow through the vessel  (no flow) and a large vascular territory distal to the lesion. This lesion  is a likely culprit for the patient's recent myocardial infarction. It  appears amenable to percutaneous intervention.  --  Mid LAD: There was a 100 % stenosis.  --  D1: The vessel was small to medium sized. There was a 100 % stenosis.  --  D2: The vessel was large sized. There was a 100 % stenosis.  CX:   --  Circumflex: The vessel was medium sized. There was a tubular 80 %  stenosis in the middle third of the vessel segment. The lesion was  eccentric.  --  OM1: The vessel was medium to large sized. There was a tubular 90 %  stenosis in the middle third of the vessel segment. The lesion was hazy,  concentric, and without evidence of thrombus. There was FAZAL grade 1 flow  through the vessel (slow flow without perfusion).  RCA:   --  RCA: The vessel was large sized (dominant).  --  Proximal RCA: There was a tubular 80 % stenosis in the middle third of  the vessel segment.  --  Distal RCA: There was a tubular 99 % stenosis.  --  RPDA: The vessel was medium sized. Angiography showed mild  atherosclerosis with no flow limiting lesions.  --  RPLS: The vessel was medium to large sized. Angiography showed mild  atherosclerosis with no flow limiting lesions. There was a tubular 99 %  stenosis in the middle third of thevessel segment.  COMPLICATIONS: Ventricular fibrillation occurred, developed after no reflow  in the LAD and after appearance of filling defect in the left main.  Impella CP 3.5 was already in placed and patient was shocked with 300 J to  NSR  DIAGNOSTIC IMPRESSIONS: Severe Multivessel CAD with LAD being culprit for  anterior STEMI  Severe LV systolic dysfunction  Elevated left ventricular filling pressure  DIAGNOSTIC RECOMMENDATIONS: Given anterior STEMI, decision was to proceed  with primary PCI of the LAD and diagonal 2 for more timely reperfusion of  the LAD territory.  INTERVENTIONAL IMPRESSIONS: 100% LAD lesion was crossed using 0.014  Runthrough wire, 100% D2 lesion was crossed using a 0.014 BMW universal  wire . Multiple balloon angioplasty of the LAD and D2 was performed using  a 1.5 , 2.0 (LAD and diagonal 2) and 2.5 balllon (LAD) . This appeared to  be a bifurcation lesion Mejia 1,1,1 with both LAD and diagonal 2 being  equally sized vessel. Kissing balloon angioplasty was performed. There  were temporary flow in both vessels but then no reflow.  ITA stenting of LAD using 2.75 x 28 mm stent was done resulting in flow in  the LAD but absence of flow D2. D2 lesion was recrossed with 0.014 whisper  wire and ITA stenting was placed in the D2 restoring Diagonal flow but  resulted in loss of flow in the LAD stent. LAD stent was recrossed with a  0.014 runthrough wire and angioplasty performed with no reflow however.  Filling defect appeared in the left main that appeared to be a thrombus (  likely proximal thrombus extension from LAD) . Decision was to placed  Impella CP 3.5 L support and transfer the patient to OR for emergency CABG  INTERVENTIONAL RECOMMENDATIONS: Emergency CABG with grafts to the LAD, D2,  OM1 and RPDA.  Prepared and signed by  Patricio Shen MD    < end of copied text >    [ ] Stress Test:

## 2018-12-14 NOTE — PROCEDURE NOTE - NSPOSTPRCRAD_GEN_A_CORE
central line located in the superior vena cava/post-procedure radiography performed
post-procedure radiography performed

## 2018-12-14 NOTE — PROGRESS NOTE ADULT - PROBLEM SELECTOR PLAN 3
Pt was being treated for groin staph infection with doxy prior to admission.  Rx was close to completion.  No visible signs of groin infection at this time. new as of recent TTE on 12/13/18  continue diuresis  CVP likely not accurate

## 2018-12-14 NOTE — PROGRESS NOTE ADULT - PROBLEM SELECTOR PLAN 10
low prealbumin level on recent labs  currently has NG tube for feeds  continue tube feeds, discuss with nutrition possibility of changing tube feeds for greater protein input

## 2018-12-14 NOTE — CONSULT NOTE ADULT - ASSESSMENT
47 yo M w/ abdominal distension and ABD XR revealing dilated small bowel loops concerning for small bowel obstruction vs ileus    Continue CTICU management  Keep NGT to LIS  Serial abdominal exams  Patient is too unstable for CT scan at this time, will order a gastrografin challenge for tonight.   Repeat Abd XR will be 4 hrs after gastrografin is given to check progression of contrast to colon  Continue to replenish electrolytes  Will eventually require PT/PMR

## 2018-12-14 NOTE — PROCEDURE NOTE - NSPROCDETAILS_GEN_ALL_CORE
lumen(s) aspirated and flushed/guidewire recovered/sterile dressing applied/sterile technique, catheter placed/ultrasound guidance
sutured in place/ultrasound guidance/Seldinger technique/all materials/supplies accounted for at end of procedure/location identified, draped/prepped, sterile technique used, needle inserted/introduced/connected to a pressurized flush line/positive blood return obtained via catheter
guidewire recovered/lumen(s) aspirated and flushed/sterile dressing applied/ultrasound guidance/sterile technique, catheter placed
sterile technique, catheter placed/guidewire recovered/ultrasound guidance/lumen(s) aspirated and flushed/sterile dressing applied

## 2018-12-14 NOTE — PROGRESS NOTE ADULT - PROBLEM SELECTOR PLAN 1
s/p CABG  passive ROM of upper extremities and LLE by RNs   maintain primacor at current dose  begin to wean Epi this AM  wean pressors as tolerated to maintain MAP 65-75   tube feeds at goal, continue protonix  lantus/humalog/ JACKIE ACHS  lasix gtt for diuresis, maintain augustin for strict Is/Os, follow lytes closely and replete PRN

## 2018-12-14 NOTE — PROGRESS NOTE ADULT - PROBLEM SELECTOR PLAN 6
no ace/arb or beta blocker at this time due to pressor and inotrope requirements s/p trach  high pressure support CPAP as tolerated

## 2018-12-15 DIAGNOSIS — K56.7 ILEUS, UNSPECIFIED: ICD-10-CM

## 2018-12-15 DIAGNOSIS — R09.02 HYPOXEMIA: ICD-10-CM

## 2018-12-15 DIAGNOSIS — A41.9 SEPSIS, UNSPECIFIED ORGANISM: ICD-10-CM

## 2018-12-15 LAB
ALBUMIN SERPL ELPH-MCNC: 3.4 G/DL — SIGNIFICANT CHANGE UP (ref 3.3–5.2)
ALP SERPL-CCNC: 160 U/L — HIGH (ref 40–120)
ALT FLD-CCNC: 43 U/L — HIGH
ANION GAP SERPL CALC-SCNC: 14 MMOL/L — SIGNIFICANT CHANGE UP (ref 5–17)
ANION GAP SERPL CALC-SCNC: 14 MMOL/L — SIGNIFICANT CHANGE UP (ref 5–17)
AST SERPL-CCNC: 69 U/L — HIGH
BASE EXCESS BLDA CALC-SCNC: 2.1 MMOL/L — HIGH (ref -2–2)
BILIRUB SERPL-MCNC: 1.8 MG/DL — SIGNIFICANT CHANGE UP (ref 0.4–2)
BLOOD GAS COMMENTS ARTERIAL: SIGNIFICANT CHANGE UP
BUN SERPL-MCNC: 41 MG/DL — HIGH (ref 8–20)
BUN SERPL-MCNC: 43 MG/DL — HIGH (ref 8–20)
CALCIUM SERPL-MCNC: 7.4 MG/DL — LOW (ref 8.6–10.2)
CALCIUM SERPL-MCNC: 8.5 MG/DL — LOW (ref 8.6–10.2)
CHLORIDE SERPL-SCNC: 111 MMOL/L — HIGH (ref 98–107)
CHLORIDE SERPL-SCNC: 113 MMOL/L — HIGH (ref 98–107)
CK MB CFR SERPL CALC: 3.5 NG/ML — SIGNIFICANT CHANGE UP (ref 0–6.7)
CK SERPL-CCNC: 270 U/L — HIGH (ref 30–200)
CO2 SERPL-SCNC: 24 MMOL/L — SIGNIFICANT CHANGE UP (ref 22–29)
CO2 SERPL-SCNC: 26 MMOL/L — SIGNIFICANT CHANGE UP (ref 22–29)
CREAT SERPL-MCNC: 0.76 MG/DL — SIGNIFICANT CHANGE UP (ref 0.5–1.3)
CREAT SERPL-MCNC: 0.95 MG/DL — SIGNIFICANT CHANGE UP (ref 0.5–1.3)
GAS PNL BLDA: SIGNIFICANT CHANGE UP
GLUCOSE BLDC GLUCOMTR-MCNC: 127 MG/DL — HIGH (ref 70–99)
GLUCOSE BLDC GLUCOMTR-MCNC: 138 MG/DL — HIGH (ref 70–99)
GLUCOSE BLDC GLUCOMTR-MCNC: 140 MG/DL — HIGH (ref 70–99)
GLUCOSE BLDC GLUCOMTR-MCNC: 144 MG/DL — HIGH (ref 70–99)
GLUCOSE BLDC GLUCOMTR-MCNC: 150 MG/DL — HIGH (ref 70–99)
GLUCOSE BLDC GLUCOMTR-MCNC: 185 MG/DL — HIGH (ref 70–99)
GLUCOSE SERPL-MCNC: 122 MG/DL — HIGH (ref 70–115)
GLUCOSE SERPL-MCNC: 140 MG/DL — HIGH (ref 70–115)
HCO3 BLDA-SCNC: 26 MMOL/L — SIGNIFICANT CHANGE UP (ref 20–26)
HCT VFR BLD CALC: 28.4 % — LOW (ref 42–52)
HGB BLD-MCNC: 8.9 G/DL — LOW (ref 14–18)
HOROWITZ INDEX BLDA+IHG-RTO: 0.7 — SIGNIFICANT CHANGE UP
INR BLD: 1.33 RATIO — HIGH (ref 0.88–1.16)
MAGNESIUM SERPL-MCNC: 2.4 MG/DL — SIGNIFICANT CHANGE UP (ref 1.6–2.6)
MCHC RBC-ENTMCNC: 28.9 PG — SIGNIFICANT CHANGE UP (ref 27–31)
MCHC RBC-ENTMCNC: 31.3 G/DL — LOW (ref 32–36)
MCV RBC AUTO: 92.2 FL — SIGNIFICANT CHANGE UP (ref 80–94)
PCO2 BLDA: 35 MMHG — SIGNIFICANT CHANGE UP (ref 35–45)
PH BLDA: 7.48 — HIGH (ref 7.35–7.45)
PHOSPHATE SERPL-MCNC: 4.3 MG/DL — SIGNIFICANT CHANGE UP (ref 2.4–4.7)
PLATELET # BLD AUTO: 426 K/UL — HIGH (ref 150–400)
PO2 BLDA: 78 MMHG — LOW (ref 83–108)
POTASSIUM SERPL-MCNC: 3.8 MMOL/L — SIGNIFICANT CHANGE UP (ref 3.5–5.3)
POTASSIUM SERPL-MCNC: 3.8 MMOL/L — SIGNIFICANT CHANGE UP (ref 3.5–5.3)
POTASSIUM SERPL-SCNC: 3.8 MMOL/L — SIGNIFICANT CHANGE UP (ref 3.5–5.3)
POTASSIUM SERPL-SCNC: 3.8 MMOL/L — SIGNIFICANT CHANGE UP (ref 3.5–5.3)
PREALB SERPL-MCNC: 10 MG/DL — LOW (ref 18–38)
PROCALCITONIN SERPL-MCNC: 3.43 NG/ML — HIGH (ref 0–0.04)
PROT SERPL-MCNC: 6.2 G/DL — LOW (ref 6.6–8.7)
PROTHROM AB SERPL-ACNC: 15.4 SEC — HIGH (ref 10–12.9)
RBC # BLD: 3.08 M/UL — LOW (ref 4.6–6.2)
RBC # FLD: 15.8 % — HIGH (ref 11–15.6)
SAO2 % BLDA: 96 % — SIGNIFICANT CHANGE UP (ref 95–99)
SODIUM SERPL-SCNC: 151 MMOL/L — HIGH (ref 135–145)
SODIUM SERPL-SCNC: 151 MMOL/L — HIGH (ref 135–145)
TROPONIN T SERPL-MCNC: 0.76 NG/ML — HIGH (ref 0–0.06)
WBC # BLD: 8.8 K/UL — SIGNIFICANT CHANGE UP (ref 4.8–10.8)
WBC # FLD AUTO: 8.8 K/UL — SIGNIFICANT CHANGE UP (ref 4.8–10.8)

## 2018-12-15 PROCEDURE — 71045 X-RAY EXAM CHEST 1 VIEW: CPT | Mod: 26

## 2018-12-15 PROCEDURE — 99291 CRITICAL CARE FIRST HOUR: CPT

## 2018-12-15 PROCEDURE — 93010 ELECTROCARDIOGRAM REPORT: CPT

## 2018-12-15 PROCEDURE — 71045 X-RAY EXAM CHEST 1 VIEW: CPT | Mod: 26,77

## 2018-12-15 PROCEDURE — 74018 RADEX ABDOMEN 1 VIEW: CPT | Mod: 26

## 2018-12-15 RX ORDER — VANCOMYCIN HCL 1 G
1250 VIAL (EA) INTRAVENOUS ONCE
Qty: 0 | Refills: 0 | Status: COMPLETED | OUTPATIENT
Start: 2018-12-15 | End: 2018-12-15

## 2018-12-15 RX ORDER — NOREPINEPHRINE BITARTRATE/D5W 8 MG/250ML
0.05 PLASTIC BAG, INJECTION (ML) INTRAVENOUS
Qty: 8 | Refills: 0 | Status: DISCONTINUED | OUTPATIENT
Start: 2018-12-15 | End: 2018-12-19

## 2018-12-15 RX ORDER — FUROSEMIDE 40 MG
20 TABLET ORAL ONCE
Qty: 0 | Refills: 0 | Status: COMPLETED | OUTPATIENT
Start: 2018-12-15 | End: 2018-12-15

## 2018-12-15 RX ORDER — LISINOPRIL 2.5 MG/1
2.5 TABLET ORAL DAILY
Qty: 0 | Refills: 0 | Status: DISCONTINUED | OUTPATIENT
Start: 2018-12-15 | End: 2018-12-15

## 2018-12-15 RX ORDER — HYDROMORPHONE HYDROCHLORIDE 2 MG/ML
0.5 INJECTION INTRAMUSCULAR; INTRAVENOUS; SUBCUTANEOUS ONCE
Qty: 0 | Refills: 0 | Status: DISCONTINUED | OUTPATIENT
Start: 2018-12-15 | End: 2018-12-15

## 2018-12-15 RX ORDER — ALBUMIN HUMAN 25 %
250 VIAL (ML) INTRAVENOUS ONCE
Qty: 0 | Refills: 0 | Status: COMPLETED | OUTPATIENT
Start: 2018-12-15 | End: 2018-12-15

## 2018-12-15 RX ORDER — CARVEDILOL PHOSPHATE 80 MG/1
3.12 CAPSULE, EXTENDED RELEASE ORAL EVERY 12 HOURS
Qty: 0 | Refills: 0 | Status: DISCONTINUED | OUTPATIENT
Start: 2018-12-15 | End: 2018-12-15

## 2018-12-15 RX ORDER — PIPERACILLIN AND TAZOBACTAM 4; .5 G/20ML; G/20ML
3.38 INJECTION, POWDER, LYOPHILIZED, FOR SOLUTION INTRAVENOUS EVERY 8 HOURS
Qty: 0 | Refills: 0 | Status: DISCONTINUED | OUTPATIENT
Start: 2018-12-15 | End: 2018-12-16

## 2018-12-15 RX ORDER — POTASSIUM CHLORIDE 20 MEQ
10 PACKET (EA) ORAL
Qty: 0 | Refills: 0 | Status: COMPLETED | OUTPATIENT
Start: 2018-12-15 | End: 2018-12-15

## 2018-12-15 RX ORDER — QUETIAPINE FUMARATE 200 MG/1
25 TABLET, FILM COATED ORAL ONCE
Qty: 0 | Refills: 0 | Status: COMPLETED | OUTPATIENT
Start: 2018-12-15 | End: 2018-12-15

## 2018-12-15 RX ORDER — VANCOMYCIN HCL 1 G
1250 VIAL (EA) INTRAVENOUS EVERY 8 HOURS
Qty: 0 | Refills: 0 | Status: DISCONTINUED | OUTPATIENT
Start: 2018-12-16 | End: 2018-12-16

## 2018-12-15 RX ORDER — POTASSIUM CHLORIDE 20 MEQ
20 PACKET (EA) ORAL
Qty: 0 | Refills: 0 | Status: COMPLETED | OUTPATIENT
Start: 2018-12-15 | End: 2018-12-15

## 2018-12-15 RX ORDER — PIPERACILLIN AND TAZOBACTAM 4; .5 G/20ML; G/20ML
3.38 INJECTION, POWDER, LYOPHILIZED, FOR SOLUTION INTRAVENOUS ONCE
Qty: 0 | Refills: 0 | Status: COMPLETED | OUTPATIENT
Start: 2018-12-15 | End: 2018-12-15

## 2018-12-15 RX ORDER — EPINEPHRINE 0.3 MG/.3ML
0.02 INJECTION INTRAMUSCULAR; SUBCUTANEOUS
Qty: 4 | Refills: 0 | Status: DISCONTINUED | OUTPATIENT
Start: 2018-12-15 | End: 2018-12-18

## 2018-12-15 RX ORDER — HYDROMORPHONE HYDROCHLORIDE 2 MG/ML
1 INJECTION INTRAMUSCULAR; INTRAVENOUS; SUBCUTANEOUS ONCE
Qty: 0 | Refills: 0 | Status: DISCONTINUED | OUTPATIENT
Start: 2018-12-15 | End: 2018-12-15

## 2018-12-15 RX ORDER — VANCOMYCIN HCL 1 G
VIAL (EA) INTRAVENOUS
Qty: 0 | Refills: 0 | Status: DISCONTINUED | OUTPATIENT
Start: 2018-12-15 | End: 2018-12-15

## 2018-12-15 RX ORDER — HYDROMORPHONE HYDROCHLORIDE 2 MG/ML
2 INJECTION INTRAMUSCULAR; INTRAVENOUS; SUBCUTANEOUS ONCE
Qty: 0 | Refills: 0 | Status: DISCONTINUED | OUTPATIENT
Start: 2018-12-15 | End: 2018-12-15

## 2018-12-15 RX ORDER — MIDAZOLAM HYDROCHLORIDE 1 MG/ML
1 INJECTION, SOLUTION INTRAMUSCULAR; INTRAVENOUS ONCE
Qty: 0 | Refills: 0 | Status: DISCONTINUED | OUTPATIENT
Start: 2018-12-15 | End: 2018-12-15

## 2018-12-15 RX ORDER — MAGNESIUM SULFATE 500 MG/ML
2 VIAL (ML) INJECTION ONCE
Qty: 0 | Refills: 0 | Status: COMPLETED | OUTPATIENT
Start: 2018-12-15 | End: 2018-12-15

## 2018-12-15 RX ORDER — VANCOMYCIN HCL 1 G
VIAL (EA) INTRAVENOUS
Qty: 0 | Refills: 0 | Status: DISCONTINUED | OUTPATIENT
Start: 2018-12-15 | End: 2018-12-16

## 2018-12-15 RX ORDER — ACETAMINOPHEN 500 MG
1000 TABLET ORAL ONCE
Qty: 0 | Refills: 0 | Status: COMPLETED | OUTPATIENT
Start: 2018-12-15 | End: 2018-12-15

## 2018-12-15 RX ADMIN — HYDROMORPHONE HYDROCHLORIDE 2 MILLIGRAM(S): 2 INJECTION INTRAMUSCULAR; INTRAVENOUS; SUBCUTANEOUS at 15:40

## 2018-12-15 RX ADMIN — CHLORHEXIDINE GLUCONATE 5 MILLILITER(S): 213 SOLUTION TOPICAL at 06:18

## 2018-12-15 RX ADMIN — HYDROMORPHONE HYDROCHLORIDE 2 MILLIGRAM(S): 2 INJECTION INTRAMUSCULAR; INTRAVENOUS; SUBCUTANEOUS at 12:30

## 2018-12-15 RX ADMIN — Medication 10 MILLIGRAM(S): at 22:34

## 2018-12-15 RX ADMIN — Medication 1 MILLIGRAM(S): at 15:03

## 2018-12-15 RX ADMIN — Medication 50 MILLIEQUIVALENT(S): at 06:10

## 2018-12-15 RX ADMIN — Medication 50 MILLIEQUIVALENT(S): at 07:15

## 2018-12-15 RX ADMIN — PANTOPRAZOLE SODIUM 40 MILLIGRAM(S): 20 TABLET, DELAYED RELEASE ORAL at 18:34

## 2018-12-15 RX ADMIN — Medication 10 MILLIGRAM(S): at 06:18

## 2018-12-15 RX ADMIN — HYDROMORPHONE HYDROCHLORIDE 1 MILLIGRAM(S): 2 INJECTION INTRAMUSCULAR; INTRAVENOUS; SUBCUTANEOUS at 20:40

## 2018-12-15 RX ADMIN — ENOXAPARIN SODIUM 40 MILLIGRAM(S): 100 INJECTION SUBCUTANEOUS at 12:24

## 2018-12-15 RX ADMIN — ATORVASTATIN CALCIUM 80 MILLIGRAM(S): 80 TABLET, FILM COATED ORAL at 22:34

## 2018-12-15 RX ADMIN — Medication 325 MILLIGRAM(S): at 12:23

## 2018-12-15 RX ADMIN — Medication 50 GRAM(S): at 19:41

## 2018-12-15 RX ADMIN — Medication 125 MILLILITER(S): at 01:30

## 2018-12-15 RX ADMIN — Medication 125 MILLILITER(S): at 00:00

## 2018-12-15 RX ADMIN — HYDROMORPHONE HYDROCHLORIDE 0.5 MILLIGRAM(S): 2 INJECTION INTRAMUSCULAR; INTRAVENOUS; SUBCUTANEOUS at 10:50

## 2018-12-15 RX ADMIN — Medication 20 MILLIGRAM(S): at 11:30

## 2018-12-15 RX ADMIN — Medication 125 MILLILITER(S): at 17:45

## 2018-12-15 RX ADMIN — PIPERACILLIN AND TAZOBACTAM 200 GRAM(S): 4; .5 INJECTION, POWDER, LYOPHILIZED, FOR SOLUTION INTRAVENOUS at 21:23

## 2018-12-15 RX ADMIN — Medication 400 MILLIGRAM(S): at 18:34

## 2018-12-15 RX ADMIN — MIDAZOLAM HYDROCHLORIDE 1 MILLIGRAM(S): 1 INJECTION, SOLUTION INTRAMUSCULAR; INTRAVENOUS at 11:00

## 2018-12-15 RX ADMIN — Medication 1000 MILLIGRAM(S): at 19:00

## 2018-12-15 RX ADMIN — PANTOPRAZOLE SODIUM 40 MILLIGRAM(S): 20 TABLET, DELAYED RELEASE ORAL at 06:18

## 2018-12-15 RX ADMIN — HYDROMORPHONE HYDROCHLORIDE 2 MILLIGRAM(S): 2 INJECTION INTRAMUSCULAR; INTRAVENOUS; SUBCUTANEOUS at 14:40

## 2018-12-15 RX ADMIN — CHLORHEXIDINE GLUCONATE 5 MILLILITER(S): 213 SOLUTION TOPICAL at 02:00

## 2018-12-15 RX ADMIN — Medication 100 MILLIEQUIVALENT(S): at 18:33

## 2018-12-15 RX ADMIN — HYDROMORPHONE HYDROCHLORIDE 1 MILLIGRAM(S): 2 INJECTION INTRAMUSCULAR; INTRAVENOUS; SUBCUTANEOUS at 20:20

## 2018-12-15 RX ADMIN — CHLORHEXIDINE GLUCONATE 5 MILLILITER(S): 213 SOLUTION TOPICAL at 18:34

## 2018-12-15 RX ADMIN — CHLORHEXIDINE GLUCONATE 5 MILLILITER(S): 213 SOLUTION TOPICAL at 14:39

## 2018-12-15 RX ADMIN — Medication 3 MILLILITER(S): at 15:27

## 2018-12-15 RX ADMIN — CLOPIDOGREL BISULFATE 75 MILLIGRAM(S): 75 TABLET, FILM COATED ORAL at 12:23

## 2018-12-15 RX ADMIN — Medication 166.67 MILLIGRAM(S): at 22:33

## 2018-12-15 RX ADMIN — Medication 50 MILLIEQUIVALENT(S): at 05:10

## 2018-12-15 RX ADMIN — Medication 100 MILLIEQUIVALENT(S): at 22:32

## 2018-12-15 RX ADMIN — Medication 100 MILLIEQUIVALENT(S): at 19:43

## 2018-12-15 RX ADMIN — Medication 1 TABLET(S): at 22:36

## 2018-12-15 RX ADMIN — HYDROMORPHONE HYDROCHLORIDE 2 MILLIGRAM(S): 2 INJECTION INTRAMUSCULAR; INTRAVENOUS; SUBCUTANEOUS at 13:30

## 2018-12-15 RX ADMIN — Medication 10 MILLIGRAM(S): at 14:40

## 2018-12-15 RX ADMIN — CHLORHEXIDINE GLUCONATE 5 MILLILITER(S): 213 SOLUTION TOPICAL at 22:33

## 2018-12-15 RX ADMIN — CHLORHEXIDINE GLUCONATE 5 MILLILITER(S): 213 SOLUTION TOPICAL at 12:21

## 2018-12-15 RX ADMIN — Medication 2: at 12:26

## 2018-12-15 RX ADMIN — QUETIAPINE FUMARATE 25 MILLIGRAM(S): 200 TABLET, FILM COATED ORAL at 12:23

## 2018-12-15 RX ADMIN — EPINEPHRINE 8 MICROGRAM(S)/KG/MIN: 0.3 INJECTION INTRAMUSCULAR; SUBCUTANEOUS at 23:22

## 2018-12-15 RX ADMIN — HYDROMORPHONE HYDROCHLORIDE 0.5 MILLIGRAM(S): 2 INJECTION INTRAMUSCULAR; INTRAVENOUS; SUBCUTANEOUS at 11:05

## 2018-12-15 RX ADMIN — QUETIAPINE FUMARATE 50 MILLIGRAM(S): 200 TABLET, FILM COATED ORAL at 22:35

## 2018-12-15 NOTE — PROGRESS NOTE ADULT - PROBLEM SELECTOR PLAN 1
aggressive diuresis  resume inotropic support per Dr. Arnold bishop  follow lytes, repeat labs, trend CVP

## 2018-12-15 NOTE — PROGRESS NOTE ADULT - PROBLEM SELECTOR PLAN 4
peep to 10  abg  repeat cxr  wean fi02 as tolerated  hob 40 degrees  aggressive chest pt, suctioning prn

## 2018-12-15 NOTE — PROGRESS NOTE ADULT - PROBLEM SELECTOR PLAN 7
PRN transfusions for hemodynamics  as per attending transfuse for Hct less than 27 if volume required

## 2018-12-15 NOTE — PROGRESS NOTE ADULT - PROBLEM SELECTOR PLAN 4
s/p CAGB EF 20%  would start coreg 3.125 bid  add captopril 6.25 tid if he tolerated beta blocker s/p CAGB EF 20%  would start coreg 3.125 bid  add lisinopril 2.5 QD.   Antianginal ranexa 500 Q12,    Deep venous thrombosis prophylaxis: heparin or lovenox SQ.   Aspirin and plavix. s/p CAGB EF 20%  would start coreg 3.125 bid  add lisinopril 2.5 QD.  hold for SBP < 90 mm Hg.   Antianginal ranexa 500 Q12,    Deep venous thrombosis prophylaxis: heparin or lovenox SQ.   Aspirin and plavix.

## 2018-12-15 NOTE — PROGRESS NOTE ADULT - PROBLEM SELECTOR PLAN 10
low prealbumin level on recent labs  was on tube feeds, now on hold    13. Hypernatremia  will discuss transitioning lasix gtt to IV, may be due to over diuresis  otherwise, will need free water  11. Small Bowel Obstruction  general surgery following  tube feeds are on hold  gastrographin study in progress  CXR/AXR to be done at 7 am this morning  has dignicare with obvious gas    12. ileus  as above

## 2018-12-15 NOTE — PROGRESS NOTE ADULT - PROBLEM SELECTOR PLAN 8
hold ABX at this time as per ID given patient is spiking temps although blood cultures are negative  cooling blanket and tylenol PRN  most recent sputum culture - normal respiratory keara  mahesh aware

## 2018-12-15 NOTE — PROGRESS NOTE ADULT - ASSESSMENT
47 yo M w/ abdominal distension and ABD XR revealing dilated small bowel loops concerning for small bowel obstruction vs ileus, no w/sp gastrograffin challenge with KUB showing contrast in colon    Plan:  -Continue CTICU management  -NGT can be taken off suction  -Can resume feeds tomorrow after rectal tube has more output  -Serial abdominal exams  -Continue to replenish electrolytes  -Will eventually require PT/PMR

## 2018-12-15 NOTE — PROGRESS NOTE ADULT - PROBLEM SELECTOR PLAN 2
Due to pulmonary edema  Failed multiple weaning trials Due to pulmonary edema  Failed multiple weaning trials.

## 2018-12-15 NOTE — PROGRESS NOTE ADULT - ASSESSMENT
46 yo Male who presnented with chest discomfort, stemi cathed  stent to LAD and D2; stent to LAD thrombosed,  impella placed for support.  Pt then had VF arrest, shock x 1, return to NSR.  Upon transfer to OR for Urgent CABG, pt with asystolic arrest, chest opened intra-op with CPR in progress. Pt underwent C4V (LAD, Diag, OM and PDA) with inability to wean from CPB therefore requiring VA ECMO (central cannulation) and IABP. , he was transferred to CICU on multiple gtts, now s/p explant 12/3 with placement of impella via R groin sheath. 12/4 ID consulted started on broad spectrum abx, 12/5 Impella weaned. 12/6 Impella removal via right femoral cutdown with primary repair of femoral artery and stent placed to external illiac artery for dissection, PRBC x1. 12/8 Pt underwent head CT, no acute intracranial process noted, chest CT b/l pneumothoraces R-20%, L-10%, b/l chest tubes in place, confirmed infiltrates on right lower and left upper lung.  ABX Vanco and Zosyn course extended, sputum + Klebsiella pending sensitivity; significant agitation causing profound increase in LVEDP now controlled with precedex, seroquel, and QHS propofol. He intermittently tolerates CPAP. Repeat TTE 12/14 done after CPAP was not tolerated showed EF 20-25%.   Hospital course notable for persistent cardiogenic shock/LV failure, vent dependent respiratory failure (inability to wean also contributed to by severe agitation, now s/p trach), sepsis secondary to klebsiella PNA completed abx, now off abx.  Developed illeus which is resolving. Developed pulm edema again this am now on 100%

## 2018-12-15 NOTE — PROGRESS NOTE ADULT - PROBLEM SELECTOR PLAN 1
s/p CABG  passive ROM of upper extremities and LLE by RNs   maintain primacor at current dose  epi now weaned off  continue protonix  lasix gtt for diuresis, maintain augustin for strict Is/Os, follow lytes closely and replete PRN, consider transition to lasix IVP

## 2018-12-15 NOTE — PROGRESS NOTE ADULT - ASSESSMENT
48 yo Male c/o CP for 2-3 days prior to admission, then 10/10 chest pain approximately 9:30 am yesterday, followed by vomiting.  Wife drove pt to hospital, ruled in for STEMI. Urgent Cath, pt found to have multiple occlusions; stent to LAD and D2; stent to LAD thrombosed,  impella placed for support.  Pt then had VF arrest, shock x 1, return to NSR.  Upon transfer to OR for Urgent CABG, pt with asystolic arrest, chest opened intra-op with CPR in progress. Pt underwent C4V (LAD, Diag, OM and PDA) with inability to wean from CPB therefore requiring VA ECMO (central cannulation) and IABP. , he was transferred to CICU on multiple gtts, now s/p explant 12/3 with placement of impella via R groin sheath. 12/4 ID consulted started on broad spectrum abx, 12/5 Impella weaned. 12/6 Impella removal via right femoral cutdown with primary repair of femoral artery and stent placed to external illiac artery for dissection, PRBC x1. 12/8 Pt underwent head CT, no acute intracranial process noted, chest CT b/l pneumothoraces R-20%, L-10%, b/l chest tubes in place, confirmed infiltrates on right lower and left upper lung.  ABX Vanco and Zosyn course extended, sputum + Klebsiella pending sensitivity; significant agitation causing profound increase in LVEDP now controlled with precedex, seroquel, and QHS propofol. He intermittently tolerates CPAP. Repeat TTE 12/14 done after CPAP was not tolerated showed EF 20-25%. As of 12/14, Epinephrine and Levophed gtt have been weaned. Most recently, patient was noted to have a distended non-tender abdomen. Tube feeds were held and general surgery was consulted after abdominal XR was concerning for ileus vs SBO. Surgery service is in the process of performing a gastrographin study with serial XRs, first one to be done this AM. Patient otherwise remains hemodynamically stable, improved agitation, and tolerating CPAP during the day.    Hospital course notable for persistent cardiogenic shock/LV failure, vent dependent respiratory failure (inability to wean also contributed to by severe agitation, now s/p trach), sepsis secondary to klebsiella PNA completed abx, now off abx.

## 2018-12-15 NOTE — PROGRESS NOTE ADULT - SUBJECTIVE AND OBJECTIVE BOX
Overnight events:  No acute events overnight    Past Medical History:  ST elevation myocardial infarction (STEMI)  Family history of myocardial infarction (Mother)  No pertinent family history in first degree relatives  Handoff  MEWS Score  Staph infection  No pertinent past medical history  Chronic respiratory failure with hypoxia  Cardiogenic shock  Chronic respiratory failure with hypoxia  Cardiogenic shock  ST elevation myocardial infarction (STEMI), unspecified artery  Malnutrition  Right ventricular dysfunction  Acute overt combined systolic and diastolic congestive heart failure  Klebsiella pneumonia  Pulmonary edema  Agitation  Acute respiratory failure with hypoxia  Other encephalopathy  Fever, unspecified fever cause  Thrombocytopenia  Anemia due to blood loss  Acute systolic heart failure  Respiratory failure  Cardiogenic shock  Staph infection  Ventricular fibrillation  Coronary artery disease involving native coronary artery of native heart with unstable angina pectoris  Dissection of left main coronary artery  ST elevation myocardial infarction involving left anterior descending (LAD) coronary artery  STEMI (ST elevation myocardial infarction)  Tracheostomy  Exploration of femoral artery  Insertion of Impella device  ECMO decannulation  Exploration and washout of mediastinum  Intra-aortic balloon pump removal  Arterial cannulation  Central line placement  [place] Michelle placement  CABG  ECMO (extracorporeal membrane oxygenation)  No significant past surgical history  CHEST PAIN  90+        acetaminophen    Suspension .. 650 milliGRAM(s) Oral every 6 hours PRN  ALBUTerol/ipratropium for Nebulization 3 milliLiter(s) Nebulizer every 6 hours PRN  amiodarone    Tablet 200 milliGRAM(s) Oral daily  artificial  tears Solution 1 Drop(s) Both EYES four times a day PRN  ascorbic acid 500 milliGRAM(s) Oral daily  aspirin 325 milliGRAM(s) Oral daily  atorvastatin 80 milliGRAM(s) Oral at bedtime  chlorhexidine 0.12% Liquid 5 milliLiter(s) Oral Mucosa every 4 hours  chlorproMAZINE    Tablet 25 milliGRAM(s) Oral three times a day PRN  clopidogrel Tablet 75 milliGRAM(s) Oral daily  dexmedetomidine Infusion 1.4 MICROgram(s)/kG/Hr IV Continuous <Continuous>  enoxaparin Injectable 40 milliGRAM(s) SubCutaneous daily  ferrous    sulfate 325 milliGRAM(s) Oral daily  folic acid 1 milliGRAM(s) Oral daily  furosemide Infusion 5 mG/Hr IV Continuous <Continuous>  HYDROmorphone   Tablet 2 milliGRAM(s) Oral every 3 hours PRN  HYDROmorphone   Tablet 4 milliGRAM(s) Oral every 3 hours PRN  insulin lispro (HumaLOG) corrective regimen sliding scale   SubCutaneous every 4 hours  lactobacillus acidophilus 1 Tablet(s) Oral every 8 hours  metoclopramide Injectable 10 milliGRAM(s) IV Push every 8 hours  milrinone Infusion 0.0625 MICROgram(s)/kG/Min IV Continuous <Continuous>  multivitamin 1 Tablet(s) Oral daily  norepinephrine Infusion 0.029 MICROgram(s)/kG/Min IV Continuous <Continuous>  ondansetron Injectable 4 milliGRAM(s) IV Push once  pantoprazole  Injectable 40 milliGRAM(s) IV Push every 12 hours  petrolatum white Ointment 1 Application(s) Topical four times a day PRN  propofol Infusion 7 MICROgram(s)/kG/Min IV Continuous <Continuous>  QUEtiapine 25 milliGRAM(s) Oral daily  QUEtiapine 50 milliGRAM(s) Oral <User Schedule>  sodium chloride 0.9%. 1000 milliLiter(s) IV Continuous <Continuous>  sodium chloride 0.9%. 1000 milliLiter(s) IV Continuous <Continuous>  MEDICATIONS  (PRN):  acetaminophen    Suspension .. 650 milliGRAM(s) Oral every 6 hours PRN Temp greater or equal to 38.5C (101.3F)  ALBUTerol/ipratropium for Nebulization 3 milliLiter(s) Nebulizer every 6 hours PRN Shortness of Breath and/or Wheezing  artificial  tears Solution 1 Drop(s) Both EYES four times a day PRN Dry Eyes  chlorproMAZINE    Tablet 25 milliGRAM(s) Oral three times a day PRN hiccups  HYDROmorphone   Tablet 2 milliGRAM(s) Oral every 3 hours PRN Moderate Pain (4 - 6)  HYDROmorphone   Tablet 4 milliGRAM(s) Oral every 3 hours PRN Severe Pain (7 - 10)  petrolatum white Ointment 1 Application(s) Topical four times a day PRN dry lips    Mode: AC/ CMV (Assist Control/ Continuous Mandatory Ventilation), RR (machine): 12, TV (machine): 600, FiO2: 30, PEEP: 5, MAP: 10, PIP: 31  Daily     Daily       ABG - ( 14 Dec 2018 21:45 )  pH, Arterial: 7.51  pH, Blood: x     /  pCO2: 34    /  pO2: 117   / HCO3: 28    / Base Excess: 4.2   /  SaO2: 99                                      8.9    8.8   )-----------( 426      ( 15 Dec 2018 03:16 )             28.4   12-15    151<H>  |  111<H>  |  43.0<H>  ----------------------------<  140<H>  3.8   |  26.0  |  0.76    Ca    8.5<L>      15 Dec 2018 03:16  Phos  4.3     12-15  Mg     2.4     12-15    TPro  6.2<L>  /  Alb  3.4  /  TBili  1.8  /  DBili  x   /  AST  69<H>  /  ALT  43<H>  /  AlkPhos  160<H>  12-15      PT/INR - ( 15 Dec 2018 03:16 )   PT: 15.4 sec;   INR: 1.33 ratio               Objective:  T(C): 38 (12-15-18 @ 03:00), Max: 38 (12-14-18 @ 21:00)  HR: 88 (12-15-18 @ 03:35) (72 - 93)  BP: --  RR: 22 (12-15-18 @ 03:00) (10 - 30)  SpO2: 99% (12-15-18 @ 03:35) (80% - 100%)  Wt(kg): --CAPILLARY BLOOD GLUCOSE      POCT Blood Glucose.: 144 mg/dL (15 Dec 2018 03:01)  POCT Blood Glucose.: 138 mg/dL (15 Dec 2018 00:02)  POCT Blood Glucose.: 150 mg/dL (14 Dec 2018 17:28)  POCT Blood Glucose.: 150 mg/dL (14 Dec 2018 14:19)  POCT Blood Glucose.: 160 mg/dL (14 Dec 2018 09:37)  I&O's Summary    13 Dec 2018 07:01  -  14 Dec 2018 07:00  --------------------------------------------------------  IN: 3163 mL / OUT: 2805 mL / NET: 358 mL    14 Dec 2018 07:01  -  15 Dec 2018 04:16  --------------------------------------------------------  IN: 1740.4 mL / OUT: 4590 mL / NET: -2849.6 mL        Physical Exam  Neuro: awake, following commands, moves all extremities  Pulm: CTA, coarse rhonchi bilaterally, + TRACH  CV: RRR, no murmurs, +S1S2  Abd: soft, NT, ND, +BS  Ext: +DP Pulses b/l, +PT pulses, no edema  Inc: MSI C/D/I/stable with staples

## 2018-12-15 NOTE — PROGRESS NOTE ADULT - SUBJECTIVE AND OBJECTIVE BOX
Patient seen and examined. Vent sedated    T(C): 37.4 (12-15-18 @ 23:00)  T(F): 99.3 (12-15-18 @ 23:00)  HR: 85 (12-15-18 @ 23:00)  BP: 87/53 (12-15-18 @ 17:15)  BP(mean): 64 (12-15-18 @ 17:15)  ABP: 99/58 (12-15-18 @ 23:00)  ABP(mean): 70 (12-15-18 @ 23:00)  RR: 24 (12-15-18 @ 23:00)  SpO2: 99% (12-15-18 @ 23:00)  Wt(kg): --  CVP(mm Hg): 22 (12-15-18 @ 23:00)  CI: 2.5 (12-15-18 @ 23:00)  PA: --  PA(mean): --  PA(direct): --  SVRI: 1534 (12-15-18 @ 23:00)  Mode: AC/ CMV (Assist Control/ Continuous Mandatory Ventilation), RR (machine): 18, TV (machine): 600, FiO2: 70, PEEP: 10, MAP: 20, PIP: 41    Physical Exam:  Gen: trach sedated on cooling blanket  Pulm:  course rhonchi b/l, tan secretions  CV:  S1S2, RRR,   Abd: obese, distended ogt to ilws bilious material draining mild diffuse tenderness, no rebound, no guarding  Ext:  2+edema b/l  Incision:  c/d/i no click, no exudate    I&O's Detail    14 Dec 2018 07:01  -  15 Dec 2018 07:00  --------------------------------------------------------  IN:    Albumin 5%  - 250 mL: 750 mL    dexmedetomidine Infusion: 253.2 mL    EPINEPHrine Infusion: 6 mL    furosemide Infusion: 22.8 mL    furosemide Infusion: 45 mL    Glucerna 1.5: 60 mL    milrinone  Infusion: 40.8 mL    norepinephrine Infusion: 9 mL    propofol Infusion: 41.6 mL    sodium chloride 0.9%.: 120 mL    sodium chloride 0.9%.: 240 mL    Solution: 350 mL  Total IN: 1938.4 mL    OUT:    Chest Tube: 40 mL    Chest Tube: 190 mL    Indwelling Catheter - Urethral: 2960 mL    Nasoenteral Tube: 2000 mL  Total OUT: 5190 mL    Total NET: -3251.6 mL      15 Dec 2018 07:01  -  15 Dec 2018 23:50  --------------------------------------------------------  IN:    Albumin 5%  - 250 mL: 250 mL    dexmedetomidine Infusion: 343.5 mL    EPINEPHrine Infusion: 16 mL    furosemide Infusion: 29.6 mL    furosemide Infusion: 10 mL    milrinone  Infusion: 3.4 mL    norepinephrine Infusion: 87 mL    Packed Red Blood Cells: 295 mL    propofol Infusion: 146.3 mL    sodium chloride 0.9%.: 160 mL    sodium chloride 0.9%.: 80 mL    Solution: 300 mL    Solution: 50 mL    Solution: 100 mL    Solution: 100 mL    Solution: 250 mL  Total IN: 2220.8 mL    OUT:    Chest Tube: 40 mL    Chest Tube: 280 mL    Indwelling Catheter - Urethral: 1755 mL    Nasoenteral Tube: 100 mL  Total OUT: 2175 mL    Total NET: 45.8 mL                              8.9    8.8   )-----------( 426      ( 15 Dec 2018 03:16 )             28.4   12-15    151<H>  |  113<H>  |  41.0<H>  ----------------------------<  122<H>  3.8   |  24.0  |  0.95    Ca    7.4<L>      15 Dec 2018 16:56  Phos  4.3     12-15  Mg     2.4     12-15    TPro  6.2<L>  /  Alb  3.4  /  TBili  1.8  /  DBili  x   /  AST  69<H>  /  ALT  43<H>  /  AlkPhos  160<H>  12-15  aPTT: x    ; PT: 15.4 sec; INR: 1.33 ratio  12-15-18 @ 03:16       ABG - ( 15 Dec 2018 21:54 )  pH: 7.48  /  pCO2: 34    /  pO2: 81    / HCO3: 26    / Base Excess: 2.2   /  SaO2: 97   /  Lactate: x        CAPILLARY BLOOD GLUCOSE      POCT Blood Glucose.: 150 mg/dL (15 Dec 2018 21:01)        Medications:  acetaminophen    Suspension .. 650 milliGRAM(s) Oral every 6 hours PRN  ALBUTerol/ipratropium for Nebulization 3 milliLiter(s) Nebulizer every 6 hours PRN  amiodarone    Tablet 200 milliGRAM(s) Oral daily  artificial  tears Solution 1 Drop(s) Both EYES four times a day PRN  ascorbic acid 500 milliGRAM(s) Oral daily  aspirin 325 milliGRAM(s) Oral daily  atorvastatin 80 milliGRAM(s) Oral at bedtime  chlorhexidine 0.12% Liquid 5 milliLiter(s) Oral Mucosa every 4 hours  chlorproMAZINE    Tablet 25 milliGRAM(s) Oral three times a day PRN  clopidogrel Tablet 75 milliGRAM(s) Oral daily  dexmedetomidine Infusion 1.4 MICROgram(s)/kG/Hr IV Continuous <Continuous>  enoxaparin Injectable 40 milliGRAM(s) SubCutaneous daily  EPINEPHrine    Infusion 0.024 MICROgram(s)/kG/Min IV Continuous <Continuous>  ferrous    sulfate 325 milliGRAM(s) Oral daily  folic acid 1 milliGRAM(s) Oral daily  furosemide Infusion 10 mG/Hr IV Continuous <Continuous>  HYDROmorphone   Tablet 2 milliGRAM(s) Oral every 3 hours PRN  HYDROmorphone   Tablet 4 milliGRAM(s) Oral every 3 hours PRN  insulin lispro (HumaLOG) corrective regimen sliding scale   SubCutaneous every 4 hours  lactobacillus acidophilus 1 Tablet(s) Oral every 8 hours  multivitamin 1 Tablet(s) Oral daily  norepinephrine Infusion 0.05 MICROgram(s)/kG/Min IV Continuous <Continuous>  ondansetron Injectable 4 milliGRAM(s) IV Push once  pantoprazole  Injectable 40 milliGRAM(s) IV Push every 12 hours  petrolatum white Ointment 1 Application(s) Topical four times a day PRN  piperacillin/tazobactam IVPB. 3.375 Gram(s) IV Intermittent every 8 hours  propofol Infusion 7 MICROgram(s)/kG/Min IV Continuous <Continuous>  QUEtiapine 25 milliGRAM(s) Oral daily  QUEtiapine 50 milliGRAM(s) Oral <User Schedule>  sodium chloride 0.9%. 1000 milliLiter(s) IV Continuous <Continuous>  sodium chloride 0.9%. 1000 milliLiter(s) IV Continuous <Continuous>  vancomycin  IVPB          CXR:  pulmonary edema, all lines in place    Assessment:  Pt is a 48y year old Male  s/p Tracheostomy  Exploration of femoral artery  Insertion of Impella device  ECMO decannulation  Exploration and washout of mediastinum  Intra-aortic balloon pump removal  Arterial cannulation  Central line placement  [place] Michelle placement  CABG  ECMO (extracorporeal membrane oxygenation)      Patent currently stable, NAD.    Plan: Patient seen and examined. Vent sedated    T(C): 37.4 (12-15-18 @ 23:00)  T(F): 99.3 (12-15-18 @ 23:00)  HR: 85 (12-15-18 @ 23:00)  BP: 87/53 (12-15-18 @ 17:15)  BP(mean): 64 (12-15-18 @ 17:15)  ABP: 99/58 (12-15-18 @ 23:00)  ABP(mean): 70 (12-15-18 @ 23:00)  RR: 24 (12-15-18 @ 23:00)  SpO2: 99% (12-15-18 @ 23:00)  Wt(kg): --  CVP(mm Hg): 22 (12-15-18 @ 23:00)  CI: 2.5 (12-15-18 @ 23:00)  PA: --  PA(mean): --  PA(direct): --  SVRI: 1534 (12-15-18 @ 23:00)  Mode: AC/ CMV (Assist Control/ Continuous Mandatory Ventilation), RR (machine): 18, TV (machine): 600, FiO2: 70, PEEP: 10, MAP: 20, PIP: 41    Physical Exam:  Gen: trach sedated on cooling blanket  Pulm:  course rhonchi b/l, tan secretions  CV:  S1S2, RRR,   Abd: obese, distended ogt to ilws bilious material draining mild diffuse tenderness, no rebound, no guarding  Ext:  2+edema b/l  Incision:  c/d/i no click, no exudate    I&O's Deta    14 Dec 2018 07:01  -  15 Dec 2018 07:00  --------------------------------------------------------  IN:    Albumin 5%  - 250 mL: 750 mL    dexmedetomidine Infusion: 253.2 mL    EPINEPHrine Infusion: 6 mL    furosemide Infusion: 22.8 mL    furosemide Infusion: 45 mL    Glucerna 1.5: 60 mL    milrinone  Infusion: 40.8 mL    norepinephrine Infusion: 9 mL    propofol Infusion: 41.6 mL    sodium chloride 0.9%.: 120 mL    sodium chloride 0.9%.: 240 mL    Solution: 350 mL  Total IN: 1938.4 mL    OUT:    Chest Tube: 40 mL    Chest Tube: 190 mL    Indwelling Catheter - Urethral: 2960 mL    Nasoenteral Tube: 2000 mL  Total OUT: 5190 mL    Total NET: -3251.6 mL      15 Dec 2018 07:01  -  15 Dec 2018 23:50  --------------------------------------------------------  IN:    Albumin 5%  - 250 mL: 250 mL    dexmedetomidine Infusion: 343.5 mL    EPINEPHrine Infusion: 16 mL    furosemide Infusion: 29.6 mL    furosemide Infusion: 10 mL    milrinone  Infusion: 3.4 mL    norepinephrine Infusion: 87 mL    Packed Red Blood Cells: 295 mL    propofol Infusion: 146.3 mL    sodium chloride 0.9%.: 160 mL    sodium chloride 0.9%.: 80 mL    Solution: 300 mL    Solution: 50 mL    Solution: 100 mL    Solution: 100 mL    Solution: 250 mL  Total IN: 2220.8 mL    OUT:    Chest Tube: 40 mL    Chest Tube: 280 mL    Indwelling Catheter - Urethral: 1755 mL    Nasoenteral Tube: 100 mL  Total OUT: 2175 mL    Total NET: 45.8 mL                              8.9    8.8   )-----------( 426      ( 15 Dec 2018 03:16 )             28.4   12-15    151<H>  |  113<H>  |  41.0<H>  ----------------------------<  122<H>  3.8   |  24.0  |  0.95    Ca    7.4<L>      15 Dec 2018 16:56  Phos  4.3     12-15  Mg     2.4     12-15    TPro  6.2<L>  /  Alb  3.4  /  TBili  1.8  /  DBili  x   /  AST  69<H>  /  ALT  43<H>  /  AlkPhos  160<H>  12-15  aPTT: x    ; PT: 15.4 sec; INR: 1.33 ratio  12-15-18 @ 03:16       ABG - ( 15 Dec 2018 21:54 )  pH: 7.48  /  pCO2: 34    /  pO2: 81    / HCO3: 26    / Base Excess: 2.2   /  SaO2: 97   /  Lactate: x        CAPILLARY BLOOD GLUCOSE      POCT Blood Glucose.: 150 mg/dL (15 Dec 2018 21:01)        Medications:  acetaminophen    Suspension .. 650 milliGRAM(s) Oral every 6 hours PRN  ALBUTerol/ipratropium for Nebulization 3 milliLiter(s) Nebulizer every 6 hours PRN  amiodarone    Tablet 200 milliGRAM(s) Oral daily  artificial  tears Solution 1 Drop(s) Both EYES four times a day PRN  ascorbic acid 500 milliGRAM(s) Oral daily  aspirin 325 milliGRAM(s) Oral daily  atorvastatin 80 milliGRAM(s) Oral at bedtime  chlorhexidine 0.12% Liquid 5 milliLiter(s) Oral Mucosa every 4 hours  chlorproMAZINE    Tablet 25 milliGRAM(s) Oral three times a day PRN  clopidogrel Tablet 75 milliGRAM(s) Oral daily  dexmedetomidine Infusion 1.4 MICROgram(s)/kG/Hr IV Continuous <Continuous>  enoxaparin Injectable 40 milliGRAM(s) SubCutaneous daily  EPINEPHrine    Infusion 0.024 MICROgram(s)/kG/Min IV Continuous <Continuous>  ferrous    sulfate 325 milliGRAM(s) Oral daily  folic acid 1 milliGRAM(s) Oral daily  furosemide Infusion 10 mG/Hr IV Continuous <Continuous>  HYDROmorphone   Tablet 2 milliGRAM(s) Oral every 3 hours PRN  HYDROmorphone   Tablet 4 milliGRAM(s) Oral every 3 hours PRN  insulin lispro (HumaLOG) corrective regimen sliding scale   SubCutaneous every 4 hours  lactobacillus acidophilus 1 Tablet(s) Oral every 8 hours  multivitamin 1 Tablet(s) Oral daily  norepinephrine Infusion 0.05 MICROgram(s)/kG/Min IV Continuous <Continuous>  ondansetron Injectable 4 milliGRAM(s) IV Push once  pantoprazole  Injectable 40 milliGRAM(s) IV Push every 12 hours  petrolatum white Ointment 1 Application(s) Topical four times a day PRN  piperacillin/tazobactam IVPB. 3.375 Gram(s) IV Intermittent every 8 hours  propofol Infusion 7 MICROgram(s)/kG/Min IV Continuous <Continuous>  QUEtiapine 25 milliGRAM(s) Oral daily  QUEtiapine 50 milliGRAM(s) Oral <User Schedule>  sodium chloride 0.9%. 1000 milliLiter(s) IV Continuous <Continuous>  sodium chloride 0.9%. 1000 milliLiter(s) IV Continuous <Continuous>  vancomycin  IVPB          CXR:  pulmonary edema, all lines in place    Assessment:  Pt is a 48y year old Male  s/p Tracheostomy  Exploration of femoral artery  Insertion of Impella device  ECMO decannulation  Exploration and washout of mediastinum  Intra-aortic balloon pump removal  Arterial cannulation  Central line placement  [place] Michelle placement  CABG  ECMO (extracorporeal membrane oxygenation)      Patent currently stable, NAD.    Plan: Patient seen and examined. Vent sedated    T(C): 37.4 (12-15-18 @ 23:00)  T(F): 99.3 (12-15-18 @ 23:00)  HR: 85 (12-15-18 @ 23:00)  BP: 87/53 (12-15-18 @ 17:15)  BP(mean): 64 (12-15-18 @ 17:15)  ABP: 99/58 (12-15-18 @ 23:00)  ABP(mean): 70 (12-15-18 @ 23:00)  RR: 24 (12-15-18 @ 23:00)  SpO2: 99% (12-15-18 @ 23:00)  Wt(kg): --  CVP(mm Hg): 22 (12-15-18 @ 23:00)  CI: 2.5 (12-15-18 @ 23:00)  PA: --  PA(mean): --  PA(direct): --  SVRI: 1534 (12-15-18 @ 23:00)  Mode: AC/ CMV (Assist Control/ Continuous Mandatory Ventilation), RR (machine): 18, TV (machine): 600, FiO2: 70, PEEP: 10, MAP: 20, PIP: 41    Physical Exam:  Gen: trach sedated on cooling blanket  Pulm:  course rhonchi b/l, tan secretions  CV:  S1S2, RRR,   Abd: obese, distended ogt to ilws bilious material draining mild diffuse tenderness, no rebound, no guarding  Ext:  2+edema b/l  Incision:  c/d/i no click, no exudate    I&O's Deta    14 Dec 2018 07:01  -  15 Dec 2018 07:00  --------------------------------------------------------  IN:    Albumin 5%  - 250 mL: 750 mL    dexmedetomidine Infusion: 253.2 mL    EPINEPHrine Infusion: 6 mL    furosemide Infusion: 22.8 mL    furosemide Infusion: 45 mL    Glucerna 1.5: 60 mL    milrinone  Infusion: 40.8 mL    norepinephrine Infusion: 9 mL    propofol Infusion: 41.6 mL    sodium chloride 0.9%.: 120 mL    sodium chloride 0.9%.: 240 mL    Solution: 350 mL  Total IN: 1938.4 mL    OUT:    Chest Tube: 40 mL    Chest Tube: 190 mL    Indwelling Catheter - Urethral: 2960 mL    Nasoenteral Tube: 2000 mL  Total OUT: 5190 mL    Total NET: -3251.6 mL      15 Dec 2018 07:01  -  15 Dec 2018 23:50  --------------------------------------------------------  IN:    Albumin 5%  - 250 mL: 250 mL    dexmedetomidine Infusion: 343.5 mL    EPINEPHrine Infusion: 16 mL    furosemide Infusion: 29.6 mL    furosemide Infusion: 10 mL    milrinone  Infusion: 3.4 mL    norepinephrine Infusion: 87 mL    Packed Red Blood Cells: 295 mL    propofol Infusion: 146.3 mL    sodium chloride 0.9%.: 160 mL    sodium chloride 0.9%.: 80 mL    Solution: 300 mL    Solution: 50 mL    Solution: 100 mL    Solution: 100 mL    Solution: 250 mL  Total IN: 2220.8 mL    OUT:    Chest Tube: 40 mL    Chest Tube: 280 mL    Indwelling Catheter - Urethral: 1755 mL    Nasoenteral Tube: 100 mL  Total OUT: 2175 mL    Total NET: 45.8 mL                              8.9    8.8   )-----------( 426      ( 15 Dec 2018 03:16 )             28.4   12-15    151<H>  |  113<H>  |  41.0<H>  ----------------------------<  122<H>  3.8   |  24.0  |  0.95    Ca    7.4<L>      15 Dec 2018 16:56  Phos  4.3     12-15  Mg     2.4     12-15    TPro  6.2<L>  /  Alb  3.4  /  TBili  1.8  /  DBili  x   /  AST  69<H>  /  ALT  43<H>  /  AlkPhos  160<H>  12-15  aPTT: x    ; PT: 15.4 sec; INR: 1.33 ratio  12-15-18 @ 03:16       ABG - ( 15 Dec 2018 21:54 )  pH: 7.48  /  pCO2: 34    /  pO2: 81    / HCO3: 26    / Base Excess: 2.2   /  SaO2: 97   /  Lactate: x        CAPILLARY BLOOD GLUCOSE      POCT Blood Glucose.: 150 mg/dL (15 Dec 2018 21:01)        Medications:  acetaminophen    Suspension .. 650 milliGRAM(s) Oral every 6 hours PRN  ALBUTerol/ipratropium for Nebulization 3 milliLiter(s) Nebulizer every 6 hours PRN  amiodarone    Tablet 200 milliGRAM(s) Oral daily  artificial  tears Solution 1 Drop(s) Both EYES four times a day PRN  ascorbic acid 500 milliGRAM(s) Oral daily  aspirin 325 milliGRAM(s) Oral daily  atorvastatin 80 milliGRAM(s) Oral at bedtime  chlorhexidine 0.12% Liquid 5 milliLiter(s) Oral Mucosa every 4 hours  chlorproMAZINE    Tablet 25 milliGRAM(s) Oral three times a day PRN  clopidogrel Tablet 75 milliGRAM(s) Oral daily  dexmedetomidine Infusion 1.4 MICROgram(s)/kG/Hr IV Continuous <Continuous>  enoxaparin Injectable 40 milliGRAM(s) SubCutaneous daily  EPINEPHrine    Infusion 0.024 MICROgram(s)/kG/Min IV Continuous <Continuous>  ferrous    sulfate 325 milliGRAM(s) Oral daily  folic acid 1 milliGRAM(s) Oral daily  furosemide Infusion 10 mG/Hr IV Continuous <Continuous>  HYDROmorphone   Tablet 2 milliGRAM(s) Oral every 3 hours PRN  HYDROmorphone   Tablet 4 milliGRAM(s) Oral every 3 hours PRN  insulin lispro (HumaLOG) corrective regimen sliding scale   SubCutaneous every 4 hours  lactobacillus acidophilus 1 Tablet(s) Oral every 8 hours  multivitamin 1 Tablet(s) Oral daily  norepinephrine Infusion 0.05 MICROgram(s)/kG/Min IV Continuous <Continuous>  ondansetron Injectable 4 milliGRAM(s) IV Push once  pantoprazole  Injectable 40 milliGRAM(s) IV Push every 12 hours  petrolatum white Ointment 1 Application(s) Topical four times a day PRN  piperacillin/tazobactam IVPB. 3.375 Gram(s) IV Intermittent every 8 hours  propofol Infusion 7 MICROgram(s)/kG/Min IV Continuous <Continuous>  QUEtiapine 25 milliGRAM(s) Oral daily  QUEtiapine 50 milliGRAM(s) Oral <User Schedule>  sodium chloride 0.9%. 1000 milliLiter(s) IV Continuous <Continuous>  sodium chloride 0.9%. 1000 milliLiter(s) IV Continuous <Continuous>  vancomycin  IVPB          CXR:  pulmonary edema, all lines in place    Assessment:  Pt is a 48y year old Male  s/p Tracheostomy  Exploration of femoral artery  Insertion of Impella device  ECMO decannulation  Exploration and washout of mediastinum  Intra-aortic balloon pump removal  Arterial cannulation  Central line placement  [place] Michelle placement  CABG  ECMO (extracorporeal membrane oxygenation)          Plan:

## 2018-12-15 NOTE — PROGRESS NOTE ADULT - PROBLEM SELECTOR PLAN 2
pan culture  vanco/zosyn  call placed to ID, recommends zosyn will see patient in AM  trend lactate, fluid resuscitated limited in setting of acute decompensated hf with worsening hypoxemia and increased ventilator support to maintain adequate spo2. Continue to use levophed as needed for BP support

## 2018-12-15 NOTE — PROGRESS NOTE ADULT - PROBLEM SELECTOR PLAN 9
appreciate MICU input  currently attempting to resume circadian rhythm with Propofol at night and precedex during the day  seroquel QID, weaned per [facility] recommendations

## 2018-12-15 NOTE — PROGRESS NOTE ADULT - ASSESSMENT
48M presented in cardiogenic shock placed on [facility] ecmo subsequently decannulated and trached. Recent events have been trial off abx and wean of inotropic support as well as development of ileus and anemia requiring PRBC tranfusion.  Today patient febrile, hypoxic, tachypneic, and hypotensive requiring pressor support.  Likely septic shock with acute decompensated heart failure.  Patient condition guarded.

## 2018-12-15 NOTE — PROGRESS NOTE ADULT - SUBJECTIVE AND OBJECTIVE BOX
INTERVAL HPI/OVERNIGHT EVENTS: Patient given gastrograffin overnight via NGT, KUB this AM shows contrast in the colon. Per nursing, rectal tube still with dec output. Tube feeds currently being held    MEDICATIONS  (STANDING):  amiodarone    Tablet 200 milliGRAM(s) Oral daily  ascorbic acid 500 milliGRAM(s) Oral daily  aspirin 325 milliGRAM(s) Oral daily  atorvastatin 80 milliGRAM(s) Oral at bedtime  chlorhexidine 0.12% Liquid 5 milliLiter(s) Oral Mucosa every 4 hours  clopidogrel Tablet 75 milliGRAM(s) Oral daily  dexmedetomidine Infusion 1.4 MICROgram(s)/kG/Hr (31.745 mL/Hr) IV Continuous <Continuous>  enoxaparin Injectable 40 milliGRAM(s) SubCutaneous daily  ferrous    sulfate 325 milliGRAM(s) Oral daily  folic acid 1 milliGRAM(s) Oral daily  furosemide Infusion 5 mG/Hr (2.5 mL/Hr) IV Continuous <Continuous>  HYDROmorphone  Injectable 0.5 milliGRAM(s) IV Push once  insulin lispro (HumaLOG) corrective regimen sliding scale   SubCutaneous every 4 hours  lactobacillus acidophilus 1 Tablet(s) Oral every 8 hours  metoclopramide Injectable 10 milliGRAM(s) IV Push every 8 hours  milrinone Infusion 0.0625 MICROgram(s)/kG/Min (1.701 mL/Hr) IV Continuous <Continuous>  multivitamin 1 Tablet(s) Oral daily  ondansetron Injectable 4 milliGRAM(s) IV Push once  pantoprazole  Injectable 40 milliGRAM(s) IV Push every 12 hours  propofol Infusion 7 MICROgram(s)/kG/Min (3.809 mL/Hr) IV Continuous <Continuous>  QUEtiapine 25 milliGRAM(s) Oral daily  QUEtiapine 25 milliGRAM(s) Oral once  QUEtiapine 50 milliGRAM(s) Oral <User Schedule>  sodium chloride 0.9%. 1000 milliLiter(s) (5 mL/Hr) IV Continuous <Continuous>  sodium chloride 0.9%. 1000 milliLiter(s) (10 mL/Hr) IV Continuous <Continuous>    MEDICATIONS  (PRN):  acetaminophen    Suspension .. 650 milliGRAM(s) Oral every 6 hours PRN Temp greater or equal to 38.5C (101.3F)  ALBUTerol/ipratropium for Nebulization 3 milliLiter(s) Nebulizer every 6 hours PRN Shortness of Breath and/or Wheezing  artificial  tears Solution 1 Drop(s) Both EYES four times a day PRN Dry Eyes  chlorproMAZINE    Tablet 25 milliGRAM(s) Oral three times a day PRN hiccups  HYDROmorphone   Tablet 2 milliGRAM(s) Oral every 3 hours PRN Moderate Pain (4 - 6)  HYDROmorphone   Tablet 4 milliGRAM(s) Oral every 3 hours PRN Severe Pain (7 - 10)  petrolatum white Ointment 1 Application(s) Topical four times a day PRN dry lips      Vital Signs Last 24 Hrs  T(C): 39 (15 Dec 2018 11:00), Max: 39 (15 Dec 2018 11:00)  T(F): 102.2 (15 Dec 2018 11:00), Max: 102.2 (15 Dec 2018 11:00)  HR: 130 (15 Dec 2018 11:00) (76 - 130)  BP: --  BP(mean): --  RR: 32 (15 Dec 2018 11:00) (9 - 34)  SpO2: 77% (15 Dec 2018 11:00) (77% - 100%)    PE  Gen: AOX3, mild acute distress, Trach in place  Pulm: Non labored breathing, on CPAP, chest tube x 2 in place  Abd: Soft, moderate distension  Ext: +2 edema      I&O's Detail    14 Dec 2018 07:01  -  15 Dec 2018 07:00  --------------------------------------------------------  IN:    Albumin 5%  - 250 mL: 750 mL    dexmedetomidine Infusion: 253.2 mL    EPINEPHrine Infusion: 6 mL    furosemide Infusion: 22.8 mL    furosemide Infusion: 45 mL    Glucerna 1.5: 60 mL    milrinone  Infusion: 40.8 mL    norepinephrine Infusion: 9 mL    propofol Infusion: 41.6 mL    sodium chloride 0.9%.: 120 mL    sodium chloride 0.9%.: 240 mL    Solution: 350 mL  Total IN: 1938.4 mL    OUT:    Chest Tube: 40 mL    Chest Tube: 190 mL    Indwelling Catheter - Urethral: 2960 mL    Nasoenteral Tube: 2000 mL  Total OUT: 5190 mL    Total NET: -3251.6 mL          LABS:                        8.9    8.8   )-----------( 426      ( 15 Dec 2018 03:16 )             28.4     12-15    151<H>  |  111<H>  |  43.0<H>  ----------------------------<  140<H>  3.8   |  26.0  |  0.76    Ca    8.5<L>      15 Dec 2018 03:16  Phos  4.3     12-15  Mg     2.4     12-15    TPro  6.2<L>  /  Alb  3.4  /  TBili  1.8  /  DBili  x   /  AST  69<H>  /  ALT  43<H>  /  AlkPhos  160<H>  12-15    PT/INR - ( 15 Dec 2018 03:16 )   PT: 15.4 sec;   INR: 1.33 ratio               RADIOLOGY & ADDITIONAL STUDIES:

## 2018-12-15 NOTE — PROGRESS NOTE ADULT - SUBJECTIVE AND OBJECTIVE BOX
[facility]  [facility]  [address] [place] [address]  P   F     REASON FOR VISIT:  Follow up on resp failure/chf/cad  UPDATE:  Weaned off levophed today    Patient is still quite hypoxic, cxr pulm edema  Has epsiode of tachycardia   Now back on 100%  Patient had an episode of tachypnea and hypoxia  Treated for pulmonary edema    MEDICATIONS  (STANDING):  amiodarone    Tablet 200 milliGRAM(s) Oral daily  ascorbic acid 500 milliGRAM(s) Oral daily  aspirin 325 milliGRAM(s) Oral daily  atorvastatin 80 milliGRAM(s) Oral at bedtime  clopidogrel Tablet 75 milliGRAM(s) Oral daily  dexmedetomidine Infusion 1.4 MICROgram(s)/kG/Hr (31.745 mL/Hr) IV Continuous <Continuous>  enoxaparin Injectable 40 milliGRAM(s) SubCutaneous daily  ferrous    sulfate 325 milliGRAM(s) Oral daily  folic acid 1 milliGRAM(s) Oral daily    insulin lispro (HumaLOG) corrective regimen sliding scale   SubCutaneous every 4 hours  lactobacillus acidophilus 1 Tablet(s) Oral every 8 hours  metoclopramide Injectable 10 milliGRAM(s) IV Push every 8 hours  multivitamin 1 Tablet(s) Oral daily  ondansetron Injectable 4 milliGRAM(s) IV Push once  pantoprazole  Injectable 40 milliGRAM(s) IV Push every 12 hours  propofol Infusion 7 MICROgram(s)/kG/Min (3.809 mL/Hr) IV Continuous <Continuous>  QUEtiapine 25 milliGRAM(s) Oral daily  QUEtiapine 50 milliGRAM(s) Oral <User Schedule>  sodium chloride 0.9%. 1000 milliLiter(s) (5 mL/Hr) IV Continuous <Continuous>  sodium chloride 0.9%. 1000 milliLiter(s) (10 mL/Hr) IV Continuous <Continuous>    furosemide Infusion 5 mG/Hr (2.5 mL/Hr) IV Continuous <Continuous>  propofol Infusion 7 MICROgram(s)/kG/Min (3.809 mL/Hr) IV Continuous <Continuous>  dexmedetomidine Infusion 1.4 MICROgram(s)/kG/Hr (31.745 mL/Hr) IV Continuous <Continuous>  MEDICATIONS  (PRN):  acetaminophen    Suspension .. 650 milliGRAM(s) Oral every 6 hours PRN Temp greater or equal to 38.5C (101.3F)  ALBUTerol/ipratropium for Nebulization 3 milliLiter(s) Nebulizer every 6 hours PRN Shortness of Breath and/or Wheezing  artificial  tears Solution 1 Drop(s) Both EYES four times a day PRN Dry Eyes  chlorproMAZINE    Tablet 25 milliGRAM(s) Oral three times a day PRN hiccups  HYDROmorphone   Tablet 2 milliGRAM(s) Oral every 3 hours PRN Moderate Pain (4 - 6)  HYDROmorphone   Tablet 4 milliGRAM(s) Oral every 3 hours PRN Severe Pain (7 - 10)  petrolatum white Ointment 1 Application(s) Topical four times a day PRN dry lips      Vital Signs Last 24 Hrs  T(C): 38.1 (15 Dec 2018 14:00), Max: 39 (15 Dec 2018 11:00)  T(F): 100.6 (15 Dec 2018 14:00), Max: 102.2 (15 Dec 2018 11:00)  HR: 94 (15 Dec 2018 14:00) (79 - 137)  BP: --  BP(mean): --  RR: 29 (15 Dec 2018 14:00) (9 - 45)  SpO2: 97% (15 Dec 2018 14:00) (77% - 100%)      Heent  Short neck  trach inplace  Ngt left nostil  Chest  Rhonchi bilaterally  Heart  S1&s2 regualr no s3 or s4  No murmur appreciated  Abdomen  soft  faint bowel sounds  Ext  ++ edema  Neuro  Lethargic but alert jones answering simple questions by nodding head    LABS:  CBC Full  -  ( 15 Dec 2018 03:16 )  WBC Count : 8.8 K/uL  Hemoglobin : 8.9 g/dL  Hematocrit : 28.4 %  Platelet Count - Automated : 426 K/uL  Mean Cell Volume : 92.2 fl  Mean Cell Hemoglobin : 28.9 pg  Mean Cell Hemoglobin Concentration : 31.3 g/dL    12-15    151<H>  |  111<H>  |  43.0<H>  ----------------------------<  140<H>  3.8   |  26.0  |  0.76    Ca    8.5<L>      15 Dec 2018 03:16  Phos  4.3     12-15  Mg     2.4     12-15    TPro  6.2<L>  /  Alb  3.4  /  TBili  1.8  /  DBili  x   /  AST  69<H>  /  ALT  43<H>  /  AlkPhos  160<H>  12-15    < from: TTE Echo Complete w/Doppler (12.13.18 @ 11:08) >  Summary:   1. Technically difficult study. Limited information is available.   2. Endocardial visualization was enhanced with intravenous echo contrast.   3. Severely decreased global left ventricular systolic function.   4. Left ventricular ejection fraction, by visual estimation, is 20 to   25%.   5. Moderately reduced RV systolic function.   6. There is no evidence of pericardial effusion.   7. There is no evidence of LV apical thrombus.    < end of copied text >    [ ]  Catheterization:    < from: Cardiac Cath Lab - Adult (11.29.18 @ 11:22) >  CORONARY VESSELS: The coronary circulation is right dominant.  LM:   --  Ostial LM: There was a 20 % stenosis.  LAD:   --  Proximal LAD: There was a tubular 80 % stenosis at a site with  no prior intervention, in the distal third of the vessel segment. The  lesion was hazy, eccentric, and associated with a moderate filling defect  consistent with thrombus. There was FAZAL grade 0 flow through the vessel  (no flow) and a large vascular territory distal to the lesion. This lesion  is a likely culprit for the patient's recent myocardial infarction. It  appears amenable to percutaneous intervention.  --  Mid LAD: There was a 100 % stenosis.  --  D1: The vessel was small to medium sized. There was a 100 % stenosis.  --  D2: The vessel was large sized. There was a 100 % stenosis.  CX:   --  Circumflex: The vessel was medium sized. There was a tubular 80 %  stenosis in the middle third of the vessel segment. The lesion was  eccentric.  --  OM1: The vessel was medium to large sized. There was a tubular 90 %  stenosis in the middle third of the vessel segment. The lesion was hazy,  concentric, and without evidence of thrombus. There was FAZAL grade 1 flow  through the vessel (slow flow without perfusion).  RCA:   --  RCA: The vessel was large sized (dominant).  --  Proximal RCA: There was a tubular 80 % stenosis in the middle third of  the vessel segment.  --  Distal RCA: There was a tubular 99 % stenosis.  --  RPDA: The vessel was medium sized. Angiography showed mild  atherosclerosis with no flow limiting lesions.  --  RPLS: The vessel was medium to large sized. Angiography showed mild  atherosclerosis with no flow limiting lesions. There was a tubular 99 %  stenosis in the middle third of thevessel segment.  COMPLICATIONS: Ventricular fibrillation occurred, developed after no reflow  in the LAD and after appearance of filling defect in the left main.  Impella CP 3.5 was already in placed and patient was shocked with 300 J to  NSR  DIAGNOSTIC IMPRESSIONS: Severe Multivessel CAD with LAD being culprit for  anterior STEMI  Severe LV systolic dysfunction  Elevated left ventricular filling pressure  DIAGNOSTIC RECOMMENDATIONS: Given anterior STEMI, decision was to proceed  with primary PCI of the LAD and diagonal 2 for more timely reperfusion of  the LAD territory.  INTERVENTIONAL IMPRESSIONS: 100% LAD lesion was crossed using 0.014  Runthrough wire, 100% D2 lesion was crossed using a 0.014 BMW universal  wire . Multiple balloon angioplasty of the LAD and D2 was performed using  a 1.5 , 2.0 (LAD and diagonal 2) and 2.5 balllon (LAD) . This appeared to  be a bifurcation lesion Mejia 1,1,1 with both LAD and diagonal 2 being  equally sized vessel. Kissing balloon angioplasty was performed. There  were temporary flow in both vessels but then no reflow.  ITA stenting of LAD using 2.75 x 28 mm stent was done resulting in flow in  the LAD but absence of flow D2. D2 lesion was recrossed with 0.014 whisper  wire and ITA stenting was placed in the D2 restoring Diagonal flow but  resulted in loss of flow in the LAD stent. LAD stent was recrossed with a  0.014 runthrough wire and angioplasty performed with no reflow however.  Filling defect appeared in the left main that appeared to be a thrombus (  likely proximal thrombus extension from LAD) . Decision was to placed  Impella CP 3.5 L support and transfer the patient to OR for emergency CABG  INTERVENTIONAL RECOMMENDATIONS: Emergency CABG with grafts to the LAD, D2,  OM1 and RPDA.  Prepared and signed by  Patricio Shen MD [facility]  [facility]/[place]  [address] [place] [address]  P   F     REASON FOR VISIT:  Follow up on resp failure/chf/cad  UPDATE:  Weaned off levophed today    Patient is still quite hypoxic, cxr pulm edema  Has epsiode of tachycardia   Now back on 100%  Patient had an episode of tachypnea and hypoxia  Treated for pulmonary edema    MEDICATIONS  (STANDING):  amiodarone    Tablet 200 milliGRAM(s) Oral daily  ascorbic acid 500 milliGRAM(s) Oral daily  aspirin 325 milliGRAM(s) Oral daily  atorvastatin 80 milliGRAM(s) Oral at bedtime  clopidogrel Tablet 75 milliGRAM(s) Oral daily  dexmedetomidine Infusion 1.4 MICROgram(s)/kG/Hr (31.745 mL/Hr) IV Continuous <Continuous>  enoxaparin Injectable 40 milliGRAM(s) SubCutaneous daily  ferrous    sulfate 325 milliGRAM(s) Oral daily  folic acid 1 milliGRAM(s) Oral daily    insulin lispro (HumaLOG) corrective regimen sliding scale   SubCutaneous every 4 hours  lactobacillus acidophilus 1 Tablet(s) Oral every 8 hours  metoclopramide Injectable 10 milliGRAM(s) IV Push every 8 hours  multivitamin 1 Tablet(s) Oral daily  ondansetron Injectable 4 milliGRAM(s) IV Push once  pantoprazole  Injectable 40 milliGRAM(s) IV Push every 12 hours  propofol Infusion 7 MICROgram(s)/kG/Min (3.809 mL/Hr) IV Continuous <Continuous>  QUEtiapine 25 milliGRAM(s) Oral daily  QUEtiapine 50 milliGRAM(s) Oral <User Schedule>  sodium chloride 0.9%. 1000 milliLiter(s) (5 mL/Hr) IV Continuous <Continuous>  sodium chloride 0.9%. 1000 milliLiter(s) (10 mL/Hr) IV Continuous <Continuous>    furosemide Infusion 5 mG/Hr (2.5 mL/Hr) IV Continuous <Continuous>  propofol Infusion 7 MICROgram(s)/kG/Min (3.809 mL/Hr) IV Continuous <Continuous>  dexmedetomidine Infusion 1.4 MICROgram(s)/kG/Hr (31.745 mL/Hr) IV Continuous <Continuous>  MEDICATIONS  (PRN):  acetaminophen    Suspension .. 650 milliGRAM(s) Oral every 6 hours PRN Temp greater or equal to 38.5C (101.3F)  ALBUTerol/ipratropium for Nebulization 3 milliLiter(s) Nebulizer every 6 hours PRN Shortness of Breath and/or Wheezing  artificial  tears Solution 1 Drop(s) Both EYES four times a day PRN Dry Eyes  chlorproMAZINE    Tablet 25 milliGRAM(s) Oral three times a day PRN hiccups  HYDROmorphone   Tablet 2 milliGRAM(s) Oral every 3 hours PRN Moderate Pain (4 - 6)  HYDROmorphone   Tablet 4 milliGRAM(s) Oral every 3 hours PRN Severe Pain (7 - 10)  petrolatum white Ointment 1 Application(s) Topical four times a day PRN dry lips      Vital Signs Last 24 Hrs  T(C): 38.1 (15 Dec 2018 14:00), Max: 39 (15 Dec 2018 11:00)  T(F): 100.6 (15 Dec 2018 14:00), Max: 102.2 (15 Dec 2018 11:00)  HR: 94 (15 Dec 2018 14:00) (79 - 137)  BP: --  BP(mean): --  RR: 29 (15 Dec 2018 14:00) (9 - 45)  SpO2: 97% (15 Dec 2018 14:00) (77% - 100%)  Elevated PEEP and plateau pressures.       Heent  Short neck  trach inplace  Ngt left nostil  Chest  Rhonchi bilaterally  Heart  S1&s2 regualr no s3 or s4  No murmur appreciated  Abdomen  soft  faint bowel sounds  Ext  ++ edema  Neuro  Lethargic but alert jones answering simple questions by nodding head    LABS:  CBC Full  -  ( 15 Dec 2018 03:16 )  WBC Count : 8.8 K/uL  Hemoglobin : 8.9 g/dL  Hematocrit : 28.4 %  Platelet Count - Automated : 426 K/uL  Mean Cell Volume : 92.2 fl  Mean Cell Hemoglobin : 28.9 pg  Mean Cell Hemoglobin Concentration : 31.3 g/dL    12-15    151<H>  |  111<H>  |  43.0<H>  ----------------------------<  140<H>  3.8   |  26.0  |  0.76    Ca    8.5<L>      15 Dec 2018 03:16  Phos  4.3     12-15  Mg     2.4     12-15    TPro  6.2<L>  /  Alb  3.4  /  TBili  1.8  /  DBili  x   /  AST  69<H>  /  ALT  43<H>  /  AlkPhos  160<H>  12-15    < from: TTE Echo Complete w/Doppler (12.13.18 @ 11:08) >  Summary:   1. Technically difficult study. Limited information is available.   2. Endocardial visualization was enhanced with intravenous echo contrast.   3. Severely decreased global left ventricular systolic function.   4. Left ventricular ejection fraction, by visual estimation, is 20 to   25%.   5. Moderately reduced RV systolic function.   6. There is no evidence of pericardial effusion.   7. There is no evidence of LV apical thrombus.    < end of copied text >    [ ]  Catheterization:    < from: Cardiac Cath Lab - Adult (11.29.18 @ 11:22) >  CORONARY VESSELS: The coronary circulation is right dominant.  LM:   --  Ostial LM: There was a 20 % stenosis.  LAD:   --  Proximal LAD: There was a tubular 80 % stenosis at a site with  no prior intervention, in the distal third of the vessel segment. The  lesion was hazy, eccentric, and associated with a moderate filling defect  consistent with thrombus. There was FAZAL grade 0 flow through the vessel  (no flow) and a large vascular territory distal to the lesion. This lesion  is a likely culprit for the patient's recent myocardial infarction. It  appears amenable to percutaneous intervention.  --  Mid LAD: There was a 100 % stenosis.  --  D1: The vessel was small to medium sized. There was a 100 % stenosis.  --  D2: The vessel was large sized. There was a 100 % stenosis.  CX:   --  Circumflex: The vessel was medium sized. There was a tubular 80 %  stenosis in the middle third of the vessel segment. The lesion was  eccentric.  --  OM1: The vessel was medium to large sized. There was a tubular 90 %  stenosis in the middle third of the vessel segment. The lesion was hazy,  concentric, and without evidence of thrombus. There was FAZAL grade 1 flow  through the vessel (slow flow without perfusion).  RCA:   --  RCA: The vessel was large sized (dominant).  --  Proximal RCA: There was a tubular 80 % stenosis in the middle third of  the vessel segment.  --  Distal RCA: There was a tubular 99 % stenosis.  --  RPDA: The vessel was medium sized. Angiography showed mild  atherosclerosis with no flow limiting lesions.  --  RPLS: The vessel was medium to large sized. Angiography showed mild  atherosclerosis with no flow limiting lesions. There was a tubular 99 %  stenosis in the middle third of thevessel segment.  COMPLICATIONS: Ventricular fibrillation occurred, developed after no reflow  in the LAD and after appearance of filling defect in the left main.  Impella CP 3.5 was already in placed and patient was shocked with 300 J to  NSR  DIAGNOSTIC IMPRESSIONS: Severe Multivessel CAD with LAD being culprit for  anterior STEMI  Severe LV systolic dysfunction  Elevated left ventricular filling pressure  DIAGNOSTIC RECOMMENDATIONS: Given anterior STEMI, decision was to proceed  with primary PCI of the LAD and diagonal 2 for more timely reperfusion of  the LAD territory.  INTERVENTIONAL IMPRESSIONS: 100% LAD lesion was crossed using 0.014  Runthrough wire, 100% D2 lesion was crossed using a 0.014 BMW universal  wire . Multiple balloon angioplasty of the LAD and D2 was performed using  a 1.5 , 2.0 (LAD and diagonal 2) and 2.5 balllon (LAD) . This appeared to  be a bifurcation lesion Mejia 1,1,1 with both LAD and diagonal 2 being  equally sized vessel. Kissing balloon angioplasty was performed. There  were temporary flow in both vessels but then no reflow.  ITA stenting of LAD using 2.75 x 28 mm stent was done resulting in flow in  the LAD but absence of flow D2. D2 lesion was recrossed with 0.014 whisper  wire and ITA stenting was placed in the D2 restoring Diagonal flow but  resulted in loss of flow in the LAD stent. LAD stent was recrossed with a  0.014 runthrough wire and angioplasty performed with no reflow however.  Filling defect appeared in the left main that appeared to be a thrombus (  likely proximal thrombus extension from LAD) . Decision was to placed  Impella CP 3.5 L support and transfer the patient to OR for emergency CABG  INTERVENTIONAL RECOMMENDATIONS: Emergency CABG with grafts to the LAD, D2,  OM1 and RPDA.  Prepared and signed by  Patricio Shen MD

## 2018-12-16 DIAGNOSIS — R57.9 SHOCK, UNSPECIFIED: ICD-10-CM

## 2018-12-16 LAB
ANION GAP SERPL CALC-SCNC: 14 MMOL/L — SIGNIFICANT CHANGE UP (ref 5–17)
BUN SERPL-MCNC: 32 MG/DL — HIGH (ref 8–20)
BUN SERPL-MCNC: 33 MG/DL — HIGH (ref 8–20)
BUN SERPL-MCNC: 36 MG/DL — HIGH (ref 8–20)
CALCIUM SERPL-MCNC: 7.8 MG/DL — LOW (ref 8.6–10.2)
CALCIUM SERPL-MCNC: 8.1 MG/DL — LOW (ref 8.6–10.2)
CALCIUM SERPL-MCNC: 8.2 MG/DL — LOW (ref 8.6–10.2)
CHLORIDE SERPL-SCNC: 106 MMOL/L — SIGNIFICANT CHANGE UP (ref 98–107)
CHLORIDE SERPL-SCNC: 112 MMOL/L — HIGH (ref 98–107)
CHLORIDE SERPL-SCNC: 114 MMOL/L — HIGH (ref 98–107)
CK MB CFR SERPL CALC: 2.6 NG/ML — SIGNIFICANT CHANGE UP (ref 0–6.7)
CK SERPL-CCNC: 183 U/L — SIGNIFICANT CHANGE UP (ref 30–200)
CO2 SERPL-SCNC: 25 MMOL/L — SIGNIFICANT CHANGE UP (ref 22–29)
CO2 SERPL-SCNC: 26 MMOL/L — SIGNIFICANT CHANGE UP (ref 22–29)
CO2 SERPL-SCNC: 28 MMOL/L — SIGNIFICANT CHANGE UP (ref 22–29)
CREAT SERPL-MCNC: 0.72 MG/DL — SIGNIFICANT CHANGE UP (ref 0.5–1.3)
CREAT SERPL-MCNC: 0.76 MG/DL — SIGNIFICANT CHANGE UP (ref 0.5–1.3)
CREAT SERPL-MCNC: 0.83 MG/DL — SIGNIFICANT CHANGE UP (ref 0.5–1.3)
CULTURE RESULTS: SIGNIFICANT CHANGE UP
GAS PNL BLDA: SIGNIFICANT CHANGE UP
GLUCOSE BLDC GLUCOMTR-MCNC: 150 MG/DL — HIGH (ref 70–99)
GLUCOSE BLDC GLUCOMTR-MCNC: 157 MG/DL — HIGH (ref 70–99)
GLUCOSE BLDC GLUCOMTR-MCNC: 182 MG/DL — HIGH (ref 70–99)
GLUCOSE BLDC GLUCOMTR-MCNC: 193 MG/DL — HIGH (ref 70–99)
GLUCOSE BLDC GLUCOMTR-MCNC: 203 MG/DL — HIGH (ref 70–99)
GLUCOSE BLDC GLUCOMTR-MCNC: 208 MG/DL — HIGH (ref 70–99)
GLUCOSE SERPL-MCNC: 169 MG/DL — HIGH (ref 70–115)
GLUCOSE SERPL-MCNC: 172 MG/DL — HIGH (ref 70–115)
GLUCOSE SERPL-MCNC: 175 MG/DL — HIGH (ref 70–115)
GRAM STN FLD: SIGNIFICANT CHANGE UP
HCT VFR BLD CALC: 33 % — LOW (ref 42–52)
HGB BLD-MCNC: 10.2 G/DL — LOW (ref 14–18)
MAGNESIUM SERPL-MCNC: 2.5 MG/DL — SIGNIFICANT CHANGE UP (ref 1.6–2.6)
MAGNESIUM SERPL-MCNC: 2.7 MG/DL — HIGH (ref 1.6–2.6)
MCHC RBC-ENTMCNC: 27.8 PG — SIGNIFICANT CHANGE UP (ref 27–31)
MCHC RBC-ENTMCNC: 30.9 G/DL — LOW (ref 32–36)
MCV RBC AUTO: 89.9 FL — SIGNIFICANT CHANGE UP (ref 80–94)
PLATELET # BLD AUTO: 455 K/UL — HIGH (ref 150–400)
POTASSIUM SERPL-MCNC: 3.2 MMOL/L — LOW (ref 3.5–5.3)
POTASSIUM SERPL-MCNC: 3.3 MMOL/L — LOW (ref 3.5–5.3)
POTASSIUM SERPL-MCNC: 3.6 MMOL/L — SIGNIFICANT CHANGE UP (ref 3.5–5.3)
POTASSIUM SERPL-SCNC: 3.2 MMOL/L — LOW (ref 3.5–5.3)
POTASSIUM SERPL-SCNC: 3.3 MMOL/L — LOW (ref 3.5–5.3)
POTASSIUM SERPL-SCNC: 3.6 MMOL/L — SIGNIFICANT CHANGE UP (ref 3.5–5.3)
RBC # BLD: 3.67 M/UL — LOW (ref 4.6–6.2)
RBC # FLD: 16.6 % — HIGH (ref 11–15.6)
SODIUM SERPL-SCNC: 148 MMOL/L — HIGH (ref 135–145)
SODIUM SERPL-SCNC: 151 MMOL/L — HIGH (ref 135–145)
SODIUM SERPL-SCNC: 154 MMOL/L — HIGH (ref 135–145)
SPECIMEN SOURCE: SIGNIFICANT CHANGE UP
SPECIMEN SOURCE: SIGNIFICANT CHANGE UP
TROPONIN T SERPL-MCNC: 0.5 NG/ML — HIGH (ref 0–0.06)
WBC # BLD: 11.4 K/UL — HIGH (ref 4.8–10.8)
WBC # FLD AUTO: 11.4 K/UL — HIGH (ref 4.8–10.8)

## 2018-12-16 PROCEDURE — 71045 X-RAY EXAM CHEST 1 VIEW: CPT | Mod: 26,77

## 2018-12-16 PROCEDURE — 99291 CRITICAL CARE FIRST HOUR: CPT | Mod: 24

## 2018-12-16 PROCEDURE — 71045 X-RAY EXAM CHEST 1 VIEW: CPT | Mod: 26

## 2018-12-16 PROCEDURE — 99291 CRITICAL CARE FIRST HOUR: CPT

## 2018-12-16 PROCEDURE — 99233 SBSQ HOSP IP/OBS HIGH 50: CPT

## 2018-12-16 RX ORDER — MAGNESIUM SULFATE 500 MG/ML
2 VIAL (ML) INJECTION ONCE
Qty: 0 | Refills: 0 | Status: COMPLETED | OUTPATIENT
Start: 2018-12-16 | End: 2018-12-16

## 2018-12-16 RX ORDER — POTASSIUM CHLORIDE 20 MEQ
20 PACKET (EA) ORAL ONCE
Qty: 0 | Refills: 0 | Status: COMPLETED | OUTPATIENT
Start: 2018-12-16 | End: 2018-12-16

## 2018-12-16 RX ORDER — VANCOMYCIN HCL 1 G
1250 VIAL (EA) INTRAVENOUS EVERY 12 HOURS
Qty: 0 | Refills: 0 | Status: DISCONTINUED | OUTPATIENT
Start: 2018-12-16 | End: 2018-12-17

## 2018-12-16 RX ORDER — POTASSIUM CHLORIDE 20 MEQ
40 PACKET (EA) ORAL
Qty: 0 | Refills: 0 | Status: COMPLETED | OUTPATIENT
Start: 2018-12-16 | End: 2018-12-16

## 2018-12-16 RX ORDER — POTASSIUM CHLORIDE 20 MEQ
20 PACKET (EA) ORAL
Qty: 0 | Refills: 0 | Status: COMPLETED | OUTPATIENT
Start: 2018-12-16 | End: 2018-12-16

## 2018-12-16 RX ORDER — MEROPENEM 1 G/30ML
1000 INJECTION INTRAVENOUS EVERY 8 HOURS
Qty: 0 | Refills: 0 | Status: DISCONTINUED | OUTPATIENT
Start: 2018-12-16 | End: 2018-12-28

## 2018-12-16 RX ORDER — ALBUMIN HUMAN 25 %
100 VIAL (ML) INTRAVENOUS ONCE
Qty: 0 | Refills: 0 | Status: COMPLETED | OUTPATIENT
Start: 2018-12-16 | End: 2018-12-16

## 2018-12-16 RX ORDER — POTASSIUM CHLORIDE 20 MEQ
20 PACKET (EA) ORAL
Qty: 0 | Refills: 0 | Status: COMPLETED | OUTPATIENT
Start: 2018-12-16 | End: 2018-12-17

## 2018-12-16 RX ORDER — MEROPENEM 1 G/30ML
1000 INJECTION INTRAVENOUS ONCE
Qty: 0 | Refills: 0 | Status: COMPLETED | OUTPATIENT
Start: 2018-12-16 | End: 2018-12-16

## 2018-12-16 RX ORDER — MEROPENEM 1 G/30ML
INJECTION INTRAVENOUS
Qty: 0 | Refills: 0 | Status: DISCONTINUED | OUTPATIENT
Start: 2018-12-16 | End: 2018-12-28

## 2018-12-16 RX ADMIN — Medication 1 TABLET(S): at 05:19

## 2018-12-16 RX ADMIN — Medication 1 TABLET(S): at 15:16

## 2018-12-16 RX ADMIN — Medication 2: at 16:15

## 2018-12-16 RX ADMIN — Medication 100 MILLIEQUIVALENT(S): at 15:06

## 2018-12-16 RX ADMIN — Medication 4: at 08:30

## 2018-12-16 RX ADMIN — Medication 1 MILLIGRAM(S): at 11:09

## 2018-12-16 RX ADMIN — PANTOPRAZOLE SODIUM 40 MILLIGRAM(S): 20 TABLET, DELAYED RELEASE ORAL at 05:18

## 2018-12-16 RX ADMIN — Medication 100 MILLIEQUIVALENT(S): at 17:22

## 2018-12-16 RX ADMIN — Medication 3 MILLILITER(S): at 08:37

## 2018-12-16 RX ADMIN — CHLORHEXIDINE GLUCONATE 5 MILLILITER(S): 213 SOLUTION TOPICAL at 11:10

## 2018-12-16 RX ADMIN — Medication 100 MILLIEQUIVALENT(S): at 02:47

## 2018-12-16 RX ADMIN — Medication 100 MILLIEQUIVALENT(S): at 04:00

## 2018-12-16 RX ADMIN — Medication 50 GRAM(S): at 02:47

## 2018-12-16 RX ADMIN — PANTOPRAZOLE SODIUM 40 MILLIGRAM(S): 20 TABLET, DELAYED RELEASE ORAL at 17:22

## 2018-12-16 RX ADMIN — Medication 100 MILLIEQUIVALENT(S): at 05:16

## 2018-12-16 RX ADMIN — Medication 650 MILLIGRAM(S): at 16:16

## 2018-12-16 RX ADMIN — Medication 650 MILLIGRAM(S): at 23:55

## 2018-12-16 RX ADMIN — CHLORHEXIDINE GLUCONATE 5 MILLILITER(S): 213 SOLUTION TOPICAL at 14:21

## 2018-12-16 RX ADMIN — Medication 500 MILLIGRAM(S): at 11:09

## 2018-12-16 RX ADMIN — Medication 166.67 MILLIGRAM(S): at 18:22

## 2018-12-16 RX ADMIN — CHLORHEXIDINE GLUCONATE 5 MILLILITER(S): 213 SOLUTION TOPICAL at 05:18

## 2018-12-16 RX ADMIN — MEROPENEM 100 MILLIGRAM(S): 1 INJECTION INTRAVENOUS at 23:56

## 2018-12-16 RX ADMIN — Medication 50 MILLILITER(S): at 06:48

## 2018-12-16 RX ADMIN — Medication 2: at 12:19

## 2018-12-16 RX ADMIN — Medication 40 MILLIEQUIVALENT(S): at 02:47

## 2018-12-16 RX ADMIN — CHLORHEXIDINE GLUCONATE 5 MILLILITER(S): 213 SOLUTION TOPICAL at 17:23

## 2018-12-16 RX ADMIN — Medication 4: at 00:12

## 2018-12-16 RX ADMIN — Medication 2: at 20:40

## 2018-12-16 RX ADMIN — QUETIAPINE FUMARATE 50 MILLIGRAM(S): 200 TABLET, FILM COATED ORAL at 21:56

## 2018-12-16 RX ADMIN — Medication 325 MILLIGRAM(S): at 11:09

## 2018-12-16 RX ADMIN — Medication 100 MILLIEQUIVALENT(S): at 23:53

## 2018-12-16 RX ADMIN — ENOXAPARIN SODIUM 40 MILLIGRAM(S): 100 INJECTION SUBCUTANEOUS at 11:09

## 2018-12-16 RX ADMIN — PIPERACILLIN AND TAZOBACTAM 25 GRAM(S): 4; .5 INJECTION, POWDER, LYOPHILIZED, FOR SOLUTION INTRAVENOUS at 05:16

## 2018-12-16 RX ADMIN — Medication 100 MILLIEQUIVALENT(S): at 16:15

## 2018-12-16 RX ADMIN — Medication 2: at 23:56

## 2018-12-16 RX ADMIN — CLOPIDOGREL BISULFATE 75 MILLIGRAM(S): 75 TABLET, FILM COATED ORAL at 11:09

## 2018-12-16 RX ADMIN — Medication 40 MILLIEQUIVALENT(S): at 05:17

## 2018-12-16 RX ADMIN — Medication 1 TABLET(S): at 23:55

## 2018-12-16 RX ADMIN — Medication 1 TABLET(S): at 11:09

## 2018-12-16 RX ADMIN — QUETIAPINE FUMARATE 25 MILLIGRAM(S): 200 TABLET, FILM COATED ORAL at 05:17

## 2018-12-16 RX ADMIN — Medication 650 MILLIGRAM(S): at 15:17

## 2018-12-16 RX ADMIN — MEROPENEM 100 MILLIGRAM(S): 1 INJECTION INTRAVENOUS at 11:09

## 2018-12-16 RX ADMIN — Medication 166.67 MILLIGRAM(S): at 05:19

## 2018-12-16 RX ADMIN — AMIODARONE HYDROCHLORIDE 200 MILLIGRAM(S): 400 TABLET ORAL at 05:18

## 2018-12-16 RX ADMIN — CHLORHEXIDINE GLUCONATE 5 MILLILITER(S): 213 SOLUTION TOPICAL at 23:53

## 2018-12-16 RX ADMIN — ATORVASTATIN CALCIUM 80 MILLIGRAM(S): 80 TABLET, FILM COATED ORAL at 23:54

## 2018-12-16 RX ADMIN — CHLORHEXIDINE GLUCONATE 5 MILLILITER(S): 213 SOLUTION TOPICAL at 02:47

## 2018-12-16 NOTE — PROGRESS NOTE ADULT - PROBLEM SELECTOR PLAN 7
PRN transfusions for hemodynamics  as per attending transfuse for Hct less than 27 improved  continue to trend H/H daily

## 2018-12-16 NOTE — PROGRESS NOTE ADULT - PROBLEM SELECTOR PLAN 9
SC with GNR, continue zosyn for now  PO vanco d/c'd as c-diff was negative  blood cx remain negative, likely can d/c vanco (will d/w ID and Dr Barrett) continue with PT and OT

## 2018-12-16 NOTE — PROGRESS NOTE ADULT - SUBJECTIVE AND OBJECTIVE BOX
[facility]  INFECTIOUS DISEASES AND INTERNAL MEDICINE at [place]  =======================================================  Perfecto Santizo MD  Diplomates American Board of Internal Medicine and Infectious Diseases  =======================================================    [id number]  [facility]     follow up for:  post operative fevers and pnmeumonia  S/p trach with purulent discharge from trach and around it.   Febrile. Awake and responsive.   Diarrhea is better, c diff negative     REVIEW OF SYSTEMS:  unable to obtain due to medical condition    Antibiotics:  Vancomycin and zosyn     Physical Exam:  Vital Signs Last 24 Hrs  T(C): 37.5 (16 Dec 2018 09:00), Max: 39 (15 Dec 2018 11:00)  T(F): 99.5 (16 Dec 2018 09:00), Max: 102.2 (15 Dec 2018 11:00)  HR: 97 (16 Dec 2018 09:15) (74 - 137)  BP: 87/53 (15 Dec 2018 17:15) (87/53 - 87/53)  BP(mean): 64 (15 Dec 2018 17:15) (64 - 64)  RR: 36 (16 Dec 2018 09:15) (17 - 45)  SpO2: 96% (16 Dec 2018 09:15) (77% - 100%)  General:  awake and alert , has trach   Eye: Pupils are equal, round and reactive to light,   HENT: Normocephalic, trach+ with pus around it  Neck: Supple, No lymphadenopathy.  RIGHT neck IJ line  Respiratory: Lungs with coarse rhonchi bilaterally.   midline chest with dressing in place.   + CHEST tubes in place  Cardiovascular: Normal rate, Regular rhythm  Gastrointestinal: Soft, Non-tender, Non-distended, Normal bowel sounds.  Genitourinary: + Mckeon with light color urine  Lymphatics: No lymphadenopathy neck  Musculoskeletal: edema in all extremities   Integumentary: No rash.  Neurologic: Alert,      Labs:  12-16    154<H>  |  114<H>  |  36.0<H>  ----------------------------<  175<H>  3.3<L>   |  26.0  |  0.72    Ca    7.8<L>      16 Dec 2018 02:27  Phos  4.3     12-15  Mg     2.5     12-16    TPro  6.2<L>  /  Alb  3.4  /  TBili  1.8  /  DBili  x   /  AST  69<H>  /  ALT  43<H>  /  AlkPhos  160<H>  12-15                          10.2   11.4  )-----------( 455      ( 16 Dec 2018 02:27 )             33.0     PT/INR - ( 15 Dec 2018 03:16 )   PT: 15.4 sec;   INR: 1.33 ratio      LIVER FUNCTIONS - ( 15 Dec 2018 03:16 )  Alb: 3.4 g/dL / Pro: 6.2 g/dL / ALK PHOS: 160 U/L / ALT: 43 U/L / AST: 69 U/L / GGT: x           CARDIAC MARKERS ( 16 Dec 2018 02:27 )  x     / 0.50 ng/mL / 183 U/L / x     / 2.6 ng/mL  CARDIAC MARKERS ( 15 Dec 2018 16:56 )  x     / 0.76 ng/mL / 270 U/L / x     / 3.5 ng/mL    ABG - ( 16 Dec 2018 06:58 )  pH, Arterial: 7.54  pH, Blood: x     /  pCO2: 30    /  pO2: 91    / HCO3: 28    / Base Excess: 4.0   /  SaO2: 98        RECENT CULTURES:  12-14 @ 13:53 .Catheter left IJ (internal jugular) catheter tip     No growth at 2 days.    12-12 @ 16:19 .Sputum     Few Routine respiratory keara present    Few White blood cells  Few Gram Positive Cocci in Pairs and Chains    12-11 @ 20:24 .Broncial bronchial fluid       12-11 @ 10:49 .Broncial bronchial fluid     Rare Candida albicans  Rare Routine respiratory keara present    Numerous white blood cells  No organisms seen    12-09 @ 18:56 .Blood     No growth at 5 days.    12-08 @ 19:40 .Sputum Klebsiella pneumoniae    Moderate Klebsiella pneumoniae  No Routine respiratory keara present    Moderate WBC's  Few Yeast  Few Gram positive cocci in pairs    12-07 @ 22:28 .Urine     No growth    12-07 @ 22:26 .Blood     No growth at 5 days.    12-07 @ 22:25 .Blood     No growth at 5 days.    12-05 @ 10:21 .Blood     No growth at 5 days.    12-04 @ 10:22 .Blood     No growth at 5 days.    12-04 @ 10:21 .Sputum     Few Candida albicans  No Routine respiratory keara present    Few White blood cells  No organisms seen

## 2018-12-16 NOTE — PROGRESS NOTE ADULT - ASSESSMENT
48 yo Male c/o CP for 2-3 days prior to admission, then 10/10 chest pain approximately 9:30 am yesterday, followed by vomiting.  Wife drove pt to hospital, ruled in for STEMI. Urgent Cath, pt found to have multiple occlusions; stent to LAD and D2; stent to LAD thrombosed,  impella placed for support.  Pt then had VF arrest, shock x 1, return to NSR.  Upon transfer to OR for Urgent CABG, pt with asystolic arrest, chest opened intra-op with CPR in progress. Pt underwent C4V (LAD, Diag, OM and PDA) with inability to wean from CPB therefore requiring VA ECMO (central cannulation) and IABP. , he was transferred to CICU on multiple gtts, now s/p explant 12/3 with placement of impella via R groin sheath. 12/4 ID consulted started on broad spectrum abx, 12/5 Impella weaned. 12/6 Impella removal via right femoral cutdown with primary repair of femoral artery and stent placed to external illiac artery for dissection, PRBC x1. 12/8 Pt underwent head CT, no acute intracranial process noted, chest CT b/l pneumothoraces R-20%, L-10%, b/l chest tubes in place, confirmed infiltrates on right lower and left upper lung.  ABX Vanco and Zosyn course extended, f/u sputum Cx.  Hospital course notable for persistent cardiogenic shock/LV failure, vent dependent respiratory failure (inability to wean also contributed to by severe agitation, now s/p trach), sepsis likely d/t GNR VAP.  12/14 + ileus, 12/15 ileus improved, septic and cardiogenic shock.

## 2018-12-16 NOTE — PROGRESS NOTE ADULT - PROBLEM SELECTOR PLAN 1
aggressive diuresis  resume inotropic support per Dr. Arnold bishop  follow lytes, repeat labs, trend CVP hemodynamics improved with resumption of inotropes and pressors on 12/15  continue epi at current rate for now  wean levophed as tolerated to maintain MAP 65-75  continue with diuresis with lasix infusion, improved since zaroxolyn given

## 2018-12-16 NOTE — PROGRESS NOTE ADULT - PROBLEM SELECTOR PLAN 2
responded to diuresis. ct diuresis.   Avoid agitation.  Other possibility patient is ischemic. - trops and CPK unremarkable   ct trach. ct current vent settings. -CPAP.   CT scan of chest once xray returns to baseline to evaluate for PNA/atelecvtasis.  abx.

## 2018-12-16 NOTE — PROGRESS NOTE ADULT - ASSESSMENT
48y/o man with no significant PMH admitted on 11/29/18 for CP for 2-3 days prior to admission,   found with STEMI. Urgent Cath, pt found to have multiple occlusions; VF arrest, shock x 1, return to NSR.  Upon transfer to OR for Urgent CABG, pt again VF arrest, chest opened intra-op with CPR in progress.  s/p ECMO and Impella; 12/3 ECMO explanted and Impella placed via right groin sheath.    Has been treated for fever empirically few times. Was off Abx until last night, restarted due to persistent fever. CXR wtih   Congestion and possible infiltration.   No positive cultures     Pneumonia  Fever post op  s/p CABG   Diarrhea    S/P tracheostomy     - Blood and urine cultures are sent on 11/15, follow the results  - Mild leukocytosis and febrile to 102.2, trend them  - Deep suction for sputum culture   - Stop zosyn and start meropenem 1gm q8h  - Continue vancomycin 1.25gm q12h and send trough tomorrow morning, keep 15 to 20     Will follow.

## 2018-12-16 NOTE — PROGRESS NOTE ADULT - PROBLEM SELECTOR PLAN 1
multifactorial. sepsis, cardiogenic. Responded to epinephrine, diuresis,  CT scan chest once stable to go down to look for collection.   Blood cultures, ID evaluation continues. Abx.    if continues to have fever, despite adequate abx and no source found. then consider dresslers syndrome and consider colchicine 0.6 Q12.

## 2018-12-16 NOTE — PROGRESS NOTE ADULT - PROBLEM SELECTOR PLAN 2
pan culture  vanco/zosyn  call placed to ID, recommends zosyn will see patient in AM  trend lactate, fluid resuscitated limited in setting of acute decompensated hf with worsening hypoxemia and increased ventilator support to maintain adequate spo2. Continue to use levophed as needed for BP support recultured on 12/15  continue vancomycin, adjust dosing per trough  zosyn changed to meropenem as d/w ID  tylenol PRN for fevers

## 2018-12-16 NOTE — PROGRESS NOTE ADULT - PROBLEM SELECTOR PLAN 5
s/p CAGB EF 20%  cannot tolerate beta-blocker due to low BP.   Antianginal ranexa 500 Q12,    Deep venous thrombosis prophylaxis: heparin or lovenox SQ.   Aspirin and plavix.

## 2018-12-16 NOTE — PROGRESS NOTE ADULT - PROBLEM SELECTOR PLAN 4
peep to 10  abg  repeat cxr  wean fi02 as tolerated  hob 40 degrees  aggressive chest pt, suctioning prn improved

## 2018-12-16 NOTE — PROGRESS NOTE ADULT - PROBLEM SELECTOR PLAN 5
s/p trach  may benefit from addition of xanax ATC or seroquel, will d/w Dr Barrett s/p trach  continue vent support  if hemodynamics remain improved and can restart PS/CPAP trials as tolerated 12/17

## 2018-12-16 NOTE — PROGRESS NOTE ADULT - SUBJECTIVE AND OBJECTIVE BOX
Brief Hospital Course:    STEMI, emergently to cath lab, left main thrombus, cardiac arrest requiring defib, intubated in cath lab, impella placed which clotted off on way to OR and was subsequently removed in OR. Crashed onto CPB for CABG d/t hemodynamic collapse. TO CTICU post CABG with central ECMO and open chest.  12/3 ECMO explanted and IABP removed, impella inserted   impella removed via R femoral cutdown with repair and external iliac stent  12/10 s/p trach for prolonged respiratory failure   abd distention, TF held, 2L NGT output, +ileus on AXR, gastrographin study done  12/15 ileus resolved, resumed for meds via NGT. Respiratory distress, febrile, septic and cardiogenic shock requiring restarting of abx, epinephrine and norepinephrine infusions   bilat pleural CTs d/c'd, more awake and alert    Subjective/review of Systems: pt mouthing he is more comfortable, difficult to obtain full ROS d/u trach      Patient is a 48y old  Male who presents with a chief complaint of Pulmonary edema (16 Dec 2018 22:40)    HPI:  This is a 48 y/o male no significant PMH; recent ABT (doxycycline) r/t cellulitis to right groin per pt. Pt presented to the ED with 10/10 chest pain that he reported started at 0930; he drank water with no relief; pain started to progress to B/L shoulders. Per pt spouse, she reports he has had chest pain x2-3 days and this morning he vomited which he attributed to reflux.  She brought him into the emergency department.    Pt has significant EKG changes ST elevations in leads V1-V5 with reciprocal changes in II, III, AVF.  Pt rec'd asa and plavix load in ED. (2018 11:32)    PAST MEDICAL & SURGICAL HISTORY:  Staph infection  No significant past surgical history    FAMILY HISTORY:  Family history of myocardial infarction (Mother): mother;       Vitals   ICU Vital Signs Last 24 Hrs  T(C): 38.4 (tmax 38.8 12/15 @ 1800)  HR: SR 80s  ABP: 110s-120s/50-70s  RR: 20s (12/15 max rate 30-40s)  SpO2: 99%       VENT SETTINGS   Mode: AC/ CMV (Assist Control/ Continuous Mandatory Ventilation)  RR (machine): 12  TV (machine): 600  FiO2: 40  PEEP: 10  MAP: 16  PIP: 34      I&O's Detail    15 Dec 2018 07:01  -  16 Dec 2018 07:00  --------------------------------------------------------  IN:    Albumin 5%  - 250 mL: 250 mL    dexmedetomidine Infusion: 527.5 mL    Enteral Tube Flush: 100 mL    EPINEPHrine Infusion: 80 mL    furosemide Infusion: 5 mL    furosemide Infusion: 29.6 mL    furosemide Infusion: 30 mL    milrinone  Infusion: 3.4 mL    norepinephrine Infusion: 175 mL    Packed Red Blood Cells: 295 mL    propofol Infusion: 259.3 mL    sodium chloride 0.9%: 120 mL    sodium chloride 0.9%: 240 mL    Solution: 100 mL    Solution: 150 mL    Solution: 700 mL    Solution: 500 mL    Solution: 100 mL  Total IN: 3664.8 mL    OUT:    Chest Tube: 350 mL    Chest Tube: 50 mL    Indwelling Catheter - Urethral: 3905 mL    Nasoenteral Tube: 100 mL  Total OUT: 4405 mL    Total NET: -740.2 mL      LABS  Complete Blood Count (18 @ 02:27)    WBC Count: 11.4 K/uL    RBC Count: 3.67 M/uL    Hemoglobin: 10.2 g/dL    Hematocrit: 33.0 %    Mean Cell Volume: 89.9 fl    Mean Cell Hemoglobin: 27.8 pg    Mean Cell Hemoglobin Conc: 30.9 g/dL    Red Cell Distrib Width: 16.6 %    Platelet Count - Automated: 455 K/uL    Basic Metabolic Panel - STAT (18 @ 13:13)    Sodium, Serum: 151 mmol/L    Potassium, Serum: 3.6 mmol/L    Chloride, Serum: 112 mmol/L    Carbon Dioxide, Serum: 25.0 mmol/L    Anion Gap, Serum: 14 mmol/L    Blood Urea Nitrogen, Serum: 32.0 mg/dL    Creatinine, Serum: 0.76 mg/dL    Glucose, Serum: 169 mg/dL    Calcium, Total Serum: 8.1 mg/dL          Blood Gas Profile - Arterial (18 @ 06:58)    pH, Arterial: 7.54    pCO2, Arterial: 30 mmHg    pO2, Arterial: 91 mmHg    HCO3, Arterial: 28 mmoL/L    Base Excess, Arterial: 4.0 mmol/L    Oxygen Saturation, Arterial: 98 %    Blood Gas Comments Arterial: AC 18/600/40%/+10    Blood Gas Source Arterial: Arterial    POCT Blood Glucose.: 158 mg/dL (18 @ 03:44)  POCT Blood Glucose.: 193 mg/dL (18 @ 20:06)  POCT Blood Glucose.: 182 mg/dL (18 @ 16:12)  POCT Blood Glucose.: 157 mg/dL (18 @ 11:45)  POCT Blood Glucose.: 208 mg/dL (18 @ 08:27)      < from: Xray Chest 1 View AP/PA. (18 @ 16:25) >  FINDINGS:  LINES/TUBES: Unchanged endotracheal tube. Unchanged enteric tube coursing below the diaphragm and tip outside the field of view of this radiograph. Unchanged right internal jugular central venous catheter with tip overlying the superior vena cava.  LUNGS/PLEURA: New small left pneumothorax. Diffuse bilateral airspace opacities.  MEDIASTINUM: Heart size cannot adequately be assessed on this projection.  OTHER: Sternotomy.  IMPRESSION:   New small left pneumothorax.  Diffuse bilateral airspace opacities.  < end of copied text >      MEDICATIONS  (STANDING):  acetaminophen  IVPB .. 1000 milliGRAM(s) IV Intermittent once  amiodarone    Tablet 200 milliGRAM(s) Oral daily  ascorbic acid 500 milliGRAM(s) Oral daily  aspirin 325 milliGRAM(s) Oral daily  atorvastatin 80 milliGRAM(s) Oral at bedtime  chlorhexidine 0.12% Liquid 5 milliLiter(s) Oral Mucosa every 4 hours  clopidogrel Tablet 75 milliGRAM(s) Oral daily  dexmedetomidine Infusion 1.4 MICROgram(s)/kG/Hr (31.745 mL/Hr) IV Continuous <Continuous>  enoxaparin Injectable 40 milliGRAM(s) SubCutaneous daily  EPINEPHrine    Infusion 0.024 MICROgram(s)/kG/Min (8 mL/Hr) IV Continuous <Continuous>  ferrous    sulfate 325 milliGRAM(s) Oral daily  folic acid 1 milliGRAM(s) Oral daily  furosemide Infusion 2.5 mG/Hr (1.25 mL/Hr) IV Continuous <Continuous>  insulin lispro (HumaLOG) corrective regimen sliding scale   SubCutaneous every 4 hours  lactobacillus acidophilus 1 Tablet(s) Oral every 8 hours  meropenem  IVPB      meropenem  IVPB 1000 milliGRAM(s) IV Intermittent every 8 hours  multivitamin 1 Tablet(s) Oral daily  norepinephrine Infusion 0.05 MICROgram(s)/kG/Min (8.503 mL/Hr) IV Continuous <Continuous>  ondansetron Injectable 4 milliGRAM(s) IV Push once  pantoprazole  Injectable 40 milliGRAM(s) IV Push every 12 hours  propofol Infusion 7 MICROgram(s)/kG/Min (3.809 mL/Hr) IV Continuous <Continuous>  QUEtiapine 25 milliGRAM(s) Oral daily  QUEtiapine 50 milliGRAM(s) Oral <User Schedule>  vancomycin  IVPB 1250 milliGRAM(s) IV Intermittent every 12 hours    MEDICATIONS  (PRN):  acetaminophen    Suspension .. 650 milliGRAM(s) Oral every 6 hours PRN Temp greater or equal to 38.5C (101.3F)  ALBUTerol/ipratropium for Nebulization 3 milliLiter(s) Nebulizer every 6 hours PRN Shortness of Breath and/or Wheezing  artificial  tears Solution 1 Drop(s) Both EYES four times a day PRN Dry Eyes  HYDROmorphone   Tablet 2 milliGRAM(s) Oral every 3 hours PRN Moderate Pain (4 - 6)  HYDROmorphone   Tablet 4 milliGRAM(s) Oral every 3 hours PRN Severe Pain (7 - 10)  petrolatum white Ointment 1 Application(s) Topical four times a day PRN dry lips    Allergies: penicillins (Unknown)      Physical Exam:   Neuro: awake and alert, mouths to communicate needs, follows commands  HEENT: PERRL, ETT and OGT changed to trach and NGT  Neck: RIJ TLC with site C/D/I.  CV: regular rate, regular rhythm, +S1S2  Pulm/chest: clear on right, coarse on left, MSI C/D/I with dresssing  Abd: soft, ND, NT, + BS  Ext: anasarca, able to move all extremities though remains weak (3/5 x 4)      Code Status: full code      Critical care time spent: __43__minutes (reviewing chart including medication, labs and imaging results, discussions with interdisciplinary team, discussing goals of care/advanced directives, counseling patient and/or family, non-inclusive of procedures)

## 2018-12-16 NOTE — PROGRESS NOTE ADULT - PROBLEM SELECTOR PLAN 3
npo  ngt to ilws  gen surgery note appreciated improved  low dose tube feeds restarted, if tolerates advance to goal tomorrow  rectal tube d/c'd

## 2018-12-16 NOTE — PROGRESS NOTE ADULT - SUBJECTIVE AND OBJECTIVE BOX
[facility]  [facility]  [address] [place] [address]  P   F     REASON FOR VISIT:  Follow up on resp failure/chf/cad, cardiogenic shock.   UPDATE:  responded to diuretics. started on antibiotics.    significant imporvement after diuresis. Responded to xaroxylyn.     ROS: cannot be obtained.  patient intubated.     MEDICATIONS  (STANDING):  amiodarone    Tablet 200 milliGRAM(s) Oral daily  EPINEPHrine    Infusion 0.024 MICROgram(s)/kG/Min IV Continuous <Continuous>  furosemide Infusion 5 mG/Hr IV Continuous <Continuous>  norepinephrine Infusion 0.05 MICROgram(s)/kG/Min IV Continuous <Continuous>    pantoprazole  Injectable  meropenem  IVPB  meropenem  IVPB  vancomycin  IVPB  ascorbic acid  aspirin  atorvastatin  chlorhexidine 0.12% Liquid  clopidogrel Tablet  dexmedetomidine Infusion  enoxaparin Injectable  ferrous    sulfate  folic acid  insulin lispro (HumaLOG) corrective regimen sliding scale  lactobacillus acidophilus  multivitamin  ondansetron Injectable  propofol Infusion  QUEtiapine  QUEtiapine    PRN: acetaminophen    Suspension .. PRN  ALBUTerol/ipratropium for Nebulization PRN  artificial  tears Solution PRN  chlorproMAZINE    Tablet PRN  HYDROmorphone   Tablet PRN  HYDROmorphone   Tablet PRN  petrolatum white Ointment PRN      Vital Signs Last 24 Hrs  Vital Signs Last 24 Hrs  T(C): 39.1 (12-16-18 @ 21:00), Max: 39.1 (12-16-18 @ 21:00)  T(F): 102.4 (12-16-18 @ 21:00), Max: 102.4 (12-16-18 @ 21:00)  HR: 101 (12-16-18 @ 22:30) (85 - 107)  BP: --  BP(mean): --  RR: 10 (12-16-18 @ 22:30) (10 - 53)  SpO2: 100% (12-16-18 @ 22:30) (94% - 100%)    Heent  Short neck  trach inplace  Ngt left nostil  Chest  Rhonchi bilaterally significant improvement from yesterday.   Heart  S1&s2 regualr no s3 or s4  No murmur appreciated  Abdomen  soft  faint bowel sounds  Ext  ++ edema  Neuro  Lethargic but alert jones answering simple questions by nodding head                              10.2   11.4  )-----------( 455      ( 16 Dec 2018 02:27 )             33.0   N=x    ; L=x        16 Dec 2018 22:04    148    |  106    |  33.0   ----------------------------<  172    3.2     |  28.0   |  0.83     Ca    8.2        16 Dec 2018 22:04  Phos  4.3       15 Dec 2018 03:16  Mg     2.7       16 Dec 2018 13:13    TPro  6.2    /  Alb  3.4    /  TBili  1.8    /  DBili  x      /  AST  69     /  ALT  43     /  AlkPhos  160    15 Dec 2018 03:16      Hepatic panel: 15 Dec 2018 03:16  6.2   | 3.4                            1.8   | 1.8  /x                              69    | 43                                /160  \par                               <H>  /  AlkPhos  160<H>  12-15    < from: TTE Echo Complete w/Doppler (12.13.18 @ 11:08) >  Summary:   1. Technically difficult study. Limited information is available.   2. Endocardial visualization was enhanced with intravenous echo contrast.   3. Severely decreased global left ventricular systolic function.   4. Left ventricular ejection fraction, by visual estimation, is 20 to   25%.   5. Moderately reduced RV systolic function.   6. There is no evidence of pericardial effusion.   7. There is no evidence of LV apical thrombus.    < end of copied text >    [ ]  Catheterization:    < from: Cardiac Cath Lab - Adult (11.29.18 @ 11:22) >  CORONARY VESSELS: The coronary circulation is right dominant.  LM:   --  Ostial LM: There was a 20 % stenosis.  LAD:   --  Proximal LAD: There was a tubular 80 % stenosis at a site with  no prior intervention, in the distal third of the vessel segment. The  lesion was hazy, eccentric, and associated with a moderate filling defect  consistent with thrombus. There was FAZAL grade 0 flow through the vessel  (no flow) and a large vascular territory distal to the lesion. This lesion  is a likely culprit for the patient's recent myocardial infarction. It  appears amenable to percutaneous intervention.  --  Mid LAD: There was a 100 % stenosis.  --  D1: The vessel was small to medium sized. There was a 100 % stenosis.  --  D2: The vessel was large sized. There was a 100 % stenosis.  CX:   --  Circumflex: The vessel was medium sized. There was a tubular 80 %  stenosis in the middle third of the vessel segment. The lesion was  eccentric.  --  OM1: The vessel was medium to large sized. There was a tubular 90 %  stenosis in the middle third of the vessel segment. The lesion was hazy,  concentric, and without evidence of thrombus. There was FAZAL grade 1 flow  through the vessel (slow flow without perfusion).  RCA:   --  RCA: The vessel was large sized (dominant).  --  Proximal RCA: There was a tubular 80 % stenosis in the middle third of  the vessel segment.  --  Distal RCA: There was a tubular 99 % stenosis.  --  RPDA: The vessel was medium sized. Angiography showed mild  atherosclerosis with no flow limiting lesions.  --  RPLS: The vessel was medium to large sized. Angiography showed mild  atherosclerosis with no flow limiting lesions. There was a tubular 99 %  stenosis in the middle third of thevessel segment.  COMPLICATIONS: Ventricular fibrillation occurred, developed after no reflow  in the LAD and after appearance of filling defect in the left main.  Impella CP 3.5 was already in placed and patient was shocked with 300 J to  NSR  DIAGNOSTIC IMPRESSIONS: Severe Multivessel CAD with LAD being culprit for  anterior STEMI  Severe LV systolic dysfunction  Elevated left ventricular filling pressure  DIAGNOSTIC RECOMMENDATIONS: Given anterior STEMI, decision was to proceed  with primary PCI of the LAD and diagonal 2 for more timely reperfusion of  the LAD territory.  INTERVENTIONAL IMPRESSIONS: 100% LAD lesion was crossed using 0.014  Runthrough wire, 100% D2 lesion was crossed using a 0.014 BMW universal  wire . Multiple balloon angioplasty of the LAD and D2 was performed using  a 1.5 , 2.0 (LAD and diagonal 2) and 2.5 balllon (LAD) . This appeared to  be a bifurcation lesion Mejia 1,1,1 with both LAD and diagonal 2 being  equally sized vessel. Kissing balloon angioplasty was performed. There  were temporary flow in both vessels but then no reflow.  ITA stenting of LAD using 2.75 x 28 mm stent was done resulting in flow in  the LAD but absence of flow D2. D2 lesion was recrossed with 0.014 whisper  wire and ITA stenting was placed in the D2 restoring Diagonal flow but  resulted in loss of flow in the LAD stent. LAD stent was recrossed with a  0.014 runthrough wire and angioplasty performed with no reflow however.  Filling defect appeared in the left main that appeared to be a thrombus (  likely proximal thrombus extension from LAD) . Decision was to placed  Impella CP 3.5 L support and transfer the patient to OR for emergency CABG  INTERVENTIONAL RECOMMENDATIONS: Emergency CABG with grafts to the LAD, D2,  OM1 and RPDA.  Prepared and signed by  Patricio Shen MD

## 2018-12-17 DIAGNOSIS — I25.10 ATHEROSCLEROTIC HEART DISEASE OF NATIVE CORONARY ARTERY WITHOUT ANGINA PECTORIS: ICD-10-CM

## 2018-12-17 DIAGNOSIS — E87.0 HYPEROSMOLALITY AND HYPERNATREMIA: ICD-10-CM

## 2018-12-17 DIAGNOSIS — G72.81 CRITICAL ILLNESS MYOPATHY: ICD-10-CM

## 2018-12-17 LAB
ANION GAP SERPL CALC-SCNC: 14 MMOL/L — SIGNIFICANT CHANGE UP (ref 5–17)
ANION GAP SERPL CALC-SCNC: 15 MMOL/L — SIGNIFICANT CHANGE UP (ref 5–17)
BUN SERPL-MCNC: 27 MG/DL — HIGH (ref 8–20)
BUN SERPL-MCNC: 31 MG/DL — HIGH (ref 8–20)
CALCIUM SERPL-MCNC: 8.1 MG/DL — LOW (ref 8.6–10.2)
CALCIUM SERPL-MCNC: 8.1 MG/DL — LOW (ref 8.6–10.2)
CHLORIDE SERPL-SCNC: 104 MMOL/L — SIGNIFICANT CHANGE UP (ref 98–107)
CHLORIDE SERPL-SCNC: 98 MMOL/L — SIGNIFICANT CHANGE UP (ref 98–107)
CO2 SERPL-SCNC: 28 MMOL/L — SIGNIFICANT CHANGE UP (ref 22–29)
CO2 SERPL-SCNC: 29 MMOL/L — SIGNIFICANT CHANGE UP (ref 22–29)
CREAT SERPL-MCNC: 0.63 MG/DL — SIGNIFICANT CHANGE UP (ref 0.5–1.3)
CREAT SERPL-MCNC: 0.79 MG/DL — SIGNIFICANT CHANGE UP (ref 0.5–1.3)
CULTURE RESULTS: NO GROWTH — SIGNIFICANT CHANGE UP
GAS PNL BLDA: SIGNIFICANT CHANGE UP
GLUCOSE BLDC GLUCOMTR-MCNC: 158 MG/DL — HIGH (ref 70–99)
GLUCOSE BLDC GLUCOMTR-MCNC: 187 MG/DL — HIGH (ref 70–99)
GLUCOSE BLDC GLUCOMTR-MCNC: 188 MG/DL — HIGH (ref 70–99)
GLUCOSE BLDC GLUCOMTR-MCNC: 228 MG/DL — HIGH (ref 70–99)
GLUCOSE SERPL-MCNC: 180 MG/DL — HIGH (ref 70–115)
GLUCOSE SERPL-MCNC: 188 MG/DL — HIGH (ref 70–115)
HCT VFR BLD CALC: 31.5 % — LOW (ref 42–52)
HCT VFR BLD CALC: 32.9 % — LOW (ref 42–52)
HGB BLD-MCNC: 9.7 G/DL — LOW (ref 14–18)
HGB BLD-MCNC: 9.8 G/DL — LOW (ref 14–18)
MAGNESIUM SERPL-MCNC: 2.2 MG/DL — SIGNIFICANT CHANGE UP (ref 1.6–2.6)
MAGNESIUM SERPL-MCNC: 2.2 MG/DL — SIGNIFICANT CHANGE UP (ref 1.6–2.6)
MCHC RBC-ENTMCNC: 26.8 PG — LOW (ref 27–31)
MCHC RBC-ENTMCNC: 27.8 PG — SIGNIFICANT CHANGE UP (ref 27–31)
MCHC RBC-ENTMCNC: 29.8 G/DL — LOW (ref 32–36)
MCHC RBC-ENTMCNC: 30.8 G/DL — LOW (ref 32–36)
MCV RBC AUTO: 89.9 FL — SIGNIFICANT CHANGE UP (ref 80–94)
MCV RBC AUTO: 90.3 FL — SIGNIFICANT CHANGE UP (ref 80–94)
PLATELET # BLD AUTO: 377 K/UL — SIGNIFICANT CHANGE UP (ref 150–400)
PLATELET # BLD AUTO: 411 K/UL — HIGH (ref 150–400)
POTASSIUM SERPL-MCNC: 3.6 MMOL/L — SIGNIFICANT CHANGE UP (ref 3.5–5.3)
POTASSIUM SERPL-MCNC: 3.7 MMOL/L — SIGNIFICANT CHANGE UP (ref 3.5–5.3)
POTASSIUM SERPL-MCNC: 3.8 MMOL/L — SIGNIFICANT CHANGE UP (ref 3.5–5.3)
POTASSIUM SERPL-SCNC: 3.6 MMOL/L — SIGNIFICANT CHANGE UP (ref 3.5–5.3)
POTASSIUM SERPL-SCNC: 3.7 MMOL/L — SIGNIFICANT CHANGE UP (ref 3.5–5.3)
POTASSIUM SERPL-SCNC: 3.8 MMOL/L — SIGNIFICANT CHANGE UP (ref 3.5–5.3)
RBC # BLD: 3.49 M/UL — LOW (ref 4.6–6.2)
RBC # BLD: 3.66 M/UL — LOW (ref 4.6–6.2)
RBC # FLD: 16.1 % — HIGH (ref 11–15.6)
RBC # FLD: 16.6 % — HIGH (ref 11–15.6)
SODIUM SERPL-SCNC: 140 MMOL/L — SIGNIFICANT CHANGE UP (ref 135–145)
SODIUM SERPL-SCNC: 148 MMOL/L — HIGH (ref 135–145)
SPECIMEN SOURCE: SIGNIFICANT CHANGE UP
VANCOMYCIN TROUGH SERPL-MCNC: 10.8 UG/ML — SIGNIFICANT CHANGE UP (ref 10–20)
WBC # BLD: 10.9 K/UL — HIGH (ref 4.8–10.8)
WBC # BLD: 11 K/UL — HIGH (ref 4.8–10.8)
WBC # FLD AUTO: 10.9 K/UL — HIGH (ref 4.8–10.8)
WBC # FLD AUTO: 11 K/UL — HIGH (ref 4.8–10.8)

## 2018-12-17 PROCEDURE — 71045 X-RAY EXAM CHEST 1 VIEW: CPT | Mod: 26

## 2018-12-17 PROCEDURE — 99232 SBSQ HOSP IP/OBS MODERATE 35: CPT

## 2018-12-17 PROCEDURE — 99291 CRITICAL CARE FIRST HOUR: CPT | Mod: 24

## 2018-12-17 PROCEDURE — 99233 SBSQ HOSP IP/OBS HIGH 50: CPT

## 2018-12-17 RX ORDER — POTASSIUM CHLORIDE 20 MEQ
20 PACKET (EA) ORAL
Qty: 0 | Refills: 0 | Status: COMPLETED | OUTPATIENT
Start: 2018-12-17 | End: 2018-12-17

## 2018-12-17 RX ORDER — POTASSIUM CHLORIDE 20 MEQ
40 PACKET (EA) ORAL ONCE
Qty: 0 | Refills: 0 | Status: COMPLETED | OUTPATIENT
Start: 2018-12-17 | End: 2018-12-17

## 2018-12-17 RX ORDER — LIDOCAINE HCL 20 MG/ML
1 VIAL (ML) INJECTION
Qty: 0 | Refills: 0 | Status: DISCONTINUED | OUTPATIENT
Start: 2018-12-17 | End: 2018-12-20

## 2018-12-17 RX ORDER — INSULIN GLARGINE 100 [IU]/ML
10 INJECTION, SOLUTION SUBCUTANEOUS AT BEDTIME
Qty: 0 | Refills: 0 | Status: DISCONTINUED | OUTPATIENT
Start: 2018-12-17 | End: 2018-12-19

## 2018-12-17 RX ORDER — VANCOMYCIN HCL 1 G
1000 VIAL (EA) INTRAVENOUS EVERY 8 HOURS
Qty: 0 | Refills: 0 | Status: DISCONTINUED | OUTPATIENT
Start: 2018-12-17 | End: 2018-12-20

## 2018-12-17 RX ORDER — POTASSIUM CHLORIDE 20 MEQ
20 PACKET (EA) ORAL
Qty: 0 | Refills: 0 | Status: COMPLETED | OUTPATIENT
Start: 2018-12-17 | End: 2018-12-18

## 2018-12-17 RX ORDER — MAGNESIUM SULFATE 500 MG/ML
2 VIAL (ML) INJECTION ONCE
Qty: 0 | Refills: 0 | Status: COMPLETED | OUTPATIENT
Start: 2018-12-17 | End: 2018-12-17

## 2018-12-17 RX ORDER — ACETAMINOPHEN 500 MG
1000 TABLET ORAL ONCE
Qty: 0 | Refills: 0 | Status: COMPLETED | OUTPATIENT
Start: 2018-12-17 | End: 2018-12-17

## 2018-12-17 RX ADMIN — MEROPENEM 100 MILLIGRAM(S): 1 INJECTION INTRAVENOUS at 13:38

## 2018-12-17 RX ADMIN — Medication 1 TABLET(S): at 12:12

## 2018-12-17 RX ADMIN — Medication 250 MILLIGRAM(S): at 21:42

## 2018-12-17 RX ADMIN — Medication 1 TABLET(S): at 05:56

## 2018-12-17 RX ADMIN — Medication 40 MILLIEQUIVALENT(S): at 17:17

## 2018-12-17 RX ADMIN — Medication 100 MILLIEQUIVALENT(S): at 09:56

## 2018-12-17 RX ADMIN — Medication 1000 MILLIGRAM(S): at 08:05

## 2018-12-17 RX ADMIN — PROPOFOL 3.81 MICROGRAM(S)/KG/MIN: 10 INJECTION, EMULSION INTRAVENOUS at 06:41

## 2018-12-17 RX ADMIN — EPINEPHRINE 8 MICROGRAM(S)/KG/MIN: 0.3 INJECTION INTRAMUSCULAR; SUBCUTANEOUS at 03:40

## 2018-12-17 RX ADMIN — Medication 100 MILLIEQUIVALENT(S): at 01:06

## 2018-12-17 RX ADMIN — Medication 2: at 08:01

## 2018-12-17 RX ADMIN — HYDROMORPHONE HYDROCHLORIDE 2 MILLIGRAM(S): 2 INJECTION INTRAMUSCULAR; INTRAVENOUS; SUBCUTANEOUS at 09:45

## 2018-12-17 RX ADMIN — Medication 100 MILLIEQUIVALENT(S): at 05:28

## 2018-12-17 RX ADMIN — Medication 100 MILLIEQUIVALENT(S): at 04:05

## 2018-12-17 RX ADMIN — PANTOPRAZOLE SODIUM 40 MILLIGRAM(S): 20 TABLET, DELAYED RELEASE ORAL at 17:17

## 2018-12-17 RX ADMIN — MEROPENEM 100 MILLIGRAM(S): 1 INJECTION INTRAVENOUS at 21:42

## 2018-12-17 RX ADMIN — Medication 2: at 03:45

## 2018-12-17 RX ADMIN — Medication 100 MILLIEQUIVALENT(S): at 23:01

## 2018-12-17 RX ADMIN — Medication 325 MILLIGRAM(S): at 12:12

## 2018-12-17 RX ADMIN — Medication 100 MILLIEQUIVALENT(S): at 09:14

## 2018-12-17 RX ADMIN — Medication 2: at 12:12

## 2018-12-17 RX ADMIN — INSULIN GLARGINE 10 UNIT(S): 100 INJECTION, SOLUTION SUBCUTANEOUS at 23:00

## 2018-12-17 RX ADMIN — Medication 1 MILLILITER(S): at 12:59

## 2018-12-17 RX ADMIN — Medication 40 MILLIEQUIVALENT(S): at 08:01

## 2018-12-17 RX ADMIN — Medication 100 MILLIEQUIVALENT(S): at 17:16

## 2018-12-17 RX ADMIN — Medication 250 MILLIGRAM(S): at 14:10

## 2018-12-17 RX ADMIN — Medication 166.67 MILLIGRAM(S): at 06:36

## 2018-12-17 RX ADMIN — Medication 650 MILLIGRAM(S): at 03:07

## 2018-12-17 RX ADMIN — Medication 400 MILLIGRAM(S): at 07:35

## 2018-12-17 RX ADMIN — ENOXAPARIN SODIUM 40 MILLIGRAM(S): 100 INJECTION SUBCUTANEOUS at 12:12

## 2018-12-17 RX ADMIN — AMIODARONE HYDROCHLORIDE 200 MILLIGRAM(S): 400 TABLET ORAL at 05:56

## 2018-12-17 RX ADMIN — Medication 50 GRAM(S): at 04:30

## 2018-12-17 RX ADMIN — Medication 1 TABLET(S): at 21:41

## 2018-12-17 RX ADMIN — MEROPENEM 100 MILLIGRAM(S): 1 INJECTION INTRAVENOUS at 05:29

## 2018-12-17 RX ADMIN — Medication 100 MILLIEQUIVALENT(S): at 03:25

## 2018-12-17 RX ADMIN — Medication 1 TABLET(S): at 13:04

## 2018-12-17 RX ADMIN — Medication 1 MILLIGRAM(S): at 12:11

## 2018-12-17 RX ADMIN — CHLORHEXIDINE GLUCONATE 5 MILLILITER(S): 213 SOLUTION TOPICAL at 05:56

## 2018-12-17 RX ADMIN — DEXMEDETOMIDINE HYDROCHLORIDE IN 0.9% SODIUM CHLORIDE 31.75 MICROGRAM(S)/KG/HR: 4 INJECTION INTRAVENOUS at 06:42

## 2018-12-17 RX ADMIN — Medication 100 MILLIEQUIVALENT(S): at 16:29

## 2018-12-17 RX ADMIN — Medication 650 MILLIGRAM(S): at 23:00

## 2018-12-17 RX ADMIN — Medication 1 MILLILITER(S): at 21:39

## 2018-12-17 RX ADMIN — Medication 2: at 20:13

## 2018-12-17 RX ADMIN — QUETIAPINE FUMARATE 25 MILLIGRAM(S): 200 TABLET, FILM COATED ORAL at 05:56

## 2018-12-17 RX ADMIN — CHLORHEXIDINE GLUCONATE 5 MILLILITER(S): 213 SOLUTION TOPICAL at 17:17

## 2018-12-17 RX ADMIN — Medication 100 MILLIEQUIVALENT(S): at 18:47

## 2018-12-17 RX ADMIN — QUETIAPINE FUMARATE 50 MILLIGRAM(S): 200 TABLET, FILM COATED ORAL at 21:40

## 2018-12-17 RX ADMIN — Medication 100 MILLIEQUIVALENT(S): at 08:01

## 2018-12-17 RX ADMIN — CHLORHEXIDINE GLUCONATE 5 MILLILITER(S): 213 SOLUTION TOPICAL at 13:03

## 2018-12-17 RX ADMIN — ATORVASTATIN CALCIUM 80 MILLIGRAM(S): 80 TABLET, FILM COATED ORAL at 21:41

## 2018-12-17 RX ADMIN — DEXMEDETOMIDINE HYDROCHLORIDE IN 0.9% SODIUM CHLORIDE 31.75 MICROGRAM(S)/KG/HR: 4 INJECTION INTRAVENOUS at 21:30

## 2018-12-17 RX ADMIN — Medication 4: at 15:44

## 2018-12-17 RX ADMIN — CLOPIDOGREL BISULFATE 75 MILLIGRAM(S): 75 TABLET, FILM COATED ORAL at 12:12

## 2018-12-17 RX ADMIN — CHLORHEXIDINE GLUCONATE 5 MILLILITER(S): 213 SOLUTION TOPICAL at 03:05

## 2018-12-17 RX ADMIN — Medication 500 MILLIGRAM(S): at 12:12

## 2018-12-17 RX ADMIN — DEXMEDETOMIDINE HYDROCHLORIDE IN 0.9% SODIUM CHLORIDE 31.75 MICROGRAM(S)/KG/HR: 4 INJECTION INTRAVENOUS at 03:40

## 2018-12-17 RX ADMIN — Medication 100 MILLIEQUIVALENT(S): at 02:06

## 2018-12-17 RX ADMIN — CHLORHEXIDINE GLUCONATE 5 MILLILITER(S): 213 SOLUTION TOPICAL at 21:41

## 2018-12-17 RX ADMIN — PANTOPRAZOLE SODIUM 40 MILLIGRAM(S): 20 TABLET, DELAYED RELEASE ORAL at 05:56

## 2018-12-17 RX ADMIN — HYDROMORPHONE HYDROCHLORIDE 2 MILLIGRAM(S): 2 INJECTION INTRAMUSCULAR; INTRAVENOUS; SUBCUTANEOUS at 10:45

## 2018-12-17 RX ADMIN — CHLORHEXIDINE GLUCONATE 5 MILLILITER(S): 213 SOLUTION TOPICAL at 09:55

## 2018-12-17 NOTE — PROGRESS NOTE ADULT - ASSESSMENT
48 yo Male c/o CP for 2-3 days prior to admission, then 10/10 chest pain approximately 9:30 am yesterday, followed by vomiting.  Wife drove pt to hospital, ruled in for STEMI. Urgent Cath, pt found to have multiple occlusions; stent to LAD and D2; stent to LAD thrombosed,  impella placed for support.  Pt then had VF arrest, shock x 1, return to NSR.  Upon transfer to OR for Urgent CABG, pt with asystolic arrest, chest opened intra-op with CPR in progress. Pt underwent C4V (LAD, Diag, OM and PDA) with inability to wean from CPB therefore requiring VA ECMO (central cannulation) and IABP. , he was transferred to CICU on multiple gtts, now s/p explant 12/3 with placement of impella via R groin sheath. 12/4 ID consulted started on broad spectrum abx, 12/5 Impella weaned. 12/6 Impella removal via right femoral cutdown with primary repair of femoral artery and stent placed to external illiac artery for dissection, PRBC x1. 12/8 Pt underwent head CT, no acute intracranial process noted, chest CT b/l pneumothoraces R-20%, L-10%, b/l chest tubes in place, confirmed infiltrates on right lower and left upper lung.  ABX Vanco and Zosyn course extended, f/u sputum Cx.  Hospital course notable for persistent cardiogenic shock/LV failure, vent dependent respiratory failure (inability to wean also contributed to by severe agitation, now s/p trach), sepsis likely d/t GNR VAP.  12/14 + ileus, 12/15 ileus improved, septic and cardiogenic shock.  12/17 improving shock state.

## 2018-12-17 NOTE — PROGRESS NOTE ADULT - SUBJECTIVE AND OBJECTIVE BOX
[facility]  INFECTIOUS DISEASES AND INTERNAL MEDICINE at [place]  =======================================================  Perfecto Santizo MD  Diplomates American Board of Internal Medicine and Infectious Diseases  =======================================================    [id number]  [facility]     follow up for:  post operative fevers and pn eumonia  S/p trach with purulent discharge from trach and around it.   Febrile. Awake and responsive.   Diarrhea is better, c diff negative   on MERREM/Vanco    REVIEW OF SYSTEMS:  unable to obtain due to medical condition    Antibiotics:  Vancomycin and Merrem     Physical Exam:  Vital Signs Last 24 Hrs  T(C): 37.5 (16 Dec 2018 09:00), Max: 39 (15 Dec 2018 11:00)  T(F): 99.5 (16 Dec 2018 09:00), Max: 102.2 (15 Dec 2018 11:00)  HR: 97 (16 Dec 2018 09:15) (74 - 137)  BP: 87/53 (15 Dec 2018 17:15) (87/53 - 87/53)  BP(mean): 64 (15 Dec 2018 17:15) (64 - 64)  RR: 36 (16 Dec 2018 09:15) (17 - 45)  SpO2: 96% (16 Dec 2018 09:15) (77% - 100%)  General:  awake and alert , has trach   Eye: Pupils are equal, round and reactive to light,   HENT: Normocephalic, trach+ with pus around it  Neck: Supple, No lymphadenopathy.  RIGHT neck IJ line  Respiratory: Lungs with coarse rhonchi bilaterally.   midline chest with dressing in place.   + CHEST tubes in place  Cardiovascular: Normal rate, Regular rhythm  Gastrointestinal: Soft, Non-tender, Non-distended, Normal bowel sounds.  Genitourinary: + Mckeon with light color urine  Lymphatics: No lymphadenopathy neck  Musculoskeletal: edema in all extremities   Integumentary: No rash.  Neurologic: Alert,      Labs:  1                       9.7    10.9  )-----------( 411      ( 17 Dec 2018 03:33 )             31.5   12-17    x   |  x   |  x   ----------------------------<  x   3.8   |  x   |  x     Ca    8.1<L>      17 Dec 2018 03:33  Mg     2.2     12-17    ase Excess: 4.0   /  SaO2: 98        RECENT CULTURES:  12-14 @ 13:53 .Catheter left IJ (internal jugular) catheter tip     No growth at 2 days.    12-12 @ 16:19 .Sputum     Few Routine respiratory keara present    Few White blood cells  Few Gram Positive Cocci in Pairs and Chains    12-11 @ 20:24 .Broncial bronchial fluid       12-11 @ 10:49 .Broncial bronchial fluid     Rare Candida albicans  Rare Routine respiratory keara present    Numerous white blood cells  No organisms seen    12-09 @ 18:56 .Blood     No growth at 5 days.    12-08 @ 19:40 .Sputum Klebsiella pneumoniae    Moderate Klebsiella pneumoniae  No Routine respiratory keara present    Moderate WBC's  Few Yeast  Few Gram positive cocci in pairs    12-07 @ 22:28 .Urine     No growth    12-07 @ 22:26 .Blood     No growth at 5 days.    12-07 @ 22:25 .Blood     No growth at 5 days.    12-05 @ 10:21 .Blood     No growth at 5 days.    12-04 @ 10:22 .Blood     No growth at 5 days.    12-04 @ 10:21 .Sputum     Few Candida albicans  No Routine respiratory keara present    Few White blood cells  No organisms seen

## 2018-12-17 NOTE — PROGRESS NOTE ADULT - ASSESSMENT
48M with unknown PMH admitted with STEMI s/p CBAG complicated by cardiac arrest, LV failure and cardiogenic shock, respiratory failure requiring trach and vent support.  He developed hyperglycemia in the ICU while on pressors and tube feeds and his A1c is elevated in prediabetes range, however this could be falsely low as it was drawn in setting of acute anemia and possible blood loss from surgery.   Hospital course notable for persistent cardiogenic shock/LV failure, vent dependent respiratory failure (inability to wean also contributed to by severe agitation, now s/p trach), sepsis secondary to klebsiella PNA completed abx, now off abx.  Developed illeus and off tube feeds      DM vs pre-DM- FS acceptable  -tube feeds still on hold due to bowel obstruction  -continue insulin sliding scale Q4    -given patient is acutely ill, would have low threshold for insulin gtt    Hypernatremia- probably due to dehydration. mild. monitor for now. can consider urine studies    Hypocalcemia- check Alb and vitamin D    CAD-  as per cardiac team.  On statin, ASA and Plavix.    Cardiogenic shock- as per ICU team.      Respiratory failure-  as per ICU team. 48M with unknown PMH admitted with STEMI s/p CBAG complicated by cardiac arrest, LV failure and cardiogenic shock, respiratory failure requiring trach and vent support.  He developed hyperglycemia in the ICU while on pressors and tube feeds and his A1c is elevated in prediabetes range, however this could be falsely low as it was drawn in setting of acute anemia and possible blood loss from surgery.   Hospital course notable for persistent cardiogenic shock/LV failure, vent dependent respiratory failure (inability to wean also contributed to by severe agitation, now s/p trach), sepsis secondary to klebsiella PNA completed abx, now off abx.  Developed illeus      DM vs pre-DM- FS acceptable  -on tube feeds at 60cc/hr, over 24hrs   -continue insulin sliding scale Q4    -add lantus 10 units tonight  -would hold insulin if tube feeds interrupted, and may need dextrose fluids  -given patient is acutely ill, would have low threshold for insulin gtt    Hypernatremia- probably due to dehydration. mild. monitor for now. can consider urine studies    Hypocalcemia- check Alb and vitamin D    CAD-  as per cardiac team.  On statin, ASA and Plavix. 48M with unknown PMH admitted with STEMI s/p CBAG complicated by cardiac arrest, LV failure and cardiogenic shock, respiratory failure requiring trach and vent support.  He developed hyperglycemia in the ICU while on pressors and tube feeds and his A1c is elevated in prediabetes range, however this could be falsely low as it was drawn in setting of acute anemia and possible blood loss from surgery.   Hospital course notable for persistent cardiogenic shock/LV failure, vent dependent respiratory failure (inability to wean also contributed to by severe agitation, now s/p trach), sepsis secondary to klebsiella PNA completed abx, now off abx.  Developed illeus      DM vs pre-DM- FS acceptable  -on tube feeds at 60cc/hr, over 24hrs   -continue insulin sliding scale Q4    -add lantus 10 units tonight  -would hold insulin if tube feeds interrupted, and may need dextrose fluids  -given patient is acutely ill, would have low threshold for insulin gtt    Hypernatremia- probably due to dehydration. mild. monitor for now. can consider urine studies. consider free water through NGT    Hypocalcemia- check Alb and vitamin D    CAD-  as per cardiac team.  On statin, ASA and Plavix.

## 2018-12-17 NOTE — CHART NOTE - NSCHARTNOTEFT_GEN_A_CORE
Source: Patient [ ]  Family [ ]   other [x ] chart and EMR     Enteral /Parenteral Nutrition:   Diet, NPO with Tube Feed:   Tube Feeding Modality: Orogastric  Glucerna 1.5 Danielito  Total Volume for 24 Hours (mL): 480  Continuous  Starting Tube Feed Rate {mL per Hour}: 20  Until Goal Tube Feed Rate (mL per Hour): 20  Tube Feed Duration (in Hours): 24  Tube Feed Start Time: 10:00 (12-16-18 @ 09:41)   (feeds currently on hold for test)     Current Weight:   12/13: 112kg   12/6: 90.7kg     % Weight Change No new wt today,  (19%) 48# wt gain over past week, unsure of accuracy, however +fluid accumulation/ edema noted in all 4 extremities        Pertinent Medications: MEDICATIONS  (STANDING):  amiodarone    Tablet 200 milliGRAM(s) Oral daily  ascorbic acid 500 milliGRAM(s) Oral daily  aspirin 325 milliGRAM(s) Oral daily  atorvastatin 80 milliGRAM(s) Oral at bedtime  chlorhexidine 0.12% Liquid 5 milliLiter(s) Oral Mucosa every 4 hours  clopidogrel Tablet 75 milliGRAM(s) Oral daily  dexmedetomidine Infusion 1.4 MICROgram(s)/kG/Hr (31.745 mL/Hr) IV Continuous <Continuous>  enoxaparin Injectable 40 milliGRAM(s) SubCutaneous daily  EPINEPHrine    Infusion 0.024 MICROgram(s)/kG/Min (8 mL/Hr) IV Continuous <Continuous>  ferrous    sulfate 325 milliGRAM(s) Oral daily  folic acid 1 milliGRAM(s) Oral daily  furosemide Infusion 2.5 mG/Hr (1.25 mL/Hr) IV Continuous <Continuous>  insulin lispro (HumaLOG) corrective regimen sliding scale   SubCutaneous every 4 hours  lactobacillus acidophilus 1 Tablet(s) Oral every 8 hours  meropenem  IVPB      meropenem  IVPB 1000 milliGRAM(s) IV Intermittent every 8 hours  multivitamin 1 Tablet(s) Oral daily  norepinephrine Infusion 0.05 MICROgram(s)/kG/Min (8.503 mL/Hr) IV Continuous <Continuous>  ondansetron Injectable 4 milliGRAM(s) IV Push once  pantoprazole  Injectable 40 milliGRAM(s) IV Push every 12 hours  potassium chloride  20 mEq/100 mL IVPB 20 milliEquivalent(s) IV Intermittent every 1 hour  propofol Infusion 7 MICROgram(s)/kG/Min (3.809 mL/Hr) IV Continuous <Continuous>  QUEtiapine 25 milliGRAM(s) Oral daily  QUEtiapine 50 milliGRAM(s) Oral <User Schedule>  vancomycin  IVPB 1250 milliGRAM(s) IV Intermittent every 12 hours    MEDICATIONS  (PRN):  acetaminophen    Suspension .. 650 milliGRAM(s) Oral every 6 hours PRN Temp greater or equal to 38.5C (101.3F)  ALBUTerol/ipratropium for Nebulization 3 milliLiter(s) Nebulizer every 6 hours PRN Shortness of Breath and/or Wheezing  artificial  tears Solution 1 Drop(s) Both EYES four times a day PRN Dry Eyes  HYDROmorphone   Tablet 2 milliGRAM(s) Oral every 3 hours PRN Moderate Pain (4 - 6)  HYDROmorphone   Tablet 4 milliGRAM(s) Oral every 3 hours PRN Severe Pain (7 - 10)  petrolatum white Ointment 1 Application(s) Topical four times a day PRN dry lips    Pertinent Labs: CBC Full  -  ( 17 Dec 2018 03:33 )  WBC Count : 10.9 K/uL  Hemoglobin : 9.7 g/dL  Hematocrit : 31.5 %  Platelet Count - Automated : 411 K/uL  Mean Cell Volume : 90.3 fl  Mean Cell Hemoglobin : 27.8 pg  Mean Cell Hemoglobin Concentration : 30.8 g/dL  Auto Neutrophil # : x  Auto Lymphocyte # : x  Auto Monocyte # : x  Auto Eosinophil # : x  Auto Basophil # : x  Auto Neutrophil % : x  Auto Lymphocyte % : x  Auto Monocyte % : x  Auto Eosinophil % : x  Auto Basophil % : x        Skin: surgical wound (sternum and leg)     Nutrition focused physical exam conducted - found signs of malnutrition [ ]absent [x ]present    Subcutaneous fat loss: [ ] Orbital fat pads region, [ ]Buccal fat region, [ ]Triceps region,  [ ]Ribs region    Muscle wasting: [x ]Temples region, [ ]Clavicle region, [ ]Shoulder region, [ ]Scapula region, [ ]Interosseous region,  [ ]thigh region, [ ]Calf region    Estimated Needs:   [ ] no change since previous assessment  [ x] recalculated:   83 kg (IBW)   4138-9871 kcal   1.5gm /kg 124gm protein     Current Nutrition Diagnosis:  Pt remains at high nutrition risk secondary to moderate-acute protein calorie malnutrition related to increased nutrient needs in setting recent STEMI, emergent C4V with ECMO and IABP (now removed), hypoxic resp failure, s/p trach as evidenced by +fluid accumulation in all 4 extremities, meeting approximately 75% estimated nutrition needs > 7 days.  Pt was found to have ileus 12/14, enteral feeds stopped. Ileus now resolved, Feeds of Glucerna re-started yesterday @ 20ml/hr (on hold for test). Recommendations below:       Recommendations:   1. Rx: MVI and Vit.C daily (500mg)   2. Check wt daily to monitor trends   3. as/when feasible restart enteral feeds of Nepro @ 60ml/hr (x20 hours) 1200ml/day 2160 kcal, 97gm protein  4. Prostat BID (200kcal, 30gm protein)       Monitoring and Evaluation:   [ ] PO intake [x ] Tolerance to diet prescription [X] Weights  [X] Follow up per protocol [X] Labs:

## 2018-12-17 NOTE — PROGRESS NOTE ADULT - PROBLEM SELECTOR PLAN 2
repeat blood cultures from 12/15 pending  doubt tate PNA, ID aware, no change to med regimen at this time  continue vancomycin, adjust dosing per trough  zosyn changed to meropenem as d/w ID  tylenol PRN for fevers  cooling blanket PRN  concern for sinusitis since NGT in place (will need PEG)

## 2018-12-17 NOTE — PROGRESS NOTE ADULT - ASSESSMENT
46y/o man with no significant PMH admitted on 11/29/18 for CP for 2-3 days prior to admission,   found with STEMI. Urgent Cath, pt found to have multiple occlusions; VF arrest, shock x 1, return to NSR.  Upon transfer to OR for Urgent CABG, pt again VF arrest, chest opened intra-op with CPR in progress.  s/p ECMO and Impella; 12/3 ECMO explanted and Impella placed via right groin sheath.    Has been treated for fever empirically few times. Was off Abx  restarted due to persistent fever. CXR wtih   Congestion and possible infiltration.   No positive cultures     Pneumonia  Fever post op  s/p CABG   Diarrhea    S/P tracheostomy     - Blood and urine cultures are sent on 11/15, follow the results  - Mild leukocytosis and febrile to 102.2,   - Deep suction for sputum culture    t meropenem 1gm q8h    vancomycin increased to 1gm q 8  SPUTUM CX CANDIDA PROBABLE COLONIZATION  CONSIDER REPEAT IMAGING CHEST ABD PELVIS TO LOOK FOR SOURCE OF FEVERS DESPITE ABX

## 2018-12-17 NOTE — PROGRESS NOTE ADULT - PROBLEM SELECTOR PLAN 3
TF changed to nepro for increased protein to volume ratio  prosource BID added  continue with MVI  will need PEG this week, Dr Yancey aware

## 2018-12-17 NOTE — PROGRESS NOTE ADULT - ASSESSMENT
A/P: 47 year old male s/p CABG x4 on 11/29 and right impella sheath removal on 12/6 now with right groin staples removed and wound dehisced.    -Daily wet to dry dressing changes  -rest of care per primary team  -will continue to follow

## 2018-12-17 NOTE — PROGRESS NOTE ADULT - SUBJECTIVE AND OBJECTIVE BOX
INTERVAL HPI/OVERNIGHT EVENTS:  Right groin staples were removed today. Upon completion, the wound dehisced exposing the subcutaneous tissue. Some serous discharge was also noted. Right groin will be dressed with wet to dry in a daily basis.        MEDICATIONS  (STANDING):  amiodarone    Tablet 200 milliGRAM(s) Oral daily  ascorbic acid 500 milliGRAM(s) Oral daily  aspirin 325 milliGRAM(s) Oral daily  atorvastatin 80 milliGRAM(s) Oral at bedtime  chlorhexidine 0.12% Liquid 5 milliLiter(s) Oral Mucosa every 4 hours  clopidogrel Tablet 75 milliGRAM(s) Oral daily  dexmedetomidine Infusion 1.4 MICROgram(s)/kG/Hr (31.745 mL/Hr) IV Continuous <Continuous>  enoxaparin Injectable 40 milliGRAM(s) SubCutaneous daily  EPINEPHrine    Infusion 0.024 MICROgram(s)/kG/Min (8 mL/Hr) IV Continuous <Continuous>  ferrous    sulfate 325 milliGRAM(s) Oral daily  folic acid 1 milliGRAM(s) Oral daily  furosemide Infusion 2.5 mG/Hr (1.25 mL/Hr) IV Continuous <Continuous>  insulin lispro (HumaLOG) corrective regimen sliding scale   SubCutaneous every 4 hours  lactobacillus acidophilus 1 Tablet(s) Oral every 8 hours  meropenem  IVPB      meropenem  IVPB 1000 milliGRAM(s) IV Intermittent every 8 hours  multivitamin 1 Tablet(s) Oral daily  norepinephrine Infusion 0.05 MICROgram(s)/kG/Min (8.503 mL/Hr) IV Continuous <Continuous>  ondansetron Injectable 4 milliGRAM(s) IV Push once  pantoprazole  Injectable 40 milliGRAM(s) IV Push every 12 hours  propofol Infusion 7 MICROgram(s)/kG/Min (3.809 mL/Hr) IV Continuous <Continuous>  QUEtiapine 25 milliGRAM(s) Oral daily  QUEtiapine 50 milliGRAM(s) Oral <User Schedule>  vancomycin  IVPB 1000 milliGRAM(s) IV Intermittent every 8 hours    MEDICATIONS  (PRN):  acetaminophen    Suspension .. 650 milliGRAM(s) Oral every 6 hours PRN Temp greater or equal to 38.5C (101.3F)  ALBUTerol/ipratropium for Nebulization 3 milliLiter(s) Nebulizer every 6 hours PRN Shortness of Breath and/or Wheezing  artificial  tears Solution 1 Drop(s) Both EYES four times a day PRN Dry Eyes  HYDROmorphone   Tablet 2 milliGRAM(s) Oral every 3 hours PRN Moderate Pain (4 - 6)  HYDROmorphone   Tablet 4 milliGRAM(s) Oral every 3 hours PRN Severe Pain (7 - 10)  lidocaine 2% Jelly 1 milliLiter(s) IntraUrethral four times a day PRN urethral discomfort  petrolatum white Ointment 1 Application(s) Topical four times a day PRN dry lips      Vital Signs Last 24 Hrs  T(C): 38.2 (17 Dec 2018 11:00), Max: 39.1 (16 Dec 2018 21:00)  T(F): 100.8 (17 Dec 2018 11:00), Max: 102.4 (16 Dec 2018 21:00)  HR: 101 (17 Dec 2018 12:01) (84 - 107)  BP: 106/61 (17 Dec 2018 09:30) (106/61 - 106/61)  BP(mean): 78 (17 Dec 2018 09:30) (78 - 78)  RR: 21 (17 Dec 2018 11:45) (0 - 53)  SpO2: 99% (17 Dec 2018 12:01) (98% - 100%)    PE  Gen: Not in acute distress  Pulm: Intubated, non-labored breathing  Abd: Soft, distended, non-tender  : Mckeon catheter in place  Ext: Right groin staples removed, dehisced wound, no surrounding erythema. Left groin with staples and dressing intact  Vasc: 2+ radial and PT pulese B/L      I&O's Detail    16 Dec 2018 07:01  -  17 Dec 2018 07:00  --------------------------------------------------------  IN:    dexmedetomidine Infusion: 402 mL    Enteral Tube Flush: 275 mL    EPINEPHrine Infusion: 192 mL    Free Water: 450 mL    furosemide Infusion: 55 mL    furosemide Infusion: 3.8 mL    Glucerna 1.5: 430 mL    norepinephrine Infusion: 338 mL    propofol Infusion: 309.1 mL    sodium chloride 0.9%: 10 mL    sodium chloride 0.9%: 20 mL    Solution: 50 mL    Solution: 200 mL    Solution: 50 mL    Solution: 900 mL    Solution: 417 mL  Total IN: 4101.9 mL    OUT:    Chest Tube: 30 mL    Chest Tube: 210 mL    Indwelling Catheter - Urethral: 8550 mL  Total OUT: 8790 mL    Total NET: -4688.1 mL      17 Dec 2018 07:01  -  17 Dec 2018 12:13  --------------------------------------------------------  IN:    dexmedetomidine Infusion: 43.2 mL    Enteral Tube Flush: 50 mL    EPINEPHrine Infusion: 30.5 mL    Free Water: 150 mL    furosemide Infusion: 5 mL    Glucerna 1.5: 240 mL    norepinephrine Infusion: 56 mL    propofol Infusion: 43.6 mL    Solution: 300 mL  Total IN: 918.3 mL    OUT:    Indwelling Catheter - Urethral: 550 mL  Total OUT: 550 mL    Total NET: 368.3 mL          LABS:                        9.7    10.9  )-----------( 411      ( 17 Dec 2018 03:33 )             31.5     12-17    148<H>  |  104  |  31.0<H>  ----------------------------<  180<H>  3.6   |  29.0  |  0.79    Ca    8.1<L>      17 Dec 2018 03:33  Mg     2.2     12-17            RADIOLOGY & ADDITIONAL STUDIES:

## 2018-12-17 NOTE — PROGRESS NOTE ADULT - PROBLEM SELECTOR PLAN 5
s/p trach  continue vent support  PS/CPAP trials as tolerated during day, continue full support at night for now

## 2018-12-17 NOTE — PROGRESS NOTE ADULT - SUBJECTIVE AND OBJECTIVE BOX
Brief Hospital Course:    STEMI, emergently to cath lab, left main thrombus, cardiac arrest requiring defib, intubated in cath lab, impella placed which clotted off on way to OR and was subsequently removed in OR. Crashed onto CPB for CABG d/t hemodynamic collapse. TO CTICU post CABG with central ECMO and open chest.  12/3 ECMO explanted and IABP removed, impella inserted   impella removed via R femoral cutdown with repair and external iliac stent  12/10 s/p trach for prolonged respiratory failure   abd distention, TF held, 2L NGT output, +ileus on AXR, gastrographin study done  12/15 ileus resolved, resumed for meds via NGT. Respiratory distress, febrile, septic and cardiogenic shock requiring restarting of abx, epinephrine and norepinephrine infusions   bilat pleural CTs d/c'd, more awake and alert, zosyn changed to meropenem     Subjective/review of Systems: pt mouthing he is more comfortable, is thankful for our help, difficult to obtain full ROS d/u trach      Patient is a 48y old  Male who presents with a chief complaint of cp (17 Dec 2018 16:22)    HPI:  This is a 48 y/o male no significant PMH; recent ABT (doxycycline) r/t cellulitis to right groin per pt. Pt presented to the ED with 10/10 chest pain that he reported started at 0930; he drank water with no relief; pain started to progress to B/L shoulders. Per pt spouse, she reports he has had chest pain x2-3 days and this morning he vomited which he attributed to reflux.  She brought him into the emergency department.    Pt has significant EKG changes ST elevations in leads V1-V5 with reciprocal changes in II, III, AVF.  Pt rec'd asa and plavix load in ED. (2018 11:32)    PAST MEDICAL & SURGICAL HISTORY:  Staph infection  No significant past surgical history    FAMILY HISTORY:  Family history of myocardial infarction (Mother): mother;       Vitals   ICU Vital Signs Last 24 Hrs  T(C): 37.8 (17 Dec 2018 16:00), Max: 39.1 (16 Dec 2018 21:00)  T(F): 100 (17 Dec 2018 16:00), Max: 102.4 (16 Dec 2018 21:00)  HR: 103 (17 Dec 2018 16:30) (84 - 107), ST  BP: 106/61 (17 Dec 2018 09:30) (106/61 - 106/61)  BP(mean): 78 (17 Dec 2018 09:30) (78 - 78)  ABP: 102/63 (17 Dec 2018 16:30) (90/56 - 145/75)  ABP(mean): 76 (17 Dec 2018 16:30) (65 - 94)  RR: 20 (17 Dec 2018 16:30) (0 - 53)  SpO2: 100% (17 Dec 2018 16:30) (98% - 100%)    VENT SETTINGS   Mode: AC/ CMV (Assist Control/ Continuous Mandatory Ventilation)  RR (machine): 12  TV (machine): 600  FiO2: 35  PEEP: 8  MAP: 14  PIP: 28      I&O's Detail    16 Dec 2018 07:01  -  17 Dec 2018 07:00  --------------------------------------------------------  IN:    dexmedetomidine Infusion: 402 mL    Enteral Tube Flush: 275 mL    EPINEPHrine Infusion: 192 mL    Free Water: 450 mL    furosemide Infusion: 55 mL    furosemide Infusion: 3.8 mL    Glucerna 1.5: 430 mL    norepinephrine Infusion: 338 mL    propofol Infusion: 309.1 mL    sodium chloride 0.9%: 10 mL    sodium chloride 0.9%: 20 mL    Solution: 50 mL    Solution: 200 mL    Solution: 50 mL    Solution: 900 mL    Solution: 417 mL  Total IN: 4101.9 mL    OUT:    Chest Tube: 30 mL    Chest Tube: 210 mL    Indwelling Catheter - Urethral: 8550 mL  Total OUT: 8790 mL    Total NET: -4688.1 mL      17 Dec 2018 07:01  -  17 Dec 2018 16:48  --------------------------------------------------------  IN:    dexmedetomidine Infusion: 88.7 mL    Enteral Tube Flush: 50 mL    EPINEPHrine Infusion: 58 mL    Free Water: 400 mL    furosemide Infusion: 11.3 mL    Glucerna 1.5: 540 mL    norepinephrine Infusion: 98 mL    propofol Infusion: 73.4 mL    Solution: 50 mL    Solution: 250 mL    Solution: 400 mL  Total IN: 2019.4 mL    OUT:    Indwelling Catheter - Urethral: 1800 mL  Total OUT: 1800 mL    Total NET: 219.4 mL      LABS                        9.7    10.9  )-----------( 411      ( 17 Dec 2018 03:33 )             31.5         x   |  x   |  x   ----------------------------<  x   3.8   |  x   |  x     Ca    8.1<L>      17 Dec 2018 03:33  Mg     2.2         ABG - ( 17 Dec 2018 07:40 )  pH, Arterial: 7.54  pH, Blood: x     /  pCO2: 39    /  pO2: 129   / HCO3: 34    / Base Excess: 9.8   /  SaO2: 100       POCT Blood Glucose.: 228 mg/dL (18 @ 15:42)  POCT Blood Glucose.: 187 mg/dL (18 @ 12:10)  POCT Blood Glucose.: 158 mg/dL (18 @ 03:44)  POCT Blood Glucose.: 193 mg/dL (18 @ 20:06)      Culture - Sputum . (12.15.18 @ 22:52)    Culture Results:   Few Gram Negative Rods Identification and susceptibility to follow.  Few Candida albicans  Few Routine respiratory keara present  Culture in progress    Culture - Urine (12.15.18 @ 22:31)    Specimen Source: .Urine    Culture Results:   No growth    Culture - Blood (18 @ 21:58)    Specimen Source: .Blood    Culture Results:   No growth at 48 hours    Culture - Catheter (18 @ 13:53)    Specimen Source: .Catheter left IJ (internal jugular) catheter tip    Culture Results:   No growth at 2 days.    Culture - Blood (18 @ 11:48)    Specimen Source: .Blood    Culture Results:   No growth at 48 hours      < from: Xray Chest 1 View- PORTABLE-Routine (18 @ 05:32) >  Findings:       Tracheostomy tube is in good position. CVP line in the SVC unchanged. Nasogastric tube in stomach.  Bilateral diffuse airspace disease is present unchanged no significant pleural effusions.  .  A  left apical pneumothorax, decreased in size. Moderate bilateral pulmonic congestion. Heart size is unremarkable. Median sternotomy.  Impression: No significant change in bilateral airspace disease. A minimal left apical pneumothorax decreased in size. Status post CABG  < end of copied text >      MEDICATIONS  (STANDING):  amiodarone    Tablet 200 milliGRAM(s) Oral daily  ascorbic acid 500 milliGRAM(s) Oral daily  aspirin 325 milliGRAM(s) Oral daily  atorvastatin 80 milliGRAM(s) Oral at bedtime  chlorhexidine 0.12% Liquid 5 milliLiter(s) Oral Mucosa every 4 hours  clopidogrel Tablet 75 milliGRAM(s) Oral daily  dexmedetomidine Infusion 1.4 MICROgram(s)/kG/Hr (31.745 mL/Hr) IV Continuous  enoxaparin Injectable 40 milliGRAM(s) SubCutaneous daily  EPINEPHrine    Infusion 0.024 MICROgram(s)/kG/Min (8 mL/Hr) IV Continuous   ferrous    sulfate 325 milliGRAM(s) Oral daily  folic acid 1 milliGRAM(s) Oral daily  furosemide Infusion 2.5 mG/Hr (1.25 mL/Hr) IV Continuous   insulin glargine Injectable (LANTUS) 10 Unit(s) SubCutaneous at bedtime  insulin lispro (HumaLOG) corrective regimen sliding scale   SubCutaneous every 4 hours  lactobacillus acidophilus 1 Tablet(s) Oral every 8 hours  meropenem  IVPB 1000 milliGRAM(s) IV Intermittent every 8 hours  multivitamin 1 Tablet(s) Oral daily  norepinephrine Infusion 0.05 MICROgram(s)/kG/Min (8.503 mL/Hr) IV Continuous   ondansetron Injectable 4 milliGRAM(s) IV Push once  pantoprazole  Injectable 40 milliGRAM(s) IV Push every 12 hours  potassium chloride   Powder 40 milliEquivalent(s) Oral once  potassium chloride  20 mEq/100 mL IVPB 20 milliEquivalent(s) IV Intermittent every 1 hour x 3  propofol Infusion 7 MICROgram(s)/kG/Min (3.809 mL/Hr) IV Continuous   QUEtiapine 25 milliGRAM(s) Oral daily  QUEtiapine 50 milliGRAM(s) Oral QHS  vancomycin  IVPB 1000 milliGRAM(s) IV Intermittent every 8 hours    MEDICATIONS  (PRN):  acetaminophen    Suspension .. 650 milliGRAM(s) Oral every 6 hours PRN Temp greater or equal to 38.5C (101.3F)  ALBUTerol/ipratropium for Nebulization 3 milliLiter(s) Nebulizer every 6 hours PRN Shortness of Breath and/or Wheezing  artificial  tears Solution 1 Drop(s) Both EYES four times a day PRN Dry Eyes  HYDROmorphone   Tablet 2 milliGRAM(s) Oral every 3 hours PRN Moderate Pain (4 - 6)  HYDROmorphone   Tablet 4 milliGRAM(s) Oral every 3 hours PRN Severe Pain (7 - 10)  lidocaine 2% Jelly 1 milliLiter(s) IntraUrethral four times a day PRN urethral discomfort  petrolatum white Ointment 1 Application(s) Topical four times a day PRN dry lips    Allergies: penicillins (Unknown)      Physical Exam:   Neuro: awake and alert, mouths to communicate needs, follows commands  HEENT: PERRL, ETT and OGT changed to trach and NGT  Neck: RIJ TLC with site C/D/I.  CV: regular rate, regular rhythm, +S1S2  Pulm/chest: clear throughout, MSI C/D/I with dresssing  Abd: soft, ND, NT, + BS  Ext: anasarca, able to move all extremities though remains weak (RUE 4+/5, LUE and BLE 3/5), bilat SVG with staples and dressing (now Ace wrapped). Right groin staples d/c'd, dehisced, no erythema or purulence, wet to dry dressing in place.      Code Status: full code      Critical care time spent: __39__minutes (reviewing chart including medication, labs and imaging results, discussions with interdisciplinary team, discussing goals of care/advanced directives, counseling patient and/or family, non-inclusive of procedures)

## 2018-12-17 NOTE — PROGRESS NOTE ADULT - SUBJECTIVE AND OBJECTIVE BOX
Interval Events:  no overnight events  follow up on prediabetes/diabetes    patient seen and examined at bedside.  patient is intubated and sedated    REVIEW OF SYSTEMS:  unable to obtain, intubated and sedated        penicillins (Unknown)      MEDICATIONS  (STANDING):  amiodarone    Tablet 200 milliGRAM(s) Oral daily  ascorbic acid 500 milliGRAM(s) Oral daily  aspirin 325 milliGRAM(s) Oral daily  atorvastatin 80 milliGRAM(s) Oral at bedtime  chlorhexidine 0.12% Liquid 5 milliLiter(s) Oral Mucosa every 4 hours  clopidogrel Tablet 75 milliGRAM(s) Oral daily  dexmedetomidine Infusion 1.4 MICROgram(s)/kG/Hr (31.745 mL/Hr) IV Continuous <Continuous>  enoxaparin Injectable 40 milliGRAM(s) SubCutaneous daily  EPINEPHrine    Infusion 0.024 MICROgram(s)/kG/Min (8 mL/Hr) IV Continuous <Continuous>  ferrous    sulfate 325 milliGRAM(s) Oral daily  folic acid 1 milliGRAM(s) Oral daily  furosemide Infusion 2.5 mG/Hr (1.25 mL/Hr) IV Continuous <Continuous>  insulin lispro (HumaLOG) corrective regimen sliding scale   SubCutaneous every 4 hours  lactobacillus acidophilus 1 Tablet(s) Oral every 8 hours  meropenem  IVPB      meropenem  IVPB 1000 milliGRAM(s) IV Intermittent every 8 hours  multivitamin 1 Tablet(s) Oral daily  norepinephrine Infusion 0.05 MICROgram(s)/kG/Min (8.503 mL/Hr) IV Continuous <Continuous>  ondansetron Injectable 4 milliGRAM(s) IV Push once  pantoprazole  Injectable 40 milliGRAM(s) IV Push every 12 hours  propofol Infusion 7 MICROgram(s)/kG/Min (3.809 mL/Hr) IV Continuous <Continuous>  QUEtiapine 25 milliGRAM(s) Oral daily  QUEtiapine 50 milliGRAM(s) Oral <User Schedule>  vancomycin  IVPB 1000 milliGRAM(s) IV Intermittent every 8 hours    MEDICATIONS  (PRN):  acetaminophen    Suspension .. 650 milliGRAM(s) Oral every 6 hours PRN Temp greater or equal to 38.5C (101.3F)  ALBUTerol/ipratropium for Nebulization 3 milliLiter(s) Nebulizer every 6 hours PRN Shortness of Breath and/or Wheezing  artificial  tears Solution 1 Drop(s) Both EYES four times a day PRN Dry Eyes  HYDROmorphone   Tablet 2 milliGRAM(s) Oral every 3 hours PRN Moderate Pain (4 - 6)  HYDROmorphone   Tablet 4 milliGRAM(s) Oral every 3 hours PRN Severe Pain (7 - 10)  lidocaine 2% Jelly 1 milliLiter(s) IntraUrethral four times a day PRN urethral discomfort  petrolatum white Ointment 1 Application(s) Topical four times a day PRN dry lips      Vital Signs Last 24 Hrs  T(C): 37.9 (17 Dec 2018 13:00), Max: 39.1 (16 Dec 2018 21:00)  T(F): 100.2 (17 Dec 2018 13:00), Max: 102.4 (16 Dec 2018 21:00)  HR: 92 (17 Dec 2018 13:00) (84 - 107)  BP: 106/61 (17 Dec 2018 09:30) (106/61 - 106/61)  BP(mean): 78 (17 Dec 2018 09:30) (78 - 78)  RR: 15 (17 Dec 2018 13:00) (0 - 53)  SpO2: 100% (17 Dec 2018 13:00) (98% - 100%)    PHYSICAL EXAM:    Constitutional: NAD, well-groomed, well-developed  HEENT: PERRLA, EOMI, no exophalmos  Neck: No LAD, No JVD, trachea midline, no thyroid enlargement  Respiratory: CTAB  Cardiovascular: S1 and S2, RRR, no M/G/R  Gastrointestinal: BS+, soft, no organomeglag or mass  Extremities: No peripheral edema, no pedal lesions  Vascular: 2+ peripheral pulses  Neurological: A/O x 3, no focal deficits  Psychiatric: Normal mood, normal affect  Skin: No rashes, no acanthosis        LABS  12-17    148<H>  |  104  |  31.0<H>  ----------------------------<  180<H>  3.6   |  29.0  |  0.79    Ca    8.1<L>      17 Dec 2018 03:33  Mg     2.2     12-17                            9.7    10.9  )-----------( 411      ( 17 Dec 2018 03:33 )             31.5     Triglycerides, Serum: 187 mg/dL (12-12-18 @ 03:24)        CAPILLARY BLOOD GLUCOSE      POCT Blood Glucose.: 187 mg/dL (17 Dec 2018 12:10)  POCT Blood Glucose.: 158 mg/dL (17 Dec 2018 03:44)  POCT Blood Glucose.: 193 mg/dL (16 Dec 2018 20:06)  POCT Blood Glucose.: 182 mg/dL (16 Dec 2018 16:12) Interval Events:  no overnight events  follow up on prediabetes/diabetes    patient seen and examined at bedside.  patient responding to questions with nodding  wife at bedside    patient on tube feeds over 24 hrs and at goal  as per nurse, patient passing gas but no BM  weaning down epi gtt    REVIEW OF SYSTEMS:  unable to obtain        penicillins (Unknown)      MEDICATIONS  (STANDING):  amiodarone    Tablet 200 milliGRAM(s) Oral daily  ascorbic acid 500 milliGRAM(s) Oral daily  aspirin 325 milliGRAM(s) Oral daily  atorvastatin 80 milliGRAM(s) Oral at bedtime  chlorhexidine 0.12% Liquid 5 milliLiter(s) Oral Mucosa every 4 hours  clopidogrel Tablet 75 milliGRAM(s) Oral daily  dexmedetomidine Infusion 1.4 MICROgram(s)/kG/Hr (31.745 mL/Hr) IV Continuous <Continuous>  enoxaparin Injectable 40 milliGRAM(s) SubCutaneous daily  EPINEPHrine    Infusion 0.024 MICROgram(s)/kG/Min (8 mL/Hr) IV Continuous <Continuous>  ferrous    sulfate 325 milliGRAM(s) Oral daily  folic acid 1 milliGRAM(s) Oral daily  furosemide Infusion 2.5 mG/Hr (1.25 mL/Hr) IV Continuous <Continuous>  insulin lispro (HumaLOG) corrective regimen sliding scale   SubCutaneous every 4 hours  lactobacillus acidophilus 1 Tablet(s) Oral every 8 hours  meropenem  IVPB      meropenem  IVPB 1000 milliGRAM(s) IV Intermittent every 8 hours  multivitamin 1 Tablet(s) Oral daily  norepinephrine Infusion 0.05 MICROgram(s)/kG/Min (8.503 mL/Hr) IV Continuous <Continuous>  ondansetron Injectable 4 milliGRAM(s) IV Push once  pantoprazole  Injectable 40 milliGRAM(s) IV Push every 12 hours  propofol Infusion 7 MICROgram(s)/kG/Min (3.809 mL/Hr) IV Continuous <Continuous>  QUEtiapine 25 milliGRAM(s) Oral daily  QUEtiapine 50 milliGRAM(s) Oral <User Schedule>  vancomycin  IVPB 1000 milliGRAM(s) IV Intermittent every 8 hours    MEDICATIONS  (PRN):  acetaminophen    Suspension .. 650 milliGRAM(s) Oral every 6 hours PRN Temp greater or equal to 38.5C (101.3F)  ALBUTerol/ipratropium for Nebulization 3 milliLiter(s) Nebulizer every 6 hours PRN Shortness of Breath and/or Wheezing  artificial  tears Solution 1 Drop(s) Both EYES four times a day PRN Dry Eyes  HYDROmorphone   Tablet 2 milliGRAM(s) Oral every 3 hours PRN Moderate Pain (4 - 6)  HYDROmorphone   Tablet 4 milliGRAM(s) Oral every 3 hours PRN Severe Pain (7 - 10)  lidocaine 2% Jelly 1 milliLiter(s) IntraUrethral four times a day PRN urethral discomfort  petrolatum white Ointment 1 Application(s) Topical four times a day PRN dry lips      Vital Signs Last 24 Hrs  T(C): 37.9 (17 Dec 2018 13:00), Max: 39.1 (16 Dec 2018 21:00)  T(F): 100.2 (17 Dec 2018 13:00), Max: 102.4 (16 Dec 2018 21:00)  HR: 92 (17 Dec 2018 13:00) (84 - 107)  BP: 106/61 (17 Dec 2018 09:30) (106/61 - 106/61)  BP(mean): 78 (17 Dec 2018 09:30) (78 - 78)  RR: 15 (17 Dec 2018 13:00) (0 - 53)  SpO2: 100% (17 Dec 2018 13:00) (98% - 100%)    PHYSICAL EXAM:    Constitutional: NAD, well-groomed, well-developed  Neck: trach in place  Respiratory: transmitted upper airway sounds  Cardiovascular: S1 and S2, RRR  Gastrointestinal: BS+, soft, distended  Neurological: Alert, able to nod to questions  Psychiatric: Normal mood, normal affect  Skin: No rashes, no acanthosis    NGT with tube feeds running    LABS  12-17    148<H>  |  104  |  31.0<H>  ----------------------------<  180<H>  3.6   |  29.0  |  0.79    Ca    8.1<L>      17 Dec 2018 03:33  Mg     2.2     12-17                            9.7    10.9  )-----------( 411      ( 17 Dec 2018 03:33 )             31.5     Triglycerides, Serum: 187 mg/dL (12-12-18 @ 03:24)        CAPILLARY BLOOD GLUCOSE      POCT Blood Glucose.: 187 mg/dL (17 Dec 2018 12:10)  POCT Blood Glucose.: 158 mg/dL (17 Dec 2018 03:44)  POCT Blood Glucose.: 193 mg/dL (16 Dec 2018 20:06)  POCT Blood Glucose.: 182 mg/dL (16 Dec 2018 16:12)

## 2018-12-17 NOTE — PROGRESS NOTE ADULT - PROBLEM SELECTOR PLAN 1
hemodynamics continue to improve, epinephrine weaned down to 0.01 mcg/kg/min (4 cc/hr) and norepinephrine down to 0.02 mcg/kg/min   will maintain epinephrine at this rate then resume wean per Dr Barrett in am  continue to wean levophed as tolerated to maintain MAP 65-75  continue with diuresis with lasix infusion  Zaroxolyn x 1 on 12/16 was very synergistic and helpful (consider decreasing dose to 2.5mg if needed again)  continue augustin catheter for urine output monitoring in critically ill patient

## 2018-12-17 NOTE — PROGRESS NOTE ADULT - PROBLEM SELECTOR PLAN 10
continue aspirin, plavix and statin  no beta-blocker d/t shock state  Lovenox and SCDs for DVT prophylaxis  Protonix for GI prophylaxis  no history of DM but BG remains elevated (emil contributed to by shock state and epinephrine), will add lantus (up titrate PRN) and continue FS Q4 with coverage

## 2018-12-18 ENCOUNTER — TRANSCRIPTION ENCOUNTER (OUTPATIENT)
Age: 48
End: 2018-12-18

## 2018-12-18 LAB
-  AMIKACIN: SIGNIFICANT CHANGE UP
-  AMPICILLIN/SULBACTAM: SIGNIFICANT CHANGE UP
-  AMPICILLIN: SIGNIFICANT CHANGE UP
-  AZTREONAM: SIGNIFICANT CHANGE UP
-  CEFAZOLIN: SIGNIFICANT CHANGE UP
-  CEFEPIME: SIGNIFICANT CHANGE UP
-  CEFOXITIN: SIGNIFICANT CHANGE UP
-  CEFTRIAXONE: SIGNIFICANT CHANGE UP
-  CIPROFLOXACIN: SIGNIFICANT CHANGE UP
-  ERTAPENEM: SIGNIFICANT CHANGE UP
-  GENTAMICIN: SIGNIFICANT CHANGE UP
-  IMIPENEM: SIGNIFICANT CHANGE UP
-  LEVOFLOXACIN: SIGNIFICANT CHANGE UP
-  MEROPENEM: SIGNIFICANT CHANGE UP
-  PIPERACILLIN/TAZOBACTAM: SIGNIFICANT CHANGE UP
-  TOBRAMYCIN: SIGNIFICANT CHANGE UP
-  TRIMETHOPRIM/SULFAMETHOXAZOLE: SIGNIFICANT CHANGE UP
ALBUMIN SERPL ELPH-MCNC: 3.2 G/DL — LOW (ref 3.3–5.2)
ALP SERPL-CCNC: 181 U/L — HIGH (ref 40–120)
ALT FLD-CCNC: 56 U/L — HIGH
AMYLASE P1 CFR SERPL: 63 U/L — SIGNIFICANT CHANGE UP (ref 36–128)
ANION GAP SERPL CALC-SCNC: 14 MMOL/L — SIGNIFICANT CHANGE UP (ref 5–17)
ANION GAP SERPL CALC-SCNC: 14 MMOL/L — SIGNIFICANT CHANGE UP (ref 5–17)
ANION GAP SERPL CALC-SCNC: 15 MMOL/L — SIGNIFICANT CHANGE UP (ref 5–17)
AST SERPL-CCNC: 90 U/L — HIGH
BILIRUB DIRECT SERPL-MCNC: 1.6 MG/DL — HIGH (ref 0–0.3)
BILIRUB INDIRECT FLD-MCNC: 1.3 MG/DL — HIGH (ref 0.2–1)
BILIRUB SERPL-MCNC: 2.9 MG/DL — HIGH (ref 0.4–2)
BLD GP AB SCN SERPL QL: SIGNIFICANT CHANGE UP
BUN SERPL-MCNC: 21 MG/DL — HIGH (ref 8–20)
BUN SERPL-MCNC: 22 MG/DL — HIGH (ref 8–20)
BUN SERPL-MCNC: 24 MG/DL — HIGH (ref 8–20)
CALCIUM SERPL-MCNC: 8 MG/DL — LOW (ref 8.6–10.2)
CALCIUM SERPL-MCNC: 8.1 MG/DL — LOW (ref 8.6–10.2)
CALCIUM SERPL-MCNC: 8.6 MG/DL — SIGNIFICANT CHANGE UP (ref 8.6–10.2)
CHLORIDE SERPL-SCNC: 94 MMOL/L — LOW (ref 98–107)
CHLORIDE SERPL-SCNC: 95 MMOL/L — LOW (ref 98–107)
CHLORIDE SERPL-SCNC: 99 MMOL/L — SIGNIFICANT CHANGE UP (ref 98–107)
CO2 SERPL-SCNC: 28 MMOL/L — SIGNIFICANT CHANGE UP (ref 22–29)
CO2 SERPL-SCNC: 28 MMOL/L — SIGNIFICANT CHANGE UP (ref 22–29)
CO2 SERPL-SCNC: 29 MMOL/L — SIGNIFICANT CHANGE UP (ref 22–29)
CREAT SERPL-MCNC: 0.5 MG/DL — SIGNIFICANT CHANGE UP (ref 0.5–1.3)
CREAT SERPL-MCNC: 0.5 MG/DL — SIGNIFICANT CHANGE UP (ref 0.5–1.3)
CREAT SERPL-MCNC: 0.52 MG/DL — SIGNIFICANT CHANGE UP (ref 0.5–1.3)
CULTURE RESULTS: SIGNIFICANT CHANGE UP
GAS PNL BLDA: SIGNIFICANT CHANGE UP
GLUCOSE BLDC GLUCOMTR-MCNC: 143 MG/DL — HIGH (ref 70–99)
GLUCOSE BLDC GLUCOMTR-MCNC: 158 MG/DL — HIGH (ref 70–99)
GLUCOSE BLDC GLUCOMTR-MCNC: 213 MG/DL — HIGH (ref 70–99)
GLUCOSE SERPL-MCNC: 158 MG/DL — HIGH (ref 70–115)
GLUCOSE SERPL-MCNC: 169 MG/DL — HIGH (ref 70–115)
GLUCOSE SERPL-MCNC: 176 MG/DL — HIGH (ref 70–115)
GRAM STN FLD: SIGNIFICANT CHANGE UP
GRAM STN FLD: SIGNIFICANT CHANGE UP
HCT VFR BLD CALC: 31.4 % — LOW (ref 42–52)
HCT VFR BLD CALC: 31.5 % — LOW (ref 42–52)
HGB BLD-MCNC: 9.7 G/DL — LOW (ref 14–18)
HGB BLD-MCNC: 9.8 G/DL — LOW (ref 14–18)
INR BLD: 1.46 RATIO — HIGH (ref 0.88–1.16)
LIDOCAIN IGE QN: 114 U/L — HIGH (ref 22–51)
MAGNESIUM SERPL-MCNC: 2.1 MG/DL — SIGNIFICANT CHANGE UP (ref 1.6–2.6)
MAGNESIUM SERPL-MCNC: 2.2 MG/DL — SIGNIFICANT CHANGE UP (ref 1.6–2.6)
MAGNESIUM SERPL-MCNC: 2.3 MG/DL — SIGNIFICANT CHANGE UP (ref 1.6–2.6)
MCHC RBC-ENTMCNC: 27.4 PG — SIGNIFICANT CHANGE UP (ref 27–31)
MCHC RBC-ENTMCNC: 27.4 PG — SIGNIFICANT CHANGE UP (ref 27–31)
MCHC RBC-ENTMCNC: 30.8 G/DL — LOW (ref 32–36)
MCHC RBC-ENTMCNC: 31.2 G/DL — LOW (ref 32–36)
MCV RBC AUTO: 87.7 FL — SIGNIFICANT CHANGE UP (ref 80–94)
MCV RBC AUTO: 89 FL — SIGNIFICANT CHANGE UP (ref 80–94)
METHOD TYPE: SIGNIFICANT CHANGE UP
ORGANISM # SPEC MICROSCOPIC CNT: SIGNIFICANT CHANGE UP
ORGANISM # SPEC MICROSCOPIC CNT: SIGNIFICANT CHANGE UP
PHOSPHATE SERPL-MCNC: 3.4 MG/DL — SIGNIFICANT CHANGE UP (ref 2.4–4.7)
PLATELET # BLD AUTO: 331 K/UL — SIGNIFICANT CHANGE UP (ref 150–400)
PLATELET # BLD AUTO: 355 K/UL — SIGNIFICANT CHANGE UP (ref 150–400)
POTASSIUM SERPL-MCNC: 3.2 MMOL/L — LOW (ref 3.5–5.3)
POTASSIUM SERPL-MCNC: 3.7 MMOL/L — SIGNIFICANT CHANGE UP (ref 3.5–5.3)
POTASSIUM SERPL-MCNC: 4.1 MMOL/L — SIGNIFICANT CHANGE UP (ref 3.5–5.3)
POTASSIUM SERPL-SCNC: 3.2 MMOL/L — LOW (ref 3.5–5.3)
POTASSIUM SERPL-SCNC: 3.7 MMOL/L — SIGNIFICANT CHANGE UP (ref 3.5–5.3)
POTASSIUM SERPL-SCNC: 4.1 MMOL/L — SIGNIFICANT CHANGE UP (ref 3.5–5.3)
PROT SERPL-MCNC: 6 G/DL — LOW (ref 6.6–8.7)
PROTHROM AB SERPL-ACNC: 17 SEC — HIGH (ref 10–12.9)
RBC # BLD: 3.54 M/UL — LOW (ref 4.6–6.2)
RBC # BLD: 3.58 M/UL — LOW (ref 4.6–6.2)
RBC # FLD: 15.3 % — SIGNIFICANT CHANGE UP (ref 11–15.6)
RBC # FLD: 15.8 % — HIGH (ref 11–15.6)
SODIUM SERPL-SCNC: 137 MMOL/L — SIGNIFICANT CHANGE UP (ref 135–145)
SODIUM SERPL-SCNC: 138 MMOL/L — SIGNIFICANT CHANGE UP (ref 135–145)
SODIUM SERPL-SCNC: 141 MMOL/L — SIGNIFICANT CHANGE UP (ref 135–145)
SPECIMEN SOURCE: SIGNIFICANT CHANGE UP
TYPE + AB SCN PNL BLD: SIGNIFICANT CHANGE UP
VANCOMYCIN TROUGH SERPL-MCNC: 13.4 UG/ML — SIGNIFICANT CHANGE UP (ref 10–20)
WBC # BLD: 10.1 K/UL — SIGNIFICANT CHANGE UP (ref 4.8–10.8)
WBC # BLD: 10.7 K/UL — SIGNIFICANT CHANGE UP (ref 4.8–10.8)
WBC # FLD AUTO: 10.1 K/UL — SIGNIFICANT CHANGE UP (ref 4.8–10.8)
WBC # FLD AUTO: 10.7 K/UL — SIGNIFICANT CHANGE UP (ref 4.8–10.8)

## 2018-12-18 PROCEDURE — 74018 RADEX ABDOMEN 1 VIEW: CPT | Mod: 26

## 2018-12-18 PROCEDURE — 99232 SBSQ HOSP IP/OBS MODERATE 35: CPT

## 2018-12-18 PROCEDURE — 71045 X-RAY EXAM CHEST 1 VIEW: CPT | Mod: 26,59

## 2018-12-18 PROCEDURE — 99233 SBSQ HOSP IP/OBS HIGH 50: CPT

## 2018-12-18 RX ORDER — PANTOPRAZOLE SODIUM 20 MG/1
8 TABLET, DELAYED RELEASE ORAL
Qty: 80 | Refills: 0 | Status: DISCONTINUED | OUTPATIENT
Start: 2018-12-18 | End: 2018-12-20

## 2018-12-18 RX ORDER — CHLORPROMAZINE HCL 10 MG
25 TABLET ORAL ONCE
Qty: 0 | Refills: 0 | Status: COMPLETED | OUTPATIENT
Start: 2018-12-18 | End: 2018-12-18

## 2018-12-18 RX ORDER — MICAFUNGIN SODIUM 100 MG/1
100 INJECTION, POWDER, LYOPHILIZED, FOR SOLUTION INTRAVENOUS ONCE
Qty: 0 | Refills: 0 | Status: COMPLETED | OUTPATIENT
Start: 2018-12-18 | End: 2018-12-18

## 2018-12-18 RX ORDER — POTASSIUM CHLORIDE 20 MEQ
20 PACKET (EA) ORAL
Qty: 0 | Refills: 0 | Status: COMPLETED | OUTPATIENT
Start: 2018-12-18 | End: 2018-12-18

## 2018-12-18 RX ORDER — MICAFUNGIN SODIUM 100 MG/1
INJECTION, POWDER, LYOPHILIZED, FOR SOLUTION INTRAVENOUS
Qty: 0 | Refills: 0 | Status: DISCONTINUED | OUTPATIENT
Start: 2018-12-18 | End: 2018-12-28

## 2018-12-18 RX ORDER — METOCLOPRAMIDE HCL 10 MG
10 TABLET ORAL ONCE
Qty: 0 | Refills: 0 | Status: COMPLETED | OUTPATIENT
Start: 2018-12-18 | End: 2018-12-18

## 2018-12-18 RX ORDER — POTASSIUM CHLORIDE 20 MEQ
10 PACKET (EA) ORAL
Qty: 0 | Refills: 0 | Status: COMPLETED | OUTPATIENT
Start: 2018-12-18 | End: 2018-12-18

## 2018-12-18 RX ORDER — POTASSIUM CHLORIDE 20 MEQ
20 PACKET (EA) ORAL ONCE
Qty: 0 | Refills: 0 | Status: COMPLETED | OUTPATIENT
Start: 2018-12-18 | End: 2018-12-18

## 2018-12-18 RX ORDER — MICAFUNGIN SODIUM 100 MG/1
100 INJECTION, POWDER, LYOPHILIZED, FOR SOLUTION INTRAVENOUS EVERY 24 HOURS
Qty: 0 | Refills: 0 | Status: DISCONTINUED | OUTPATIENT
Start: 2018-12-19 | End: 2018-12-28

## 2018-12-18 RX ORDER — MAGNESIUM SULFATE 500 MG/ML
2 VIAL (ML) INJECTION ONCE
Qty: 0 | Refills: 0 | Status: COMPLETED | OUTPATIENT
Start: 2018-12-18 | End: 2018-12-18

## 2018-12-18 RX ADMIN — Medication 100 MILLIEQUIVALENT(S): at 22:14

## 2018-12-18 RX ADMIN — CHLORHEXIDINE GLUCONATE 5 MILLILITER(S): 213 SOLUTION TOPICAL at 05:13

## 2018-12-18 RX ADMIN — Medication 250 MILLIGRAM(S): at 05:17

## 2018-12-18 RX ADMIN — Medication 650 MILLIGRAM(S): at 00:00

## 2018-12-18 RX ADMIN — CHLORHEXIDINE GLUCONATE 5 MILLILITER(S): 213 SOLUTION TOPICAL at 05:15

## 2018-12-18 RX ADMIN — Medication 1 TABLET(S): at 14:07

## 2018-12-18 RX ADMIN — MEROPENEM 100 MILLIGRAM(S): 1 INJECTION INTRAVENOUS at 05:13

## 2018-12-18 RX ADMIN — QUETIAPINE FUMARATE 25 MILLIGRAM(S): 200 TABLET, FILM COATED ORAL at 05:16

## 2018-12-18 RX ADMIN — Medication 1 MILLIGRAM(S): at 14:08

## 2018-12-18 RX ADMIN — CHLORHEXIDINE GLUCONATE 5 MILLILITER(S): 213 SOLUTION TOPICAL at 10:00

## 2018-12-18 RX ADMIN — ONDANSETRON 4 MILLIGRAM(S): 8 TABLET, FILM COATED ORAL at 18:20

## 2018-12-18 RX ADMIN — Medication 102 MILLIGRAM(S): at 18:00

## 2018-12-18 RX ADMIN — ENOXAPARIN SODIUM 40 MILLIGRAM(S): 100 INJECTION SUBCUTANEOUS at 14:09

## 2018-12-18 RX ADMIN — Medication 10 MILLIGRAM(S): at 19:43

## 2018-12-18 RX ADMIN — CHLORHEXIDINE GLUCONATE 5 MILLILITER(S): 213 SOLUTION TOPICAL at 17:18

## 2018-12-18 RX ADMIN — Medication 250 MILLIGRAM(S): at 23:45

## 2018-12-18 RX ADMIN — MEROPENEM 100 MILLIGRAM(S): 1 INJECTION INTRAVENOUS at 14:30

## 2018-12-18 RX ADMIN — PROPOFOL 3.81 MICROGRAM(S)/KG/MIN: 10 INJECTION, EMULSION INTRAVENOUS at 03:11

## 2018-12-18 RX ADMIN — MEROPENEM 100 MILLIGRAM(S): 1 INJECTION INTRAVENOUS at 22:14

## 2018-12-18 RX ADMIN — Medication 250 MILLIGRAM(S): at 16:35

## 2018-12-18 RX ADMIN — Medication 50 MILLIEQUIVALENT(S): at 06:31

## 2018-12-18 RX ADMIN — Medication 50 MILLIEQUIVALENT(S): at 07:31

## 2018-12-18 RX ADMIN — Medication 4: at 00:15

## 2018-12-18 RX ADMIN — PANTOPRAZOLE SODIUM 40 MILLIGRAM(S): 20 TABLET, DELAYED RELEASE ORAL at 17:19

## 2018-12-18 RX ADMIN — Medication 2: at 09:15

## 2018-12-18 RX ADMIN — Medication 325 MILLIGRAM(S): at 14:09

## 2018-12-18 RX ADMIN — CLOPIDOGREL BISULFATE 75 MILLIGRAM(S): 75 TABLET, FILM COATED ORAL at 14:08

## 2018-12-18 RX ADMIN — Medication 1 TABLET(S): at 05:16

## 2018-12-18 RX ADMIN — Medication 50 GRAM(S): at 14:10

## 2018-12-18 RX ADMIN — CHLORHEXIDINE GLUCONATE 5 MILLILITER(S): 213 SOLUTION TOPICAL at 15:15

## 2018-12-18 RX ADMIN — Medication 100 MILLIEQUIVALENT(S): at 00:00

## 2018-12-18 RX ADMIN — Medication 100 MILLIEQUIVALENT(S): at 22:00

## 2018-12-18 RX ADMIN — HYDROMORPHONE HYDROCHLORIDE 2 MILLIGRAM(S): 2 INJECTION INTRAMUSCULAR; INTRAVENOUS; SUBCUTANEOUS at 04:00

## 2018-12-18 RX ADMIN — Medication 50 MILLIEQUIVALENT(S): at 09:00

## 2018-12-18 RX ADMIN — Medication 102 MILLIGRAM(S): at 22:00

## 2018-12-18 RX ADMIN — Medication 2: at 05:13

## 2018-12-18 RX ADMIN — DEXMEDETOMIDINE HYDROCHLORIDE IN 0.9% SODIUM CHLORIDE 31.75 MICROGRAM(S)/KG/HR: 4 INJECTION INTRAVENOUS at 03:11

## 2018-12-18 RX ADMIN — MICAFUNGIN SODIUM 105 MILLIGRAM(S): 100 INJECTION, POWDER, LYOPHILIZED, FOR SOLUTION INTRAVENOUS at 12:00

## 2018-12-18 RX ADMIN — PANTOPRAZOLE SODIUM 40 MILLIGRAM(S): 20 TABLET, DELAYED RELEASE ORAL at 05:15

## 2018-12-18 RX ADMIN — Medication 500 MILLIGRAM(S): at 14:08

## 2018-12-18 RX ADMIN — Medication 325 MILLIGRAM(S): at 14:11

## 2018-12-18 RX ADMIN — Medication 100 MILLIEQUIVALENT(S): at 19:00

## 2018-12-18 RX ADMIN — HYDROMORPHONE HYDROCHLORIDE 2 MILLIGRAM(S): 2 INJECTION INTRAMUSCULAR; INTRAVENOUS; SUBCUTANEOUS at 03:05

## 2018-12-18 RX ADMIN — AMIODARONE HYDROCHLORIDE 200 MILLIGRAM(S): 400 TABLET ORAL at 05:14

## 2018-12-18 NOTE — PROGRESS NOTE ADULT - SUBJECTIVE AND OBJECTIVE BOX
Interval Events:  no overnight events  follow up on   patient seen and examined at bedside.    REVIEW OF SYSTEMS:    CONSTITUTIONAL: No fever, weight loss, or fatigue  EYES: No eye pain, visual disturbances, or discharge  ENMT:  No difficulty hearing, tinnitus, vertigo; No sinus or throat pain  NECK: No pain or stiffness  RESPIRATORY: No cough, wheezing, chills or hemoptysis; No shortness of breath  CARDIOVASCULAR: No chest pain, palpitations, dizziness, or leg swelling  GASTROINTESTINAL: No abdominal or epigastric pain. No nausea, vomiting, or hematemesis; No diarrhea or constipation. No melena or hematochezia.  NEUROLOGICAL: No headaches, memory loss, loss of strength, numbness, or tremors  SKIN: No itching, burning, rashes, or lesions   MUSCULOSKELETAL: No joint pain or swelling; No muscle, back, or extremity pain  PSYCHIATRIC: No depression, anxiety, mood swings, or difficulty sleeping        penicillins (Unknown)      MEDICATIONS  (STANDING):  amiodarone    Tablet 200 milliGRAM(s) Oral daily  ascorbic acid 500 milliGRAM(s) Oral daily  aspirin 325 milliGRAM(s) Oral daily  atorvastatin 80 milliGRAM(s) Oral at bedtime  chlorhexidine 0.12% Liquid 5 milliLiter(s) Oral Mucosa every 4 hours  clopidogrel Tablet 75 milliGRAM(s) Oral daily  dexmedetomidine Infusion 1.4 MICROgram(s)/kG/Hr (31.745 mL/Hr) IV Continuous <Continuous>  enoxaparin Injectable 40 milliGRAM(s) SubCutaneous daily  EPINEPHrine    Infusion 0.024 MICROgram(s)/kG/Min (8 mL/Hr) IV Continuous <Continuous>  ferrous    sulfate 325 milliGRAM(s) Oral daily  folic acid 1 milliGRAM(s) Oral daily  furosemide Infusion 2.5 mG/Hr (1.25 mL/Hr) IV Continuous <Continuous>  insulin glargine Injectable (LANTUS) 10 Unit(s) SubCutaneous at bedtime  insulin lispro (HumaLOG) corrective regimen sliding scale   SubCutaneous every 4 hours  lactobacillus acidophilus 1 Tablet(s) Oral every 8 hours  meropenem  IVPB      meropenem  IVPB 1000 milliGRAM(s) IV Intermittent every 8 hours  micafungin IVPB      multivitamin 1 Tablet(s) Oral daily  norepinephrine Infusion 0.05 MICROgram(s)/kG/Min (8.503 mL/Hr) IV Continuous <Continuous>  ondansetron Injectable 4 milliGRAM(s) IV Push once  pantoprazole  Injectable 40 milliGRAM(s) IV Push every 12 hours  potassium chloride  20 mEq/100 mL IVPB 20 milliEquivalent(s) IV Intermittent once  propofol Infusion 7 MICROgram(s)/kG/Min (3.809 mL/Hr) IV Continuous <Continuous>  QUEtiapine 25 milliGRAM(s) Oral daily  QUEtiapine 50 milliGRAM(s) Oral <User Schedule>  vancomycin  IVPB 1000 milliGRAM(s) IV Intermittent every 8 hours    MEDICATIONS  (PRN):  acetaminophen    Suspension .. 650 milliGRAM(s) Oral every 6 hours PRN Temp greater or equal to 38.5C (101.3F)  ALBUTerol/ipratropium for Nebulization 3 milliLiter(s) Nebulizer every 6 hours PRN Shortness of Breath and/or Wheezing  artificial  tears Solution 1 Drop(s) Both EYES four times a day PRN Dry Eyes  HYDROmorphone   Tablet 2 milliGRAM(s) Oral every 3 hours PRN Moderate Pain (4 - 6)  HYDROmorphone   Tablet 4 milliGRAM(s) Oral every 3 hours PRN Severe Pain (7 - 10)  lidocaine 2% Jelly 1 milliLiter(s) IntraUrethral four times a day PRN urethral discomfort  petrolatum white Ointment 1 Application(s) Topical four times a day PRN dry lips      Vital Signs Last 24 Hrs  T(C): 38.2 (18 Dec 2018 13:00), Max: 39 (18 Dec 2018 07:00)  T(F): 100.8 (18 Dec 2018 13:00), Max: 102.2 (18 Dec 2018 07:00)  HR: 107 (18 Dec 2018 16:00) (92 - 118)  BP: --  BP(mean): --  RR: 23 (18 Dec 2018 16:00) (15 - 31)  SpO2: 99% (18 Dec 2018 16:00) (98% - 100%)    PHYSICAL EXAM:    Constitutional: NAD, well-groomed, well-developed  HEENT: PERRLA, EOMI, no exophalmos  Neck: No LAD, No JVD, trachea midline, no thyroid enlargement  Respiratory: CTAB  Cardiovascular: S1 and S2, RRR, no M/G/R  Gastrointestinal: BS+, soft, no organomeglag or mass  Extremities: No peripheral edema, no pedal lesions  Vascular: 2+ peripheral pulses  Neurological: A/O x 3, no focal deficits  Psychiatric: Normal mood, normal affect  Skin: No rashes, no acanthosis        LABS  12-18    137  |  94<L>  |  22.0<H>  ----------------------------<  169<H>  4.1   |  28.0  |  0.50    Ca    8.6      18 Dec 2018 14:12  Mg     2.1     12-18                            9.7    10.1  )-----------( 355      ( 18 Dec 2018 04:04 )             31.5     Triglycerides, Serum: 187 mg/dL (12-12-18 @ 03:24)        CAPILLARY BLOOD GLUCOSE      POCT Blood Glucose.: 158 mg/dL (18 Dec 2018 09:06)  POCT Blood Glucose.: 213 mg/dL (18 Dec 2018 00:04)  POCT Blood Glucose.: 188 mg/dL (17 Dec 2018 20:09) Interval Events:  no overnight events  follow up on prediabetes    patient seen and examined at bedside.  interactive. answering questions appropriately with nodding  able to follow commands  TF at goal    REVIEW OF SYSTEMS:  unable to obtain        penicillins (Unknown)      MEDICATIONS  (STANDING):  amiodarone    Tablet 200 milliGRAM(s) Oral daily  ascorbic acid 500 milliGRAM(s) Oral daily  aspirin 325 milliGRAM(s) Oral daily  atorvastatin 80 milliGRAM(s) Oral at bedtime  chlorhexidine 0.12% Liquid 5 milliLiter(s) Oral Mucosa every 4 hours  clopidogrel Tablet 75 milliGRAM(s) Oral daily  dexmedetomidine Infusion 1.4 MICROgram(s)/kG/Hr (31.745 mL/Hr) IV Continuous <Continuous>  enoxaparin Injectable 40 milliGRAM(s) SubCutaneous daily  EPINEPHrine    Infusion 0.024 MICROgram(s)/kG/Min (8 mL/Hr) IV Continuous <Continuous>  ferrous    sulfate 325 milliGRAM(s) Oral daily  folic acid 1 milliGRAM(s) Oral daily  furosemide Infusion 2.5 mG/Hr (1.25 mL/Hr) IV Continuous <Continuous>  insulin glargine Injectable (LANTUS) 10 Unit(s) SubCutaneous at bedtime  insulin lispro (HumaLOG) corrective regimen sliding scale   SubCutaneous every 4 hours  lactobacillus acidophilus 1 Tablet(s) Oral every 8 hours  meropenem  IVPB      meropenem  IVPB 1000 milliGRAM(s) IV Intermittent every 8 hours  micafungin IVPB      multivitamin 1 Tablet(s) Oral daily  norepinephrine Infusion 0.05 MICROgram(s)/kG/Min (8.503 mL/Hr) IV Continuous <Continuous>  ondansetron Injectable 4 milliGRAM(s) IV Push once  pantoprazole  Injectable 40 milliGRAM(s) IV Push every 12 hours  potassium chloride  20 mEq/100 mL IVPB 20 milliEquivalent(s) IV Intermittent once  propofol Infusion 7 MICROgram(s)/kG/Min (3.809 mL/Hr) IV Continuous <Continuous>  QUEtiapine 25 milliGRAM(s) Oral daily  QUEtiapine 50 milliGRAM(s) Oral <User Schedule>  vancomycin  IVPB 1000 milliGRAM(s) IV Intermittent every 8 hours    MEDICATIONS  (PRN):  acetaminophen    Suspension .. 650 milliGRAM(s) Oral every 6 hours PRN Temp greater or equal to 38.5C (101.3F)  ALBUTerol/ipratropium for Nebulization 3 milliLiter(s) Nebulizer every 6 hours PRN Shortness of Breath and/or Wheezing  artificial  tears Solution 1 Drop(s) Both EYES four times a day PRN Dry Eyes  HYDROmorphone   Tablet 2 milliGRAM(s) Oral every 3 hours PRN Moderate Pain (4 - 6)  HYDROmorphone   Tablet 4 milliGRAM(s) Oral every 3 hours PRN Severe Pain (7 - 10)  lidocaine 2% Jelly 1 milliLiter(s) IntraUrethral four times a day PRN urethral discomfort  petrolatum white Ointment 1 Application(s) Topical four times a day PRN dry lips      Vital Signs Last 24 Hrs  T(C): 38.2 (18 Dec 2018 13:00), Max: 39 (18 Dec 2018 07:00)  T(F): 100.8 (18 Dec 2018 13:00), Max: 102.2 (18 Dec 2018 07:00)  HR: 107 (18 Dec 2018 16:00) (92 - 118)  BP: --  BP(mean): --  RR: 23 (18 Dec 2018 16:00) (15 - 31)  SpO2: 99% (18 Dec 2018 16:00) (98% - 100%)    PHYSICAL EXAM:    Constitutional: NAD, well-groomed, well-developed  Neck: trach in place  Respiratory: transmitted upper airway sounds  Cardiovascular: S1 and S2, RRR  Gastrointestinal: BS+, soft, distended  Neurological: Alert, able to nod to questions, following commands  Psychiatric: Normal mood, normal affect    NGT with tube feeds running      LABS  12-18    137  |  94<L>  |  22.0<H>  ----------------------------<  169<H>  4.1   |  28.0  |  0.50    Ca    8.6      18 Dec 2018 14:12  Mg     2.1     12-18                            9.7    10.1  )-----------( 355      ( 18 Dec 2018 04:04 )             31.5     Triglycerides, Serum: 187 mg/dL (12-12-18 @ 03:24)        CAPILLARY BLOOD GLUCOSE      POCT Blood Glucose.: 158 mg/dL (18 Dec 2018 09:06)  POCT Blood Glucose.: 213 mg/dL (18 Dec 2018 00:04)  POCT Blood Glucose.: 188 mg/dL (17 Dec 2018 20:09)

## 2018-12-18 NOTE — PROGRESS NOTE ADULT - ASSESSMENT
48y/o man with no significant PMH admitted on 11/29/18 for CP for 2-3 days prior to admission,   found with STEMI. Urgent Cath, pt found to have multiple occlusions; VF arrest, shock x 1, return to NSR.  Upon transfer to OR for Urgent CABG, pt again VF arrest, chest opened intra-op with CPR in progress.  s/p ECMO and Impella; 12/3 ECMO explanted and Impella placed via right groin sheath.    Has been treated for fever empirically few times. Was off Abx  restarted due to persistent fever. CXR wtih   Congestion and possible infiltration.   No positive cultures     Pneumonia  Fever post op  s/p CABG   Diarrhea    S/P tracheostomy     - Blood and urine cultures are sent on 11/15, follow the results  - Mild leukocytosis and febrile to 102.2,   - Deep suction for sputum culture    t meropenem 1gm q8h    vancomycin increased to 1gm q 8  ALTHOUGH FUNGAL INFECTION UNLIKELY WITH NEGATIVE BLOOD CX AND SPUTUM ONLY JANKI  CAN START EMPIRIC MYCAMINE   WOULD STILL   CONSIDER REPEAT IMAGING CHEST ABD PELVIS TO LOOK FOR SOURCE OF FEVERS  AS   DESPITE ABX STILL WITH FEVERS   ALTHOUGH APPEARS CLINICALLY IMPROVED

## 2018-12-18 NOTE — PROGRESS NOTE ADULT - PROBLEM SELECTOR PLAN 2
repeat blood cultures to be drawn today, pt febrile   doubt candida PNA, ID aware, no change to med regimen at this time  continue vancomycin, adjust dosing per trough  zosyn changed to meropenem as d/w ID  tylenol PRN for fevers  cooling blanket PRN  concern for sinusitis since NGT in place (will need PEG)

## 2018-12-18 NOTE — PROGRESS NOTE ADULT - SUBJECTIVE AND OBJECTIVE BOX
[facility]  [facility]  [address] [place] [address]  P   F     REASON FOR VISIT:    Acute Anterior STEMI , Acute LAD stent thrombosis, s/p CABG x 4, cardiac arrest, LVAD need (Impella) , respiratory failure, cardiogenic shock    ROS: cannot be obtained.  patient intubated.     MEDICATIONS  (STANDING):  MEDICATIONS  (STANDING):  amiodarone    Tablet 200 milliGRAM(s) Oral daily  EPINEPHrine    Infusion 0.024 MICROgram(s)/kG/Min IV Continuous <Continuous>  furosemide Infusion 2.5 mG/Hr IV Continuous <Continuous>  norepinephrine Infusion 0.05 MICROgram(s)/kG/Min IV Continuous <Continuous>    pantoprazole  Injectable  meropenem  IVPB  meropenem  IVPB  vancomycin  IVPB  ascorbic acid  aspirin  atorvastatin  chlorhexidine 0.12% Liquid  clopidogrel Tablet  dexmedetomidine Infusion  enoxaparin Injectable  ferrous    sulfate  folic acid  insulin glargine Injectable (LANTUS)  insulin lispro (HumaLOG) corrective regimen sliding scale  lactobacillus acidophilus  multivitamin  ondansetron Injectable  propofol Infusion  QUEtiapine  QUEtiapine      Vital Signs Last 24 Hrs  ICU Vital Signs Last 24 Hrs  T(C): 39 (18 Dec 2018 07:00), Max: 39 (18 Dec 2018 07:00)  T(F): 102.2 (18 Dec 2018 07:00), Max: 102.2 (18 Dec 2018 07:00)  HR: 113 (18 Dec 2018 10:00) (92 - 118)  BP: --  BP(mean): --  ABP: 112/63 (18 Dec 2018 10:00) (-5/-5 - 141/91)  ABP(mean): 79 (18 Dec 2018 10:00) (-5 - 109)  RR: 27 (18 Dec 2018 10:00) (15 - 31)  SpO2: 99% (18 Dec 2018 10:00) (98% - 100%)      Heent  Short neck  trach inplace  Ngt left nostil  Chest  clear to auscultation anteriorly   Heart  S1&s2 regualr no s3 or s4  No murmur appreciated  Abdomen  soft  faint bowel sounds  Ext  ++ edema  Neuro  sitting in chair, alert, follows commands. moves all 3 extremeties.                                          9.7    10.1  )-----------( 355      ( 18 Dec 2018 04:04 )             31.5   N=x    ; L=x        18 Dec 2018 04:04    141    |  99     |  24.0   ----------------------------<  158    3.7     |  28.0   |  0.52     Ca    8.1        18 Dec 2018 04:04  Mg     2.2       18 Dec 2018 04:04        < from: TTE Echo Complete w/Doppler (12.13.18 @ 11:08) >  Summary:   1. Technically difficult study. Limited information is available.   2. Endocardial visualization was enhanced with intravenous echo contrast.   3. Severely decreased global left ventricular systolic function.   4. Left ventricular ejection fraction, by visual estimation, is 20 to   25%.   5. Moderately reduced RV systolic function.   6. There is no evidence of pericardial effusion.   7. There is no evidence of LV apical thrombus.    [id number] Jony Ackerman MD, Electronically signed on 12/13/2018 at 12:30:46   PM    < end of copied text >      [ ]  Catheterization:    < from: Cardiac Cath Lab - Adult (11.29.18 @ 11:22) >  CORONARY VESSELS: The coronary circulation is right dominant.  LM:   --  Ostial LM: There was a 20 % stenosis.  LAD:   --  Proximal LAD: There was a tubular 80 % stenosis at a site with  no prior intervention, in the distal third of the vessel segment. The  lesion was hazy, eccentric, and associated with a moderate filling defect  consistent with thrombus. There was FAZAL grade 0 flow through the vessel  (no flow) and a large vascular territory distal to the lesion. This lesion  is a likely culprit for the patient's recent myocardial infarction. It  appears amenable to percutaneous intervention.  --  Mid LAD: There was a 100 % stenosis.  --  D1: The vessel was small to medium sized. There was a 100 % stenosis.  --  D2: The vessel was large sized. There was a 100 % stenosis.  CX:   --  Circumflex: The vessel was medium sized. There was a tubular 80 %  stenosis in the middle third of the vessel segment. The lesion was  eccentric.  --  OM1: The vessel was medium to large sized. There was a tubular 90 %  stenosis in the middle third of the vessel segment. The lesion was hazy,  concentric, and without evidence of thrombus. There was FAZAL grade 1 flow  through the vessel (slow flow without perfusion).  RCA:   --  RCA: The vessel was large sized (dominant).  --  Proximal RCA: There was a tubular 80 % stenosis in the middle third of  the vessel segment.  --  Distal RCA: There was a tubular 99 % stenosis.  --  RPDA: The vessel was medium sized. Angiography showed mild  atherosclerosis with no flow limiting lesions.  --  RPLS: The vessel was medium to large sized. Angiography showed mild  atherosclerosis with no flow limiting lesions. There was a tubular 99 %  stenosis in the middle third of thevessel segment.  COMPLICATIONS: Ventricular fibrillation occurred, developed after no reflow  in the LAD and after appearance of filling defect in the left main.  Impella CP 3.5 was already in placed and patient was shocked with 300 J to  NSR  DIAGNOSTIC IMPRESSIONS: Severe Multivessel CAD with LAD being culprit for  anterior STEMI  Severe LV systolic dysfunction  Elevated left ventricular filling pressure  DIAGNOSTIC RECOMMENDATIONS: Given anterior STEMI, decision was to proceed  with primary PCI of the LAD and diagonal 2 for more timely reperfusion of  the LAD territory.  INTERVENTIONAL IMPRESSIONS: 100% LAD lesion was crossed using 0.014  Runthrough wire, 100% D2 lesion was crossed using a 0.014 BMW universal  wire . Multiple balloon angioplasty of the LAD and D2 was performed using  a 1.5 , 2.0 (LAD and diagonal 2) and 2.5 balllon (LAD) . This appeared to  be a bifurcation lesion Mejia 1,1,1 with both LAD and diagonal 2 being  equally sized vessel. Kissing balloon angioplasty was performed. There  were temporary flow in both vessels but then no reflow.  ITA stenting of LAD using 2.75 x 28 mm stent was done resulting in flow in  the LAD but absence of flow D2. D2 lesion was recrossed with 0.014 whisper  wire and ITA stenting was placed in the D2 restoring Diagonal flow but  resulted in loss of flow in the LAD stent. LAD stent was recrossed with a  0.014 runthrough wire and angioplasty performed with no reflow however.  Filling defect appeared in the left main that appeared to be a thrombus (  likely proximal thrombus extension from LAD) . Decision was to placed  Impella CP 3.5 L support and transfer the patient to OR for emergency CABG  INTERVENTIONAL RECOMMENDATIONS: Emergency CABG with grafts to the LAD, D2,  OM1 and RPDA.  Prepared and signed by  Patricio Shen MD

## 2018-12-18 NOTE — PROGRESS NOTE ADULT - SUBJECTIVE AND OBJECTIVE BOX
Subjective:  49yo Male improving daily.  He is now very interactive with staff and family.  Has expressed that he is comfortable and understanding of circumstances and care.    Past Medical History:  ST elevation myocardial infarction (STEMI)  Family history of myocardial infarction (Mother)  No pertinent family history in first degree relatives  Handoff  MEWS Score  Staph infection  No pertinent past medical history  Chronic respiratory failure with hypoxia  Cardiogenic shock  Chronic respiratory failure with hypoxia  Cardiogenic shock  ST elevation myocardial infarction (STEMI), unspecified artery  Coronary artery disease involving native coronary artery of native heart, angina presence unspecified  Critical illness myopathy  Hypernatremia  Shock  Hypoxia  Ileus  Septic shock  Malnutrition  Right ventricular dysfunction  Acute overt combined systolic and diastolic congestive heart failure  Klebsiella pneumonia  Pulmonary edema  Agitation  Acute respiratory failure with hypoxia  Other encephalopathy  Fever, unspecified fever cause  Thrombocytopenia  Anemia due to blood loss  Acute systolic heart failure  Respiratory failure  Cardiogenic shock  Staph infection  Ventricular fibrillation  Coronary artery disease involving native coronary artery of native heart with unstable angina pectoris  Dissection of left main coronary artery  ST elevation myocardial infarction involving left anterior descending (LAD) coronary artery  STEMI (ST elevation myocardial infarction)  Tracheostomy  Exploration of femoral artery  Insertion of Impella device  ECMO decannulation  Exploration and washout of mediastinum  Intra-aortic balloon pump removal  Arterial cannulation  Central line placement  Jefferson Michelle placement  CABG  ECMO (extracorporeal membrane oxygenation)  No significant past surgical history  CHEST PAIN  90+        acetaminophen    Suspension .. 650 milliGRAM(s) Oral every 6 hours PRN  ALBUTerol/ipratropium for Nebulization 3 milliLiter(s) Nebulizer every 6 hours PRN  amiodarone    Tablet 200 milliGRAM(s) Oral daily  artificial  tears Solution 1 Drop(s) Both EYES four times a day PRN  ascorbic acid 500 milliGRAM(s) Oral daily  aspirin 325 milliGRAM(s) Oral daily  atorvastatin 80 milliGRAM(s) Oral at bedtime  chlorhexidine 0.12% Liquid 5 milliLiter(s) Oral Mucosa every 4 hours  clopidogrel Tablet 75 milliGRAM(s) Oral daily  dexmedetomidine Infusion 1.4 MICROgram(s)/kG/Hr IV Continuous <Continuous>  enoxaparin Injectable 40 milliGRAM(s) SubCutaneous daily  EPINEPHrine    Infusion 0.024 MICROgram(s)/kG/Min IV Continuous <Continuous>  ferrous    sulfate 325 milliGRAM(s) Oral daily  folic acid 1 milliGRAM(s) Oral daily  furosemide Infusion 2.5 mG/Hr IV Continuous <Continuous>  HYDROmorphone   Tablet 2 milliGRAM(s) Oral every 3 hours PRN  HYDROmorphone   Tablet 4 milliGRAM(s) Oral every 3 hours PRN  insulin glargine Injectable (LANTUS) 10 Unit(s) SubCutaneous at bedtime  insulin lispro (HumaLOG) corrective regimen sliding scale   SubCutaneous every 4 hours  lactobacillus acidophilus 1 Tablet(s) Oral every 8 hours  lidocaine 2% Jelly 1 milliLiter(s) IntraUrethral four times a day PRN  meropenem  IVPB      meropenem  IVPB 1000 milliGRAM(s) IV Intermittent every 8 hours  multivitamin 1 Tablet(s) Oral daily  norepinephrine Infusion 0.05 MICROgram(s)/kG/Min IV Continuous <Continuous>  ondansetron Injectable 4 milliGRAM(s) IV Push once  pantoprazole  Injectable 40 milliGRAM(s) IV Push every 12 hours  petrolatum white Ointment 1 Application(s) Topical four times a day PRN  propofol Infusion 7 MICROgram(s)/kG/Min IV Continuous <Continuous>  QUEtiapine 25 milliGRAM(s) Oral daily  QUEtiapine 50 milliGRAM(s) Oral <User Schedule>  vancomycin  IVPB 1000 milliGRAM(s) IV Intermittent every 8 hours  MEDICATIONS  (PRN):  acetaminophen    Suspension .. 650 milliGRAM(s) Oral every 6 hours PRN Temp greater or equal to 38.5C (101.3F)  ALBUTerol/ipratropium for Nebulization 3 milliLiter(s) Nebulizer every 6 hours PRN Shortness of Breath and/or Wheezing  artificial  tears Solution 1 Drop(s) Both EYES four times a day PRN Dry Eyes  HYDROmorphone   Tablet 2 milliGRAM(s) Oral every 3 hours PRN Moderate Pain (4 - 6)  HYDROmorphone   Tablet 4 milliGRAM(s) Oral every 3 hours PRN Severe Pain (7 - 10)  lidocaine 2% Jelly 1 milliLiter(s) IntraUrethral four times a day PRN urethral discomfort  petrolatum white Ointment 1 Application(s) Topical four times a day PRN dry lips    Mode: AC/ CMV (Assist Control/ Continuous Mandatory Ventilation), RR (machine): 12, TV (machine): 600, FiO2: 35, PEEP: 8, MAP: 14, PIP: 35  Daily     Daily     Mode: AC/ CMV (Assist Control/ Continuous Mandatory Ventilation), RR (machine): 12, TV (machine): 600, FiO2: 35, PEEP: 8, MAP: 14, PIP: 34    ABG - ( 17 Dec 2018 20:52 )  pH, Arterial: 7.55  pH, Blood: x     /  pCO2: 36    /  pO2: 133   / HCO3: 32    / Base Excess: 8.6   /  SaO2: 100                                     9.8    11.0  )-----------( 377      ( 17 Dec 2018 21:05 )             32.9   12-17    140  |  98  |  27.0<H>  ----------------------------<  188<H>  3.7   |  28.0  |  0.63    Ca    8.1<L>      17 Dec 2018 21:05  Mg     2.2     12-17              Objective:  T(C): 38.3 (12-18-18 @ 03:00), Max: 38.5 (12-17-18 @ 23:00)  HR: 96 (12-18-18 @ 03:39) (84 - 118)  BP: 106/61 (12-17-18 @ 09:30) (106/61 - 106/61)  RR: 23 (12-18-18 @ 03:00) (13 - 29)  SpO2: 100% (12-18-18 @ 03:39) (98% - 100%)  Wt(kg): --CAPILLARY BLOOD GLUCOSE      POCT Blood Glucose.: 213 mg/dL (18 Dec 2018 00:04)  POCT Blood Glucose.: 188 mg/dL (17 Dec 2018 20:09)  POCT Blood Glucose.: 228 mg/dL (17 Dec 2018 15:42)  POCT Blood Glucose.: 187 mg/dL (17 Dec 2018 12:10)  POCT Blood Glucose.: 158 mg/dL (17 Dec 2018 03:44)  I&O's Summary    16 Dec 2018 07:01  -  17 Dec 2018 07:00  --------------------------------------------------------  IN: 4101.9 mL / OUT: 8790 mL / NET: -4688.1 mL    17 Dec 2018 07:01  -  18 Dec 2018 03:42  --------------------------------------------------------  IN: 3900.6 mL / OUT: 4350 mL / NET: -449.4 mL        Lines:  RIJ, PIV x 2     Mckeon:  yes    Physical Exam:  Neuro: Alert, appropriate, comfortable  Pulm:  Course b/l Unlabored  CV: s1/s2   reg  Abd: soft, minimal firmness, improved last 24-48 hrs  Ext: warm, min edema, well perfused  Inc: chest c/d/i, R groin open wound with w->d dressing changes, clean

## 2018-12-18 NOTE — PROGRESS NOTE ADULT - PROBLEM SELECTOR PLAN 1
hemodynamics continue to improve, epinephrine weaned down to 0.01 mcg/kg/min (4 cc/hr) and norepinephrine off   will maintain epinephrine at this rate then resume wean per Dr Barrett in am  continue with diuresis with lasix infusion  Zaroxolyn x 1 on 12/16 was very synergistic and helpful (consider decreasing dose to 2.5mg if needed again)  continue augustin catheter for urine output monitoring in critically ill patient

## 2018-12-18 NOTE — PROGRESS NOTE ADULT - SUBJECTIVE AND OBJECTIVE BOX
Vascular Surgery Follow up    - s/p right groin exploration, decannulation and closure  - Patient with wound dehiscence post staple removal     Wound clean with persistent serous drainage    Agree with primary team to place wound vac     - Will place vac dressing later today once machine is delivered bedside

## 2018-12-18 NOTE — PROGRESS NOTE ADULT - ASSESSMENT
48M with unknown PMH admitted with STEMI s/p CBAG complicated by cardiac arrest, LV failure and cardiogenic shock, respiratory failure requiring trach and vent support.  He developed hyperglycemia in the ICU while on pressors and tube feeds and his A1c is elevated in prediabetes range, however this could be falsely low as it was drawn in setting of acute anemia and possible blood loss from surgery.   Hospital course notable for persistent cardiogenic shock/LV failure, vent dependent respiratory failure (inability to wean also contributed to by severe agitation, now s/p trach), sepsis secondary to klebsiella PNA completed abx, now off abx.  Developed illeus      DM vs pre-DM- FS acceptable  -on tube feeds at 60cc/hr, over 24hrs   -continue insulin sliding scale Q4    -continue lantus 10 units tonight  -would hold insulin if tube feeds interrupted, and may need dextrose fluids  -given patient is acutely ill, would have low threshold for insulin gtt    Hypernatremia- improved on TF    Hypocalcemia- resolved    CAD-  as per cardiac team.  On statin, ASA and Plavix.

## 2018-12-18 NOTE — PROGRESS NOTE ADULT - PROBLEM SELECTOR PLAN 3
Continue TF nepro for increased protein to volume ratio  prosource BID added  continue with MVI  will need PEG this week, Dr Yancey aware

## 2018-12-18 NOTE — PROGRESS NOTE ADULT - PROBLEM SELECTOR PLAN 5
s/p CAGB EF 25%  cannot tolerate beta-blocker due to low BP.   start low dose ACEI once off pressors  Deep venous thrombosis prophylaxis: heparin or lovenox SQ.   Aspirin and plavix.

## 2018-12-18 NOTE — PROGRESS NOTE ADULT - ASSESSMENT
46 yo Male c/o CP for 2-3 days prior to admission, then 10/10 chest pain approximately 9:30 am yesterday, followed by vomiting.  Wife drove pt to hospital, ruled in for STEMI. Urgent Cath, pt found to have multiple occlusions; stent to LAD and D2; stent to LAD thrombosed,  impella placed for support.  Pt then had VF arrest, shock x 1, return to NSR.  Upon transfer to OR for Urgent CABG, pt with asystolic arrest, chest opened intra-op with CPR in progress. Pt underwent C4V (LAD, Diag, OM and PDA) with inability to wean from CPB therefore requiring VA ECMO (central cannulation) and IABP. , he was transferred to CICU on multiple gtts, now s/p explant 12/3 with placement of impella via R groin sheath. 12/4 ID consulted started on broad spectrum abx, 12/5 Impella weaned. 12/6 Impella removal via right femoral cutdown with primary repair of femoral artery and stent placed to external illiac artery for dissection, PRBC x1. 12/8 Pt underwent head CT, no acute intracranial process noted, chest CT b/l pneumothoraces R-20%, L-10%, b/l chest tubes in place, confirmed infiltrates on right lower and left upper lung.  ABX Vanco and Zosyn course extended, f/u sputum Cx.  Hospital course notable for persistent cardiogenic shock/LV failure, vent dependent respiratory failure (inability to wean also contributed to by severe agitation, now s/p trach), sepsis likely d/t GNR VAP.  12/14 + ileus, 12/15 ileus improved, septic and cardiogenic shock.  12/17 improving shock state.   12/18 Improved overall, pt interactive and calm, hemodynamically stable.

## 2018-12-18 NOTE — PROGRESS NOTE ADULT - ASSESSMENT
46 yo Male who presnented with chest discomfort, stemi cathed  stent to LAD and D2; stent to LAD thrombosed,  impella placed for support.  Pt then had VF arrest, shock x 1, return to NSR.  Upon transfer to OR for Urgent CABG, pt with asystolic arrest, chest opened intra-op with CPR in progress. Pt underwent C4V (LAD, Diag, OM and PDA) with inability to wean from CPB therefore requiring VA ECMO (central cannulation) and IABP. , he was transferred to CICU on multiple gtts, now s/p explant 12/3 with placement of impella via R groin sheath. 12/4 ID consulted started on broad spectrum abx, 12/5 Impella weaned. 12/6 Impella removal via right femoral cutdown with primary repair of femoral artery and stent placed to external illiac artery for dissection, PRBC x1. 12/8 Pt underwent head CT, no acute intracranial process noted, chest CT b/l pneumothoraces R-20%, L-10%, b/l chest tubes in place, confirmed infiltrates on right lower and left upper lung.  ABX Vanco and Zosyn course extended, sputum + Klebsiella pending sensitivity; significant agitation causing profound increase in LVEDP now controlled with precedex, seroquel, and QHS propofol. He intermittently tolerates CPAP. Repeat TTE 12/14 done after CPAP was not tolerated showed EF 20-25%.   Hospital course notable for persistent cardiogenic shock/LV failure, vent dependent respiratory failure (inability to wean also contributed to by severe agitation, now s/p trach), sepsis secondary to klebsiella PNA completed abx, now off abx.  Developed illeus which is resolving. Developed pulm edema again this am now on 100% on the weekend , increased diuresis currently improving respiratory status. Back on Abx for fever and klebsiella in sputum

## 2018-12-18 NOTE — PROGRESS NOTE ADULT - PROBLEM SELECTOR PLAN 10
continue aspirin, plavix and statin  no beta-blocker d/t shock state  Lovenox and SCDs for DVT prophylaxis  Protonix for GI prophylaxis  no history of DM but BG remains elevated (emil contributed to by shock state and epinephrine), continue lantus, and continue FS Q4 with coverage

## 2018-12-19 DIAGNOSIS — K92.2 GASTROINTESTINAL HEMORRHAGE, UNSPECIFIED: ICD-10-CM

## 2018-12-19 DIAGNOSIS — K92.0 HEMATEMESIS: ICD-10-CM

## 2018-12-19 LAB
ALBUMIN SERPL ELPH-MCNC: 2.9 G/DL — LOW (ref 3.3–5.2)
ALBUMIN SERPL ELPH-MCNC: 2.9 G/DL — LOW (ref 3.3–5.2)
ALP SERPL-CCNC: 151 U/L — HIGH (ref 40–120)
ALP SERPL-CCNC: 160 U/L — HIGH (ref 40–120)
ALT FLD-CCNC: 46 U/L — HIGH
ALT FLD-CCNC: 49 U/L — HIGH
ANION GAP SERPL CALC-SCNC: 12 MMOL/L — SIGNIFICANT CHANGE UP (ref 5–17)
ANION GAP SERPL CALC-SCNC: 13 MMOL/L — SIGNIFICANT CHANGE UP (ref 5–17)
ANION GAP SERPL CALC-SCNC: 13 MMOL/L — SIGNIFICANT CHANGE UP (ref 5–17)
APTT BLD: 27.5 SEC — SIGNIFICANT CHANGE UP (ref 27.5–36.3)
AST SERPL-CCNC: 74 U/L — HIGH
AST SERPL-CCNC: 84 U/L — HIGH
BILIRUB DIRECT SERPL-MCNC: 1.4 MG/DL — HIGH (ref 0–0.3)
BILIRUB INDIRECT FLD-MCNC: 1.3 MG/DL — HIGH (ref 0.2–1)
BILIRUB SERPL-MCNC: 2.4 MG/DL — HIGH (ref 0.4–2)
BILIRUB SERPL-MCNC: 2.7 MG/DL — HIGH (ref 0.4–2)
BUN SERPL-MCNC: 25 MG/DL — HIGH (ref 8–20)
BUN SERPL-MCNC: 29 MG/DL — HIGH (ref 8–20)
BUN SERPL-MCNC: 30 MG/DL — HIGH (ref 8–20)
CALCIUM SERPL-MCNC: 7.2 MG/DL — LOW (ref 8.6–10.2)
CALCIUM SERPL-MCNC: 8 MG/DL — LOW (ref 8.6–10.2)
CALCIUM SERPL-MCNC: 8 MG/DL — LOW (ref 8.6–10.2)
CHLORIDE SERPL-SCNC: 101 MMOL/L — SIGNIFICANT CHANGE UP (ref 98–107)
CHLORIDE SERPL-SCNC: 95 MMOL/L — LOW (ref 98–107)
CHLORIDE SERPL-SCNC: 97 MMOL/L — LOW (ref 98–107)
CO2 SERPL-SCNC: 28 MMOL/L — SIGNIFICANT CHANGE UP (ref 22–29)
CO2 SERPL-SCNC: 29 MMOL/L — SIGNIFICANT CHANGE UP (ref 22–29)
CO2 SERPL-SCNC: 30 MMOL/L — HIGH (ref 22–29)
CREAT SERPL-MCNC: 0.55 MG/DL — SIGNIFICANT CHANGE UP (ref 0.5–1.3)
CREAT SERPL-MCNC: 0.62 MG/DL — SIGNIFICANT CHANGE UP (ref 0.5–1.3)
CREAT SERPL-MCNC: 0.66 MG/DL — SIGNIFICANT CHANGE UP (ref 0.5–1.3)
CULTURE RESULTS: SIGNIFICANT CHANGE UP
CULTURE RESULTS: SIGNIFICANT CHANGE UP
GAS PNL BLDA: SIGNIFICANT CHANGE UP
GAS PNL BLDA: SIGNIFICANT CHANGE UP
GLUCOSE BLDC GLUCOMTR-MCNC: 137 MG/DL — HIGH (ref 70–99)
GLUCOSE BLDC GLUCOMTR-MCNC: 165 MG/DL — HIGH (ref 70–99)
GLUCOSE BLDC GLUCOMTR-MCNC: 181 MG/DL — HIGH (ref 70–99)
GLUCOSE SERPL-MCNC: 123 MG/DL — HIGH (ref 70–115)
GLUCOSE SERPL-MCNC: 174 MG/DL — HIGH (ref 70–115)
GLUCOSE SERPL-MCNC: 188 MG/DL — HIGH (ref 70–115)
HCT VFR BLD CALC: 26.4 % — LOW (ref 42–52)
HCT VFR BLD CALC: 28.4 % — LOW (ref 42–52)
HCT VFR BLD CALC: 28.5 % — LOW (ref 42–52)
HCT VFR BLD CALC: 31.3 % — LOW (ref 42–52)
HGB BLD-MCNC: 8.2 G/DL — LOW (ref 14–18)
HGB BLD-MCNC: 9 G/DL — LOW (ref 14–18)
HGB BLD-MCNC: 9.1 G/DL — LOW (ref 14–18)
HGB BLD-MCNC: 9.9 G/DL — LOW (ref 14–18)
INR BLD: 1.4 RATIO — HIGH (ref 0.88–1.16)
INR BLD: 1.44 RATIO — HIGH (ref 0.88–1.16)
MAGNESIUM SERPL-MCNC: 2.3 MG/DL — SIGNIFICANT CHANGE UP (ref 1.6–2.6)
MCHC RBC-ENTMCNC: 27.2 PG — SIGNIFICANT CHANGE UP (ref 27–31)
MCHC RBC-ENTMCNC: 27.6 PG — SIGNIFICANT CHANGE UP (ref 27–31)
MCHC RBC-ENTMCNC: 27.6 PG — SIGNIFICANT CHANGE UP (ref 27–31)
MCHC RBC-ENTMCNC: 27.7 PG — SIGNIFICANT CHANGE UP (ref 27–31)
MCHC RBC-ENTMCNC: 31.1 G/DL — LOW (ref 32–36)
MCHC RBC-ENTMCNC: 31.6 G/DL — LOW (ref 32–36)
MCHC RBC-ENTMCNC: 31.7 G/DL — LOW (ref 32–36)
MCHC RBC-ENTMCNC: 31.9 G/DL — LOW (ref 32–36)
MCV RBC AUTO: 86.9 FL — SIGNIFICANT CHANGE UP (ref 80–94)
MCV RBC AUTO: 87.1 FL — SIGNIFICANT CHANGE UP (ref 80–94)
MCV RBC AUTO: 87.2 FL — SIGNIFICANT CHANGE UP (ref 80–94)
MCV RBC AUTO: 87.7 FL — SIGNIFICANT CHANGE UP (ref 80–94)
PLATELET # BLD AUTO: 236 K/UL — SIGNIFICANT CHANGE UP (ref 150–400)
PLATELET # BLD AUTO: 268 K/UL — SIGNIFICANT CHANGE UP (ref 150–400)
PLATELET # BLD AUTO: 274 K/UL — SIGNIFICANT CHANGE UP (ref 150–400)
PLATELET # BLD AUTO: 288 K/UL — SIGNIFICANT CHANGE UP (ref 150–400)
POTASSIUM SERPL-MCNC: 3.6 MMOL/L — SIGNIFICANT CHANGE UP (ref 3.5–5.3)
POTASSIUM SERPL-MCNC: 3.7 MMOL/L — SIGNIFICANT CHANGE UP (ref 3.5–5.3)
POTASSIUM SERPL-MCNC: 4.2 MMOL/L — SIGNIFICANT CHANGE UP (ref 3.5–5.3)
POTASSIUM SERPL-SCNC: 3.6 MMOL/L — SIGNIFICANT CHANGE UP (ref 3.5–5.3)
POTASSIUM SERPL-SCNC: 3.7 MMOL/L — SIGNIFICANT CHANGE UP (ref 3.5–5.3)
POTASSIUM SERPL-SCNC: 4.2 MMOL/L — SIGNIFICANT CHANGE UP (ref 3.5–5.3)
PROT SERPL-MCNC: 6 G/DL — LOW (ref 6.6–8.7)
PROT SERPL-MCNC: 6 G/DL — LOW (ref 6.6–8.7)
PROTHROM AB SERPL-ACNC: 16.3 SEC — HIGH (ref 10–12.9)
PROTHROM AB SERPL-ACNC: 16.8 SEC — HIGH (ref 10–12.9)
RBC # BLD: 3.01 M/UL — LOW (ref 4.6–6.2)
RBC # BLD: 3.26 M/UL — LOW (ref 4.6–6.2)
RBC # BLD: 3.28 M/UL — LOW (ref 4.6–6.2)
RBC # BLD: 3.59 M/UL — LOW (ref 4.6–6.2)
RBC # FLD: 15.1 % — SIGNIFICANT CHANGE UP (ref 11–15.6)
RBC # FLD: 15.2 % — SIGNIFICANT CHANGE UP (ref 11–15.6)
RBC # FLD: 15.3 % — SIGNIFICANT CHANGE UP (ref 11–15.6)
RBC # FLD: 15.3 % — SIGNIFICANT CHANGE UP (ref 11–15.6)
SODIUM SERPL-SCNC: 138 MMOL/L — SIGNIFICANT CHANGE UP (ref 135–145)
SODIUM SERPL-SCNC: 139 MMOL/L — SIGNIFICANT CHANGE UP (ref 135–145)
SODIUM SERPL-SCNC: 141 MMOL/L — SIGNIFICANT CHANGE UP (ref 135–145)
SPECIMEN SOURCE: SIGNIFICANT CHANGE UP
SPECIMEN SOURCE: SIGNIFICANT CHANGE UP
VANCOMYCIN TROUGH SERPL-MCNC: 19.9 UG/ML — SIGNIFICANT CHANGE UP (ref 10–20)
WBC # BLD: 10.6 K/UL — SIGNIFICANT CHANGE UP (ref 4.8–10.8)
WBC # BLD: 10.6 K/UL — SIGNIFICANT CHANGE UP (ref 4.8–10.8)
WBC # BLD: 8.7 K/UL — SIGNIFICANT CHANGE UP (ref 4.8–10.8)
WBC # BLD: 9.5 K/UL — SIGNIFICANT CHANGE UP (ref 4.8–10.8)
WBC # FLD AUTO: 10.6 K/UL — SIGNIFICANT CHANGE UP (ref 4.8–10.8)
WBC # FLD AUTO: 10.6 K/UL — SIGNIFICANT CHANGE UP (ref 4.8–10.8)
WBC # FLD AUTO: 8.7 K/UL — SIGNIFICANT CHANGE UP (ref 4.8–10.8)
WBC # FLD AUTO: 9.5 K/UL — SIGNIFICANT CHANGE UP (ref 4.8–10.8)

## 2018-12-19 PROCEDURE — 93010 ELECTROCARDIOGRAM REPORT: CPT

## 2018-12-19 PROCEDURE — 71045 X-RAY EXAM CHEST 1 VIEW: CPT | Mod: 26

## 2018-12-19 PROCEDURE — 43255 EGD CONTROL BLEEDING ANY: CPT

## 2018-12-19 PROCEDURE — 99223 1ST HOSP IP/OBS HIGH 75: CPT | Mod: 25

## 2018-12-19 PROCEDURE — 99232 SBSQ HOSP IP/OBS MODERATE 35: CPT

## 2018-12-19 PROCEDURE — 99233 SBSQ HOSP IP/OBS HIGH 50: CPT

## 2018-12-19 RX ORDER — POTASSIUM CHLORIDE 20 MEQ
20 PACKET (EA) ORAL
Qty: 0 | Refills: 0 | Status: COMPLETED | OUTPATIENT
Start: 2018-12-19 | End: 2018-12-19

## 2018-12-19 RX ORDER — HYDROMORPHONE HYDROCHLORIDE 2 MG/ML
0.5 INJECTION INTRAMUSCULAR; INTRAVENOUS; SUBCUTANEOUS EVERY 4 HOURS
Qty: 0 | Refills: 0 | Status: DISCONTINUED | OUTPATIENT
Start: 2018-12-19 | End: 2018-12-21

## 2018-12-19 RX ORDER — ACETAMINOPHEN 500 MG
1000 TABLET ORAL ONCE
Qty: 0 | Refills: 0 | Status: COMPLETED | OUTPATIENT
Start: 2018-12-19 | End: 2018-12-19

## 2018-12-19 RX ORDER — OLANZAPINE 15 MG/1
5 TABLET, FILM COATED ORAL AT BEDTIME
Qty: 0 | Refills: 0 | Status: DISCONTINUED | OUTPATIENT
Start: 2018-12-19 | End: 2018-12-21

## 2018-12-19 RX ORDER — CLOPIDOGREL BISULFATE 75 MG/1
75 TABLET, FILM COATED ORAL DAILY
Qty: 0 | Refills: 0 | Status: DISCONTINUED | OUTPATIENT
Start: 2018-12-19 | End: 2018-12-29

## 2018-12-19 RX ORDER — ASPIRIN/CALCIUM CARB/MAGNESIUM 324 MG
300 TABLET ORAL DAILY
Qty: 0 | Refills: 0 | Status: DISCONTINUED | OUTPATIENT
Start: 2018-12-19 | End: 2018-12-20

## 2018-12-19 RX ORDER — METOCLOPRAMIDE HCL 10 MG
10 TABLET ORAL ONCE
Qty: 0 | Refills: 0 | Status: COMPLETED | OUTPATIENT
Start: 2018-12-19 | End: 2018-12-19

## 2018-12-19 RX ORDER — ONDANSETRON 8 MG/1
4 TABLET, FILM COATED ORAL ONCE
Qty: 0 | Refills: 0 | Status: COMPLETED | OUTPATIENT
Start: 2018-12-19 | End: 2018-12-19

## 2018-12-19 RX ORDER — MIDAZOLAM HYDROCHLORIDE 1 MG/ML
2 INJECTION, SOLUTION INTRAMUSCULAR; INTRAVENOUS ONCE
Qty: 0 | Refills: 0 | Status: DISCONTINUED | OUTPATIENT
Start: 2018-12-19 | End: 2018-12-19

## 2018-12-19 RX ORDER — CALCIUM GLUCONATE 100 MG/ML
2 VIAL (ML) INTRAVENOUS ONCE
Qty: 0 | Refills: 0 | Status: COMPLETED | OUTPATIENT
Start: 2018-12-19 | End: 2018-12-19

## 2018-12-19 RX ADMIN — CHLORHEXIDINE GLUCONATE 5 MILLILITER(S): 213 SOLUTION TOPICAL at 00:37

## 2018-12-19 RX ADMIN — Medication 250 MILLIGRAM(S): at 15:00

## 2018-12-19 RX ADMIN — MEROPENEM 100 MILLIGRAM(S): 1 INJECTION INTRAVENOUS at 05:37

## 2018-12-19 RX ADMIN — MICAFUNGIN SODIUM 105 MILLIGRAM(S): 100 INJECTION, POWDER, LYOPHILIZED, FOR SOLUTION INTRAVENOUS at 13:00

## 2018-12-19 RX ADMIN — Medication 400 MILLIGRAM(S): at 14:45

## 2018-12-19 RX ADMIN — Medication 250 MILLIGRAM(S): at 22:41

## 2018-12-19 RX ADMIN — DEXMEDETOMIDINE HYDROCHLORIDE IN 0.9% SODIUM CHLORIDE 31.75 MICROGRAM(S)/KG/HR: 4 INJECTION INTRAVENOUS at 01:00

## 2018-12-19 RX ADMIN — Medication 100 MILLIEQUIVALENT(S): at 04:15

## 2018-12-19 RX ADMIN — Medication 300 MILLIGRAM(S): at 15:00

## 2018-12-19 RX ADMIN — Medication 100 MILLIEQUIVALENT(S): at 20:00

## 2018-12-19 RX ADMIN — CHLORHEXIDINE GLUCONATE 5 MILLILITER(S): 213 SOLUTION TOPICAL at 17:59

## 2018-12-19 RX ADMIN — CLOPIDOGREL BISULFATE 75 MILLIGRAM(S): 75 TABLET, FILM COATED ORAL at 15:00

## 2018-12-19 RX ADMIN — CHLORHEXIDINE GLUCONATE 5 MILLILITER(S): 213 SOLUTION TOPICAL at 06:36

## 2018-12-19 RX ADMIN — Medication 10 MILLIGRAM(S): at 10:00

## 2018-12-19 RX ADMIN — CHLORHEXIDINE GLUCONATE 5 MILLILITER(S): 213 SOLUTION TOPICAL at 11:33

## 2018-12-19 RX ADMIN — Medication 100 MILLIEQUIVALENT(S): at 05:15

## 2018-12-19 RX ADMIN — CHLORHEXIDINE GLUCONATE 5 MILLILITER(S): 213 SOLUTION TOPICAL at 05:01

## 2018-12-19 RX ADMIN — Medication 200 GRAM(S): at 18:00

## 2018-12-19 RX ADMIN — MEROPENEM 100 MILLIGRAM(S): 1 INJECTION INTRAVENOUS at 15:10

## 2018-12-19 RX ADMIN — Medication 100 MILLIEQUIVALENT(S): at 17:00

## 2018-12-19 RX ADMIN — CHLORHEXIDINE GLUCONATE 5 MILLILITER(S): 213 SOLUTION TOPICAL at 14:28

## 2018-12-19 RX ADMIN — MIDAZOLAM HYDROCHLORIDE 2 MILLIGRAM(S): 1 INJECTION, SOLUTION INTRAMUSCULAR; INTRAVENOUS at 09:08

## 2018-12-19 RX ADMIN — Medication 100 MILLIEQUIVALENT(S): at 18:00

## 2018-12-19 RX ADMIN — Medication 1000 MILLIGRAM(S): at 16:00

## 2018-12-19 RX ADMIN — Medication 2: at 08:40

## 2018-12-19 RX ADMIN — Medication 2: at 12:01

## 2018-12-19 RX ADMIN — Medication 250 MILLIGRAM(S): at 06:39

## 2018-12-19 RX ADMIN — ONDANSETRON 4 MILLIGRAM(S): 8 TABLET, FILM COATED ORAL at 03:15

## 2018-12-19 NOTE — PROGRESS NOTE ADULT - ASSESSMENT
48y/o man with no significant PMH admitted on 11/29/18 for CP for 2-3 days prior to admission,   found with STEMI. Urgent Cath, pt found to have multiple occlusions; VF arrest, shock x 1, return to NSR.  Upon transfer to OR for Urgent CABG, pt again VF arrest, chest opened intra-op with CPR in progress.  s/p ECMO and Impella; 12/3 ECMO explanted and Impella placed via right groin sheath.    Has been treated for fever empirically few times. Was off Abx  restarted due to persistent fever. CXR wtih   Congestion and possible infiltration.   No positive cultures     Pneumonia  Fever post op  s/p CABG   Diarrhea    S/P tracheostomy     - Blood and urine cultures are sent on 11/15, follow the results  - Mild leukocytosis and febrile to 102.2,   - Deep suction for sputum culture    t meropenem 1gm q8h    vancomycin increased to 1gm q 8  ALTHOUGH FUNGAL INFECTION UNLIKELY WITH NEGATIVE BLOOD CX AND SPUTUM ONLY CANDIDA  ON EMPIRIC  MYCAMINE   WOULD STILL   CONSIDER REPEAT IMAGING CHEST ABD PELVIS TO LOOK FOR SOURCE OF FEVERS  AS   DESPITE ABX STILL WITH FEVERS   UNDERGOING EGD    D/W CTICU

## 2018-12-19 NOTE — PROGRESS NOTE ADULT - PROBLEM SELECTOR PLAN 10
Pt with coffee ground emesis yesterday.  Pantoprazole gtt started, NGT to LWS,  Dr. Barrett not wanting GI consult at this time. Pt with coffee ground emesis yesterday.  Pantoprazole gtt started, NGT to LWS,  2 uts FFP given for INR 1.46  Dr. Barrett not wanting GI consult at this time.

## 2018-12-19 NOTE — CONSULT NOTE ADULT - SUBJECTIVE AND OBJECTIVE BOX
Patient is a 48y old  Male who presents with a chief complaint of Chest Pain (19 Dec 2018 00:52) referred for GI evaluation of hematemesis.      HPI:  This is a 48 y/o male no significant PMH; recent ABT (doxycycline) r/t cellulitis to right groin per pt. Pt presented to the ED with 10/10 chest pain that he reported started at 0930; he drank water with no relief; pain started to progress to B/L shoulders. Per pt spouse, she reports he had chest pain x2-3 days and this morning he vomited which he attributed to reflux.  She brought him into the emergency department. Pt had significant EKG changes ST elevations in leads V1-V5 with reciprocal changes in II, III, AVF.  Pt rec'd asa and plavix load in ED. (2018 11:32). Pt ruled in for STEMI and cath found multiple occlusions, and previous stents were occluded. Pt. had urgent CABG intubated, trached, s/p cardiogenic shock. Early this AM pt. had coffee ground emesis likely secondary to stress induced gastritis. received 2 units of FFP and one unit of PRBC's and is presently on a Pantoprazole drip and a Propofol drip      REVIEW OF SYSTEMS: As per HPI.    PAST MEDICAL & SURGICAL HISTORY:  As noted above.      FAMILY HISTORY:  Family history of myocardial infarction (Mother): mother;       SOCIAL HISTORY:  Smoking Status: [ ] Current, [x ] Former, [ ] Never  Pack Years: Unnown. No known ETOH or drug abuse history.    MEDICATIONS:  MEDICATIONS  (STANDING):  amiodarone    Tablet 200 milliGRAM(s) Oral daily  ascorbic acid 500 milliGRAM(s) Oral daily  aspirin 325 milliGRAM(s) Oral daily  atorvastatin 80 milliGRAM(s) Oral at bedtime  chlorhexidine 0.12% Liquid 5 milliLiter(s) Oral Mucosa every 4 hours  clopidogrel Tablet 75 milliGRAM(s) Oral daily  dexmedetomidine Infusion 1.4 MICROgram(s)/kG/Hr (31.745 mL/Hr) IV Continuous <Continuous>  ferrous    sulfate 325 milliGRAM(s) Oral daily  folic acid 1 milliGRAM(s) Oral daily  furosemide Infusion 2.5 mG/Hr (1.25 mL/Hr) IV Continuous <Continuous>  insulin glargine Injectable (LANTUS) 10 Unit(s) SubCutaneous at bedtime  insulin lispro (HumaLOG) corrective regimen sliding scale   SubCutaneous every 4 hours  lactobacillus acidophilus 1 Tablet(s) Oral every 8 hours  meropenem  IVPB      meropenem  IVPB 1000 milliGRAM(s) IV Intermittent every 8 hours  micafungin IVPB      micafungin IVPB 100 milliGRAM(s) IV Intermittent every 24 hours  multivitamin 1 Tablet(s) Oral daily  norepinephrine Infusion 0.05 MICROgram(s)/kG/Min (8.503 mL/Hr) IV Continuous <Continuous>  pantoprazole Infusion 8 mG/Hr (10 mL/Hr) IV Continuous <Continuous>  propofol Infusion 7 MICROgram(s)/kG/Min (3.809 mL/Hr) IV Continuous <Continuous>  QUEtiapine 25 milliGRAM(s) Oral daily  QUEtiapine 50 milliGRAM(s) Oral <User Schedule>  vancomycin  IVPB 1000 milliGRAM(s) IV Intermittent every 8 hours    MEDICATIONS  (PRN):  acetaminophen    Suspension .. 650 milliGRAM(s) Oral every 6 hours PRN Temp greater or equal to 38.5C (101.3F)  ALBUTerol/ipratropium for Nebulization 3 milliLiter(s) Nebulizer every 6 hours PRN Shortness of Breath and/or Wheezing  artificial  tears Solution 1 Drop(s) Both EYES four times a day PRN Dry Eyes  HYDROmorphone   Tablet 2 milliGRAM(s) Oral every 3 hours PRN Moderate Pain (4 - 6)  HYDROmorphone   Tablet 4 milliGRAM(s) Oral every 3 hours PRN Severe Pain (7 - 10)  lidocaine 2% Jelly 1 milliLiter(s) IntraUrethral four times a day PRN urethral discomfort  petrolatum white Ointment 1 Application(s) Topical four times a day PRN dry lips      Allergies    penicillins (Unknown)    Intolerances        Vital Signs Last 24 Hrs  T(C): 38.3 (19 Dec 2018 07:00), Max: 38.6 (18 Dec 2018 22:00)  T(F): 100.9 (19 Dec 2018 07:00), Max: 101.5 (18 Dec 2018 22:00)  HR: 83 (19 Dec 2018 07:15) (83 - 116)  BP: --  BP(mean): --  RR: 14 (19 Dec 2018 07:15) (0 - 30)  SpO2: 100% (19 Dec 2018 07:15) (98% - 100%)     @ 07:01  -   @ 07:00  --------------------------------------------------------  IN: 3992 mL / OUT: 3165 mL / NET: 827 mL      Mode: AC/ CMV (Assist Control/ Continuous Mandatory Ventilation)  RR (machine): 12  TV (machine): 600  FiO2: 35  PEEP: 8  MAP: 13  PIP: 32      PHYSICAL EXAM:    General: Well developed; well nourished; intubated and trached. NGT with dark blood in tube  HEENT: MMM, conjunctiva pink and sclera anicteric.  Lungs: Clear bilaterally.  Cor: RRR S1, S2 only  Gastrointestinal: Abdomen: Soft, non-tender non-distended; Normal bowel sounds; No rebound or guarding or HSM  RIGO: Not done at this time. No BM's overnight.  Extremities: Normal range of motion, No clubbing, cyanosis or edema  Neurological: Alert and oriented x3  Skin: Warm and dry. No obvious rash      LABS:                        8.2    9.5   )-----------( 288      ( 19 Dec 2018 02:01 )             26.4         138  |  95<L>  |  25.0<H>  ----------------------------<  188<H>  3.7   |  30.0<H>  |  0.66    Ca    8.0<L>      19 Dec 2018 02:01  Phos  3.4       Mg     2.3         TPro  6.0<L>  /  Alb  2.9<L>  /  TBili  2.7<H>  /  DBili  1.4<H>  /  AST  84<H>  /  ALT  49<H>  /  AlkPhos  160<H>            RADIOLOGY & ADDITIONAL STUDIES:

## 2018-12-19 NOTE — PROGRESS NOTE ADULT - PROBLEM SELECTOR PLAN 1
hemodynamics continue to improve, epinephrine weaned off, norepinephrine off   continue with diuresis with lasix infusion  continue augustin catheter for urine output monitoring in critically ill patient

## 2018-12-19 NOTE — PROGRESS NOTE ADULT - ASSESSMENT
48M with unknown PMH admitted with STEMI s/p CBAG complicated by cardiac arrest, LV failure and cardiogenic shock, respiratory failure requiring trach and vent support.  He developed hyperglycemia in the ICU while on pressors and tube feeds and his A1c is elevated in prediabetes range, however this could be falsely low as it was drawn in setting of acute anemia and possible blood loss from surgery.   Hospital course notable for persistent cardiogenic shock/LV failure, vent dependent respiratory failure (inability to wean also contributed to by severe agitation, now s/p trach), sepsis secondary to klebsiella PNA completed abx, now off abx, ileus and UGIB due to bleeding ulcer.      DM vs pre-DM- FS acceptable  -tube feeds on hold, would hold lantus  -once tube feeds resume at full dose, can restart lantus 10 units  -continue insulin sliding scale Q4      Hypernatremia- resolved    Hypocalcemia- corrected still mildly low. check PTH and vitamin D    CAD-  as per cardiac team.  On statin, ASA and Plavix.

## 2018-12-19 NOTE — PROGRESS NOTE ADULT - SUBJECTIVE AND OBJECTIVE BOX
Subjective:  47yo Male awake, appropriate, with trach in place, interactive with family and staff.  No c/o pain at this time.    Past Medical History:  ST elevation myocardial infarction (STEMI)  Family history of myocardial infarction (Mother)  No pertinent family history in first degree relatives  Handoff  MEWS Score  Staph infection  No pertinent past medical history  Chronic respiratory failure with hypoxia  Cardiogenic shock  Chronic respiratory failure with hypoxia  Cardiogenic shock  ST elevation myocardial infarction (STEMI), unspecified artery  Coronary artery disease involving native coronary artery of native heart, angina presence unspecified  Critical illness myopathy  Hypernatremia  Shock  Hypoxia  Ileus  Septic shock  Malnutrition  Right ventricular dysfunction  Acute overt combined systolic and diastolic congestive heart failure  Klebsiella pneumonia  Pulmonary edema  Agitation  Acute respiratory failure with hypoxia  Other encephalopathy  Fever, unspecified fever cause  Thrombocytopenia  Anemia due to blood loss  Acute systolic heart failure  Respiratory failure  Cardiogenic shock  Staph infection  Ventricular fibrillation  Coronary artery disease involving native coronary artery of native heart with unstable angina pectoris  Dissection of left main coronary artery  ST elevation myocardial infarction involving left anterior descending (LAD) coronary artery  STEMI (ST elevation myocardial infarction)  Tracheostomy  Exploration of femoral artery  Insertion of Impella device  ECMO decannulation  Exploration and washout of mediastinum  Intra-aortic balloon pump removal  Arterial cannulation  Central line placement  Elgin Michelle placement  CABG  ECMO (extracorporeal membrane oxygenation)  No significant past surgical history  CHEST PAIN  90+        acetaminophen    Suspension .. 650 milliGRAM(s) Oral every 6 hours PRN  ALBUTerol/ipratropium for Nebulization 3 milliLiter(s) Nebulizer every 6 hours PRN  amiodarone    Tablet 200 milliGRAM(s) Oral daily  artificial  tears Solution 1 Drop(s) Both EYES four times a day PRN  ascorbic acid 500 milliGRAM(s) Oral daily  aspirin 325 milliGRAM(s) Oral daily  atorvastatin 80 milliGRAM(s) Oral at bedtime  chlorhexidine 0.12% Liquid 5 milliLiter(s) Oral Mucosa every 4 hours  chlorproMAZINE    IVPB 25 milliGRAM(s) IV Intermittent once  clopidogrel Tablet 75 milliGRAM(s) Oral daily  dexmedetomidine Infusion 1.4 MICROgram(s)/kG/Hr IV Continuous <Continuous>  enoxaparin Injectable 40 milliGRAM(s) SubCutaneous daily  ferrous    sulfate 325 milliGRAM(s) Oral daily  folic acid 1 milliGRAM(s) Oral daily  furosemide Infusion 2.5 mG/Hr IV Continuous <Continuous>  HYDROmorphone   Tablet 2 milliGRAM(s) Oral every 3 hours PRN  HYDROmorphone   Tablet 4 milliGRAM(s) Oral every 3 hours PRN  insulin glargine Injectable (LANTUS) 10 Unit(s) SubCutaneous at bedtime  insulin lispro (HumaLOG) corrective regimen sliding scale   SubCutaneous every 4 hours  lactobacillus acidophilus 1 Tablet(s) Oral every 8 hours  lidocaine 2% Jelly 1 milliLiter(s) IntraUrethral four times a day PRN  meropenem  IVPB      meropenem  IVPB 1000 milliGRAM(s) IV Intermittent every 8 hours  micafungin IVPB      micafungin IVPB 100 milliGRAM(s) IV Intermittent every 24 hours  multivitamin 1 Tablet(s) Oral daily  norepinephrine Infusion 0.05 MICROgram(s)/kG/Min IV Continuous <Continuous>  pantoprazole  Injectable 40 milliGRAM(s) IV Push every 12 hours  pantoprazole Infusion 8 mG/Hr IV Continuous <Continuous>  petrolatum white Ointment 1 Application(s) Topical four times a day PRN  potassium chloride  20 mEq/100 mL IVPB 20 milliEquivalent(s) IV Intermittent every 1 hour  propofol Infusion 7 MICROgram(s)/kG/Min IV Continuous <Continuous>  QUEtiapine 25 milliGRAM(s) Oral daily  QUEtiapine 50 milliGRAM(s) Oral <User Schedule>  vancomycin  IVPB 1000 milliGRAM(s) IV Intermittent every 8 hours  MEDICATIONS  (PRN):  acetaminophen    Suspension .. 650 milliGRAM(s) Oral every 6 hours PRN Temp greater or equal to 38.5C (101.3F)  ALBUTerol/ipratropium for Nebulization 3 milliLiter(s) Nebulizer every 6 hours PRN Shortness of Breath and/or Wheezing  artificial  tears Solution 1 Drop(s) Both EYES four times a day PRN Dry Eyes  HYDROmorphone   Tablet 2 milliGRAM(s) Oral every 3 hours PRN Moderate Pain (4 - 6)  HYDROmorphone   Tablet 4 milliGRAM(s) Oral every 3 hours PRN Severe Pain (7 - 10)  lidocaine 2% Jelly 1 milliLiter(s) IntraUrethral four times a day PRN urethral discomfort  petrolatum white Ointment 1 Application(s) Topical four times a day PRN dry lips    Mode: AC/ CMV (Assist Control/ Continuous Mandatory Ventilation), RR (machine): 12, TV (machine): 600, FiO2: 35, PEEP: 8, MAP: 14, PIP: 28  Daily     Daily     Mode: AC/ CMV (Assist Control/ Continuous Mandatory Ventilation), RR (machine): 12, TV (machine): 600, FiO2: 35, PEEP: 8, MAP: 14, PIP: 28    ABG - ( 19 Dec 2018 00:27 )  pH, Arterial: 7.58  pH, Blood: x     /  pCO2: 36    /  pO2: 122   / HCO3: 35    / Base Excess: 10.9  /  SaO2: 100                                     9.8    10.7  )-----------( 331      ( 18 Dec 2018 19:35 )             31.4   12-18    138  |  95<L>  |  21.0<H>  ----------------------------<  176<H>  3.2<L>   |  29.0  |  0.50    Ca    8.0<L>      18 Dec 2018 19:35  Phos  3.4     12-18  Mg     2.3     12-18    TPro  6.0<L>  /  Alb  3.2<L>  /  TBili  2.9<H>  /  DBili  1.6<H>  /  AST  90<H>  /  ALT  56<H>  /  AlkPhos  181<H>  12-18      PT/INR - ( 18 Dec 2018 19:35 )   PT: 17.0 sec;   INR: 1.46 ratio               Objective:  T(C): 38.5 (12-18-18 @ 23:45), Max: 39 (12-18-18 @ 07:00)  HR: 89 (12-19-18 @ 00:15) (89 - 118)  BP: --  RR: 20 (12-19-18 @ 00:15) (17 - 31)  SpO2: 100% (12-19-18 @ 00:15) (98% - 100%)  Wt(kg): --CAPILLARY BLOOD GLUCOSE      POCT Blood Glucose.: 143 mg/dL (18 Dec 2018 17:08)  POCT Blood Glucose.: 158 mg/dL (18 Dec 2018 09:06)  I&O's Summary    17 Dec 2018 07:01  -  18 Dec 2018 07:00  --------------------------------------------------------  IN: 4903.6 mL / OUT: 5050 mL / NET: -146.4 mL    18 Dec 2018 07:01  -  19 Dec 2018 00:53  --------------------------------------------------------  IN: 2367.3 mL / OUT: 2395 mL / NET: -27.7 mL        Lines:  CAMMIE SIGALA:  yes    Physical Exam:  Neuro: A0X3, moving all ext's, left arm mild-mod motor deficit  Pulm: CtA b/l Unlabored  CV: s1/s2   reg  Abd: slightly firm, c/o discomfort  Ext: warm, no edema, well perfused  Inc: c/d/i

## 2018-12-19 NOTE — PROGRESS NOTE ADULT - ASSESSMENT
48 yo Male c/o CP for 2-3 days prior to admission, then 10/10 chest pain approximately 9:30 am yesterday, followed by vomiting.  Wife drove pt to hospital, ruled in for STEMI. Urgent Cath, pt found to have multiple occlusions; stent to LAD and D2; stent to LAD thrombosed,  impella placed for support.  Pt then had VF arrest, shock x 1, return to NSR.  Upon transfer to OR for Urgent CABG, pt with asystolic arrest, chest opened intra-op with CPR in progress. Pt underwent C4V (LAD, Diag, OM and PDA) with inability to wean from CPB therefore requiring VA ECMO (central cannulation) and IABP. , he was transferred to CICU on multiple gtts, now s/p explant 12/3 with placement of impella via R groin sheath. 12/4 ID consulted started on broad spectrum abx, 12/5 Impella weaned. 12/6 Impella removal via right femoral cutdown with primary repair of femoral artery and stent placed to external illiac artery for dissection, PRBC x1. 12/8 Pt underwent head CT, no acute intracranial process noted, chest CT b/l pneumothoraces R-20%, L-10%, b/l chest tubes in place, confirmed infiltrates on right lower and left upper lung.  ABX Vanco and Zosyn course extended, f/u sputum Cx.  Hospital course notable for persistent cardiogenic shock/LV failure, vent dependent respiratory failure (inability to wean also contributed to by severe agitation, now s/p trach), sepsis likely d/t GNR VAP.  12/14 + ileus, 12/15 ileus improved, septic and cardiogenic shock.  12/17 improving shock state.   12/18 Improved overall, pt interactive and calm, hemodynamically stable. 12/19 pt vomited coffee-ground material, NGT placed on suction, dark bloody drainage, checking CBC q 6 hrs, started panto gtt and pt received 2 uts FFP for INR 1.46 46 yo Male c/o CP for 2-3 days prior to admission, then 10/10 chest pain approximately 9:30 am yesterday, followed by vomiting.  Wife drove pt to hospital, ruled in for STEMI. Urgent Cath, pt found to have multiple occlusions; stent to LAD and D2; stent to LAD thrombosed,  impella placed for support.  Pt then had VF arrest, shock x 1, return to NSR.  Upon transfer to OR for Urgent CABG, pt with asystolic arrest, chest opened intra-op with CPR in progress. Pt underwent C4V (LAD, Diag, OM and PDA) with inability to wean from CPB therefore requiring VA ECMO (central cannulation) and IABP. , he was transferred to CICU on multiple gtts, now s/p explant 12/3 with placement of impella via R groin sheath. 12/4 ID consulted started on broad spectrum abx, 12/5 Impella weaned. 12/6 Impella removal via right femoral cutdown with primary repair of femoral artery and stent placed to external illiac artery for dissection, PRBC x1. 12/8 Pt underwent head CT, no acute intracranial process noted, chest CT b/l pneumothoraces R-20%, L-10%, b/l chest tubes in place, confirmed infiltrates on right lower and left upper lung.  ABX Vanco and Zosyn course extended, f/u sputum Cx.  Hospital course notable for persistent cardiogenic shock/LV failure, vent dependent respiratory failure (inability to wean also contributed to by severe agitation, now s/p trach), sepsis likely d/t GNR VAP.  12/14 + ileus, 12/15 ileus improved, septic and cardiogenic shock.  12/17 improving shock state.   12/18 Improved overall, pt interactive and calm, hemodynamically stable. 12/19 pt had coffee ground emesis, NGT placed on suction, dark bloody drainage, checking CBC q 6 hrs, started panto gtt and pt received 2 uts FFP for INR 1.46

## 2018-12-19 NOTE — BRIEF OPERATIVE NOTE - COMMENTS
patient tx to CTICU   Gtts: levophed, primacor, insulin, amio   Blood products given intraop: 2 platelets   Invasive lines placed: lines all preexisting (patient had R fem art IABP via 14 F sheath/ R fem vein 8 F sheath/ B/L radial alines/ RIJ swan) NEW 14 F sheath via Seldinger technique placed   Indwelling catheters: augustin; central ecmo discontinued   Pacing wires: 1V1G from previous OR   Chest tubes: as above; 2 MS, 1 R, 1L     No qualified resident available to perform operation
NPO. Leave NGT out. IV Pantoprazole continuous infusion. Liquid Carafate if can tolerate liquids.
wound class I
Pt placed on central ECMO and trousered to CTICU w/ open chest

## 2018-12-19 NOTE — PROGRESS NOTE ADULT - SUBJECTIVE AND OBJECTIVE BOX
vascular surgical follow up    Wound vac to the right dehiscence groin wound placed yesterday    vac operational, with good seal    - Next vac change 12/21 (friday)

## 2018-12-19 NOTE — BRIEF OPERATIVE NOTE - BRIEF OP NOTE DRAINS
as above   2 mediastinal chest tubes   1 right chest tube   1 left chest tube   *all from previous operation
None
none

## 2018-12-19 NOTE — BRIEF OPERATIVE NOTE - PROCEDURE
<<-----Click on this checkbox to enter Procedure EGD w control of hemorrhage w image guidance  12/19/2018  EGD with epinephrine injection and endoscopic hemoclip placement  Active  [id number]

## 2018-12-19 NOTE — PROGRESS NOTE ADULT - SUBJECTIVE AND OBJECTIVE BOX
[facility]  INFECTIOUS DISEASES AND INTERNAL MEDICINE at [place]  =======================================================  Perfecto Santizo MD  Diplomates American Board of Internal Medicine and Infectious Diseases  =======================================================    [id number]  [facility]     follow up for:  post operative fevers and pn eumonia  S/p trach with purulent discharge from trach and around it.      on MERREM/Vanco  SEEN EARLIER UNDERGOING EGD     REVIEW OF SYSTEMS:  unable to obtain due to medical condition    Antibiotics:  Vancomycin and Merrem     Physical Exam:   I ICU Vital Signs Last 24 Hrs  T(C): 38.4 (19 Dec 2018 12:00), Max: 38.6 (18 Dec 2018 22:00)  T(F): 101.1 (19 Dec 2018 12:00), Max: 101.5 (18 Dec 2018 22:00)  HR: 102 (19 Dec 2018 12:40) (82 - 110)  BP: --  BP(mean): --  ABP: 112/60 (19 Dec 2018 12:30) (87/52 - 133/71)  ABP(mean): 75 (19 Dec 2018 12:30) (61 - 91)  RR: 22 (19 Dec 2018 12:30) (0 - 28)  SpO2: 100% (19 Dec 2018 12:40) (97% - 100%)        General:  awake and alert , has trach    Eye: Pupils are equal, round and reactive to light,   HENT: Normocephalic, trach+ with pus around it  Neck: Supple, No lymphadenopathy.  RIGHT neck IJ line  Respiratory: Lungs with coarse rhonchi bilaterally.   midline chest with dressing in place.   + CHEST tubes in place  Cardiovascular: Normal rate, Regular rhythm  Gastrointestinal: Soft, Non-tender, Non-distended, Normal bowel sounds.  Genitourinary: + Mckeon with light color urine  Lymphatics: No lymphadenopathy neck  Musculoskeletal: edema in all extremities   Integumentary: No rash.  Neurologic: Alert,  OOB TO CHAIR    Labs:                                   9.1    10.6  )-----------( 268      ( 19 Dec 2018 13:49 )             28.5   12-19    139  |  97<L>  |  29.0<H>  ----------------------------<  174<H>  3.6   |  29.0  |  0.55    Ca    7.2<L>      19 Dec 2018 09:01  Phos  3.4     12-18  Mg     2.3     12-19    TPro  6.0<L>  /  Alb  2.9<L>  /  TBili  2.4<H>  /  DBili  x   /  AST  74<H>  /  ALT  46<H>  /  AlkPhos  151<H>  12-19                      RECENT CULTURES:  12-14 @ 13:53 .Catheter left IJ (internal jugular) catheter tip     No growth at 2 days.    12-12 @ 16:19 .Sputum     Few Routine respiratory keara present    Few White blood cells  Few Gram Positive Cocci in Pairs and Chains    12-11 @ 20:24 .Broncial bronchial fluid       12-11 @ 10:49 .Broncial bronchial fluid     Rare Candida albicans  Rare Routine respiratory keara present    Numerous white blood cells  No organisms seen    12-09 @ 18:56 .Blood     No growth at 5 days.    12-08 @ 19:40 .Sputum Klebsiella pneumoniae    Moderate Klebsiella pneumoniae  No Routine respiratory keara present    Moderate WBC's  Few Yeast  Few Gram positive cocci in pairs    12-07 @ 22:28 .Urine     No growth    12-07 @ 22:26 .Blood     No growth at 5 days.    12-07 @ 22:25 .Blood     No growth at 5 days.    12-05 @ 10:21 .Blood     No growth at 5 days.    12-04 @ 10:22 .Blood     No growth at 5 days.    12-04 @ 10:21 .Sputum     Few Candida albicans  No Routine respiratory keara present    Few White blood cells  No organisms seen

## 2018-12-19 NOTE — BRIEF OPERATIVE NOTE - OPERATION/FINDINGS
A shallow roughly 1 cm. ulcer with visible vessel seen in proximal body of the stomach. Dependent clot seen in fundus. Ulcer was oozing. 2 cc. epinephrine injected around ulcer edge and 4 hemoclips successfully deployed, 3 on visible vessel and one at apex of ulcer with cessation of bleeding.

## 2018-12-19 NOTE — PROGRESS NOTE ADULT - PROBLEM SELECTOR PLAN 2
Continue to follow up on all cultures, neg to date  Mycofungin added for possible fungemia  continue vancomycin, adjust dosing per trough  continue meropenem, ID following pt closely  tylenol PRN for fevers  cooling blanket PRN  concern for sinusitis since NGT in place (will need PEG)

## 2018-12-19 NOTE — PROGRESS NOTE ADULT - SUBJECTIVE AND OBJECTIVE BOX
Asked to help assist with sedative/neuroleptic medication management.    Patient has recent GI bleed, now NPO, unable to receive oral quetiapine or oral hydromorphone for next 24 hours.    Patient is currently alert, responsive and interactive, comfortable.  Recommendations:  - if agitated can give zyprexa 5 mg IM - which is available in the pharmacy   - can convert oral hydromorphone to 0.5 mg IV Q4 hours PRN pain or dyspnea  - would continue precedex at night as there is recent literature demonstrating a reduction in delirium in patients randomized to receive nocturnal precedex in the ICU (Am J Respir Crit Care Med. 2018 May 1;197(9):8206-6749)  Recommendations discussed with CTICU team.

## 2018-12-19 NOTE — PROGRESS NOTE ADULT - SUBJECTIVE AND OBJECTIVE BOX
Interval Events:  no overnight events  follow up on type 2 diabetes    patient seen and examined at bedside.  NGT feeds on hold as patient had UGIB   wife at bedside  no complaints from patient but having more secretions    REVIEW OF SYSTEMS:    CONSTITUTIONAL: No fever, weight loss, or fatigue  EYES: No eye pain, visual disturbances, or discharge  ENMT:  No difficulty hearing, tinnitus, vertigo; No sinus or throat pain  NECK: No pain or stiffness  RESPIRATORY: No cough, wheezing, chills or hemoptysis; No shortness of breath  CARDIOVASCULAR: No chest pain, palpitations, dizziness, or leg swelling  GASTROINTESTINAL: No abdominal or epigastric pain. No nausea, vomiting, or hematemesis; No diarrhea or constipation. No melena or hematochezia.  NEUROLOGICAL: No headaches, memory loss, loss of strength, numbness, or tremors  SKIN: No itching, burning, rashes, or lesions   MUSCULOSKELETAL: No joint pain or swelling; No muscle, back, or extremity pain  PSYCHIATRIC: No depression, anxiety, mood swings, or difficulty sleeping        penicillins (Unknown)      MEDICATIONS  (STANDING):  amiodarone    Tablet 200 milliGRAM(s) Oral daily  ascorbic acid 500 milliGRAM(s) Oral daily  aspirin Suppository 300 milliGRAM(s) Rectal daily  atorvastatin 80 milliGRAM(s) Oral at bedtime  chlorhexidine 0.12% Liquid 5 milliLiter(s) Oral Mucosa every 4 hours  clopidogrel Tablet 75 milliGRAM(s) Oral daily  dexmedetomidine Infusion 1.4 MICROgram(s)/kG/Hr (31.745 mL/Hr) IV Continuous <Continuous>  ferrous    sulfate 325 milliGRAM(s) Oral daily  folic acid 1 milliGRAM(s) Oral daily  furosemide Infusion 2.5 mG/Hr (1.25 mL/Hr) IV Continuous <Continuous>  insulin lispro (HumaLOG) corrective regimen sliding scale   SubCutaneous every 4 hours  lactobacillus acidophilus 1 Tablet(s) Oral every 8 hours  meropenem  IVPB      meropenem  IVPB 1000 milliGRAM(s) IV Intermittent every 8 hours  micafungin IVPB      micafungin IVPB 100 milliGRAM(s) IV Intermittent every 24 hours  multivitamin 1 Tablet(s) Oral daily  OLANZapine Injectable 5 milliGRAM(s) IntraMuscular at bedtime  pantoprazole Infusion 8 mG/Hr (10 mL/Hr) IV Continuous <Continuous>  propofol Infusion 7 MICROgram(s)/kG/Min (3.809 mL/Hr) IV Continuous <Continuous>  vancomycin  IVPB 1000 milliGRAM(s) IV Intermittent every 8 hours    MEDICATIONS  (PRN):  acetaminophen    Suspension .. 650 milliGRAM(s) Oral every 6 hours PRN Temp greater or equal to 38.5C (101.3F)  ALBUTerol/ipratropium for Nebulization 3 milliLiter(s) Nebulizer every 6 hours PRN Shortness of Breath and/or Wheezing  artificial  tears Solution 1 Drop(s) Both EYES four times a day PRN Dry Eyes  HYDROmorphone  Injectable 0.5 milliGRAM(s) IV Push every 4 hours PRN Moderate Pain (4 - 6)  lidocaine 2% Jelly 1 milliLiter(s) IntraUrethral four times a day PRN urethral discomfort  petrolatum white Ointment 1 Application(s) Topical four times a day PRN dry lips      Vital Signs Last 24 Hrs  T(C): 38.1 (19 Dec 2018 18:30), Max: 38.6 (18 Dec 2018 22:00)  T(F): 100.5 (19 Dec 2018 18:30), Max: 101.5 (18 Dec 2018 22:00)  HR: 98 (19 Dec 2018 20:54) (82 - 107)  BP: --  BP(mean): --  RR: 26 (19 Dec 2018 20:00) (0 - 28)  SpO2: 99% (19 Dec 2018 20:54) (97% - 100%)    PHYSICAL EXAM:    Constitutional: NAD, well-groomed, well-developed  Neck: trach in place  Respiratory: improved after suctioning  Cardiovascular: S1 and S2, RRR  Gastrointestinal: BS+, soft, distended  Neurological: Alert, able to nod to questions, following commands  Psychiatric: Normal mood, normal affect        LABS  12-19    139  |  97<L>  |  29.0<H>  ----------------------------<  174<H>  3.6   |  29.0  |  0.55    Ca    7.2<L>      19 Dec 2018 09:01  Phos  3.4     12-18  Mg     2.3     12-19    TPro  6.0<L>  /  Alb  2.9<L>  /  TBili  2.4<H>  /  DBili  x   /  AST  74<H>  /  ALT  46<H>  /  AlkPhos  151<H>  12-19                          9.1    10.6  )-----------( 268      ( 19 Dec 2018 13:49 )             28.5         Albumin, Serum: 2.9 g/dL (12-19-18 @ 09:01)  Aspartate Aminotransferase (AST/SGOT): 74 U/L (12-19-18 @ 09:01)  Alkaline Phosphatase, Serum: 151 U/L (12-19-18 @ 09:01)  Alanine Aminotransferase (ALT/SGPT): 46 U/L (12-19-18 @ 09:01)  Aspartate Aminotransferase (AST/SGOT): 84 U/L (12-19-18 @ 02:01)  Alkaline Phosphatase, Serum: 160 U/L (12-19-18 @ 02:01)  Alanine Aminotransferase (ALT/SGPT): 49 U/L (12-19-18 @ 02:01)  Albumin, Serum: 2.9 g/dL (12-19-18 @ 02:01)  Albumin, Serum: 3.2 g/dL (12-18-18 @ 19:35)  Alkaline Phosphatase, Serum: 181 U/L (12-18-18 @ 19:35)  Aspartate Aminotransferase (AST/SGOT): 90 U/L (12-18-18 @ 19:35)  Alanine Aminotransferase (ALT/SGPT): 56 U/L (12-18-18 @ 19:35)    CAPILLARY BLOOD GLUCOSE      POCT Blood Glucose.: 137 mg/dL (19 Dec 2018 20:22)  POCT Blood Glucose.: 165 mg/dL (19 Dec 2018 12:00)  POCT Blood Glucose.: 181 mg/dL (19 Dec 2018 08:21)

## 2018-12-19 NOTE — PROGRESS NOTE ADULT - PROBLEM SELECTOR PLAN 5
s/p trach  continue vent support  PS/CPAP trials as tolerated during day, continue full support at night for now  tolerated 9.5 hrs CPAP yesterday

## 2018-12-19 NOTE — PHARMACOTHERAPY INTERVENTION NOTE - NSPHARMCOMMASP
ASP - Renal dose adjustment
ASP - Dose optimization/Non-Renal dose adjustment
ASP - Lab/ test recommended

## 2018-12-20 DIAGNOSIS — K25.0 ACUTE GASTRIC ULCER WITH HEMORRHAGE: ICD-10-CM

## 2018-12-20 LAB
-  CANDIDA PARAPSILOSIS: SIGNIFICANT CHANGE UP
24R-OH-CALCIDIOL SERPL-MCNC: 8.4 NG/ML — LOW (ref 30–80)
ALBUMIN SERPL ELPH-MCNC: 2.8 G/DL — LOW (ref 3.3–5.2)
ALP SERPL-CCNC: 160 U/L — HIGH (ref 40–120)
ALT FLD-CCNC: 81 U/L — HIGH
ANION GAP SERPL CALC-SCNC: 10 MMOL/L — SIGNIFICANT CHANGE UP (ref 5–17)
APPEARANCE UR: ABNORMAL
AST SERPL-CCNC: 183 U/L — HIGH
BACTERIA # UR AUTO: ABNORMAL
BILIRUB DIRECT SERPL-MCNC: 1 MG/DL — HIGH (ref 0–0.3)
BILIRUB INDIRECT FLD-MCNC: 1.1 MG/DL — HIGH (ref 0.2–1)
BILIRUB SERPL-MCNC: 2.1 MG/DL — HIGH (ref 0.4–2)
BILIRUB UR-MCNC: NEGATIVE — SIGNIFICANT CHANGE UP
BUN SERPL-MCNC: 31 MG/DL — HIGH (ref 8–20)
CALCIUM SERPL-MCNC: 8.2 MG/DL — LOW (ref 8.6–10.2)
CHLORIDE SERPL-SCNC: 102 MMOL/L — SIGNIFICANT CHANGE UP (ref 98–107)
CO2 SERPL-SCNC: 29 MMOL/L — SIGNIFICANT CHANGE UP (ref 22–29)
COLOR SPEC: YELLOW — SIGNIFICANT CHANGE UP
COMMENT - URINE: SIGNIFICANT CHANGE UP
CREAT SERPL-MCNC: 0.61 MG/DL — SIGNIFICANT CHANGE UP (ref 0.5–1.3)
CULTURE RESULTS: SIGNIFICANT CHANGE UP
DIFF PNL FLD: ABNORMAL
GAS PNL BLDA: SIGNIFICANT CHANGE UP
GLUCOSE BLDC GLUCOMTR-MCNC: 129 MG/DL — HIGH (ref 70–99)
GLUCOSE BLDC GLUCOMTR-MCNC: 132 MG/DL — HIGH (ref 70–99)
GLUCOSE BLDC GLUCOMTR-MCNC: 140 MG/DL — HIGH (ref 70–99)
GLUCOSE BLDC GLUCOMTR-MCNC: 141 MG/DL — HIGH (ref 70–99)
GLUCOSE BLDC GLUCOMTR-MCNC: 144 MG/DL — HIGH (ref 70–99)
GLUCOSE SERPL-MCNC: 129 MG/DL — HIGH (ref 70–115)
GLUCOSE UR QL: NEGATIVE MG/DL — SIGNIFICANT CHANGE UP
HCT VFR BLD CALC: 31 % — LOW (ref 42–52)
HGB BLD-MCNC: 9.6 G/DL — LOW (ref 14–18)
KETONES UR-MCNC: NEGATIVE — SIGNIFICANT CHANGE UP
LEUKOCYTE ESTERASE UR-ACNC: NEGATIVE — SIGNIFICANT CHANGE UP
MAGNESIUM SERPL-MCNC: 2.2 MG/DL — SIGNIFICANT CHANGE UP (ref 1.8–2.6)
MCHC RBC-ENTMCNC: 27 PG — SIGNIFICANT CHANGE UP (ref 27–31)
MCHC RBC-ENTMCNC: 31 G/DL — LOW (ref 32–36)
MCV RBC AUTO: 87.1 FL — SIGNIFICANT CHANGE UP (ref 80–94)
METHOD TYPE: SIGNIFICANT CHANGE UP
NITRITE UR-MCNC: NEGATIVE — SIGNIFICANT CHANGE UP
PH UR: 5 — SIGNIFICANT CHANGE UP (ref 5–8)
PLATELET # BLD AUTO: 237 K/UL — SIGNIFICANT CHANGE UP (ref 150–400)
POTASSIUM SERPL-MCNC: 3.9 MMOL/L — SIGNIFICANT CHANGE UP (ref 3.5–5.3)
POTASSIUM SERPL-SCNC: 3.9 MMOL/L — SIGNIFICANT CHANGE UP (ref 3.5–5.3)
PROT SERPL-MCNC: 5.9 G/DL — LOW (ref 6.6–8.7)
PROT UR-MCNC: 15 MG/DL
PTH-INTACT FLD-MCNC: 89 PG/ML — HIGH (ref 15–65)
RBC # BLD: 3.56 M/UL — LOW (ref 4.6–6.2)
RBC # FLD: 15 % — SIGNIFICANT CHANGE UP (ref 11–15.6)
RBC CASTS # UR COMP ASSIST: ABNORMAL /HPF (ref 0–4)
SODIUM SERPL-SCNC: 141 MMOL/L — SIGNIFICANT CHANGE UP (ref 135–145)
SP GR SPEC: 1.01 — SIGNIFICANT CHANGE UP (ref 1.01–1.02)
SPECIMEN SOURCE: SIGNIFICANT CHANGE UP
UROBILINOGEN FLD QL: 1 MG/DL
VANCOMYCIN TROUGH SERPL-MCNC: 21.3 UG/ML — HIGH (ref 10–20)
WBC # BLD: 9 K/UL — SIGNIFICANT CHANGE UP (ref 4.8–10.8)
WBC # FLD AUTO: 9 K/UL — SIGNIFICANT CHANGE UP (ref 4.8–10.8)
WBC UR QL: SIGNIFICANT CHANGE UP

## 2018-12-20 PROCEDURE — 99233 SBSQ HOSP IP/OBS HIGH 50: CPT

## 2018-12-20 PROCEDURE — 71045 X-RAY EXAM CHEST 1 VIEW: CPT | Mod: 26,77,76

## 2018-12-20 PROCEDURE — 71045 X-RAY EXAM CHEST 1 VIEW: CPT | Mod: 26

## 2018-12-20 PROCEDURE — 99232 SBSQ HOSP IP/OBS MODERATE 35: CPT

## 2018-12-20 RX ORDER — POTASSIUM CHLORIDE 20 MEQ
10 PACKET (EA) ORAL
Qty: 0 | Refills: 0 | Status: COMPLETED | OUTPATIENT
Start: 2018-12-20 | End: 2018-12-20

## 2018-12-20 RX ORDER — LIDOCAINE HCL 20 MG/ML
10 VIAL (ML) INJECTION DAILY
Qty: 0 | Refills: 0 | Status: DISCONTINUED | OUTPATIENT
Start: 2018-12-20 | End: 2018-12-23

## 2018-12-20 RX ORDER — SUCRALFATE 1 G
1 TABLET ORAL
Qty: 0 | Refills: 0 | Status: DISCONTINUED | OUTPATIENT
Start: 2018-12-20 | End: 2019-01-07

## 2018-12-20 RX ORDER — PROPOFOL 10 MG/ML
30 INJECTION, EMULSION INTRAVENOUS ONCE
Qty: 0 | Refills: 0 | Status: COMPLETED | OUTPATIENT
Start: 2018-12-20 | End: 2018-12-20

## 2018-12-20 RX ORDER — ASPIRIN/CALCIUM CARB/MAGNESIUM 324 MG
325 TABLET ORAL DAILY
Qty: 0 | Refills: 0 | Status: DISCONTINUED | OUTPATIENT
Start: 2018-12-20 | End: 2019-01-11

## 2018-12-20 RX ORDER — PANTOPRAZOLE SODIUM 20 MG/1
40 TABLET, DELAYED RELEASE ORAL EVERY 12 HOURS
Qty: 0 | Refills: 0 | Status: DISCONTINUED | OUTPATIENT
Start: 2018-12-20 | End: 2019-01-06

## 2018-12-20 RX ORDER — INSULIN LISPRO 100/ML
VIAL (ML) SUBCUTANEOUS EVERY 6 HOURS
Qty: 0 | Refills: 0 | Status: DISCONTINUED | OUTPATIENT
Start: 2018-12-20 | End: 2019-01-04

## 2018-12-20 RX ORDER — ACETAMINOPHEN 500 MG
1000 TABLET ORAL ONCE
Qty: 0 | Refills: 0 | Status: COMPLETED | OUTPATIENT
Start: 2018-12-20 | End: 2018-12-20

## 2018-12-20 RX ORDER — ENOXAPARIN SODIUM 100 MG/ML
40 INJECTION SUBCUTANEOUS DAILY
Qty: 0 | Refills: 0 | Status: DISCONTINUED | OUTPATIENT
Start: 2018-12-20 | End: 2019-01-09

## 2018-12-20 RX ADMIN — MEROPENEM 100 MILLIGRAM(S): 1 INJECTION INTRAVENOUS at 21:17

## 2018-12-20 RX ADMIN — Medication 1 TABLET(S): at 15:00

## 2018-12-20 RX ADMIN — OLANZAPINE 5 MILLIGRAM(S): 15 TABLET, FILM COATED ORAL at 00:35

## 2018-12-20 RX ADMIN — Medication 50 MILLIEQUIVALENT(S): at 11:41

## 2018-12-20 RX ADMIN — Medication 50 MILLIEQUIVALENT(S): at 20:00

## 2018-12-20 RX ADMIN — PROPOFOL 30 MILLIGRAM(S): 10 INJECTION, EMULSION INTRAVENOUS at 16:47

## 2018-12-20 RX ADMIN — Medication 50 MILLIEQUIVALENT(S): at 07:11

## 2018-12-20 RX ADMIN — CHLORHEXIDINE GLUCONATE 5 MILLILITER(S): 213 SOLUTION TOPICAL at 05:32

## 2018-12-20 RX ADMIN — OLANZAPINE 5 MILLIGRAM(S): 15 TABLET, FILM COATED ORAL at 21:15

## 2018-12-20 RX ADMIN — Medication 50 MILLIEQUIVALENT(S): at 05:33

## 2018-12-20 RX ADMIN — Medication 400 MILLIGRAM(S): at 07:20

## 2018-12-20 RX ADMIN — Medication 325 MILLIGRAM(S): at 12:20

## 2018-12-20 RX ADMIN — MEROPENEM 100 MILLIGRAM(S): 1 INJECTION INTRAVENOUS at 05:33

## 2018-12-20 RX ADMIN — CHLORHEXIDINE GLUCONATE 5 MILLILITER(S): 213 SOLUTION TOPICAL at 09:47

## 2018-12-20 RX ADMIN — Medication 1 MILLIGRAM(S): at 11:41

## 2018-12-20 RX ADMIN — CHLORHEXIDINE GLUCONATE 5 MILLILITER(S): 213 SOLUTION TOPICAL at 00:37

## 2018-12-20 RX ADMIN — Medication 3 MILLILITER(S): at 05:10

## 2018-12-20 RX ADMIN — Medication 650 MILLIGRAM(S): at 18:56

## 2018-12-20 RX ADMIN — Medication 325 MILLIGRAM(S): at 11:41

## 2018-12-20 RX ADMIN — ATORVASTATIN CALCIUM 80 MILLIGRAM(S): 80 TABLET, FILM COATED ORAL at 21:16

## 2018-12-20 RX ADMIN — MEROPENEM 100 MILLIGRAM(S): 1 INJECTION INTRAVENOUS at 00:36

## 2018-12-20 RX ADMIN — CLOPIDOGREL BISULFATE 75 MILLIGRAM(S): 75 TABLET, FILM COATED ORAL at 11:41

## 2018-12-20 RX ADMIN — AMIODARONE HYDROCHLORIDE 200 MILLIGRAM(S): 400 TABLET ORAL at 11:41

## 2018-12-20 RX ADMIN — Medication 1 TABLET(S): at 12:20

## 2018-12-20 RX ADMIN — MEROPENEM 100 MILLIGRAM(S): 1 INJECTION INTRAVENOUS at 14:14

## 2018-12-20 RX ADMIN — MICAFUNGIN SODIUM 105 MILLIGRAM(S): 100 INJECTION, POWDER, LYOPHILIZED, FOR SOLUTION INTRAVENOUS at 11:41

## 2018-12-20 RX ADMIN — Medication 50 MILLIEQUIVALENT(S): at 12:20

## 2018-12-20 RX ADMIN — CHLORHEXIDINE GLUCONATE 5 MILLILITER(S): 213 SOLUTION TOPICAL at 05:31

## 2018-12-20 RX ADMIN — Medication 250 MILLIGRAM(S): at 07:20

## 2018-12-20 RX ADMIN — Medication 1 GRAM(S): at 18:18

## 2018-12-20 RX ADMIN — Medication 50 MILLIEQUIVALENT(S): at 14:14

## 2018-12-20 RX ADMIN — CHLORHEXIDINE GLUCONATE 5 MILLILITER(S): 213 SOLUTION TOPICAL at 21:16

## 2018-12-20 RX ADMIN — ENOXAPARIN SODIUM 40 MILLIGRAM(S): 100 INJECTION SUBCUTANEOUS at 11:40

## 2018-12-20 RX ADMIN — Medication 50 MILLIEQUIVALENT(S): at 05:00

## 2018-12-20 RX ADMIN — CHLORHEXIDINE GLUCONATE 5 MILLILITER(S): 213 SOLUTION TOPICAL at 18:18

## 2018-12-20 RX ADMIN — Medication 500 MILLIGRAM(S): at 11:41

## 2018-12-20 RX ADMIN — Medication 650 MILLIGRAM(S): at 19:26

## 2018-12-20 RX ADMIN — PANTOPRAZOLE SODIUM 40 MILLIGRAM(S): 20 TABLET, DELAYED RELEASE ORAL at 18:18

## 2018-12-20 RX ADMIN — Medication 1 TABLET(S): at 21:16

## 2018-12-20 RX ADMIN — Medication 50 MILLIEQUIVALENT(S): at 21:15

## 2018-12-20 RX ADMIN — CHLORHEXIDINE GLUCONATE 5 MILLILITER(S): 213 SOLUTION TOPICAL at 14:59

## 2018-12-20 NOTE — PROGRESS NOTE ADULT - SUBJECTIVE AND OBJECTIVE BOX
Interval Events:  no overnight events  follow up on t2dm    patient seen and examined at bedside.  still has secretions  spoke with primary team. tickle feeds to start today    REVIEW OF SYSTEMS:  difficult to obtain but patient responding to questions      penicillins (Unknown)      MEDICATIONS  (STANDING):  amiodarone    Tablet 200 milliGRAM(s) Oral daily  ascorbic acid 500 milliGRAM(s) Oral daily  aspirin 325 milliGRAM(s) Oral daily  atorvastatin 80 milliGRAM(s) Oral at bedtime  chlorhexidine 0.12% Liquid 5 milliLiter(s) Oral Mucosa every 4 hours  clopidogrel Tablet 75 milliGRAM(s) Oral daily  dexmedetomidine Infusion 1.4 MICROgram(s)/kG/Hr (31.745 mL/Hr) IV Continuous <Continuous>  enoxaparin Injectable 40 milliGRAM(s) SubCutaneous daily  ferrous    sulfate 325 milliGRAM(s) Oral daily  folic acid 1 milliGRAM(s) Oral daily  furosemide Infusion 5 mG/Hr (2.5 mL/Hr) IV Continuous <Continuous>  insulin lispro (HumaLOG) corrective regimen sliding scale   SubCutaneous every 6 hours  lactobacillus acidophilus 1 Tablet(s) Oral every 8 hours  meropenem  IVPB      meropenem  IVPB 1000 milliGRAM(s) IV Intermittent every 8 hours  micafungin IVPB      micafungin IVPB 100 milliGRAM(s) IV Intermittent every 24 hours  multivitamin 1 Tablet(s) Oral daily  OLANZapine Injectable 5 milliGRAM(s) IntraMuscular at bedtime  pantoprazole  Injectable 40 milliGRAM(s) IV Push every 12 hours  potassium chloride  10 mEq/50 mL IVPB 10 milliEquivalent(s) IV Intermittent every 1 hour  sucralfate suspension 1 Gram(s) Oral two times a day    MEDICATIONS  (PRN):  acetaminophen    Suspension .. 650 milliGRAM(s) Oral every 6 hours PRN Temp greater or equal to 38.5C (101.3F)  ALBUTerol/ipratropium for Nebulization 3 milliLiter(s) Nebulizer every 6 hours PRN Shortness of Breath and/or Wheezing  artificial  tears Solution 1 Drop(s) Both EYES four times a day PRN Dry Eyes  HYDROmorphone  Injectable 0.5 milliGRAM(s) IV Push every 4 hours PRN Moderate Pain (4 - 6)  petrolatum white Ointment 1 Application(s) Topical four times a day PRN dry lips      Vital Signs Last 24 Hrs  T(C): 37.9 (20 Dec 2018 12:00), Max: 38.4 (19 Dec 2018 14:00)  T(F): 100.2 (20 Dec 2018 12:00), Max: 101.1 (19 Dec 2018 14:00)  HR: 93 (20 Dec 2018 12:06) (82 - 107)  BP: 121/75 (19 Dec 2018 21:00) (121/75 - 121/75)  BP(mean): 85 (19 Dec 2018 21:00) (85 - 85)  RR: 28 (20 Dec 2018 12:00) (12 - 30)  SpO2: 100% (20 Dec 2018 12:06) (97% - 100%)    PHYSICAL EXAM:    Constitutional: NAD, well-groomed, well-developed  Neck: trach in place  Respiratory: R chest CTAB, left chest w/ rhonchorous breath sounds  Cardiovascular: S1 and S2, RRR  Gastrointestinal: BS+, soft, distended  Neurological: Alert, able to nod to questions, following commands  Psychiatric: Normal mood, normal affect        LABS  12-20    141  |  102  |  31.0<H>  ----------------------------<  129<H>  3.9   |  29.0  |  0.61    Ca    8.2<L>      20 Dec 2018 02:33  Phos  3.4     12-18  Mg     2.2     12-20    TPro  5.9<L>  /  Alb  2.8<L>  /  TBili  2.1<H>  /  DBili  1.0<H>  /  AST  183<H>  /  ALT  81<H>  /  AlkPhos  160<H>  12-20                          9.6    9.0   )-----------( 237      ( 20 Dec 2018 02:33 )             31.0         Aspartate Aminotransferase (AST/SGOT): 183 U/L (12-20-18 @ 02:33)  Alkaline Phosphatase, Serum: 160 U/L (12-20-18 @ 02:33)  Alanine Aminotransferase (ALT/SGPT): 81 U/L (12-20-18 @ 02:33)  Albumin, Serum: 2.8 g/dL (12-20-18 @ 02:33)  Albumin, Serum: 2.9 g/dL (12-19-18 @ 09:01)  Aspartate Aminotransferase (AST/SGOT): 74 U/L (12-19-18 @ 09:01)  Alkaline Phosphatase, Serum: 151 U/L (12-19-18 @ 09:01)  Alanine Aminotransferase (ALT/SGPT): 46 U/L (12-19-18 @ 09:01)  Aspartate Aminotransferase (AST/SGOT): 84 U/L (12-19-18 @ 02:01)  Alkaline Phosphatase, Serum: 160 U/L (12-19-18 @ 02:01)  Alanine Aminotransferase (ALT/SGPT): 49 U/L (12-19-18 @ 02:01)  Albumin, Serum: 2.9 g/dL (12-19-18 @ 02:01)  Albumin, Serum: 3.2 g/dL (12-18-18 @ 19:35)  Alkaline Phosphatase, Serum: 181 U/L (12-18-18 @ 19:35)  Aspartate Aminotransferase (AST/SGOT): 90 U/L (12-18-18 @ 19:35)  Alanine Aminotransferase (ALT/SGPT): 56 U/L (12-18-18 @ 19:35)    CAPILLARY BLOOD GLUCOSE      POCT Blood Glucose.: 141 mg/dL (20 Dec 2018 09:13)  POCT Blood Glucose.: 129 mg/dL (20 Dec 2018 04:22)  POCT Blood Glucose.: 140 mg/dL (20 Dec 2018 00:42)  POCT Blood Glucose.: 137 mg/dL (19 Dec 2018 20:22)

## 2018-12-20 NOTE — PROGRESS NOTE ADULT - PROBLEM SELECTOR PLAN 2
Secondary to the above acute stress induced gastric ulcer. See above management plan recommendations.

## 2018-12-20 NOTE — PROGRESS NOTE ADULT - PROBLEM SELECTOR PLAN 1
No further hematemesis or GI bleeding s/p EGD with endoscopic hemoclip placement yesterday. Can insert Dobhoff tube for tube feeds and oral medications today and can resume SQ Lovenox for DVT prophylaxsis as well. Can restart TF today once Dobhoff tube in and proper position verified radiographically but would start @ 20 cc./hr. increasing rate 10 cc./hr. every 24 hrs. until target rate of 50 or 60 cc./hr. reached. Continue IV Pantoprazole which can be converted from continuous infusion to 40 mg. IVP every 12 hours. Daily cbc.s keeping Hb at greater than 8 grams. Can continue ASA and Plavix w/o interruption. His acute gastric ulcer is likely a stress induced ulcer from what he has been through this admission. No further hematemesis or GI bleeding s/p EGD with endoscopic hemoclip placement yesterday. Can insert Dobhoff tube for tube feeds and oral medications today and can resume SQ Lovenox for DVT prophylaxsis as well. Can start Carafate liquid 1 gram QID via Dobhoff tube. Can restart TF today once Dobhoff tube in and proper position verified radiographically but would start @ 20 cc./hr. increasing rate 10 cc./hr. every 24 hrs. until target rate of 50 or 60 cc./hr. reached. Continue IV Pantoprazole which can be converted from continuous infusion to 40 mg. IVP every 12 hours. Daily cbc.s keeping Hb at greater than 8 grams. Can continue ASA and Plavix w/o interruption. His acute gastric ulcer is likely a stress induced ulcer from what he has been through this admission.

## 2018-12-20 NOTE — PROGRESS NOTE ADULT - ASSESSMENT
48M with unknown PMH admitted with STEMI s/p CBAG complicated by cardiac arrest, LV failure and cardiogenic shock, respiratory failure requiring trach and vent support.  He developed hyperglycemia in the ICU while on pressors and tube feeds and his A1c is elevated in prediabetes range, however this could be falsely low as it was drawn in setting of acute anemia and possible blood loss from surgery.   Hospital course notable for persistent cardiogenic shock/LV failure, vent dependent respiratory failure (inability to wean also contributed to by severe agitation, now s/p trach), sepsis secondary to klebsiella PNA completed abx, now off abx, ileus and UGIB due to bleeding ulcer.      DM vs pre-DM- FS acceptable  -tube feeds on hold, would hold lantus  -once tube feeds resume at full dose, can restart lantus 10 units  -continue insulin sliding scale Q6 okay (Q4 for tighter control)    Hypernatremia- resolved    Hypocalcemia- resolved. possibly lab error from yesterday. pth and vitamin D levels pending    CAD-  as per cardiac team.  On statin, ASA and Plavix.

## 2018-12-20 NOTE — PROGRESS NOTE ADULT - SUBJECTIVE AND OBJECTIVE BOX
Subjective:  47yo Male awake, appropriate, with trach in place, interactive with family and staff.  No c/o pain at this time.    Past Medical History:  ST elevation myocardial infarction (STEMI)  Family history of myocardial infarction (Mother)  No pertinent family history in first degree relatives  Handoff  MEWS Score  Staph infection  No pertinent past medical history  Chronic respiratory failure with hypoxia  Cardiogenic shock  Chronic respiratory failure with hypoxia  Cardiogenic shock  ST elevation myocardial infarction (STEMI), unspecified artery  Coronary artery disease involving native coronary artery of native heart, angina presence unspecified  Critical illness myopathy  Hypernatremia  Shock  Hypoxia  Ileus  Septic shock  Malnutrition  Right ventricular dysfunction  Acute overt combined systolic and diastolic congestive heart failure  Klebsiella pneumonia  Pulmonary edema  Agitation  Acute respiratory failure with hypoxia  Other encephalopathy  Fever, unspecified fever cause  Thrombocytopenia  Anemia due to blood loss  Acute systolic heart failure  Respiratory failure  Cardiogenic shock  Staph infection  Ventricular fibrillation  Coronary artery disease involving native coronary artery of native heart with unstable angina pectoris  Dissection of left main coronary artery  ST elevation myocardial infarction involving left anterior descending (LAD) coronary artery  STEMI (ST elevation myocardial infarction)  Tracheostomy  Exploration of femoral artery  Insertion of Impella device  ECMO decannulation  Exploration and washout of mediastinum  Intra-aortic balloon pump removal  Arterial cannulation  Central line placement  [place] placement  CABG  ECMO (extracorporeal membrane oxygenation)  No significant past surgical history  CHEST PAIN  90+        acetaminophen    Suspension .. 650 milliGRAM(s) Oral every 6 hours PRN  ALBUTerol/ipratropium for Nebulization 3 milliLiter(s) Nebulizer every 6 hours PRN  amiodarone    Tablet 200 milliGRAM(s) Oral daily  artificial  tears Solution 1 Drop(s) Both EYES four times a day PRN  ascorbic acid 500 milliGRAM(s) Oral daily  aspirin 325 milliGRAM(s) Oral daily  atorvastatin 80 milliGRAM(s) Oral at bedtime  chlorhexidine 0.12% Liquid 5 milliLiter(s) Oral Mucosa every 4 hours  chlorproMAZINE    IVPB 25 milliGRAM(s) IV Intermittent once  clopidogrel Tablet 75 milliGRAM(s) Oral daily  dexmedetomidine Infusion 1.4 MICROgram(s)/kG/Hr IV Continuous <Continuous>  enoxaparin Injectable 40 milliGRAM(s) SubCutaneous daily  ferrous    sulfate 325 milliGRAM(s) Oral daily  folic acid 1 milliGRAM(s) Oral daily  furosemide Infusion 2.5 mG/Hr IV Continuous <Continuous>  HYDROmorphone   Tablet 2 milliGRAM(s) Oral every 3 hours PRN  HYDROmorphone   Tablet 4 milliGRAM(s) Oral every 3 hours PRN  insulin glargine Injectable (LANTUS) 10 Unit(s) SubCutaneous at bedtime  insulin lispro (HumaLOG) corrective regimen sliding scale   SubCutaneous every 4 hours  lactobacillus acidophilus 1 Tablet(s) Oral every 8 hours  lidocaine 2% Jelly 1 milliLiter(s) IntraUrethral four times a day PRN  meropenem  IVPB      meropenem  IVPB 1000 milliGRAM(s) IV Intermittent every 8 hours  micafungin IVPB      micafungin IVPB 100 milliGRAM(s) IV Intermittent every 24 hours  multivitamin 1 Tablet(s) Oral daily  norepinephrine Infusion 0.05 MICROgram(s)/kG/Min IV Continuous <Continuous>  pantoprazole  Injectable 40 milliGRAM(s) IV Push every 12 hours  pantoprazole Infusion 8 mG/Hr IV Continuous <Continuous>  petrolatum white Ointment 1 Application(s) Topical four times a day PRN  potassium chloride  20 mEq/100 mL IVPB 20 milliEquivalent(s) IV Intermittent every 1 hour  propofol Infusion 7 MICROgram(s)/kG/Min IV Continuous <Continuous>  QUEtiapine 25 milliGRAM(s) Oral daily  QUEtiapine 50 milliGRAM(s) Oral <User Schedule>  vancomycin  IVPB 1000 milliGRAM(s) IV Intermittent every 8 hours  MEDICATIONS  (PRN):  acetaminophen    Suspension .. 650 milliGRAM(s) Oral every 6 hours PRN Temp greater or equal to 38.5C (101.3F)  ALBUTerol/ipratropium for Nebulization 3 milliLiter(s) Nebulizer every 6 hours PRN Shortness of Breath and/or Wheezing  artificial  tears Solution 1 Drop(s) Both EYES four times a day PRN Dry Eyes  HYDROmorphone   Tablet 2 milliGRAM(s) Oral every 3 hours PRN Moderate Pain (4 - 6)  HYDROmorphone   Tablet 4 milliGRAM(s) Oral every 3 hours PRN Severe Pain (7 - 10)  lidocaine 2% Jelly 1 milliLiter(s) IntraUrethral four times a day PRN urethral discomfort  petrolatum white Ointment 1 Application(s) Topical four times a day PRN dry lips    Mode: AC/ CMV (Assist Control/ Continuous Mandatory Ventilation), RR (machine): 12, TV (machine): 600, FiO2: 35, PEEP: 8, MAP: 14, PIP: 28  Daily     Daily     Mode: AC/ CMV (Assist Control/ Continuous Mandatory Ventilation), RR (machine): 12, TV (machine): 600, FiO2: 35, PEEP: 8, MAP: 14, PIP: 28    ABG - ( 19 Dec 2018 00:27 )  pH, Arterial: 7.58  pH, Blood: x     /  pCO2: 36    /  pO2: 122   / HCO3: 35    / Base Excess: 10.9  /  SaO2: 100                                     9.8    10.7  )-----------( 331      ( 18 Dec 2018 19:35 )             31.4   12-18    138  |  95<L>  |  21.0<H>  ----------------------------<  176<H>  3.2<L>   |  29.0  |  0.50    Ca    8.0<L>      18 Dec 2018 19:35  Phos  3.4     12-18  Mg     2.3     12-18    TPro  6.0<L>  /  Alb  3.2<L>  /  TBili  2.9<H>  /  DBili  1.6<H>  /  AST  90<H>  /  ALT  56<H>  /  AlkPhos  181<H>  12-18      PT/INR - ( 18 Dec 2018 19:35 )   PT: 17.0 sec;   INR: 1.46 ratio               Objective:  T(C): 38.5 (12-18-18 @ 23:45), Max: 39 (12-18-18 @ 07:00)  HR: 89 (12-19-18 @ 00:15) (89 - 118)  BP: --  RR: 20 (12-19-18 @ 00:15) (17 - 31)  SpO2: 100% (12-19-18 @ 00:15) (98% - 100%)  Wt(kg): --CAPILLARY BLOOD GLUCOSE      POCT Blood Glucose.: 143 mg/dL (18 Dec 2018 17:08)  POCT Blood Glucose.: 158 mg/dL (18 Dec 2018 09:06)  I&O's Summary    17 Dec 2018 07:01  -  18 Dec 2018 07:00  --------------------------------------------------------  IN: 4903.6 mL / OUT: 5050 mL / NET: -146.4 mL    18 Dec 2018 07:01  -  19 Dec 2018 00:53  --------------------------------------------------------  IN: 2367.3 mL / OUT: 2395 mL / NET: -27.7 mL        Lines:  CAMMIE SIGALA:  yes    Physical Exam:  Neuro: A0X3, moving all ext's, left arm mild-mod motor deficit  Pulm: CtA b/l Unlabored  CV: s1/s2   reg  Abd: slightly firm, c/o discomfort  Ext: warm, no edema, well perfused  Inc: c/d/i

## 2018-12-20 NOTE — PROGRESS NOTE ADULT - ASSESSMENT
48 yo Male c/o CP for 2-3 days prior to admission, then 10/10 chest pain approximately 9:30 am yesterday, followed by vomiting.  Wife drove pt to hospital, ruled in for STEMI. Urgent Cath, pt found to have multiple occlusions; stent to LAD and D2; stent to LAD thrombosed,  impella placed for support.  Pt then had VF arrest, shock x 1, return to NSR.  Upon transfer to OR for Urgent CABG, pt with asystolic arrest, chest opened intra-op with CPR in progress. Pt underwent C4V (LAD, Diag, OM and PDA) with inability to wean from CPB therefore requiring VA ECMO (central cannulation) and IABP. , he was transferred to CICU on multiple gtts, now s/p explant 12/3 with placement of impella via R groin sheath. 12/4 ID consulted started on broad spectrum abx, 12/5 Impella weaned. 12/6 Impella removal via right femoral cutdown with primary repair of femoral artery and stent placed to external illiac artery for dissection, PRBC x1. 12/8 Pt underwent head CT, no acute intracranial process noted, chest CT b/l pneumothoraces R-20%, L-10%, b/l chest tubes in place, confirmed infiltrates on right lower and left upper lung.  ABX Vanco and Zosyn course extended, f/u sputum Cx.  Hospital course notable for persistent cardiogenic shock/LV failure, vent dependent respiratory failure (inability to wean also contributed to by severe agitation, now s/p trach), sepsis likely d/t GNR VAP.  12/14 + ileus, 12/15 ileus improved, septic and cardiogenic shock.  12/17 improving shock state.   12/18 Improved overall, pt interactive and calm, hemodynamically stable. 12/19 pt had coffee ground emesis, NGT placed on suction, dark bloody drainage, checking CBC q 6 hrs, started panto gtt and pt received 2 uts FFP for INR 1.46  12/20 pt with aggressive CPAP trial. pt cleared by GI for feed, and DVT prophylaxis .

## 2018-12-20 NOTE — PROGRESS NOTE ADULT - SUBJECTIVE AND OBJECTIVE BOX
Pt seen and examined. He had no further hematemesis since EGD with clipping of gastric ulcer yesterday. Hb this AM is stable @ 9.6 grams and he is presently NPO on Iv Pantoprazole. NGT left out yesterday after endoscopic clipping for fear of dislodging clips with NGT. No BM's overnight. Off of pressors and Lovenox held yesterday.     REVIEW OF SYSTEMS:  Constitutional: No fever, weight loss or fatigue  Cardiovascular: No chest pain, palpitations, dizziness or leg swelling  Gastrointestinal: No abdominal or epigastric pain. No nausea, vomiting or hematemesis; No diarrhea or constipation. No melena or hematochezia.  Skin: No itching, burning, rashes or lesions       MEDICATIONS:  MEDICATIONS  (STANDING):  amiodarone    Tablet 200 milliGRAM(s) Oral daily  ascorbic acid 500 milliGRAM(s) Oral daily  aspirin Suppository 300 milliGRAM(s) Rectal daily  atorvastatin 80 milliGRAM(s) Oral at bedtime  chlorhexidine 0.12% Liquid 5 milliLiter(s) Oral Mucosa every 4 hours  clopidogrel Tablet 75 milliGRAM(s) Oral daily  dexmedetomidine Infusion 1.4 MICROgram(s)/kG/Hr (31.745 mL/Hr) IV Continuous <Continuous>  ferrous    sulfate 325 milliGRAM(s) Oral daily  folic acid 1 milliGRAM(s) Oral daily  furosemide Infusion 2.5 mG/Hr (1.25 mL/Hr) IV Continuous <Continuous>  insulin lispro (HumaLOG) corrective regimen sliding scale   SubCutaneous every 4 hours  lactobacillus acidophilus 1 Tablet(s) Oral every 8 hours  meropenem  IVPB      meropenem  IVPB 1000 milliGRAM(s) IV Intermittent every 8 hours  micafungin IVPB      micafungin IVPB 100 milliGRAM(s) IV Intermittent every 24 hours  multivitamin 1 Tablet(s) Oral daily  OLANZapine Injectable 5 milliGRAM(s) IntraMuscular at bedtime  pantoprazole Infusion 8 mG/Hr (10 mL/Hr) IV Continuous <Continuous>  vancomycin  IVPB 1000 milliGRAM(s) IV Intermittent every 8 hours    MEDICATIONS  (PRN):  acetaminophen    Suspension .. 650 milliGRAM(s) Oral every 6 hours PRN Temp greater or equal to 38.5C (101.3F)  ALBUTerol/ipratropium for Nebulization 3 milliLiter(s) Nebulizer every 6 hours PRN Shortness of Breath and/or Wheezing  artificial  tears Solution 1 Drop(s) Both EYES four times a day PRN Dry Eyes  HYDROmorphone  Injectable 0.5 milliGRAM(s) IV Push every 4 hours PRN Moderate Pain (4 - 6)  lidocaine 2% Jelly 1 milliLiter(s) IntraUrethral four times a day PRN urethral discomfort  petrolatum white Ointment 1 Application(s) Topical four times a day PRN dry lips      Allergies    penicillins (Unknown)    Intolerances        Vital Signs Last 24 Hrs  T(C): 38.4 (20 Dec 2018 07:00), Max: 38.4 (19 Dec 2018 08:00)  T(F): 101.1 (20 Dec 2018 07:00), Max: 101.1 (19 Dec 2018 08:00)  HR: 97 (20 Dec 2018 07:00) (82 - 107)  BP: 121/75 (19 Dec 2018 21:00) (121/75 - 121/75)  BP(mean): 85 (19 Dec 2018 21:00) (85 - 85)  RR: 24 (20 Dec 2018 07:00) (12 - 28)  SpO2: 99% (20 Dec 2018 07:00) (97% - 100%)    12-19 @ 07:01  -  12-20 @ 07:00  --------------------------------------------------------  IN: 2334.2 mL / OUT: 2730 mL / NET: -395.8 mL        PHYSICAL EXAM:    General: Well developed; well nourished; trached, vented, in no acute distress  HEENT: MMM, conjunctiva pink and sclera anicteric.  Lungs: clear to auscultation bilaterally.  Cor: RRR S1, S2 only.  Gastrointestinal: Abdomen: Soft non-tender non-distended; Normal bowel sounds; No hepatosplenomegaly  Extremities: no cyanosis, clubbing or edema.  Skin: Warm and dry. No obvious rash  Neuro: Pt. a + o x 3    LABS:  ABG - ( 20 Dec 2018 05:18 )  pH, Arterial: 7.53  pH, Blood: x     /  pCO2: 38    /  pO2: 138   / HCO3: 32    / Base Excess: 7.7   /  SaO2: 100       CBC Full  -  ( 20 Dec 2018 02:33 )  WBC Count : 9.0 K/uL  Hemoglobin : 9.6 g/dL  Hematocrit : 31.0 %  Platelet Count - Automated : 237 K/uL  Mean Cell Volume : 87.1 fl  Mean Cell Hemoglobin : 27.0 pg  Mean Cell Hemoglobin Concentration : 31.0 g/dL  Auto Neutrophil # : x  Auto Lymphocyte # : x  Auto Monocyte # : x  Auto Eosinophil # : x  Auto Basophil # : x  Auto Neutrophil % : x  Auto Lymphocyte % : x  Auto Monocyte % : x  Auto Eosinophil % : x  Auto Basophil % : x    12-20    141  |  102  |  31.0<H>  ----------------------------<  129<H>  3.9   |  29.0  |  0.61    Ca    8.2<L>      20 Dec 2018 02:33  Phos  3.4     12-18  Mg     2.2     12-20    TPro  5.9<L>  /  Alb  2.8<L>  /  TBili  2.1<H>  /  DBili  1.0<H>  /  AST  183<H>  /  ALT  81<H>  /  AlkPhos  160<H>  12-20    PT/INR - ( 19 Dec 2018 09:01 )   PT: 16.3 sec;   INR: 1.40 ratio         PTT - ( 19 Dec 2018 09:01 )  PTT:27.5 sec                  RADIOLOGY & ADDITIONAL STUDIES (The following images were personally reviewed):

## 2018-12-20 NOTE — PROGRESS NOTE ADULT - SUBJECTIVE AND OBJECTIVE BOX
[facility]  INFECTIOUS DISEASES AND INTERNAL MEDICINE at [place]  =======================================================  Perfecto Santizo MD  Diplomates American Board of Internal Medicine and Infectious Diseases  =======================================================    [id number]  [facility]     follow up for:  post operative fevers and pn eumonia  S/p trach with purulent discharge from trach and around it.      on MERREM/Vanco  NOW WITH POSITIVE BLOOD CX JANKI PARAPSILOSIS    REVIEW OF SYSTEMS:  unable to obtain due to medical condition    Antibiotics:  Vancomycin and Merrem MYCAMINE    Physical Exam:   I I ICU Vital Signs Last 24 Hrs  T(C): 37.9 (20 Dec 2018 12:00), Max: 38.4 (19 Dec 2018 13:00)  T(F): 100.2 (20 Dec 2018 12:00), Max: 101.1 (19 Dec 2018 13:00)  HR: 98 (20 Dec 2018 12:00) (82 - 107)  BP: 121/75 (19 Dec 2018 21:00) (121/75 - 121/75)  BP(mean): 85 (19 Dec 2018 21:00) (85 - 85)  ABP: 115/71 (20 Dec 2018 12:00) (87/49 - 120/77)  ABP(mean): 85 (20 Dec 2018 12:00) (59 - 90)  RR: 28 (20 Dec 2018 12:00) (12 - 30)  SpO2: 100% (20 Dec 2018 12:00) (97% - 100%)        General:  awake and alert , has trach    Eye: Pupils are equal, round and reactive to light,   HENT: Normocephalic, trach+ with pus around it  Neck: Supple, No lymphadenopathy.  RIGHT neck IJ line  Respiratory: Lungs with coarse rhonchi bilaterally.   midline chest with dressing in place.   + CHEST tubes in place  Cardiovascular: Normal rate, Regular rhythm  Gastrointestinal: Soft, Non-tender, Non-distended, Normal bowel sounds.  Genitourinary: + Mckeon with light color urine  Lymphatics: No lymphadenopathy neck  Musculoskeletal: edema in all extremities   Integumentary: No rash.  Neurologic: Alert,  OOB TO CHAIR    Labs:                                                            9.6    9.0   )-----------( 237      ( 20 Dec 2018 02:33 )             31.0   12-20    141  |  102  |  31.0<H>  ----------------------------<  129<H>  3.9   |  29.0  |  0.61    Ca    8.2<L>      20 Dec 2018 02:33  Phos  3.4     12-18  Mg     2.2     12-20    TPro  5.9<L>  /  Alb  2.8<L>  /  TBili  2.1<H>  /  DBili  1.0<H>  /  AST  183<H>  /  ALT  81<H>  /  AlkPhos  160<H>  12-20    RECENT CULTURES:  12-14 @ 13:53 .Catheter left IJ (internal jugular) catheter tip     No growth at 2 days.    12-12 @ 16:19 .Sputum     Few Routine respiratory keara present    Few White blood cells  Few Gram Positive Cocci in Pairs and Chains    12-11 @ 20:24 .Broncial bronchial fluid       12-11 @ 10:49 .Broncial bronchial fluid     Rare Candida albicans  Rare Routine respiratory keara present    Numerous white blood cells  No organisms seen    12-09 @ 18:56 .Blood     No growth at 5 days.    12-08 @ 19:40 .Sputum Klebsiella pneumoniae    Moderate Klebsiella pneumoniae  No Routine respiratory keara present    Moderate WBC's  Few Yeast  Few Gram positive cocci in pairs    12-07 @ 22:28 .Urine     No growth    12-07 @ 22:26 .Blood     No growth at 5 days.    12-07 @ 22:25 .Blood     No growth at 5 days.    12-05 @ 10:21 .Blood     No growth at 5 days.    12-04 @ 10:22 .Blood     No growth at 5 days.    12-04 @ 10:21 .Sputum     Few Candida albicans  No Routine respiratory keara present    Few White blood cells  No organisms seen

## 2018-12-20 NOTE — PROGRESS NOTE ADULT - PROBLEM SELECTOR PLAN 10
Pt with coffee ground emesis yesterday.  Pantoprazole gtt started, NGT to LWS,  2 uts FFP given for INR 1.46  Dr. Barrett not wanting GI consult at this time.

## 2018-12-20 NOTE — PROGRESS NOTE ADULT - ASSESSMENT
46y/o man with no significant PMH admitted on 11/29/18 for CP for 2-3 days prior to admission,   found with STEMI. Urgent Cath, pt found to have multiple occlusions; VF arrest, shock x 1, return to NSR.  Upon transfer to OR for Urgent CABG, pt again VF arrest, chest opened intra-op with CPR in progress.  s/p ECMO and Impella; 12/3 ECMO explanted and Impella placed via right groin sheath.    Has been treated for fever empirically few times. Was off Abx  restarted due to persistent fever. CXR wtih   Congestion and possible infiltration.   No positive cultures     Pneumonia  Fever post op  s/p CABG   Diarrhea    S/P tracheostomy   -  BLOOD CX WITH JANKI PARAPSILOSIS  WOULD REPEAT BLOOD CX X2      ON MYCAMINE GOOD ANTIFUNGAL COVERGE    FEVERS DOWN    CONSIDER D/C VANCO AS NO EVIDENCE OF MRSA   ASLO CONSIDER LINE CHANGES  ECHOCARDIOGRAM EVALUATE VALVES    WILL FOLLOW UP 48y/o man with no significant PMH admitted on 11/29/18 for CP for 2-3 days prior to admission,   found with STEMI. Urgent Cath, pt found to have multiple occlusions; VF arrest, shock x 1, return to NSR.  Upon transfer to OR for Urgent CABG, pt again VF arrest, chest opened intra-op with CPR in progress.  s/p ECMO and Impella; 12/3 ECMO explanted and Impella placed via right groin sheath.    Has been treated for fever empirically few times. Was off Abx  restarted due to persistent fever. CXR wtih   Congestion and possible infiltration.   No positive cultures     Pneumonia  Fever post op  s/p CABG   Diarrhea    S/P tracheostomy   -  BLOOD CX WITH CANDIDA PARAPSILOSIS   REPEAT BLOOD CX X2  pending    ON MYCAMINE GOOD ANTIFUNGAL COVERGE    FEVERS DOWN    CONSIDER D/C VANCO AS NO EVIDENCE OF MRSA   ASLO CONSIDER LINE CHANGES  ECHOCARDIOGRAM EVALUATE VALVES    WILL FOLLOW UP

## 2018-12-21 LAB
ALBUMIN SERPL ELPH-MCNC: 2.7 G/DL — LOW (ref 3.3–5.2)
ALP SERPL-CCNC: 185 U/L — HIGH (ref 40–120)
ALT FLD-CCNC: 121 U/L — HIGH
ANION GAP SERPL CALC-SCNC: 13 MMOL/L — SIGNIFICANT CHANGE UP (ref 5–17)
AST SERPL-CCNC: 247 U/L — HIGH
BILIRUB DIRECT SERPL-MCNC: 0.8 MG/DL — HIGH (ref 0–0.3)
BILIRUB INDIRECT FLD-MCNC: 1.1 MG/DL — HIGH (ref 0.2–1)
BILIRUB SERPL-MCNC: 1.9 MG/DL — SIGNIFICANT CHANGE UP (ref 0.4–2)
BLD GP AB SCN SERPL QL: SIGNIFICANT CHANGE UP
BUN SERPL-MCNC: 31 MG/DL — HIGH (ref 8–20)
CALCIUM SERPL-MCNC: 7.6 MG/DL — LOW (ref 8.6–10.2)
CALCIUM SERPL-MCNC: 8.1 MG/DL — LOW (ref 8.4–10.5)
CHLORIDE SERPL-SCNC: 102 MMOL/L — SIGNIFICANT CHANGE UP (ref 98–107)
CO2 SERPL-SCNC: 26 MMOL/L — SIGNIFICANT CHANGE UP (ref 22–29)
CREAT SERPL-MCNC: 0.61 MG/DL — SIGNIFICANT CHANGE UP (ref 0.5–1.3)
CULTURE RESULTS: SIGNIFICANT CHANGE UP
CULTURE RESULTS: SIGNIFICANT CHANGE UP
GLUCOSE BLDC GLUCOMTR-MCNC: 153 MG/DL — HIGH (ref 70–99)
GLUCOSE BLDC GLUCOMTR-MCNC: 155 MG/DL — HIGH (ref 70–99)
GLUCOSE BLDC GLUCOMTR-MCNC: 178 MG/DL — HIGH (ref 70–99)
GLUCOSE BLDC GLUCOMTR-MCNC: 180 MG/DL — HIGH (ref 70–99)
GLUCOSE SERPL-MCNC: 163 MG/DL — HIGH (ref 70–115)
HCT VFR BLD CALC: 33 % — LOW (ref 42–52)
HGB BLD-MCNC: 10.4 G/DL — LOW (ref 14–18)
MAGNESIUM SERPL-MCNC: 2.1 MG/DL — SIGNIFICANT CHANGE UP (ref 1.6–2.6)
MCHC RBC-ENTMCNC: 27.7 PG — SIGNIFICANT CHANGE UP (ref 27–31)
MCHC RBC-ENTMCNC: 31.5 G/DL — LOW (ref 32–36)
MCV RBC AUTO: 87.8 FL — SIGNIFICANT CHANGE UP (ref 80–94)
METHOD TYPE: SIGNIFICANT CHANGE UP
PLATELET # BLD AUTO: 257 K/UL — SIGNIFICANT CHANGE UP (ref 150–400)
POTASSIUM SERPL-MCNC: 3.6 MMOL/L — SIGNIFICANT CHANGE UP (ref 3.5–5.3)
POTASSIUM SERPL-SCNC: 3.6 MMOL/L — SIGNIFICANT CHANGE UP (ref 3.5–5.3)
PROT SERPL-MCNC: 5.8 G/DL — LOW (ref 6.6–8.7)
RBC # BLD: 3.76 M/UL — LOW (ref 4.6–6.2)
RBC # FLD: 15.3 % — SIGNIFICANT CHANGE UP (ref 11–15.6)
SODIUM SERPL-SCNC: 141 MMOL/L — SIGNIFICANT CHANGE UP (ref 135–145)
SPECIMEN SOURCE: SIGNIFICANT CHANGE UP
SPECIMEN SOURCE: SIGNIFICANT CHANGE UP
TYPE + AB SCN PNL BLD: SIGNIFICANT CHANGE UP
WBC # BLD: 7.6 K/UL — SIGNIFICANT CHANGE UP (ref 4.8–10.8)
WBC # FLD AUTO: 7.6 K/UL — SIGNIFICANT CHANGE UP (ref 4.8–10.8)

## 2018-12-21 PROCEDURE — 74018 RADEX ABDOMEN 1 VIEW: CPT | Mod: 26

## 2018-12-21 PROCEDURE — 99291 CRITICAL CARE FIRST HOUR: CPT | Mod: 24

## 2018-12-21 PROCEDURE — 99233 SBSQ HOSP IP/OBS HIGH 50: CPT

## 2018-12-21 PROCEDURE — 99232 SBSQ HOSP IP/OBS MODERATE 35: CPT

## 2018-12-21 PROCEDURE — 71045 X-RAY EXAM CHEST 1 VIEW: CPT | Mod: 26

## 2018-12-21 RX ORDER — ONDANSETRON 8 MG/1
4 TABLET, FILM COATED ORAL EVERY 6 HOURS
Qty: 0 | Refills: 0 | Status: DISCONTINUED | OUTPATIENT
Start: 2018-12-21 | End: 2019-01-11

## 2018-12-21 RX ORDER — POTASSIUM CHLORIDE 20 MEQ
10 PACKET (EA) ORAL
Qty: 0 | Refills: 0 | Status: COMPLETED | OUTPATIENT
Start: 2018-12-21 | End: 2018-12-21

## 2018-12-21 RX ORDER — ACETAMINOPHEN 500 MG
1000 TABLET ORAL ONCE
Qty: 0 | Refills: 0 | Status: COMPLETED | OUTPATIENT
Start: 2018-12-21 | End: 2018-12-21

## 2018-12-21 RX ORDER — ATORVASTATIN CALCIUM 80 MG/1
10 TABLET, FILM COATED ORAL AT BEDTIME
Qty: 0 | Refills: 0 | Status: DISCONTINUED | OUTPATIENT
Start: 2018-12-21 | End: 2019-01-03

## 2018-12-21 RX ORDER — QUETIAPINE FUMARATE 200 MG/1
12.5 TABLET, FILM COATED ORAL EVERY 8 HOURS
Qty: 0 | Refills: 0 | Status: DISCONTINUED | OUTPATIENT
Start: 2018-12-21 | End: 2019-01-07

## 2018-12-21 RX ORDER — QUETIAPINE FUMARATE 200 MG/1
25 TABLET, FILM COATED ORAL DAILY
Qty: 0 | Refills: 0 | Status: DISCONTINUED | OUTPATIENT
Start: 2018-12-21 | End: 2018-12-25

## 2018-12-21 RX ORDER — FUROSEMIDE 40 MG
20 TABLET ORAL EVERY 8 HOURS
Qty: 0 | Refills: 0 | Status: COMPLETED | OUTPATIENT
Start: 2018-12-21 | End: 2018-12-27

## 2018-12-21 RX ORDER — POTASSIUM CHLORIDE 20 MEQ
40 PACKET (EA) ORAL ONCE
Qty: 0 | Refills: 0 | Status: COMPLETED | OUTPATIENT
Start: 2018-12-21 | End: 2018-12-21

## 2018-12-21 RX ORDER — METOCLOPRAMIDE HCL 10 MG
10 TABLET ORAL EVERY 8 HOURS
Qty: 0 | Refills: 0 | Status: DISCONTINUED | OUTPATIENT
Start: 2018-12-21 | End: 2018-12-23

## 2018-12-21 RX ADMIN — ENOXAPARIN SODIUM 40 MILLIGRAM(S): 100 INJECTION SUBCUTANEOUS at 12:14

## 2018-12-21 RX ADMIN — Medication 100 MILLIEQUIVALENT(S): at 19:30

## 2018-12-21 RX ADMIN — Medication 20 MILLIGRAM(S): at 22:38

## 2018-12-21 RX ADMIN — Medication 50 MILLIEQUIVALENT(S): at 06:00

## 2018-12-21 RX ADMIN — CHLORHEXIDINE GLUCONATE 5 MILLILITER(S): 213 SOLUTION TOPICAL at 03:03

## 2018-12-21 RX ADMIN — Medication 400 MILLIGRAM(S): at 22:38

## 2018-12-21 RX ADMIN — Medication 325 MILLIGRAM(S): at 12:13

## 2018-12-21 RX ADMIN — Medication 2: at 17:29

## 2018-12-21 RX ADMIN — Medication 2: at 06:55

## 2018-12-21 RX ADMIN — CHLORHEXIDINE GLUCONATE 5 MILLILITER(S): 213 SOLUTION TOPICAL at 22:38

## 2018-12-21 RX ADMIN — Medication 1 GRAM(S): at 17:29

## 2018-12-21 RX ADMIN — Medication 500 MILLIGRAM(S): at 12:13

## 2018-12-21 RX ADMIN — PANTOPRAZOLE SODIUM 40 MILLIGRAM(S): 20 TABLET, DELAYED RELEASE ORAL at 17:29

## 2018-12-21 RX ADMIN — Medication 2: at 12:19

## 2018-12-21 RX ADMIN — CHLORHEXIDINE GLUCONATE 5 MILLILITER(S): 213 SOLUTION TOPICAL at 06:55

## 2018-12-21 RX ADMIN — CHLORHEXIDINE GLUCONATE 5 MILLILITER(S): 213 SOLUTION TOPICAL at 13:00

## 2018-12-21 RX ADMIN — Medication 20 MILLIGRAM(S): at 13:02

## 2018-12-21 RX ADMIN — MEROPENEM 100 MILLIGRAM(S): 1 INJECTION INTRAVENOUS at 22:38

## 2018-12-21 RX ADMIN — Medication 1 TABLET(S): at 12:13

## 2018-12-21 RX ADMIN — Medication 1 MILLIGRAM(S): at 12:13

## 2018-12-21 RX ADMIN — MEROPENEM 100 MILLIGRAM(S): 1 INJECTION INTRAVENOUS at 14:34

## 2018-12-21 RX ADMIN — Medication 10 MILLIGRAM(S): at 16:20

## 2018-12-21 RX ADMIN — Medication 100 MILLIEQUIVALENT(S): at 22:38

## 2018-12-21 RX ADMIN — MEROPENEM 100 MILLIGRAM(S): 1 INJECTION INTRAVENOUS at 06:52

## 2018-12-21 RX ADMIN — MICAFUNGIN SODIUM 105 MILLIGRAM(S): 100 INJECTION, POWDER, LYOPHILIZED, FOR SOLUTION INTRAVENOUS at 12:58

## 2018-12-21 RX ADMIN — Medication 40 MILLIEQUIVALENT(S): at 19:30

## 2018-12-21 RX ADMIN — Medication 1 GRAM(S): at 06:55

## 2018-12-21 RX ADMIN — Medication 1 TABLET(S): at 06:56

## 2018-12-21 RX ADMIN — Medication 650 MILLIGRAM(S): at 12:13

## 2018-12-21 RX ADMIN — CLOPIDOGREL BISULFATE 75 MILLIGRAM(S): 75 TABLET, FILM COATED ORAL at 12:13

## 2018-12-21 RX ADMIN — Medication 50 MILLIEQUIVALENT(S): at 06:30

## 2018-12-21 RX ADMIN — CHLORHEXIDINE GLUCONATE 5 MILLILITER(S): 213 SOLUTION TOPICAL at 12:07

## 2018-12-21 RX ADMIN — PANTOPRAZOLE SODIUM 40 MILLIGRAM(S): 20 TABLET, DELAYED RELEASE ORAL at 06:52

## 2018-12-21 RX ADMIN — Medication 10 MILLIGRAM(S): at 22:28

## 2018-12-21 RX ADMIN — Medication 50 MILLIEQUIVALENT(S): at 05:00

## 2018-12-21 RX ADMIN — Medication 1 TABLET(S): at 12:59

## 2018-12-21 NOTE — PROGRESS NOTE ADULT - ASSESSMENT
A/P 47 year old man with open right groin wound after dehiscence, wound vac changed today.    -next wound vac change scheduled for 12/14  -rest of care per primary team

## 2018-12-21 NOTE — PROGRESS NOTE ADULT - SUBJECTIVE AND OBJECTIVE BOX
[facility]  INFECTIOUS DISEASES AND INTERNAL MEDICINE at [place]  =======================================================  Perfecto Santizo MD  Diplomates American Board of Internal Medicine and Infectious Diseases  =======================================================    [id number]  [facility]     follow up for:  post operative fevers and pn eumonia  S/p trach with purulent discharge from trach and around it.      on MERREM/Vanco  NOW WITH POSITIVE BLOOD CX CANDIDA PARAPSILOSIS  with some fevers    REVIEW OF SYSTEMS:  unable to obtain due to medical condition    Antibiotics:  Vancomycin and Merrem MYCAMINE    Physical Exam:   I I  ICU Vital Signs Last 24 Hrs  T(C): 38.6 (21 Dec 2018 13:00), Max: 38.8 (21 Dec 2018 04:00)  T(F): 101.5 (21 Dec 2018 13:00), Max: 101.8 (21 Dec 2018 04:00)  HR: 114 (21 Dec 2018 13:16) (93 - 120)  BP: 112/72 (21 Dec 2018 13:00) (110/74 - 112/72)  BP(mean): 87 (21 Dec 2018 13:00) (87 - 87)  ABP: 118/79 (21 Dec 2018 10:00) (93/61 - 118/79)  ABP(mean): 93 (21 Dec 2018 10:00) (70 - 93)  RR: 28 (21 Dec 2018 13:00) (19 - 37)  SpO2: 100% (21 Dec 2018 13:16) (98% - 100%)        General:  awake and alert , has trach    Eye: Pupils are equal, round and reactive to light,   HENT: Normocephalic, trach+ with pus around it  Neck: Supple, No lymphadenopathy.  RIGHT neck IJ line  Respiratory: Lungs with coarse rhonchi bilaterally.   midline chest with dressing in place.   + CHEST tubes in place  Cardiovascular: Normal rate, Regular rhythm  Gastrointestinal: Soft, Non-tender, Non-distended, Normal bowel sounds.  Genitourinary: + Mckeon with light color urine  Lymphatics: No lymphadenopathy neck  Musculoskeletal: edema in all extremities   Integumentary: No rash.  Neurologic: Alert,  OOB TO CHAIR    Labs:                                     10.4   7.6   )-----------( 257      ( 21 Dec 2018 03:15 )             33.0   12-21    141  |  102  |  31.0<H>  ----------------------------<  163<H>  3.6   |  26.0  |  0.61    Ca    7.6<L>      21 Dec 2018 03:15  Mg     2.1     12-21    TPro  5.8<L>  /  Alb  2.7<L>  /  TBili  1.9  /  DBili  0.8<H>  /  AST  247<H>  /  ALT  121<H>  /  AlkPhos  185<H>  12-21 12-12 @ 16:19 .Sputum     Few Routine respiratory keara present    Few White blood cells  Few Gram Positive Cocci in Pairs and Chains    12-11 @ 20:24 .Broncial bronchial fluid       12-11 @ 10:49 .Broncial bronchial fluid     Rare Candida albicans  Rare Routine respiratory keara present    Numerous white blood cells  No organisms seen    12-09 @ 18:56 .Blood     No growth at 5 days.    12-08 @ 19:40 .Sputum Klebsiella pneumoniae    Moderate Klebsiella pneumoniae  No Routine respiratory keara present    Moderate WBC's  Few Yeast  Few Gram positive cocci in pairs    12-07 @ 22:28 .Urine     No growth    12-07 @ 22:26 .Blood     No growth at 5 days.    12-07 @ 22:25 .Blood     No growth at 5 days.    12-05 @ 10:21 .Blood     No growth at 5 days.    12-04 @ 10:22 .Blood     No growth at 5 days.    12-04 @ 10:21 .Sputum     Few Candida albicans  No Routine respiratory keara present    Few White blood cells  No organisms seen

## 2018-12-21 NOTE — PROGRESS NOTE ADULT - PROBLEM SELECTOR PLAN 8
continue aspirin, plavix and statin  Lovenox and SCDs for DVT prophylaxis  no history of DM but BG remain mildly elevated and continue FS Q6 with coverage

## 2018-12-21 NOTE — PROGRESS NOTE ADULT - ASSESSMENT
46y/o man with no significant PMH admitted on 11/29/18 for CP for 2-3 days prior to admission,   found with STEMI. Urgent Cath, pt found to have multiple occlusions; VF arrest, shock x 1, return to NSR.  Upon transfer to OR for Urgent CABG, pt again VF arrest, chest opened intra-op with CPR in progress.  s/p ECMO and Impella; 12/3 ECMO explanted and Impella placed via right groin sheath.    Has been treated for fever empirically few times. Was off Abx  restarted due to persistent fever. CXR wtih   Congestion and possible infiltration.   No positive cultures     Pneumonia  Fever post op  s/p CABG   Diarrhea    S/P tracheostomy   -  BLOOD CX WITH CANDIDA PARAPSILOSIS   REPEAT BLOOD CX X2  pending    ON MYCAMINE GOOD ANTIFUNGAL COVERGE    FEVERS  STILL PRESENT  LINES CHANGED   CLINICALLY APPEARS IMPROVED OVERALL    CONSIDER D/C VANCO AS NO EVIDENCE OF MRSA   ASLO CONSIDER LINE CHANGES  ECHOCARDIOGRAM EVALUATE VALVES    WILL FOLLOW UP

## 2018-12-21 NOTE — PROGRESS NOTE ADULT - PROBLEM SELECTOR PLAN 5
s/p trach  continue vent support  PS/CPAP trials as tolerated during day, continue full support at night for now  CPT Q4H while awake

## 2018-12-21 NOTE — PROGRESS NOTE ADULT - ASSESSMENT
Patient with CV disease, s/p CABG, GI bleeding s/p gastric ulcer    1. PPI bid  2. Carafate 1 gram bid  3. Call GI prn

## 2018-12-21 NOTE — PROGRESS NOTE ADULT - ASSESSMENT
48 yo Male c/o CP for 2-3 days prior to admission, then 10/10 chest pain approximately 9:30 am yesterday, followed by vomiting.  Wife drove pt to hospital, ruled in for STEMI. Urgent Cath, pt found to have multiple occlusions; stent to LAD and D2; stent to LAD thrombosed,  impella placed for support.  Pt then had VF arrest, shock x 1, return to NSR.  Upon transfer to OR for Urgent CABG, pt with asystolic arrest, chest opened intra-op with CPR in progress. Pt underwent C4V (LAD, Diag, OM and PDA) with inability to wean from CPB therefore requiring VA ECMO (central cannulation) and IABP, he was transferred to CICU.  12/3 s/p ECMO explant and IABP d/c'd, placement of impella via R groin sheath.   12/6 Impella removal via right femoral cutdown with primary repair of femoral artery and stent placed to external iliac artery for dissection.  12/14 + ileus.  12/15 ileus improved, septic and cardiogenic shock.    12/19 + UGIB.   12/20 EGD (+ bleeding stomach ulcer)    Hospital course notable for (in addition to above): acute systolic heart failure, vent dependent respiratory failure (inability to wean also contributed to by severe agitation, now s/p trach), persistent fevers (one blood culture with fungemia, other cultures unremarkable), acute blood loss anemia, hypokalemia.

## 2018-12-21 NOTE — PROGRESS NOTE ADULT - PROBLEM SELECTOR PLAN 2
cultures as above  continue mycofungin for fungemia  vancomycin d/c'd by ID, no evidence of MRSA  continue meropenem for now  tylenol PRN and cooling blanket PRN for fevers  concern for sinusitis since NGT in place (will need PEG which was scheduled for this week but delayed due to GIB, will reevaluate for next week)

## 2018-12-21 NOTE — PROGRESS NOTE ADULT - SUBJECTIVE AND OBJECTIVE BOX
Brief Hospital Course:    STEMI, emergently to cath lab, left main thrombus, cardiac arrest requiring defib, intubated in cath lab, impella placed which clotted off on way to OR and was subsequently removed in OR. Crashed onto CPB for CABG d/t hemodynamic collapse. TO CTICU post CABG with central ECMO and open chest.  12/3 ECMO explanted and IABP removed, impella inserted   impella removed via R femoral cutdown with repair and external iliac stent  12/10 s/p trach for prolonged respiratory failure   abd distention, TF held, 2L NGT output, +ileus on AXR, gastrographin study done  12/15 ileus resolved, resumed for meds via NGT. Respiratory distress, febrile, septic and cardiogenic shock requiring restarting of abx, epinephrine and norepinephrine infusions   bilat pleural CTs d/c'd, more awake and alert, zosyn changed to meropenem    mycofungin added for candida parapisolsis fungemia, epinephrine weaned off, vac placed to R groin, + UGIB, started on pantoprazole infusion   EGD (+ bleeding stomach ulcer, injected with epi and hemoclip placed x 4)      Subjective/review of Systems: denies CP, SOB. Nods yes to abd issues (had BM x 2, + "gassy"). Difficult to obtain full ROS d/u trach      Patient is a 48y old  Male who presents with a chief complaint of cp (21 Dec 2018 14:42)    HPI:  This is a 46 y/o male no significant PMH; recent ABT (doxycycline) r/t cellulitis to right groin per pt. Pt presented to the ED with 10/10 chest pain that he reported started at 0930; he drank water with no relief; pain started to progress to B/L shoulders. Per pt spouse, she reports he has had chest pain x2-3 days and this morning he vomited which he attributed to reflux.  She brought him into the emergency department.    Pt has significant EKG changes ST elevations in leads V1-V5 with reciprocal changes in II, III, AVF.  Pt rec'd asa and plavix load in ED. (2018 11:32)    PAST MEDICAL & SURGICAL HISTORY:  Staph infection  No significant past surgical history    FAMILY HISTORY:  Family history of myocardial infarction (Mother): mother;       Vitals   ICU Vital Signs Last 24 Hrs  T(C): 38.5 (21 Dec 2018 20:00), Max: 38.8 (21 Dec 2018 04:00)  T(F): 101.3 (21 Dec 2018 20:00), Max: 101.8 (21 Dec 2018 04:00)  HR: 127 (21 Dec 2018 22:00) (94 - 127), ST  BP: 129/88 (21 Dec 2018 22:00) (93/69 - 129/88)  BP(mean): 103 (21 Dec 2018 22:00) (76 - 103)  ABP: 118/79 (21 Dec 2018 10:00) (93/61 - 118/79)  ABP(mean): 93 (21 Dec 2018 10:00) (72 - 93)  RR: 40 (21 Dec 2018 22:00) (19 - 40)  SpO2: 99% (21 Dec 2018 22:00) (98% - 100%)    VENT SETTINGS   Mode: CPAP with PS  FiO2: 35  PEEP: 5  PS: 15  MAP: 11      I&O's Detail    20 Dec 2018 07:01  -  21 Dec 2018 07:00  --------------------------------------------------------  Total IN: 2470.1 mL  Total OUT: 3035 mL  Total NET: -564.9 mL    21 Dec 2018 07:01  -  21 Dec 2018 22:59  --------------------------------------------------------  IN:    dexmedetomidine Infusion: 4.5 mL    Enteral Tube Flush: 250 mL    furosemide Infusion: 2.5 mL    Glucerna 1.5: 820 mL    Solution: 200 mL    Solution: 50 mL    Solution: 100 mL  Total IN: 1427 mL    OUT:    Indwelling Catheter - Urethral: 1480 mL    Nasoenteral Tube: 150 mL  Total OUT: 1630 mL    Total NET: -203 mL      LABS                        10.4   7.6   )-----------( 257      ( 21 Dec 2018 03:15 )             33.0         141  |  102  |  31.0<H>  ----------------------------<  163<H>  3.6   |  26.0  |  0.61    Ca    7.6<L>      21 Dec 2018 03:15  Mg     2.1       TPro  5.8<L>  /  Alb  2.7<L>  /  TBili  1.9  /  DBili  0.8<H>  /  AST  247<H>  /  ALT  121<H>  /  AlkPhos  185<H>        ABG - ( 20 Dec 2018 18:16 )  pH, Arterial: 7.54  pH, Blood: x     /  pCO2: 34    /  pO2: 129   / HCO3: 30    / Base Excess: 6.6   /  SaO2: 100       Urinalysis Basic - ( 20 Dec 2018 07:27 )  Color: Yellow / Appearance: Slightly Turbid / S.015 / pH: x  Gluc: x / Ketone: Negative  / Bili: Negative / Urobili: 1 mg/dL   Blood: x / Protein: 15 mg/dL / Nitrite: Negative   Leuk Esterase: Negative / RBC: 6-10 /HPF / WBC 0-2   Sq Epi: x / Non Sq Epi: x / Bacteria: Occasional    POCT Blood Glucose.: 153 mg/dL (18 @ 17:10)  POCT Blood Glucose.: 155 mg/dL (18 @ 12:16)  POCT Blood Glucose.: 180 mg/dL (18 @ 06:51)  POCT Blood Glucose.: 178 mg/dL (18 @ 04:06)  POCT Blood Glucose.: 144 mg/dL (18 @ 23:09)      Culture - Surgical Swab (18 @ 15:02)    Gram Stain:   No WBC's seen.  No organisms seen    Specimen Source: .Surgical Swab    Culture Results:   No growth at 3 days.  Culture in progress    Culture - Sputum . (18 @ 08:34)    Gram Stain:   Moderate White blood cells  No organisms seen    Specimen Source: .Sputum    Culture Results:   Few Routine respiratory keara present    Culture - Blood (18 @ 06:31)    Specimen Source: .Blood    Culture Results:   No growth at 48 hours    Culture - Blood (18 @ 06:31)    -  Candida parapsilosis: Detec    Specimen Source: .Blood    Organism: Blood Culture PCR    Organism: Candida parapsilosis    Culture Results:   Growth in aerobic bottle: Candida parapsilosis  Aerobic Bottle: 1 day; 18:39 Hours to positivity  Anaerobic Bottle: No growth to date      < from: Xray Chest 1 View- PORTABLE-Routine (18 @ 05:19) >  Findings:  There is a right IJ central line with the tip in the region of the distal SVC. Tracheostomy tube unchanged. Nasogastric tube, unchanged. Persistent bilateral airspace disease, unchanged. No evidence of pneumothorax.  Impression: Persistent bilateral airspace disease, unchanged.  < end of copied text >      MEDICATIONS  (STANDING):  ascorbic acid 500 milliGRAM(s) Oral daily  aspirin 325 milliGRAM(s) Oral daily  atorvastatin 10 milliGRAM(s) Oral at bedtime  chlorhexidine 0.12% Liquid 5 milliLiter(s) Oral Mucosa every 4 hours  clopidogrel Tablet 75 milliGRAM(s) Oral daily  enoxaparin Injectable 40 milliGRAM(s) SubCutaneous daily  ferrous    sulfate 325 milliGRAM(s) Oral daily  folic acid 1 milliGRAM(s) Oral daily  furosemide   Injectable 20 milliGRAM(s) IV Push every 8 hours  insulin lispro (HumaLOG) corrective regimen sliding scale   SubCutaneous every 6 hours  lactobacillus acidophilus 1 Tablet(s) Oral every 8 hours  meropenem  IVPB 1000 milliGRAM(s) IV Intermittent every 8 hours  metoclopramide Injectable 10 milliGRAM(s) IV Push every 8 hours  micafungin IVPB 100 milliGRAM(s) IV Intermittent every 24 hours  multivitamin 1 Tablet(s) Oral daily  pantoprazole  Injectable 40 milliGRAM(s) IV Push every 12 hours  QUEtiapine 25 milliGRAM(s) Oral daily  sucralfate suspension 1 Gram(s) Oral two times a day    MEDICATIONS  (PRN):  acetaminophen    Suspension .. 650 milliGRAM(s) Oral every 6 hours PRN Temp greater or equal to 38.5C (101.3F)  ALBUTerol/ipratropium for Nebulization 3 milliLiter(s) Nebulizer every 6 hours PRN Shortness of Breath and/or Wheezing  lidocaine 2% Jelly 10 milliLiter(s) IntraUrethral daily PRN discomfort from augustin  ondansetron Injectable 4 milliGRAM(s) IV Push every 6 hours PRN Nausea and/or Vomiting  petrolatum white Ointment 1 Application(s) Topical four times a day PRN dry lips  QUEtiapine 12.5 milliGRAM(s) Oral every 8 hours PRN agitation    Allergies: penicillins (Unknown)      Physical Exam:   while at bedside after CPT done pt vomited approx 2-300cc of what looks like tube feeds.  Neuro: awake and alert, mouths to communicate needs, follows commands  HEENT: PERRL, + trach, + NGT  CV: regular rate, regular rhythm, +S1S2  Pulm/chest: clear throughout with diminished right base, midsternal incision C/D/I with dressing  Abd: softly distended, NT, + BS  Ext: moves all extremities as follows: RUE 4+/5, LUE and BLE 4/5, bilat SVG with staples and dressing (now Ace wrapped). Right groin staples d/c'd, dehisced, no erythema or purulence, wet to dry dressing in place.    Code Status: full code      Critical care time spent: __43__minutes (reviewing chart including medication, labs and imaging results, discussions with interdisciplinary team, discussing goals of care/advanced directives, counseling patient and/or family, non-inclusive of procedures)

## 2018-12-21 NOTE — CHART NOTE - NSCHARTNOTEFT_GEN_A_CORE
RUE prepped and draped in sterile fashion. Ultrasound used for percutaneous access to basilic vein and using the Seldinger technique a long 20g peripheral IV was inserted and secured to the skin then covered with Tegaderm dressing. Care as per CTICU protocol.

## 2018-12-21 NOTE — PROGRESS NOTE ADULT - SUBJECTIVE AND OBJECTIVE BOX
INTERVAL HPI/OVERNIGHT EVENTS:    Wound Vac to the right groin was changed today. Still excessive quantities of serous fluid discharged from wound. Good granulation tissue. Next vac changed in 3 days     MEDICATIONS  (STANDING):  ascorbic acid 500 milliGRAM(s) Oral daily  aspirin 325 milliGRAM(s) Oral daily  atorvastatin 10 milliGRAM(s) Oral at bedtime  chlorhexidine 0.12% Liquid 5 milliLiter(s) Oral Mucosa every 4 hours  clopidogrel Tablet 75 milliGRAM(s) Oral daily  enoxaparin Injectable 40 milliGRAM(s) SubCutaneous daily  ferrous    sulfate 325 milliGRAM(s) Oral daily  folic acid 1 milliGRAM(s) Oral daily  furosemide   Injectable 20 milliGRAM(s) IV Push every 8 hours  insulin lispro (HumaLOG) corrective regimen sliding scale   SubCutaneous every 6 hours  lactobacillus acidophilus 1 Tablet(s) Oral every 8 hours  meropenem  IVPB      meropenem  IVPB 1000 milliGRAM(s) IV Intermittent every 8 hours  micafungin IVPB      micafungin IVPB 100 milliGRAM(s) IV Intermittent every 24 hours  multivitamin 1 Tablet(s) Oral daily  pantoprazole  Injectable 40 milliGRAM(s) IV Push every 12 hours  QUEtiapine 25 milliGRAM(s) Oral daily  sucralfate suspension 1 Gram(s) Oral two times a day    MEDICATIONS  (PRN):  acetaminophen    Suspension .. 650 milliGRAM(s) Oral every 6 hours PRN Temp greater or equal to 38.5C (101.3F)  ALBUTerol/ipratropium for Nebulization 3 milliLiter(s) Nebulizer every 6 hours PRN Shortness of Breath and/or Wheezing  HYDROmorphone  Injectable 0.5 milliGRAM(s) IV Push every 4 hours PRN Moderate Pain (4 - 6)  lidocaine 2% Jelly 10 milliLiter(s) IntraUrethral daily PRN discomfort from augustin  petrolatum white Ointment 1 Application(s) Topical four times a day PRN dry lips      Vital Signs Last 24 Hrs  T(C): 38.7 (21 Dec 2018 08:00), Max: 38.8 (21 Dec 2018 04:00)  T(F): 101.7 (21 Dec 2018 08:00), Max: 101.8 (21 Dec 2018 04:00)  HR: 109 (21 Dec 2018 08:48) (88 - 113)  BP: --  BP(mean): --  RR: 27 (21 Dec 2018 08:00) (19 - 37)  SpO2: 100% (21 Dec 2018 08:48) (98% - 100%)    PE  Gen: Intubated  Abd: soft nondistended  Ext: right groin with serous fluid drainage, wound vac in place      I&O's Detail    20 Dec 2018 07:01  -  21 Dec 2018 07:00  --------------------------------------------------------  IN:    dexmedetomidine Infusion: 172.6 mL    Enteral Tube Flush: 290 mL    Free Water: 700 mL    furosemide Infusion: 52.5 mL    furosemide Infusion: 5 mL    Glucerna 1.5: 670 mL    pantoprazole Infusion: 30 mL    Solution: 100 mL    Solution: 300 mL    Solution: 150 mL  Total IN: 2470.1 mL    OUT:    Indwelling Catheter - Urethral: 3035 mL  Total OUT: 3035 mL    Total NET: -564.9 mL      21 Dec 2018 07:01  -  21 Dec 2018 09:09  --------------------------------------------------------  IN:    dexmedetomidine Infusion: 4.5 mL    furosemide Infusion: 2.5 mL    Glucerna 1.5: 50 mL  Total IN: 57 mL    OUT:    Indwelling Catheter - Urethral: 150 mL  Total OUT: 150 mL    Total NET: -93 mL          LABS:                        10.4   7.6   )-----------( 257      ( 21 Dec 2018 03:15 )             33.0     12-    141  |  102  |  31.0<H>  ----------------------------<  163<H>  3.6   |  26.0  |  0.61    Ca    7.6<L>      21 Dec 2018 03:15  Mg     2.1         TPro  5.8<L>  /  Alb  2.7<L>  /  TBili  1.9  /  DBili  0.8<H>  /  AST  247<H>  /  ALT  121<H>  /  AlkPhos  185<H>  12-21      Urinalysis Basic - ( 20 Dec 2018 07:27 )    Color: Yellow / Appearance: Slightly Turbid / S.015 / pH: x  Gluc: x / Ketone: Negative  / Bili: Negative / Urobili: 1 mg/dL   Blood: x / Protein: 15 mg/dL / Nitrite: Negative   Leuk Esterase: Negative / RBC: 6-10 /HPF / WBC 0-2   Sq Epi: x / Non Sq Epi: x / Bacteria: Occasional        RADIOLOGY & ADDITIONAL STUDIES:

## 2018-12-21 NOTE — PROGRESS NOTE ADULT - PROBLEM SELECTOR PLAN 1
will d/c Dr Barrett and cardiology re: timinig to start ACEI/ARB and/or beta-blocker  continue lasix 20mg IV q8 for now  continue augustin catheter for urine output monitoring in critically ill patient

## 2018-12-21 NOTE — PROGRESS NOTE ADULT - SUBJECTIVE AND OBJECTIVE BOX
INTERVAL HPI/OVERNIGHT EVENTS:Fu for gastric ulcer. No complaints. No CPAP.     MEDICATIONS  (STANDING):  ascorbic acid 500 milliGRAM(s) Oral daily  aspirin 325 milliGRAM(s) Oral daily  atorvastatin 10 milliGRAM(s) Oral at bedtime  chlorhexidine 0.12% Liquid 5 milliLiter(s) Oral Mucosa every 4 hours  clopidogrel Tablet 75 milliGRAM(s) Oral daily  enoxaparin Injectable 40 milliGRAM(s) SubCutaneous daily  ferrous    sulfate 325 milliGRAM(s) Oral daily  folic acid 1 milliGRAM(s) Oral daily  furosemide   Injectable 20 milliGRAM(s) IV Push every 8 hours  insulin lispro (HumaLOG) corrective regimen sliding scale   SubCutaneous every 6 hours  lactobacillus acidophilus 1 Tablet(s) Oral every 8 hours  meropenem  IVPB      meropenem  IVPB 1000 milliGRAM(s) IV Intermittent every 8 hours  micafungin IVPB      micafungin IVPB 100 milliGRAM(s) IV Intermittent every 24 hours  multivitamin 1 Tablet(s) Oral daily  pantoprazole  Injectable 40 milliGRAM(s) IV Push every 12 hours  QUEtiapine 25 milliGRAM(s) Oral daily  sucralfate suspension 1 Gram(s) Oral two times a day    MEDICATIONS  (PRN):  acetaminophen    Suspension .. 650 milliGRAM(s) Oral every 6 hours PRN Temp greater or equal to 38.5C (101.3F)  ALBUTerol/ipratropium for Nebulization 3 milliLiter(s) Nebulizer every 6 hours PRN Shortness of Breath and/or Wheezing  HYDROmorphone  Injectable 0.5 milliGRAM(s) IV Push every 4 hours PRN Moderate Pain (4 - 6)  lidocaine 2% Jelly 10 milliLiter(s) IntraUrethral daily PRN discomfort from augustin  petrolatum white Ointment 1 Application(s) Topical four times a day PRN dry lips      Allergies    penicillins (Unknown)    Intolerances        Vital Signs Last 24 Hrs  T(C): 38.5 (21 Dec 2018 10:00), Max: 38.8 (21 Dec 2018 04:00)  T(F): 101.3 (21 Dec 2018 10:00), Max: 101.8 (21 Dec 2018 04:00)  HR: 117 (21 Dec 2018 10:00) (92 - 117)  BP: --  BP(mean): --  RR: 29 (21 Dec 2018 10:00) (19 - 37)  SpO2: 100% (21 Dec 2018 10:00) (98% - 100%)    LABS:                        10.4   7.6   )-----------( 257      ( 21 Dec 2018 03:15 )             33.0     12-    141  |  102  |  31.0<H>  ----------------------------<  163<H>  3.6   |  26.0  |  0.61    Ca    7.6<L>      21 Dec 2018 03:15  Mg     2.1         TPro  5.8<L>  /  Alb  2.7<L>  /  TBili  1.9  /  DBili  0.8<H>  /  AST  247<H>  /  ALT  121<H>  /  AlkPhos  185<H>  -      Urinalysis Basic - ( 20 Dec 2018 07:27 )    Color: Yellow / Appearance: Slightly Turbid / S.015 / pH: x  Gluc: x / Ketone: Negative  / Bili: Negative / Urobili: 1 mg/dL   Blood: x / Protein: 15 mg/dL / Nitrite: Negative   Leuk Esterase: Negative / RBC: 6-10 /HPF / WBC 0-2   Sq Epi: x / Non Sq Epi: x / Bacteria: Occasional        RADIOLOGY & ADDITIONAL TESTS:

## 2018-12-22 DIAGNOSIS — B49 UNSPECIFIED MYCOSIS: ICD-10-CM

## 2018-12-22 DIAGNOSIS — R10.9 UNSPECIFIED ABDOMINAL PAIN: ICD-10-CM

## 2018-12-22 LAB
ALBUMIN SERPL ELPH-MCNC: 3 G/DL — LOW (ref 3.3–5.2)
ALBUMIN SERPL ELPH-MCNC: 3 G/DL — LOW (ref 3.3–5.2)
ALBUMIN SERPL ELPH-MCNC: 3.4 G/DL — SIGNIFICANT CHANGE UP (ref 3.3–5.2)
ALP SERPL-CCNC: 184 U/L — HIGH (ref 40–120)
ALP SERPL-CCNC: 193 U/L — HIGH (ref 40–120)
ALP SERPL-CCNC: 219 U/L — HIGH (ref 40–120)
ALT FLD-CCNC: 143 U/L — HIGH
ALT FLD-CCNC: 151 U/L — HIGH
ALT FLD-CCNC: 199 U/L — HIGH
AMYLASE P1 CFR SERPL: 100 U/L — SIGNIFICANT CHANGE UP (ref 36–128)
AMYLASE P1 CFR SERPL: 106 U/L — SIGNIFICANT CHANGE UP (ref 36–128)
AMYLASE P1 CFR SERPL: 98 U/L — SIGNIFICANT CHANGE UP (ref 36–128)
ANION GAP SERPL CALC-SCNC: 10 MMOL/L — SIGNIFICANT CHANGE UP (ref 5–17)
ANION GAP SERPL CALC-SCNC: 12 MMOL/L — SIGNIFICANT CHANGE UP (ref 5–17)
ANION GAP SERPL CALC-SCNC: 12 MMOL/L — SIGNIFICANT CHANGE UP (ref 5–17)
AST SERPL-CCNC: 128 U/L — HIGH
AST SERPL-CCNC: 161 U/L — HIGH
AST SERPL-CCNC: 349 U/L — HIGH
BILIRUB SERPL-MCNC: 1.8 MG/DL — SIGNIFICANT CHANGE UP (ref 0.4–2)
BILIRUB SERPL-MCNC: 1.8 MG/DL — SIGNIFICANT CHANGE UP (ref 0.4–2)
BILIRUB SERPL-MCNC: 1.9 MG/DL — SIGNIFICANT CHANGE UP (ref 0.4–2)
BUN SERPL-MCNC: 23 MG/DL — HIGH (ref 8–20)
BUN SERPL-MCNC: 23 MG/DL — HIGH (ref 8–20)
BUN SERPL-MCNC: 27 MG/DL — HIGH (ref 8–20)
CALCIUM SERPL-MCNC: 8 MG/DL — LOW (ref 8.6–10.2)
CALCIUM SERPL-MCNC: 8.3 MG/DL — LOW (ref 8.6–10.2)
CALCIUM SERPL-MCNC: 8.3 MG/DL — LOW (ref 8.6–10.2)
CHLORIDE SERPL-SCNC: 104 MMOL/L — SIGNIFICANT CHANGE UP (ref 98–107)
CO2 SERPL-SCNC: 26 MMOL/L — SIGNIFICANT CHANGE UP (ref 22–29)
CO2 SERPL-SCNC: 26 MMOL/L — SIGNIFICANT CHANGE UP (ref 22–29)
CO2 SERPL-SCNC: 27 MMOL/L — SIGNIFICANT CHANGE UP (ref 22–29)
CREAT SERPL-MCNC: 0.43 MG/DL — LOW (ref 0.5–1.3)
CREAT SERPL-MCNC: 0.49 MG/DL — LOW (ref 0.5–1.3)
CREAT SERPL-MCNC: 0.54 MG/DL — SIGNIFICANT CHANGE UP (ref 0.5–1.3)
GLUCOSE BLDC GLUCOMTR-MCNC: 138 MG/DL — HIGH (ref 70–99)
GLUCOSE BLDC GLUCOMTR-MCNC: 140 MG/DL — HIGH (ref 70–99)
GLUCOSE BLDC GLUCOMTR-MCNC: 147 MG/DL — HIGH (ref 70–99)
GLUCOSE SERPL-MCNC: 144 MG/DL — HIGH (ref 70–115)
GLUCOSE SERPL-MCNC: 153 MG/DL — HIGH (ref 70–115)
GLUCOSE SERPL-MCNC: 153 MG/DL — HIGH (ref 70–115)
HCT VFR BLD CALC: 33.2 % — LOW (ref 42–52)
HCT VFR BLD CALC: 34.3 % — LOW (ref 42–52)
HGB BLD-MCNC: 10.2 G/DL — LOW (ref 14–18)
HGB BLD-MCNC: 10.8 G/DL — LOW (ref 14–18)
LIDOCAIN IGE QN: 177 U/L — HIGH (ref 22–51)
LIDOCAIN IGE QN: 188 U/L — HIGH (ref 22–51)
LIDOCAIN IGE QN: 191 U/L — HIGH (ref 22–51)
MAGNESIUM SERPL-MCNC: 2.3 MG/DL — SIGNIFICANT CHANGE UP (ref 1.6–2.6)
MCHC RBC-ENTMCNC: 27.1 PG — SIGNIFICANT CHANGE UP (ref 27–31)
MCHC RBC-ENTMCNC: 27.9 PG — SIGNIFICANT CHANGE UP (ref 27–31)
MCHC RBC-ENTMCNC: 30.7 G/DL — LOW (ref 32–36)
MCHC RBC-ENTMCNC: 31.5 G/DL — LOW (ref 32–36)
MCV RBC AUTO: 88.1 FL — SIGNIFICANT CHANGE UP (ref 80–94)
MCV RBC AUTO: 88.6 FL — SIGNIFICANT CHANGE UP (ref 80–94)
PHOSPHATE SERPL-MCNC: 2.9 MG/DL — SIGNIFICANT CHANGE UP (ref 2.4–4.7)
PLATELET # BLD AUTO: 281 K/UL — SIGNIFICANT CHANGE UP (ref 150–400)
PLATELET # BLD AUTO: 319 K/UL — SIGNIFICANT CHANGE UP (ref 150–400)
POTASSIUM SERPL-MCNC: 3.8 MMOL/L — SIGNIFICANT CHANGE UP (ref 3.5–5.3)
POTASSIUM SERPL-MCNC: 4.2 MMOL/L — SIGNIFICANT CHANGE UP (ref 3.5–5.3)
POTASSIUM SERPL-MCNC: 4.4 MMOL/L — SIGNIFICANT CHANGE UP (ref 3.5–5.3)
POTASSIUM SERPL-SCNC: 3.8 MMOL/L — SIGNIFICANT CHANGE UP (ref 3.5–5.3)
POTASSIUM SERPL-SCNC: 4.2 MMOL/L — SIGNIFICANT CHANGE UP (ref 3.5–5.3)
POTASSIUM SERPL-SCNC: 4.4 MMOL/L — SIGNIFICANT CHANGE UP (ref 3.5–5.3)
PROT SERPL-MCNC: 6 G/DL — LOW (ref 6.6–8.7)
PROT SERPL-MCNC: 6.1 G/DL — LOW (ref 6.6–8.7)
PROT SERPL-MCNC: 6.3 G/DL — LOW (ref 6.6–8.7)
RBC # BLD: 3.77 M/UL — LOW (ref 4.6–6.2)
RBC # BLD: 3.87 M/UL — LOW (ref 4.6–6.2)
RBC # FLD: 15.4 % — SIGNIFICANT CHANGE UP (ref 11–15.6)
RBC # FLD: 15.4 % — SIGNIFICANT CHANGE UP (ref 11–15.6)
SODIUM SERPL-SCNC: 140 MMOL/L — SIGNIFICANT CHANGE UP (ref 135–145)
SODIUM SERPL-SCNC: 142 MMOL/L — SIGNIFICANT CHANGE UP (ref 135–145)
SODIUM SERPL-SCNC: 143 MMOL/L — SIGNIFICANT CHANGE UP (ref 135–145)
WBC # BLD: 10.2 K/UL — SIGNIFICANT CHANGE UP (ref 4.8–10.8)
WBC # BLD: 8.2 K/UL — SIGNIFICANT CHANGE UP (ref 4.8–10.8)
WBC # FLD AUTO: 10.2 K/UL — SIGNIFICANT CHANGE UP (ref 4.8–10.8)
WBC # FLD AUTO: 8.2 K/UL — SIGNIFICANT CHANGE UP (ref 4.8–10.8)

## 2018-12-22 PROCEDURE — 99233 SBSQ HOSP IP/OBS HIGH 50: CPT

## 2018-12-22 PROCEDURE — 74018 RADEX ABDOMEN 1 VIEW: CPT | Mod: 26

## 2018-12-22 PROCEDURE — 76700 US EXAM ABDOM COMPLETE: CPT | Mod: 26

## 2018-12-22 PROCEDURE — 99291 CRITICAL CARE FIRST HOUR: CPT | Mod: 24

## 2018-12-22 PROCEDURE — 74019 RADEX ABDOMEN 2 VIEWS: CPT | Mod: 26

## 2018-12-22 RX ORDER — POTASSIUM CHLORIDE 20 MEQ
10 PACKET (EA) ORAL
Qty: 0 | Refills: 0 | Status: COMPLETED | OUTPATIENT
Start: 2018-12-22 | End: 2018-12-22

## 2018-12-22 RX ORDER — POTASSIUM CHLORIDE 20 MEQ
10 PACKET (EA) ORAL ONCE
Qty: 0 | Refills: 0 | Status: COMPLETED | OUTPATIENT
Start: 2018-12-22 | End: 2018-12-22

## 2018-12-22 RX ORDER — MAGNESIUM SULFATE 500 MG/ML
1 VIAL (ML) INJECTION ONCE
Qty: 0 | Refills: 0 | Status: COMPLETED | OUTPATIENT
Start: 2018-12-22 | End: 2018-12-22

## 2018-12-22 RX ADMIN — CHLORHEXIDINE GLUCONATE 5 MILLILITER(S): 213 SOLUTION TOPICAL at 10:48

## 2018-12-22 RX ADMIN — ENOXAPARIN SODIUM 40 MILLIGRAM(S): 100 INJECTION SUBCUTANEOUS at 13:33

## 2018-12-22 RX ADMIN — MEROPENEM 100 MILLIGRAM(S): 1 INJECTION INTRAVENOUS at 14:15

## 2018-12-22 RX ADMIN — Medication 100 MILLIEQUIVALENT(S): at 03:00

## 2018-12-22 RX ADMIN — Medication 20 MILLIGRAM(S): at 05:23

## 2018-12-22 RX ADMIN — Medication 1 MILLIGRAM(S): at 15:31

## 2018-12-22 RX ADMIN — Medication 10 MILLIGRAM(S): at 21:15

## 2018-12-22 RX ADMIN — Medication 1 GRAM(S): at 17:37

## 2018-12-22 RX ADMIN — Medication 1 TABLET(S): at 15:31

## 2018-12-22 RX ADMIN — Medication 10 MILLIGRAM(S): at 14:16

## 2018-12-22 RX ADMIN — Medication 50 MILLIEQUIVALENT(S): at 21:41

## 2018-12-22 RX ADMIN — Medication 20 MILLIGRAM(S): at 14:16

## 2018-12-22 RX ADMIN — QUETIAPINE FUMARATE 12.5 MILLIGRAM(S): 200 TABLET, FILM COATED ORAL at 21:14

## 2018-12-22 RX ADMIN — Medication 100 MILLIEQUIVALENT(S): at 06:23

## 2018-12-22 RX ADMIN — CHLORHEXIDINE GLUCONATE 5 MILLILITER(S): 213 SOLUTION TOPICAL at 02:00

## 2018-12-22 RX ADMIN — Medication 100 GRAM(S): at 21:41

## 2018-12-22 RX ADMIN — Medication 100 MILLIEQUIVALENT(S): at 01:00

## 2018-12-22 RX ADMIN — CHLORHEXIDINE GLUCONATE 5 MILLILITER(S): 213 SOLUTION TOPICAL at 17:37

## 2018-12-22 RX ADMIN — Medication 20 MILLIGRAM(S): at 21:15

## 2018-12-22 RX ADMIN — MEROPENEM 100 MILLIGRAM(S): 1 INJECTION INTRAVENOUS at 05:22

## 2018-12-22 RX ADMIN — Medication 10 MILLIGRAM(S): at 05:22

## 2018-12-22 RX ADMIN — CLOPIDOGREL BISULFATE 75 MILLIGRAM(S): 75 TABLET, FILM COATED ORAL at 15:31

## 2018-12-22 RX ADMIN — PANTOPRAZOLE SODIUM 40 MILLIGRAM(S): 20 TABLET, DELAYED RELEASE ORAL at 17:37

## 2018-12-22 RX ADMIN — Medication 100 MILLIEQUIVALENT(S): at 05:00

## 2018-12-22 RX ADMIN — MEROPENEM 100 MILLIGRAM(S): 1 INJECTION INTRAVENOUS at 21:16

## 2018-12-22 RX ADMIN — Medication 500 MILLIGRAM(S): at 15:31

## 2018-12-22 RX ADMIN — Medication 325 MILLIGRAM(S): at 15:31

## 2018-12-22 RX ADMIN — CHLORHEXIDINE GLUCONATE 5 MILLILITER(S): 213 SOLUTION TOPICAL at 21:15

## 2018-12-22 RX ADMIN — ATORVASTATIN CALCIUM 10 MILLIGRAM(S): 80 TABLET, FILM COATED ORAL at 21:15

## 2018-12-22 RX ADMIN — QUETIAPINE FUMARATE 25 MILLIGRAM(S): 200 TABLET, FILM COATED ORAL at 15:31

## 2018-12-22 RX ADMIN — PANTOPRAZOLE SODIUM 40 MILLIGRAM(S): 20 TABLET, DELAYED RELEASE ORAL at 05:23

## 2018-12-22 RX ADMIN — Medication 100 MILLIEQUIVALENT(S): at 02:00

## 2018-12-22 RX ADMIN — Medication 2: at 13:32

## 2018-12-22 RX ADMIN — Medication 100 MILLIEQUIVALENT(S): at 04:00

## 2018-12-22 RX ADMIN — CHLORHEXIDINE GLUCONATE 5 MILLILITER(S): 213 SOLUTION TOPICAL at 14:15

## 2018-12-22 RX ADMIN — MICAFUNGIN SODIUM 105 MILLIGRAM(S): 100 INJECTION, POWDER, LYOPHILIZED, FOR SOLUTION INTRAVENOUS at 11:19

## 2018-12-22 RX ADMIN — Medication 1 TABLET(S): at 21:14

## 2018-12-22 RX ADMIN — CHLORHEXIDINE GLUCONATE 5 MILLILITER(S): 213 SOLUTION TOPICAL at 05:23

## 2018-12-22 NOTE — CONSULT NOTE ADULT - ASSESSMENT
48 year old male with post CABG ileus. Primary team ordered state lactate and KUB this AM. Patient unlikely to be obstructed hence gastograffin challenge not indicated at this time as patient is clinically demonstrating GI function. ACS/Trauma to follow results and unlikely any acute surgical intervention at this time necessary. 48 year old male with post CABG ileus. Primary team ordered state lactate and KUB this AM. Patient unlikely to be obstructed hence gastograffin challenge not indicated at this time as patient is clinically demonstrating GI function. ACS/Trauma to follow results and unlikely any acute surgical intervention at this time necessary. Also recommend wound care scrotal pressure ulcer. 48 year old male with post CABG ileus. Primary team ordered stat lactate and KUB this AM. Patient unlikely to be obstructed hence gastograffin challenge not indicated at this time as patient is clinically demonstrating GI function. ACS/Trauma to follow results and unlikely any acute surgical intervention at this time necessary. Also recommend wound care scrotal pressure ulcer.

## 2018-12-22 NOTE — CONSULT NOTE ADULT - SUBJECTIVE AND OBJECTIVE BOX
47 year old male initially presenting with chief complaint of mid sternal CP x 2-3 days 18. Initial ED EKG noted to have ST elevations in V1-V5 with reciprocal changes in inferior leads. Code STEMI activated at the time and pt was taken to cath lab emergently. Attempted bifurcated stent to LAD and D2 with development of acute thrombus in LAD stent, attempted to re balloon with subsequent dissection of LM. Impella placed. VF arrest, shock x1 with return of NSR. Pt was emergently intubated, received CABG x 4 all vein to the LAD, Diag, OM and PDA with inability to wean from CPB therefore requiring ECMO and IABP. ECMO was explanted on 12/3. Had VDRF, requiring trach placement. Was also being treated for PNA, however, ABx were stopped due to patient experiencing diarrhea. Flexiseal was placed. Surgery was consulted 18 because the patient started to experience abdominal distension 18. Abd XR at that time revealed dilated small bowel loops concerning for SBO. TFs were stopped at the time and gastograffin challenge performed showed contrast in colon and surgery signed off. ACS/Trauma re-consulted  for abdominal distention, nausea and vomiting. Patient describes abdominal pain x 1 day as dull, diffuse, intermittent associated with 2 episodes of bilious emesis. Reports to passing flatus and having BMs (last BM ) ROS negative for chills, chest pain, SOB, dyspnea, dysuria or changes in bowel habits (color and caliber). 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    PMHx: None   PSHx: Cardiac related procedures this admission (cath, CABG, echo, IABP - see HPI above)   Allergies: NKDA   Meds: No home meds, current in hospital meds include plavix, ASA, meropenem, micafungin, atorvastatin, lasix, reglan, seroquel   FamHx: Mother  on MI in her 60s   SocHx: Never smoker, denies EtOH and illicit drug use     Vital Signs Last 24 Hrs  T(C): 37.9 (22 Dec 2018 08:00), Max: 38.7 (21 Dec 2018 11:00)  T(F): 100.2 (22 Dec 2018 08:00), Max: 101.7 (21 Dec 2018 11:00)  HR: 109 (22 Dec 2018 10:00) (98 - 127)  BP: 96/73 (22 Dec 2018 10:00) (93/69 - 129/88)  BP(mean): 81 (22 Dec 2018 10:00) (74 - 103)  RR: 21 (22 Dec 2018 10:00) (17 - 40)  SpO2: 100% (22 Dec 2018 10:00) (99% - 100%)    I&O's Detail    21 Dec 2018 07:01  -  22 Dec 2018 07:00  --------------------------------------------------------  IN:    dexmedetomidine Infusion: 4.5 mL    Enteral Tube Flush: 250 mL    furosemide Infusion: 2.5 mL    Glucerna 1.5: 820 mL    Solution: 800 mL    Solution: 100 mL    Solution: 50 mL    Solution: 100 mL  Total IN: 2127 mL    OUT:    Indwelling Catheter - Urethral: 2385 mL    Nasoenteral Tube: 300 mL  Total OUT: 2685 mL    Total NET: -558 mL    22 Dec 2018 07:01  -  22 Dec 2018 10:29  --------------------------------------------------------  IN:  Total IN: 0 mL    OUT:    Indwelling Catheter - Urethral: 250 mL    VAC (Vacuum Assisted Closure) System: 200 mL  Total OUT: 450 mL  Total NET: -450 mL    Constitutional: Patient resting comfortably in bed, in no acute distress  HEENT: EOMI / PERRL b/l  Neck: No JVD  Respiratory: CTAB with respirations are unlabored, no accessory muscle use, no conversational dyspnea; vent settings A/C Tidal volume 600 FiO2 35, RR 15 and PEEP 5   Cardiovascular: Well healing mid sternal scar present c/d/i; regular rate & rhythm  Gastrointestinal: Prior chest tube incision sites c/d/i healing well, abdomen soft, non-tender, minimally distended with no rebound tenderness / guarding; well functioning wound vac present in RLQ region (prior site of femoral cut down) with good seal and no leak   Rectal: No visible/palpable lesion, adequate rectal tone, no tenderness, no blood on examining finger, no evidence of impacted stool but some soft stool present examining finger    Neurological: GCS: 15 (4/5/6). A&O x 3; no gross sensory / motor / coordination deficits  Psychiatric: Normal mood, normal affect  Musculoskeletal: No joint pain, swelling or deformity  LABS:                        10.2   8.2   )-----------( 281      ( 22 Dec 2018 00:35 )             33.2     12-    143  |  104  |  27.0<H>  ----------------------------<  153<H>  3.8   |  27.0  |  0.54    Ca    8.0<L>      22 Dec 2018 00:35  Phos  2.9     -  Mg     2.3         TPro  6.1<L>  /  Alb  3.0<L>  /  TBili  1.8  /  DBili  x   /  AST  349<H>  /  ALT  199<H>  /  AlkPhos  219<H>  -    MEDICATIONS  (STANDING):  ascorbic acid 500 milliGRAM(s) Oral daily  aspirin 325 milliGRAM(s) Oral daily  atorvastatin 10 milliGRAM(s) Oral at bedtime  chlorhexidine 0.12% Liquid 5 milliLiter(s) Oral Mucosa every 4 hours  clopidogrel Tablet 75 milliGRAM(s) Oral daily  enoxaparin Injectable 40 milliGRAM(s) SubCutaneous daily  ferrous    sulfate 325 milliGRAM(s) Oral daily  folic acid 1 milliGRAM(s) Oral daily  furosemide   Injectable 20 milliGRAM(s) IV Push every 8 hours  insulin lispro (HumaLOG) corrective regimen sliding scale   SubCutaneous every 6 hours  lactobacillus acidophilus 1 Tablet(s) Oral every 8 hours  meropenem  IVPB      meropenem  IVPB 1000 milliGRAM(s) IV Intermittent every 8 hours  metoclopramide Injectable 10 milliGRAM(s) IV Push every 8 hours  micafungin IVPB      micafungin IVPB 100 milliGRAM(s) IV Intermittent every 24 hours  multivitamin 1 Tablet(s) Oral daily  pantoprazole  Injectable 40 milliGRAM(s) IV Push every 12 hours  QUEtiapine 25 milliGRAM(s) Oral daily  sucralfate suspension 1 Gram(s) Oral two times a day    MEDICATIONS  (PRN):  acetaminophen    Suspension .. 650 milliGRAM(s) Oral every 6 hours PRN Temp greater or equal to 38.5C (101.3F)  ALBUTerol/ipratropium for Nebulization 3 milliLiter(s) Nebulizer every 6 hours PRN Shortness of Breath and/or Wheezing  lidocaine 2% Jelly 10 milliLiter(s) IntraUrethral daily PRN discomfort from augustin  ondansetron Injectable 4 milliGRAM(s) IV Push every 6 hours PRN Nausea and/or Vomiting  petrolatum white Ointment 1 Application(s) Topical four times a day PRN dry lips  QUEtiapine 12.5 milliGRAM(s) Oral every 8 hours PRN agitation    MICRO:     STUDIES: 47 year old male initially presenting with chief complaint of mid sternal CP x 2-3 days 18. Initial ED EKG noted to have ST elevations in V1-V5 with reciprocal changes in inferior leads. Code STEMI activated at the time and pt was taken to cath lab emergently. Attempted bifurcated stent to LAD and D2 with development of acute thrombus in LAD stent, attempted to re balloon with subsequent dissection of LM. Impella placed. VF arrest, shock x1 with return of NSR. Pt was emergently intubated, received CABG x 4 all vein to the LAD, Diag, OM and PDA with inability to wean from CPB therefore requiring ECMO and IABP. ECMO was explanted on 12/3. Had VDRF, requiring trach placement. Was also being treated for PNA, however, ABx were stopped due to patient experiencing diarrhea. Flexiseal was placed. Surgery was consulted 18 because the patient started to experience abdominal distension 18. Abd XR at that time revealed dilated small bowel loops concerning for SBO. TFs were stopped at the time and gastograffin challenge performed showed contrast in colon and surgery signed off. ACS/Trauma re-consulted  for abdominal distention, nausea and vomiting. Patient describes abdominal pain x 1 day as dull, diffuse, intermittent associated with 2 episodes of bilious emesis. Reports to passing flatus and having BMs (last BM ) ROS negative for chills, chest pain, SOB, dyspnea, dysuria or changes in bowel habits (color and caliber). 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    PMHx: None   PSHx: Cardiac related procedures this admission (cath, CABG, echo, IABP - see HPI above)   Allergies: NKDA   Meds: No home meds, current in hospital meds include plavix, ASA, meropenem, micafungin, atorvastatin, lasix, reglan, seroquel   FamHx: Mother  on MI in her 60s   SocHx: Never smoker, denies EtOH and illicit drug use     Vital Signs Last 24 Hrs  T(C): 37.9 (22 Dec 2018 08:00), Max: 38.7 (21 Dec 2018 11:00)  T(F): 100.2 (22 Dec 2018 08:00), Max: 101.7 (21 Dec 2018 11:00)  HR: 109 (22 Dec 2018 10:00) (98 - 127)  BP: 96/73 (22 Dec 2018 10:00) (93/69 - 129/88)  BP(mean): 81 (22 Dec 2018 10:00) (74 - 103)  RR: 21 (22 Dec 2018 10:00) (17 - 40)  SpO2: 100% (22 Dec 2018 10:00) (99% - 100%)    I&O's Detail    21 Dec 2018 07:01  -  22 Dec 2018 07:00  --------------------------------------------------------  IN:    dexmedetomidine Infusion: 4.5 mL    Enteral Tube Flush: 250 mL    furosemide Infusion: 2.5 mL    Glucerna 1.5: 820 mL    Solution: 800 mL    Solution: 100 mL    Solution: 50 mL    Solution: 100 mL  Total IN: 2127 mL    OUT:    Indwelling Catheter - Urethral: 2385 mL    Nasoenteral Tube: 300 mL  Total OUT: 2685 mL    Total NET: -558 mL    22 Dec 2018 07:01  -  22 Dec 2018 10:29  --------------------------------------------------------  IN:  Total IN: 0 mL    OUT:    Indwelling Catheter - Urethral: 250 mL    VAC (Vacuum Assisted Closure) System: 200 mL  Total OUT: 450 mL  Total NET: -450 mL    Constitutional: Patient resting comfortably in bed, in no acute distress  HEENT: EOMI / PERRL b/l  Neck: No JVD  Respiratory: CTAB with respirations are unlabored, no accessory muscle use, no conversational dyspnea; vent settings A/C Tidal volume 600 FiO2 35, RR 15 and PEEP 5   Cardiovascular: Well healing mid sternal scar present c/d/i; regular rate & rhythm  Gastrointestinal: Prior chest tube incision sites c/d/i healing well, abdomen soft, non-tender, minimally distended with no rebound tenderness / guarding; well functioning wound vac present in RLQ region (prior site of femoral cut down) with good seal and no leak   Rectal: No visible/palpable lesion, adequate rectal tone, no tenderness, no blood on examining finger, no evidence of impacted stool but some soft stool present examining finger    Neurological: GCS: 15 (4/5/6). A&O x 3; no gross sensory / motor / coordination deficits  Psychiatric: Normal mood, normal affect  Musculoskeletal: No joint pain, swelling or deformity  Skin: Pressure ulcers present to scrotal area     LABS:                        10.2   8.2   )-----------( 281      ( 22 Dec 2018 00:35 )             33.2     12-    143  |  104  |  27.0<H>  ----------------------------<  153<H>  3.8   |  27.0  |  0.54    Ca    8.0<L>      22 Dec 2018 00:35  Phos  2.9       Mg     2.3         TPro  6.1<L>  /  Alb  3.0<L>  /  TBili  1.8  /  DBili  x   /  AST  349<H>  /  ALT  199<H>  /  AlkPhos  219<H>      MEDICATIONS  (STANDING):  ascorbic acid 500 milliGRAM(s) Oral daily  aspirin 325 milliGRAM(s) Oral daily  atorvastatin 10 milliGRAM(s) Oral at bedtime  chlorhexidine 0.12% Liquid 5 milliLiter(s) Oral Mucosa every 4 hours  clopidogrel Tablet 75 milliGRAM(s) Oral daily  enoxaparin Injectable 40 milliGRAM(s) SubCutaneous daily  ferrous    sulfate 325 milliGRAM(s) Oral daily  folic acid 1 milliGRAM(s) Oral daily  furosemide   Injectable 20 milliGRAM(s) IV Push every 8 hours  insulin lispro (HumaLOG) corrective regimen sliding scale   SubCutaneous every 6 hours  lactobacillus acidophilus 1 Tablet(s) Oral every 8 hours  meropenem  IVPB      meropenem  IVPB 1000 milliGRAM(s) IV Intermittent every 8 hours  metoclopramide Injectable 10 milliGRAM(s) IV Push every 8 hours  micafungin IVPB      micafungin IVPB 100 milliGRAM(s) IV Intermittent every 24 hours  multivitamin 1 Tablet(s) Oral daily  pantoprazole  Injectable 40 milliGRAM(s) IV Push every 12 hours  QUEtiapine 25 milliGRAM(s) Oral daily  sucralfate suspension 1 Gram(s) Oral two times a day    MEDICATIONS  (PRN):  acetaminophen    Suspension .. 650 milliGRAM(s) Oral every 6 hours PRN Temp greater or equal to 38.5C (101.3F)  ALBUTerol/ipratropium for Nebulization 3 milliLiter(s) Nebulizer every 6 hours PRN Shortness of Breath and/or Wheezing  lidocaine 2% Jelly 10 milliLiter(s) IntraUrethral daily PRN discomfort from augustin  ondansetron Injectable 4 milliGRAM(s) IV Push every 6 hours PRN Nausea and/or Vomiting  petrolatum white Ointment 1 Application(s) Topical four times a day PRN dry lips  QUEtiapine 12.5 milliGRAM(s) Oral every 8 hours PRN agitation    MICRO:     STUDIES:

## 2018-12-22 NOTE — PROGRESS NOTE ADULT - SUBJECTIVE AND OBJECTIVE BOX
[facility]  INFECTIOUS DISEASES AND INTERNAL MEDICINE at [place]  =======================================================  Perfecto Santizo MD  Diplomates American Board of Internal Medicine and Infectious Diseases  =======================================================    PATRICK LYLE [id number]    Follow up: Fungemia    No fevers  Stable on vent      Allergies:  penicillins (Unknown)      Antibiotics:    meropenem  IVPB 1000 milliGRAM(s) IV Intermittent every 8 hours      micafungin IVPB 100 milliGRAM(s) IV Intermittent every 24 hours       REVIEW OF SYSTEMS:  Unable to obtain, not verbal due to trach      Physical Exam:  ICU Vital Signs Last 24 Hrs  T(C): 37.9 (22 Dec 2018 07:00), Max: 38.7 (21 Dec 2018 11:00)  T(F): 100.2 (22 Dec 2018 07:00), Max: 101.7 (21 Dec 2018 11:00)  HR: 102 (22 Dec 2018 07:00) (98 - 127)  BP: 103/63 (22 Dec 2018 07:00) (93/69 - 129/88)  BP(mean): 78 (22 Dec 2018 07:00) (74 - 103)  ABP: 118/79 (21 Dec 2018 10:00) (118/77 - 118/79)  ABP(mean): 93 (21 Dec 2018 10:00) (91 - 93)  RR: 18 (22 Dec 2018 07:00) (17 - 40)  SpO2: 100% (22 Dec 2018 07:00) (99% - 100%)      GEN: NAD  HEENT: normocephalic and atraumatic. EOMI. PERRL.   NECK: Supple. + Trach  LUNGS: Coarse BS B/L  HEART: Regular rate and rhythm   ABDOMEN: Soft, nontender, and nondistended.  Positive bowel sounds.    : + Mckeon  EXTREMITIES: Without any edema.  MSK: no joint swelling  NEUROLOGIC: Awake, alert follows commands  PSYCHIATRIC: Appropriate affect .  SKIN: Sternal wound dressing, + Wound Vac      Labs:  12-22    143  |  104  |  27.0<H>  ----------------------------<  153<H>  3.8   |  27.0  |  0.54    Ca    8.0<L>      22 Dec 2018 00:35  Phos  2.9     12-22  Mg     2.3     12-22    TPro  6.1<L>  /  Alb  3.0<L>  /  TBili  1.8  /  DBili  x   /  AST  349<H>  /  ALT  199<H>  /  AlkPhos  219<H>  12-22               10.2   8.2   )-----------( 281      ( 22 Dec 2018 00:35 )             33.2     LIVER FUNCTIONS - ( 22 Dec 2018 00:35 )  Alb: 3.0 g/dL / Pro: 6.1 g/dL / ALK PHOS: 219 U/L / ALT: 199 U/L / AST: 349 U/L / GGT: x             ABG - ( 20 Dec 2018 18:16 )  pH, Arterial: 7.54  pH, Blood: x     /  pCO2: 34    /  pO2: 129   / HCO3: 30    / Base Excess: 6.6   /  SaO2: 100         RECENT CULTURES:  12-18 @ 15:02 .Surgical Swab     No growth at 3 days.  Culture in progress  No WBC's seen.  No organisms seen      12-18 @ 08:34 .Sputum     Few Routine respiratory keara present  Moderate White blood cells  No organisms seen      12-18 @ 06:31 .Blood Candida parapsilosis  Blood Culture PCR    Growth in aerobic bottle: Candida parapsilosis  Aerobic Bottle: 1 day; 18:39 Hours to positivity  Anaerobic Bottle: No growth to date  ***Blood Panel PCR results on this specimen are available  approximately 3 hours after the Gram stain result.***  Gram stain, PCR, and/or culture results may not always  correspond due to difference in methodologies.  ************************************************************  This PCR assay was performed using [employer].  The following targets are tested for: Enterococcus,  vancomycin resistant enterococci, Listeria monocytogenes,  coagulase negative staphylococci, S. aureus,  methicillin resistant S. aureus, Streptococcus agalactiae  (Group B), S. pneumoniae, S. pyogenes (Group A),  Acinetobacter baumannii, Enterobacter cloacae, E. coli,  Klebsiella oxytoca, K. pneumoniae, Proteus sp.,  Serratia marcescens, Haemophilus influenzae,  Neisseria meningitidis, Pseudomonas aeruginosa, Candida  albicans, C. glabrata, C krusei, C parapsilosis,  C.tropicalis and the KPC resistance gene.  "Due to technical problems, Proteus sp. will Not be reported as part of  the BCID panel until further notice"      12-15 @ 23:04 .Blood     No growth at 5 days.      12-15 @ 22:52 .Sputum Klebsiella pneumoniae    Few Klebsiella pneumoniae  Few Candida albicans  Few Routine respiratory keara present  Numerous white blood cells  Few Gram positive cocci in pairs  Few Gram Positive Rods      12-15 @ 22:31 .Urine     No growth      12-14 @ 21:58 .Blood     No growth at 5 days.      12-14 @ 13:53 .Catheter left IJ (internal jugular) catheter tip     No growth at 2 days.      12-14 @ 11:48 .Blood     No growth at 5 days.      12-12 @ 16:19 .Sputum     Few Routine respiratory keara present  Few White blood cells  Few Gram Positive Cocci in Pairs and Chains      12-11 @ 20:24 .Broncial bronchial fluid     No growth at 1 week.      12-11 @ 10:49 .Broncial bronchial fluid     Rare Candida albicans  Rare Routine respiratory keara present  Numerous white blood cells  No organisms seen      12-11 @ 10:47 .Broncial bronchial fluid     Candida albicans isolated  Culture in progress      12-09 @ 18:56 .Blood     No growth at 5 days.      12-08 @ 19:40 .Sputum Klebsiella pneumoniae    Moderate Klebsiella pneumoniae  No Routine respiratory keara present  Moderate WBC's  Few Yeast  Few Gram positive cocci in pairs        EXAM:  XR CHEST PORTABLE ROUTINE 1V                        PROCEDURE DATE:  12/21/2018    INTERPRETATION:  CHEST AP PORTABLE:  History: trach, pneumonia.   Date and time of exam: 12/21/2018 4:40 AM.  Technique: A single AP view of the chest was obtained.  Comparison exam: 12/20/2018 1:10 PM.  Findings:  There is a right IJ central line with the tip in the region of the distal   SVC. Tracheostomy tube unchanged. Nasogastric tube, unchanged. Persistent   bilateral airspace disease, unchanged. No evidence of pneumothorax..  Impression:  Persistent bilateral airspace disease, unchanged..

## 2018-12-22 NOTE — PROGRESS NOTE ADULT - SUBJECTIVE AND OBJECTIVE BOX
Patient seen and examined; chart reviewed.  No further clinical bleeding.  Off Eccmo and hemofiltration.  Sitting upright, still intubated.  Some vomiting?  Awake and responsive.  IV PPI continues.  AXR still shows same ileus pattern x several days.     PAST MEDICAL & SURGICAL HISTORY:  Staph infection  No significant past surgical history      ROS:  No Heartburn, regurgitation, dysphagia, odynophagia.  No dyspepsia  No abdominal pain.    No Nausea, vomiting.  No Bleeding.  No hematemesis.   No diarrhea.    No hematochesia.  No weight loss, anorexia.  No edema.      MEDICATIONS  (STANDING):  ascorbic acid 500 milliGRAM(s) Oral daily  aspirin 325 milliGRAM(s) Oral daily  atorvastatin 10 milliGRAM(s) Oral at bedtime  chlorhexidine 0.12% Liquid 5 milliLiter(s) Oral Mucosa every 4 hours  clopidogrel Tablet 75 milliGRAM(s) Oral daily  enoxaparin Injectable 40 milliGRAM(s) SubCutaneous daily  ferrous    sulfate 325 milliGRAM(s) Oral daily  folic acid 1 milliGRAM(s) Oral daily  furosemide   Injectable 20 milliGRAM(s) IV Push every 8 hours  insulin lispro (HumaLOG) corrective regimen sliding scale   SubCutaneous every 6 hours  lactobacillus acidophilus 1 Tablet(s) Oral every 8 hours  meropenem  IVPB      meropenem  IVPB 1000 milliGRAM(s) IV Intermittent every 8 hours  metoclopramide Injectable 10 milliGRAM(s) IV Push every 8 hours  micafungin IVPB      micafungin IVPB 100 milliGRAM(s) IV Intermittent every 24 hours  multivitamin 1 Tablet(s) Oral daily  pantoprazole  Injectable 40 milliGRAM(s) IV Push every 12 hours  QUEtiapine 25 milliGRAM(s) Oral daily  sucralfate suspension 1 Gram(s) Oral two times a day    MEDICATIONS  (PRN):  acetaminophen    Suspension .. 650 milliGRAM(s) Oral every 6 hours PRN Temp greater or equal to 38.5C (101.3F)  ALBUTerol/ipratropium for Nebulization 3 milliLiter(s) Nebulizer every 6 hours PRN Shortness of Breath and/or Wheezing  lidocaine 2% Jelly 10 milliLiter(s) IntraUrethral daily PRN discomfort from augustin  ondansetron Injectable 4 milliGRAM(s) IV Push every 6 hours PRN Nausea and/or Vomiting  petrolatum white Ointment 1 Application(s) Topical four times a day PRN dry lips  QUEtiapine 12.5 milliGRAM(s) Oral every 8 hours PRN agitation      Allergies    penicillins (Unknown)    Intolerances        Vital Signs Last 24 Hrs  T(C): 37.7 (22 Dec 2018 11:00), Max: 38.7 (21 Dec 2018 23:00)  T(F): 99.9 (22 Dec 2018 11:00), Max: 101.7 (21 Dec 2018 23:00)  HR: 107 (22 Dec 2018 11:00) (98 - 127)  BP: 105/72 (22 Dec 2018 11:00) (93/69 - 129/88)  BP(mean): 83 (22 Dec 2018 11:00) (74 - 103)  RR: 23 (22 Dec 2018 11:00) (17 - 40)  SpO2: 100% (22 Dec 2018 11:00) (99% - 100%)    PHYSICAL EXAM:    GENERAL: NAD, well-groomed, well-developed  HEAD:  Atraumatic, Normocephalic  EYES: EOMI, PERRLA, conjunctiva and sclera clear  ENMT: No tonsillar erythema, exudates, or enlargement; Moist mucous membranes, Good dentition, No lesions  NECK: Supple, No JVD, Normal thyroid  CHEST/LUNG: Clear to percussion bilaterally; No rales, rhonchi, wheezing, or rubs  HEART: Regular rate and rhythm; No murmurs, rubs, or gallops  ABDOMEN: Soft, Nontender, Nondistended; Bowel sounds present  EXTREMITIES:  2+ Peripheral Pulses, No clubbing, cyanosis, or edema  LYMPH: No lymphadenopathy noted  SKIN: No rashes or lesions      LABS:                        10.2   8.2   )-----------( 281      ( 22 Dec 2018 00:35 )             33.2     12-22    143  |  104  |  27.0<H>  ----------------------------<  153<H>  3.8   |  27.0  |  0.54    Ca    8.0<L>      22 Dec 2018 00:35  Phos  2.9     12-22  Mg     2.3     12-22    TPro  6.1<L>  /  Alb  3.0<L>  /  TBili  1.8  /  DBili  x   /  AST  349<H>  /  ALT  199<H>  /  AlkPhos  219<H>  12-22             RADIOLOGY & ADDITIONAL STUDIES:  KUB:  Gas throughout all of the bowel.  Ileus.  NGT in stomach.

## 2018-12-22 NOTE — CONSULT NOTE ADULT - PROBLEM SELECTOR PROBLEM 1
Hematemesis, presence of nausea not specified
ST elevation myocardial infarction involving left anterior descending (LAD) coronary artery
Other encephalopathy
Abdominal pain

## 2018-12-22 NOTE — PROGRESS NOTE ADULT - PROBLEM SELECTOR PLAN 4
TF held d/t vomiting  f/u AXR   will get GI and ACS input  NPO and abd ultrasound ordered/pending for worsening transaminitis and mildly elevated lipase

## 2018-12-22 NOTE — PROGRESS NOTE ADULT - SUBJECTIVE AND OBJECTIVE BOX
Brief Hospital Course:    STEMI, emergently to cath lab, left main thrombus, cardiac arrest requiring defib, intubated in cath lab, impella placed which clotted off on way to OR and was subsequently removed in OR. Crashed onto CPB for CABG d/t hemodynamic collapse. TO CTICU post CABG with central ECMO and open chest.  12/3 ECMO explanted and IABP removed, impella inserted   impella removed via R femoral cutdown with repair and external iliac stent  12/10 s/p trach for prolonged respiratory failure   abd distention, TF held, 2L NGT output, +ileus on AXR, gastrographin study done  12/15 ileus resolved, resumed for meds via NGT. Respiratory distress, febrile, septic and cardiogenic shock requiring restarting of abx, epinephrine and norepinephrine infusions   bilat pleural CTs d/c'd, more awake and alert, zosyn changed to meropenem    mycofungin added for candida parapisolsis fungemia, epinephrine weaned off, vac placed to R groin, + UGIB, started on pantoprazole infusion   EGD (+ bleeding stomach ulcer, injected with epi and hemoclip placed x 4)    Subjective/review of Systems: denies CP, SOB, abd pain. Difficult to obtain full ROS d/u trach    Significant recent/past 24 hr events: slept well overnight, emesis (~200-300 cc) last night, TF held/NPO for GI and ACS eval and abd ultrasound ordered/pending for worsening transaminitis and mildly elevated lipase      Patient is a 48y old  Male who presents with a chief complaint of cp (21 Dec 2018 14:42)    HPI:  This is a 48 y/o male no significant PMH; recent ABT (doxycycline) r/t cellulitis to right groin per pt. Pt presented to the ED with 10/10 chest pain that he reported started at 0930; he drank water with no relief; pain started to progress to B/L shoulders. Per pt spouse, she reports he has had chest pain x2-3 days and this morning he vomited which he attributed to reflux.  She brought him into the emergency department.    Pt has significant EKG changes ST elevations in leads V1-V5 with reciprocal changes in II, III, AVF.  Pt rec'd asa and plavix load in ED. (2018 11:32)    PAST MEDICAL & SURGICAL HISTORY:  Staph infection  No significant past surgical history    FAMILY HISTORY:  Family history of myocardial infarction (Mother): mother;       Vitals   ICU Vital Signs Last 24 Hrs  T(C): 37.8 (22 Dec 2018 04:00), Max: 38.7 (21 Dec 2018 07:00)  T(F): 100 (22 Dec 2018 04:00), Max: 101.7 (21 Dec 2018 07:00)  HR: 100 (22 Dec 2018 04:00) (98 - 127), SR/ST  BP: 103/70 (22 Dec 2018 04:00) (93/69 - 129/88)  BP(mean): 82 (22 Dec 2018 04:00) (74 - 103)  ABP: 118/79 (21 Dec 2018 10:00) (108/65 - 118/79)  ABP(mean): 93 (21 Dec 2018 10:00) (79 - 93)  RR: 21 (22 Dec 2018 04:00) (17 - 40)  SpO2: 100% (22 Dec 2018 04:00) (99% - 100%)    VENT SETTINGS   Mode: AC/ CMV (Assist Control/ Continuous Mandatory Ventilation)  RR (machine): 15  TV (machine): 600  FiO2: 35  PEEP: 5  MAP: 10  PIP: 27      I&O's Detail    20 Dec 2018 07:01  -  21 Dec 2018 07:00  --------------------------------------------------------  Total IN: 2470.1 mL  Total OUT: 3035 mL  Total NET: -564.9 mL    21 Dec 2018 07:01  -  22 Dec 2018 05:39  --------------------------------------------------------  IN:    dexmedetomidine Infusion: 4.5 mL    Enteral Tube Flush: 250 mL    furosemide Infusion: 2.5 mL    Glucerna 1.5: 820 mL    Solution: 100 mL    Solution: 50 mL    Solution: 100 mL    Solution: 700 mL  Total IN: 2027 mL    OUT:    Indwelling Catheter - Urethral: 2085 mL    Nasoenteral Tube: 150 mL  Total OUT: 2235 mL    Total NET: -208 mL      LABS                        10.2   8.2   )-----------( 281      ( 22 Dec 2018 00:35 )             33.2         143  |  104  |  27.0<H>  ----------------------------<  153<H>  3.8   |  27.0  |  0.54    Ca    8.0<L>      22 Dec 2018 00:35  Phos  2.9       Mg     2.3       TPro  6.1<L>  /  Alb  3.0<L>  /  TBili  1.8  /  DBili  x   /  AST  349<H>  /  ALT  199<H>  /  AlkPhos  219<H>      Amylase, Serum Total (18 @ 00:35)    Amylase, Serum Total: 98 U/L    Lipase, Serum (18 @ 00:35)    Lipase, Serum: 177 U/L    POCT Blood Glucose.: 138 mg/dL (18 @ 05:34)  POCT Blood Glucose.: 147 mg/dL (18 @ 00:27)  POCT Blood Glucose.: 153 mg/dL (18 @ 17:10)  POCT Blood Glucose.: 155 mg/dL (18 @ 12:16)  POCT Blood Glucose.: 180 mg/dL (18 @ 06:51)      Culture - Surgical Swab (18 @ 15:02)    Gram Stain:   No WBC's seen.  No organisms seen    Specimen Source: .Surgical Swab    Culture Results:   No growth at 3 days.  Culture in progress    Culture - Sputum . (18 @ 08:34)    Gram Stain:   Moderate White blood cells  No organisms seen    Specimen Source: .Sputum    Culture Results:   Few Routine respiratory keara present    Culture - Blood (18 @ 06:31)    Specimen Source: .Blood    Culture Results:   No growth at 48 hours    Culture - Blood (18 @ 06:31)    -  Candida parapsilosis: Detec    Specimen Source: .Blood    Organism: Blood Culture PCR    Organism: Candida parapsilosis    Culture Results:   Growth in aerobic bottle: Candida parapsilosis  Aerobic Bottle: 1 day; 18:39 Hours to positivity  Anaerobic Bottle: No growth to date      < from: Xray Chest 1 View- PORTABLE-Routine (18 @ 05:19) >  Findings:  There is a right IJ central line with the tip in the region of the distal SVC. Tracheostomy tube unchanged. Nasogastric tube, unchanged. Persistent bilateral airspace disease, unchanged. No evidence of pneumothorax.  Impression: Persistent bilateral airspace disease, unchanged.  < end of copied text >    18 AXR (prelim read):       MEDICATIONS  (STANDING):  ascorbic acid 500 milliGRAM(s) Oral daily  aspirin 325 milliGRAM(s) Oral daily  atorvastatin 10 milliGRAM(s) Oral at bedtime  chlorhexidine 0.12% Liquid 5 milliLiter(s) Oral Mucosa every 4 hours  clopidogrel Tablet 75 milliGRAM(s) Oral daily  enoxaparin Injectable 40 milliGRAM(s) SubCutaneous daily  ferrous    sulfate 325 milliGRAM(s) Oral daily  folic acid 1 milliGRAM(s) Oral daily  furosemide   Injectable 20 milliGRAM(s) IV Push every 8 hours  insulin lispro (HumaLOG) corrective regimen sliding scale   SubCutaneous every 6 hours  lactobacillus acidophilus 1 Tablet(s) Oral every 8 hours  meropenem  IVPB 1000 milliGRAM(s) IV Intermittent every 8 hours  metoclopramide Injectable 10 milliGRAM(s) IV Push every 8 hours  micafungin IVPB 100 milliGRAM(s) IV Intermittent every 24 hours  multivitamin 1 Tablet(s) Oral daily  pantoprazole  Injectable 40 milliGRAM(s) IV Push every 12 hours  potassium chloride  10 mEq/100 mL IVPB 10 milliEquivalent(s) IV Intermittent every 1 hour  QUEtiapine 25 milliGRAM(s) Oral daily  sucralfate suspension 1 Gram(s) Oral two times a day    MEDICATIONS  (PRN):  acetaminophen    Suspension .. 650 milliGRAM(s) Oral every 6 hours PRN Temp greater or equal to 38.5C (101.3F)  ALBUTerol/ipratropium for Nebulization 3 milliLiter(s) Nebulizer every 6 hours PRN Shortness of Breath and/or Wheezing  lidocaine 2% Jelly 10 milliLiter(s) IntraUrethral daily PRN discomfort from augustin  ondansetron Injectable 4 milliGRAM(s) IV Push every 6 hours PRN Nausea and/or Vomiting  petrolatum white Ointment 1 Application(s) Topical four times a day PRN dry lips  QUEtiapine 12.5 milliGRAM(s) Oral every 8 hours PRN agitation    Allergies: penicillins (Unknown)      Physical Exam:   Neuro: awake and alert, mouths to communicate needs, follows commands  HEENT: PERRL, + trach, + NGT  CV: regular rate, regular rhythm, +S1S2  Pulm/chest: clear throughout with diminished right base, midsternal incision C/D/I with dressing  Abd: softly distended, NT, + BS  Ext: moves all extremities as follows: RUE 4+/5, LUE and BLE 4/5, bilat SVG with staples and dressing (now Ace wrapped). Right groin staples d/c'd, dehisced, no erythema or purulence, wet to dry dressing in place.    Code Status: full code      Critical care time spent: __36__minutes (reviewing chart including medication, labs and imaging results, discussions with interdisciplinary team, discussing goals of care/advanced directives, counseling patient and/or family, non-inclusive of procedures) Brief Hospital Course:    STEMI, emergently to cath lab, left main thrombus, cardiac arrest requiring defib, intubated in cath lab, impella placed which clotted off on way to OR and was subsequently removed in OR. Crashed onto CPB for CABG d/t hemodynamic collapse. TO CTICU post CABG with central ECMO and open chest.  12/3 ECMO explanted and IABP removed, impella inserted   impella removed via R femoral cutdown with repair and external iliac stent  12/10 s/p trach for prolonged respiratory failure   abd distention, TF held, 2L NGT output, +ileus on AXR, gastrographin study done  12/15 ileus resolved, resumed for meds via NGT. Respiratory distress, febrile, septic and cardiogenic shock requiring restarting of abx, epinephrine and norepinephrine infusions   bilat pleural CTs d/c'd, more awake and alert, zosyn changed to meropenem    mycofungin added for candida parapisolsis fungemia, epinephrine weaned off, vac placed to R groin, + UGIB, started on pantoprazole infusion   EGD (+ bleeding stomach ulcer, injected with epi and hemoclip placed x 4)    Subjective/review of Systems: denies CP, SOB, abd pain. Difficult to obtain full ROS d/u trach    Significant recent/past 24 hr events: slept well overnight, emesis (~200-300 cc) last night, TF held/NPO for GI and ACS eval and abd ultrasound ordered/pending for worsening transaminitis and mildly elevated lipase      Patient is a 48y old  Male who presents with a chief complaint of cp (21 Dec 2018 14:42)    HPI:  This is a 48 y/o male no significant PMH; recent ABT (doxycycline) r/t cellulitis to right groin per pt. Pt presented to the ED with 10/10 chest pain that he reported started at 0930; he drank water with no relief; pain started to progress to B/L shoulders. Per pt spouse, she reports he has had chest pain x2-3 days and this morning he vomited which he attributed to reflux.  She brought him into the emergency department.    Pt has significant EKG changes ST elevations in leads V1-V5 with reciprocal changes in II, III, AVF.  Pt rec'd asa and plavix load in ED. (2018 11:32)    PAST MEDICAL & SURGICAL HISTORY:  Staph infection  No significant past surgical history    FAMILY HISTORY:  Family history of myocardial infarction (Mother): mother;       Vitals   ICU Vital Signs Last 24 Hrs  T(C): 37.8 (22 Dec 2018 04:00), Max: 38.7 (21 Dec 2018 07:00)  T(F): 100 (22 Dec 2018 04:00), Max: 101.7 (21 Dec 2018 07:00)  HR: 100 (22 Dec 2018 04:00) (98 - 127), SR/ST  BP: 103/70 (22 Dec 2018 04:00) (93/69 - 129/88)  BP(mean): 82 (22 Dec 2018 04:00) (74 - 103)  ABP: 118/79 (21 Dec 2018 10:00) (108/65 - 118/79)  ABP(mean): 93 (21 Dec 2018 10:00) (79 - 93)  RR: 21 (22 Dec 2018 04:00) (17 - 40)  SpO2: 100% (22 Dec 2018 04:00) (99% - 100%)    VENT SETTINGS   Mode: AC/ CMV (Assist Control/ Continuous Mandatory Ventilation)  RR (machine): 15  TV (machine): 600  FiO2: 35  PEEP: 5  MAP: 10  PIP: 27      I&O's Detail    20 Dec 2018 07:01  -  21 Dec 2018 07:00  --------------------------------------------------------  Total IN: 2470.1 mL  Total OUT: 3035 mL  Total NET: -564.9 mL    21 Dec 2018 07:01  -  22 Dec 2018 05:39  --------------------------------------------------------  IN:    dexmedetomidine Infusion: 4.5 mL    Enteral Tube Flush: 250 mL    furosemide Infusion: 2.5 mL    Glucerna 1.5: 820 mL    Solution: 100 mL    Solution: 50 mL    Solution: 100 mL    Solution: 700 mL  Total IN: 2027 mL    OUT:    Indwelling Catheter - Urethral: 2085 mL    Nasoenteral Tube: 150 mL  Total OUT: 2235 mL    Total NET: -208 mL      LABS                        10.2   8.2   )-----------( 281      ( 22 Dec 2018 00:35 )             33.2         143  |  104  |  27.0<H>  ----------------------------<  153<H>  3.8   |  27.0  |  0.54    Ca    8.0<L>      22 Dec 2018 00:35  Phos  2.9       Mg     2.3       TPro  6.1<L>  /  Alb  3.0<L>  /  TBili  1.8  /  DBili  x   /  AST  349<H>  /  ALT  199<H>  /  AlkPhos  219<H>      Amylase, Serum Total (18 @ 00:35)    Amylase, Serum Total: 98 U/L    Lipase, Serum (18 @ 00:35)    Lipase, Serum: 177 U/L    POCT Blood Glucose.: 138 mg/dL (18 @ 05:34)  POCT Blood Glucose.: 147 mg/dL (18 @ 00:27)  POCT Blood Glucose.: 153 mg/dL (18 @ 17:10)  POCT Blood Glucose.: 155 mg/dL (18 @ 12:16)  POCT Blood Glucose.: 180 mg/dL (18 @ 06:51)      Culture - Surgical Swab (18 @ 15:02)    Gram Stain:   No WBC's seen.  No organisms seen    Specimen Source: .Surgical Swab    Culture Results:   No growth at 3 days.  Culture in progress    Culture - Sputum . (18 @ 08:34)    Gram Stain:   Moderate White blood cells  No organisms seen    Specimen Source: .Sputum    Culture Results:   Few Routine respiratory keara present    Culture - Blood (18 @ 06:31)    Specimen Source: .Blood    Culture Results:   No growth at 48 hours    Culture - Blood (18 @ 06:31)    -  Candida parapsilosis: Detec    Specimen Source: .Blood    Organism: Blood Culture PCR    Organism: Candida parapsilosis    Culture Results:   Growth in aerobic bottle: Candida parapsilosis  Aerobic Bottle: 1 day; 18:39 Hours to positivity  Anaerobic Bottle: No growth to date      < from: Xray Chest 1 View- PORTABLE-Routine (18 @ 05:19) >  Findings:  There is a right IJ central line with the tip in the region of the distal SVC. Tracheostomy tube unchanged. Nasogastric tube, unchanged. Persistent bilateral airspace disease, unchanged. No evidence of pneumothorax.  Impression: Persistent bilateral airspace disease, unchanged.  < end of copied text >    18 AXR (my read): non-specific bowel gas pattern but concern for beginnings of ileus      MEDICATIONS  (STANDING):  ascorbic acid 500 milliGRAM(s) Oral daily  aspirin 325 milliGRAM(s) Oral daily  atorvastatin 10 milliGRAM(s) Oral at bedtime  chlorhexidine 0.12% Liquid 5 milliLiter(s) Oral Mucosa every 4 hours  clopidogrel Tablet 75 milliGRAM(s) Oral daily  enoxaparin Injectable 40 milliGRAM(s) SubCutaneous daily  ferrous    sulfate 325 milliGRAM(s) Oral daily  folic acid 1 milliGRAM(s) Oral daily  furosemide   Injectable 20 milliGRAM(s) IV Push every 8 hours  insulin lispro (HumaLOG) corrective regimen sliding scale   SubCutaneous every 6 hours  lactobacillus acidophilus 1 Tablet(s) Oral every 8 hours  meropenem  IVPB 1000 milliGRAM(s) IV Intermittent every 8 hours  metoclopramide Injectable 10 milliGRAM(s) IV Push every 8 hours  micafungin IVPB 100 milliGRAM(s) IV Intermittent every 24 hours  multivitamin 1 Tablet(s) Oral daily  pantoprazole  Injectable 40 milliGRAM(s) IV Push every 12 hours  potassium chloride  10 mEq/100 mL IVPB 10 milliEquivalent(s) IV Intermittent every 1 hour  QUEtiapine 25 milliGRAM(s) Oral daily  sucralfate suspension 1 Gram(s) Oral two times a day    MEDICATIONS  (PRN):  acetaminophen    Suspension .. 650 milliGRAM(s) Oral every 6 hours PRN Temp greater or equal to 38.5C (101.3F)  ALBUTerol/ipratropium for Nebulization 3 milliLiter(s) Nebulizer every 6 hours PRN Shortness of Breath and/or Wheezing  lidocaine 2% Jelly 10 milliLiter(s) IntraUrethral daily PRN discomfort from augustin  ondansetron Injectable 4 milliGRAM(s) IV Push every 6 hours PRN Nausea and/or Vomiting  petrolatum white Ointment 1 Application(s) Topical four times a day PRN dry lips  QUEtiapine 12.5 milliGRAM(s) Oral every 8 hours PRN agitation    Allergies: penicillins (Unknown)      Physical Exam:   Neuro: awake and alert, mouths to communicate needs, follows commands  HEENT: PERRL, + trach, + NGT  CV: regular rate, regular rhythm, +S1S2  Pulm/chest: clear throughout with diminished right base, midsternal incision C/D/I with dressing  Abd: softly distended, NT, + BS  Ext: moves all extremities as follows: RUE 4+/5, LUE and BLE 4/5, bilat SVG with staples and dressing (now Ace wrapped). Right groin staples d/c'd, dehisced, no erythema or purulence, wet to dry dressing in place.    Code Status: full code      Critical care time spent: __36__minutes (reviewing chart including medication, labs and imaging results, discussions with interdisciplinary team, discussing goals of care/advanced directives, counseling patient and/or family, non-inclusive of procedures)

## 2018-12-22 NOTE — PROGRESS NOTE ADULT - ASSESSMENT
Resolved UGI bleed from , Clipped successfully.  Hgb stable for several days. No bx done.  Likely stress related.    Ileus pattern on KUB.  No worse, and probably improved.  Ok to start low dose NGT feedings and advance as tolerated.   Moderate transaminitis:  Multifactorial.  Probably reactive to ICU stresses.  Normal bili and minimal AP.  Trend pattern.

## 2018-12-22 NOTE — PROGRESS NOTE ADULT - PROBLEM SELECTOR PLAN 1
will d/c Dr Barrett and cardiology re: timing to start ACEI/ARB and/or beta-blocker  continue lasix 20mg IV q8 for now  continue augustin catheter for urine output monitoring in critically ill patient

## 2018-12-22 NOTE — CONSULT NOTE ADULT - PROBLEM SELECTOR RECOMMENDATION 9
Rule out ulcer disease +/or stress induced gastritis. Keep NPO and on continuous IV Pantoprazole drip. Will proceed with EGD later this AM at bedside.
s/p cath with dissection of LM, impella placed  transfer to OR for emergent cabg
possibly ICU agitated delerium   consider anoxia vs brain injury  consider MRI brain  consider neurology evaluation  if just delerium can consider use of seroquel at night and prn  regulate sleep/wake cycles as much as possible quieter room, lights off at night/minimal stimulation at night  consider OOB to chair  if need propofol use only at night  consider precedex titrate for RASS 0-1
Primary team obtained RUQ U/S later in the day that showed gallbladder wall thickening with sludge and pericholecystic edema. Patient's abdominal exam completely benign with no clinical signs/symptoms for acute cholecystitis. ACS/Trauma following given these U/S findings.

## 2018-12-23 LAB
ALBUMIN SERPL ELPH-MCNC: 2.8 G/DL — LOW (ref 3.3–5.2)
ALP SERPL-CCNC: 171 U/L — HIGH (ref 40–120)
ALT FLD-CCNC: 117 U/L — HIGH
AMYLASE P1 CFR SERPL: 100 U/L — SIGNIFICANT CHANGE UP (ref 36–128)
ANION GAP SERPL CALC-SCNC: 11 MMOL/L — SIGNIFICANT CHANGE UP (ref 5–17)
AST SERPL-CCNC: 101 U/L — HIGH
BILIRUB SERPL-MCNC: 1.7 MG/DL — SIGNIFICANT CHANGE UP (ref 0.4–2)
BUN SERPL-MCNC: 23 MG/DL — HIGH (ref 8–20)
CALCIUM SERPL-MCNC: 8.2 MG/DL — LOW (ref 8.6–10.2)
CHLORIDE SERPL-SCNC: 104 MMOL/L — SIGNIFICANT CHANGE UP (ref 98–107)
CO2 SERPL-SCNC: 28 MMOL/L — SIGNIFICANT CHANGE UP (ref 22–29)
CREAT SERPL-MCNC: 0.48 MG/DL — LOW (ref 0.5–1.3)
CULTURE RESULTS: SIGNIFICANT CHANGE UP
GLUCOSE BLDC GLUCOMTR-MCNC: 122 MG/DL — HIGH (ref 70–99)
GLUCOSE BLDC GLUCOMTR-MCNC: 135 MG/DL — HIGH (ref 70–99)
GLUCOSE BLDC GLUCOMTR-MCNC: 141 MG/DL — HIGH (ref 70–99)
GLUCOSE BLDC GLUCOMTR-MCNC: 148 MG/DL — HIGH (ref 70–99)
GLUCOSE BLDC GLUCOMTR-MCNC: 153 MG/DL — HIGH (ref 70–99)
GLUCOSE SERPL-MCNC: 121 MG/DL — HIGH (ref 70–115)
HCT VFR BLD CALC: 31.4 % — LOW (ref 42–52)
HGB BLD-MCNC: 9.4 G/DL — LOW (ref 14–18)
LIDOCAIN IGE QN: 183 U/L — HIGH (ref 22–51)
MAGNESIUM SERPL-MCNC: 2.5 MG/DL — SIGNIFICANT CHANGE UP (ref 1.6–2.6)
MCHC RBC-ENTMCNC: 26.7 PG — LOW (ref 27–31)
MCHC RBC-ENTMCNC: 29.9 G/DL — LOW (ref 32–36)
MCV RBC AUTO: 89.2 FL — SIGNIFICANT CHANGE UP (ref 80–94)
ORGANISM # SPEC MICROSCOPIC CNT: SIGNIFICANT CHANGE UP
PHOSPHATE SERPL-MCNC: 3 MG/DL — SIGNIFICANT CHANGE UP (ref 2.4–4.7)
PLATELET # BLD AUTO: 274 K/UL — SIGNIFICANT CHANGE UP (ref 150–400)
POTASSIUM SERPL-MCNC: 4.1 MMOL/L — SIGNIFICANT CHANGE UP (ref 3.5–5.3)
POTASSIUM SERPL-SCNC: 4.1 MMOL/L — SIGNIFICANT CHANGE UP (ref 3.5–5.3)
PROT SERPL-MCNC: 5.9 G/DL — LOW (ref 6.6–8.7)
RBC # BLD: 3.52 M/UL — LOW (ref 4.6–6.2)
RBC # FLD: 15.5 % — SIGNIFICANT CHANGE UP (ref 11–15.6)
SODIUM SERPL-SCNC: 143 MMOL/L — SIGNIFICANT CHANGE UP (ref 135–145)
SPECIMEN SOURCE: SIGNIFICANT CHANGE UP
WBC # BLD: 8.1 K/UL — SIGNIFICANT CHANGE UP (ref 4.8–10.8)
WBC # FLD AUTO: 8.1 K/UL — SIGNIFICANT CHANGE UP (ref 4.8–10.8)

## 2018-12-23 PROCEDURE — 71045 X-RAY EXAM CHEST 1 VIEW: CPT | Mod: 26

## 2018-12-23 PROCEDURE — 99233 SBSQ HOSP IP/OBS HIGH 50: CPT

## 2018-12-23 PROCEDURE — 93010 ELECTROCARDIOGRAM REPORT: CPT

## 2018-12-23 RX ORDER — FERROUS SULFATE 325(65) MG
300 TABLET ORAL DAILY
Qty: 0 | Refills: 0 | Status: DISCONTINUED | OUTPATIENT
Start: 2018-12-23 | End: 2019-01-05

## 2018-12-23 RX ORDER — METOCLOPRAMIDE HCL 10 MG
10 TABLET ORAL EVERY 8 HOURS
Qty: 0 | Refills: 0 | Status: DISCONTINUED | OUTPATIENT
Start: 2018-12-23 | End: 2018-12-28

## 2018-12-23 RX ORDER — CARVEDILOL PHOSPHATE 80 MG/1
3.12 CAPSULE, EXTENDED RELEASE ORAL EVERY 12 HOURS
Qty: 0 | Refills: 0 | Status: DISCONTINUED | OUTPATIENT
Start: 2018-12-23 | End: 2018-12-24

## 2018-12-23 RX ORDER — POTASSIUM CHLORIDE 20 MEQ
10 PACKET (EA) ORAL
Qty: 0 | Refills: 0 | Status: COMPLETED | OUTPATIENT
Start: 2018-12-23 | End: 2018-12-23

## 2018-12-23 RX ADMIN — Medication 10 MILLIGRAM(S): at 05:10

## 2018-12-23 RX ADMIN — Medication 20 MILLIGRAM(S): at 22:00

## 2018-12-23 RX ADMIN — MEROPENEM 100 MILLIGRAM(S): 1 INJECTION INTRAVENOUS at 22:00

## 2018-12-23 RX ADMIN — MEROPENEM 100 MILLIGRAM(S): 1 INJECTION INTRAVENOUS at 13:29

## 2018-12-23 RX ADMIN — CHLORHEXIDINE GLUCONATE 5 MILLILITER(S): 213 SOLUTION TOPICAL at 17:10

## 2018-12-23 RX ADMIN — Medication 1 MILLIGRAM(S): at 11:06

## 2018-12-23 RX ADMIN — Medication 1 TABLET(S): at 05:10

## 2018-12-23 RX ADMIN — Medication 325 MILLIGRAM(S): at 11:06

## 2018-12-23 RX ADMIN — Medication 50 MILLIEQUIVALENT(S): at 05:29

## 2018-12-23 RX ADMIN — CHLORHEXIDINE GLUCONATE 5 MILLILITER(S): 213 SOLUTION TOPICAL at 11:04

## 2018-12-23 RX ADMIN — Medication 300 MILLIGRAM(S): at 11:06

## 2018-12-23 RX ADMIN — Medication 20 MILLIGRAM(S): at 05:09

## 2018-12-23 RX ADMIN — Medication 1 TABLET(S): at 22:00

## 2018-12-23 RX ADMIN — PANTOPRAZOLE SODIUM 40 MILLIGRAM(S): 20 TABLET, DELAYED RELEASE ORAL at 17:10

## 2018-12-23 RX ADMIN — CHLORHEXIDINE GLUCONATE 5 MILLILITER(S): 213 SOLUTION TOPICAL at 05:10

## 2018-12-23 RX ADMIN — ENOXAPARIN SODIUM 40 MILLIGRAM(S): 100 INJECTION SUBCUTANEOUS at 11:06

## 2018-12-23 RX ADMIN — PANTOPRAZOLE SODIUM 40 MILLIGRAM(S): 20 TABLET, DELAYED RELEASE ORAL at 05:10

## 2018-12-23 RX ADMIN — CARVEDILOL PHOSPHATE 3.12 MILLIGRAM(S): 80 CAPSULE, EXTENDED RELEASE ORAL at 17:14

## 2018-12-23 RX ADMIN — CHLORHEXIDINE GLUCONATE 5 MILLILITER(S): 213 SOLUTION TOPICAL at 13:29

## 2018-12-23 RX ADMIN — CHLORHEXIDINE GLUCONATE 5 MILLILITER(S): 213 SOLUTION TOPICAL at 01:54

## 2018-12-23 RX ADMIN — ATORVASTATIN CALCIUM 10 MILLIGRAM(S): 80 TABLET, FILM COATED ORAL at 22:00

## 2018-12-23 RX ADMIN — Medication 500 MILLIGRAM(S): at 11:06

## 2018-12-23 RX ADMIN — Medication 1 TABLET(S): at 13:29

## 2018-12-23 RX ADMIN — Medication 50 MILLIEQUIVALENT(S): at 05:09

## 2018-12-23 RX ADMIN — Medication 1 GRAM(S): at 05:10

## 2018-12-23 RX ADMIN — Medication 10 MILLIGRAM(S): at 22:00

## 2018-12-23 RX ADMIN — QUETIAPINE FUMARATE 25 MILLIGRAM(S): 200 TABLET, FILM COATED ORAL at 11:06

## 2018-12-23 RX ADMIN — Medication 20 MILLIGRAM(S): at 13:30

## 2018-12-23 RX ADMIN — Medication 10 MILLIGRAM(S): at 13:30

## 2018-12-23 RX ADMIN — Medication 2: at 11:28

## 2018-12-23 RX ADMIN — MEROPENEM 100 MILLIGRAM(S): 1 INJECTION INTRAVENOUS at 05:09

## 2018-12-23 RX ADMIN — Medication 1 GRAM(S): at 17:10

## 2018-12-23 RX ADMIN — CHLORHEXIDINE GLUCONATE 5 MILLILITER(S): 213 SOLUTION TOPICAL at 22:00

## 2018-12-23 RX ADMIN — CLOPIDOGREL BISULFATE 75 MILLIGRAM(S): 75 TABLET, FILM COATED ORAL at 11:06

## 2018-12-23 RX ADMIN — Medication 5 MILLILITER(S): at 11:04

## 2018-12-23 RX ADMIN — MICAFUNGIN SODIUM 105 MILLIGRAM(S): 100 INJECTION, POWDER, LYOPHILIZED, FOR SOLUTION INTRAVENOUS at 11:07

## 2018-12-23 NOTE — PROGRESS NOTE ADULT - SUBJECTIVE AND OBJECTIVE BOX
Patient seen and examined;  events noted.  Sono results noted.  Temps trending downwards.  Not toxic.  Denies again any abdominal pain.  Sitting in chair and converted over to trach collar.  Off of Vent x few hours and tolerates well.  Passed a BM yesterday.  Currently held NPO.      PAST MEDICAL & SURGICAL HISTORY:  Staph infection  No significant past surgical history      ROS:  No Heartburn, regurgitation, dysphagia, odynophagia.  No dyspepsia  No abdominal pain.    No Nausea, vomiting.  No Bleeding.  No hematemesis.   No diarrhea.    No hematochesia.  No weight loss, anorexia.  No edema.      MEDICATIONS  (STANDING):  ascorbic acid 500 milliGRAM(s) Oral daily  aspirin 325 milliGRAM(s) Oral daily  atorvastatin 10 milliGRAM(s) Oral at bedtime  chlorhexidine 0.12% Liquid 5 milliLiter(s) Oral Mucosa every 4 hours  clopidogrel Tablet 75 milliGRAM(s) Oral daily  enoxaparin Injectable 40 milliGRAM(s) SubCutaneous daily  ferrous    sulfate Liquid 300 milliGRAM(s) Enteral Tube daily  folic acid 1 milliGRAM(s) Oral daily  furosemide   Injectable 20 milliGRAM(s) IV Push every 8 hours  insulin lispro (HumaLOG) corrective regimen sliding scale   SubCutaneous every 6 hours  lactobacillus acidophilus 1 Tablet(s) Oral every 8 hours  meropenem  IVPB      meropenem  IVPB 1000 milliGRAM(s) IV Intermittent every 8 hours  metoclopramide Injectable 10 milliGRAM(s) IV Push every 8 hours  micafungin IVPB      micafungin IVPB 100 milliGRAM(s) IV Intermittent every 24 hours  multivitamin   Solution 5 milliLiter(s) Enteral Tube daily  pantoprazole  Injectable 40 milliGRAM(s) IV Push every 12 hours  QUEtiapine 25 milliGRAM(s) Oral daily  sucralfate suspension 1 Gram(s) Oral two times a day    MEDICATIONS  (PRN):  acetaminophen    Suspension .. 650 milliGRAM(s) Oral every 6 hours PRN Temp greater or equal to 38.5C (101.3F)  ALBUTerol/ipratropium for Nebulization 3 milliLiter(s) Nebulizer every 6 hours PRN Shortness of Breath and/or Wheezing  lidocaine 2% Jelly 10 milliLiter(s) IntraUrethral daily PRN discomfort from augustin  ondansetron Injectable 4 milliGRAM(s) IV Push every 6 hours PRN Nausea and/or Vomiting  petrolatum white Ointment 1 Application(s) Topical four times a day PRN dry lips  QUEtiapine 12.5 milliGRAM(s) Oral every 8 hours PRN agitation      Allergies    penicillins (Unknown)    Intolerances        Vital Signs Last 24 Hrs  T(C): 37.9 (23 Dec 2018 10:00), Max: 38 (22 Dec 2018 16:00)  T(F): 100.2 (23 Dec 2018 10:00), Max: 100.4 (22 Dec 2018 16:00)  HR: 113 (23 Dec 2018 10:00) (94 - 117)  BP: 104/73 (23 Dec 2018 10:00) (92/65 - 177/86)  BP(mean): 82 (23 Dec 2018 10:00) (73 - 122)  RR: 0 (23 Dec 2018 10:00) (0 - 25)  SpO2: 98% (23 Dec 2018 10:00) (98% - 100%)    PHYSICAL EXAM:    GENERAL: NAD, well-groomed, well-developed  HEAD:  Atraumatic, Normocephalic; NGT bilious.   EYES: EOMI, PERRLA, conjunctiva and sclera clear  ENMT: No tonsillar erythema, exudates, or enlargement; Moist mucous membranes, Good dentition, No lesions  NECK: Supple, No JVD, Normal thyroid; Trach intact.   CHEST/LUNG: Clear to percussion bilaterally; No rales, rhonchi, wheezing, or rubs  HEART: Regular rate and rhythm; No murmurs, rubs, or gallops  ABDOMEN: Soft, Nontender, Nondistended; Bowel sounds present;  No HSM.  No MTR  EXTREMITIES:  2+ Peripheral Pulses, No clubbing, cyanosis, or edema  LYMPH: No lymphadenopathy noted  SKIN: No rashes or lesions      LABS:                        9.4    8.1   )-----------( 274      ( 23 Dec 2018 03:23 )             31.4     12-23    143  |  104  |  23.0<H>  ----------------------------<  121<H>  4.1   |  28.0  |  0.48<L>    Ca    8.2<L>      23 Dec 2018 03:23  Phos  3.0     12-23  Mg     2.5     12-23    TPro  5.9<L>  /  Alb  2.8<L>  /  TBili  1.7  /  DBili  x   /  AST  101<H>  /  ALT  117<H>  /  AlkPhos  171<H>  12-23             RADIOLOGY & ADDITIONAL STUDIES:

## 2018-12-23 NOTE — PROGRESS NOTE ADULT - ASSESSMENT
48 year old male with post CABG ileus, surgery reconsulted given abd distension and RUQ US showed thickened GB wall, sludge and pericholecystic fluid. Patient's abd exam completely benign, LFTs downtre  Plan:  -Patient does not have acute cholecystitis  -Can feed patient through NGT

## 2018-12-23 NOTE — PROGRESS NOTE ADULT - SUBJECTIVE AND OBJECTIVE BOX
BRIEF hosp COURSE   11/29 STEMI, emergently to cath lab, left main thrombus, cardiac arrest requiring defib, intubated in cath lab, impella placed which clotted off on way to OR and was subsequently removed in OR. Crashed onto CPB for CABG d/t hemodynamic collapse. TO CTICU post CABG with central ECMO and open chest.  12/3 ECMO explanted and IABP removed, impella inserted  12/6 impella removed via R femoral cutdown with repair and external iliac stent  12/10 s/p trach for prolonged respiratory failure  12/14 abd distention, TF held, 2L NGT output, +ileus on AXR, gastrographin study done  12/15 ileus resolved, resumed for meds via NGT. Respiratory distress, febrile, septic and cardiogenic shock requiring restarting of abx, epinephrine and norepinephrine infusions  12/16 bilat pleural CTs d/c'd, more awake and alert, zosyn changed to meropenem   12/18 mycofungin added for candida parapisolsis fungemia, epinephrine weaned off, vac placed to R groin, + UGIB, started on pantoprazole infusion  12/19 EGD (+ bleeding stomach ulcer, injected with epi and hemoclip placed x 4)  12/21 ileus on AXR   12/22 RUQ consistent with cholecystitis and splenomegaly, excercising on cpap     SUBJECTIVE   unable to offer, nods head to questions   denies abdominal pain, N/V, reflx or chest pain      INTERIM HISTORY SIGNIFICANT FOR   RUQ Ronano       Patient is a 48y old  Male who presents with a chief complaint of heart disease and fungemia. (22 Dec 2018 11:14)    HPI:  This is a 48 y/o male no significant PMH; recent ABT (doxycycline) r/t cellulitis to right groin per pt. Pt presented to the ED with 10/10 chest pain that he reported started at 0930; he drank water with no relief; pain started to progress to B/L shoulders. Per pt spouse, she reports he has had chest pain x2-3 days and this morning he vomited which he attributed to reflux.  She brought him into the emergency department.    Pt has significant EKG changes ST elevations in leads V1-V5 with reciprocal changes in II, III, AVF.  Pt rec'd asa and plavix load in ED. (29 Nov 2018 11:32)    OBJECTIVE  PAST MEDICAL & SURGICAL HISTORY:  Staph infection  No significant past surgical history    penicillins (Unknown)    Home Medications:    VITALS  Currently in sinus rhythm with vitals as below  ICU Vital Signs Last 24 Hrs  T(C): 37.7 (23 Dec 2018 00:00), Max: 38 (22 Dec 2018 16:00)  T(F): 99.9 (23 Dec 2018 00:00), Max: 100.4 (22 Dec 2018 16:00)  HR: 103 (23 Dec 2018 03:00) (94 - 115)  BP: 99/73 (23 Dec 2018 03:00) (92/65 - 177/86)  BP(mean): 81 (23 Dec 2018 03:00) (73 - 122)  ABP: --  ABP(mean): --  RR: 21 (23 Dec 2018 03:00) (16 - 25)  SpO2: 100% (23 Dec 2018 03:00) (100% - 100%)    Adult Advanced Hemodynamics Last 24 Hrs  CVP(mm Hg): --  CVP(cm H2O): --  CO: --  CI: --  PA: --  PA(mean): --  PCWP: --  SVR: --  SVRI: --  PVR: --  PVRI: --  LABS                        10.8   10.2  )-----------( 319      ( 22 Dec 2018 14:19 )             34.3     Culture - Blood (collected 20 Dec 2018 11:09)  Source: .Blood  Preliminary Report (22 Dec 2018 14:54):    Growth in aerobic bottle: Yeast    Aerobic Bottle: 1 day ;23:51 Hours to positivity    Anaerobic Bottle: No growth to date    .    TYPE: (C=Critical, N=Notification, A=Abnormal) C    TESTS:  _ BLD     DATE/TIME CALLED: _ 12/22/2018 14:52:23    CALLED TO: _ CARLOS ENRIQUE VUONGRN    READ BACK (2 Patient Identifiers)(Y/N): _ Y    READ BACK VALUES (Y/N): _ Y    CALLED BY: Phoenix GROVER    Culture - Blood (collected 20 Dec 2018 07:25)  Source: .Blood  Preliminary Report (22 Dec 2018 14:50):    Growth in aerobic bottle: Yeast    Aerobic Bottle: 02 days;05:48 Hours to positivity    Anaerobic Bottle: No growth to date    .    TYPE: (C=Critical, N=Notification, A=Abnormal) C    TESTS:  _ BLD GS    DATE/TIME CALLED: _ 12/22/2018 14:48:40    CALLED TO: Phoenix VUONG RN    READ BACK (2 Patient Identifiers)(Y/N): _ Y    READ BACK VALUES (Y/N): _ Y    CALLED BY: Phoenix GROVER    12-22    142  |  104  |  23.0<H>  ----------------------------<  144<H>  4.2   |  26.0  |  0.49<L>    Ca    8.3<L>      22 Dec 2018 19:22  Phos  2.9     12-22  Mg     2.3     12-22    TPro  6.3<L>  /  Alb  3.4  /  TBili  1.8  /  DBili  x   /  AST  128<H>  /  ALT  143<H>  /  AlkPhos  184<H>  12-22  CAPILLARY BLOOD GLUCOSE      POCT Blood Glucose.: 135 mg/dL (22 Dec 2018 23:59)        Mode: AC/ CMV (Assist Control/ Continuous Mandatory Ventilation)  RR (machine): 15  TV (machine): 600  FiO2: 35  PEEP: 5  MAP: 10    IN/OUT    12-21-18 @ 07:01  -  12-22-18 @ 07:00  --------------------------------------------------------  IN: 2127 mL / OUT: 2685 mL / NET: -558 mL    12-22-18 @ 07:01  - 12-23-18 @ 03:28  --------------------------------------------------------  IN: 820 mL / OUT: 2105 mL / NET: -1285 mL      IMAGING  personally reviewed imaging     CURRENT MEDICATIONS  MEDICATIONS  (STANDING):  ascorbic acid 500 milliGRAM(s) Oral daily  aspirin 325 milliGRAM(s) Oral daily  atorvastatin 10 milliGRAM(s) Oral at bedtime  chlorhexidine 0.12% Liquid 5 milliLiter(s) Oral Mucosa every 4 hours  clopidogrel Tablet 75 milliGRAM(s) Oral daily  enoxaparin Injectable 40 milliGRAM(s) SubCutaneous daily  ferrous    sulfate 325 milliGRAM(s) Oral daily  folic acid 1 milliGRAM(s) Oral daily  furosemide   Injectable 20 milliGRAM(s) IV Push every 8 hours  insulin lispro (HumaLOG) corrective regimen sliding scale   SubCutaneous every 6 hours  lactobacillus acidophilus 1 Tablet(s) Oral every 8 hours  meropenem  IVPB      meropenem  IVPB 1000 milliGRAM(s) IV Intermittent every 8 hours  metoclopramide Injectable 10 milliGRAM(s) IV Push every 8 hours  micafungin IVPB      micafungin IVPB 100 milliGRAM(s) IV Intermittent every 24 hours  multivitamin 1 Tablet(s) Oral daily  pantoprazole  Injectable 40 milliGRAM(s) IV Push every 12 hours  QUEtiapine 25 milliGRAM(s) Oral daily  sucralfate suspension 1 Gram(s) Oral two times a day    MEDICATIONS  (PRN):  acetaminophen    Suspension .. 650 milliGRAM(s) Oral every 6 hours PRN Temp greater or equal to 38.5C (101.3F)  ALBUTerol/ipratropium for Nebulization 3 milliLiter(s) Nebulizer every 6 hours PRN Shortness of Breath and/or Wheezing  lidocaine 2% Jelly 10 milliLiter(s) IntraUrethral daily PRN discomfort from augustin  ondansetron Injectable 4 milliGRAM(s) IV Push every 6 hours PRN Nausea and/or Vomiting  petrolatum white Ointment 1 Application(s) Topical four times a day PRN dry lips  QUEtiapine 12.5 milliGRAM(s) Oral every 8 hours PRN agitation

## 2018-12-23 NOTE — PROGRESS NOTE ADULT - SUBJECTIVE AND OBJECTIVE BOX
[facility]  INFECTIOUS DISEASES AND INTERNAL MEDICINE at [place]  =======================================================  Perfecto Santizo MD  Diplomates American Board of Internal Medicine and Infectious Diseases  =======================================================    PATRICK LYLE [id number]    Follow up: Fungemia    No fevers  Stable on vent      Allergies:  penicillins (Unknown)      Antibiotics:    meropenem  IVPB 1000 milliGRAM(s) IV Intermittent every 8 hours      micafungin IVPB 100 milliGRAM(s) IV Intermittent every 24 hours       REVIEW OF SYSTEMS:  Unable to obtain, not verbal due to trach      Physical Exam:  ICU Vital Signs Last 24 Hrs  T(C): 37.6 (23 Dec 2018 06:00), Max: 38 (22 Dec 2018 16:00)  T(F): 99.7 (23 Dec 2018 06:00), Max: 100.4 (22 Dec 2018 16:00)  HR: 113 (23 Dec 2018 08:18) (94 - 115)  BP: 107/73 (23 Dec 2018 07:00) (92/65 - 177/86)  BP(mean): 86 (23 Dec 2018 07:00) (73 - 122)  RR: 24 (23 Dec 2018 07:00) (16 - 25)  SpO2: 99% (23 Dec 2018 08:18) (99% - 100%)      GEN: NAD  HEENT: normocephalic and atraumatic. EOMI. PERRL.   NECK: Supple. + Trach  LUNGS: Coarse BS B/L  HEART: Regular rate and rhythm   ABDOMEN: Soft, nontender, and nondistended.  Positive bowel sounds.    : + Mckeon  EXTREMITIES: Without any edema.  MSK: no joint swelling  NEUROLOGIC: Awake, alert follows commands  PSYCHIATRIC: Appropriate affect .  SKIN: Sternal wound dressing, + Wound Vac      Labs:  12-23    143  |  104  |  23.0<H>  ----------------------------<  121<H>  4.1   |  28.0  |  0.48<L>    Ca    8.2<L>      23 Dec 2018 03:23  Phos  3.0     12-23  Mg     2.5     12-23    TPro  5.9<L>  /  Alb  2.8<L>  /  TBili  1.7  /  DBili  x   /  AST  101<H>  /  ALT  117<H>  /  AlkPhos  171<H>  12-23               9.4    8.1   )-----------( 274      ( 23 Dec 2018 03:23 )             31.4     LIVER FUNCTIONS - ( 23 Dec 2018 03:23 )  Alb: 2.8 g/dL / Pro: 5.9 g/dL / ALK PHOS: 171 U/L / ALT: 117 U/L / AST: 101 U/L / GGT: x             RECENT CULTURES:  12-20 @ 11:09 .Blood     Growth in aerobic bottle: Yeast  Aerobic Bottle: 1 day ;23:51 Hours to positivity  Anaerobic Bottle: No growth to date      12-20 @ 07:25 .Blood     Growth in aerobic bottle: Yeast  Aerobic Bottle: 02 days;05:48 Hours to positivity  Anaerobic Bottle: No growth to date      12-18 @ 15:02 .Surgical Swab     No growth at 4 days.  Culture in progress  No WBC's seen.  No organisms seen      12-18 @ 08:34 .Sputum     Few Routine respiratory keara present  Moderate White blood cells  No organisms seen      12-18 @ 06:31 .Blood Candida parapsilosis  Blood Culture PCR    Growth in aerobic bottle: Candida parapsilosis  Aerobic Bottle: 1 day; 18:39 Hours to positivity  Anaerobic Bottle: No growth to date  ***Blood Panel PCR results on this specimen are available  approximately 3 hours after the Gram stain result.***  Gram stain, PCR, and/or culture results may not always  correspond due to difference in methodologies.  ************************************************************  This PCR assay was performed using [employer].  The following targets are tested for: Enterococcus,  vancomycin resistant enterococci, Listeria monocytogenes,  coagulase negative staphylococci, S. aureus,  methicillin resistant S. aureus, Streptococcus agalactiae  (Group B), S. pneumoniae, S. pyogenes (Group A),  Acinetobacter baumannii, Enterobacter cloacae, E. coli,  Klebsiella oxytoca, K. pneumoniae, Proteus sp.,  Serratia marcescens, Haemophilus influenzae,  Neisseria meningitidis, Pseudomonas aeruginosa, Candida  albicans, C. glabrata, C krusei, C parapsilosis,  C.tropicalis and the KPC resistance gene.  "Due to technical problems, Proteus sp. will Not be reported as part of  the BCID panel until further notice"      12-15 @ 23:04 .Blood     No growth at 5 days.      12-15 @ 22:52 .Sputum Klebsiella pneumoniae    Few Klebsiella pneumoniae  Few Candida albicans  Few Routine respiratory keara present  Numerous white blood cells  Few Gram positive cocci in pairs  Few Gram Positive Rods      12-15 @ 22:31 .Urine     No growth      12-14 @ 21:58 .Blood     No growth at 5 days.      12-14 @ 13:53 .Catheter left IJ (internal jugular) catheter tip     No growth at 2 days.      12-14 @ 11:48 .Blood     No growth at 5 days.      12-12 @ 16:19 .Sputum     Few Routine respiratory keara present  Few White blood cells  Few Gram Positive Cocci in Pairs and Chains      12-11 @ 20:24 .Broncial bronchial fluid     No growth at 1 week.      12-11 @ 10:49 .Broncial bronchial fluid     Rare Candida albicans  Rare Routine respiratory keara present  Numerous white blood cells  No organisms seen      12-11 @ 10:47 .Broncial bronchial fluid     Candida albicans isolated  Culture in progress      12-09 @ 18:56 .Blood     No growth at 5 days.      12-08 @ 19:40 .Sputum Klebsiella pneumoniae    Moderate Klebsiella pneumoniae  No Routine respiratory keara present  Moderate WBC's  Few Yeast  Few Gram positive cocci in pairs        EXAM:  XR CHEST PORTABLE ROUTINE 1V                        PROCEDURE DATE:  12/21/2018    INTERPRETATION:  CHEST AP PORTABLE:  History: trach, pneumonia.   Date and time of exam: 12/21/2018 4:40 AM.  Technique: A single AP view of the chest was obtained.  Comparison exam: 12/20/2018 1:10 PM.  Findings:  There is a right IJ central line with the tip in the region of the distal   SVC. Tracheostomy tube unchanged. Nasogastric tube, unchanged. Persistent   bilateral airspace disease, unchanged. No evidence of pneumothorax..  Impression:  Persistent bilateral airspace disease, unchanged..

## 2018-12-23 NOTE — PROGRESS NOTE ADULT - SUBJECTIVE AND OBJECTIVE BOX
Patient's LFTs decreasing, no complaints this AM, no abd pain      MEDICATIONS  (STANDING):  ascorbic acid 500 milliGRAM(s) Oral daily  aspirin 325 milliGRAM(s) Oral daily  atorvastatin 10 milliGRAM(s) Oral at bedtime  chlorhexidine 0.12% Liquid 5 milliLiter(s) Oral Mucosa every 4 hours  clopidogrel Tablet 75 milliGRAM(s) Oral daily  enoxaparin Injectable 40 milliGRAM(s) SubCutaneous daily  ferrous    sulfate 325 milliGRAM(s) Oral daily  folic acid 1 milliGRAM(s) Oral daily  furosemide   Injectable 20 milliGRAM(s) IV Push every 8 hours  insulin lispro (HumaLOG) corrective regimen sliding scale   SubCutaneous every 6 hours  lactobacillus acidophilus 1 Tablet(s) Oral every 8 hours  meropenem  IVPB      meropenem  IVPB 1000 milliGRAM(s) IV Intermittent every 8 hours  metoclopramide Injectable 10 milliGRAM(s) IV Push every 8 hours  micafungin IVPB      micafungin IVPB 100 milliGRAM(s) IV Intermittent every 24 hours  multivitamin 1 Tablet(s) Oral daily  pantoprazole  Injectable 40 milliGRAM(s) IV Push every 12 hours  QUEtiapine 25 milliGRAM(s) Oral daily  sucralfate suspension 1 Gram(s) Oral two times a day    MEDICATIONS  (PRN):  acetaminophen    Suspension .. 650 milliGRAM(s) Oral every 6 hours PRN Temp greater or equal to 38.5C (101.3F)  ALBUTerol/ipratropium for Nebulization 3 milliLiter(s) Nebulizer every 6 hours PRN Shortness of Breath and/or Wheezing  lidocaine 2% Jelly 10 milliLiter(s) IntraUrethral daily PRN discomfort from augustin  ondansetron Injectable 4 milliGRAM(s) IV Push every 6 hours PRN Nausea and/or Vomiting  petrolatum white Ointment 1 Application(s) Topical four times a day PRN dry lips  QUEtiapine 12.5 milliGRAM(s) Oral every 8 hours PRN agitation      Vital Signs Last 24 Hrs  T(C): 38 (23 Dec 2018 09:00), Max: 38 (22 Dec 2018 16:00)  T(F): 100.4 (23 Dec 2018 09:00), Max: 100.4 (22 Dec 2018 16:00)  HR: 117 (23 Dec 2018 09:00) (94 - 117)  BP: 106/77 (23 Dec 2018 09:00) (92/65 - 177/86)  BP(mean): 87 (23 Dec 2018 09:00) (73 - 122)  RR: 24 (23 Dec 2018 09:00) (16 - 25)  SpO2: 99% (23 Dec 2018 09:00) (99% - 100%)    Constitutional: Patient sitting up comfortably  HEENT: NGT in place  Respiratory: no increased WOB  Cardiovascular: Well healing mid sternal scar present c/d/i  Gastrointestinal: Soft, non-tender, mildly distended  Neurological: GCS: 15 (4/5/6). A&O x 3; no gross sensory / motor / coordination deficits  Psychiatric: Normal mood, normal affect  Musculoskeletal: No joint pain, swelling or deformity      I&O's Detail    22 Dec 2018 07:01  -  23 Dec 2018 07:00  --------------------------------------------------------  IN:    Enteral Tube Flush: 540 mL    Solution: 150 mL    Solution: 200 mL    Solution: 100 mL    Solution: 100 mL  Total IN: 1090 mL    OUT:    Indwelling Catheter - Urethral: 2495 mL    Nasoenteral Tube: 50 mL    VAC (Vacuum Assisted Closure) System: 200 mL  Total OUT: 2745 mL    Total NET: -1655 mL      23 Dec 2018 07:01  -  23 Dec 2018 09:33  --------------------------------------------------------  IN:  Total IN: 0 mL    OUT:    Indwelling Catheter - Urethral: 90 mL  Total OUT: 90 mL    Total NET: -90 mL          LABS:                        9.4    8.1   )-----------( 274      ( 23 Dec 2018 03:23 )             31.4     12-23    143  |  104  |  23.0<H>  ----------------------------<  121<H>  4.1   |  28.0  |  0.48<L>    Ca    8.2<L>      23 Dec 2018 03:23  Phos  3.0     12-23  Mg     2.5     12-23    TPro  5.9<L>  /  Alb  2.8<L>  /  TBili  1.7  /  DBili  x   /  AST  101<H>  /  ALT  117<H>  /  AlkPhos  171<H>  12-23          RADIOLOGY & ADDITIONAL STUDIES:

## 2018-12-23 NOTE — PROGRESS NOTE ADULT - ASSESSMENT
48y/o man with no significant PMH admitted on 11/29/18 for CP for 2-3 days prior to admission,   found with STEMI. Urgent Cath, pt found to have multiple occlusions; VF arrest, shock x 1, return to NSR.  Upon transfer to OR for Urgent CABG, pt again VF arrest, chest opened intra-op with CPR in progress.  s/p ECMO and Impella; 12/3 ECMO explanted and Impella placed via right groin sheath.    Has been on antibiotics :  Vancomycin 11/29 to 12/20  Cipro 11/29 to 12/4  Zosyn 12/4 to 12/12 and 12/15 to 12/16  Meropenem 12/26 to present  Micafungin 12/18 to present      Fungemia  Pneumonia  Fever post op  s/p CABG   S/P tracheostomy       - Blood cultures with Candida Parapsilosis  - Repeat blood cultures pending, repeat daily till negative  - Continue Micafungin  - Needs DARION to r/o endocarditis   - Ophthalmology eval  - Low grade fever  - On Meropenem since 12/16 today day 8, probably not needed but d/w CT surg want to continue it  - Trend fever  - Trend leukocytosis    Will Follow

## 2018-12-23 NOTE — PROGRESS NOTE ADULT - ASSESSMENT
No clinical evidence for acute myriam;  OK for restart of Tube feedings:  Rapid increases from 30 to 45 to 60 cc/hr of the Pivot.  Eventual transition to regular diet as tolerated.    GI bleeding has resolved.  Prior  clipped shut.  Same PPI.    Ileus resolved.    LFT's nonspecific.  Better.

## 2018-12-23 NOTE — PROGRESS NOTE ADULT - ASSESSMENT
48 yo Male c/o CP for 2-3 days prior to admission, then 10/10 chest pain approximately 9:30 am yesterday, followed by vomiting.  Wife drove pt to hospital, ruled in for STEMI. Urgent Cath, pt found to have multiple occlusions; stent to LAD and D2; stent to LAD thrombosed,  impella placed for support.  Pt then had VF arrest, shock x 1, return to NSR.  Upon transfer to OR for Urgent CABG, pt with asystolic arrest, chest opened intra-op with CPR in progress. Pt underwent C4V (LAD, Diag, OM and PDA) with inability to wean from CPB therefore requiring VA ECMO (central cannulation) and IABP, he was transferred to CICU.  12/3 s/p ECMO explant and IABP d/c'd, placement of impella via R groin sheath.   12/6 Impella removal via right femoral cutdown with primary repair of femoral artery and stent placed to external iliac artery for dissection.  12/14 + ileus.  12/15 ileus improved, septic and cardiogenic shock.    12/19 + UGIB.   12/20 EGD (+ bleeding stomach ulcer)  12/21 ileus, made NPO   12/22 RUQ consistent with cholecystis, sx consulted no impressed, examination benign     Hospital course notable for (in addition to above): acute systolic heart failure, vent dependent respiratory failure (inability to wean also contributed to by severe agitation, now s/p trach), persistent fevers (one blood culture with fungemia, other cultures unremarkable), acute blood loss anemia, hypokalemia, ileus

## 2018-12-24 LAB
ALBUMIN SERPL ELPH-MCNC: 2.9 G/DL — LOW (ref 3.3–5.2)
ALP SERPL-CCNC: 192 U/L — HIGH (ref 40–120)
ALT FLD-CCNC: 109 U/L — HIGH
AMYLASE P1 CFR SERPL: 107 U/L — SIGNIFICANT CHANGE UP (ref 36–128)
ANION GAP SERPL CALC-SCNC: 11 MMOL/L — SIGNIFICANT CHANGE UP (ref 5–17)
AST SERPL-CCNC: 116 U/L — HIGH
BILIRUB SERPL-MCNC: 1.6 MG/DL — SIGNIFICANT CHANGE UP (ref 0.4–2)
BLD GP AB SCN SERPL QL: SIGNIFICANT CHANGE UP
BUN SERPL-MCNC: 27 MG/DL — HIGH (ref 8–20)
CALCIUM SERPL-MCNC: 8.3 MG/DL — LOW (ref 8.6–10.2)
CHLORIDE SERPL-SCNC: 106 MMOL/L — SIGNIFICANT CHANGE UP (ref 98–107)
CO2 SERPL-SCNC: 25 MMOL/L — SIGNIFICANT CHANGE UP (ref 22–29)
CREAT SERPL-MCNC: 0.52 MG/DL — SIGNIFICANT CHANGE UP (ref 0.5–1.3)
GLUCOSE BLDC GLUCOMTR-MCNC: 145 MG/DL — HIGH (ref 70–99)
GLUCOSE BLDC GLUCOMTR-MCNC: 151 MG/DL — HIGH (ref 70–99)
GLUCOSE BLDC GLUCOMTR-MCNC: 156 MG/DL — HIGH (ref 70–99)
GLUCOSE BLDC GLUCOMTR-MCNC: 182 MG/DL — HIGH (ref 70–99)
GLUCOSE SERPL-MCNC: 164 MG/DL — HIGH (ref 70–115)
HCT VFR BLD CALC: 33.6 % — LOW (ref 42–52)
HGB BLD-MCNC: 10.4 G/DL — LOW (ref 14–18)
LIDOCAIN IGE QN: 217 U/L — HIGH (ref 22–51)
MAGNESIUM SERPL-MCNC: 2.2 MG/DL — SIGNIFICANT CHANGE UP (ref 1.6–2.6)
MCHC RBC-ENTMCNC: 27.1 PG — SIGNIFICANT CHANGE UP (ref 27–31)
MCHC RBC-ENTMCNC: 31 G/DL — LOW (ref 32–36)
MCV RBC AUTO: 87.5 FL — SIGNIFICANT CHANGE UP (ref 80–94)
METHOD TYPE: SIGNIFICANT CHANGE UP
METHOD TYPE: SIGNIFICANT CHANGE UP
PHOSPHATE SERPL-MCNC: 2.6 MG/DL — SIGNIFICANT CHANGE UP (ref 2.4–4.7)
PLATELET # BLD AUTO: 306 K/UL — SIGNIFICANT CHANGE UP (ref 150–400)
POTASSIUM SERPL-MCNC: 3.9 MMOL/L — SIGNIFICANT CHANGE UP (ref 3.5–5.3)
POTASSIUM SERPL-SCNC: 3.9 MMOL/L — SIGNIFICANT CHANGE UP (ref 3.5–5.3)
PROT SERPL-MCNC: 6.2 G/DL — LOW (ref 6.6–8.7)
RBC # BLD: 3.84 M/UL — LOW (ref 4.6–6.2)
RBC # FLD: 15.4 % — SIGNIFICANT CHANGE UP (ref 11–15.6)
SODIUM SERPL-SCNC: 142 MMOL/L — SIGNIFICANT CHANGE UP (ref 135–145)
TYPE + AB SCN PNL BLD: SIGNIFICANT CHANGE UP
WBC # BLD: 8.2 K/UL — SIGNIFICANT CHANGE UP (ref 4.8–10.8)
WBC # FLD AUTO: 8.2 K/UL — SIGNIFICANT CHANGE UP (ref 4.8–10.8)

## 2018-12-24 PROCEDURE — 99232 SBSQ HOSP IP/OBS MODERATE 35: CPT

## 2018-12-24 PROCEDURE — 99233 SBSQ HOSP IP/OBS HIGH 50: CPT

## 2018-12-24 PROCEDURE — 99223 1ST HOSP IP/OBS HIGH 75: CPT | Mod: GC

## 2018-12-24 RX ORDER — PSEUDOEPHEDRINE HCL 30 MG
30 TABLET ORAL ONCE
Qty: 0 | Refills: 0 | Status: DISCONTINUED | OUTPATIENT
Start: 2018-12-24 | End: 2018-12-24

## 2018-12-24 RX ORDER — LORATADINE 10 MG/1
10 TABLET ORAL ONCE
Qty: 0 | Refills: 0 | Status: COMPLETED | OUTPATIENT
Start: 2018-12-24 | End: 2018-12-24

## 2018-12-24 RX ORDER — LANOLIN ALCOHOL/MO/W.PET/CERES
5 CREAM (GRAM) TOPICAL AT BEDTIME
Qty: 0 | Refills: 0 | Status: DISCONTINUED | OUTPATIENT
Start: 2018-12-24 | End: 2018-12-25

## 2018-12-24 RX ORDER — FENTANYL CITRATE 50 UG/ML
25 INJECTION INTRAVENOUS ONCE
Qty: 0 | Refills: 0 | Status: DISCONTINUED | OUTPATIENT
Start: 2018-12-24 | End: 2018-12-25

## 2018-12-24 RX ORDER — ACETAMINOPHEN 500 MG
650 TABLET ORAL ONCE
Qty: 0 | Refills: 0 | Status: COMPLETED | OUTPATIENT
Start: 2018-12-24 | End: 2018-12-24

## 2018-12-24 RX ORDER — CARVEDILOL PHOSPHATE 80 MG/1
3.12 CAPSULE, EXTENDED RELEASE ORAL EVERY 12 HOURS
Qty: 0 | Refills: 0 | Status: DISCONTINUED | OUTPATIENT
Start: 2018-12-24 | End: 2019-01-07

## 2018-12-24 RX ORDER — POTASSIUM CHLORIDE 20 MEQ
10 PACKET (EA) ORAL
Qty: 0 | Refills: 0 | Status: COMPLETED | OUTPATIENT
Start: 2018-12-24 | End: 2018-12-24

## 2018-12-24 RX ADMIN — Medication 650 MILLIGRAM(S): at 03:25

## 2018-12-24 RX ADMIN — Medication 1 MILLIGRAM(S): at 11:40

## 2018-12-24 RX ADMIN — Medication 1 GRAM(S): at 19:04

## 2018-12-24 RX ADMIN — Medication 5 MILLILITER(S): at 14:01

## 2018-12-24 RX ADMIN — Medication 1 TABLET(S): at 22:16

## 2018-12-24 RX ADMIN — MEROPENEM 100 MILLIGRAM(S): 1 INJECTION INTRAVENOUS at 06:27

## 2018-12-24 RX ADMIN — Medication 20 MILLIGRAM(S): at 14:00

## 2018-12-24 RX ADMIN — Medication 2: at 06:33

## 2018-12-24 RX ADMIN — CHLORHEXIDINE GLUCONATE 5 MILLILITER(S): 213 SOLUTION TOPICAL at 14:00

## 2018-12-24 RX ADMIN — Medication 20 MILLIGRAM(S): at 22:16

## 2018-12-24 RX ADMIN — FENTANYL CITRATE 25 MICROGRAM(S): 50 INJECTION INTRAVENOUS at 23:30

## 2018-12-24 RX ADMIN — Medication 325 MILLIGRAM(S): at 11:40

## 2018-12-24 RX ADMIN — Medication 20 MILLIGRAM(S): at 06:27

## 2018-12-24 RX ADMIN — Medication 1 TABLET(S): at 14:00

## 2018-12-24 RX ADMIN — Medication 500 MILLIGRAM(S): at 11:40

## 2018-12-24 RX ADMIN — FENTANYL CITRATE 25 MICROGRAM(S): 50 INJECTION INTRAVENOUS at 23:15

## 2018-12-24 RX ADMIN — PANTOPRAZOLE SODIUM 40 MILLIGRAM(S): 20 TABLET, DELAYED RELEASE ORAL at 17:40

## 2018-12-24 RX ADMIN — Medication 10 MILLIGRAM(S): at 14:01

## 2018-12-24 RX ADMIN — CHLORHEXIDINE GLUCONATE 5 MILLILITER(S): 213 SOLUTION TOPICAL at 22:17

## 2018-12-24 RX ADMIN — Medication 1 TABLET(S): at 06:26

## 2018-12-24 RX ADMIN — Medication 100 MILLIEQUIVALENT(S): at 03:42

## 2018-12-24 RX ADMIN — CHLORHEXIDINE GLUCONATE 5 MILLILITER(S): 213 SOLUTION TOPICAL at 11:39

## 2018-12-24 RX ADMIN — Medication 10 MILLIGRAM(S): at 06:33

## 2018-12-24 RX ADMIN — PANTOPRAZOLE SODIUM 40 MILLIGRAM(S): 20 TABLET, DELAYED RELEASE ORAL at 06:26

## 2018-12-24 RX ADMIN — CHLORHEXIDINE GLUCONATE 5 MILLILITER(S): 213 SOLUTION TOPICAL at 19:04

## 2018-12-24 RX ADMIN — Medication 650 MILLIGRAM(S): at 11:39

## 2018-12-24 RX ADMIN — MEROPENEM 100 MILLIGRAM(S): 1 INJECTION INTRAVENOUS at 14:01

## 2018-12-24 RX ADMIN — Medication 10 MILLIGRAM(S): at 22:16

## 2018-12-24 RX ADMIN — CARVEDILOL PHOSPHATE 3.12 MILLIGRAM(S): 80 CAPSULE, EXTENDED RELEASE ORAL at 06:26

## 2018-12-24 RX ADMIN — CHLORHEXIDINE GLUCONATE 5 MILLILITER(S): 213 SOLUTION TOPICAL at 01:58

## 2018-12-24 RX ADMIN — Medication 300 MILLIGRAM(S): at 14:01

## 2018-12-24 RX ADMIN — Medication 2: at 12:02

## 2018-12-24 RX ADMIN — MICAFUNGIN SODIUM 105 MILLIGRAM(S): 100 INJECTION, POWDER, LYOPHILIZED, FOR SOLUTION INTRAVENOUS at 12:14

## 2018-12-24 RX ADMIN — Medication 650 MILLIGRAM(S): at 12:20

## 2018-12-24 RX ADMIN — ENOXAPARIN SODIUM 40 MILLIGRAM(S): 100 INJECTION SUBCUTANEOUS at 11:39

## 2018-12-24 RX ADMIN — CARVEDILOL PHOSPHATE 3.12 MILLIGRAM(S): 80 CAPSULE, EXTENDED RELEASE ORAL at 19:04

## 2018-12-24 RX ADMIN — Medication 650 MILLIGRAM(S): at 19:40

## 2018-12-24 RX ADMIN — ATORVASTATIN CALCIUM 10 MILLIGRAM(S): 80 TABLET, FILM COATED ORAL at 22:16

## 2018-12-24 RX ADMIN — Medication 650 MILLIGRAM(S): at 19:24

## 2018-12-24 RX ADMIN — QUETIAPINE FUMARATE 25 MILLIGRAM(S): 200 TABLET, FILM COATED ORAL at 11:40

## 2018-12-24 RX ADMIN — Medication 1 GRAM(S): at 06:26

## 2018-12-24 RX ADMIN — MEROPENEM 100 MILLIGRAM(S): 1 INJECTION INTRAVENOUS at 22:17

## 2018-12-24 RX ADMIN — CLOPIDOGREL BISULFATE 75 MILLIGRAM(S): 75 TABLET, FILM COATED ORAL at 11:40

## 2018-12-24 RX ADMIN — Medication 100 MILLIEQUIVALENT(S): at 04:14

## 2018-12-24 RX ADMIN — CHLORHEXIDINE GLUCONATE 5 MILLILITER(S): 213 SOLUTION TOPICAL at 06:26

## 2018-12-24 RX ADMIN — Medication 2: at 17:43

## 2018-12-24 RX ADMIN — Medication 650 MILLIGRAM(S): at 04:25

## 2018-12-24 NOTE — PROGRESS NOTE ADULT - PROBLEM SELECTOR PLAN 5
s/p trach  trach collar x 2 episodes yesterday  CPAP PS 10/5 to rest overnight  attempt trach collar again this morning

## 2018-12-24 NOTE — PROGRESS NOTE ADULT - SUBJECTIVE AND OBJECTIVE BOX
INTERVAL HPI/OVERNIGHT EVENTS:FU for GI bleeding and ileus and elevated LFTs. No more bleeding. Tolerated the NG tube feeding. Had a BM. No abdominal pain. LFTs still up but stable. Noted to have candidemia. ID on board.     MEDICATIONS  (STANDING):  ascorbic acid 500 milliGRAM(s) Oral daily  aspirin 325 milliGRAM(s) Oral daily  atorvastatin 10 milliGRAM(s) Oral at bedtime  carvedilol 3.125 milliGRAM(s) Oral every 12 hours  chlorhexidine 0.12% Liquid 5 milliLiter(s) Oral Mucosa every 4 hours  clopidogrel Tablet 75 milliGRAM(s) Oral daily  enoxaparin Injectable 40 milliGRAM(s) SubCutaneous daily  ferrous    sulfate Liquid 300 milliGRAM(s) Enteral Tube daily  folic acid 1 milliGRAM(s) Oral daily  furosemide   Injectable 20 milliGRAM(s) IV Push every 8 hours  insulin lispro (HumaLOG) corrective regimen sliding scale   SubCutaneous every 6 hours  lactobacillus acidophilus 1 Tablet(s) Oral every 8 hours  meropenem  IVPB      meropenem  IVPB 1000 milliGRAM(s) IV Intermittent every 8 hours  metoclopramide   Syrup 10 milliGRAM(s) Oral every 8 hours  micafungin IVPB      micafungin IVPB 100 milliGRAM(s) IV Intermittent every 24 hours  multivitamin   Solution 5 milliLiter(s) Enteral Tube daily  pantoprazole  Injectable 40 milliGRAM(s) IV Push every 12 hours  QUEtiapine 25 milliGRAM(s) Oral daily  sucralfate suspension 1 Gram(s) Oral two times a day    MEDICATIONS  (PRN):  acetaminophen    Suspension .. 650 milliGRAM(s) Oral every 6 hours PRN Temp greater or equal to 38.5C (101.3F)  ondansetron Injectable 4 milliGRAM(s) IV Push every 6 hours PRN Nausea and/or Vomiting  QUEtiapine 12.5 milliGRAM(s) Oral every 8 hours PRN agitation      Allergies    penicillins (Unknown)    Intolerances        Vital Signs Last 24 Hrs  T(C): 37.7 (24 Dec 2018 07:00), Max: 38.5 (24 Dec 2018 03:25)  T(F): 99.9 (24 Dec 2018 07:00), Max: 101.3 (24 Dec 2018 03:25)  HR: 106 (24 Dec 2018 08:22) (101 - 119)  BP: 111/72 (24 Dec 2018 08:00) (92/68 - 114/78)  BP(mean): 88 (24 Dec 2018 08:00) (72 - 92)  RR: 15 (24 Dec 2018 08:00) (15 - 32)  SpO2: 99% (24 Dec 2018 08:22) (98% - 100%)    LABS:                        10.4   8.2   )-----------( 306      ( 24 Dec 2018 02:56 )             33.6     12-24    142  |  106  |  27.0<H>  ----------------------------<  164<H>  3.9   |  25.0  |  0.52    Ca    8.3<L>      24 Dec 2018 02:56  Phos  2.6     12-24  Mg     2.2     12-24    TPro  6.2<L>  /  Alb  2.9<L>  /  TBili  1.6  /  DBili  x   /  AST  116<H>  /  ALT  109<H>  /  AlkPhos  192<H>  12-24          RADIOLOGY & ADDITIONAL TESTS:

## 2018-12-24 NOTE — CONSULT NOTE ADULT - SUBJECTIVE AND OBJECTIVE BOX
48yM was admitted on 11-29  Patient is a 48y old  Male who presents with a chief complaint of STEMI/ARREST.    HPI:  Mr. Winters is a 46 y/o male no significant PMH; recent ABT (doxycycline) for cellulitis to right groin per patient. He presented to the ED on 11/29 with 10/10 chest pain; he drank water with no relief; pain started to progress to B/L shoulders. Per his spouse he had chest pain x 2-3 days, and vomited the morning of presentation. On EKG found to have ST elevations in leads V1-V5 with reciprocal changes in II, III, AVF.  Underwent urgent cath; found to have multiple occlusions. S/p VF arrest, shock x 1, return to NSR. Upon transfer to OR for Urgent CABG, patient had VF arrest again; chest opened intra-op with CPR in progress; underwent 4 vessel CABG. On 12/3 ECMO explanted and Impella placed via right groin sheath. Impella removed 12/6. S/p trach 12/10 (now on trach collar). 12/14 found to have ileus. Course complicated by sepsis/cardiogenic shock on12/15, requiring pressors; treated for pneumonia/fungemia. Course also complicated by bleeding stomach ulcer, s/p EGD on 12/19  (injected with epi and hemoclip placed x 4). Continue ileus on abdominal xray on 12/21. Abdominal US on 12/22 showed: splenomegaly; gallbladder wall thickening with sludge and pericholecystic edema suggesting acute cholecystitis; per surgery consult not consistent with cholecystitis. Patient now on tube feeds.    Imaging showed:  HEAD CT - No acute findings (12/8/18)    REVIEW OF SYSTEMS  Constitutional - No fever, No weight loss, No fatigue  HEENT - No eye pain, No visual disturbances, No difficulty hearing, No tinnitus, No vertigo, No neck pain  Respiratory - No cough, No wheezing, No shortness of breath  Cardiovascular - No chest pain, No palpitations  Gastrointestinal - No abdominal pain, No nausea, No vomiting, No diarrhea, No constipation  Genitourinary - No dysuria, No frequency, No hematuria, No incontinence  Neurological - No headaches, No memory loss, No loss of strength, No numbness, No tremors  Skin - No itching, No rashes, No lesions   Endocrine - No temperature intolerance  Musculoskeletal - No joint pain, No joint swelling, No muscle pain  Psychiatric - No depression, No anxiety    VITALS  T(C): 37.7 (12-24-18 @ 07:00), Max: 38.5 (12-24-18 @ 03:25)  HR: 106 (12-24-18 @ 08:22) (101 - 119)  BP: 111/72 (12-24-18 @ 08:00) (92/68 - 114/78)  RR: 15 (12-24-18 @ 08:00) (15 - 32)  SpO2: 99% (12-24-18 @ 08:22) (98% - 100%)  Wt(kg): --    PAST MEDICAL & SURGICAL HISTORY  Staph infection  No pertinent past medical history  No significant past surgical history      SOCIAL HISTORY  Smoking - Denied  EtOH - Denied   Drugs - Denied    FUNCTIONAL HISTORY  Lives   Independent    CURRENT FUNCTIONAL STATUS  Active and active assisted excercises     FAMILY HISTORY   Family history of myocardial infarction (Mother)  No pertinent family history in first degree relatives      RECENT LABS/IMAGING  CBC Full  -  ( 24 Dec 2018 02:56 )  WBC Count : 8.2 K/uL  Hemoglobin : 10.4 g/dL  Hematocrit : 33.6 %  Platelet Count - Automated : 306 K/uL  Mean Cell Volume : 87.5 fl  Mean Cell Hemoglobin : 27.1 pg  Mean Cell Hemoglobin Concentration : 31.0 g/dL    12-24    142  |  106  |  27.0<H>  ----------------------------<  164<H>  3.9   |  25.0  |  0.52    Ca    8.3<L>      24 Dec 2018 02:56  Phos  2.6     12-24  Mg     2.2     12-24    TPro  6.2<L>  /  Alb  2.9<L>  /  TBili  1.6  /  DBili  x   /  AST  116<H>  /  ALT  109<H>  /  AlkPhos  192<H>  12-24    ALLERGIES  penicillins (Unknown)    MEDICATIONS  (STANDING):  ascorbic acid 500 milliGRAM(s) Oral daily  aspirin 325 milliGRAM(s) Oral daily  atorvastatin 10 milliGRAM(s) Oral at bedtime  carvedilol 3.125 milliGRAM(s) Oral every 12 hours  chlorhexidine 0.12% Liquid 5 milliLiter(s) Oral Mucosa every 4 hours  clopidogrel Tablet 75 milliGRAM(s) Oral daily  enoxaparin Injectable 40 milliGRAM(s) SubCutaneous daily  ferrous    sulfate Liquid 300 milliGRAM(s) Enteral Tube daily  folic acid 1 milliGRAM(s) Oral daily  furosemide   Injectable 20 milliGRAM(s) IV Push every 8 hours  insulin lispro (HumaLOG) corrective regimen sliding scale   SubCutaneous every 6 hours  lactobacillus acidophilus 1 Tablet(s) Oral every 8 hours  meropenem  IVPB      meropenem  IVPB 1000 milliGRAM(s) IV Intermittent every 8 hours  metoclopramide   Syrup 10 milliGRAM(s) Oral every 8 hours  micafungin IVPB      micafungin IVPB 100 milliGRAM(s) IV Intermittent every 24 hours  multivitamin   Solution 5 milliLiter(s) Enteral Tube daily  pantoprazole  Injectable 40 milliGRAM(s) IV Push every 12 hours  QUEtiapine 25 milliGRAM(s) Oral daily  sucralfate suspension 1 Gram(s) Oral two times a day    MEDICATIONS  (PRN):  acetaminophen    Suspension .. 650 milliGRAM(s) Oral every 6 hours PRN Temp greater or equal to 38.5C (101.3F)  ondansetron Injectable 4 milliGRAM(s) IV Push every 6 hours PRN Nausea and/or Vomiting  QUEtiapine 12.5 milliGRAM(s) Oral every 8 hours PRN agitation 48yM was admitted on 11-29  Patient is a 48y old  Male who presents with a chief complaint of STEMI/ARREST.    HPI:  Mr. Winters is a 46 y/o male no significant PMH; recent ABT (doxycycline) for cellulitis to right groin per patient. He presented to the ED on 11/29 with 10/10 chest pain; he drank water with no relief; pain started to progress to B/L shoulders. Per his spouse he had chest pain x 2-3 days, and vomited the morning of presentation. On EKG found to have ST elevations in leads V1-V5 with reciprocal changes in II, III, AVF.  Underwent urgent cath; found to have multiple occlusions. S/p VF arrest, shock x 1, return to NSR. Upon transfer to OR for Urgent CABG, patient had VF arrest again; chest opened intra-op with CPR in progress; underwent 4 vessel CABG. On 12/3 ECMO explanted and Impella placed via right groin sheath. Impella removed 12/6. S/p trach 12/10 (now on trach collar). 12/14 found to have ileus. Course complicated by sepsis/cardiogenic shock on12/15, requiring pressors; being treated for pneumonia/fungemia. Course also complicated by bleeding stomach ulcer, s/p EGD on 12/19  (injected with epi and hemoclip placed x 4). Continue ileus on abdominal xray on 12/21. Abdominal US on 12/22 showed: splenomegaly; gallbladder wall thickening with sludge and pericholecystic edema suggesting acute cholecystitis; per surgery consult not consistent with clinical cholecystitis, no surgical intervention. Patient now on tube feeds.    Imaging showed:  HEAD CT - No acute findings (12/8/18)    REVIEW OF SYSTEMS  Constitutional - No fever, No weight loss, No fatigue  HEENT - No eye pain, No visual disturbances, No difficulty hearing, No tinnitus, No vertigo, No neck pain  Respiratory - No cough, No wheezing, No shortness of breath  Cardiovascular - No chest pain, No palpitations  Gastrointestinal - No abdominal pain, No nausea, No vomiting, No diarrhea, No constipation  Genitourinary - No dysuria, No frequency, No hematuria, No incontinence  Neurological - No headaches, No memory loss, No loss of strength, No numbness, No tremors  Skin - No itching, No rashes, No lesions   Endocrine - No temperature intolerance  Musculoskeletal - No joint pain, No joint swelling, No muscle pain  Psychiatric - No depression, No anxiety    VITALS  T(C): 37.7 (12-24-18 @ 07:00), Max: 38.5 (12-24-18 @ 03:25)  HR: 106 (12-24-18 @ 08:22) (101 - 119)  BP: 111/72 (12-24-18 @ 08:00) (92/68 - 114/78)  RR: 15 (12-24-18 @ 08:00) (15 - 32)  SpO2: 99% (12-24-18 @ 08:22) (98% - 100%)  Wt(kg): --    PAST MEDICAL & SURGICAL HISTORY  Staph infection  No pertinent past medical history  No significant past surgical history    SOCIAL HISTORY  Smoking - Denied  EtOH - Denied   Drugs - Denied    FUNCTIONAL HISTORY  Lives with spouse in private home, minimal steps  Independent ADLs and ambulation PTA        CURRENT FUNCTIONAL STATUS  Active and active assisted exercises     FAMILY HISTORY   Family history of myocardial infarction (Mother)  No pertinent family history in first degree relatives      RECENT LABS/IMAGING  CBC Full  -  ( 24 Dec 2018 02:56 )  WBC Count : 8.2 K/uL  Hemoglobin : 10.4 g/dL  Hematocrit : 33.6 %  Platelet Count - Automated : 306 K/uL  Mean Cell Volume : 87.5 fl  Mean Cell Hemoglobin : 27.1 pg  Mean Cell Hemoglobin Concentration : 31.0 g/dL    12-24    142  |  106  |  27.0<H>  ----------------------------<  164<H>  3.9   |  25.0  |  0.52    Ca    8.3<L>      24 Dec 2018 02:56  Phos  2.6     12-24  Mg     2.2     12-24    TPro  6.2<L>  /  Alb  2.9<L>  /  TBili  1.6  /  DBili  x   /  AST  116<H>  /  ALT  109<H>  /  AlkPhos  192<H>  12-24    ALLERGIES  penicillins (Unknown)    MEDICATIONS  (STANDING):  ascorbic acid 500 milliGRAM(s) Oral daily  aspirin 325 milliGRAM(s) Oral daily  atorvastatin 10 milliGRAM(s) Oral at bedtime  carvedilol 3.125 milliGRAM(s) Oral every 12 hours  chlorhexidine 0.12% Liquid 5 milliLiter(s) Oral Mucosa every 4 hours  clopidogrel Tablet 75 milliGRAM(s) Oral daily  enoxaparin Injectable 40 milliGRAM(s) SubCutaneous daily  ferrous    sulfate Liquid 300 milliGRAM(s) Enteral Tube daily  folic acid 1 milliGRAM(s) Oral daily  furosemide   Injectable 20 milliGRAM(s) IV Push every 8 hours  insulin lispro (HumaLOG) corrective regimen sliding scale   SubCutaneous every 6 hours  lactobacillus acidophilus 1 Tablet(s) Oral every 8 hours  meropenem  IVPB      meropenem  IVPB 1000 milliGRAM(s) IV Intermittent every 8 hours  metoclopramide   Syrup 10 milliGRAM(s) Oral every 8 hours  micafungin IVPB      micafungin IVPB 100 milliGRAM(s) IV Intermittent every 24 hours  multivitamin   Solution 5 milliLiter(s) Enteral Tube daily  pantoprazole  Injectable 40 milliGRAM(s) IV Push every 12 hours  QUEtiapine 25 milliGRAM(s) Oral daily  sucralfate suspension 1 Gram(s) Oral two times a day    MEDICATIONS  (PRN):  acetaminophen    Suspension .. 650 milliGRAM(s) Oral every 6 hours PRN Temp greater or equal to 38.5C (101.3F)  ondansetron Injectable 4 milliGRAM(s) IV Push every 6 hours PRN Nausea and/or Vomiting  QUEtiapine 12.5 milliGRAM(s) Oral every 8 hours PRN agitation    ----------------------------------------------------------------------------------------  PHYSICAL EXAM  Constitutional - NAD, Comfortable  HEENT - NCAT, EOMI  Neck - Supple, No limited ROM  Chest - Breathing comfortably, No wheezing, + trach collar   Cardiovascular - S1S2   Abdomen - Soft   Extremities - No C/C/E, No calf tenderness   Neurologic Exam -                    Cognitive - Awake, Alert, AAO to self, place, date     Communication - Able to mouth words     Cranial Nerves - CN 2-12 intact     Motor - No focal deficits, at least antigravity throughout, difficulty following complex commands during motor testing     Sensory - Intact to LT  Psychiatric - Mood stable, Affect WNL  ----------------------------------------------------------------------------------------  ASSESSMENT/PLAN  48y Male with functional deficits after STEMI, cardiac arrest s/p CABG, followed by complicated hospital course including need for trach, ileus/tube feeds, GI bleed, sepsis.   Pain - tylenol prn  DVT PPX - lovenox SQ  Rehab - Recommend ACUTE inpatient rehabilitation for the functional deficits consisting of 3 hours of therapy/day & 24 hour RN/daily PMR physician for comorbid medical management. Will continue to follow for ongoing rehab needs and recommendations.

## 2018-12-24 NOTE — CONSULT NOTE ADULT - CONSULT REQUESTED DATE/TIME
11-Dec-2018 15:09
04-Dec-2018 17:14
12-Dec-2018 14:55
14-Dec-2018 16:38
19-Dec-2018 07:24
22-Dec-2018
24-Dec-2018 09:19
29-Nov-2018

## 2018-12-24 NOTE — CONSULT NOTE ADULT - CONSULT REQUESTED BY NAME
Arnold/CTS
CICU Team
CTICU
Dr Oleksandr Barrett
Dr. Barrett
Dr. Barrett
Dr. Shen
Oleksandr Barrett MD

## 2018-12-24 NOTE — PROGRESS NOTE ADULT - PROBLEM SELECTOR PLAN 2
cultures as above  continue mycofungin for fungemia  vancomycin d/c'd by ID, no evidence of MRSA  continue meropenem for now  tylenol PRN and cooling blanket PRN for fevers  concern for sinusitis since NGT in place (will need PEG, re-evaluate this week)  repeat Bcx this AM

## 2018-12-24 NOTE — PROGRESS NOTE ADULT - PROBLEM SELECTOR PLAN 8
continue aspirin, plavix and statin for graft patency   Lovenox and SCDs for DVT prophylaxis  no history of DM but BG remain mildly elevated and continue FS Q6 with coverage

## 2018-12-24 NOTE — PROGRESS NOTE ADULT - ASSESSMENT
46y/o man with no significant PMH admitted on 11/29/18 for CP for 2-3 days prior to admission,   found with STEMI. Urgent Cath, pt found to have multiple occlusions; VF arrest, shock x 1, return to NSR.  Upon transfer to OR for Urgent CABG, pt again VF arrest, chest opened intra-op with CPR in progress.  s/p ECMO and Impella; 12/3 ECMO explanted and Impella placed via right groin sheath.    Has been on antibiotics :  Vancomycin 11/29 to 12/20  Cipro 11/29 to 12/4  Zosyn 12/4 to 12/12 and 12/15 to 12/16  Meropenem 12/26 to present  Micafungin 12/18 to present      Fungemia  Pneumonia  Fevers  Fever post op  s/p CABG   S/P tracheostomy       - Fever on 12/24 - repeat blood cultures pending  - Blood cultures with Candida Parapsilosis  - Repeat blood cultures pending, repeat daily till negative  - Continue Micafungin  - Needs DARION to r/o endocarditis   - Ophthalmology eval  - Low grade fever  - On Meropenem since 12/16 today day 8, probably not needed but d/w CT surg want to continue it  - Trend fever  - Trend leukocytosis    Will Follow

## 2018-12-24 NOTE — CONSULT NOTE ADULT - CONSULT REASON
ICU delerium?
Abdominal distension/pain
Abdominal distention, nausea and vomiting
Hematemesis
Management of hyperglycemia
Rehab goals
STEMI, need for urgent CABG
fevers

## 2018-12-24 NOTE — CHART NOTE - NSCHARTNOTEFT_GEN_A_CORE
pt stayed on CPAP 5/ps10 settings till 2 :45 am and placed back on AC vent settings due to pt c/o hot and sob, RN aware, pt back on cpap settings at 630 a.m as ordered. pt sitting in chair without any difficulties. RN aware of changes

## 2018-12-24 NOTE — PROGRESS NOTE ADULT - SUBJECTIVE AND OBJECTIVE BOX
INTERVAL HPI/OVERNIGHT EVENTS: No acute events overnight. Tube feeds started yesterday and are currently at goal , patient tolerating well. Denies N/V/Abdominal pain.     STATUS POST: 11/29  STEMI /arrest, impella placed, 4V CABG, on ecmo post/op w/ open chest. 12/6 impella removed, r femoral artery repair of enterotomy after cutdown, angiogram and stent  R EIA, 12/19 gastric bleeder, inj epi and clipped        SUBJECTIVE:  Flatus: YES              Bowel Movement: [ ] YES [ ] NO  Pain Control Adequate: YES   Nausea:  NO            Vomiting: NO    MEDICATIONS  (STANDING):  ascorbic acid 500 milliGRAM(s) Oral daily  aspirin 325 milliGRAM(s) Oral daily  atorvastatin 10 milliGRAM(s) Oral at bedtime  carvedilol 3.125 milliGRAM(s) Oral every 12 hours  chlorhexidine 0.12% Liquid 5 milliLiter(s) Oral Mucosa every 4 hours  clopidogrel Tablet 75 milliGRAM(s) Oral daily  enoxaparin Injectable 40 milliGRAM(s) SubCutaneous daily  ferrous    sulfate Liquid 300 milliGRAM(s) Enteral Tube daily  folic acid 1 milliGRAM(s) Oral daily  furosemide   Injectable 20 milliGRAM(s) IV Push every 8 hours  insulin lispro (HumaLOG) corrective regimen sliding scale   SubCutaneous every 6 hours  lactobacillus acidophilus 1 Tablet(s) Oral every 8 hours  meropenem  IVPB      meropenem  IVPB 1000 milliGRAM(s) IV Intermittent every 8 hours  metoclopramide   Syrup 10 milliGRAM(s) Oral every 8 hours  micafungin IVPB      micafungin IVPB 100 milliGRAM(s) IV Intermittent every 24 hours  multivitamin   Solution 5 milliLiter(s) Enteral Tube daily  pantoprazole  Injectable 40 milliGRAM(s) IV Push every 12 hours  QUEtiapine 25 milliGRAM(s) Oral daily  sucralfate suspension 1 Gram(s) Oral two times a day    MEDICATIONS  (PRN):  acetaminophen    Suspension .. 650 milliGRAM(s) Oral every 6 hours PRN Temp greater or equal to 38.5C (101.3F)  ondansetron Injectable 4 milliGRAM(s) IV Push every 6 hours PRN Nausea and/or Vomiting  QUEtiapine 12.5 milliGRAM(s) Oral every 8 hours PRN agitation      Vital Signs Last 24 Hrs  T(C): 37.7 (24 Dec 2018 07:00), Max: 38.5 (24 Dec 2018 03:25)  T(F): 99.9 (24 Dec 2018 07:00), Max: 101.3 (24 Dec 2018 03:25)  HR: 106 (24 Dec 2018 08:22) (101 - 119)  BP: 111/82 (24 Dec 2018 07:00) (92/68 - 114/78)  BP(mean): 92 (24 Dec 2018 07:00) (72 - 92)  RR: 18 (24 Dec 2018 07:00) (15 - 32)  SpO2: 99% (24 Dec 2018 08:22) (98% - 100%)    PHYSICAL EXAM:      Constitutional: in good spirits      Gastrointestinal: Peg tube well seated, abdomen softly distended, no pain on palpation, +flatus             I&O's Detail    23 Dec 2018 07:01  -  24 Dec 2018 07:00  --------------------------------------------------------  IN:    Enteral Tube Flush: 540 mL    Nepro: 650 mL    Solution: 300 mL    Solution: 200 mL  Total IN: 1690 mL    OUT:    Indwelling Catheter - Urethral: 1750 mL    VAC (Vacuum Assisted Closure) System: 95 mL  Total OUT: 1845 mL    Total NET: -155 mL      24 Dec 2018 07:01  -  24 Dec 2018 08:46  --------------------------------------------------------  IN:    Nepro: 60 mL  Total IN: 60 mL    OUT:  Total OUT: 0 mL    Total NET: 60 mL          LABS:                        10.4   8.2   )-----------( 306      ( 24 Dec 2018 02:56 )             33.6     12-24    142  |  106  |  27.0<H>  ----------------------------<  164<H>  3.9   |  25.0  |  0.52    Ca    8.3<L>      24 Dec 2018 02:56  Phos  2.6     12-24  Mg     2.2     12-24    TPro  6.2<L>  /  Alb  2.9<L>  /  TBili  1.6  /  DBili  x   /  AST  116<H>  /  ALT  109<H>  /  AlkPhos  192<H>  12-24          RADIOLOGY & ADDITIONAL STUDIES:

## 2018-12-24 NOTE — PROGRESS NOTE ADULT - PROBLEM SELECTOR PLAN 1
coreg started 12/23, d/w Dr. Barrett when to start ACE/ARB therapy  continue lasix 20mg IV q8 for now  continue augustin catheter for urine output monitoring in critically ill patient

## 2018-12-24 NOTE — PROGRESS NOTE ADULT - PROBLEM SELECTOR PLAN 1
Tolerating tube feeds with no issues, no signs of acute cholecystitis. No surgical intervention needed, Surgery will sign off for now. For any questions or concerns please free to call [phone]

## 2018-12-24 NOTE — CONSULT NOTE ADULT - PROVIDER SPECIALTY LIST ADULT
CT Surgery
Endocrinology
Gastroenterology
Infectious Disease
Rehab Medicine
Surgery
Surgery
Critical Care

## 2018-12-24 NOTE — PROGRESS NOTE ADULT - ATTENDING COMMENTS
Denies abdominal pain.  no nausea or vomiting. Abdomen is soft, mild distension, no tenderness, no guarding.  Recommend to resume tube feeds. If tolerates, no acute surgical intervention.
The patient was seen and examined  I have reviewed the patient's AXR  There is no evidence of obstruction  Exam:  Abdomen is softly distended  No surgical intervention needed  This is likely ileus  Decrease narcotics, check TSH  Please re-consult as needed
The patient was seen and examined  Events noted  Tolerating his tube feeding.  No abdominal complaints  Abdomen is soft, non-tender  Hepatic profile noted  No surgical intervention  Repeat hepatic profile in 1 week, unless patient becomes symptomatic
Critical care cardiology. Time spent > 45 mins including discuss with patient, family and primary team and referring    Dr. Patricio lua to follow up tomorrow.
Critical care cardiology. Time spent > 45 mins including discuss with patient, primary team and referring
Critical care cardiology. Time spent > 45 mins including discuss with patient, family and primary team and referring

## 2018-12-24 NOTE — PROGRESS NOTE ADULT - ASSESSMENT
Patient with  s/p clipping, ileus which has resolved. Tolerating tube feeding. LFTs -most likely sepsis related.  No need for further liver disease work up at this time  Trend LFTs  Sepsis management as per ID  PPI bid  Nutrition as per CTS team  Call GI prn

## 2018-12-24 NOTE — PROGRESS NOTE ADULT - ASSESSMENT
48 yo Male c/o CP for 2-3 days prior to admission, then 10/10 chest pain approximately 9:30 am yesterday, followed by vomiting.  Wife drove pt to hospital, ruled in for STEMI. Urgent Cath, pt found to have multiple occlusions; stent to LAD and D2; stent to LAD thrombosed,  impella placed for support.  Pt then had VF arrest, shock x 1, return to NSR.  Upon transfer to OR for Urgent CABG, pt with asystolic arrest, chest opened intra-op with CPR in progress. Pt underwent C4V (LAD, Diag, OM and PDA) with inability to wean from CPB therefore requiring VA ECMO (central cannulation) and IABP, he was transferred to CICU.  12/3 s/p ECMO explant and IABP d/c'd, placement of impella via R groin sheath.   12/6 Impella removal via right femoral cutdown with primary repair of femoral artery and stent placed to external iliac artery for dissection.  12/14 + ileus.  12/15 ileus improved, septic and cardiogenic shock.    12/18 + UGIB.   12/19 EGD (+ bleeding stomach ulcer requiring clip x4)  12/21 ileus, made NPO   12/22 RUQ consistent with cholecystis, sx consulted no impressed, examination benign     Hospital course notable for (in addition to above): acute systolic heart failure, vent dependent respiratory failure (inability to wean also contributed to by severe agitation, now s/p trach), persistent fevers (one blood culture with fungemia, other cultures unremarkable), acute blood loss anemia, hypokalemia, ileus

## 2018-12-24 NOTE — PROGRESS NOTE ADULT - SUBJECTIVE AND OBJECTIVE BOX
[facility]  INFECTIOUS DISEASES AND INTERNAL MEDICINE at [place]  =======================================================  Perfecto Santizo MD  Diplomates American Board of Internal Medicine and Infectious Diseases  =======================================================    PATRICK LYLE [id number]    Follow up: Fungemia    Febrile last night  Stable on vent  awake alert follows commands  Reports no diarrhea    Allergies:  penicillins (Unknown)      Antibiotics:    meropenem  IVPB 1000 milliGRAM(s) IV Intermittent every 8 hours      micafungin IVPB 100 milliGRAM(s) IV Intermittent every 24 hours       REVIEW OF SYSTEMS:  Unable to obtain, not verbal due to trach      Physical Exam:  ICU Vital Signs Last 24 Hrs  T(C): 37.7 (24 Dec 2018 07:00), Max: 38.5 (24 Dec 2018 03:25)  T(F): 99.9 (24 Dec 2018 07:00), Max: 101.3 (24 Dec 2018 03:25)  HR: 106 (24 Dec 2018 07:00) (101 - 119)  BP: 111/82 (24 Dec 2018 07:00) (92/68 - 114/78)  BP(mean): 92 (24 Dec 2018 07:00) (72 - 92)  RR: 18 (24 Dec 2018 07:00) (15 - 32)  SpO2: 99% (24 Dec 2018 07:00) (98% - 100%)        GEN: NAD  HEENT: normocephalic and atraumatic. EOMI. PERRL.   NECK: Supple. + Trach  LUNGS: Coarse BS B/L  HEART: Regular rate and rhythm   ABDOMEN: Soft, nontender, and nondistended.  Positive bowel sounds.    : + Mckeon  EXTREMITIES: Without any edema.  MSK: no joint swelling  NEUROLOGIC: Awake, alert follows commands  PSYCHIATRIC: Appropriate affect .  SKIN: Sternal wound dressing, + wound vac, Leg dressings in place      Labs:  12-24    142  |  106  |  27.0<H>  ----------------------------<  164<H>  3.9   |  25.0  |  0.52    Ca    8.3<L>      24 Dec 2018 02:56  Phos  2.6     12-24  Mg     2.2     12-24    TPro  6.2<L>  /  Alb  2.9<L>  /  TBili  1.6  /  DBili  x   /  AST  116<H>  /  ALT  109<H>  /  AlkPhos  192<H>  12-24                          10.4   8.2   )-----------( 306      ( 24 Dec 2018 02:56 )             33.6           LIVER FUNCTIONS - ( 24 Dec 2018 02:56 )  Alb: 2.9 g/dL / Pro: 6.2 g/dL / ALK PHOS: 192 U/L / ALT: 109 U/L / AST: 116 U/L / GGT: x             RECENT CULTURES:  12-21 @ 10:03 .Blood     No growth at 48 hours      12-20 @ 11:09 .Blood     Growth in aerobic bottle: Yeast  Aerobic Bottle: 1 day ;23:51 Hours to positivity  Anaerobic Bottle: No growth to date      12-20 @ 07:25 .Blood     Growth in aerobic bottle: Yeast  Aerobic Bottle: 02 days;05:48 Hours to positivity  Anaerobic Bottle: No growth to date      12-18 @ 15:02 .Surgical Swab     No growth at 5 days.  No WBC's seen.  No organisms seen      12-18 @ 08:34 .Sputum     Few Routine respiratory keara present  Moderate White blood cells  No organisms seen      12-18 @ 06:31 .Blood Candida parapsilosis  Blood Culture PCR    Growth in aerobic bottle: Candida parapsilosis  Aerobic Bottle: 1 day; 18:39 Hours to positivity  Anaerobic Bottle: No growth at 5 days.  ***Blood Panel PCR results on this specimen are available  approximately 3 hours after the Gram stain result.***  Gram stain, PCR, and/or culture results may not always  correspond due to difference in methodologies.  ************************************************************  This PCR assay was performed using [employer].  The following targets are tested for: Enterococcus,  vancomycin resistant enterococci, Listeria monocytogenes,  coagulase negative staphylococci, S. aureus,  methicillin resistant S. aureus, Streptococcus agalactiae  (Group B), S. pneumoniae, S. pyogenes (Group A),  Acinetobacter baumannii, Enterobacter cloacae, E. coli,  Klebsiella oxytoca, K. pneumoniae, Proteus sp.,  Serratia marcescens, Haemophilus influenzae,  Neisseria meningitidis, Pseudomonas aeruginosa, Candida  albicans, C. glabrata, C krusei, C parapsilosis,  C. tropicalis and the KPC resistance gene.  "Due to technical problems, Proteus sp. will Not be reported as part of  the BCID panel until further notice"      12-15 @ 23:04 .Blood     No growth at 5 days.      12-15 @ 22:52 .Sputum Klebsiella pneumoniae    Few Klebsiella pneumoniae  Few Candida albicans  Few Routine respiratory keara present  Numerous white blood cells  Few Gram positive cocci in pairs  Few Gram Positive Rods      12-15 @ 22:31 .Urine     No growth      12-14 @ 21:58 .Blood     No growth at 5 days.        12-14 @ 13:53 .Catheter left IJ (internal jugular) catheter tip     No growth at 2 days.      12-14 @ 11:48 .Blood     No growth at 5 days.      12-12 @ 16:19 .Sputum     Few Routine respiratory keara present  Few White blood cells  Few Gram Positive Cocci in Pairs and Chains      12-11 @ 20:24 .Broncial bronchial fluid     No growth at 1 week.      12-11 @ 10:49 .Broncial bronchial fluid     Rare Candida albicans  Rare Routine respiratory keara present  Numerous white blood cells  No organisms seen      12-11 @ 10:47 .Broncial bronchial fluid     Candida albicans isolated  Culture in progress      12-09 @ 18:56 .Blood     No growth at 5 days.      12-08 @ 19:40 .Sputum Klebsiella pneumoniae    Moderate Klebsiella pneumoniae  No Routine respiratory keara present  Moderate WBC's  Few Yeast  Few Gram positive cocci in pairs        EXAM:  XR CHEST PORTABLE ROUTINE 1V                        PROCEDURE DATE:  12/21/2018    INTERPRETATION:  CHEST AP PORTABLE:  History: trach, pneumonia.   Date and time of exam: 12/21/2018 4:40 AM.  Technique: A single AP view of the chest was obtained.  Comparison exam: 12/20/2018 1:10 PM.  Findings:  There is a right IJ central line with the tip in the region of the distal   SVC. Tracheostomy tube unchanged. Nasogastric tube, unchanged. Persistent   bilateral airspace disease, unchanged. No evidence of pneumothorax..  Impression:  Persistent bilateral airspace disease, unchanged..

## 2018-12-24 NOTE — PROGRESS NOTE ADULT - SUBJECTIVE AND OBJECTIVE BOX
Brief Hospital Course:    STEMI, emergently to cath lab, left main thrombus, cardiac arrest requiring defib, intubated in cath lab, impella placed which clotted off on way to OR and was subsequently removed in OR. Crashed onto CPB for CABG d/t hemodynamic collapse. TO CTICU post CABG with central ECMO and open chest.  12/3 ECMO explanted and IABP removed, impella inserted   impella removed via R femoral cutdown with repair and external iliac stent  12/10 s/p trach for prolonged respiratory failure   abd distention, TF held, 2L NGT output, +ileus on AXR, gastrographin study done  12/15 ileus resolved, resumed for meds via NGT. Respiratory distress, febrile, septic and cardiogenic shock requiring restarting of abx, epinephrine and norepinephrine infusions   bilat pleural CTs d/c'd, more awake and alert, zosyn changed to meropenem    mycofungin added for candida parapisolsis fungemia, epinephrine weaned off, vac placed to R groin, + UGIB, started on pantoprazole infusion   EGD (+ bleeding stomach ulcer, injected with epi and hemoclip placed x 4)   rising LFTs, N/V, tube feeds held, AXR with ileus    abd US Splenomegaly. Gallbladder wall thickening with sludge and pericholecystic edema suggesting acute cholecystitis, surgery consulted > NTD  : trach collar     Significant recent/past 24 hr events: slow titration of tube feeds, trach collar x 2 episodes (3 hours, then one hour)    Subjective/review of Systems: c/o feeling hot, asking for the "ice blanket", denies CP, palpitations, SOB, cough, chills, diaphoresis, N/V, abd pain, diarrhea, numbness/tingling, dizziness/lightheadedness, HA, itchiness/rash;    Review of Systems: limited ROS obtained due to pt with trach    Patient is a 48y old  Male who presents with a chief complaint of sepsis (23 Dec 2018 10:53)    HPI:  This is a 48 y/o male no significant PMH; recent ABT (doxycycline) r/t cellulitis to right groin per pt. Pt presented to the ED with 10/10 chest pain that he reported started at 0930; he drank water with no relief; pain started to progress to B/L shoulders. Per pt spouse, she reports he has had chest pain x2-3 days and this morning he vomited which he attributed to reflux.  She brought him into the emergency department.    Pt has significant EKG changes ST elevations in leads V1-V5 with reciprocal changes in II, III, AVF.  Pt rec'd asa and plavix load in ED. (2018 11:32)    PAST MEDICAL & SURGICAL HISTORY:  Staph infection  No significant past surgical history    FAMILY HISTORY:  Family history of myocardial infarction (Mother): mother;     Vitals   ICU Vital Signs Last 24 Hrs  T(C): 38.3 (24 Dec 2018 00:00), Max: 38.3 (23 Dec 2018 20:00)  T(F): 100.9 (24 Dec 2018 00:00), Max: 100.9 (23 Dec 2018 20:00)  HR: 110 (24 Dec 2018 00:00) (102 - 119)  BP: 101/68 (24 Dec 2018 00:00) (93/63 - 114/78)  BP(mean): 80 (24 Dec 2018 00:00) (72 - 91)  ABP: --  ABP(mean): --  RR: 27 (24 Dec 2018 00:00) (18 - 32)  SpO2: 100% (24 Dec 2018 00:00) (98% - 100%)    VENT SETTINGS   Mode: AC/ CMV (Assist Control/ Continuous Mandatory Ventilation)  FiO2: 35  PEEP: 5  PS: 10  MAP: 9    I&O's Detail    22 Dec 2018 07:01  -  23 Dec 2018 07:00  --------------------------------------------------------  IN:    Enteral Tube Flush: 540 mL    Solution: 150 mL    Solution: 200 mL    Solution: 100 mL    Solution: 100 mL  Total IN: 1090 mL    OUT:    Indwelling Catheter - Urethral: 2495 mL    Nasoenteral Tube: 50 mL    VAC (Vacuum Assisted Closure) System: 200 mL  Total OUT: 2745 mL    Total NET: -1655 mL      23 Dec 2018 07:01  -  24 Dec 2018 01:30  --------------------------------------------------------  IN:    Enteral Tube Flush: 380 mL    Nepro: 370 mL    Solution: 250 mL  Total IN: 1000 mL    OUT:    Indwelling Catheter - Urethral: 1575 mL    VAC (Vacuum Assisted Closure) System: 70 mL  Total OUT: 1645 mL    Total NET: -645 mL    LABS                        9.4    8.1   )-----------( 274      ( 23 Dec 2018 03:23 )             31.4         143  |  104  |  23.0<H>  ----------------------------<  121<H>  4.1   |  28.0  |  0.48<L>    Ca    8.2<L>      23 Dec 2018 03:23  Phos  3.0       Mg     2.5         TPro  5.9<L>  /  Alb  2.8<L>  /  TBili  1.7  /  DBili  x   /  AST  101<H>  /  ALT  117<H>  /  AlkPhos  171<H>      LIVER FUNCTIONS - ( 23 Dec 2018 03:23 )  Alb: 2.8 g/dL / Pro: 5.9 g/dL / ALK PHOS: 171 U/L / ALT: 117 U/L / AST: 101 U/L / GGT: x           POCT Blood Glucose.: 148 mg/dL (18 @ 23:57)  POCT Blood Glucose.: 122 mg/dL (18 @ 17:09)  POCT Blood Glucose.: 153 mg/dL (18 @ 11:24)  POCT Blood Glucose.: 141 mg/dL (18 @ 05:28)    MEDICATIONS  (STANDING):  ascorbic acid 500 milliGRAM(s) Oral daily  aspirin 325 milliGRAM(s) Oral daily  atorvastatin 10 milliGRAM(s) Oral at bedtime  carvedilol 3.125 milliGRAM(s) Oral every 12 hours  chlorhexidine 0.12% Liquid 5 milliLiter(s) Oral Mucosa every 4 hours  clopidogrel Tablet 75 milliGRAM(s) Oral daily  enoxaparin Injectable 40 milliGRAM(s) SubCutaneous daily  ferrous    sulfate Liquid 300 milliGRAM(s) Enteral Tube daily  folic acid 1 milliGRAM(s) Oral daily  furosemide   Injectable 20 milliGRAM(s) IV Push every 8 hours  insulin lispro (HumaLOG) corrective regimen sliding scale   SubCutaneous every 6 hours  lactobacillus acidophilus 1 Tablet(s) Oral every 8 hours  meropenem  IVPB      meropenem  IVPB 1000 milliGRAM(s) IV Intermittent every 8 hours  metoclopramide   Syrup 10 milliGRAM(s) Oral every 8 hours  micafungin IVPB      micafungin IVPB 100 milliGRAM(s) IV Intermittent every 24 hours  multivitamin   Solution 5 milliLiter(s) Enteral Tube daily  pantoprazole  Injectable 40 milliGRAM(s) IV Push every 12 hours  QUEtiapine 25 milliGRAM(s) Oral daily  sucralfate suspension 1 Gram(s) Oral two times a day    MEDICATIONS  (PRN):  acetaminophen    Suspension .. 650 milliGRAM(s) Oral every 6 hours PRN Temp greater or equal to 38.5C (101.3F)  ondansetron Injectable 4 milliGRAM(s) IV Push every 6 hours PRN Nausea and/or Vomiting  QUEtiapine 12.5 milliGRAM(s) Oral every 8 hours PRN agitation    Allergies:  penicillins (Unknown)    Physical Exam:   neuro: alert, following commands GALLARDO, equal strength b/l, difficulty lifting left arm up at shoulder  HEENT: PEERLA, EOMI  CV: S1S2 regular, tachy, no murmurs  Pulm: coarse BS b/l, no wheezing, no use of accessory muscles  abd: + BS soft NT ND  Ext: mild LE edema, WWP 2+ DP pulses b/l  Skin: warm, dry, well perfused, no rashes  Inc: mid sternal dsg C/D/I, b/l LE SVG harvest dsg C/D/I        Case including assessment/plan of care to be discussed with CT surgery attending in AM rounds;

## 2018-12-24 NOTE — CHART NOTE - NSCHARTNOTEFT_GEN_A_CORE
Source: Patient [ ]  Family [ ]   other [x ] RN    STATUS POST: 11/29  STEMI /arrest, emergent C4V with ECMO and IABP (now removed), hypoxic resp failure, s/p trach.  Pt also s/p GI bleeding and ileus.    Current Diet: Diet, NPO with Tube Feed:   Tube Feeding Modality: Nasogastric  Nepro  Total Volume for 24 Hours (mL): 1440  Continuous  Starting Tube Feed Rate {mL per Hour}: 40  Increase Tube Feed Rate by (mL): 20     Every 10 hours  Until Goal Tube Feed Rate (mL per Hour): 60  Tube Feed Duration (in Hours): 24  Tube Feed Start Time: 22:00     Qty per Day:  2 (12-23-18 @ 19:24)    Enteral /Parenteral Nutrition: Tube feedings were held on and off over the past few days due to pt with some vomiting and diarrhea- now resumed at goal rate and pt tolerating at this time.    Current Weight:   (12/24) 200#  (12/13) 246#  (12/6) 199#    % Weight Change wt fluctuations- question accuracy.  Will continue to monitor.  Aware pt with 2+ mild generalized edema    Pertinent Medications: MEDICATIONS  (STANDING):  ascorbic acid 500 milliGRAM(s) Oral daily  aspirin 325 milliGRAM(s) Oral daily  atorvastatin 10 milliGRAM(s) Oral at bedtime  carvedilol 3.125 milliGRAM(s) Oral every 12 hours  chlorhexidine 0.12% Liquid 5 milliLiter(s) Oral Mucosa every 4 hours  clopidogrel Tablet 75 milliGRAM(s) Oral daily  enoxaparin Injectable 40 milliGRAM(s) SubCutaneous daily  ferrous    sulfate Liquid 300 milliGRAM(s) Enteral Tube daily  folic acid 1 milliGRAM(s) Oral daily  furosemide   Injectable 20 milliGRAM(s) IV Push every 8 hours  insulin lispro (HumaLOG) corrective regimen sliding scale   SubCutaneous every 6 hours  lactobacillus acidophilus 1 Tablet(s) Oral every 8 hours  meropenem  IVPB      meropenem  IVPB 1000 milliGRAM(s) IV Intermittent every 8 hours  metoclopramide   Syrup 10 milliGRAM(s) Oral every 8 hours  micafungin IVPB      micafungin IVPB 100 milliGRAM(s) IV Intermittent every 24 hours  multivitamin   Solution 5 milliLiter(s) Enteral Tube daily  pantoprazole  Injectable 40 milliGRAM(s) IV Push every 12 hours  QUEtiapine 25 milliGRAM(s) Oral daily  sucralfate suspension 1 Gram(s) Oral two times a day    MEDICATIONS  (PRN):  acetaminophen    Suspension .. 650 milliGRAM(s) Oral every 6 hours PRN Temp greater or equal to 38.5C (101.3F)  ondansetron Injectable 4 milliGRAM(s) IV Push every 6 hours PRN Nausea and/or Vomiting  QUEtiapine 12.5 milliGRAM(s) Oral every 8 hours PRN agitation    Pertinent Labs: CBC Full  -  ( 24 Dec 2018 02:56 )  WBC Count : 8.2 K/uL  Hemoglobin : 10.4 g/dL  Hematocrit : 33.6 %  Platelet Count - Automated : 306 K/uL  Mean Cell Volume : 87.5 fl  Mean Cell Hemoglobin : 27.1 pg  Mean Cell Hemoglobin Concentration : 31.0 g/dL  12-24 Na142 mmol/L Glu 164 mg/dL<H> K+ 3.9 mmol/L Cr  0.52 mg/dL BUN 27.0 mg/dL<H> Phos 2.6 mg/dL Alb 2.9 g/dL<L> PAB n/a       Skin: no skin breakdown noted    Nutrition focused physical exam conducted On PREVIOUS FOLLOW UP - found signs of malnutrition [ ]absent [x ]present    Subcutaneous fat loss: [ ] Orbital fat pads region, [ ]Buccal fat region, [ ]Triceps region,  [ ]Ribs region    Muscle wasting: [x ]Temples region MILD, [ ]Clavicle region, [ ]Shoulder region, [ ]Scapula region, [ ]Interosseous region,  [ ]thigh region, [ ]Calf region    Estimated Needs:   [x ] no change since previous assessment  [ ] recalculated:     Current Nutrition Diagnosis: Pt remains at nutrition risk secondary to moderate protein calorie malnutrition (acute) related to inadequate protein energy intake with recent vomiting/diarrhea resulting in tube feedings intermittently on hold x 3-5 days and previous NPO with ileus as evidenced by pt meeting <75% estimated energy needs > 7 days and mild generalized edema.  Nepro now restarted and pt tolerating well.    Recommendations:   Continue with Nepro @ 60ml/hr x 20 hrs daily as tolerated (1200ml, 2160 kcal, 97g pro)  Continue with MVI, Vit C, feso4 and Folic Acid daily  Monitor Daily wts    Monitoring and Evaluation:   [ ] PO intake [x ] Tolerance to diet prescription [X] Weights  [X] Follow up per protocol [X] Labs:

## 2018-12-25 DIAGNOSIS — E44.0 MODERATE PROTEIN-CALORIE MALNUTRITION: ICD-10-CM

## 2018-12-25 DIAGNOSIS — J95.821 ACUTE POSTPROCEDURAL RESPIRATORY FAILURE: ICD-10-CM

## 2018-12-25 LAB
ALBUMIN SERPL ELPH-MCNC: 2.9 G/DL — LOW (ref 3.3–5.2)
ALP SERPL-CCNC: 204 U/L — HIGH (ref 40–120)
ALT FLD-CCNC: 84 U/L — HIGH
AMYLASE P1 CFR SERPL: 101 U/L — SIGNIFICANT CHANGE UP (ref 36–128)
ANION GAP SERPL CALC-SCNC: 11 MMOL/L — SIGNIFICANT CHANGE UP (ref 5–17)
AST SERPL-CCNC: 69 U/L — HIGH
BILIRUB SERPL-MCNC: 1.3 MG/DL — SIGNIFICANT CHANGE UP (ref 0.4–2)
BUN SERPL-MCNC: 25 MG/DL — HIGH (ref 8–20)
CALCIUM SERPL-MCNC: 8.1 MG/DL — LOW (ref 8.6–10.2)
CHLORIDE SERPL-SCNC: 103 MMOL/L — SIGNIFICANT CHANGE UP (ref 98–107)
CO2 SERPL-SCNC: 26 MMOL/L — SIGNIFICANT CHANGE UP (ref 22–29)
CREAT SERPL-MCNC: 0.41 MG/DL — LOW (ref 0.5–1.3)
CULTURE RESULTS: SIGNIFICANT CHANGE UP
CULTURE RESULTS: SIGNIFICANT CHANGE UP
GLUCOSE BLDC GLUCOMTR-MCNC: 147 MG/DL — HIGH (ref 70–99)
GLUCOSE BLDC GLUCOMTR-MCNC: 167 MG/DL — HIGH (ref 70–99)
GLUCOSE BLDC GLUCOMTR-MCNC: 171 MG/DL — HIGH (ref 70–99)
GLUCOSE BLDC GLUCOMTR-MCNC: 174 MG/DL — HIGH (ref 70–99)
GLUCOSE SERPL-MCNC: 176 MG/DL — HIGH (ref 70–115)
HCT VFR BLD CALC: 31.7 % — LOW (ref 42–52)
HGB BLD-MCNC: 9.9 G/DL — LOW (ref 14–18)
LIDOCAIN IGE QN: 209 U/L — HIGH (ref 22–51)
MAGNESIUM SERPL-MCNC: 2 MG/DL — SIGNIFICANT CHANGE UP (ref 1.6–2.6)
MCHC RBC-ENTMCNC: 27.3 PG — SIGNIFICANT CHANGE UP (ref 27–31)
MCHC RBC-ENTMCNC: 31.2 G/DL — LOW (ref 32–36)
MCV RBC AUTO: 87.6 FL — SIGNIFICANT CHANGE UP (ref 80–94)
ORGANISM # SPEC MICROSCOPIC CNT: SIGNIFICANT CHANGE UP
PHOSPHATE SERPL-MCNC: 2.6 MG/DL — SIGNIFICANT CHANGE UP (ref 2.4–4.7)
PLATELET # BLD AUTO: 297 K/UL — SIGNIFICANT CHANGE UP (ref 150–400)
POTASSIUM SERPL-MCNC: 3.7 MMOL/L — SIGNIFICANT CHANGE UP (ref 3.5–5.3)
POTASSIUM SERPL-SCNC: 3.7 MMOL/L — SIGNIFICANT CHANGE UP (ref 3.5–5.3)
PROT SERPL-MCNC: 6 G/DL — LOW (ref 6.6–8.7)
RAPID RVP RESULT: SIGNIFICANT CHANGE UP
RBC # BLD: 3.62 M/UL — LOW (ref 4.6–6.2)
RBC # FLD: 15.5 % — SIGNIFICANT CHANGE UP (ref 11–15.6)
SODIUM SERPL-SCNC: 140 MMOL/L — SIGNIFICANT CHANGE UP (ref 135–145)
SPECIMEN SOURCE: SIGNIFICANT CHANGE UP
SPECIMEN SOURCE: SIGNIFICANT CHANGE UP
WBC # BLD: 8.3 K/UL — SIGNIFICANT CHANGE UP (ref 4.8–10.8)
WBC # FLD AUTO: 8.3 K/UL — SIGNIFICANT CHANGE UP (ref 4.8–10.8)

## 2018-12-25 PROCEDURE — 99233 SBSQ HOSP IP/OBS HIGH 50: CPT

## 2018-12-25 PROCEDURE — 71045 X-RAY EXAM CHEST 1 VIEW: CPT | Mod: 26

## 2018-12-25 RX ORDER — QUETIAPINE FUMARATE 200 MG/1
25 TABLET, FILM COATED ORAL
Qty: 0 | Refills: 0 | Status: DISCONTINUED | OUTPATIENT
Start: 2018-12-25 | End: 2019-01-07

## 2018-12-25 RX ORDER — POTASSIUM CHLORIDE 20 MEQ
40 PACKET (EA) ORAL EVERY 4 HOURS
Qty: 0 | Refills: 0 | Status: COMPLETED | OUTPATIENT
Start: 2018-12-25 | End: 2018-12-25

## 2018-12-25 RX ORDER — POTASSIUM CHLORIDE 20 MEQ
40 PACKET (EA) ORAL DAILY
Qty: 0 | Refills: 0 | Status: DISCONTINUED | OUTPATIENT
Start: 2018-12-26 | End: 2018-12-28

## 2018-12-25 RX ORDER — HYDROMORPHONE HYDROCHLORIDE 2 MG/ML
2 INJECTION INTRAMUSCULAR; INTRAVENOUS; SUBCUTANEOUS EVERY 4 HOURS
Qty: 0 | Refills: 0 | Status: DISCONTINUED | OUTPATIENT
Start: 2018-12-25 | End: 2018-12-30

## 2018-12-25 RX ORDER — FENTANYL CITRATE 50 UG/ML
50 INJECTION INTRAVENOUS ONCE
Qty: 0 | Refills: 0 | Status: DISCONTINUED | OUTPATIENT
Start: 2018-12-25 | End: 2018-12-25

## 2018-12-25 RX ORDER — DIPHENHYDRAMINE HCL 50 MG
12.5 CAPSULE ORAL ONCE
Qty: 0 | Refills: 0 | Status: COMPLETED | OUTPATIENT
Start: 2018-12-25 | End: 2018-12-25

## 2018-12-25 RX ORDER — HYDROMORPHONE HYDROCHLORIDE 2 MG/ML
4 INJECTION INTRAMUSCULAR; INTRAVENOUS; SUBCUTANEOUS EVERY 4 HOURS
Qty: 0 | Refills: 0 | Status: DISCONTINUED | OUTPATIENT
Start: 2018-12-25 | End: 2018-12-30

## 2018-12-25 RX ORDER — ZOLPIDEM TARTRATE 10 MG/1
5 TABLET ORAL AT BEDTIME
Qty: 0 | Refills: 0 | Status: DISCONTINUED | OUTPATIENT
Start: 2018-12-25 | End: 2018-12-25

## 2018-12-25 RX ORDER — LANOLIN ALCOHOL/MO/W.PET/CERES
5 CREAM (GRAM) TOPICAL AT BEDTIME
Qty: 0 | Refills: 0 | Status: DISCONTINUED | OUTPATIENT
Start: 2018-12-25 | End: 2019-01-06

## 2018-12-25 RX ORDER — POTASSIUM CHLORIDE 20 MEQ
40 PACKET (EA) ORAL ONCE
Qty: 0 | Refills: 0 | Status: COMPLETED | OUTPATIENT
Start: 2018-12-25 | End: 2018-12-25

## 2018-12-25 RX ORDER — ALPRAZOLAM 0.25 MG
0.25 TABLET ORAL EVERY 12 HOURS
Qty: 0 | Refills: 0 | Status: DISCONTINUED | OUTPATIENT
Start: 2018-12-25 | End: 2018-12-30

## 2018-12-25 RX ADMIN — Medication 40 MILLIEQUIVALENT(S): at 08:40

## 2018-12-25 RX ADMIN — CHLORHEXIDINE GLUCONATE 5 MILLILITER(S): 213 SOLUTION TOPICAL at 17:54

## 2018-12-25 RX ADMIN — Medication 5 MILLILITER(S): at 11:02

## 2018-12-25 RX ADMIN — QUETIAPINE FUMARATE 25 MILLIGRAM(S): 200 TABLET, FILM COATED ORAL at 21:17

## 2018-12-25 RX ADMIN — Medication 1 GRAM(S): at 17:58

## 2018-12-25 RX ADMIN — ATORVASTATIN CALCIUM 10 MILLIGRAM(S): 80 TABLET, FILM COATED ORAL at 21:17

## 2018-12-25 RX ADMIN — PANTOPRAZOLE SODIUM 40 MILLIGRAM(S): 20 TABLET, DELAYED RELEASE ORAL at 17:53

## 2018-12-25 RX ADMIN — CHLORHEXIDINE GLUCONATE 5 MILLILITER(S): 213 SOLUTION TOPICAL at 13:59

## 2018-12-25 RX ADMIN — LORATADINE 10 MILLIGRAM(S): 10 TABLET ORAL at 00:05

## 2018-12-25 RX ADMIN — CHLORHEXIDINE GLUCONATE 5 MILLILITER(S): 213 SOLUTION TOPICAL at 06:05

## 2018-12-25 RX ADMIN — Medication 10 MILLIGRAM(S): at 21:18

## 2018-12-25 RX ADMIN — CHLORHEXIDINE GLUCONATE 5 MILLILITER(S): 213 SOLUTION TOPICAL at 10:59

## 2018-12-25 RX ADMIN — Medication 0.25 MILLIGRAM(S): at 17:57

## 2018-12-25 RX ADMIN — Medication 2: at 06:08

## 2018-12-25 RX ADMIN — Medication 500 MILLIGRAM(S): at 11:04

## 2018-12-25 RX ADMIN — Medication 20 MILLIGRAM(S): at 13:59

## 2018-12-25 RX ADMIN — Medication 10 MILLIGRAM(S): at 06:05

## 2018-12-25 RX ADMIN — Medication 20 MILLIGRAM(S): at 06:04

## 2018-12-25 RX ADMIN — Medication 40 MILLIEQUIVALENT(S): at 10:59

## 2018-12-25 RX ADMIN — Medication 100 MILLIGRAM(S): at 03:06

## 2018-12-25 RX ADMIN — HYDROMORPHONE HYDROCHLORIDE 4 MILLIGRAM(S): 2 INJECTION INTRAMUSCULAR; INTRAVENOUS; SUBCUTANEOUS at 17:59

## 2018-12-25 RX ADMIN — MEROPENEM 100 MILLIGRAM(S): 1 INJECTION INTRAVENOUS at 13:57

## 2018-12-25 RX ADMIN — CHLORHEXIDINE GLUCONATE 5 MILLILITER(S): 213 SOLUTION TOPICAL at 21:17

## 2018-12-25 RX ADMIN — Medication 1 TABLET(S): at 06:05

## 2018-12-25 RX ADMIN — PANTOPRAZOLE SODIUM 40 MILLIGRAM(S): 20 TABLET, DELAYED RELEASE ORAL at 06:04

## 2018-12-25 RX ADMIN — Medication 1 MILLIGRAM(S): at 11:04

## 2018-12-25 RX ADMIN — Medication 5 MILLIGRAM(S): at 21:17

## 2018-12-25 RX ADMIN — ENOXAPARIN SODIUM 40 MILLIGRAM(S): 100 INJECTION SUBCUTANEOUS at 11:02

## 2018-12-25 RX ADMIN — FENTANYL CITRATE 50 MICROGRAM(S): 50 INJECTION INTRAVENOUS at 01:15

## 2018-12-25 RX ADMIN — Medication 325 MILLIGRAM(S): at 11:04

## 2018-12-25 RX ADMIN — Medication 5 MILLIGRAM(S): at 00:59

## 2018-12-25 RX ADMIN — Medication 1 GRAM(S): at 06:05

## 2018-12-25 RX ADMIN — CARVEDILOL PHOSPHATE 3.12 MILLIGRAM(S): 80 CAPSULE, EXTENDED RELEASE ORAL at 17:58

## 2018-12-25 RX ADMIN — Medication 20 MILLIGRAM(S): at 21:17

## 2018-12-25 RX ADMIN — Medication 2: at 21:27

## 2018-12-25 RX ADMIN — CARVEDILOL PHOSPHATE 3.12 MILLIGRAM(S): 80 CAPSULE, EXTENDED RELEASE ORAL at 06:05

## 2018-12-25 RX ADMIN — Medication 40 MILLIEQUIVALENT(S): at 22:25

## 2018-12-25 RX ADMIN — Medication 0.25 MILLIGRAM(S): at 07:27

## 2018-12-25 RX ADMIN — MICAFUNGIN SODIUM 105 MILLIGRAM(S): 100 INJECTION, POWDER, LYOPHILIZED, FOR SOLUTION INTRAVENOUS at 12:50

## 2018-12-25 RX ADMIN — FENTANYL CITRATE 50 MICROGRAM(S): 50 INJECTION INTRAVENOUS at 00:58

## 2018-12-25 RX ADMIN — HYDROMORPHONE HYDROCHLORIDE 4 MILLIGRAM(S): 2 INJECTION INTRAMUSCULAR; INTRAVENOUS; SUBCUTANEOUS at 18:14

## 2018-12-25 RX ADMIN — Medication 1 TABLET(S): at 14:53

## 2018-12-25 RX ADMIN — Medication 2: at 11:15

## 2018-12-25 RX ADMIN — HYDROMORPHONE HYDROCHLORIDE 2 MILLIGRAM(S): 2 INJECTION INTRAMUSCULAR; INTRAVENOUS; SUBCUTANEOUS at 07:27

## 2018-12-25 RX ADMIN — CLOPIDOGREL BISULFATE 75 MILLIGRAM(S): 75 TABLET, FILM COATED ORAL at 11:04

## 2018-12-25 RX ADMIN — HYDROMORPHONE HYDROCHLORIDE 2 MILLIGRAM(S): 2 INJECTION INTRAMUSCULAR; INTRAVENOUS; SUBCUTANEOUS at 07:48

## 2018-12-25 RX ADMIN — Medication 12.5 MILLIGRAM(S): at 02:30

## 2018-12-25 RX ADMIN — ZOLPIDEM TARTRATE 5 MILLIGRAM(S): 10 TABLET ORAL at 23:40

## 2018-12-25 RX ADMIN — Medication 10 MILLIGRAM(S): at 14:53

## 2018-12-25 RX ADMIN — MEROPENEM 100 MILLIGRAM(S): 1 INJECTION INTRAVENOUS at 21:18

## 2018-12-25 RX ADMIN — Medication 300 MILLIGRAM(S): at 11:02

## 2018-12-25 RX ADMIN — Medication 1 TABLET(S): at 21:17

## 2018-12-25 RX ADMIN — MEROPENEM 100 MILLIGRAM(S): 1 INJECTION INTRAVENOUS at 06:04

## 2018-12-25 NOTE — PROGRESS NOTE ADULT - PROBLEM SELECTOR PLAN 3
s/p trach  trach collar x 2 episodes yesterday  CPAP PS 10/5 to rest overnight  attempt trach collar again this morning s/p trach  trach collar x 4 hours yesterday  rest on AC overnight per Dr. Barrett  will place back to CPAP PS 10/5 this morning with goal of trach collar later today while in chair

## 2018-12-25 NOTE — PROGRESS NOTE ADULT - PROBLEM SELECTOR PLAN 10
remains with depressed EF but shock state resolved      Problem #11: sinusitis   claritan x1 overnight, concern for interaction with coreg and pseudofed so hold on giving for now  warm compresses to sinuses  PRn pain medication   d/w Dr. Barrett additional modalities that can be used

## 2018-12-25 NOTE — PROGRESS NOTE ADULT - SUBJECTIVE AND OBJECTIVE BOX
Brief Hospital Course:    STEMI, emergently to cath lab, left main thrombus, cardiac arrest requiring defib, intubated in cath lab, impella placed which clotted off on way to OR and was subsequently removed in OR. Crashed onto CPB for CABG d/t hemodynamic collapse. TO CTICU post CABG with central ECMO and open chest.  12/3 ECMO explanted and IABP removed, impella inserted   impella removed via R femoral cutdown with repair and external iliac stent  12/10 s/p trach for prolonged respiratory failure   abd distention, TF held, 2L NGT output, +ileus on AXR, gastrographin study done  12/15 ileus resolved, resumed for meds via NGT. Respiratory distress, febrile, septic and cardiogenic shock requiring restarting of abx, epinephrine and norepinephrine infusions   bilat pleural CTs d/c'd, more awake and alert, zosyn changed to meropenem    mycofungin added for candida parapisolsis fungemia, epinephrine weaned off, vac placed to R groin, + UGIB, started on pantoprazole infusion   EGD (+ bleeding stomach ulcer, injected with epi and hemoclip placed x 4)   rising LFTs, N/V, tube feeds held, AXR with ileus    abd US Splenomegaly. Gallbladder wall thickening with sludge and pericholecystic edema suggesting acute cholecystitis, surgery consulted > NTD  : trach collar   : trach collar x 4 hours,     Significant recent/past 24 hr events: tolerating tube feeds back at goal,     Subjective/review of Systems: c/o sinus HA, pressure to face under eyes, +productive cough whitish, denies CP, palpitations, SOB, chills, diaphoresis, N/V, abd pain, diarrhea, numbness/tingling, dizziness/lightheadedness, HA, itchiness/rash;    Review of Systems: limited ROS obtained due to pt with trach    Patient is a 48y old  Male who presents with a chief complaint of Chest pain (24 Dec 2018 09:18)    HPI:  This is a 46 y/o male no significant PMH; recent ABT (doxycycline) r/t cellulitis to right groin per pt. Pt presented to the ED with 10/10 chest pain that he reported started at 0930; he drank water with no relief; pain started to progress to B/L shoulders. Per pt spouse, she reports he has had chest pain x2-3 days and this morning he vomited which he attributed to reflux.  She brought him into the emergency department.    Pt has significant EKG changes ST elevations in leads V1-V5 with reciprocal changes in II, III, AVF.  Pt rec'd asa and plavix load in ED. (2018 11:32)    PAST MEDICAL & SURGICAL HISTORY:  Staph infection  No significant past surgical history    FAMILY HISTORY:  Family history of myocardial infarction (Mother): mother;     Vitals   ICU Vital Signs Last 24 Hrs  T(C): 38 (25 Dec 2018 02:00), Max: 38.6 (24 Dec 2018 12:00)  T(F): 100.4 (25 Dec 2018 02:00), Max: 101.5 (24 Dec 2018 12:00)  HR: 104 (25 Dec 2018 03:00) (97 - 117)  BP: 98/72 (25 Dec 2018 03:00) (87/63 - 115/68)  BP(mean): 80 (25 Dec 2018 03:00) (71 - 92)  ABP: --  ABP(mean): --  RR: 20 (25 Dec 2018 03:00) (15 - 26)  SpO2: 99% (25 Dec 2018 03:00) (97% - 100%)    VENT SETTINGS   Mode: AC/ CMV (Assist Control/ Continuous Mandatory Ventilation)  RR (machine): 15  TV (machine): 600  FiO2: 30  PEEP: 5  MAP: 10  PIP: 25    I&O's Detail    23 Dec 2018 07:01  -  24 Dec 2018 07:00  --------------------------------------------------------  IN:    Enteral Tube Flush: 540 mL    Nepro: 650 mL    Solution: 200 mL    Solution: 300 mL  Total IN: 1690 mL    OUT:    Indwelling Catheter - Urethral: 1750 mL    VAC (Vacuum Assisted Closure) System: 95 mL  Total OUT: 1845 mL    Total NET: -155 mL    24 Dec 2018 07:01  -  25 Dec 2018 03:19  --------------------------------------------------------  IN:    Enteral Tube Flush: 520 mL    Nepro: 1200 mL    Solution: 100 mL    Solution: 150 mL  Total IN: 1970 mL    OUT:    Indwelling Catheter - Urethral: 2060 mL    VAC (Vacuum Assisted Closure) System: 50 mL  Total OUT: 2110 mL    Total NET: -140 mL    LABS                        10.4   8.2   )-----------( 306      ( 24 Dec 2018 02:56 )             33.6     12-24    142  |  106  |  27.0<H>  ----------------------------<  164<H>  3.9   |  25.0  |  0.52    Ca    8.3<L>      24 Dec 2018 02:56  Phos  2.6     12-24  Mg     2.2     12-24    TPro  6.2<L>  /  Alb  2.9<L>  /  TBili  1.6  /  DBili  x   /  AST  116<H>  /  ALT  109<H>  /  AlkPhos  192<H>  12-24    LIVER FUNCTIONS - ( 24 Dec 2018 02:56 )  Alb: 2.9 g/dL / Pro: 6.2 g/dL / ALK PHOS: 192 U/L / ALT: 109 U/L / AST: 116 U/L / GGT: x           POCT Blood Glucose.: 145 mg/dL (18 @ 22:28)  POCT Blood Glucose.: 156 mg/dL (18 @ 17:23)  POCT Blood Glucose.: 182 mg/dL (18 @ 11:54)  POCT Blood Glucose.: 151 mg/dL (18 @ 06:31)    MEDICATIONS  (STANDING):  ascorbic acid 500 milliGRAM(s) Oral daily  aspirin 325 milliGRAM(s) Oral daily  atorvastatin 10 milliGRAM(s) Oral at bedtime  carvedilol 3.125 milliGRAM(s) Oral every 12 hours  chlorhexidine 0.12% Liquid 5 milliLiter(s) Oral Mucosa every 4 hours  clopidogrel Tablet 75 milliGRAM(s) Oral daily  enoxaparin Injectable 40 milliGRAM(s) SubCutaneous daily  ferrous    sulfate Liquid 300 milliGRAM(s) Enteral Tube daily  folic acid 1 milliGRAM(s) Oral daily  furosemide   Injectable 20 milliGRAM(s) IV Push every 8 hours  insulin lispro (HumaLOG) corrective regimen sliding scale   SubCutaneous every 6 hours  lactobacillus acidophilus 1 Tablet(s) Oral every 8 hours  meropenem  IVPB      meropenem  IVPB 1000 milliGRAM(s) IV Intermittent every 8 hours  metoclopramide   Syrup 10 milliGRAM(s) Oral every 8 hours  micafungin IVPB      micafungin IVPB 100 milliGRAM(s) IV Intermittent every 24 hours  multivitamin   Solution 5 milliLiter(s) Enteral Tube daily  pantoprazole  Injectable 40 milliGRAM(s) IV Push every 12 hours  QUEtiapine 25 milliGRAM(s) Oral daily  sucralfate suspension 1 Gram(s) Oral two times a day    MEDICATIONS  (PRN):  acetaminophen    Suspension .. 650 milliGRAM(s) Oral every 6 hours PRN Temp greater or equal to 38.5C (101.3F)  melatonin 5 milliGRAM(s) Oral at bedtime PRN Insomnia  ondansetron Injectable 4 milliGRAM(s) IV Push every 6 hours PRN Nausea and/or Vomiting  QUEtiapine 12.5 milliGRAM(s) Oral every 8 hours PRN agitation    Allergies:  penicillins (Unknown)    Physical Exam:   neuro: alert, following commands GALLARDO, equal strength b/l, difficulty lifting left arm up at shoulder  HEENT: PEERLA, EOMI  CV: S1S2 regular, tachy, no murmurs  Pulm: coarse BS b/l, no wheezing, no use of accessory muscles  abd: + BS soft NT ND  Ext: mild LE edema, WWP 2+ DP pulses b/l  Skin: warm, dry, well perfused, no rashes  Inc: mid sternal dsg C/D/I, b/l LE SVG harvest dsg C/D/I  lines/tubes: augustin, RUE midline, V wires isolated     Case including assessment/plan of care to be discussed with CT surgery attending in AM rounds.

## 2018-12-25 NOTE — PROGRESS NOTE ADULT - ASSESSMENT
48 yo Male c/o CP for 2-3 days prior to admission, then 10/10 chest pain approximately 9:30 am yesterday, followed by vomiting.  Wife drove pt to hospital, ruled in for STEMI. Urgent Cath, pt found to have multiple occlusions; stent to LAD and D2; stent to LAD thrombosed,  impella placed for support.  Pt then had VF arrest, shock x 1, return to NSR.  Upon transfer to OR for Urgent CABG, pt with asystolic arrest, chest opened intra-op with CPR in progress. Pt underwent C4V (LAD, Diag, OM and PDA) with inability to wean from CPB therefore requiring VA ECMO (central cannulation) and IABP, he was transferred to CICU.  12/3 s/p ECMO explant and IABP d/c'd, placement of impella via R groin sheath.   12/6 Impella removal via right femoral cutdown with primary repair of femoral artery and stent placed to external iliac artery for dissection.  12/14 + ileus.  12/15 ileus improved, septic and cardiogenic shock.    12/18 + UGIB.   12/19 EGD (+ bleeding stomach ulcer requiring clip x4)  12/21 ileus, made NPO   12/22 RUQ consistent with cholecystis, sx consulted no impressed, examination benign   12/24 tolerating tube feeds at goal, trach collar x 4 hours    Hospital course notable for (in addition to above): acute systolic heart failure, vent dependent respiratory failure (inability to wean also contributed to by severe agitation, now s/p trach), persistent fevers (one blood culture with fungemia, other cultures unremarkable), acute blood loss anemia, hypokalemia, ileus

## 2018-12-25 NOTE — PROGRESS NOTE ADULT - PROBLEM SELECTOR PLAN 1
coreg started 12/23, d/w Dr. Barrett when to start ACE/ARB therapy  continue lasix 20mg IV q8 for now  continue augustin catheter for urine output monitoring in critically ill patient coreg started 12/23, d/w Dr. Barrett when to start ACE/ARB therapy (BP will likely not tolerate currently)  continue lasix 20mg IV q8 for now  continue augustin catheter for urine output monitoring in critically ill patient

## 2018-12-25 NOTE — PROGRESS NOTE ADULT - PROBLEM SELECTOR PLAN 4
tube feeds on/off over last week due to other clinical issues (GI bleed, ileus)  now tolerating nepro TF at goal 60cc/hr since 12/24 AM  nutrition following

## 2018-12-25 NOTE — PROGRESS NOTE ADULT - SUBJECTIVE AND OBJECTIVE BOX
[facility]  INFECTIOUS DISEASES AND INTERNAL MEDICINE at [place]  =======================================================  Perfecto Santizo MD  Diplomates American Board of Internal Medicine and Infectious Diseases  =======================================================    PATRICK LYLE [id number]    Follow up: Fungemia    low grade temps   Stable on vent  awake alert    Reports no diarrhea    Allergies:  penicillins (Unknown)      Antibiotics:    meropenem  IVPB 1000 milliGRAM(s) IV Intermittent every 8 hours      micafungin IVPB 100 milliGRAM(s) IV Intermittent every 24 hours       REVIEW OF SYSTEMS:  Unable to obtain, not verbal due to trach      Physical Exam:  ICU Vital Signs Last 24 Hrs   Vital Signs Last 24 Hrs  T(C): 38 (25 Dec 2018 14:00), Max: 38.3 (25 Dec 2018 07:00)  T(F): 100.4 (25 Dec 2018 14:00), Max: 100.9 (25 Dec 2018 07:00)  HR: 112 (25 Dec 2018 14:00) (97 - 114)  BP: 107/75 (25 Dec 2018 14:00) (87/63 - 111/72)  BP(mean): 84 (25 Dec 2018 14:00) (70 - 85)  RR: 32 (25 Dec 2018 14:00) (0 - 32)  SpO2: 97% (25 Dec 2018 14:00) (97% - 100%)         GEN: NAD  HEENT: normocephalic and atraumatic. EOMI. PERRL.   NECK: Supple. + Trach  LUNGS: Coarse BS B/L  HEART: Regular rate and rhythm   ABDOMEN: Soft, nontender, and nondistended.  Positive bowel sounds.    : + Mckeon  EXTREMITIES: Without any edema.  MSK: no joint swelling  NEUROLOGIC: Awake, alert follows commands  PSYCHIATRIC: Appropriate affect .  SKIN: Sternal wound dressing, + wound vac, Leg dressings in place      Labs:                         9.9    8.3   )-----------( 297      ( 25 Dec 2018 06:38 )             31.7   12-25    140  |  103  |  25.0<H>  ----------------------------<  176<H>  3.7   |  26.0  |  0.41<L>    Ca    8.1<L>      25 Dec 2018 06:38  Phos  2.6     12-25  Mg     2.0     12-25    TPro  6.0<L>  /  Alb  2.9<L>  /  TBili  1.3  /  DBili  x   /  AST  69<H>  /  ALT  84<H>  /  AlkPhos  204<H>  12-25         1       12-08 @ 19:40 .Sputum Klebsiella pneumoniae    Moderate Klebsiella pneumoniae  No Routine respiratory keara present  Moderate WBC's  Few Yeast  Few Gram positive cocci in pairs        EXAM:  XR CHEST PORTABLE ROUTINE 1V                        PROCEDURE DATE:  12/21/2018    INTERPRETATION:  CHEST AP PORTABLE:  History: trach, pneumonia.   Date and time of exam: 12/21/2018 4:40 AM.  Technique: A single AP view of the chest was obtained.  Comparison exam: 12/20/2018 1:10 PM.  Findings:  There is a right IJ central line with the tip in the region of the distal   SVC. Tracheostomy tube unchanged. Nasogastric tube, unchanged. Persistent   bilateral airspace disease, unchanged. No evidence of pneumothorax..  Impression:  Persistent bilateral airspace disease, unchanged..

## 2018-12-25 NOTE — PROGRESS NOTE ADULT - ASSESSMENT
46y/o man with no significant PMH admitted on 11/29/18 for CP for 2-3 days prior to admission,   found with STEMI. Urgent Cath, pt found to have multiple occlusions; VF arrest, shock x 1, return to NSR.  Upon transfer to OR for Urgent CABG, pt again VF arrest, chest opened intra-op with CPR in progress.  s/p ECMO and Impella; 12/3 ECMO explanted and Impella placed via right groin sheath.    Has been on antibiotics :  Vancomycin 11/29 to 12/20  Cipro 11/29 to 12/4  Zosyn 12/4 to 12/12 and 12/15 to 12/16  Meropenem 12/26 to present  Micafungin 12/18 to present      Fungemia  Pneumonia  Fevers  Fever post op  s/p CABG   S/P tracheostomy       - Fever on 12/24 - repeat blood cultures pending  - Blood cultures with Candida Parapsilosis  - Repeat blood cultures pending, repeat daily till negative  - Continue Micafungin  - Needs DARION to r/o endocarditis   - Ophthalmology eval  - Low grade fever  - On Meropenem since 12/16   consider d/c   - Trend leukocytosis    Will Follow

## 2018-12-26 DIAGNOSIS — J32.9 CHRONIC SINUSITIS, UNSPECIFIED: ICD-10-CM

## 2018-12-26 LAB
CULTURE RESULTS: SIGNIFICANT CHANGE UP
GLUCOSE BLDC GLUCOMTR-MCNC: 153 MG/DL — HIGH (ref 70–99)
GLUCOSE BLDC GLUCOMTR-MCNC: 153 MG/DL — HIGH (ref 70–99)
GLUCOSE BLDC GLUCOMTR-MCNC: 177 MG/DL — HIGH (ref 70–99)
GLUCOSE BLDC GLUCOMTR-MCNC: 182 MG/DL — HIGH (ref 70–99)
SPECIMEN SOURCE: SIGNIFICANT CHANGE UP

## 2018-12-26 PROCEDURE — 99232 SBSQ HOSP IP/OBS MODERATE 35: CPT

## 2018-12-26 PROCEDURE — 99233 SBSQ HOSP IP/OBS HIGH 50: CPT

## 2018-12-26 PROCEDURE — 93306 TTE W/DOPPLER COMPLETE: CPT | Mod: 26

## 2018-12-26 PROCEDURE — 99233 SBSQ HOSP IP/OBS HIGH 50: CPT | Mod: 24

## 2018-12-26 RX ORDER — ZOLPIDEM TARTRATE 10 MG/1
5 TABLET ORAL
Qty: 0 | Refills: 0 | Status: DISCONTINUED | OUTPATIENT
Start: 2018-12-26 | End: 2018-12-29

## 2018-12-26 RX ADMIN — Medication 0.25 MILLIGRAM(S): at 06:46

## 2018-12-26 RX ADMIN — MICAFUNGIN SODIUM 105 MILLIGRAM(S): 100 INJECTION, POWDER, LYOPHILIZED, FOR SOLUTION INTRAVENOUS at 12:25

## 2018-12-26 RX ADMIN — MEROPENEM 100 MILLIGRAM(S): 1 INJECTION INTRAVENOUS at 14:51

## 2018-12-26 RX ADMIN — Medication 650 MILLIGRAM(S): at 14:46

## 2018-12-26 RX ADMIN — Medication 1 TABLET(S): at 23:03

## 2018-12-26 RX ADMIN — Medication 2: at 23:17

## 2018-12-26 RX ADMIN — Medication 650 MILLIGRAM(S): at 12:39

## 2018-12-26 RX ADMIN — Medication 10 MILLIGRAM(S): at 14:48

## 2018-12-26 RX ADMIN — Medication 500 MILLIGRAM(S): at 12:16

## 2018-12-26 RX ADMIN — Medication 2: at 17:36

## 2018-12-26 RX ADMIN — Medication 20 MILLIGRAM(S): at 06:47

## 2018-12-26 RX ADMIN — ENOXAPARIN SODIUM 40 MILLIGRAM(S): 100 INJECTION SUBCUTANEOUS at 12:17

## 2018-12-26 RX ADMIN — HYDROMORPHONE HYDROCHLORIDE 4 MILLIGRAM(S): 2 INJECTION INTRAMUSCULAR; INTRAVENOUS; SUBCUTANEOUS at 18:36

## 2018-12-26 RX ADMIN — Medication 10 MILLIGRAM(S): at 06:46

## 2018-12-26 RX ADMIN — MEROPENEM 100 MILLIGRAM(S): 1 INJECTION INTRAVENOUS at 06:47

## 2018-12-26 RX ADMIN — PANTOPRAZOLE SODIUM 40 MILLIGRAM(S): 20 TABLET, DELAYED RELEASE ORAL at 06:46

## 2018-12-26 RX ADMIN — Medication 20 MILLIGRAM(S): at 14:51

## 2018-12-26 RX ADMIN — MEROPENEM 100 MILLIGRAM(S): 1 INJECTION INTRAVENOUS at 23:02

## 2018-12-26 RX ADMIN — Medication 325 MILLIGRAM(S): at 12:44

## 2018-12-26 RX ADMIN — CLOPIDOGREL BISULFATE 75 MILLIGRAM(S): 75 TABLET, FILM COATED ORAL at 12:16

## 2018-12-26 RX ADMIN — Medication 300 MILLIGRAM(S): at 12:14

## 2018-12-26 RX ADMIN — Medication 1 GRAM(S): at 17:34

## 2018-12-26 RX ADMIN — Medication 1 TABLET(S): at 14:48

## 2018-12-26 RX ADMIN — Medication 1 GRAM(S): at 06:46

## 2018-12-26 RX ADMIN — Medication 5 MILLILITER(S): at 12:14

## 2018-12-26 RX ADMIN — Medication 40 MILLIEQUIVALENT(S): at 12:14

## 2018-12-26 RX ADMIN — HYDROMORPHONE HYDROCHLORIDE 4 MILLIGRAM(S): 2 INJECTION INTRAMUSCULAR; INTRAVENOUS; SUBCUTANEOUS at 02:05

## 2018-12-26 RX ADMIN — PANTOPRAZOLE SODIUM 40 MILLIGRAM(S): 20 TABLET, DELAYED RELEASE ORAL at 17:34

## 2018-12-26 RX ADMIN — Medication 2: at 12:18

## 2018-12-26 RX ADMIN — CHLORHEXIDINE GLUCONATE 5 MILLILITER(S): 213 SOLUTION TOPICAL at 06:47

## 2018-12-26 RX ADMIN — CHLORHEXIDINE GLUCONATE 5 MILLILITER(S): 213 SOLUTION TOPICAL at 17:35

## 2018-12-26 RX ADMIN — Medication 0.25 MILLIGRAM(S): at 17:35

## 2018-12-26 RX ADMIN — CHLORHEXIDINE GLUCONATE 5 MILLILITER(S): 213 SOLUTION TOPICAL at 14:47

## 2018-12-26 RX ADMIN — ATORVASTATIN CALCIUM 10 MILLIGRAM(S): 80 TABLET, FILM COATED ORAL at 23:03

## 2018-12-26 RX ADMIN — Medication 2: at 06:48

## 2018-12-26 RX ADMIN — CHLORHEXIDINE GLUCONATE 5 MILLILITER(S): 213 SOLUTION TOPICAL at 12:08

## 2018-12-26 RX ADMIN — HYDROMORPHONE HYDROCHLORIDE 4 MILLIGRAM(S): 2 INJECTION INTRAMUSCULAR; INTRAVENOUS; SUBCUTANEOUS at 02:45

## 2018-12-26 RX ADMIN — CHLORHEXIDINE GLUCONATE 5 MILLILITER(S): 213 SOLUTION TOPICAL at 23:03

## 2018-12-26 RX ADMIN — Medication 10 MILLIGRAM(S): at 23:03

## 2018-12-26 RX ADMIN — Medication 1 TABLET(S): at 06:47

## 2018-12-26 RX ADMIN — CARVEDILOL PHOSPHATE 3.12 MILLIGRAM(S): 80 CAPSULE, EXTENDED RELEASE ORAL at 06:47

## 2018-12-26 RX ADMIN — Medication 20 MILLIGRAM(S): at 23:03

## 2018-12-26 RX ADMIN — QUETIAPINE FUMARATE 25 MILLIGRAM(S): 200 TABLET, FILM COATED ORAL at 23:03

## 2018-12-26 RX ADMIN — Medication 5 MILLIGRAM(S): at 23:04

## 2018-12-26 RX ADMIN — CARVEDILOL PHOSPHATE 3.12 MILLIGRAM(S): 80 CAPSULE, EXTENDED RELEASE ORAL at 17:35

## 2018-12-26 RX ADMIN — Medication 1 MILLIGRAM(S): at 12:39

## 2018-12-26 NOTE — PROGRESS NOTE ADULT - SUBJECTIVE AND OBJECTIVE BOX
[facility]  INFECTIOUS DISEASES AND INTERNAL MEDICINE at [place]  =======================================================  Perfecto Santizo MD  Diplomates American Board of Internal Medicine and Infectious Diseases  =======================================================    PATRICK LYLE [id number]    Follow up: Fungemia    Febrile last night  Stable on vent  awake alert follows commands  Reports no diarrhea    Allergies:  penicillins (Unknown)      Antibiotics:    meropenem  IVPB 1000 milliGRAM(s) IV Intermittent every 8 hours      micafungin IVPB 100 milliGRAM(s) IV Intermittent every 24 hours       REVIEW OF SYSTEMS:  Limited. Patient with trach      Physical Exam:  ICU Vital Signs Last 24 Hrs  T(C): 38.8 (26 Dec 2018 12:00), Max: 38.8 (26 Dec 2018 12:00)  T(F): 101.8 (26 Dec 2018 12:00), Max: 101.8 (26 Dec 2018 12:00)  HR: 111 (26 Dec 2018 12:00) (98 - 116)  BP: 97/65 (26 Dec 2018 12:00) (83/55 - 108/74)  BP(mean): 75 (26 Dec 2018 12:00) (61 - 86)  RR: 27 (26 Dec 2018 12:00) (16 - 32)  SpO2: 100% (26 Dec 2018 12:00) (94% - 100%)      GEN: NAD  HEENT: normocephalic and atraumatic. EOMI. PERRL. + NGT  NECK: Supple. + Trach  LUNGS: Coarse BS B/L  HEART: Regular rate and rhythm   ABDOMEN: Soft, nontender, and nondistended.  Positive bowel sounds.    : + Mckeon  EXTREMITIES: Without any edema.  MSK: no joint swelling  NEUROLOGIC: Awake, alert follows commands  PSYCHIATRIC: Appropriate affect .  SKIN: Sternal wound dressing, + wound vac, Leg dressings in place      Labs:  12-25    140  |  103  |  25.0<H>  ----------------------------<  176<H>  3.7   |  26.0  |  0.41<L>    Ca    8.1<L>      25 Dec 2018 06:38  Phos  2.6     12-25  Mg     2.0     12-25    TPro  6.0<L>  /  Alb  2.9<L>  /  TBili  1.3  /  DBili  x   /  AST  69<H>  /  ALT  84<H>  /  AlkPhos  204<H>  12-25               9.9    8.3   )-----------( 297      ( 25 Dec 2018 06:38 )             31.7     LIVER FUNCTIONS - ( 25 Dec 2018 06:38 )  Alb: 2.9 g/dL / Pro: 6.0 g/dL / ALK PHOS: 204 U/L / ALT: 84 U/L / AST: 69 U/L / GGT: x             RECENT CULTURES:  12-25 @ 06:38    [facility]      12-24 @ 03:01 .Blood     No growth at 48 hours      12-21 @ 10:03 .Blood     No growth at 5 days.      12-20 @ 11:09 .Blood Candida parapsilosis    Growth in aerobic bottle: Candida parapsilosis  Aerobic Bottle: 1 day ;23:51 Hours to positivity  Anaerobic Bottle: No growth at 5 days.      12-20 @ 07:25 .Blood Candida parapsilosis    Growth in aerobic bottle: Candida parapsilosis  Aerobic Bottle: 02 days;05:48 Hours to positivity  Anaerobic Bottle: No growth at 5 days.      12-18 @ 15:02 .Surgical Swab     No growth at 5 days.  No WBC's seen.  No organisms seen      12-18 @ 08:34 .Sputum     Few Routine respiratory keara present  Moderate White blood cells  No organisms seen      12-18 @ 06:31 .Blood Candida parapsilosis  Blood Culture PCR    Growth in aerobic bottle: Candida parapsilosis  Aerobic Bottle: 1 day; 18:39 Hours to positivity  Anaerobic Bottle: No growth at 5 days.  ***Blood Panel PCR results on this specimen are available  approximately 3 hours after the Gram stain result.***  Gram stain, PCR, and/or culture results may not always  correspond due to difference in methodologies.  ************************************************************  This PCR assay was performed using [employer].  The following targets are tested for: Enterococcus,  vancomycin resistant enterococci, Listeria monocytogenes,  coagulase negative staphylococci, S. aureus,  methicillin resistant S. aureus, Streptococcus agalactiae  (Group B), S. pneumoniae, S. pyogenes (Group A),  Acinetobacter baumannii, Enterobacter cloacae, E. coli,  Klebsiella oxytoca, K. pneumoniae, Proteus sp.,  Serratia marcescens, Haemophilus influenzae,  Neisseria meningitidis, Pseudomonas aeruginosa, Candida  albicans, C. glabrata, C krusei, C parapsilosis,  C. tropicalis and the KPC resistance gene.  "Due to technical problems, Proteus sp. will Not be reported as part of  the BCID panel until further notice"      12-15 @ 23:04 .Blood     No growth at 5 days.      12-15 @ 22:52 .Sputum Klebsiella pneumoniae    Few Klebsiella pneumoniae  Few Candida albicans  Few Routine respiratory keara present  Numerous white blood cells  Few Gram positive cocci in pairs  Few Gram Positive Rods      12-15 @ 22:31 .Urine     No growth      12-14 @ 21:58 .Blood     No growth at 5 days.      12-14 @ 13:53 .Catheter left IJ (internal jugular) catheter tip     No growth at 2 days.      12-14 @ 11:48 .Blood     No growth at 5 days.      12-12 @ 16:19 .Sputum     Few Routine respiratory keara present  Few White blood cells  Few Gram Positive Cocci in Pairs and Chains      12-11 @ 20:24 .Broncial bronchial fluid     No growth at 1 week.      12-11 @ 10:49 .Broncial bronchial fluid     Rare Candida albicans  Rare Routine respiratory keara present  Numerous white blood cells  No organisms seen      12-11 @ 10:47 .Broncial bronchial fluid     Candida albicans isolated  Culture in progress      12-09 @ 18:56 .Blood     No growth at 5 days.      12-08 @ 19:40 .Sputum Klebsiella pneumoniae    Moderate Klebsiella pneumoniae  No Routine respiratory keara present  Moderate WBC's  Few Yeast  Few Gram positive cocci in pairs      EXAM:  XR CHEST PORTABLE ROUTINE 1V                        PROCEDURE DATE:  12/25/2018    INTERPRETATION:  CHEST AP PORTABLE:  History: heart failure.   Date and time of exam: 12/25/2018 5:18 AM.  Technique: A single AP view of the chest was obtained.  Comparison exam: 12/23/2018 4:37 AM.  Findings:  No change in lines and tubes. Diffuse bilateral infiltrates, left greater   than right. Partial clearing of right lung infiltrate since the prior   study. No change of left lung infiltrate. Post sternotomy changes. No   evidence of pneumothorax..  Impression:  Partial resolution of diffuse right lung infiltrate, otherwise stable.

## 2018-12-26 NOTE — PROGRESS NOTE ADULT - SUBJECTIVE AND OBJECTIVE BOX
46 yo Male c/o CP for 2-3 days prior to admission, then 10/10 chest pain approximately 9:30 am yesterday, followed by vomiting.  Wife drove pt to hospital, ruled in for STEMI. Urgent Cath, pt found to have multiple occlusions; stent to LAD and D2; stent to LAD thrombosed,  impella placed for support.  Pt then had VF arrest, shock x 1, return to NSR.  Upon transfer to OR for Urgent CABG, pt with asystolic arrest, chest opened intra-op with CPR in progress. Pt underwent C4V (LAD, Diag, OM and PDA) with inability to wean from CPB therefore requiring VA ECMO (central cannulation) and IABP, he was transferred to CICU.  12/3 s/p ECMO explant and IABP d/c'd, placement of impella via R groin sheath.   12/6 Impella removal via right femoral cutdown with primary repair of femoral artery and stent placed to external iliac artery for dissection.  12/14 + ileus.  12/15 ileus improved, septic and cardiogenic shock.    12/18 + UGIB.   12/19 EGD (+ bleeding stomach ulcer requiring clip x4)  12/21 ileus, made NPO   12/22 RUQ sono consistent with cholecystis, abd exam benign, is unlikely clinically significant per GI and ACS  12/24 tolerating tube feeds at goal, PS/CPAP alternating with trach collar trials as tolerated      AM rounding with team performed   patient seen and evaluated bedside with Dr Barrett; chart reviewed     ICU Vital Signs Last 24 Hrs  T(C): 38.4 (26 Dec 2018 08:00), Max: 38.4 (25 Dec 2018 16:00)  FEBRILE still on meropenem and micafungin +bacid   T(F): 101.1 (26 Dec 2018 08:00), Max: 101.1 (25 Dec 2018 16:00)  HR: 112 (26 Dec 2018 08:00) (98 - 116)  CURRENTLY SINUS TACH   maintained on COREG 3.125 BID   BP: 99/67 (26 Dec 2018 08:00) (83/55 - 108/74)  BP(mean): 77 (26 Dec 2018 08:00) (61 - 86)  RR: 23 (26 Dec 2018 08:00) (16 - 32)  SpO2: 94% (26 Dec 2018 08:00) (94% - 100%)  CURRENTLY ON TRACH COLLAR     12-25    140  |  103  |  25.0<H>  ----------------------------<  176<H>  3.7   |  26.0  |  0.41<L>    Ca    8.1<L>      25 Dec 2018 06:38  Phos  2.6     12-25  Mg     2.0     12-25    TPro  6.0<L>  /  Alb  2.9<L>  /  TBili  1.3  /  DBili  x   /  AST  69<H>  /  ALT  84<H>  /  AlkPhos  204<H>  12-25    POTASSIUM replaced for maintain potassium > 4.0                          9.9    8.3   )-----------( 297      ( 25 Dec 2018 06:38 )             31.7     ANEMIA of chronic disease currently on supplemental vitamins       will continue plan as previously outline     decision made to add PRN ambien 5 mg for sleep in addition to seroquel and xanax   will trach collar patient as tolerated throughout the day (8 am- 4 pm) with rest at night (CPAP vs AC) depending on overall status; will continue to evaluate throughout the day   will continue PT/OT, pending acute rehab when more stable medically   will hold off on peg with possibility of working on capping trach and feeding PO; will continue feeds via NG tube at this time; will hold dysphagia screen at this time   discussion about ABX in AM; Dr Barrett would like to maintain meropenem at this time with goal to complete ABX course for PNA; would also like to discuss with ID about micafungin course/ would maintain at this time with future conversation about possibly maintain PO antifungals long term   staples in chest d/c; every other staple in leg was d/c   labs every other day, cxr every other day   continuous pulse ox   continuous cardiac monitoring   continue ICU care   family at bedside, patient and family aware of plan and demonstrate understanding of the same

## 2018-12-26 NOTE — CHART NOTE - NSCHARTNOTEFT_GEN_A_CORE
Wound VAC was changed today 12/26. Wound appears clean and with good granulation tissue. Still copious amounts of serous drainage. Next VAC change will take place on 12/29.

## 2018-12-26 NOTE — PROGRESS NOTE ADULT - ASSESSMENT
46 yo Male c/o CP for 2-3 days prior to admission, then 10/10 chest pain approximately 9:30 am yesterday, followed by vomiting.  Wife drove pt to hospital, ruled in for STEMI. Urgent Cath, pt found to have multiple occlusions; stent to LAD and D2; stent to LAD thrombosed,  impella placed for support.  Pt then had VF arrest, shock x 1, return to NSR.  Upon transfer to OR for Urgent CABG, pt with asystolic arrest, chest opened intra-op with CPR in progress. Pt underwent C4V (LAD, Diag, OM and PDA) with inability to wean from CPB therefore requiring VA ECMO (central cannulation) and IABP, he was transferred to CICU.  12/3 s/p ECMO explant and IABP d/c'd, placement of impella via R groin sheath.   12/6 Impella removal via right femoral cutdown with primary repair of femoral artery and stent placed to external iliac artery for dissection.  12/14 + ileus.  12/15 ileus improved, septic and cardiogenic shock.    12/18 + UGIB.   12/19 EGD (+ bleeding stomach ulcer requiring clip x4)  12/21 ileus, made NPO   12/22 RUQ sono consistent with cholecystis, abd exam benign, is unlikely clinically significant per GI and ACS  12/24 tolerating tube feeds at goal, PS/CPAP alternating with trach collar trials as tolerated    Hospital course notable for (in addition to above): acute systolic heart failure, vent dependent respiratory failure (inability to wean also contributed to by severe agitation, now s/p trach), fungemia, acute blood loss anemia, hypokalemia, ileus, sinusitis.

## 2018-12-26 NOTE — PROGRESS NOTE ADULT - PROBLEM SELECTOR PLAN 2
cultures as above  continue mycofungin for fungemia  vancomycin d/c'd by ID, no evidence of MRSA  continue meropenem for now as d/w Dr Jamisonin  tylenol PRN and cooling blanket PRN for fevers   concern for sinusitis since NGT in place (will need PEG, re-evaluate this week)  f/u repeat BC

## 2018-12-26 NOTE — PROGRESS NOTE ADULT - PROBLEM SELECTOR PLAN 1
coreg started 12/23, d/w Dr. Barrett when to start ACE/ARB therapy (BP will likely not tolerate currently)  continue lasix 20mg IV q8 for now  continue augustin catheter to prevent worsening of scrotal erosion/wound with incontinence

## 2018-12-26 NOTE — PROGRESS NOTE ADULT - SUBJECTIVE AND OBJECTIVE BOX
Patient in chair. Was assisted OOB by nurses this AM.  Reports no pain.   Noted to have left shoulder weakness, no pain.   Off vent at this moment, feels pressure in his chest from work of breathing.     FUNCTIONAL PROGRESS  12/26  Bed Mobility  Bed Mobility Training Rehab Potential: good, to achieve stated therapy goals  Bed Mobility Training Rolling/Turning: moderate assist (50% patient effort);  1 person assist;  bed rails  Bed Mobility Training Supine-to-Sit: moderate assist (50% patient effort);  1 person assist;  bed rails  Bed Mobility Training Limitations: decreased ability to use arms for pushing/pulling;  decreased ability to use legs for bridging/pushing;  impaired ability to control trunk for mobility;  decreased strength    Sit-Stand Transfer Training  Sit-to-Stand Transfer Training Rehab Potential: good, to achieve stated therapy goals  Transfer Training Sit-to-Stand Transfer: minimum assist (75% patient effort);  2 person assist;  rolling walker  Transfer Training Stand-to-Sit Transfer: minimum assist (75% patient effort);  2 person assist;  rolling walker  Sit-to-Stand Transfer Training Transfer Safety Analysis: decreased strength;  rolling walker    Gait Training  Gait Training Rehab Potential: good, to achieve stated therapy goals  Gait Training: minimum assist (75% patient effort);  2 person assist;  rolling walker;  5 feet  Gait Analysis: 2-point gait   decreased hip/knee flexion;  decreased step length;  decreased strength;  5 feet;  rolling walker      REVIEW OF SYSTEMS  Constitutional - No fever,  +fatigue  Neurological - +loss of strength, No numbness, No tremors  Skin - No rashes, +lesions   Musculoskeletal - No joint pain, No joint swelling, No muscle pain    VITALS  T(C): 38.4 (12-26-18 @ 08:00), Max: 38.4 (12-25-18 @ 16:00)  HR: 112 (12-26-18 @ 08:00) (98 - 116)  BP: 99/67 (12-26-18 @ 08:00) (83/55 - 108/74)  RR: 23 (12-26-18 @ 08:00) (16 - 32)  SpO2: 94% (12-26-18 @ 08:00) (94% - 100%)  Wt(kg): --    MEDICATIONS   acetaminophen    Suspension .. 650 milliGRAM(s) every 6 hours PRN  ALPRAZolam 0.25 milliGRAM(s) every 12 hours  ascorbic acid 500 milliGRAM(s) daily  aspirin 325 milliGRAM(s) daily  atorvastatin 10 milliGRAM(s) at bedtime  carvedilol 3.125 milliGRAM(s) every 12 hours  chlorhexidine 0.12% Liquid 5 milliLiter(s) every 4 hours  clopidogrel Tablet 75 milliGRAM(s) daily  enoxaparin Injectable 40 milliGRAM(s) daily  ferrous    sulfate Liquid 300 milliGRAM(s) daily  folic acid 1 milliGRAM(s) daily  furosemide   Injectable 20 milliGRAM(s) every 8 hours  HYDROmorphone   Tablet 2 milliGRAM(s) every 4 hours PRN  HYDROmorphone   Tablet 4 milliGRAM(s) every 4 hours PRN  insulin lispro (HumaLOG) corrective regimen sliding scale   every 6 hours  lactobacillus acidophilus 1 Tablet(s) every 8 hours  melatonin 5 milliGRAM(s) at bedtime  meropenem  IVPB     meropenem  IVPB 1000 milliGRAM(s) every 8 hours  metoclopramide   Syrup 10 milliGRAM(s) every 8 hours  micafungin IVPB     micafungin IVPB 100 milliGRAM(s) every 24 hours  multivitamin   Solution 5 milliLiter(s) daily  ondansetron Injectable 4 milliGRAM(s) every 6 hours PRN  pantoprazole  Injectable 40 milliGRAM(s) every 12 hours  potassium chloride   Powder 40 milliEquivalent(s) daily  QUEtiapine 12.5 milliGRAM(s) every 8 hours PRN  QUEtiapine 25 milliGRAM(s) <User Schedule>  sucralfate suspension 1 Gram(s) two times a day  zolpidem 5 milliGRAM(s) <User Schedule> PRN      RECENT LABS/IMAGING  CBC Full  -  ( 25 Dec 2018 06:38 )  WBC Count : 8.3 K/uL  Hemoglobin : 9.9 g/dL  Hematocrit : 31.7 %  Platelet Count - Automated : 297 K/uL  Mean Cell Volume : 87.6 fl  Mean Cell Hemoglobin : 27.3 pg  Mean Cell Hemoglobin Concentration : 31.2 g/dL  Auto Neutrophil # : x  Auto Lymphocyte # : x  Auto Monocyte # : x  Auto Eosinophil # : x  Auto Basophil # : x  Auto Neutrophil % : x  Auto Lymphocyte % : x  Auto Monocyte % : x  Auto Eosinophil % : x  Auto Basophil % : x    12-25    140  |  103  |  25.0<H>  ----------------------------<  176<H>  3.7   |  26.0  |  0.41<L>    Ca    8.1<L>      25 Dec 2018 06:38  Phos  2.6     12-25  Mg     2.0     12-25    TPro  6.0<L>  /  Alb  2.9<L>  /  TBili  1.3  /  DBili  x   /  AST  69<H>  /  ALT  84<H>  /  AlkPhos  204<H>  12-25    ----------------------------------------------------------------------------------------  PHYSICAL EXAM  Constitutional - NAD, Comfortable  Chest - Breathing comfortably, No wheezing, + trach collar   Extremities - BLE swelling, BLE incision - + Staples  Neurologic Exam -                    Cognitive - Awake, Alert, AAO to self, place, date	     Communication - Able to mouth words     Cranial Nerves - CN 2-12 intact     Motor -                      LEFT    UE - ShAB 1/5, EF 4/5, EE 4/5,  4/5                    RIGHT UE - ShAB 5/5, EF 5/5, EE 5/5,  5/5                    LEFT    LE - HF 5/5, KE 5/5, DF 5/5, PF 5/5                    RIGHT LE - HF 5/5, KE 5/5, DF 5/5, PF 5/5         Sensory - Intact to LT  Psychiatric - Mood stable, Affect WNL  ----------------------------------------------------------------------------------------  ASSESSMENT/PLAN  48y Male with functional deficits after STEMI, cardiac arrest s/p CABG, followed by complicated hospital course including need for trach, ileus/tube feeds, GI bleed, sepsis.   Pain - Tylenol, Dilaudid  Sleep - Consider Melatonin and DC Ambien  Mood - Xanax, Recommend DC of Seroquel  CAD - ASA, Plavix, Lipitor  DVT PPX - Lovenox    Candida - Mycamine, Meropenem  Rehab - Recommend ACUTE inpatient rehabilitation for the functional deficits consisting of 3 hours of therapy/day & 24 hour RN/daily PMR physician for comorbid medical management. Will continue to follow for ongoing rehab needs and recommendations.    Continue bedside therapy as well as OOB throughout the day with mobilization by staff to maintain cardiopulmonary function and prevention of secondary complications related to debility.

## 2018-12-26 NOTE — PROGRESS NOTE ADULT - SUBJECTIVE AND OBJECTIVE BOX
Brief Hospital Course:    STEMI, emergently to cath lab, left main thrombus, cardiac arrest requiring defib, intubated in cath lab, impella placed which clotted off on way to OR and was subsequently removed in OR. Crashed onto CPB for CABG d/t hemodynamic collapse. TO CTICU post CABG with central ECMO and open chest.  12/3 ECMO explanted and IABP removed, impella inserted   impella removed via R femoral cutdown with repair and external iliac stent  12/10 s/p trach for prolonged respiratory failure   abd distention, TF held, 2L NGT output, +ileus on AXR, gastrographin study done  12/15 ileus resolved, resumed for meds via NGT. Respiratory distress, febrile, septic and cardiogenic shock requiring restarting of abx, epinephrine and norepinephrine infusions   bilat pleural CTs d/c'd, more awake and alert, zosyn changed to meropenem    mycofungin added for candida parapisolsis fungemia, epinephrine weaned off, vac placed to R groin, + UGIB, started on pantoprazole infusion   EGD (+ bleeding stomach ulcer, injected with epi and hemoclip placed x 4)   rising LFTs, N/V, tube feeds held, AXR with ileus    abd US Splenomegaly. Gallbladder wall thickening with sludge and pericholecystic edema suggesting acute cholecystitis, surgery consulted > NTD   tolerating tube feeds back at goal, CPAP vs trach collar trials as tolerated      Subjective/review of Systems: slept better last night, feels well in general, still has sinus HA/pressure to face under eyes. Denies CP, palpitations, SOB, chills, diaphoresis, N/V, abd pain, diarrhea, numbness/tingling, dizziness/lightheadedness, HA, itchiness/rash    Review of Systems: limited ROS obtained due to pt with trach      Patient is a 48y old  Male who presents with a chief complaint of cp (25 Dec 2018 16:29)    HPI:  This is a 48 y/o male no significant PMH; recent ABT (doxycycline) r/t cellulitis to right groin per pt. Pt presented to the ED with 10/10 chest pain that he reported started at 0930; he drank water with no relief; pain started to progress to B/L shoulders. Per pt spouse, she reports he has had chest pain x2-3 days and this morning he vomited which he attributed to reflux.  She brought him into the emergency department.    Pt has significant EKG changes ST elevations in leads V1-V5 with reciprocal changes in II, III, AVF.  Pt rec'd asa and plavix load in ED. (2018 11:32)    PAST MEDICAL & SURGICAL HISTORY:  Staph infection  No significant past surgical history    FAMILY HISTORY:  Family history of myocardial infarction (Mother): mother;       Vitals   ICU Vital Signs Last 24 Hrs  T(C): 38.1 (26 Dec 2018 05:00), Max: 38.4 (25 Dec 2018 16:00)  T(F): 100.6 (26 Dec 2018 05:00), Max: 101.1 (25 Dec 2018 16:00)  HR: 106 (26 Dec 2018 05:00) (98 - 116), ST  BP: 91/62 (26 Dec 2018 05:00) (83/55 - 108/74)  BP(mean): 71 (26 Dec 2018 05:00) (61 - 86)  RR: 18 (26 Dec 2018 05:00) (16 - 32)  SpO2: 99% (26 Dec 2018 05:00) (97% - 100%)    VENT SETTINGS   Mode: AC/ CMV (Assist Control/ Continuous Mandatory Ventilation)  RR (machine): 15  TV (machine): 600  FiO2: 30  PEEP: 5  MAP: 11  PIP: 27      I&O's Detail    24 Dec 2018 07:  -  25 Dec 2018 07:00  --------------------------------------------------------  Total IN: 2210 mL  Total OUT: 2395 mL  Total NET: -185 mL    25 Dec 2018 07:  -  26 Dec 2018 06:17  --------------------------------------------------------  IN:    Enteral Tube Flush: 480 mL    Nepro: 1320 mL    Solution: 100 mL    Solution: 100 mL  Total IN: 2000 mL    OUT:    Indwelling Catheter - Urethral: 2690 mL    VAC (Vacuum Assisted Closure) System: 40 mL  Total OUT: 2730 mL    Total NET: -730 mL      LABS                        9.9    8.3   )-----------( 297      ( 25 Dec 2018 06:38 )             31.7         140  |  103  |  25.0<H>  ----------------------------<  176<H>  3.7   |  26.0  |  0.41<L>    Ca    8.1<L>      25 Dec 2018 06:38  Phos  2.6       Mg     2.0         TPro  6.0<L>  /  Alb  2.9<L>  /  TBili  1.3  /  DBili  x   /  AST  69<H>  /  ALT  84<H>  /  AlkPhos  204<H>      POCT Blood Glucose.: 177 mg/dL (18 @ 06:14)  POCT Blood Glucose.: 167 mg/dL (18 @ 21:16)  POCT Blood Glucose.: 147 mg/dL (18 @ 17:49)  POCT Blood Glucose.: 171 mg/dL (18 @ 11:14)      < from: Xray Chest 1 View- PORTABLE-Routine (18 @ 06:08) >  Findings: No change in lines and tubes. Diffuse bilateral infiltrates, left greater than right. Partial clearing of right lung infiltrate since the prior study. No change of left lung infiltrate. Post sternotomy changes. No evidence of pneumothorax..  Impression: Partial resolution of diffuse right lung infiltrate, otherwise stable.  < end of copied text >      MEDICATIONS  (STANDING):  ALPRAZolam 0.25 milliGRAM(s) Oral every 12 hours  ascorbic acid 500 milliGRAM(s) Oral daily  aspirin 325 milliGRAM(s) Oral daily  atorvastatin 10 milliGRAM(s) Oral at bedtime  carvedilol 3.125 milliGRAM(s) Oral every 12 hours  chlorhexidine 0.12% Liquid 5 milliLiter(s) Oral Mucosa every 4 hours  clopidogrel Tablet 75 milliGRAM(s) Oral daily  enoxaparin Injectable 40 milliGRAM(s) SubCutaneous daily  ferrous    sulfate Liquid 300 milliGRAM(s) Enteral Tube daily  folic acid 1 milliGRAM(s) Oral daily  furosemide   Injectable 20 milliGRAM(s) IV Push every 8 hours  insulin lispro (HumaLOG) corrective regimen sliding scale   SubCutaneous every 6 hours  lactobacillus acidophilus 1 Tablet(s) Oral every 8 hours  melatonin 5 milliGRAM(s) Oral at bedtime  meropenem  IVPB 1000 milliGRAM(s) IV Intermittent every 8 hours  metoclopramide   Syrup 10 milliGRAM(s) Oral every 8 hours  micafungin IVPB 100 milliGRAM(s) IV Intermittent every 24 hours  multivitamin   Solution 5 milliLiter(s) Enteral Tube daily  pantoprazole  Injectable 40 milliGRAM(s) IV Push every 12 hours  potassium chloride   Powder 40 milliEquivalent(s) Oral daily  QUEtiapine 25 milliGRAM(s) Oral <User Schedule>  sucralfate suspension 1 Gram(s) Oral two times a day    MEDICATIONS  (PRN):  acetaminophen    Suspension .. 650 milliGRAM(s) Oral every 6 hours PRN Temp greater or equal to 38.5C (101.3F)  HYDROmorphone   Tablet 2 milliGRAM(s) Oral every 4 hours PRN Moderate Pain (4 - 6)  HYDROmorphone   Tablet 4 milliGRAM(s) Oral every 4 hours PRN Severe Pain (7 - 10)  ondansetron Injectable 4 milliGRAM(s) IV Push every 6 hours PRN Nausea and/or Vomiting  QUEtiapine 12.5 milliGRAM(s) Oral every 8 hours PRN agitation    Allergies: penicillins (Unknown)      Physical Exam:   Constitutional: NAD, well-groomed, well-developed  HEENT: PERRLA, EOMI, no drainage or redness. + thrush. + NGT  Neck: supple,  No JVD, + trach (+ white secretions when on trach collar, pt has good cough)  Respiratory: Breath Sounds equal & clear bilaterally to auscultation, no accessory muscle use noted  Chest: midsternal incision well approximated, no heat, erythema or d/c, stable, + staples. V wires isolated.  Cardiovascular: mild tachycardia, regular rhythm, normal S1, S2; no murmurs or rub  Gastrointestinal: Soft, non-tender, non distended, + bowel sounds  : augustin in situ to bedside/gravity drainage, scrotal erosion   Extremities: GALLARDO x 4 (left arm still weak 4/5 but improving), able to walk from bed to chair with walker yesterday, 1+ edema to bilat feet, no cyanosis, no clubbing. BLE SVG sites well approximated, no heat, erythema or d/c, + staples, minimal resolving surrounding eccymosis  Neurological: A+O x 3 (communicates non-verbally), speech is clear and intact during brief assessment for (trach occluded temporarily while on trach collar with cuff down)  Skin: warm, dry, well perfused      Code Status: full code      time spent: __40__minutes (reviewing chart including medication, labs and imaging results, discussions with interdisciplinary team, discussing goals of care/advanced directives, counseling patient and/or family, non-inclusive of procedures) Brief Hospital Course:    STEMI, emergently to cath lab, left main thrombus, cardiac arrest requiring defib, intubated in cath lab, impella placed which clotted off on way to OR and was subsequently removed in OR. Crashed onto CPB for CABG d/t hemodynamic collapse. TO CTICU post CABG with central ECMO and open chest.  12/3 ECMO explanted and IABP removed, impella inserted   impella removed via R femoral cutdown with repair and external iliac stent  12/10 s/p trach for prolonged respiratory failure   abd distention, TF held, 2L NGT output, +ileus on AXR, gastrographin study done  12/15 ileus resolved, resumed for meds via NGT. Respiratory distress, febrile, septic and cardiogenic shock requiring restarting of abx, epinephrine and norepinephrine infusions   bilat pleural CTs d/c'd, more awake and alert, zosyn changed to meropenem    mycofungin added for candida parapisolsis fungemia, epinephrine weaned off, vac placed to R groin, + UGIB, started on pantoprazole infusion   EGD (+ bleeding stomach ulcer, injected with epi and hemoclip placed x 4)   rising LFTs, N/V, tube feeds held, AXR with ileus    abd US Splenomegaly. Gallbladder wall thickening with sludge and pericholecystic edema suggesting acute cholecystitis, surgery consulted > NTD   tolerating tube feeds back at goal, CPAP vs trach collar trials as tolerated      Subjective/review of Systems: slept better last night, feels well in general, still has sinus HA/pressure to face under eyes. Denies CP, palpitations, SOB, chills, diaphoresis, N/V, abd pain, diarrhea, numbness/tingling, dizziness/lightheadedness, HA, itchiness/rash    Review of Systems: limited ROS obtained due to pt with trach      Patient is a 48y old  Male who presents with a chief complaint of cp (25 Dec 2018 16:29)    HPI:  This is a 48 y/o male no significant PMH; recent ABT (doxycycline) r/t cellulitis to right groin per pt. Pt presented to the ED with 10/10 chest pain that he reported started at 0930; he drank water with no relief; pain started to progress to B/L shoulders. Per pt spouse, she reports he has had chest pain x2-3 days and this morning he vomited which he attributed to reflux.  She brought him into the emergency department.    Pt has significant EKG changes ST elevations in leads V1-V5 with reciprocal changes in II, III, AVF.  Pt rec'd asa and plavix load in ED. (2018 11:32)    PAST MEDICAL & SURGICAL HISTORY:  Staph infection  No significant past surgical history    FAMILY HISTORY:  Family history of myocardial infarction (Mother): mother;       Vitals   ICU Vital Signs Last 24 Hrs  T(C): 38.1 (26 Dec 2018 05:00), Max: 38.4 (25 Dec 2018 16:00)  T(F): 100.6 (26 Dec 2018 05:00), Max: 101.1 (25 Dec 2018 16:00)  HR: 106 (26 Dec 2018 05:00) (98 - 116), ST  BP: 91/62 (26 Dec 2018 05:00) (83/55 - 108/74)  BP(mean): 71 (26 Dec 2018 05:00) (61 - 86)  RR: 18 (26 Dec 2018 05:00) (16 - 32)  SpO2: 99% (26 Dec 2018 05:00) (97% - 100%)    VENT SETTINGS   Mode: AC/ CMV (Assist Control/ Continuous Mandatory Ventilation)  RR (machine): 15  TV (machine): 600  FiO2: 30  PEEP: 5  MAP: 11  PIP: 27      I&O's Detail    24 Dec 2018 07:  -  25 Dec 2018 07:00  --------------------------------------------------------  Total IN: 2210 mL  Total OUT: 2395 mL  Total NET: -185 mL    25 Dec 2018 07:  -  26 Dec 2018 06:17  --------------------------------------------------------  IN:    Enteral Tube Flush: 480 mL    Nepro: 1320 mL    Solution: 100 mL    Solution: 100 mL  Total IN: 2000 mL    OUT:    Indwelling Catheter - Urethral: 2690 mL    VAC (Vacuum Assisted Closure) System: 40 mL  Total OUT: 2730 mL    Total NET: -730 mL      LABS                        9.9    8.3   )-----------( 297      ( 25 Dec 2018 06:38 )             31.7         140  |  103  |  25.0<H>  ----------------------------<  176<H>  3.7   |  26.0  |  0.41<L>    Ca    8.1<L>      25 Dec 2018 06:38  Phos  2.6       Mg     2.0         TPro  6.0<L>  /  Alb  2.9<L>  /  TBili  1.3  /  DBili  x   /  AST  69<H>  /  ALT  84<H>  /  AlkPhos  204<H>      POCT Blood Glucose.: 177 mg/dL (18 @ 06:14)  POCT Blood Glucose.: 167 mg/dL (18 @ 21:16)  POCT Blood Glucose.: 147 mg/dL (18 @ 17:49)  POCT Blood Glucose.: 171 mg/dL (18 @ 11:14)       Blood Cx candida parapsilosis   Blood Cx No Growth   Sputum Cx routine resp keara   surg swab (groin) No Growth   Blood Cx x 2 candida parapsilosis   Blood Cx No Growth   Blood Cx No Growth      < from: Xray Chest 1 View- PORTABLE-Routine (18 @ 06:08) >  Findings: No change in lines and tubes. Diffuse bilateral infiltrates, left greater than right. Partial clearing of right lung infiltrate since the prior study. No change of left lung infiltrate. Post sternotomy changes. No evidence of pneumothorax..  Impression: Partial resolution of diffuse right lung infiltrate, otherwise stable.  < end of copied text >      MEDICATIONS  (STANDING):  ALPRAZolam 0.25 milliGRAM(s) Oral every 12 hours  ascorbic acid 500 milliGRAM(s) Oral daily  aspirin 325 milliGRAM(s) Oral daily  atorvastatin 10 milliGRAM(s) Oral at bedtime  carvedilol 3.125 milliGRAM(s) Oral every 12 hours  chlorhexidine 0.12% Liquid 5 milliLiter(s) Oral Mucosa every 4 hours  clopidogrel Tablet 75 milliGRAM(s) Oral daily  enoxaparin Injectable 40 milliGRAM(s) SubCutaneous daily  ferrous    sulfate Liquid 300 milliGRAM(s) Enteral Tube daily  folic acid 1 milliGRAM(s) Oral daily  furosemide   Injectable 20 milliGRAM(s) IV Push every 8 hours  insulin lispro (HumaLOG) corrective regimen sliding scale   SubCutaneous every 6 hours  lactobacillus acidophilus 1 Tablet(s) Oral every 8 hours  melatonin 5 milliGRAM(s) Oral at bedtime  meropenem  IVPB 1000 milliGRAM(s) IV Intermittent every 8 hours  metoclopramide   Syrup 10 milliGRAM(s) Oral every 8 hours  micafungin IVPB 100 milliGRAM(s) IV Intermittent every 24 hours  multivitamin   Solution 5 milliLiter(s) Enteral Tube daily  pantoprazole  Injectable 40 milliGRAM(s) IV Push every 12 hours  potassium chloride   Powder 40 milliEquivalent(s) Oral daily  QUEtiapine 25 milliGRAM(s) Oral <User Schedule>  sucralfate suspension 1 Gram(s) Oral two times a day    MEDICATIONS  (PRN):  acetaminophen    Suspension .. 650 milliGRAM(s) Oral every 6 hours PRN Temp greater or equal to 38.5C (101.3F)  HYDROmorphone   Tablet 2 milliGRAM(s) Oral every 4 hours PRN Moderate Pain (4 - 6)  HYDROmorphone   Tablet 4 milliGRAM(s) Oral every 4 hours PRN Severe Pain (7 - 10)  ondansetron Injectable 4 milliGRAM(s) IV Push every 6 hours PRN Nausea and/or Vomiting  QUEtiapine 12.5 milliGRAM(s) Oral every 8 hours PRN agitation    Allergies: penicillins (Unknown)      Physical Exam:   Constitutional: NAD, well-groomed, well-developed  HEENT: PERRLA, EOMI, no drainage or redness. + thrush. + NGT  Neck: supple,  No JVD, + trach (+ white secretions when on trach collar, pt has good cough)  Respiratory: Breath Sounds equal & clear bilaterally to auscultation, no accessory muscle use noted  Chest: midsternal incision well approximated, no heat, erythema or d/c, stable, + staples. V wires isolated.  Cardiovascular: mild tachycardia, regular rhythm, normal S1, S2; no murmurs or rub  Gastrointestinal: Soft, non-tender, non distended, + bowel sounds  : augustin in situ to bedside/gravity drainage, scrotal erosion   Extremities: GALLARDO x 4 (left arm still weak 4/5 but improving), able to walk from bed to chair with walker yesterday, 1+ edema to bilat feet, no cyanosis, no clubbing. BLE SVG sites well approximated, no heat, erythema or d/c, + staples, minimal resolving surrounding eccymosis  Neurological: A+O x 3 (communicates non-verbally), speech is clear and intact during brief assessment for (trach occluded temporarily while on trach collar with cuff down)  Skin: warm, dry, well perfused      Code Status: full code      time spent: __40__minutes (reviewing chart including medication, labs and imaging results, discussions with interdisciplinary team, discussing goals of care/advanced directives, counseling patient and/or family, non-inclusive of procedures)

## 2018-12-26 NOTE — PROGRESS NOTE ADULT - PROBLEM SELECTOR PLAN 3
s/p trach  PS/CPAP alternating with trach collar trials as tolerated  rest on AC overnight per Dr. Barrett

## 2018-12-26 NOTE — PROGRESS NOTE ADULT - ASSESSMENT
46y/o man with no significant PMH admitted on 11/29/18 for CP for 2-3 days prior to admission,   found with STEMI. Urgent Cath, pt found to have multiple occlusions; VF arrest, shock x 1, return to NSR.  Upon transfer to OR for Urgent CABG, pt again VF arrest, chest opened intra-op with CPR in progress.  s/p ECMO and Impella; 12/3 ECMO explanted and Impella placed via right groin sheath.    Has been on antibiotics :  Vancomycin 11/29 to 12/20  Cipro 11/29 to 12/4  Zosyn 12/4 to 12/12 and 12/15 to 12/16  Meropenem 12/16 to present  Micafungin 12/18 to present      Fungemia  Pneumonia  Fevers  Fever post op  s/p CABG   S/P tracheostomy       - Remains febrile  - Blood cultures with Candida Parapsilosis  - Repeat blood cultures no growth 12/21  - Continue Micafungin  - Needs DARION to r/o endocarditis   - Ophthalmology eval  - Low grade fever  - On Meropenem since 12/16  probably not needed but d/w CT surg want to continue it, would stop Meropenem on 12/29/18 to complete 1 month of antibiotics  - Would change NGT to PEG, fevers will likely persist until NGT removed at which point Sinusitis can be treated.   - Trend fever  - Trend leukocytosis    Will Follow

## 2018-12-27 LAB
ALBUMIN SERPL ELPH-MCNC: 3.1 G/DL — LOW (ref 3.3–5.2)
ALP SERPL-CCNC: 215 U/L — HIGH (ref 40–120)
ALT FLD-CCNC: 88 U/L — HIGH
ANION GAP SERPL CALC-SCNC: 13 MMOL/L — SIGNIFICANT CHANGE UP (ref 5–17)
AST SERPL-CCNC: 97 U/L — HIGH
BILIRUB SERPL-MCNC: 1.5 MG/DL — SIGNIFICANT CHANGE UP (ref 0.4–2)
BUN SERPL-MCNC: 29 MG/DL — HIGH (ref 8–20)
CALCIUM SERPL-MCNC: 8.4 MG/DL — LOW (ref 8.6–10.2)
CHLORIDE SERPL-SCNC: 105 MMOL/L — SIGNIFICANT CHANGE UP (ref 98–107)
CO2 SERPL-SCNC: 26 MMOL/L — SIGNIFICANT CHANGE UP (ref 22–29)
CREAT SERPL-MCNC: 0.45 MG/DL — LOW (ref 0.5–1.3)
GLUCOSE BLDC GLUCOMTR-MCNC: 148 MG/DL — HIGH (ref 70–99)
GLUCOSE BLDC GLUCOMTR-MCNC: 157 MG/DL — HIGH (ref 70–99)
GLUCOSE BLDC GLUCOMTR-MCNC: 177 MG/DL — HIGH (ref 70–99)
GLUCOSE SERPL-MCNC: 159 MG/DL — HIGH (ref 70–115)
HCT VFR BLD CALC: 32.2 % — LOW (ref 42–52)
HGB BLD-MCNC: 10 G/DL — LOW (ref 14–18)
LIDOCAIN IGE QN: 188 U/L — HIGH (ref 22–51)
MAGNESIUM SERPL-MCNC: 2 MG/DL — SIGNIFICANT CHANGE UP (ref 1.6–2.6)
MCHC RBC-ENTMCNC: 27.4 PG — SIGNIFICANT CHANGE UP (ref 27–31)
MCHC RBC-ENTMCNC: 31.1 G/DL — LOW (ref 32–36)
MCV RBC AUTO: 88.2 FL — SIGNIFICANT CHANGE UP (ref 80–94)
PLATELET # BLD AUTO: 310 K/UL — SIGNIFICANT CHANGE UP (ref 150–400)
POTASSIUM SERPL-MCNC: 4.4 MMOL/L — SIGNIFICANT CHANGE UP (ref 3.5–5.3)
POTASSIUM SERPL-SCNC: 4.4 MMOL/L — SIGNIFICANT CHANGE UP (ref 3.5–5.3)
PROT SERPL-MCNC: 6.3 G/DL — LOW (ref 6.6–8.7)
RBC # BLD: 3.65 M/UL — LOW (ref 4.6–6.2)
RBC # FLD: 16 % — HIGH (ref 11–15.6)
SODIUM SERPL-SCNC: 144 MMOL/L — SIGNIFICANT CHANGE UP (ref 135–145)
WBC # BLD: 7.1 K/UL — SIGNIFICANT CHANGE UP (ref 4.8–10.8)
WBC # FLD AUTO: 7.1 K/UL — SIGNIFICANT CHANGE UP (ref 4.8–10.8)

## 2018-12-27 PROCEDURE — 71045 X-RAY EXAM CHEST 1 VIEW: CPT | Mod: 26

## 2018-12-27 PROCEDURE — 99232 SBSQ HOSP IP/OBS MODERATE 35: CPT

## 2018-12-27 RX ORDER — FUROSEMIDE 40 MG
20 TABLET ORAL
Qty: 0 | Refills: 0 | Status: DISCONTINUED | OUTPATIENT
Start: 2018-12-28 | End: 2019-01-03

## 2018-12-27 RX ORDER — SPIRONOLACTONE 25 MG/1
12.5 TABLET, FILM COATED ORAL DAILY
Qty: 0 | Refills: 0 | Status: DISCONTINUED | OUTPATIENT
Start: 2018-12-28 | End: 2019-01-07

## 2018-12-27 RX ADMIN — PANTOPRAZOLE SODIUM 40 MILLIGRAM(S): 20 TABLET, DELAYED RELEASE ORAL at 05:23

## 2018-12-27 RX ADMIN — Medication 20 MILLIGRAM(S): at 21:14

## 2018-12-27 RX ADMIN — CHLORHEXIDINE GLUCONATE 5 MILLILITER(S): 213 SOLUTION TOPICAL at 05:23

## 2018-12-27 RX ADMIN — Medication 1 MILLIGRAM(S): at 13:43

## 2018-12-27 RX ADMIN — Medication 500 MILLIGRAM(S): at 13:37

## 2018-12-27 RX ADMIN — Medication 1 GRAM(S): at 05:35

## 2018-12-27 RX ADMIN — Medication 300 MILLIGRAM(S): at 13:34

## 2018-12-27 RX ADMIN — Medication 1 TABLET(S): at 21:15

## 2018-12-27 RX ADMIN — Medication 0.25 MILLIGRAM(S): at 19:30

## 2018-12-27 RX ADMIN — CARVEDILOL PHOSPHATE 3.12 MILLIGRAM(S): 80 CAPSULE, EXTENDED RELEASE ORAL at 19:32

## 2018-12-27 RX ADMIN — Medication 5 MILLIGRAM(S): at 21:15

## 2018-12-27 RX ADMIN — Medication 40 MILLIEQUIVALENT(S): at 13:35

## 2018-12-27 RX ADMIN — Medication 1 TABLET(S): at 05:23

## 2018-12-27 RX ADMIN — Medication 5 MILLILITER(S): at 13:38

## 2018-12-27 RX ADMIN — Medication 2: at 05:35

## 2018-12-27 RX ADMIN — Medication 0.25 MILLIGRAM(S): at 05:23

## 2018-12-27 RX ADMIN — Medication 10 MILLIGRAM(S): at 05:23

## 2018-12-27 RX ADMIN — CLOPIDOGREL BISULFATE 75 MILLIGRAM(S): 75 TABLET, FILM COATED ORAL at 13:39

## 2018-12-27 RX ADMIN — Medication 2: at 13:34

## 2018-12-27 RX ADMIN — PANTOPRAZOLE SODIUM 40 MILLIGRAM(S): 20 TABLET, DELAYED RELEASE ORAL at 19:32

## 2018-12-27 RX ADMIN — CHLORHEXIDINE GLUCONATE 5 MILLILITER(S): 213 SOLUTION TOPICAL at 09:26

## 2018-12-27 RX ADMIN — CARVEDILOL PHOSPHATE 3.12 MILLIGRAM(S): 80 CAPSULE, EXTENDED RELEASE ORAL at 05:23

## 2018-12-27 RX ADMIN — Medication 10 MILLIGRAM(S): at 13:36

## 2018-12-27 RX ADMIN — CHLORHEXIDINE GLUCONATE 5 MILLILITER(S): 213 SOLUTION TOPICAL at 21:16

## 2018-12-27 RX ADMIN — Medication 20 MILLIGRAM(S): at 13:39

## 2018-12-27 RX ADMIN — ATORVASTATIN CALCIUM 10 MILLIGRAM(S): 80 TABLET, FILM COATED ORAL at 21:15

## 2018-12-27 RX ADMIN — HYDROMORPHONE HYDROCHLORIDE 2 MILLIGRAM(S): 2 INJECTION INTRAMUSCULAR; INTRAVENOUS; SUBCUTANEOUS at 19:05

## 2018-12-27 RX ADMIN — Medication 325 MILLIGRAM(S): at 13:36

## 2018-12-27 RX ADMIN — MEROPENEM 100 MILLIGRAM(S): 1 INJECTION INTRAVENOUS at 21:16

## 2018-12-27 RX ADMIN — CHLORHEXIDINE GLUCONATE 5 MILLILITER(S): 213 SOLUTION TOPICAL at 19:31

## 2018-12-27 RX ADMIN — MICAFUNGIN SODIUM 105 MILLIGRAM(S): 100 INJECTION, POWDER, LYOPHILIZED, FOR SOLUTION INTRAVENOUS at 14:00

## 2018-12-27 RX ADMIN — Medication 20 MILLIGRAM(S): at 05:23

## 2018-12-27 RX ADMIN — Medication 1 GRAM(S): at 19:32

## 2018-12-27 RX ADMIN — Medication 10 MILLIGRAM(S): at 21:15

## 2018-12-27 RX ADMIN — Medication 1 TABLET(S): at 13:34

## 2018-12-27 RX ADMIN — CHLORHEXIDINE GLUCONATE 5 MILLILITER(S): 213 SOLUTION TOPICAL at 13:38

## 2018-12-27 RX ADMIN — ENOXAPARIN SODIUM 40 MILLIGRAM(S): 100 INJECTION SUBCUTANEOUS at 13:35

## 2018-12-27 RX ADMIN — MEROPENEM 100 MILLIGRAM(S): 1 INJECTION INTRAVENOUS at 05:22

## 2018-12-27 RX ADMIN — QUETIAPINE FUMARATE 25 MILLIGRAM(S): 200 TABLET, FILM COATED ORAL at 21:07

## 2018-12-27 RX ADMIN — MEROPENEM 100 MILLIGRAM(S): 1 INJECTION INTRAVENOUS at 14:57

## 2018-12-27 RX ADMIN — HYDROMORPHONE HYDROCHLORIDE 2 MILLIGRAM(S): 2 INJECTION INTRAMUSCULAR; INTRAVENOUS; SUBCUTANEOUS at 13:33

## 2018-12-27 NOTE — CHART NOTE - NSCHARTNOTEFT_GEN_A_CORE
Chart reviewed order received for bedside swallow evaluation, order clarified with ЮЛИЯ Cuellar, that evaluation would be to assess candidacy for speaking valve not for swallowing.  Pt received in bed A&A Ox4, wife at bedside, +#7 Shiley XLT, non-fenestrated, cuffed trach, +trach collar, SpO2 100% throughout assessment, pt voicing over trach independently.     Oral motor assessment completed: +facial symmetry, +upper/lower dentition, labial & lingual strength WFL, +velar movement, +white coating on tongue, +whitish secretions observed from trach.    Pt with good voicing with finger occlusion to trach, SpO2 100% throughout all verbalizations, overall good breath support and coordination between respiration and phonation.    Recommendations:  Consider speaking valve when trach downsized and changed to fenestrated cuffless or cuffed with cuff deflated, when pt deemed medically appropriate by MD.    Above discussed with ABBY Guzman, Respiratory therapist Debbie and ЮЛИЯ Cuellar.  No further follow-up at this time.  Please reconsult for speech and/or swallow prn.

## 2018-12-27 NOTE — PROGRESS NOTE ADULT - SUBJECTIVE AND OBJECTIVE BOX
INTERVAL HPI/OVERNIGHT EVENTS: f/u prediabted. no issues    MEDICATIONS  (STANDING):  ALPRAZolam 0.25 milliGRAM(s) Oral every 12 hours  ascorbic acid 500 milliGRAM(s) Oral daily  aspirin 325 milliGRAM(s) Oral daily  atorvastatin 10 milliGRAM(s) Oral at bedtime  carvedilol 3.125 milliGRAM(s) Oral every 12 hours  chlorhexidine 0.12% Liquid 5 milliLiter(s) Oral Mucosa every 4 hours  clopidogrel Tablet 75 milliGRAM(s) Oral daily  enoxaparin Injectable 40 milliGRAM(s) SubCutaneous daily  ferrous    sulfate Liquid 300 milliGRAM(s) Enteral Tube daily  folic acid 1 milliGRAM(s) Oral daily  furosemide   Injectable 20 milliGRAM(s) IV Push every 8 hours  insulin lispro (HumaLOG) corrective regimen sliding scale   SubCutaneous every 6 hours  lactobacillus acidophilus 1 Tablet(s) Oral every 8 hours  melatonin 5 milliGRAM(s) Oral at bedtime  meropenem  IVPB      meropenem  IVPB 1000 milliGRAM(s) IV Intermittent every 8 hours  metoclopramide   Syrup 10 milliGRAM(s) Oral every 8 hours  micafungin IVPB      micafungin IVPB 100 milliGRAM(s) IV Intermittent every 24 hours  multivitamin   Solution 5 milliLiter(s) Enteral Tube daily  pantoprazole  Injectable 40 milliGRAM(s) IV Push every 12 hours  potassium chloride   Powder 40 milliEquivalent(s) Oral daily  QUEtiapine 25 milliGRAM(s) Oral <User Schedule>  sucralfate suspension 1 Gram(s) Oral two times a day    MEDICATIONS  (PRN):  acetaminophen    Suspension .. 650 milliGRAM(s) Oral every 6 hours PRN Temp greater or equal to 38.5C (101.3F)  HYDROmorphone   Tablet 2 milliGRAM(s) Oral every 4 hours PRN Moderate Pain (4 - 6)  HYDROmorphone   Tablet 4 milliGRAM(s) Oral every 4 hours PRN Severe Pain (7 - 10)  ondansetron Injectable 4 milliGRAM(s) IV Push every 6 hours PRN Nausea and/or Vomiting  QUEtiapine 12.5 milliGRAM(s) Oral every 8 hours PRN agitation  zolpidem 5 milliGRAM(s) Oral <User Schedule> PRN Insomnia      Allergies  penicillins (Unknown)    Review of systems: denies fever/chills. denies chest pain/palp/SOB. denies abd pain/n/v/d/c.      Vital Signs Last 24 Hrs  T(C): 37.7 (27 Dec 2018 06:00), Max: 37.7 (27 Dec 2018 06:00)  T(F): 99.8 (27 Dec 2018 06:00), Max: 99.8 (27 Dec 2018 06:00)  HR: 110 (27 Dec 2018 14:00) (93 - 113)  BP: 101/68 (27 Dec 2018 14:00) (87/64 - 101/68)  BP(mean): 78 (27 Dec 2018 14:00) (67 - 80)  RR: 20 (27 Dec 2018 14:00) (12 - 36)  SpO2: 100% (27 Dec 2018 14:00) (99% - 100%)    PHYSICAL EXAM:  Constitutional: NAD,  HEENT: Pupils equal, EOMI, (+) trach  Respiratory: CTAB no w/r/r  Cardiovascular: S1 and S2, RRR, no M/G/R  Gastrointestinal: BS+, soft, nontender/nondistended  Extremities: No peripheral edema  Neurological: A/O x 3, no focal deficits  Psychiatric: Normal mood, normal affect          LABS:                        10.0   7.1   )-----------( 310      ( 27 Dec 2018 06:20 )             32.2     12-27    144  |  105  |  29.0<H>  ----------------------------<  159<H>  4.4   |  26.0  |  0.45<L>    Ca    8.4<L>      27 Dec 2018 06:20  Mg     2.0     12-27    TPro  6.3<L>  /  Alb  3.1<L>  /  TBili  1.5  /  DBili  x   /  AST  97<H>  /  ALT  88<H>  /  AlkPhos  215<H>  12-27    Hemoglobin A1C, Whole Blood: 5.7 % <H> [4.0 - 5.6] (11-30-18 @ 03:15)    CAPILLARY BLOOD GLUCOSE  POCT Blood Glucose.: 177 mg/dL (27 Dec 2018 13:33)  POCT Blood Glucose.: 157 mg/dL (27 Dec 2018 05:34)  POCT Blood Glucose.: 153 mg/dL (26 Dec 2018 23:16)

## 2018-12-27 NOTE — PROGRESS NOTE ADULT - PROBLEM SELECTOR PLAN 9
no further evidence of active UGIB since GI intervention  continue protonix BID and carafate for now

## 2018-12-27 NOTE — PROGRESS NOTE ADULT - SUBJECTIVE AND OBJECTIVE BOX
[facility]  INFECTIOUS DISEASES AND INTERNAL MEDICINE at [place]  =======================================================  Perfecto Santizo MD  Diplomates American Board of Internal Medicine and Infectious Diseases  =======================================================    PATRICK LYLE [id number]    Follow up: Fungemia    Afebrile  awake alert follows commands  Reports no diarrhea    Allergies:  penicillins (Unknown)      Antibiotics:    meropenem  IVPB 1000 milliGRAM(s) IV Intermittent every 8 hours      micafungin IVPB 100 milliGRAM(s) IV Intermittent every 24 hours       REVIEW OF SYSTEMS:  Limited. Patient with trach      Physical Exam:  ICU Vital Signs Last 24 Hrs  T(C): 37.7 (27 Dec 2018 06:00), Max: 37.7 (27 Dec 2018 06:00)  T(F): 99.8 (27 Dec 2018 06:00), Max: 99.8 (27 Dec 2018 06:00)  HR: 110 (27 Dec 2018 14:00) (92 - 113)  BP: 101/68 (27 Dec 2018 14:00) (85/61 - 103/66)  BP(mean): 78 (27 Dec 2018 14:00) (67 - 80)  RR: 20 (27 Dec 2018 14:00) (12 - 36)  SpO2: 100% (27 Dec 2018 14:00) (98% - 100%)      GEN: NAD  HEENT: normocephalic and atraumatic. EOMI. PERRL. + NGT  NECK: Supple. + Trach  LUNGS: Coarse BS B/L  HEART: Regular rate and rhythm   ABDOMEN: Soft, nontender, and nondistended.  Positive bowel sounds.    : + Mckeon  EXTREMITIES: Without any edema.  MSK: no joint swelling  NEUROLOGIC: Awake, alert follows commands  PSYCHIATRIC: Appropriate affect .  SKIN: Sternal wound dressing, + wound vac, Leg dressings in place      Labs:  12-27    144  |  105  |  29.0<H>  ----------------------------<  159<H>  4.4   |  26.0  |  0.45<L>    Ca    8.4<L>      27 Dec 2018 06:20  Mg     2.0     12-27    TPro  6.3<L>  /  Alb  3.1<L>  /  TBili  1.5  /  DBili  x   /  AST  97<H>  /  ALT  88<H>  /  AlkPhos  215<H>  12-27                        10.0   7.1   )-----------( 310      ( 27 Dec 2018 06:20 )             32.2     LIVER FUNCTIONS - ( 27 Dec 2018 06:20 )  Alb: 3.1 g/dL / Pro: 6.3 g/dL / ALK PHOS: 215 U/L / ALT: 88 U/L / AST: 97 U/L / GGT: x             RECENT CULTURES:  12-25 @ 06:38 .Blood     No growth at 48 hours      12-24 @ 03:01 .Blood     No growth at 48 hours      12-21 @ 10:03 .Blood     No growth at 5 days.      12-20 @ 11:09 .Blood Candida parapsilosis    Growth in aerobic bottle: Candida parapsilosis  Aerobic Bottle: 1 day ;23:51 Hours to positivity  Anaerobic Bottle: No growth at 5 days.      12-20 @ 07:25 .Blood Candida parapsilosis    Growth in aerobic bottle: Candida parapsilosis  Aerobic Bottle: 02 days;05:48 Hours to positivity  Anaerobic Bottle: No growth at 5 days.      12-18 @ 15:02 .Surgical Swab     No growth at 5 days.  No WBC's seen.  No organisms seen      12-18 @ 08:34 .Sputum     Few Routine respiratory keara present  Moderate White blood cells  No organisms seen      12-18 @ 06:31 .Blood Candida parapsilosis  Blood Culture PCR    Growth in aerobic bottle: Candida parapsilosis  Aerobic Bottle: 1 day; 18:39 Hours to positivity  Anaerobic Bottle: No growth at 5 days.  ***Blood Panel PCR results on this specimen are available  approximately 3 hours after the Gram stain result.***  Gram stain, PCR, and/or culture results may not always  correspond due to difference in methodologies.  ************************************************************  This PCR assay was performed using [employer].  The following targets are tested for: Enterococcus,  vancomycin resistant enterococci, Listeria monocytogenes,  coagulase negative staphylococci, S. aureus,  methicillin resistant S. aureus, Streptococcus agalactiae  (Group B), S. pneumoniae, S. pyogenes (Group A),  Acinetobacter baumannii, Enterobacter cloacae, E. coli,  Klebsiella oxytoca, K. pneumoniae, Proteus sp.,  Serratia marcescens, Haemophilus influenzae,  Neisseria meningitidis, Pseudomonas aeruginosa, Candida  albicans, C. glabrata, C krusei, C parapsilosis,  C. tropicalis and the KPC resistance gene.  "Due to technical problems, Proteus sp. will Not be reported as part of  the BCID panel until further notice"      12-15 @ 23:04 .Blood     No growth at 5 days.      12-15 @ 22:52 .Sputum Klebsiella pneumoniae    Few Klebsiella pneumoniae  Few Candida albicans  Few Routine respiratory keara present  Numerous white blood cells  Few Gram positive cocci in pairs  Few Gram Positive Rods      12-15 @ 22:31 .Urine     No growth      12-14 @ 21:58 .Blood     No growth at 5 days.      12-14 @ 13:53 .Catheter left IJ (internal jugular) catheter tip     No growth at 2 days.      12-14 @ 11:48 .Blood     No growth at 5 days.      12-12 @ 16:19 .Sputum     Few Routine respiratory keara present  Few White blood cells  Few Gram Positive Cocci in Pairs and Chains      12-11 @ 20:24 .Broncial bronchial fluid     No growth at 1 week.      12-11 @ 10:49 .Broncial bronchial fluid     Rare Candida albicans  Rare Routine respiratory keara present  Numerous white blood cells  No organisms seen      12-11 @ 10:47 .Broncial bronchial fluid     Candida albicans isolated  Culture in progress      12-09 @ 18:56 .Blood     No growth at 5 days.      12-08 @ 19:40 .Sputum Klebsiella pneumoniae    Moderate Klebsiella pneumoniae  No Routine respiratory keara present  Moderate WBC's  Few Yeast  Few Gram positive cocci in pairs

## 2018-12-27 NOTE — PROGRESS NOTE ADULT - PROBLEM SELECTOR PLAN 10
likely as pt with c/o c/w and has had prolonged NGT in place  discuss with Dr. Barrett need for PEG tube at this time

## 2018-12-27 NOTE — PROGRESS NOTE ADULT - SUBJECTIVE AND OBJECTIVE BOX
Overnight events:  No acute events overnight. Stable overnight.     Past Medical History:  ·	ST elevation myocardial infarction (STEMI)  ·	Staph infection  ·	Acute gastric ulcer with hemorrhage  ·	Chronic respiratory failure with hypoxia  ·	Cardiogenic shock  ·	Hematemesis, presence of nausea not specified  ·	Sinusitis  ·	Moderate protein-calorie malnutrition  ·	Fungemia  ·	Hypernatremia  ·	Ileus  ·	Septic shock  ·	Right ventricular dysfunction  ·	Acute overt combined systolic and diastolic congestive heart failure  ·	Klebsiella pneumonia  ·	Pulmonary edema  ·	Agitation  ·	Thrombocytopenia  ·	Anemia due to blood loss  ·	Acute systolic heart failure  ·	Staph infection  ·	Ventricular fibrillation  ·	Dissection of left main coronary artery  ·	Tracheostomy  ·	Exploration of femoral artery  ·	Insertion of Impella device  ·	ECMO decannulation  ·	Exploration and washout of mediastinum  ·	Intra-aortic balloon pump removal  ·	Arterial cannulation  ·	ECMO (extracorporeal membrane oxygenation)    Medications:  ·	ALPRAZolam 0.25 milliGRAM(s) Oral every 12 hours  ·	ascorbic acid 500 milliGRAM(s) Oral daily  ·	aspirin 325 milliGRAM(s) Oral daily  ·	atorvastatin 10 milliGRAM(s) Oral at bedtime  ·	carvedilol 3.125 milliGRAM(s) Oral every 12 hours  ·	chlorhexidine 0.12% Liquid 5 milliLiter(s) Oral Mucosa every 4 hours  ·	clopidogrel Tablet 75 milliGRAM(s) Oral daily  ·	enoxaparin Injectable 40 milliGRAM(s) SubCutaneous daily  ·	ferrous    sulfate Liquid 300 milliGRAM(s) Enteral Tube daily  ·	folic acid 1 milliGRAM(s) Oral daily  ·	furosemide   Injectable 20 milliGRAM(s) IV Push every 8 hours  ·	insulin lispro (HumaLOG) corrective regimen sliding scale   SubCutaneous every 6 hours  ·	lactobacillus acidophilus 1 Tablet(s) Oral every 8 hours  ·	melatonin 5 milliGRAM(s) Oral at bedtime  ·	meropenem  IVPB 1000 milliGRAM(s) IV Intermittent every 8 hours  ·	metoclopramide   Syrup 10 milliGRAM(s) Oral every 8 hours  ·	micafungin IVPB 100 milliGRAM(s) IV Intermittent every 24 hours  ·	multivitamin   Solution 5 milliLiter(s) Enteral Tube daily  ·	pantoprazole  Injectable 40 milliGRAM(s) IV Push every 12 hours  ·	potassium chloride   Powder 40 milliEquivalent(s) Oral daily  ·	QUEtiapine 25 milliGRAM(s) Oral <User Schedule>  ·	sucralfate suspension 1 Gram(s) Oral two times a day    MEDICATIONS  (PRN):  ·	acetaminophen    Suspension .. 650 milliGRAM(s) Oral every 6 hours PRN Temp greater or equal to 38.5C (101.3F)  ·	HYDROmorphone   Tablet 2 milliGRAM(s) Oral every 4 hours PRN Moderate Pain (4 - 6)  ·	HYDROmorphone   Tablet 4 milliGRAM(s) Oral every 4 hours PRN Severe Pain (7 - 10)  ·	ondansetron Injectable 4 milliGRAM(s) IV Push every 6 hours PRN Nausea and/or Vomiting  ·	QUEtiapine 12.5 milliGRAM(s) Oral every 8 hours PRN agitation  ·	zolpidem 5 milliGRAM(s) Oral <User Schedule> PRN Insomnia    Mode: AC/ CMV (Assist Control/ Continuous Mandatory Ventilation), RR (machine): 12, TV (machine): 600, FiO2: 30, PEEP: 5, MAP: 10, PIP: 25                          9.9    8.3   )-----------( 297      ( 25 Dec 2018 06:38 )             31.7   12-25    140  |  103  |  25.0<H>  ----------------------------<  176<H>  3.7   |  26.0  |  0.41<L>    Ca    8.1<L>      25 Dec 2018 06:38  Phos  2.6     12-25  Mg     2.0     12-25    TPro  6.0<L>  /  Alb  2.9<L>  /  TBili  1.3  /  DBili  x   /  AST  69<H>  /  ALT  84<H>  /  AlkPhos  204<H>  12-25    Objective:  T(C): 37.6 (12-26-18 @ 23:00), Max: 38.8 (12-26-18 @ 12:00)  HR: 105 (12-27-18 @ 00:00) (92 - 114)  BP: 92/58 (12-27-18 @ 00:00) (83/55 - 105/58)  RR: 16 (12-27-18 @ 00:00) (14 - 29)  SpO2: 100% (12-27-18 @ 00:00) (94% - 100%)  Wt(kg): --CAPILLARY BLOOD GLUCOSE      POCT Blood Glucose.: 153 mg/dL (26 Dec 2018 23:16)  POCT Blood Glucose.: 153 mg/dL (26 Dec 2018 17:33)  POCT Blood Glucose.: 182 mg/dL (26 Dec 2018 12:06)  POCT Blood Glucose.: 177 mg/dL (26 Dec 2018 06:14)    I&O's Summary    25 Dec 2018 07:01  -  26 Dec 2018 07:00  --------------------------------------------------------  IN: 2120 mL / OUT: 2865 mL / NET: -745 mL    26 Dec 2018 07:01  -  27 Dec 2018 01:07  --------------------------------------------------------  IN: 1240 mL / OUT: 1770 mL / NET: -530 mL        Physical Exam  Neuro: Alert, cooperative with exam, follows all commands and moves all extremities   Pulm: CTA equal bilaterally, + trach  CV: RRR - ST, no murmurs +S1S2  Abd: soft, NT, ND, +BS  Ext: +DP Pulses b/l, +PT pulses, +1 pedal edema b/l  Inc: MSI C/D/I/stable, b/l LE EVH sites with staples still in place

## 2018-12-27 NOTE — PROGRESS NOTE ADULT - SUBJECTIVE AND OBJECTIVE BOX
Patient in chair, family at bedside.  Reports no pain.  Walked more today.  Did need vent assist overnight, again off vent in day.    FUNCTIONAL PROGRESS  12/27  Bed Mobility  Bed Mobility Training Rehab Potential: good, to achieve stated therapy goals  Bed Mobility Training Supine-to-Sit: moderate assist (50% patient effort);  1 person assist;  bed rails  Bed Mobility Training Limitations: decreased ability to use arms for pushing/pulling;  decreased ability to use legs for bridging/pushing;  impaired ability to control trunk for mobility;  decreased strength    Bed-Chair Transfer Training  Bed-to-Chair Transfer Training Rehab Potential: good, to achieve stated therapy goals  Transfer Training Bed-to-Chair Transfer: moderate assist (50% patient effort);  1 person + 1 person to manage equipment;  rolling walker  Bed-to-Chair Transfer Training Transfer Safety Analysis: decreased strength;  rolling walker    Sit-Stand Transfer Training  Sit-to-Stand Transfer Training Rehab Potential: good, to achieve stated therapy goals  Transfer Training Sit-to-Stand Transfer: moderate assist (50% patient effort);  1 person assist;  rolling walker  Transfer Training Stand-to-Sit Transfer: minimum assist (75% patient effort);  1 person assist;  rolling walker  Sit-to-Stand Transfer Training Transfer Safety Analysis: decreased strength;  rolling walker    Gait Training  Gait Training Rehab Potential: good, to achieve stated therapy goals  Gait Training: 15 feet;  minimum assist (75% patient effort);  1 person + 1 person to manage equipment;  rolling walker;  chair follow and portable O2  Gait Analysis: 2-point gait   decreased strength;  15 feet;  rolling walker      REVIEW OF SYSTEMS  Constitutional - No fever,  +fatigue  HEENT - No vertigo, No neck pain  Neurological - No headaches, No memory loss, +loss of strength, No numbness, No tremors  Skin - No rashes, +lesions   Musculoskeletal - No joint pain, No joint swelling, No muscle pain  Psychiatric - No depression, No anxiety    VITALS  T(C): 37.7 (12-27-18 @ 06:00), Max: 37.7 (12-27-18 @ 06:00)  HR: 110 (12-27-18 @ 13:00) (92 - 113)  BP: 93/67 (12-27-18 @ 13:00) (85/61 - 103/66)  RR: 26 (12-27-18 @ 13:00) (12 - 36)  SpO2: 100% (12-27-18 @ 13:00) (98% - 100%)  Wt(kg): --    MEDICATIONS   acetaminophen    Suspension .. 650 milliGRAM(s) every 6 hours PRN  ALPRAZolam 0.25 milliGRAM(s) every 12 hours  ascorbic acid 500 milliGRAM(s) daily  aspirin 325 milliGRAM(s) daily  atorvastatin 10 milliGRAM(s) at bedtime  carvedilol 3.125 milliGRAM(s) every 12 hours  chlorhexidine 0.12% Liquid 5 milliLiter(s) every 4 hours  clopidogrel Tablet 75 milliGRAM(s) daily  enoxaparin Injectable 40 milliGRAM(s) daily  ferrous    sulfate Liquid 300 milliGRAM(s) daily  folic acid 1 milliGRAM(s) daily  furosemide   Injectable 20 milliGRAM(s) every 8 hours  HYDROmorphone   Tablet 2 milliGRAM(s) every 4 hours PRN  HYDROmorphone   Tablet 4 milliGRAM(s) every 4 hours PRN  insulin lispro (HumaLOG) corrective regimen sliding scale   every 6 hours  lactobacillus acidophilus 1 Tablet(s) every 8 hours  melatonin 5 milliGRAM(s) at bedtime  meropenem  IVPB     meropenem  IVPB 1000 milliGRAM(s) every 8 hours  metoclopramide   Syrup 10 milliGRAM(s) every 8 hours  micafungin IVPB     micafungin IVPB 100 milliGRAM(s) every 24 hours  multivitamin   Solution 5 milliLiter(s) daily  ondansetron Injectable 4 milliGRAM(s) every 6 hours PRN  pantoprazole  Injectable 40 milliGRAM(s) every 12 hours  potassium chloride   Powder 40 milliEquivalent(s) daily  QUEtiapine 12.5 milliGRAM(s) every 8 hours PRN  QUEtiapine 25 milliGRAM(s) <User Schedule>  sucralfate suspension 1 Gram(s) two times a day  zolpidem 5 milliGRAM(s) <User Schedule> PRN      RECENT LABS/IMAGING  CBC Full  -  ( 27 Dec 2018 06:20 )  WBC Count : 7.1 K/uL  Hemoglobin : 10.0 g/dL  Hematocrit : 32.2 %  Platelet Count - Automated : 310 K/uL  Mean Cell Volume : 88.2 fl  Mean Cell Hemoglobin : 27.4 pg  Mean Cell Hemoglobin Concentration : 31.1 g/dL  Auto Neutrophil # : x  Auto Lymphocyte # : x  Auto Monocyte # : x  Auto Eosinophil # : x  Auto Basophil # : x  Auto Neutrophil % : x  Auto Lymphocyte % : x  Auto Monocyte % : x  Auto Eosinophil % : x  Auto Basophil % : x    12-27    144  |  105  |  29.0<H>  ----------------------------<  159<H>  4.4   |  26.0  |  0.45<L>    Ca    8.4<L>      27 Dec 2018 06:20  Mg     2.0     12-27    TPro  6.3<L>  /  Alb  3.1<L>  /  TBili  1.5  /  DBili  x   /  AST  97<H>  /  ALT  88<H>  /  AlkPhos  215<H>  12-27    ------------------------------------------------------------------------------------  PHYSICAL EXAM  Constitutional - NAD, Comfortable  Chest - Breathing comfortably, No wheezing, + trach collar   Extremities - BLE swelling, BLE incision - + Staples  Neurologic Exam -                    Cognitive - Awake, Alert, AAO to self, place, date	     Communication - Able to mouth words     Cranial Nerves - CN 2-12 intact     Motor -                      LEFT    UE - ShAB 1/5, EF 4/5, EE 4/5,  4/5                    RIGHT UE - ShAB 5/5, EF 5/5, EE 5/5,  5/5                    LEFT    LE - HF 5/5, KE 5/5, DF 5/5, PF 5/5                    RIGHT LE - HF 5/5, KE 5/5, DF 5/5, PF 5/5         Sensory - Intact to LT  Psychiatric - Mood stable, Affect WNL  ----------------------------------------------------------------------------------------  ASSESSMENT/PLAN  48y Male with functional deficits after STEMI, cardiac arrest s/p CABG, followed by complicated hospital course including need for trach, ileus/tube feeds, GI bleed, sepsis.   Pain - Tylenol, Dilaudid  Sleep - Melatonin Recommend DC Ambien  Mood - Xanax, Recommend DC of Seroquel  CAD - ASA, Plavix, Lipitor  DVT PPX - Lovenox    Candida - Mycamine, Meropenem  Rehab - Recommend ACUTE inpatient rehabilitation for the functional deficits consisting of 3 hours of therapy/day & 24 hour RN/daily PMR physician for comorbid medical management. Will continue to follow for ongoing rehab needs and recommendations.    Continue bedside therapy as well as OOB throughout the day with mobilization by staff to maintain cardiopulmonary function and prevention of secondary complications related to debility.

## 2018-12-27 NOTE — PROGRESS NOTE ADULT - ASSESSMENT
48 yo Male c/o CP for 2-3 days prior to admission, then 10/10 chest pain approximately 9:30 am yesterday, followed by vomiting.  Wife drove pt to hospital, ruled in for STEMI. Urgent Cath, pt found to have multiple occlusions; stent to LAD and D2; stent to LAD thrombosed,  impella placed for support.  Pt then had VF arrest, shock x 1, return to NSR.  Upon transfer to OR for Urgent CABG, pt with asystolic arrest, chest opened intra-op with CPR in progress. Pt underwent C4V (LAD, Diag, OM and PDA) with inability to wean from CPB therefore requiring VA ECMO (central cannulation) and IABP, he was transferred to CICU.  12/3 s/p ECMO explant and IABP d/c'd, placement of impella via R groin sheath.   12/6 Impella removal via right femoral cutdown with primary repair of femoral artery and stent placed to external iliac artery for dissection.  12/14 + ileus.  12/15 ileus improved, septic and cardiogenic shock.    12/18 + UGIB.   12/19 EGD (+ bleeding stomach ulcer requiring clip x4)  12/21 ileus, made NPO   12/22 RUQ sono consistent with cholecystis, abd exam benign, is unlikely clinically significant per GI and ACS  12/24 tolerating tube feeds at goal, PS/CPAP alternating with trach collar trials as tolerated  12/26 trach collar for 8 hours    Hospital course notable for (in addition to above): acute systolic heart failure, vent dependent respiratory failure (inability to wean also contributed to by severe agitation, now s/p trach), fungemia, acute blood loss anemia, hypokalemia, ileus, sinusitis.

## 2018-12-27 NOTE — PROGRESS NOTE ADULT - PROBLEM SELECTOR PLAN 2
cultures as above  continue mycofungin for fungemia  vancomycin d/c'd by ID, no evidence of MRSA  continue meropenem for now as d/w Dr Barrett  tylenol PRN and cooling blanket PRN for fevers   concern for sinusitis since NGT in place (will need PEG, re-evaluate this week)  f/u repeat BC  Oral and bladder temps not correlating, now using oral temps per Dr. Barrett  continue bacid while on abx

## 2018-12-27 NOTE — PROGRESS NOTE ADULT - PROBLEM SELECTOR PLAN 8
continue aspirin, plavix and statin for graft patency   Lovenox and SCDs for DVT prophylaxis  no history of DM, blood glucoses now better controlled, continue FS Q6 with coverage

## 2018-12-27 NOTE — PROGRESS NOTE ADULT - ASSESSMENT
46 yo Male c/o CP for 2-3 days prior to admission and ruled in for STEMI. Urgent Cath, pt found to have multiple occlusions; stent to LAD and D2; stent to LAD thrombosed.  Upon transfer to OR for Urgent CABG, pt with asystolic arrest, chest opened intra-op with CPR in progress. Pt underwent C4V (LAD, Diag, OM and PDA) with inability to wean from CPB therefore requiring VA ECMO (central cannulation) and IABP, he was transferred to CICU. Hospital course complicated by ileus, UGIB, cholecystitis s/p trach, fungemia and pneumonia. Admission labs with A1c 5.7% - pt has no prior history of diabetes  1. prediabetes - glucoses controlled  cont Humalog sliding scale    2. CAD s/p CABG - cont aspirin, beta blocker, plavix and statin. management as per CTS    3. fungemia, pneumonia- treatment as per ID

## 2018-12-28 LAB
ANION GAP SERPL CALC-SCNC: 9 MMOL/L — SIGNIFICANT CHANGE UP (ref 5–17)
BUN SERPL-MCNC: 29 MG/DL — HIGH (ref 8–20)
CALCIUM SERPL-MCNC: 8.2 MG/DL — LOW (ref 8.6–10.2)
CHLORIDE SERPL-SCNC: 104 MMOL/L — SIGNIFICANT CHANGE UP (ref 98–107)
CO2 SERPL-SCNC: 29 MMOL/L — SIGNIFICANT CHANGE UP (ref 22–29)
CREAT SERPL-MCNC: 0.42 MG/DL — LOW (ref 0.5–1.3)
GLUCOSE BLDC GLUCOMTR-MCNC: 156 MG/DL — HIGH (ref 70–99)
GLUCOSE BLDC GLUCOMTR-MCNC: 159 MG/DL — HIGH (ref 70–99)
GLUCOSE BLDC GLUCOMTR-MCNC: 165 MG/DL — HIGH (ref 70–99)
GLUCOSE BLDC GLUCOMTR-MCNC: 171 MG/DL — HIGH (ref 70–99)
GLUCOSE SERPL-MCNC: 160 MG/DL — HIGH (ref 70–115)
HCT VFR BLD CALC: 33.1 % — LOW (ref 42–52)
HGB BLD-MCNC: 10.2 G/DL — LOW (ref 14–18)
MAGNESIUM SERPL-MCNC: 2.2 MG/DL — SIGNIFICANT CHANGE UP (ref 1.6–2.6)
MCHC RBC-ENTMCNC: 26.9 PG — LOW (ref 27–31)
MCHC RBC-ENTMCNC: 30.8 G/DL — LOW (ref 32–36)
MCV RBC AUTO: 87.3 FL — SIGNIFICANT CHANGE UP (ref 80–94)
PLATELET # BLD AUTO: 261 K/UL — SIGNIFICANT CHANGE UP (ref 150–400)
POTASSIUM SERPL-MCNC: 4.3 MMOL/L — SIGNIFICANT CHANGE UP (ref 3.5–5.3)
POTASSIUM SERPL-SCNC: 4.3 MMOL/L — SIGNIFICANT CHANGE UP (ref 3.5–5.3)
RBC # BLD: 3.79 M/UL — LOW (ref 4.6–6.2)
RBC # FLD: 16.2 % — HIGH (ref 11–15.6)
SODIUM SERPL-SCNC: 142 MMOL/L — SIGNIFICANT CHANGE UP (ref 135–145)
WBC # BLD: 6 K/UL — SIGNIFICANT CHANGE UP (ref 4.8–10.8)
WBC # FLD AUTO: 6 K/UL — SIGNIFICANT CHANGE UP (ref 4.8–10.8)

## 2018-12-28 PROCEDURE — 99232 SBSQ HOSP IP/OBS MODERATE 35: CPT

## 2018-12-28 RX ORDER — POTASSIUM CHLORIDE 20 MEQ
20 PACKET (EA) ORAL DAILY
Qty: 0 | Refills: 0 | Status: DISCONTINUED | OUTPATIENT
Start: 2018-12-28 | End: 2019-01-06

## 2018-12-28 RX ORDER — SENNA PLUS 8.6 MG/1
10 TABLET ORAL AT BEDTIME
Qty: 0 | Refills: 0 | Status: DISCONTINUED | OUTPATIENT
Start: 2018-12-28 | End: 2019-01-07

## 2018-12-28 RX ORDER — POTASSIUM CHLORIDE 20 MEQ
40 PACKET (EA) ORAL ONCE
Qty: 0 | Refills: 0 | Status: COMPLETED | OUTPATIENT
Start: 2018-12-28 | End: 2018-12-28

## 2018-12-28 RX ORDER — MICAFUNGIN SODIUM 100 MG/1
100 INJECTION, POWDER, LYOPHILIZED, FOR SOLUTION INTRAVENOUS EVERY 24 HOURS
Qty: 0 | Refills: 0 | Status: DISCONTINUED | OUTPATIENT
Start: 2018-12-28 | End: 2019-01-11

## 2018-12-28 RX ORDER — DOCUSATE SODIUM 100 MG
100 CAPSULE ORAL
Qty: 0 | Refills: 0 | Status: DISCONTINUED | OUTPATIENT
Start: 2018-12-28 | End: 2018-12-30

## 2018-12-28 RX ORDER — SENNA PLUS 8.6 MG/1
2 TABLET ORAL AT BEDTIME
Qty: 0 | Refills: 0 | Status: DISCONTINUED | OUTPATIENT
Start: 2018-12-28 | End: 2018-12-28

## 2018-12-28 RX ORDER — MEROPENEM 1 G/30ML
1000 INJECTION INTRAVENOUS EVERY 8 HOURS
Qty: 0 | Refills: 0 | Status: COMPLETED | OUTPATIENT
Start: 2018-12-28 | End: 2018-12-29

## 2018-12-28 RX ADMIN — Medication 20 MILLIGRAM(S): at 18:20

## 2018-12-28 RX ADMIN — Medication 325 MILLIGRAM(S): at 11:10

## 2018-12-28 RX ADMIN — MEROPENEM 100 MILLIGRAM(S): 1 INJECTION INTRAVENOUS at 22:23

## 2018-12-28 RX ADMIN — PANTOPRAZOLE SODIUM 40 MILLIGRAM(S): 20 TABLET, DELAYED RELEASE ORAL at 18:19

## 2018-12-28 RX ADMIN — HYDROMORPHONE HYDROCHLORIDE 4 MILLIGRAM(S): 2 INJECTION INTRAMUSCULAR; INTRAVENOUS; SUBCUTANEOUS at 13:37

## 2018-12-28 RX ADMIN — ATORVASTATIN CALCIUM 10 MILLIGRAM(S): 80 TABLET, FILM COATED ORAL at 22:23

## 2018-12-28 RX ADMIN — Medication 20 MILLIEQUIVALENT(S): at 11:22

## 2018-12-28 RX ADMIN — HYDROMORPHONE HYDROCHLORIDE 2 MILLIGRAM(S): 2 INJECTION INTRAMUSCULAR; INTRAVENOUS; SUBCUTANEOUS at 10:18

## 2018-12-28 RX ADMIN — HYDROMORPHONE HYDROCHLORIDE 2 MILLIGRAM(S): 2 INJECTION INTRAMUSCULAR; INTRAVENOUS; SUBCUTANEOUS at 01:09

## 2018-12-28 RX ADMIN — Medication 1 GRAM(S): at 05:33

## 2018-12-28 RX ADMIN — Medication 2: at 08:14

## 2018-12-28 RX ADMIN — CLOPIDOGREL BISULFATE 75 MILLIGRAM(S): 75 TABLET, FILM COATED ORAL at 11:09

## 2018-12-28 RX ADMIN — Medication 2: at 23:37

## 2018-12-28 RX ADMIN — Medication 500 MILLIGRAM(S): at 11:09

## 2018-12-28 RX ADMIN — CARVEDILOL PHOSPHATE 3.12 MILLIGRAM(S): 80 CAPSULE, EXTENDED RELEASE ORAL at 22:22

## 2018-12-28 RX ADMIN — Medication 0.25 MILLIGRAM(S): at 18:20

## 2018-12-28 RX ADMIN — ENOXAPARIN SODIUM 40 MILLIGRAM(S): 100 INJECTION SUBCUTANEOUS at 11:09

## 2018-12-28 RX ADMIN — CHLORHEXIDINE GLUCONATE 5 MILLILITER(S): 213 SOLUTION TOPICAL at 10:17

## 2018-12-28 RX ADMIN — Medication 2: at 18:20

## 2018-12-28 RX ADMIN — Medication 300 MILLIGRAM(S): at 13:37

## 2018-12-28 RX ADMIN — HYDROMORPHONE HYDROCHLORIDE 2 MILLIGRAM(S): 2 INJECTION INTRAMUSCULAR; INTRAVENOUS; SUBCUTANEOUS at 02:05

## 2018-12-28 RX ADMIN — Medication 5 MILLIGRAM(S): at 22:22

## 2018-12-28 RX ADMIN — SPIRONOLACTONE 12.5 MILLIGRAM(S): 25 TABLET, FILM COATED ORAL at 05:33

## 2018-12-28 RX ADMIN — Medication 40 MILLIEQUIVALENT(S): at 23:33

## 2018-12-28 RX ADMIN — CARVEDILOL PHOSPHATE 3.12 MILLIGRAM(S): 80 CAPSULE, EXTENDED RELEASE ORAL at 06:19

## 2018-12-28 RX ADMIN — MEROPENEM 100 MILLIGRAM(S): 1 INJECTION INTRAVENOUS at 06:20

## 2018-12-28 RX ADMIN — HYDROMORPHONE HYDROCHLORIDE 2 MILLIGRAM(S): 2 INJECTION INTRAMUSCULAR; INTRAVENOUS; SUBCUTANEOUS at 08:14

## 2018-12-28 RX ADMIN — Medication 5 MILLILITER(S): at 11:10

## 2018-12-28 RX ADMIN — Medication 20 MILLIGRAM(S): at 05:32

## 2018-12-28 RX ADMIN — Medication 0.25 MILLIGRAM(S): at 06:10

## 2018-12-28 RX ADMIN — PANTOPRAZOLE SODIUM 40 MILLIGRAM(S): 20 TABLET, DELAYED RELEASE ORAL at 05:34

## 2018-12-28 RX ADMIN — Medication 2: at 11:21

## 2018-12-28 RX ADMIN — MEROPENEM 100 MILLIGRAM(S): 1 INJECTION INTRAVENOUS at 13:40

## 2018-12-28 RX ADMIN — QUETIAPINE FUMARATE 25 MILLIGRAM(S): 200 TABLET, FILM COATED ORAL at 22:22

## 2018-12-28 RX ADMIN — HYDROMORPHONE HYDROCHLORIDE 4 MILLIGRAM(S): 2 INJECTION INTRAMUSCULAR; INTRAVENOUS; SUBCUTANEOUS at 13:58

## 2018-12-28 RX ADMIN — Medication 1 TABLET(S): at 22:43

## 2018-12-28 RX ADMIN — SENNA PLUS 10 MILLILITER(S): 8.6 TABLET ORAL at 22:23

## 2018-12-28 RX ADMIN — Medication 1 TABLET(S): at 13:41

## 2018-12-28 RX ADMIN — CHLORHEXIDINE GLUCONATE 5 MILLILITER(S): 213 SOLUTION TOPICAL at 13:41

## 2018-12-28 RX ADMIN — MICAFUNGIN SODIUM 105 MILLIGRAM(S): 100 INJECTION, POWDER, LYOPHILIZED, FOR SOLUTION INTRAVENOUS at 13:40

## 2018-12-28 RX ADMIN — CHLORHEXIDINE GLUCONATE 5 MILLILITER(S): 213 SOLUTION TOPICAL at 22:22

## 2018-12-28 RX ADMIN — Medication 1 MILLIGRAM(S): at 11:10

## 2018-12-28 RX ADMIN — Medication 2: at 00:23

## 2018-12-28 RX ADMIN — Medication 1 GRAM(S): at 18:19

## 2018-12-28 RX ADMIN — CHLORHEXIDINE GLUCONATE 5 MILLILITER(S): 213 SOLUTION TOPICAL at 18:19

## 2018-12-28 RX ADMIN — CHLORHEXIDINE GLUCONATE 5 MILLILITER(S): 213 SOLUTION TOPICAL at 01:30

## 2018-12-28 RX ADMIN — Medication 1 TABLET(S): at 05:34

## 2018-12-28 RX ADMIN — CHLORHEXIDINE GLUCONATE 5 MILLILITER(S): 213 SOLUTION TOPICAL at 06:43

## 2018-12-28 NOTE — PROGRESS NOTE ADULT - PROBLEM SELECTOR PLAN 2
cultures as above  continue micafungin for fungemia  vancomycin d/c'd by ID, no evidence of MRSA  one months of meropenem to complete 12/29  fevers now resolved  Oral and bladder temps not correlating, now using oral temps per Dr. Barrett  continue bacid while on abx

## 2018-12-28 NOTE — PROGRESS NOTE ADULT - PROBLEM SELECTOR PLAN 3
s/p trach  PS/CPAP alternating with trach collar trials as tolerated  now attempting to maintain patient on pressure support overnight  goal to decannulate patient early next week if able  will attempt to use pessamer valve in AM

## 2018-12-28 NOTE — PROGRESS NOTE ADULT - PROBLEM SELECTOR PLAN 10
remains a concern as pt has had prolonged NGT in place  consider patient will likely be decannulated, no plan for PEG tube at this time      11. scrotal erosion  stoma powder and cabalon no sting barrier were being used, however noted by RNs that it was drying lesions rather than healing, now transitioned to cabalon and medihoney

## 2018-12-28 NOTE — PROGRESS NOTE ADULT - ASSESSMENT
46y/o man with no significant PMH admitted on 11/29/18 for CP for 2-3 days prior to admission,   found with STEMI. Urgent Cath, pt found to have multiple occlusions; VF arrest, shock x 1, return to NSR.  Upon transfer to OR for Urgent CABG, pt again VF arrest, chest opened intra-op with CPR in progress.  s/p ECMO and Impella; 12/3 ECMO explanted and Impella placed via right groin sheath.    Has been on antibiotics :  Vancomycin 11/29 to 12/20  Cipro 11/29 to 12/4  Zosyn 12/4 to 12/12 and 12/15 to 12/16  Meropenem 12/16 to present  Micafungin 12/18 to present        s/p CABG   S/P tracheostomy   - Remains febrile  - Blood cultures with Candida Parapsilosis 12/18 12/20   - Repeat blood cultures no growth 12/21  - Continue Micafungin  -SUGGEST  DARION to r/o endocarditis IN PT WTIH FUNGEMIA  - Ophthalmology eval  - Low grade fever  - On Meropenem THROUGH     12/29/18    WILL FOLLOW UP WITH FURTHER RECOMMENDATIONS 48y/o man with no significant PMH admitted on 11/29/18 for CP for 2-3 days prior to admission,   found with STEMI. Urgent Cath, pt found to have multiple occlusions; VF arrest, shock x 1, return to NSR.  Upon transfer to OR for Urgent CABG, pt again VF arrest, chest opened intra-op with CPR in progress.  s/p ECMO and Impella; 12/3 ECMO explanted and Impella placed via right groin sheath.    Has been on antibiotics :  Vancomycin 11/29 to 12/20  Cipro 11/29 to 12/4  Zosyn 12/4 to 12/12 and 12/15 to 12/16  Meropenem 12/16 to present  Micafungin 12/18 to present        s/p CABG   S/P tracheostomy   - Remains febrile  - Blood cultures with Candida Parapsilosis 12/18 12/20   - Repeat blood cultures no growth 12/21  - Continue Micafungin  -SUGGEST  DARION to r/o endocarditis IN PT WITH FUNGEMIA WHICH HAS CLEARED   HOWEVER PT WITH  INCREASED RISK FOR PROCEDURE  D/W CT SURGERY   - Ophthalmology eval  - Low grade fever  - On Meropenem THROUGH     12/29/18    WILL FOLLOW UP WITH FURTHER RECOMMENDATIONS

## 2018-12-28 NOTE — PROGRESS NOTE ADULT - ASSESSMENT
46 yo Male c/o CP for 2-3 days prior to admission, then 10/10 chest pain approximately 9:30 am yesterday, followed by vomiting.  Wife drove pt to hospital, ruled in for STEMI. Urgent Cath, pt found to have multiple occlusions; stent to LAD and D2; stent to LAD thrombosed,  impella placed for support.  Pt then had VF arrest, shock x 1, return to NSR.  Upon transfer to OR for Urgent CABG, pt with asystolic arrest, chest opened intra-op with CPR in progress. Pt underwent C4V (LAD, Diag, OM and PDA) with inability to wean from CPB therefore requiring VA ECMO (central cannulation) and IABP, he was transferred to CICU.  12/3 s/p ECMO explant and IABP d/c'd, placement of impella via R groin sheath.   12/6 Impella removal via right femoral cutdown with primary repair of femoral artery and stent placed to external iliac artery for dissection.  12/14 + ileus.  12/15 ileus improved, septic and cardiogenic shock.    12/18 + UGIB.   12/19 EGD (+ bleeding stomach ulcer requiring clip x4)  12/21 ileus, made NPO   12/22 RUQ sono consistent with cholecystis, abd exam benign, is unlikely clinically significant per GI and ACS  12/24 tolerating tube feeds at goal, PS/CPAP alternating with trach collar trials as tolerated  12/26 trach collar for 8 hours  12/27 trach collar for 8 hours    Hospital course notable for (in addition to above): acute systolic heart failure, vent dependent respiratory failure (inability to wean also contributed to by severe agitation, now s/p trach), fungemia, acute blood loss anemia, hypokalemia, ileus, sinusitis.

## 2018-12-28 NOTE — CHART NOTE - NSCHARTNOTEFT_GEN_A_CORE
Source: Patient [ ]  Family [ ]   other [x ] EMR and staff     Enteral /Parenteral Nutrition: Diet, NPO with Tube Feed:   Tube Feeding Modality: Nasogastric  Nepro  Total Volume for 24 Hours (mL): 1440  Continuous  Starting Tube Feed Rate {mL per Hour}: 40  Increase Tube Feed Rate by (mL): 20     Every 10 hours  Until Goal Tube Feed Rate (mL per Hour): 60  Tube Feed Duration (in Hours): 24  Tube Feed Start Time: 22:00     Qty per Day:  2 (12-23-18 @ 19:24)      Current Weight:   (12/24) 200#  (12/13) 246#  (12/6) 199#    Weight Change:  No current wt, pt in chair during assessment, unsure of accuracy of wt's, will continue to monitor trends (mild edema to B/L feet noted)     Pertinent Medications: MEDICATIONS  (STANDING):  ALPRAZolam 0.25 milliGRAM(s) Oral every 12 hours  ascorbic acid 500 milliGRAM(s) Oral daily  aspirin 325 milliGRAM(s) Oral daily  atorvastatin 10 milliGRAM(s) Oral at bedtime  carvedilol 3.125 milliGRAM(s) Oral every 12 hours  chlorhexidine 0.12% Liquid 5 milliLiter(s) Oral Mucosa every 4 hours  clopidogrel Tablet 75 milliGRAM(s) Oral daily  enoxaparin Injectable 40 milliGRAM(s) SubCutaneous daily  ferrous    sulfate Liquid 300 milliGRAM(s) Enteral Tube daily  folic acid 1 milliGRAM(s) Oral daily  furosemide   Injectable 20 milliGRAM(s) IV Push <User Schedule>  insulin lispro (HumaLOG) corrective regimen sliding scale   SubCutaneous every 6 hours  lactobacillus acidophilus 1 Tablet(s) Oral every 8 hours  melatonin 5 milliGRAM(s) Oral at bedtime  meropenem  IVPB 1000 milliGRAM(s) IV Intermittent every 8 hours  micafungin IVPB 100 milliGRAM(s) IV Intermittent every 24 hours  multivitamin   Solution 5 milliLiter(s) Enteral Tube daily  pantoprazole  Injectable 40 milliGRAM(s) IV Push every 12 hours  potassium chloride   Powder 20 milliEquivalent(s) Oral daily  QUEtiapine 25 milliGRAM(s) Oral <User Schedule>  spironolactone 12.5 milliGRAM(s) Oral daily  sucralfate suspension 1 Gram(s) Oral two times a day    MEDICATIONS  (PRN):  acetaminophen    Suspension .. 650 milliGRAM(s) Oral every 6 hours PRN Temp greater or equal to 38.5C (101.3F)  HYDROmorphone   Tablet 2 milliGRAM(s) Oral every 4 hours PRN Moderate Pain (4 - 6)  HYDROmorphone   Tablet 4 milliGRAM(s) Oral every 4 hours PRN Severe Pain (7 - 10)  ondansetron Injectable 4 milliGRAM(s) IV Push every 6 hours PRN Nausea and/or Vomiting  QUEtiapine 12.5 milliGRAM(s) Oral every 8 hours PRN agitation  zolpidem 5 milliGRAM(s) Oral <User Schedule> PRN Insomnia    Pertinent Labs: CBC Full  -  ( 27 Dec 2018 06:20 )  WBC Count : 7.1 K/uL  Hemoglobin : 10.0 g/dL  Hematocrit : 32.2 %  Platelet Count - Automated : 310 K/uL  Mean Cell Volume : 88.2 fl  Mean Cell Hemoglobin : 27.4 pg  Mean Cell Hemoglobin Concentration : 31.1 g/dL  Auto Neutrophil # : x  Auto Lymphocyte # : x  Auto Monocyte # : x  Auto Eosinophil # : x  Auto Basophil # : x  Auto Neutrophil % : x  Auto Lymphocyte % : x  Auto Monocyte % : x  Auto Eosinophil % : x  Auto Basophil % : x      skin: surgical sites to sternum, L leg, scrotum wound noted     Nutrition focused physical exam Previously conducted - found signs of malnutrition [ ]absent [x ]present    Subcutaneous fat loss: [ ] Orbital fat pads region, [ ]Buccal fat region, [ ]Triceps region,  [ ]Ribs region    Muscle wasting: [x ]Temples region, [x ]Clavicle region, [ ]Shoulder region, [ ]Scapula region, [ ]Interosseous region,  [ ]thigh region, [ ]Calf region    Estimated Needs:   [x ] no change since previous assessment  [ ] recalculated:     Current Nutrition Diagnosis:  Pt remains at nutrition risk secondary to moderate-acute protein calorie malnutrition related to increased nutrient needs in setting recent STEMI, emergent C4V with ECMO and IABP (now removed), hypoxic resp failure, s/p trach as evidenced by +fluid accumulation in all 4 extremities (now much improved) meeting approximately 75% estimated nutrition needs > 7 days.  Pt awake and alert, Pt now tolerating enteral feeds of Nepro @ 60ml/hr ([id number]) 1200ml/day 2160 kcal, 97gm protein. Recommendations below:     Recommendations:   1. Rx: MVI and Vit. C daily (500mg)   2. Check wt daily to monitor trends  3. Continue with enteral regime   4. Rx: Prostat BID (200kcal, 30gm protein)   5. Consider swallow eval as medically feasible       Monitoring and Evaluation:   [ ] PO intake [x ] Tolerance to diet prescription [X] Weights  [X] Follow up per protocol [X] Labs:

## 2018-12-28 NOTE — PROGRESS NOTE ADULT - SUBJECTIVE AND OBJECTIVE BOX
Overnight events:  No acute events overnight, tolerating CPAP overnight.    Past Medical History:  ·	Staph infection  ·	Acute gastric ulcer with hemorrhage  ·	Chronic respiratory failure with hypoxia  ·	Cardiogenic shock  ·	Hematemesis, presence of nausea not specified  ·	Chronic respiratory failure with hypoxia  ·	Cardiogenic shock  ·	ST elevation myocardial infarction (STEMI), unspecified artery  ·	Sinusitis  ·	Moderate protein-calorie malnutrition  ·	Respiratory failure, post-operative  ·	Fungemia  ·	Gastric ulcer with hemorrhage but without obstruction, acute  ·	Hematemesis, presence of nausea not specified  ·	Coronary artery disease involving native coronary artery of native heart, angina presence unspecified  ·	Critical illness myopathy  ·	Hypernatremia  ·	Septic shock  ·	Malnutrition  ·	Right ventricular dysfunction  ·	Acute overt combined systolic and diastolic congestive heart failure  ·	Klebsiella pneumonia  ·	Pulmonary edema  ·	Agitation  ·	Acute respiratory failure with hypoxia  ·	Other encephalopathy  ·	Fever, unspecified fever cause  ·	Thrombocytopenia  ·	Anemia due to blood loss  ·	Acute systolic heart failure  ·	Respiratory failure  ·	Cardiogenic shock  ·	Staph infection  ·	Ventricular fibrillation  ·	Coronary artery disease involving native coronary artery of native heart with unstable angina pectoris  ·	Dissection of left main coronary artery  ·	EGD w control of hemorrhage w image guidance  ·	Tracheostomy  ·	Exploration of femoral artery  ·	Insertion of Impella device    Medications:  ·	ALPRAZolam 0.25 milliGRAM(s) Oral every 12 hours  ·	ascorbic acid 500 milliGRAM(s) Oral daily  ·	aspirin 325 milliGRAM(s) Oral daily  ·	atorvastatin 10 milliGRAM(s) Oral at bedtime  ·	carvedilol 3.125 milliGRAM(s) Oral every 12 hours  ·	chlorhexidine 0.12% Liquid 5 milliLiter(s) Oral Mucosa every 4 hours  ·	clopidogrel Tablet 75 milliGRAM(s) Oral daily  ·	enoxaparin Injectable 40 milliGRAM(s) SubCutaneous daily  ·	ferrous    sulfate Liquid 300 milliGRAM(s) Enteral Tube daily  ·	folic acid 1 milliGRAM(s) Oral daily  ·	furosemide   Injectable 20 milliGRAM(s) IV Push <User Schedule>  ·	insulin lispro (HumaLOG) corrective regimen sliding scale   SubCutaneous every 6 hours  ·	lactobacillus acidophilus 1 Tablet(s) Oral every 8 hours  ·	melatonin 5 milliGRAM(s) Oral at bedtime  ·	meropenem  IVPB 1000 milliGRAM(s) IV Intermittent every 8 hours  ·	metoclopramide   Syrup 10 milliGRAM(s) Oral every 8 hours  ·	micafungin IVPB 100 milliGRAM(s) IV Intermittent every 24 hours  ·	multivitamin   Solution 5 milliLiter(s) Enteral Tube daily  ·	pantoprazole  Injectable 40 milliGRAM(s) IV Push every 12 hours  ·	potassium chloride   Powder 40 milliEquivalent(s) Oral daily  ·	QUEtiapine 25 milliGRAM(s) Oral <User Schedule>  ·	spironolactone 12.5 milliGRAM(s) Oral daily  ·	sucralfate suspension 1 Gram(s) Oral two times a day    MEDICATIONS  (PRN):  ·	acetaminophen    Suspension .. 650 milliGRAM(s) Oral every 6 hours PRN Temp greater or equal to 38.5C (101.3F)  ·	HYDROmorphone   Tablet 2 milliGRAM(s) Oral every 4 hours PRN Moderate Pain (4 - 6)  ·	HYDROmorphone   Tablet 4 milliGRAM(s) Oral every 4 hours PRN Severe Pain (7 - 10)  ·	ondansetron Injectable 4 milliGRAM(s) IV Push every 6 hours PRN Nausea and/or Vomiting  ·	QUEtiapine 12.5 milliGRAM(s) Oral every 8 hours PRN agitation  ·	zolpidem 5 milliGRAM(s) Oral <User Schedule> PRN Insomnia    Mode: CPAP with PS, FiO2: 30, PEEP: 5, PS: 5, MAP: 7                        10.0   7.1   )-----------( 310      ( 27 Dec 2018 06:20 )             32.2   12-27    144  |  105  |  29.0<H>  ----------------------------<  159<H>  4.4   |  26.0  |  0.45<L>    Ca    8.4<L>      27 Dec 2018 06:20  Mg     2.0     12-27    TPro  6.3<L>  /  Alb  3.1<L>  /  TBili  1.5  /  DBili  x   /  AST  97<H>  /  ALT  88<H>  /  AlkPhos  215<H>  12-27      Objective:  T(C): 37.7 (12-27-18 @ 22:00), Max: 37.7 (12-27-18 @ 06:00)  HR: 105 (12-28-18 @ 00:14) (101 - 113)  BP: 93/64 (12-28-18 @ 00:00) (89/63 - 101/68)  RR: 20 (12-28-18 @ 00:00) (12 - 36)  SpO2: 100% (12-28-18 @ 00:14) (99% - 100%)    POCT Blood Glucose.: 156 mg/dL (27 Dec 2018 23:47)  POCT Blood Glucose.: 148 mg/dL (27 Dec 2018 19:34)  POCT Blood Glucose.: 177 mg/dL (27 Dec 2018 13:33)  POCT Blood Glucose.: 157 mg/dL (27 Dec 2018 05:34)    I&O's Summary    26 Dec 2018 07:01  -  27 Dec 2018 07:00  --------------------------------------------------------  IN: 1820 mL / OUT: 2860 mL / NET: -1040 mL    27 Dec 2018 07:01  -  28 Dec 2018 01:32  --------------------------------------------------------  IN: 1130 mL / OUT: 1825 mL / NET: -695 mL        Physical Exam  Neuro: alert, cooperative,   Pulm: CTA, equal bilaterally  CV: RRR, no murmurs, +S1S2  Abd: soft, NT, ND, +BS  Ext: +DP Pulses b/l, +PT pulses, no edema  Inc: MSI C/D/I/stable  C/D/I w/ dsg/Ace wrap

## 2018-12-28 NOTE — PROGRESS NOTE ADULT - SUBJECTIVE AND OBJECTIVE BOX
Patient in bed.   Had some chest pain earlier, now resolved.  Feels well, no difficulty with SOB.  Off vent in day and appears to be tolerating well during the day.   Being vented at night.     FUNCTIONAL PROGRESS  12/27  Bed Mobility  Bed Mobility Training Rehab Potential: good, to achieve stated therapy goals  Bed Mobility Training Supine-to-Sit: moderate assist (50% patient effort);  1 person assist;  bed rails  Bed Mobility Training Limitations: decreased ability to use arms for pushing/pulling;  decreased ability to use legs for bridging/pushing;  impaired ability to control trunk for mobility;  decreased strength    Bed-Chair Transfer Training  Bed-to-Chair Transfer Training Rehab Potential: good, to achieve stated therapy goals  Transfer Training Bed-to-Chair Transfer: moderate assist (50% patient effort);  1 person + 1 person to manage equipment;  rolling walker  Bed-to-Chair Transfer Training Transfer Safety Analysis: decreased strength;  rolling walker    Sit-Stand Transfer Training  Sit-to-Stand Transfer Training Rehab Potential: good, to achieve stated therapy goals  Transfer Training Sit-to-Stand Transfer: moderate assist (50% patient effort);  1 person assist;  rolling walker  Transfer Training Stand-to-Sit Transfer: minimum assist (75% patient effort);  1 person assist;  rolling walker  Sit-to-Stand Transfer Training Transfer Safety Analysis: decreased strength;  rolling walker    Gait Training  Gait Training Rehab Potential: good, to achieve stated therapy goals  Gait Training: 15 feet;  minimum assist (75% patient effort);  1 person + 1 person to manage equipment;  rolling walker;  chair follow and portable O2  Gait Analysis: 2-point gait   decreased strength;  15 feet;  rolling walker      REVIEW OF SYSTEMS  Constitutional - No fever,  +fatigue  HEENT - No vertigo, No neck pain  Neurological - No headaches, No memory loss, +loss of strength, No numbness, No tremors  Skin - No rashes, No lesions   Musculoskeletal - No joint pain, No joint swelling, No muscle pain  Psychiatric - +depression, No anxiety    VITALS  T(C): 37.2 (12-28-18 @ 08:00), Max: 37.7 (12-27-18 @ 22:00)  HR: 99 (12-28-18 @ 08:00) (99 - 113)  BP: 99/69 (12-28-18 @ 08:00) (85/59 - 107/55)  RR: 21 (12-28-18 @ 08:00) (14 - 36)  SpO2: 100% (12-28-18 @ 08:42) (99% - 100%)  Wt(kg): --    MEDICATIONS   acetaminophen    Suspension .. 650 milliGRAM(s) every 6 hours PRN  ALPRAZolam 0.25 milliGRAM(s) every 12 hours  ascorbic acid 500 milliGRAM(s) daily  aspirin 325 milliGRAM(s) daily  atorvastatin 10 milliGRAM(s) at bedtime  carvedilol 3.125 milliGRAM(s) every 12 hours  chlorhexidine 0.12% Liquid 5 milliLiter(s) every 4 hours  clopidogrel Tablet 75 milliGRAM(s) daily  enoxaparin Injectable 40 milliGRAM(s) daily  ferrous    sulfate Liquid 300 milliGRAM(s) daily  folic acid 1 milliGRAM(s) daily  furosemide   Injectable 20 milliGRAM(s) <User Schedule>  HYDROmorphone   Tablet 2 milliGRAM(s) every 4 hours PRN  HYDROmorphone   Tablet 4 milliGRAM(s) every 4 hours PRN  insulin lispro (HumaLOG) corrective regimen sliding scale   every 6 hours  lactobacillus acidophilus 1 Tablet(s) every 8 hours  melatonin 5 milliGRAM(s) at bedtime  meropenem  IVPB 1000 milliGRAM(s) every 8 hours  micafungin IVPB 100 milliGRAM(s) every 24 hours  multivitamin   Solution 5 milliLiter(s) daily  ondansetron Injectable 4 milliGRAM(s) every 6 hours PRN  pantoprazole  Injectable 40 milliGRAM(s) every 12 hours  potassium chloride   Powder 20 milliEquivalent(s) daily  QUEtiapine 12.5 milliGRAM(s) every 8 hours PRN  QUEtiapine 25 milliGRAM(s) <User Schedule>  spironolactone 12.5 milliGRAM(s) daily  sucralfate suspension 1 Gram(s) two times a day  zolpidem 5 milliGRAM(s) <User Schedule> PRN      RECENT LABS/IMAGING  CBC Full  -  ( 27 Dec 2018 06:20 )  WBC Count : 7.1 K/uL  Hemoglobin : 10.0 g/dL  Hematocrit : 32.2 %  Platelet Count - Automated : 310 K/uL  Mean Cell Volume : 88.2 fl  Mean Cell Hemoglobin : 27.4 pg  Mean Cell Hemoglobin Concentration : 31.1 g/dL  Auto Neutrophil # : x  Auto Lymphocyte # : x  Auto Monocyte # : x  Auto Eosinophil # : x  Auto Basophil # : x  Auto Neutrophil % : x  Auto Lymphocyte % : x  Auto Monocyte % : x  Auto Eosinophil % : x  Auto Basophil % : x    12-27    144  |  105  |  29.0<H>  ----------------------------<  159<H>  4.4   |  26.0  |  0.45<L>    Ca    8.4<L>      27 Dec 2018 06:20  Mg     2.0     12-27    TPro  6.3<L>  /  Alb  3.1<L>  /  TBili  1.5  /  DBili  x   /  AST  97<H>  /  ALT  88<H>  /  AlkPhos  215<H>  12-27    ------------------------------------------------------------------------------------  PHYSICAL EXAM  Constitutional - NAD, Comfortable  Chest - Breathing comfortably, No wheezing, + trach collar   Extremities - BLE swelling, BLE incisions  Neurologic Exam -                    Cognitive - Awake, Alert, AAO to self, place, date	     Communication - Able to mouth words     Motor -                      LEFT    UE - ShAB 1/5, EF 4/5, EE 4/5,  4/5                    RIGHT UE - ShAB 5/5, EF 5/5, EE 5/5,  5/5                    LEFT    LE - HF 5/5, KE 5/5, DF 5/5, PF 5/5                    RIGHT LE - HF 5/5, KE 5/5, DF 5/5, PF 5/5         Sensory - Intact to LT  Psychiatric - Affect flat	  ----------------------------------------------------------------------------------------  ASSESSMENT/PLAN  48y Male with functional deficits after STEMI, cardiac arrest s/p CABG, followed by complicated hospital course including need for trach, ileus/tube feeds, GI bleed, sepsis.   Pain - Tylenol, Dilaudid  Sleep - Melatonin Recommend DC Ambien  Mood - Xanax, Recommend DC of Seroquel  CAD - ASA, Plavix, Lipitor  DVT PPX - Lovenox    Candida - Mycamine, Meropenem  Pulm- Tolerating trach collar, progressing as tolerated  Rehab - Recommend ACUTE inpatient rehabilitation for the functional deficits consisting of 3 hours of therapy/day & 24 hour RN/daily PMR physician for comorbid medical management. Will continue to follow for ongoing rehab needs and recommendations.    Continue bedside therapy as well as OOB throughout the day with mobilization by staff to maintain cardiopulmonary function and prevention of secondary complications related to debility. Patient in bed.   Had some chest pain earlier, now resolved.  Feels well, no difficulty with SOB.  Off vent in day and appears to be tolerating well during the day.   Being vented at night.     FUNCTIONAL PROGRESS  12/27  Bed Mobility  Bed Mobility Training Rehab Potential: good, to achieve stated therapy goals  Bed Mobility Training Supine-to-Sit: moderate assist (50% patient effort);  1 person assist;  bed rails  Bed Mobility Training Limitations: decreased ability to use arms for pushing/pulling;  decreased ability to use legs for bridging/pushing;  impaired ability to control trunk for mobility;  decreased strength    Bed-Chair Transfer Training  Bed-to-Chair Transfer Training Rehab Potential: good, to achieve stated therapy goals  Transfer Training Bed-to-Chair Transfer: moderate assist (50% patient effort);  1 person + 1 person to manage equipment;  rolling walker  Bed-to-Chair Transfer Training Transfer Safety Analysis: decreased strength;  rolling walker    Sit-Stand Transfer Training  Sit-to-Stand Transfer Training Rehab Potential: good, to achieve stated therapy goals  Transfer Training Sit-to-Stand Transfer: moderate assist (50% patient effort);  1 person assist;  rolling walker  Transfer Training Stand-to-Sit Transfer: minimum assist (75% patient effort);  1 person assist;  rolling walker  Sit-to-Stand Transfer Training Transfer Safety Analysis: decreased strength;  rolling walker    Gait Training  Gait Training Rehab Potential: good, to achieve stated therapy goals  Gait Training: 15 feet;  minimum assist (75% patient effort);  1 person + 1 person to manage equipment;  rolling walker;  chair follow and portable O2  Gait Analysis: 2-point gait   decreased strength;  15 feet;  rolling walker      REVIEW OF SYSTEMS  Constitutional - No fever,  +fatigue  HEENT - No vertigo, No neck pain  Neurological - No headaches, No memory loss, +loss of strength, No numbness, No tremors  Skin - No rashes, No lesions   Musculoskeletal - No joint pain, No joint swelling, No muscle pain  Psychiatric - +depression, No anxiety    VITALS  T(C): 37.2 (12-28-18 @ 08:00), Max: 37.7 (12-27-18 @ 22:00)  HR: 99 (12-28-18 @ 08:00) (99 - 113)  BP: 99/69 (12-28-18 @ 08:00) (85/59 - 107/55)  RR: 21 (12-28-18 @ 08:00) (14 - 36)  SpO2: 100% (12-28-18 @ 08:42) (99% - 100%)  Wt(kg): --    MEDICATIONS   acetaminophen    Suspension .. 650 milliGRAM(s) every 6 hours PRN  ALPRAZolam 0.25 milliGRAM(s) every 12 hours  ascorbic acid 500 milliGRAM(s) daily  aspirin 325 milliGRAM(s) daily  atorvastatin 10 milliGRAM(s) at bedtime  carvedilol 3.125 milliGRAM(s) every 12 hours  chlorhexidine 0.12% Liquid 5 milliLiter(s) every 4 hours  clopidogrel Tablet 75 milliGRAM(s) daily  enoxaparin Injectable 40 milliGRAM(s) daily  ferrous    sulfate Liquid 300 milliGRAM(s) daily  folic acid 1 milliGRAM(s) daily  furosemide   Injectable 20 milliGRAM(s) <User Schedule>  HYDROmorphone   Tablet 2 milliGRAM(s) every 4 hours PRN  HYDROmorphone   Tablet 4 milliGRAM(s) every 4 hours PRN  insulin lispro (HumaLOG) corrective regimen sliding scale   every 6 hours  lactobacillus acidophilus 1 Tablet(s) every 8 hours  melatonin 5 milliGRAM(s) at bedtime  meropenem  IVPB 1000 milliGRAM(s) every 8 hours  micafungin IVPB 100 milliGRAM(s) every 24 hours  multivitamin   Solution 5 milliLiter(s) daily  ondansetron Injectable 4 milliGRAM(s) every 6 hours PRN  pantoprazole  Injectable 40 milliGRAM(s) every 12 hours  potassium chloride   Powder 20 milliEquivalent(s) daily  QUEtiapine 12.5 milliGRAM(s) every 8 hours PRN  QUEtiapine 25 milliGRAM(s) <User Schedule>  spironolactone 12.5 milliGRAM(s) daily  sucralfate suspension 1 Gram(s) two times a day  zolpidem 5 milliGRAM(s) <User Schedule> PRN      RECENT LABS/IMAGING  CBC Full  -  ( 27 Dec 2018 06:20 )  WBC Count : 7.1 K/uL  Hemoglobin : 10.0 g/dL  Hematocrit : 32.2 %  Platelet Count - Automated : 310 K/uL  Mean Cell Volume : 88.2 fl  Mean Cell Hemoglobin : 27.4 pg  Mean Cell Hemoglobin Concentration : 31.1 g/dL  Auto Neutrophil # : x  Auto Lymphocyte # : x  Auto Monocyte # : x  Auto Eosinophil # : x  Auto Basophil # : x  Auto Neutrophil % : x  Auto Lymphocyte % : x  Auto Monocyte % : x  Auto Eosinophil % : x  Auto Basophil % : x    12-27    144  |  105  |  29.0<H>  ----------------------------<  159<H>  4.4   |  26.0  |  0.45<L>    Ca    8.4<L>      27 Dec 2018 06:20  Mg     2.0     12-27    TPro  6.3<L>  /  Alb  3.1<L>  /  TBili  1.5  /  DBili  x   /  AST  97<H>  /  ALT  88<H>  /  AlkPhos  215<H>  12-27    ------------------------------------------------------------------------------------  PHYSICAL EXAM  Constitutional - NAD, Comfortable  Chest - Breathing comfortably, No wheezing, + trach collar   Extremities - BLE swelling, BLE incisions  Neurologic Exam -                    Cognitive - Awake, Alert, AAO to self, place, date	     Communication - Able to mouth words     Motor -                      LEFT    UE - ShAB 1/5, EF 4/5, EE 4/5,  4/5                    RIGHT UE - ShAB 5/5, EF 5/5, EE 5/5,  5/5                    LEFT    LE - HF 5/5, KE 5/5, DF 5/5, PF 5/5                    RIGHT LE - HF 5/5, KE 5/5, DF 5/5, PF 5/5         Sensory - Intact to LT  Psychiatric - Affect flat	  ----------------------------------------------------------------------------------------  ASSESSMENT/PLAN  48y Male with functional deficits after STEMI, cardiac arrest s/p CABG, followed by complicated hospital course including need for trach, ileus/tube feeds, GI bleed, sepsis.   Pain - Tylenol, Dilaudid  Sleep - Melatonin Recommend DC Ambien  Mood - Xanax, Recommend DC of Seroquel  CAD - ASA, Plavix, Lipitor  DVT PPX - Lovenox    Candida - Mycamine, Meropenem  Pulm- Tolerating trach collar, progressing as tolerated  Rehab - Please reorder OT. Recommend ACUTE inpatient rehabilitation for the functional deficits consisting of 3 hours of therapy/day & 24 hour RN/daily PMR physician for comorbid medical management. Will continue to follow for ongoing rehab needs and recommendations.    Continue bedside therapy as well as OOB throughout the day with mobilization by staff to maintain cardiopulmonary function and prevention of secondary complications related to debility.

## 2018-12-28 NOTE — PROGRESS NOTE ADULT - PROBLEM SELECTOR PLAN 1
coreg started 12/23  will not tolerate ACE at this time due to BP  Lasix now 20 IVP BID not TID  augustin now d/c'd, patient voiding

## 2018-12-28 NOTE — PROGRESS NOTE ADULT - SUBJECTIVE AND OBJECTIVE BOX
[facility]  INFECTIOUS DISEASES AND INTERNAL MEDICINE at [place]  =======================================================  Perfecto Santizo MD  Diplomates American Board of Internal Medicine and Infectious Diseases  =======================================================    PATRICK LYLE [id number]    Follow up: Fungemia    Afebrile  awake alert follows commands  Reports no diarrhea    Allergies:  penicillins (Unknown)      Antibiotics:    meropenem  IVPB 1000 milliGRAM(s) IV Intermittent every 8 hours      micafungin IVPB 100 milliGRAM(s) IV Intermittent every 24 hours       REVIEW OF SYSTEMS:  Limited. Patient with trach      Physical Exam:   ICU Vital Signs Last 24 Hrs  T(C): 37.2 (28 Dec 2018 11:00), Max: 37.7 (27 Dec 2018 22:00)  T(F): 99 (28 Dec 2018 11:00), Max: 99.8 (27 Dec 2018 22:00)  HR: 109 (28 Dec 2018 11:00) (97 - 110)  BP: 98/70 (28 Dec 2018 11:00) (85/59 - 107/55)  BP(mean): 78 (28 Dec 2018 11:00) (67 - 89)  ABP: --  ABP(mean): --  RR: 33 (28 Dec 2018 11:00) (14 - 34)  SpO2: 100% (28 Dec 2018 11:00) (100% - 100%)      GEN: NAD oob to chair  HEENT: normocephalic and atraumatic. EOMI. PERRL. + NGT  NECK: Supple. + Trach  LUNGS: Coarse BS B/L  HEART: Regular rate and rhythm   ABDOMEN: Soft, nontender, and nondistended.  Positive bowel sounds.    : + Mckeon  EXTREMITIES: Without any edema.  MSK: no joint swelling  NEUROLOGIC: Awake, alert follows commands  PSYCHIATRIC: Appropriate affect .  SKIN: Sternal wound dressing, + wound vac, Leg dressings in place      Labs:                            10.0   7.1   )-----------( 310      ( 27 Dec 2018 06:20 )             32.2   12-27    144  |  105  |  29.0<H>  ----------------------------<  159<H>  4.4   |  26.0  |  0.45<L>    Ca    8.4<L>      27 Dec 2018 06:20  Mg     2.0     12-27    TPro  6.3<L>  /  Alb  3.1<L>  /  TBili  1.5  /  DBili  x   /  AST  97<H>  /  ALT  88<H>  /  AlkPhos  215<H>  12-27 12-21 @ 10:03 .Blood     No growth at 5 days.      12-20 @ 11:09 .Blood Candida parapsilosis    Growth in aerobic bottle: Candida parapsilosis  Aerobic Bottle: 1 day ;23:51 Hours to positivity  Anaerobic Bottle: No growth at 5 days.      12-20 @ 07:25 .Blood Candida parapsilosis    Growth in aerobic bottle: Candida parapsilosis  Aerobic Bottle: 02 days;05:48 Hours to positivity  Anaerobic Bottle: No growth at 5 days.      12-18 @ 15:02 .Surgical Swab     No growth at 5 days.  No WBC's seen.  No organisms seen      12-18 @ 08:34 .Sputum     Few Routine respiratory keara present  Moderate White blood cells  No organisms seen      12-18 @ 06:31 .Blood Candida parapsilosis  Blood Culture PCR    Growth in aerobic bottle: Candida parapsilosis  Aerobic Bottle: 1 day; 18:39 Hours to positivity  Anaerobic Bottle: No growth at 5 days.  ***Blood Panel PCR results on this specimen are available  approximately 3 hours after the Gram stain result.***  Gram stain, PCR, and/or culture results may not always  correspond due to difference in methodologies.  ************************************************************  This PCR assay was performed using [employer].  The following targets are tested for: Enterococcus,  vancomycin resistant enterococci, Listeria monocytogenes,  coagulase negative staphylococci, S. aureus,  methicillin resistant S. aureus, Streptococcus agalactiae  (Group B), S. pneumoniae, S. pyogenes (Group A),  Acinetobacter baumannii, Enterobacter cloacae, E. coli,  Klebsiella oxytoca, K. pneumoniae, Proteus sp.,  Serratia marcescens, Haemophilus influenzae,  Neisseria meningitidis, Pseudomonas aeruginosa, Candida  albicans, C. glabrata, C krusei, C parapsilosis,  C. tropicalis and the KPC resistance gene.  "Due to technical problems, Proteus sp. will Not be reported as part of  the BCID panel until further notice"      12-15 @ 23:04 .Blood     No growth at 5 days.      12-15 @ 22:52 .Sputum Klebsiella pneumoniae    Few Klebsiella pneumoniae  Few Candida albicans  Few Routine respiratory keara present  Numerous white blood cells  Few Gram positive cocci in pairs  Few Gram Positive Rods      12-15 @ 22:31 .Urine     No growth      12-14 @ 21:58 .Blood     No growth at 5 days.      12-14 @ 13:53 .Catheter left IJ (internal jugular) catheter tip     No growth at 2 days.      12-14 @ 11:48 .Blood     No growth at 5 days.      12-12 @ 16:19 .Sputum     Few Routine respiratory keara present  Few White blood cells  Few Gram Positive Cocci in Pairs and Chains      12-11 @ 20:24 .Broncial bronchial fluid     No growth at 1 week.      12-11 @ 10:49 .Broncial bronchial fluid     Rare Candida albicans  Rare Routine respiratory keara present  Numerous white blood cells  No organisms seen      12-11 @ 10:47 .Broncial bronchial fluid     Candida albicans isolated  Culture in progress      12-09 @ 18:56 .Blood     No growth at 5 days.      12-08 @ 19:40 .Sputum Klebsiella pneumoniae    Moderate Klebsiella pneumoniae  No Routine respiratory keara present  Moderate WBC's  Few Yeast  Few Gram positive cocci in pairs

## 2018-12-29 LAB
CULTURE RESULTS: SIGNIFICANT CHANGE UP
GLUCOSE BLDC GLUCOMTR-MCNC: 161 MG/DL — HIGH (ref 70–99)
GLUCOSE BLDC GLUCOMTR-MCNC: 164 MG/DL — HIGH (ref 70–99)
GLUCOSE BLDC GLUCOMTR-MCNC: 179 MG/DL — HIGH (ref 70–99)
GLUCOSE BLDC GLUCOMTR-MCNC: 209 MG/DL — HIGH (ref 70–99)
SPECIMEN SOURCE: SIGNIFICANT CHANGE UP

## 2018-12-29 PROCEDURE — 71045 X-RAY EXAM CHEST 1 VIEW: CPT | Mod: 26

## 2018-12-29 RX ORDER — CLOPIDOGREL BISULFATE 75 MG/1
75 TABLET, FILM COATED ORAL DAILY
Qty: 0 | Refills: 0 | Status: DISCONTINUED | OUTPATIENT
Start: 2018-12-29 | End: 2019-01-11

## 2018-12-29 RX ORDER — SACCHAROMYCES BOULARDII 250 MG
250 POWDER IN PACKET (EA) ORAL
Qty: 0 | Refills: 0 | Status: DISCONTINUED | OUTPATIENT
Start: 2018-12-29 | End: 2019-01-11

## 2018-12-29 RX ADMIN — Medication 1 MILLIGRAM(S): at 09:58

## 2018-12-29 RX ADMIN — CHLORHEXIDINE GLUCONATE 5 MILLILITER(S): 213 SOLUTION TOPICAL at 21:27

## 2018-12-29 RX ADMIN — Medication 20 MILLIGRAM(S): at 06:19

## 2018-12-29 RX ADMIN — Medication 20 MILLIGRAM(S): at 18:19

## 2018-12-29 RX ADMIN — ATORVASTATIN CALCIUM 10 MILLIGRAM(S): 80 TABLET, FILM COATED ORAL at 21:27

## 2018-12-29 RX ADMIN — MICAFUNGIN SODIUM 105 MILLIGRAM(S): 100 INJECTION, POWDER, LYOPHILIZED, FOR SOLUTION INTRAVENOUS at 13:00

## 2018-12-29 RX ADMIN — Medication 2: at 06:42

## 2018-12-29 RX ADMIN — QUETIAPINE FUMARATE 25 MILLIGRAM(S): 200 TABLET, FILM COATED ORAL at 21:27

## 2018-12-29 RX ADMIN — Medication 20 MILLIEQUIVALENT(S): at 09:59

## 2018-12-29 RX ADMIN — Medication 250 MILLIGRAM(S): at 06:20

## 2018-12-29 RX ADMIN — CHLORHEXIDINE GLUCONATE 5 MILLILITER(S): 213 SOLUTION TOPICAL at 09:59

## 2018-12-29 RX ADMIN — CLOPIDOGREL BISULFATE 75 MILLIGRAM(S): 75 TABLET, FILM COATED ORAL at 09:58

## 2018-12-29 RX ADMIN — Medication 325 MILLIGRAM(S): at 09:59

## 2018-12-29 RX ADMIN — HYDROMORPHONE HYDROCHLORIDE 2 MILLIGRAM(S): 2 INJECTION INTRAMUSCULAR; INTRAVENOUS; SUBCUTANEOUS at 15:22

## 2018-12-29 RX ADMIN — Medication 500 MILLIGRAM(S): at 09:58

## 2018-12-29 RX ADMIN — HYDROMORPHONE HYDROCHLORIDE 4 MILLIGRAM(S): 2 INJECTION INTRAMUSCULAR; INTRAVENOUS; SUBCUTANEOUS at 22:14

## 2018-12-29 RX ADMIN — CHLORHEXIDINE GLUCONATE 5 MILLILITER(S): 213 SOLUTION TOPICAL at 06:19

## 2018-12-29 RX ADMIN — CHLORHEXIDINE GLUCONATE 5 MILLILITER(S): 213 SOLUTION TOPICAL at 02:48

## 2018-12-29 RX ADMIN — Medication 0.25 MILLIGRAM(S): at 06:19

## 2018-12-29 RX ADMIN — Medication 650 MILLIGRAM(S): at 01:58

## 2018-12-29 RX ADMIN — SPIRONOLACTONE 12.5 MILLIGRAM(S): 25 TABLET, FILM COATED ORAL at 06:20

## 2018-12-29 RX ADMIN — Medication 1 GRAM(S): at 18:19

## 2018-12-29 RX ADMIN — Medication 5 MILLILITER(S): at 10:00

## 2018-12-29 RX ADMIN — PANTOPRAZOLE SODIUM 40 MILLIGRAM(S): 20 TABLET, DELAYED RELEASE ORAL at 18:19

## 2018-12-29 RX ADMIN — ENOXAPARIN SODIUM 40 MILLIGRAM(S): 100 INJECTION SUBCUTANEOUS at 09:58

## 2018-12-29 RX ADMIN — MEROPENEM 100 MILLIGRAM(S): 1 INJECTION INTRAVENOUS at 15:22

## 2018-12-29 RX ADMIN — Medication 250 MILLIGRAM(S): at 18:20

## 2018-12-29 RX ADMIN — Medication 1 GRAM(S): at 06:20

## 2018-12-29 RX ADMIN — CHLORHEXIDINE GLUCONATE 5 MILLILITER(S): 213 SOLUTION TOPICAL at 15:26

## 2018-12-29 RX ADMIN — CHLORHEXIDINE GLUCONATE 5 MILLILITER(S): 213 SOLUTION TOPICAL at 18:20

## 2018-12-29 RX ADMIN — Medication 4: at 12:03

## 2018-12-29 RX ADMIN — SENNA PLUS 10 MILLILITER(S): 8.6 TABLET ORAL at 21:27

## 2018-12-29 RX ADMIN — HYDROMORPHONE HYDROCHLORIDE 4 MILLIGRAM(S): 2 INJECTION INTRAMUSCULAR; INTRAVENOUS; SUBCUTANEOUS at 23:04

## 2018-12-29 RX ADMIN — Medication 650 MILLIGRAM(S): at 00:58

## 2018-12-29 RX ADMIN — Medication 0.25 MILLIGRAM(S): at 18:20

## 2018-12-29 RX ADMIN — HYDROMORPHONE HYDROCHLORIDE 2 MILLIGRAM(S): 2 INJECTION INTRAMUSCULAR; INTRAVENOUS; SUBCUTANEOUS at 16:52

## 2018-12-29 RX ADMIN — CARVEDILOL PHOSPHATE 3.12 MILLIGRAM(S): 80 CAPSULE, EXTENDED RELEASE ORAL at 09:59

## 2018-12-29 RX ADMIN — Medication 300 MILLIGRAM(S): at 09:59

## 2018-12-29 RX ADMIN — PANTOPRAZOLE SODIUM 40 MILLIGRAM(S): 20 TABLET, DELAYED RELEASE ORAL at 06:19

## 2018-12-29 RX ADMIN — MEROPENEM 100 MILLIGRAM(S): 1 INJECTION INTRAVENOUS at 06:19

## 2018-12-29 RX ADMIN — Medication 2: at 18:20

## 2018-12-29 RX ADMIN — MEROPENEM 100 MILLIGRAM(S): 1 INJECTION INTRAVENOUS at 21:50

## 2018-12-29 RX ADMIN — Medication 5 MILLIGRAM(S): at 21:27

## 2018-12-29 NOTE — PROGRESS NOTE ADULT - PROBLEM SELECTOR PLAN 3
s/p trach  PS/CPAP alternating with trach collar trials as tolerated  now attempting to maintain patient on pressure support overnight  goal to decannulate patient early next week if able

## 2018-12-29 NOTE — PROGRESS NOTE ADULT - SUBJECTIVE AND OBJECTIVE BOX
Overnight events:  No acute events overnight.     Past Medical History:  ·	ST elevation myocardial infarction (STEMI)  ·	Staph infection  ·	Acute gastric ulcer with hemorrhage  ·	Chronic respiratory failure with hypoxia  ·	Cardiogenic shock  ·	Hematemesis, presence of nausea not specified  ·	Sinusitis  ·	Moderate protein-calorie malnutrition  ·	Fungemia  ·	Hypernatremia  ·	Ileus  ·	Septic shock  ·	Right ventricular dysfunction  ·	Acute overt combined systolic and diastolic congestive heart failure  ·	Klebsiella pneumonia  ·	Pulmonary edema  ·	Agitation  ·	Thrombocytopenia  ·	Anemia due to blood loss  ·	Acute systolic heart failure  ·	Staph infection  ·	Ventricular fibrillation  ·	Dissection of left main coronary artery  ·	Tracheostomy  ·	Exploration of femoral artery  ·	Insertion of Impella device  ·	ECMO decannulation  ·	Exploration and washout of mediastinum  ·	Intra-aortic balloon pump removal  ·	Arterial cannulation  ·	ECMO (extracorporeal membrane oxygenation)    Medications  ·	ALPRAZolam 0.25 milliGRAM(s) Oral every 12 hours  ·	ascorbic acid 500 milliGRAM(s) Oral daily  ·	aspirin 325 milliGRAM(s) Oral daily  ·	atorvastatin 10 milliGRAM(s) Oral at bedtime  ·	carvedilol 3.125 milliGRAM(s) Oral every 12 hours  ·	chlorhexidine 0.12% Liquid 5 milliLiter(s) Oral Mucosa every 4 hours  ·	clopidogrel Tablet 75 milliGRAM(s) Oral daily  ·	docusate sodium Liquid 100 milliGRAM(s) Oral two times a day  ·	enoxaparin Injectable 40 milliGRAM(s) SubCutaneous daily  ·	ferrous    sulfate Liquid 300 milliGRAM(s) Enteral Tube daily  ·	folic acid 1 milliGRAM(s) Oral daily  ·	furosemide   Injectable 20 milliGRAM(s) IV Push <User Schedule>  ·	insulin lispro (HumaLOG) corrective regimen sliding scale   SubCutaneous every 6 hours  ·	lactobacillus acidophilus 1 Tablet(s) Oral every 8 hours  ·	melatonin 5 milliGRAM(s) Oral at bedtime  ·	meropenem  IVPB 1000 milliGRAM(s) IV Intermittent every 8 hours  ·	micafungin IVPB 100 milliGRAM(s) IV Intermittent every 24 hours  ·	multivitamin   Solution 5 milliLiter(s) Enteral Tube daily  ·	pantoprazole  Injectable 40 milliGRAM(s) IV Push every 12 hours  ·	potassium chloride   Powder 20 milliEquivalent(s) Oral daily  ·	QUEtiapine 25 milliGRAM(s) Oral <User Schedule>  ·	senna Syrup 10 milliLiter(s) Oral at bedtime  ·	spironolactone 12.5 milliGRAM(s) Oral daily  ·	sucralfate suspension 1 Gram(s) Oral two times a day    MEDICATIONS  (PRN):  ·	acetaminophen    Suspension .. 650 milliGRAM(s) Oral every 6 hours PRN Temp greater or equal to 38.5C (101.3F)  ·	HYDROmorphone   Tablet 2 milliGRAM(s) Oral every 4 hours PRN Moderate Pain (4 - 6)  ·	HYDROmorphone   Tablet 4 milliGRAM(s) Oral every 4 hours PRN Severe Pain (7 - 10)  ·	ondansetron Injectable 4 milliGRAM(s) IV Push every 6 hours PRN Nausea and/or Vomiting  ·	QUEtiapine 12.5 milliGRAM(s) Oral every 8 hours PRN agitation  ·	zolpidem 5 milliGRAM(s) Oral <User Schedule> PRN Insomnia    Mode: CPAP with PS, FiO2: 30, PEEP: 5, PS: 10, MAP: 9                        10.2   6.0   )-----------( 261      ( 28 Dec 2018 21:38 )             33.1   12-28    142  |  104  |  29.0<H>  ----------------------------<  160<H>  4.3   |  29.0  |  0.42<L>    Ca    8.2<L>      28 Dec 2018 21:38  Mg     2.2     12-28    TPro  6.3<L>  /  Alb  3.1<L>  /  TBili  1.5  /  DBili  x   /  AST  97<H>  /  ALT  88<H>  /  AlkPhos  215<H>  12-27    Objective:  T(C): 37.5 (12-29-18 @ 00:30), Max: 37.5 (12-28-18 @ 22:00)  HR: 103 (12-29-18 @ 02:00) (97 - 110)  BP: 85/62 (12-29-18 @ 02:00) (85/62 - 113/82)  RR: 4 (12-29-18 @ 02:00) (4 - 33)  SpO2: 99% (12-29-18 @ 02:00) (99% - 100%)  Wt(kg): --CAPILLARY BLOOD GLUCOSE      POCT Blood Glucose.: 164 mg/dL (28 Dec 2018 23:35)  POCT Blood Glucose.: 171 mg/dL (28 Dec 2018 18:13)  POCT Blood Glucose.: 159 mg/dL (28 Dec 2018 11:13)  POCT Blood Glucose.: 165 mg/dL (28 Dec 2018 07:24)    I&O's Summary    27 Dec 2018 07:01  -  28 Dec 2018 07:00  --------------------------------------------------------  IN: 1540 mL / OUT: 2525 mL / NET: -985 mL    28 Dec 2018 07:01  -  29 Dec 2018 03:17  --------------------------------------------------------  IN: 1770 mL / OUT: 1525 mL / NET: 245 mL        Physical Exam  Neuro: alert, cooperative, aware of surroundings  Pulm: CTA, equal bilaterally  CV: RRR-ST, +S1S2, no murmurs  Abd: soft, NT, ND, +BS  Ext: +DP Pulses b/l, +PT pulses, no edema  Inc: sternal staples removed, b/l LE every other staple still intact, right groin wound vac still in place with minimal drainage

## 2018-12-29 NOTE — PROGRESS NOTE ADULT - PROBLEM SELECTOR PLAN 1
coreg started 12/23  will not tolerate ACE at this time due to BP, consider starting early next week  Lasix now 20 IVP BID   augustin now d/c'd, patient voiding

## 2018-12-29 NOTE — PROGRESS NOTE ADULT - ASSESSMENT
46 yo Male c/o CP for 2-3 days prior to admission, then 10/10 chest pain approximately 9:30 am yesterday, followed by vomiting.  Wife drove pt to hospital, ruled in for STEMI. Urgent Cath, pt found to have multiple occlusions; stent to LAD and D2; stent to LAD thrombosed,  impella placed for support.  Pt then had VF arrest, shock x 1, return to NSR.  Upon transfer to OR for Urgent CABG, pt with asystolic arrest, chest opened intra-op with CPR in progress. Pt underwent C4V (LAD, Diag, OM and PDA) with inability to wean from CPB therefore requiring VA ECMO (central cannulation) and IABP, he was transferred to CICU.  12/3 s/p ECMO explant and IABP d/c'd, placement of impella via R groin sheath.   12/6 Impella removal via right femoral cutdown with primary repair of femoral artery and stent placed to external iliac artery for dissection.  12/14 + ileus.  12/15 ileus improved, septic and cardiogenic shock.    12/18 + UGIB.   12/19 EGD (+ bleeding stomach ulcer requiring clip x4)  12/21 ileus, made NPO   12/22 RUQ sono consistent with cholecystis, abd exam benign, is unlikely clinically significant per GI and ACS  12/24 tolerating tube feeds at goal, PS/CPAP alternating with trach collar trials as tolerated  12/26 trach collar for 8 hours  12/27 trach collar for 12 hours  12/28 trach collar for 14 hrs, KUB done for continued nausea- negative     Hospital course notable for (in addition to above): acute systolic heart failure, vent dependent respiratory failure (inability to wean also contributed to by severe agitation, now s/p trach), fungemia, acute blood loss anemia, hypokalemia, ileus, sinusitis.

## 2018-12-29 NOTE — CHART NOTE - NSCHARTNOTEFT_GEN_A_CORE
Pt received on 40% CATC, pt tolerating well. pt placed on Noct CPAP vent support from 2330 to 0620, then replaced to 30% CATC. HR/RR stable. NAD noted.

## 2018-12-29 NOTE — PROGRESS NOTE ADULT - PROBLEM SELECTOR PLAN 2
recent BCx negative  continue micafungin for fungemia   vancomycin d/c'd by ID, no evidence of MRSA  one months of meropenem to complete today  fevers now resolved  Oral and bladder temps not correlating, now using oral temps per Dr. Barrett  switched from bacid to florastor now that invasive lines have been removed

## 2018-12-30 LAB
AMYLASE P1 CFR SERPL: 85 U/L — SIGNIFICANT CHANGE UP (ref 36–128)
ANION GAP SERPL CALC-SCNC: 11 MMOL/L — SIGNIFICANT CHANGE UP (ref 5–17)
BUN SERPL-MCNC: 30 MG/DL — HIGH (ref 8–20)
CALCIUM SERPL-MCNC: 8.2 MG/DL — LOW (ref 8.6–10.2)
CHLORIDE SERPL-SCNC: 102 MMOL/L — SIGNIFICANT CHANGE UP (ref 98–107)
CO2 SERPL-SCNC: 29 MMOL/L — SIGNIFICANT CHANGE UP (ref 22–29)
CREAT SERPL-MCNC: 0.45 MG/DL — LOW (ref 0.5–1.3)
CULTURE RESULTS: SIGNIFICANT CHANGE UP
GLUCOSE BLDC GLUCOMTR-MCNC: 159 MG/DL — HIGH (ref 70–99)
GLUCOSE BLDC GLUCOMTR-MCNC: 169 MG/DL — HIGH (ref 70–99)
GLUCOSE BLDC GLUCOMTR-MCNC: 174 MG/DL — HIGH (ref 70–99)
GLUCOSE BLDC GLUCOMTR-MCNC: 200 MG/DL — HIGH (ref 70–99)
GLUCOSE SERPL-MCNC: 169 MG/DL — HIGH (ref 70–115)
HCT VFR BLD CALC: 31.7 % — LOW (ref 42–52)
HGB BLD-MCNC: 10 G/DL — LOW (ref 14–18)
LIDOCAIN IGE QN: 151 U/L — HIGH (ref 22–51)
MAGNESIUM SERPL-MCNC: 2.3 MG/DL — SIGNIFICANT CHANGE UP (ref 1.6–2.6)
MCHC RBC-ENTMCNC: 27.5 PG — SIGNIFICANT CHANGE UP (ref 27–31)
MCHC RBC-ENTMCNC: 31.5 G/DL — LOW (ref 32–36)
MCV RBC AUTO: 87.3 FL — SIGNIFICANT CHANGE UP (ref 80–94)
PLATELET # BLD AUTO: 261 K/UL — SIGNIFICANT CHANGE UP (ref 150–400)
POTASSIUM SERPL-MCNC: 4.3 MMOL/L — SIGNIFICANT CHANGE UP (ref 3.5–5.3)
POTASSIUM SERPL-SCNC: 4.3 MMOL/L — SIGNIFICANT CHANGE UP (ref 3.5–5.3)
RBC # BLD: 3.63 M/UL — LOW (ref 4.6–6.2)
RBC # FLD: 16.4 % — HIGH (ref 11–15.6)
SODIUM SERPL-SCNC: 142 MMOL/L — SIGNIFICANT CHANGE UP (ref 135–145)
SPECIMEN SOURCE: SIGNIFICANT CHANGE UP
WBC # BLD: 5.8 K/UL — SIGNIFICANT CHANGE UP (ref 4.8–10.8)
WBC # FLD AUTO: 5.8 K/UL — SIGNIFICANT CHANGE UP (ref 4.8–10.8)

## 2018-12-30 PROCEDURE — 71045 X-RAY EXAM CHEST 1 VIEW: CPT | Mod: 26

## 2018-12-30 RX ORDER — ALPRAZOLAM 0.25 MG
0.25 TABLET ORAL EVERY 12 HOURS
Qty: 0 | Refills: 0 | Status: DISCONTINUED | OUTPATIENT
Start: 2018-12-30 | End: 2019-01-01

## 2018-12-30 RX ORDER — HYDROMORPHONE HYDROCHLORIDE 2 MG/ML
4 INJECTION INTRAMUSCULAR; INTRAVENOUS; SUBCUTANEOUS EVERY 4 HOURS
Qty: 0 | Refills: 0 | Status: DISCONTINUED | OUTPATIENT
Start: 2018-12-30 | End: 2019-01-06

## 2018-12-30 RX ORDER — HYDROMORPHONE HYDROCHLORIDE 2 MG/ML
2 INJECTION INTRAMUSCULAR; INTRAVENOUS; SUBCUTANEOUS EVERY 4 HOURS
Qty: 0 | Refills: 0 | Status: DISCONTINUED | OUTPATIENT
Start: 2018-12-30 | End: 2019-01-05

## 2018-12-30 RX ADMIN — PANTOPRAZOLE SODIUM 40 MILLIGRAM(S): 20 TABLET, DELAYED RELEASE ORAL at 05:22

## 2018-12-30 RX ADMIN — Medication 2: at 11:24

## 2018-12-30 RX ADMIN — CHLORHEXIDINE GLUCONATE 5 MILLILITER(S): 213 SOLUTION TOPICAL at 14:09

## 2018-12-30 RX ADMIN — Medication 2: at 05:22

## 2018-12-30 RX ADMIN — Medication 1 GRAM(S): at 05:22

## 2018-12-30 RX ADMIN — CHLORHEXIDINE GLUCONATE 5 MILLILITER(S): 213 SOLUTION TOPICAL at 02:03

## 2018-12-30 RX ADMIN — ATORVASTATIN CALCIUM 10 MILLIGRAM(S): 80 TABLET, FILM COATED ORAL at 21:14

## 2018-12-30 RX ADMIN — Medication 20 MILLIEQUIVALENT(S): at 11:23

## 2018-12-30 RX ADMIN — CHLORHEXIDINE GLUCONATE 5 MILLILITER(S): 213 SOLUTION TOPICAL at 05:22

## 2018-12-30 RX ADMIN — CHLORHEXIDINE GLUCONATE 5 MILLILITER(S): 213 SOLUTION TOPICAL at 21:13

## 2018-12-30 RX ADMIN — Medication 500 MILLIGRAM(S): at 11:23

## 2018-12-30 RX ADMIN — Medication 2: at 00:43

## 2018-12-30 RX ADMIN — CHLORHEXIDINE GLUCONATE 5 MILLILITER(S): 213 SOLUTION TOPICAL at 11:07

## 2018-12-30 RX ADMIN — SENNA PLUS 10 MILLILITER(S): 8.6 TABLET ORAL at 21:15

## 2018-12-30 RX ADMIN — Medication 20 MILLIGRAM(S): at 06:46

## 2018-12-30 RX ADMIN — QUETIAPINE FUMARATE 25 MILLIGRAM(S): 200 TABLET, FILM COATED ORAL at 21:13

## 2018-12-30 RX ADMIN — SPIRONOLACTONE 12.5 MILLIGRAM(S): 25 TABLET, FILM COATED ORAL at 05:23

## 2018-12-30 RX ADMIN — Medication 1 GRAM(S): at 17:54

## 2018-12-30 RX ADMIN — Medication 1 MILLIGRAM(S): at 11:23

## 2018-12-30 RX ADMIN — Medication 250 MILLIGRAM(S): at 17:54

## 2018-12-30 RX ADMIN — PANTOPRAZOLE SODIUM 40 MILLIGRAM(S): 20 TABLET, DELAYED RELEASE ORAL at 17:55

## 2018-12-30 RX ADMIN — Medication 250 MILLIGRAM(S): at 05:22

## 2018-12-30 RX ADMIN — Medication 5 MILLILITER(S): at 11:23

## 2018-12-30 RX ADMIN — Medication 0.25 MILLIGRAM(S): at 17:54

## 2018-12-30 RX ADMIN — Medication 0.25 MILLIGRAM(S): at 05:23

## 2018-12-30 RX ADMIN — ENOXAPARIN SODIUM 40 MILLIGRAM(S): 100 INJECTION SUBCUTANEOUS at 11:23

## 2018-12-30 RX ADMIN — Medication 2: at 17:55

## 2018-12-30 RX ADMIN — CARVEDILOL PHOSPHATE 3.12 MILLIGRAM(S): 80 CAPSULE, EXTENDED RELEASE ORAL at 11:25

## 2018-12-30 RX ADMIN — CHLORHEXIDINE GLUCONATE 5 MILLILITER(S): 213 SOLUTION TOPICAL at 17:54

## 2018-12-30 RX ADMIN — Medication 20 MILLIGRAM(S): at 17:54

## 2018-12-30 RX ADMIN — Medication 5 MILLIGRAM(S): at 21:15

## 2018-12-30 RX ADMIN — Medication 325 MILLIGRAM(S): at 11:23

## 2018-12-30 RX ADMIN — MICAFUNGIN SODIUM 105 MILLIGRAM(S): 100 INJECTION, POWDER, LYOPHILIZED, FOR SOLUTION INTRAVENOUS at 13:30

## 2018-12-30 RX ADMIN — Medication 300 MILLIGRAM(S): at 11:24

## 2018-12-30 RX ADMIN — CLOPIDOGREL BISULFATE 75 MILLIGRAM(S): 75 TABLET, FILM COATED ORAL at 11:25

## 2018-12-30 NOTE — PROGRESS NOTE ADULT - PROBLEM SELECTOR PLAN 1
coreg started 12/23  will not tolerate ACE at this time due to BP, consider starting early next week  Lasix 20 IVP BID and aldactone   augustin now d/c'd, patient voiding

## 2018-12-30 NOTE — PROGRESS NOTE ADULT - PROBLEM SELECTOR PLAN 2
recent BCx negative  continue micafungin for fungemia   vancomycin d/c'd by ID, no evidence of MRSA  one months of meropenem to complete  fevers now resolved  Oral and bladder temps not correlating, now using oral temps per Dr. Barrett  switched from bacid to florastor now that invasive lines have been removed

## 2018-12-30 NOTE — PROGRESS NOTE ADULT - SUBJECTIVE AND OBJECTIVE BOX
Subjective: no c/o incisional pain at this time. Denies CP, SOB, palpitations, N/V, other c/o.    T(C): 37.2 (12-30-18 @ 03:00), Max: 37.2 (12-30-18 @ 03:00)  HR: 102 (12-30-18 @ 04:00) (97 - 111)  BP: 91/65 (12-30-18 @ 04:00) (83/61 - 102/77)  ABP: --  ABP(mean): --  RR: 22 (12-30-18 @ 04:00) (17 - 28)  SpO2: 100% (12-30-18 @ 04:00) (99% - 100%)  Wt(kg): --  CVP(mm Hg): --  CO: --  CI: --  PA: --   Mode: CPAP with PS  FiO2: 30  PEEP: 5  PS: 10  MAP: 9      I&O's Detail    28 Dec 2018 07:01  -  29 Dec 2018 07:00  --------------------------------------------------------  IN:    Enteral Tube Flush: 510 mL    Nepro: 1380 mL    Solution: 150 mL    Solution: 100 mL  Total IN: 2140 mL    OUT:    Voided: 1650 mL  Total OUT: 1650 mL    Total NET: 490 mL      29 Dec 2018 07:01  -  30 Dec 2018 04:42  --------------------------------------------------------  IN:    Enteral Tube Flush: 320 mL    Nepro: 1260 mL    Solution: 100 mL    Solution: 100 mL  Total IN: 1780 mL    OUT:    Voided: 1175 mL  Total OUT: 1175 mL    Total NET: 605 mL          LABS: All Lab data reviewed and analyzed                        10.2   6.0   )-----------( 261      ( 28 Dec 2018 21:38 )             33.1     12-28    142  |  104  |  29.0<H>  ----------------------------<  160<H>  4.3   |  29.0  |  0.42<L>    Ca    8.2<L>      28 Dec 2018 21:38  Mg     2.2     12-28              CAPILLARY BLOOD GLUCOSE      POCT Blood Glucose.: 174 mg/dL (30 Dec 2018 00:42)  POCT Blood Glucose.: 161 mg/dL (29 Dec 2018 18:18)  POCT Blood Glucose.: 209 mg/dL (29 Dec 2018 11:47)  POCT Blood Glucose.: 179 mg/dL (29 Dec 2018 06:28)           RADIOLOGY: - Reviewed and analyzed   CXR: pending    HOSPITAL MEDICATIONS: All medications reviewed and analyzed  MEDICATIONS  (STANDING):  ALPRAZolam 0.25 milliGRAM(s) Oral every 12 hours  ascorbic acid 500 milliGRAM(s) Oral daily  aspirin 325 milliGRAM(s) Oral daily  atorvastatin 10 milliGRAM(s) Oral at bedtime  carvedilol 3.125 milliGRAM(s) Oral every 12 hours  chlorhexidine 0.12% Liquid 5 milliLiter(s) Oral Mucosa every 4 hours  clopidogrel Tablet 75 milliGRAM(s) Oral daily  docusate sodium Liquid 100 milliGRAM(s) Oral two times a day  enoxaparin Injectable 40 milliGRAM(s) SubCutaneous daily  ferrous    sulfate Liquid 300 milliGRAM(s) Enteral Tube daily  folic acid 1 milliGRAM(s) Oral daily  furosemide   Injectable 20 milliGRAM(s) IV Push <User Schedule>  insulin lispro (HumaLOG) corrective regimen sliding scale   SubCutaneous every 6 hours  melatonin 5 milliGRAM(s) Oral at bedtime  micafungin IVPB 100 milliGRAM(s) IV Intermittent every 24 hours  multivitamin   Solution 5 milliLiter(s) Enteral Tube daily  pantoprazole  Injectable 40 milliGRAM(s) IV Push every 12 hours  potassium chloride   Powder 20 milliEquivalent(s) Oral daily  QUEtiapine 25 milliGRAM(s) Oral <User Schedule>  saccharomyces boulardii 250 milliGRAM(s) Oral two times a day  senna Syrup 10 milliLiter(s) Oral at bedtime  spironolactone 12.5 milliGRAM(s) Oral daily  sucralfate suspension 1 Gram(s) Oral two times a day    MEDICATIONS  (PRN):  acetaminophen    Suspension .. 650 milliGRAM(s) Oral every 6 hours PRN Temp greater or equal to 38.5C (101.3F)  HYDROmorphone   Tablet 2 milliGRAM(s) Oral every 4 hours PRN Moderate Pain (4 - 6)  HYDROmorphone   Tablet 4 milliGRAM(s) Oral every 4 hours PRN Severe Pain (7 - 10)  ondansetron Injectable 4 milliGRAM(s) IV Push every 6 hours PRN Nausea and/or Vomiting  QUEtiapine 12.5 milliGRAM(s) Oral every 8 hours PRN agitation        Physical Exam  Neuro: A+O x 3, non-focal, speech clear and intact  HEENT:  NCAT, PERRL, EOMI. No conjuctival edema or iIcterus, no thrush.  No ETT or NGT/OGT  Neck:  ROM intact, no JVD, no nodes or masses palpable, trachea midline, tracheostomy w/ dressing c/d/i   Pulm: CTA, good air entry, equal bilaterally, no rales/rhonchi/wheezing, no accessory muscle use noted  CV: RRR, S1, S2. No murmurs, rubs, or gallops noted.  Abd: +BSx4. Soft, nontender, nondistended.  : scrotal ulceration improving  Ext: GALLARDO x 4, 1+ lower extremity edema, no cyanosis or clubbing, distal motor/neuro/circ intact  Skin: warm, dry, no rashes  Incisions: midsternal C/D/I/stable w/ dressing, LLE w/ every other staple intact         Case including assessment/plan of care discussed with   CT surgery attending.        48yMale with PMH     EGD w control of hemorrhage w image guidance  Tracheostomy  Exploration of femoral artery  Insertion of Impella device  ECMO decannulation  Exploration and washout of mediastinum  Intra-aortic balloon pump removal  Arterial cannulation  Central line placement  [place] Michelle placement  CABG  ECMO (extracorporeal membrane oxygenation)      PAST MEDICAL & SURGICAL HISTORY:  Staph infection  No significant past surgical history

## 2018-12-30 NOTE — PROGRESS NOTE ADULT - ASSESSMENT
46 yo Male c/o CP for 2-3 days prior to admission, then 10/10 chest pain approximately 9:30 am yesterday, followed by vomiting.  Wife drove pt to hospital, ruled in for STEMI. Urgent Cath, pt found to have multiple occlusions; stent to LAD and D2; stent to LAD thrombosed,  impella placed for support.  Pt then had VF arrest, shock x 1, return to NSR.  Upon transfer to OR for Urgent CABG, pt with asystolic arrest, chest opened intra-op with CPR in progress. Pt underwent C4V (LAD, Diag, OM and PDA) with inability to wean from CPB therefore requiring VA ECMO (central cannulation) and IABP, he was transferred to CICU.  12/3 s/p ECMO explant and IABP d/c'd, placement of impella via R groin sheath.   12/6 Impella removal via right femoral cutdown with primary repair of femoral artery and stent placed to external iliac artery for dissection.  12/14 + ileus.  12/15 ileus improved, septic and cardiogenic shock.    12/18 + UGIB.   12/19 EGD (+ bleeding stomach ulcer requiring clip x4)  12/21 ileus, made NPO   12/22 RUQ sono consistent with cholecystis, abd exam benign, is unlikely clinically significant per GI and ACS  12/24 tolerating tube feeds at goal, PS/CPAP alternating with trach collar trials as tolerated  12/26 trach collar for 8 hours  12/27 trach collar for 12 hours  12/28 trach collar for 14 hrs, KUB done for continued nausea- negative   12/29 Trach collar for 15 hours , meropenem abx course completed     Hospital course notable for (in addition to above): acute systolic heart failure, vent dependent respiratory failure (inability to wean also contributed to by severe agitation, now s/p trach), fungemia, acute blood loss anemia, hypokalemia, ileus, sinusitis.

## 2018-12-31 LAB
ALBUMIN SERPL ELPH-MCNC: 3.1 G/DL — LOW (ref 3.3–5.2)
ALP SERPL-CCNC: 236 U/L — HIGH (ref 40–120)
ALT FLD-CCNC: 107 U/L — HIGH
ANION GAP SERPL CALC-SCNC: 13 MMOL/L — SIGNIFICANT CHANGE UP (ref 5–17)
AST SERPL-CCNC: 111 U/L — HIGH
BILIRUB SERPL-MCNC: 1.5 MG/DL — SIGNIFICANT CHANGE UP (ref 0.4–2)
BUN SERPL-MCNC: 32 MG/DL — HIGH (ref 8–20)
CALCIUM SERPL-MCNC: 8.2 MG/DL — LOW (ref 8.6–10.2)
CHLORIDE SERPL-SCNC: 105 MMOL/L — SIGNIFICANT CHANGE UP (ref 98–107)
CO2 SERPL-SCNC: 26 MMOL/L — SIGNIFICANT CHANGE UP (ref 22–29)
CREAT SERPL-MCNC: 0.51 MG/DL — SIGNIFICANT CHANGE UP (ref 0.5–1.3)
GLUCOSE BLDC GLUCOMTR-MCNC: 155 MG/DL — HIGH (ref 70–99)
GLUCOSE BLDC GLUCOMTR-MCNC: 173 MG/DL — HIGH (ref 70–99)
GLUCOSE BLDC GLUCOMTR-MCNC: 182 MG/DL — HIGH (ref 70–99)
GLUCOSE BLDC GLUCOMTR-MCNC: 192 MG/DL — HIGH (ref 70–99)
GLUCOSE SERPL-MCNC: 170 MG/DL — HIGH (ref 70–115)
HCT VFR BLD CALC: 31.9 % — LOW (ref 42–52)
HGB BLD-MCNC: 9.8 G/DL — LOW (ref 14–18)
MAGNESIUM SERPL-MCNC: 2.3 MG/DL — SIGNIFICANT CHANGE UP (ref 1.6–2.6)
MCHC RBC-ENTMCNC: 26.8 PG — LOW (ref 27–31)
MCHC RBC-ENTMCNC: 30.7 G/DL — LOW (ref 32–36)
MCV RBC AUTO: 87.2 FL — SIGNIFICANT CHANGE UP (ref 80–94)
PLATELET # BLD AUTO: 286 K/UL — SIGNIFICANT CHANGE UP (ref 150–400)
POTASSIUM SERPL-MCNC: 4.5 MMOL/L — SIGNIFICANT CHANGE UP (ref 3.5–5.3)
POTASSIUM SERPL-SCNC: 4.5 MMOL/L — SIGNIFICANT CHANGE UP (ref 3.5–5.3)
PROT SERPL-MCNC: 6.5 G/DL — LOW (ref 6.6–8.7)
RBC # BLD: 3.66 M/UL — LOW (ref 4.6–6.2)
RBC # FLD: 16.6 % — HIGH (ref 11–15.6)
SODIUM SERPL-SCNC: 144 MMOL/L — SIGNIFICANT CHANGE UP (ref 135–145)
WBC # BLD: 7.7 K/UL — SIGNIFICANT CHANGE UP (ref 4.8–10.8)
WBC # FLD AUTO: 7.7 K/UL — SIGNIFICANT CHANGE UP (ref 4.8–10.8)

## 2018-12-31 PROCEDURE — 99232 SBSQ HOSP IP/OBS MODERATE 35: CPT

## 2018-12-31 PROCEDURE — 71045 X-RAY EXAM CHEST 1 VIEW: CPT | Mod: 26

## 2018-12-31 RX ADMIN — Medication 2: at 05:59

## 2018-12-31 RX ADMIN — CHLORHEXIDINE GLUCONATE 5 MILLILITER(S): 213 SOLUTION TOPICAL at 02:58

## 2018-12-31 RX ADMIN — CHLORHEXIDINE GLUCONATE 5 MILLILITER(S): 213 SOLUTION TOPICAL at 20:14

## 2018-12-31 RX ADMIN — Medication 2: at 13:04

## 2018-12-31 RX ADMIN — Medication 20 MILLIGRAM(S): at 18:13

## 2018-12-31 RX ADMIN — HYDROMORPHONE HYDROCHLORIDE 2 MILLIGRAM(S): 2 INJECTION INTRAMUSCULAR; INTRAVENOUS; SUBCUTANEOUS at 21:00

## 2018-12-31 RX ADMIN — ATORVASTATIN CALCIUM 10 MILLIGRAM(S): 80 TABLET, FILM COATED ORAL at 20:14

## 2018-12-31 RX ADMIN — Medication 0.25 MILLIGRAM(S): at 18:12

## 2018-12-31 RX ADMIN — CLOPIDOGREL BISULFATE 75 MILLIGRAM(S): 75 TABLET, FILM COATED ORAL at 12:07

## 2018-12-31 RX ADMIN — Medication 5 MILLIGRAM(S): at 20:13

## 2018-12-31 RX ADMIN — Medication 325 MILLIGRAM(S): at 12:07

## 2018-12-31 RX ADMIN — Medication 300 MILLIGRAM(S): at 12:07

## 2018-12-31 RX ADMIN — CHLORHEXIDINE GLUCONATE 5 MILLILITER(S): 213 SOLUTION TOPICAL at 05:58

## 2018-12-31 RX ADMIN — SPIRONOLACTONE 12.5 MILLIGRAM(S): 25 TABLET, FILM COATED ORAL at 05:59

## 2018-12-31 RX ADMIN — CARVEDILOL PHOSPHATE 3.12 MILLIGRAM(S): 80 CAPSULE, EXTENDED RELEASE ORAL at 12:07

## 2018-12-31 RX ADMIN — Medication 1 GRAM(S): at 18:14

## 2018-12-31 RX ADMIN — Medication 2: at 00:20

## 2018-12-31 RX ADMIN — PANTOPRAZOLE SODIUM 40 MILLIGRAM(S): 20 TABLET, DELAYED RELEASE ORAL at 05:59

## 2018-12-31 RX ADMIN — Medication 5 MILLILITER(S): at 12:07

## 2018-12-31 RX ADMIN — Medication 2: at 17:00

## 2018-12-31 RX ADMIN — PANTOPRAZOLE SODIUM 40 MILLIGRAM(S): 20 TABLET, DELAYED RELEASE ORAL at 18:14

## 2018-12-31 RX ADMIN — Medication 250 MILLIGRAM(S): at 05:59

## 2018-12-31 RX ADMIN — Medication 0.25 MILLIGRAM(S): at 05:59

## 2018-12-31 RX ADMIN — Medication 20 MILLIEQUIVALENT(S): at 12:07

## 2018-12-31 RX ADMIN — MICAFUNGIN SODIUM 105 MILLIGRAM(S): 100 INJECTION, POWDER, LYOPHILIZED, FOR SOLUTION INTRAVENOUS at 16:08

## 2018-12-31 RX ADMIN — ENOXAPARIN SODIUM 40 MILLIGRAM(S): 100 INJECTION SUBCUTANEOUS at 12:08

## 2018-12-31 RX ADMIN — Medication 250 MILLIGRAM(S): at 18:15

## 2018-12-31 RX ADMIN — HYDROMORPHONE HYDROCHLORIDE 4 MILLIGRAM(S): 2 INJECTION INTRAMUSCULAR; INTRAVENOUS; SUBCUTANEOUS at 01:15

## 2018-12-31 RX ADMIN — Medication 20 MILLIGRAM(S): at 05:58

## 2018-12-31 RX ADMIN — CHLORHEXIDINE GLUCONATE 5 MILLILITER(S): 213 SOLUTION TOPICAL at 15:38

## 2018-12-31 RX ADMIN — HYDROMORPHONE HYDROCHLORIDE 2 MILLIGRAM(S): 2 INJECTION INTRAMUSCULAR; INTRAVENOUS; SUBCUTANEOUS at 20:13

## 2018-12-31 RX ADMIN — Medication 1 GRAM(S): at 05:59

## 2018-12-31 RX ADMIN — HYDROMORPHONE HYDROCHLORIDE 4 MILLIGRAM(S): 2 INJECTION INTRAMUSCULAR; INTRAVENOUS; SUBCUTANEOUS at 00:55

## 2018-12-31 RX ADMIN — CHLORHEXIDINE GLUCONATE 5 MILLILITER(S): 213 SOLUTION TOPICAL at 18:13

## 2018-12-31 RX ADMIN — Medication 500 MILLIGRAM(S): at 12:07

## 2018-12-31 RX ADMIN — CHLORHEXIDINE GLUCONATE 5 MILLILITER(S): 213 SOLUTION TOPICAL at 12:07

## 2018-12-31 RX ADMIN — QUETIAPINE FUMARATE 25 MILLIGRAM(S): 200 TABLET, FILM COATED ORAL at 20:13

## 2018-12-31 RX ADMIN — Medication 1 MILLIGRAM(S): at 12:07

## 2018-12-31 NOTE — PROGRESS NOTE ADULT - SUBJECTIVE AND OBJECTIVE BOX
[facility]  INFECTIOUS DISEASES AND INTERNAL MEDICINE at [place]  =======================================================  Perfecto Santizo MD  Diplomates American Board of Internal Medicine and Infectious Diseases  =======================================================    PATRICK LYLE [id number]    Follow up: Fungemia    Afebrile  awake alert follows commands and answers questions.   Has NG tube and trach. Afebrile  Daughter at bedside.     Allergies:  penicillins (Unknown)    Antibiotics:    micafungin IVPB 100 milliGRAM(s) IV Intermittent every 24 hours     REVIEW OF SYSTEMS:  as above     Physical Exam:   ICU Vital Signs Last 24 Hrs  T(C): 37.2 (28 Dec 2018 11:00), Max: 37.7 (27 Dec 2018 22:00)  T(F): 99 (28 Dec 2018 11:00), Max: 99.8 (27 Dec 2018 22:00)  HR: 109 (28 Dec 2018 11:00) (97 - 110)  BP: 98/70 (28 Dec 2018 11:00) (85/59 - 107/55)  BP(mean): 78 (28 Dec 2018 11:00) (67 - 89)  RR: 33 (28 Dec 2018 11:00) (14 - 34)  SpO2: 100% (28 Dec 2018 11:00) (100% - 100%)  GEN: NAD , awake   HEENT: normocephalic and atraumatic. EOMI. PERRL. + NGT  NECK: Supple. + Trach and NGT  LUNGS: Coarse BS B/L  HEART: Regular rate and rhythm   ABDOMEN: Soft, nontender, and nondistended.  Positive bowel sounds.    : + Mckeon  EXTREMITIES: Without any edema.  MSK: no joint swelling  NEUROLOGIC: Awake, alert follows commands  PSYCHIATRIC: Appropriate affect .  SKIN: Sternal wound dressing, + wound vac, Leg dressings in place      Labs:   12-31    144  |  105  |  32.0<H>  ----------------------------<  170<H>  4.5   |  26.0  |  0.51    Ca    8.2<L>      31 Dec 2018 04:30  Mg     2.3     12-31    TPro  6.5<L>  /  Alb  3.1<L>  /  TBili  1.5  /  DBili  x   /  AST  111<H>  /  ALT  107<H>  /  AlkPhos  236<H>  12-31                        9.8    7.7   )-----------( 286      ( 31 Dec 2018 04:30 )             31.9     LIVER FUNCTIONS - ( 31 Dec 2018 04:30 )  Alb: 3.1 g/dL / Pro: 6.5 g/dL / ALK PHOS: 236 U/L / ALT: 107 U/L / AST: 111 U/L / GGT: x           RECENT CULTURES:  12-25 @ 06:38 .Blood     No growth at 5 days.    [facility]  12-24 @ 03:01 .Blood     No growth at 5 days.    12-21 @ 10:03 .Blood     No growth at 5 days.    12-20 @ 11:09 .Blood Candida parapsilosis    Growth in aerobic bottle: Candida parapsilosis  Aerobic Bottle: 1 day ;23:51 Hours to positivity  Anaerobic Bottle: No growth at 5 days.    12-20 @ 07:25 .Blood Candida parapsilosis    Growth in aerobic bottle: Candida parapsilosis  Aerobic Bottle: 02 days;05:48 Hours to positivity  Anaerobic Bottle: No growth at 5 days.    12-18 @ 15:02 .Surgical Swab     No growth at 5 days.    No WBC's seen.  No organisms seen    12-18 @ 08:34 .Sputum     Few Routine respiratory keara present    Moderate White blood cells  No organisms seen    12-18 @ 06:31 .Blood Candida parapsilosis  Blood Culture PCR    Growth in aerobic bottle: Candida parapsilosis  Aerobic Bottle: 1 day; 18:39 Hours to positivity  Anaerobic Bottle: No growth at 5 days.

## 2018-12-31 NOTE — PROGRESS NOTE ADULT - PROBLEM SELECTOR PLAN 2
recent BCx negative (12/25)  continue micafungin for fungemia   vancomycin d/c'd by ID, no evidence of MRSA  one months of meropenem completed 12/29  now with fevere 100.5  fevers now resolved  Oral and bladder temps not correlating, now using oral temps per Dr. Barrett  switched from bacid to florastor now that invasive lines have been removed

## 2018-12-31 NOTE — PROGRESS NOTE ADULT - SUBJECTIVE AND OBJECTIVE BOX
SUBJECTIVE   "can I have water"   patient continues to request water  c/o dry mouth       INTERIM HISTORY SIGNIFICANT FOR   trach collar; cpap overnight   OB--> chair   ambulates with assitance     Patient is a 48y old  Male who presents with a chief complaint of Emergent CABG cardiogenic shock (30 Dec 2018 04:41)    HPI:  This is a 46 y/o male no significant PMH; recent ABT (doxycycline) r/t cellulitis to right groin per pt. Pt presented to the ED with 10/10 chest pain that he reported started at 0930; he drank water with no relief; pain started to progress to B/L shoulders. Per pt spouse, she reports he has had chest pain x2-3 days and this morning he vomited which he attributed to reflux.  She brought him into the emergency department.    Pt has significant EKG changes ST elevations in leads V1-V5 with reciprocal changes in II, III, AVF.  Pt rec'd asa and plavix load in ED. (29 Nov 2018 11:32)    OBJECTIVE  PAST MEDICAL & SURGICAL HISTORY:  Staph infection  No significant past surgical history    penicillins (Unknown)    Home Medications:    VITALS  Currently in sinus rhythm with vitals as below  ICU Vital Signs Last 24 Hrs  T(C): 38.1 (31 Dec 2018 00:25), Max: 38.1 (31 Dec 2018 00:25)  T(F): 100.5 (31 Dec 2018 00:25), Max: 100.5 (31 Dec 2018 00:25)  HR: 107 (30 Dec 2018 23:00) (100 - 113)  BP: 92/60 (30 Dec 2018 23:00) (82/55 - 101/69)  BP(mean): 71 (30 Dec 2018 23:00) (64 - 81)  ABP: --  ABP(mean): --  RR: 27 (30 Dec 2018 23:00) (10 - 28)  SpO2: 100% (30 Dec 2018 23:00) (99% - 100%)    Adult Advanced Hemodynamics Last 24 Hrs  CVP(mm Hg): --  CVP(cm H2O): --  CO: --  CI: --  PA: --  PA(mean): --  PCWP: --  SVR: --  SVRI: --  PVR: --  PVRI: --  LABS                        10.0   5.8   )-----------( 261      ( 30 Dec 2018 06:46 )             31.7   12-30    142  |  102  |  30.0<H>  ----------------------------<  169<H>  4.3   |  29.0  |  0.45<L>    Ca    8.2<L>      30 Dec 2018 06:46  Mg     2.3     12-30    CAPILLARY BLOOD GLUCOSE      POCT Blood Glucose.: 173 mg/dL (31 Dec 2018 00:18)        Mode: CPAP with PS  FiO2: 30  PEEP: 5  PS: 5  MAP: 9    IN/OUT    12-29-18 @ 07:01  -  12-30-18 @ 07:00  --------------------------------------------------------  IN: 2160 mL / OUT: 1525 mL / NET: 635 mL    12-30-18 @ 07:01  -  12-31-18 @ 00:55  --------------------------------------------------------  IN: 1060 mL / OUT: 1425 mL / NET: -365 mL      IMAGING  personally reviewed imaging   Xray Chest 1 View- PORTABLE-Urgent:    EXAM:  XR CHEST PORTABLE URGENT 1V                          PROCEDURE DATE:  12/30/2018          INTERPRETATION:  AP chest on December 30, 2018 at 6:43 AM. Patient is   ventilator dependent.    Heart is magnified by technique.    Sternotomy, tracheostomy, nasogastric tube remain.    There is an increased interstitial picture in the lung fields left   greater than right.    Chest is similar to December 29.    IMPRESSION: Unchanged chest showing interstitial prominence left greater   than right.                DULCE DUENAS M.D., ATTENDING RADIOLOGIST  This document has been electronically signed. Dec 30 2018  1:00PM               (12-30-18 @ 07:01)    CURRENT MEDICATIONS  MEDICATIONS  (STANDING):  ALPRAZolam 0.25 milliGRAM(s) Oral every 12 hours  ascorbic acid 500 milliGRAM(s) Oral daily  aspirin 325 milliGRAM(s) Oral daily  atorvastatin 10 milliGRAM(s) Oral at bedtime  carvedilol 3.125 milliGRAM(s) Oral every 12 hours  chlorhexidine 0.12% Liquid 5 milliLiter(s) Oral Mucosa every 4 hours  clopidogrel Tablet 75 milliGRAM(s) Oral daily  enoxaparin Injectable 40 milliGRAM(s) SubCutaneous daily  ferrous    sulfate Liquid 300 milliGRAM(s) Enteral Tube daily  folic acid 1 milliGRAM(s) Oral daily  furosemide   Injectable 20 milliGRAM(s) IV Push <User Schedule>  insulin lispro (HumaLOG) corrective regimen sliding scale   SubCutaneous every 6 hours  melatonin 5 milliGRAM(s) Oral at bedtime  micafungin IVPB 100 milliGRAM(s) IV Intermittent every 24 hours  multivitamin   Solution 5 milliLiter(s) Enteral Tube daily  pantoprazole  Injectable 40 milliGRAM(s) IV Push every 12 hours  potassium chloride   Powder 20 milliEquivalent(s) Oral daily  QUEtiapine 25 milliGRAM(s) Oral <User Schedule>  saccharomyces boulardii 250 milliGRAM(s) Oral two times a day  senna Syrup 10 milliLiter(s) Oral at bedtime  spironolactone 12.5 milliGRAM(s) Oral daily  sucralfate suspension 1 Gram(s) Oral two times a day    MEDICATIONS  (PRN):  acetaminophen    Suspension .. 650 milliGRAM(s) Oral every 6 hours PRN Temp greater or equal to 38.5C (101.3F)  HYDROmorphone   Tablet 2 milliGRAM(s) Oral every 4 hours PRN Moderate Pain (4 - 6)  HYDROmorphone   Tablet 4 milliGRAM(s) Oral every 4 hours PRN Severe Pain (7 - 10)  ondansetron Injectable 4 milliGRAM(s) IV Push every 6 hours PRN Nausea and/or Vomiting  QUEtiapine 12.5 milliGRAM(s) Oral every 8 hours PRN agitation      Invasive Lines & Indwelling Catheters  +Pacing wires isolated

## 2018-12-31 NOTE — PROGRESS NOTE ADULT - SUBJECTIVE AND OBJECTIVE BOX
Patient in bed, walked with PT earlier and now planned for MBS but now has been cancelled.  Making continued progress both medically and physically.  Continues to have focal left shoulder to  weakness.   Wants to have something to wet his mouth.    FUNCTIONAL PROGRESS  12/28  Bed Mobility  Bed Mobility Training Rehab Potential: good, to achieve stated therapy goals  Bed Mobility Training Supine-to-Sit: moderate assist (50% patient effort);  1 person assist;  bed rails  Bed Mobility Training Limitations: decreased strength    Bed-Chair Transfer Training  Bed-to-Chair Transfer Training Rehab Potential: good, to achieve stated therapy goals  Transfer Training Chair-to-Bed Transfer: 1 person assist;  rolling walker;  moderate assist (50% patient effort)  Bed-to-Chair Transfer Training Transfer Safety Analysis: decreased strength;  rolling walker    Sit-Stand Transfer Training  Sit-to-Stand Transfer Training Rehab Potential: good, to achieve stated therapy goals  Transfer Training Sit-to-Stand Transfer: moderate assist (50% patient effort);  1 person assist;  rolling walker  Transfer Training Stand-to-Sit Transfer: minimum assist (75% patient effort);  1 person assist;  rolling walker  Sit-to-Stand Transfer Training Transfer Safety Analysis: decreased strength;  rolling walker    Gait Training  Gait Training Rehab Potential: good, to achieve stated therapy goals  Gait Training: minimum assist (75% patient effort);  1 person + 1 person to manage equipment;  portable O2 and chair follow.;  rolling walker;  30 feet  Gait Analysis: 2-point gait   decreased strength;  30 feet;  rolling walker      REVIEW OF SYSTEMS  Constitutional - No fever,  +fatigue  HEENT - No vertigo, No neck pain  Neurological - No headaches, No memory loss, +loss of strength, No numbness, No tremors  Skin - No rashes, +lesions   Musculoskeletal - No joint pain, No joint swelling, No muscle pain  Psychiatric - +depression, No anxiety    VITALS  T(C): 37.3 (12-31-18 @ 07:00), Max: 38.1 (12-31-18 @ 00:25)  HR: 106 (12-31-18 @ 09:00) (100 - 113)  BP: 95/60 (12-31-18 @ 09:00) (79/59 - 108/73)  RR: 19 (12-31-18 @ 09:00) (14 - 28)  SpO2: 100% (12-31-18 @ 09:00) (100% - 100%)  Wt(kg): --    MEDICATIONS   acetaminophen    Suspension .. 650 milliGRAM(s) every 6 hours PRN  ALPRAZolam 0.25 milliGRAM(s) every 12 hours  ascorbic acid 500 milliGRAM(s) daily  aspirin 325 milliGRAM(s) daily  atorvastatin 10 milliGRAM(s) at bedtime  carvedilol 3.125 milliGRAM(s) every 12 hours  chlorhexidine 0.12% Liquid 5 milliLiter(s) every 4 hours  clopidogrel Tablet 75 milliGRAM(s) daily  enoxaparin Injectable 40 milliGRAM(s) daily  ferrous    sulfate Liquid 300 milliGRAM(s) daily  folic acid 1 milliGRAM(s) daily  furosemide   Injectable 20 milliGRAM(s) <User Schedule>  HYDROmorphone   Tablet 2 milliGRAM(s) every 4 hours PRN  HYDROmorphone   Tablet 4 milliGRAM(s) every 4 hours PRN  insulin lispro (HumaLOG) corrective regimen sliding scale   every 6 hours  melatonin 5 milliGRAM(s) at bedtime  micafungin IVPB 100 milliGRAM(s) every 24 hours  multivitamin   Solution 5 milliLiter(s) daily  ondansetron Injectable 4 milliGRAM(s) every 6 hours PRN  pantoprazole  Injectable 40 milliGRAM(s) every 12 hours  potassium chloride   Powder 20 milliEquivalent(s) daily  QUEtiapine 12.5 milliGRAM(s) every 8 hours PRN  QUEtiapine 25 milliGRAM(s) <User Schedule>  saccharomyces boulardii 250 milliGRAM(s) two times a day  senna Syrup 10 milliLiter(s) at bedtime  spironolactone 12.5 milliGRAM(s) daily  sucralfate suspension 1 Gram(s) two times a day      RECENT LABS/IMAGING  CBC Full  -  ( 31 Dec 2018 04:30 )  WBC Count : 7.7 K/uL  Hemoglobin : 9.8 g/dL  Hematocrit : 31.9 %  Platelet Count - Automated : 286 K/uL  Mean Cell Volume : 87.2 fl  Mean Cell Hemoglobin : 26.8 pg  Mean Cell Hemoglobin Concentration : 30.7 g/dL  Auto Neutrophil # : x  Auto Lymphocyte # : x  Auto Monocyte # : x  Auto Eosinophil # : x  Auto Basophil # : x  Auto Neutrophil % : x  Auto Lymphocyte % : x  Auto Monocyte % : x  Auto Eosinophil % : x  Auto Basophil % : x    12-31    144  |  105  |  32.0<H>  ----------------------------<  170<H>  4.5   |  26.0  |  0.51    Ca    8.2<L>      31 Dec 2018 04:30  Mg     2.3     12-31    TPro  6.5<L>  /  Alb  3.1<L>  /  TBili  1.5  /  DBili  x   /  AST  111<H>  /  ALT  107<H>  /  AlkPhos  236<H>  12-31    ------------------------------------------------------------------------------------  PHYSICAL EXAM  Constitutional - NAD, Comfortable  Chest - Breathing comfortably, No wheezing, + trach collar   Extremities - BLE swelling, BLE incisions + staples CDI  Neurologic Exam -                    Cognitive - Awake, Alert, AAO to self, place, date	     Communication - Able to mouth words     Motor -                      LEFT    UE - ShAB 1/5, EF 4/5, EE 4/5,  4/5                    RIGHT UE - ShAB 5/5, EF 5/5, EE 5/5,  5/5                    LEFT    LE - HF 5/5, KE 5/5, DF 5/5, PF 5/5                    RIGHT LE - HF 5/5, KE 5/5, DF 5/5, PF 5/5         Sensory - Intact to LT  Psychiatric - Affect flat	  ----------------------------------------------------------------------------------------  ASSESSMENT/PLAN  48y Male with functional deficits after STEMI, cardiac arrest s/p CABG, followed by complicated hospital course including need for trach, ileus/tube feeds, GI bleed, sepsis.   Pain - Tylenol, Dilaudid  Sleep - Melatonin Recommend DC Ambien  Mood - Xanax, Recommend DC of Seroquel  CAD - ASA, Plavix, Lipitor  DVT PPX - Lovenox    Candida - Mycamine   Pulm- Tolerating trach collar, progressing as tolerated  Rehab - Awaiting MBS and DC NGT for dispo. Continue to recommend ACUTE inpatient rehabilitation for the functional deficits consisting of 3 hours of therapy/day & 24 hour RN/daily PMR physician for comorbid medical management. Will continue to follow for ongoing rehab needs and recommendations.    Continue bedside therapy as well as OOB throughout the day with mobilization by staff to maintain cardiopulmonary function and prevention of secondary complications related to debility.

## 2018-12-31 NOTE — PROGRESS NOTE ADULT - ASSESSMENT
46 yo Male c/o CP for 2-3 days prior to admission, then 10/10 chest pain approximately 9:30 am yesterday, followed by vomiting.  Wife drove pt to hospital, ruled in for STEMI. Urgent Cath, pt found to have multiple occlusions; stent to LAD and D2; stent to LAD thrombosed,  impella placed for support.  Pt then had VF arrest, shock x 1, return to NSR.  Upon transfer to OR for Urgent CABG, pt with asystolic arrest, chest opened intra-op with CPR in progress. Pt underwent C4V (LAD, Diag, OM and PDA) with inability to wean from CPB therefore requiring VA ECMO (central cannulation) and IABP, he was transferred to CICU.  12/3 s/p ECMO explant and IABP d/c'd, placement of impella via R groin sheath.   12/6 Impella removal via right femoral cutdown with primary repair of femoral artery and stent placed to external iliac artery for dissection.  12/14 + ileus.  12/15 ileus improved, septic and cardiogenic shock.    12/18 + UGIB.   12/19 EGD (+ bleeding stomach ulcer requiring clip x4)  12/21 ileus, made NPO   12/22 RUQ sono consistent with cholecystis, abd exam benign, is unlikely clinically significant per GI and ACS  12/24 tolerating tube feeds at goal, PS/CPAP alternating with trach collar trials as tolerated  12/26 trach collar for 8 hours  12/27 trach collar for 12 hours  12/28 trach collar for 14 hrs, KUB done for continued nausea- negative   12/29 Trach collar for 15 hours , meropenem abx course completed   12/30 oral temp 100.5; off meropenem, remains on micafungin; trach collar for 15 hours rests on cpap overnight     Hospital course notable for (in addition to above): acute systolic heart failure, vent dependent respiratory failure (inability to wean also contributed to by severe agitation, now s/p trach), fungemia, acute blood loss anemia, hypokalemia, ileus, sinusitis.

## 2018-12-31 NOTE — PROGRESS NOTE ADULT - SUBJECTIVE AND OBJECTIVE BOX
INTERVAL HPI/OVERNIGHT EVENTS:  FOllow up T2DM   Pt doing better Eating some food   MEDICATIONS  (STANDING):  ALPRAZolam 0.25 milliGRAM(s) Oral every 12 hours  ascorbic acid 500 milliGRAM(s) Oral daily  aspirin 325 milliGRAM(s) Oral daily  atorvastatin 10 milliGRAM(s) Oral at bedtime  carvedilol 3.125 milliGRAM(s) Oral every 12 hours  chlorhexidine 0.12% Liquid 5 milliLiter(s) Oral Mucosa every 4 hours  clopidogrel Tablet 75 milliGRAM(s) Oral daily  enoxaparin Injectable 40 milliGRAM(s) SubCutaneous daily  ferrous    sulfate Liquid 300 milliGRAM(s) Enteral Tube daily  folic acid 1 milliGRAM(s) Oral daily  furosemide   Injectable 20 milliGRAM(s) IV Push <User Schedule>  insulin lispro (HumaLOG) corrective regimen sliding scale   SubCutaneous every 6 hours  melatonin 5 milliGRAM(s) Oral at bedtime  micafungin IVPB 100 milliGRAM(s) IV Intermittent every 24 hours  multivitamin   Solution 5 milliLiter(s) Enteral Tube daily  pantoprazole  Injectable 40 milliGRAM(s) IV Push every 12 hours  potassium chloride   Powder 20 milliEquivalent(s) Oral daily  QUEtiapine 25 milliGRAM(s) Oral <User Schedule>  saccharomyces boulardii 250 milliGRAM(s) Oral two times a day  senna Syrup 10 milliLiter(s) Oral at bedtime  spironolactone 12.5 milliGRAM(s) Oral daily  sucralfate suspension 1 Gram(s) Oral two times a day    MEDICATIONS  (PRN):  acetaminophen    Suspension .. 650 milliGRAM(s) Oral every 6 hours PRN Temp greater or equal to 38.5C (101.3F)  HYDROmorphone   Tablet 2 milliGRAM(s) Oral every 4 hours PRN Moderate Pain (4 - 6)  HYDROmorphone   Tablet 4 milliGRAM(s) Oral every 4 hours PRN Severe Pain (7 - 10)  ondansetron Injectable 4 milliGRAM(s) IV Push every 6 hours PRN Nausea and/or Vomiting  QUEtiapine 12.5 milliGRAM(s) Oral every 8 hours PRN agitation      Allergies    penicillins (Unknown)    Intolerances        Review of systems: Neg  exfept c/o dry mouth   Vital Signs Last 24 Hrs  T(C): 36.6 (31 Dec 2018 16:12), Max: 38.1 (31 Dec 2018 00:25)  T(F): 97.9 (31 Dec 2018 16:12), Max: 100.5 (31 Dec 2018 00:25)  HR: 109 (31 Dec 2018 21:00) (101 - 113)  BP: 98/67 (31 Dec 2018 20:00) (79/59 - 108/73)  BP(mean): 78 (31 Dec 2018 20:00) (65 - 85)  RR: 17 (31 Dec 2018 16:00) (17 - 27)  SpO2: 100% (31 Dec 2018 21:00) (99% - 100%)    PHYSICAL EXAM:      Constitutional: NAD, well-groomed, well-developed  HEENT: PERRLA, EOMI, no exophalmos  Neck: No LAD, No JVD, trachea midline, no thyroid enlargement    Respiratory: CTAB  Cardiovascular: S1 and S2, RRR, no M/G/R    Neurological: A/O x 3, no focal deficits  Psychiatric: Normal mood, normal affect          LABS:                        9.8    7.7   )-----------( 286      ( 31 Dec 2018 04:30 )             31.9     12-31    144  |  105  |  32.0<H>  ----------------------------<  170<H>  4.5   |  26.0  |  0.51    Ca    8.2<L>      31 Dec 2018 04:30  Mg     2.3     12-31    TPro  6.5<L>  /  Alb  3.1<L>  /  TBili  1.5  /  DBili  x   /  AST  111<H>  /  ALT  107<H>  /  AlkPhos  236<H>  12-31          CAPILLARY BLOOD GLUCOSE  CAPILLARY BLOOD GLUCOSE      POCT Blood Glucose.: 192 mg/dL (31 Dec 2018 16:46)  POCT Blood Glucose.: 155 mg/dL (31 Dec 2018 13:00)  POCT Blood Glucose.: 182 mg/dL (31 Dec 2018 05:56)  POCT Blood Glucose.: 173 mg/dL (31 Dec 2018 00:18)      RADIOLOGY & ADDITIONAL TESTS:

## 2018-12-31 NOTE — PROGRESS NOTE ADULT - ASSESSMENT
46y/o man with no significant PMH admitted on 11/29/18 for CP for 2-3 days prior to admission,   found with STEMI. Urgent Cath, pt found to have multiple occlusions; VF arrest, shock x 1, return to NSR.  Upon transfer to OR for Urgent CABG, pt again VF arrest, chest opened intra-op with CPR in progress.  s/p ECMO and Impella; 12/3 ECMO explanted and Impella placed via right groin sheath.    Has been on antibiotics :  Vancomycin 11/29 to 12/20  Cipro 11/29 to 12/4  Zosyn 12/4 to 12/12 and 12/15 to 12/16  Meropenem 12/16 to present  Micafungin 12/18 to present        s/p CABG   S/P tracheostomy     - Remains febrile  - Blood cultures with Candida Parapsilosis 12/18 and 12/20   - Repeat blood cultures no growth 12/21 and there after  - Continue Micafungin 100mg daily  - DARION to rule out endocarditis due to fungemia  - Ophthalmology evaluation for candida enophthalmias   Will follow.

## 2019-01-01 LAB
GAS PNL BLDA: SIGNIFICANT CHANGE UP
GLUCOSE BLDC GLUCOMTR-MCNC: 170 MG/DL — HIGH (ref 70–99)
GLUCOSE BLDC GLUCOMTR-MCNC: 181 MG/DL — HIGH (ref 70–99)
GLUCOSE BLDC GLUCOMTR-MCNC: 184 MG/DL — HIGH (ref 70–99)
GLUCOSE BLDC GLUCOMTR-MCNC: 205 MG/DL — HIGH (ref 70–99)

## 2019-01-01 PROCEDURE — 99232 SBSQ HOSP IP/OBS MODERATE 35: CPT

## 2019-01-01 RX ORDER — POTASSIUM CHLORIDE 20 MEQ
40 PACKET (EA) ORAL ONCE
Qty: 0 | Refills: 0 | Status: DISCONTINUED | OUTPATIENT
Start: 2019-01-01 | End: 2019-01-01

## 2019-01-01 RX ORDER — ALPRAZOLAM 0.25 MG
0.25 TABLET ORAL EVERY 12 HOURS
Qty: 0 | Refills: 0 | Status: DISCONTINUED | OUTPATIENT
Start: 2019-01-01 | End: 2019-01-05

## 2019-01-01 RX ORDER — POTASSIUM CHLORIDE 20 MEQ
40 PACKET (EA) ORAL ONCE
Qty: 0 | Refills: 0 | Status: COMPLETED | OUTPATIENT
Start: 2019-01-01 | End: 2019-01-01

## 2019-01-01 RX ADMIN — Medication 40 MILLIEQUIVALENT(S): at 10:00

## 2019-01-01 RX ADMIN — PANTOPRAZOLE SODIUM 40 MILLIGRAM(S): 20 TABLET, DELAYED RELEASE ORAL at 05:30

## 2019-01-01 RX ADMIN — Medication 325 MILLIGRAM(S): at 13:37

## 2019-01-01 RX ADMIN — HYDROMORPHONE HYDROCHLORIDE 2 MILLIGRAM(S): 2 INJECTION INTRAMUSCULAR; INTRAVENOUS; SUBCUTANEOUS at 03:00

## 2019-01-01 RX ADMIN — Medication 5 MILLIGRAM(S): at 21:53

## 2019-01-01 RX ADMIN — Medication 0.25 MILLIGRAM(S): at 19:54

## 2019-01-01 RX ADMIN — Medication 20 MILLIGRAM(S): at 05:30

## 2019-01-01 RX ADMIN — Medication 250 MILLIGRAM(S): at 18:59

## 2019-01-01 RX ADMIN — Medication 1 MILLIGRAM(S): at 13:31

## 2019-01-01 RX ADMIN — Medication 0.25 MILLIGRAM(S): at 05:30

## 2019-01-01 RX ADMIN — Medication 2: at 00:23

## 2019-01-01 RX ADMIN — SENNA PLUS 10 MILLILITER(S): 8.6 TABLET ORAL at 21:53

## 2019-01-01 RX ADMIN — MICAFUNGIN SODIUM 105 MILLIGRAM(S): 100 INJECTION, POWDER, LYOPHILIZED, FOR SOLUTION INTRAVENOUS at 16:00

## 2019-01-01 RX ADMIN — Medication 300 MILLIGRAM(S): at 13:43

## 2019-01-01 RX ADMIN — Medication 4: at 13:31

## 2019-01-01 RX ADMIN — Medication 20 MILLIGRAM(S): at 18:53

## 2019-01-01 RX ADMIN — Medication 20 MILLIEQUIVALENT(S): at 13:39

## 2019-01-01 RX ADMIN — QUETIAPINE FUMARATE 25 MILLIGRAM(S): 200 TABLET, FILM COATED ORAL at 21:53

## 2019-01-01 RX ADMIN — Medication 5 MILLILITER(S): at 13:42

## 2019-01-01 RX ADMIN — CARVEDILOL PHOSPHATE 3.12 MILLIGRAM(S): 80 CAPSULE, EXTENDED RELEASE ORAL at 13:32

## 2019-01-01 RX ADMIN — HYDROMORPHONE HYDROCHLORIDE 2 MILLIGRAM(S): 2 INJECTION INTRAMUSCULAR; INTRAVENOUS; SUBCUTANEOUS at 02:06

## 2019-01-01 RX ADMIN — Medication 1 GRAM(S): at 05:30

## 2019-01-01 RX ADMIN — Medication 500 MILLIGRAM(S): at 13:37

## 2019-01-01 RX ADMIN — SPIRONOLACTONE 12.5 MILLIGRAM(S): 25 TABLET, FILM COATED ORAL at 05:32

## 2019-01-01 RX ADMIN — Medication 250 MILLIGRAM(S): at 05:32

## 2019-01-01 RX ADMIN — Medication 2: at 06:12

## 2019-01-01 RX ADMIN — CARVEDILOL PHOSPHATE 3.12 MILLIGRAM(S): 80 CAPSULE, EXTENDED RELEASE ORAL at 00:23

## 2019-01-01 RX ADMIN — Medication 1 GRAM(S): at 18:53

## 2019-01-01 RX ADMIN — ENOXAPARIN SODIUM 40 MILLIGRAM(S): 100 INJECTION SUBCUTANEOUS at 13:30

## 2019-01-01 RX ADMIN — CHLORHEXIDINE GLUCONATE 5 MILLILITER(S): 213 SOLUTION TOPICAL at 13:32

## 2019-01-01 RX ADMIN — HYDROMORPHONE HYDROCHLORIDE 2 MILLIGRAM(S): 2 INJECTION INTRAMUSCULAR; INTRAVENOUS; SUBCUTANEOUS at 09:59

## 2019-01-01 RX ADMIN — QUETIAPINE FUMARATE 12.5 MILLIGRAM(S): 200 TABLET, FILM COATED ORAL at 13:31

## 2019-01-01 RX ADMIN — CHLORHEXIDINE GLUCONATE 5 MILLILITER(S): 213 SOLUTION TOPICAL at 21:54

## 2019-01-01 RX ADMIN — SENNA PLUS 10 MILLILITER(S): 8.6 TABLET ORAL at 00:23

## 2019-01-01 RX ADMIN — ATORVASTATIN CALCIUM 10 MILLIGRAM(S): 80 TABLET, FILM COATED ORAL at 21:53

## 2019-01-01 RX ADMIN — CHLORHEXIDINE GLUCONATE 5 MILLILITER(S): 213 SOLUTION TOPICAL at 18:59

## 2019-01-01 RX ADMIN — PANTOPRAZOLE SODIUM 40 MILLIGRAM(S): 20 TABLET, DELAYED RELEASE ORAL at 18:54

## 2019-01-01 RX ADMIN — CLOPIDOGREL BISULFATE 75 MILLIGRAM(S): 75 TABLET, FILM COATED ORAL at 13:36

## 2019-01-01 RX ADMIN — CHLORHEXIDINE GLUCONATE 5 MILLILITER(S): 213 SOLUTION TOPICAL at 07:00

## 2019-01-01 RX ADMIN — HYDROMORPHONE HYDROCHLORIDE 2 MILLIGRAM(S): 2 INJECTION INTRAMUSCULAR; INTRAVENOUS; SUBCUTANEOUS at 11:26

## 2019-01-01 RX ADMIN — Medication 2: at 18:58

## 2019-01-01 NOTE — PROGRESS NOTE ADULT - ASSESSMENT
46y/o man with no significant PMH admitted on 11/29/18 for CP for 2-3 days prior to admission,   found with STEMI. Urgent Cath, pt found to have multiple occlusions; VF arrest, shock x 1, return to NSR.  Upon transfer to OR for Urgent CABG, pt again VF arrest, chest opened intra-op with CPR in progress.  s/p ECMO and Impella; 12/3 ECMO explanted and Impella placed via right groin sheath.    Has been on antibiotics :  Vancomycin 11/29 to 12/20  Cipro 11/29 to 12/4  Zosyn 12/4 to 12/12 and 12/15 to 12/16  Meropenem 12/16 to present  Micafungin 12/18 to present     s/p CABG   S/P tracheostomy     - Afebrile  - Blood cultures with Candida Parapsilosis 12/18 and 12/20   - Repeat blood cultures no growth 12/21 and there after  - Continue Micafungin 100mg daily  - DARION to rule out endocarditis due to fungemia  - Ophthalmology evaluation for candida enophthalmias   - LFTs are going up, will add Hep panel  - RUQ ultrasound to evaluate Liver and gall bladder.   Will follow.

## 2019-01-01 NOTE — PROGRESS NOTE ADULT - ASSESSMENT
46 yo Male c/o CP for 2-3 days prior to admission, then 10/10 chest pain approximately 9:30 am yesterday, followed by vomiting.  Wife drove pt to hospital, ruled in for STEMI. Urgent Cath, pt found to have multiple occlusions; stent to LAD and D2; stent to LAD thrombosed,  impella placed for support.  Pt then had VF arrest, shock x 1, return to NSR.  Upon transfer to OR for Urgent CABG, pt with asystolic arrest, chest opened intra-op with CPR in progress. Pt underwent C4V (LAD, Diag, OM and PDA) with inability to wean from CPB therefore requiring VA ECMO (central cannulation) and IABP, he was transferred to CICU.  12/3 s/p ECMO explant and IABP d/c'd, placement of impella via R groin sheath.   12/6 Impella removal via right femoral cutdown with primary repair of femoral artery and stent placed to external iliac artery for dissection.  12/14 + ileus.  12/15 ileus improved, septic and cardiogenic shock.    12/18 + UGIB.   12/19 EGD (+ bleeding stomach ulcer requiring clip x4)  12/21 ileus, made NPO   12/22 RUQ sono consistent with cholecystis, abd exam benign, is unlikely clinically significant per GI and ACS  12/24 tolerating tube feeds at goal, PS/CPAP alternating with trach collar trials as tolerated  12/26 trach collar for 8 hours  12/27 trach collar for 12 hours  12/28 trach collar for 14 hrs, KUB done for continued nausea- negative   12/29 Trach collar for 15 hours , meropenem abx course completed   12/30 oral temp 100.5; off meropenem, remains on micafungin; trach collar for 15 hours rests on cpap overnight   12/31 trach collar at 6 AM since on     Hospital course notable for (in addition to above): acute systolic heart failure, vent dependent respiratory failure (inability to wean also contributed to by severe agitation, now s/p trach), fungemia, acute blood loss anemia, hypokalemia, ileus, sinusitis.

## 2019-01-01 NOTE — PROGRESS NOTE ADULT - SUBJECTIVE AND OBJECTIVE BOX
SUBJECTIVE   able to speak around trach   currently resting comfotably   without acute complaints prior  would like to drink water     INTERIM HISTORY SIGNIFICANT FOR   patient has been able to trach collar for increasingly significant amount of times   resting on cpap overnight    currently on trach collar since 6 AM 12/31      Patient is a 48y old  Male who presents with a chief complaint of +STEMI (31 Dec 2018 18:45)    HPI:  This is a 46 y/o male no significant PMH; recent ABT (doxycycline) r/t cellulitis to right groin per pt. Pt presented to the ED with 10/10 chest pain that he reported started at 0930; he drank water with no relief; pain started to progress to B/L shoulders. Per pt spouse, she reports he has had chest pain x2-3 days and this morning he vomited which he attributed to reflux.  She brought him into the emergency department.    Pt has significant EKG changes ST elevations in leads V1-V5 with reciprocal changes in II, III, AVF.  Pt rec'd asa and plavix load in ED. (29 Nov 2018 11:32)    OBJECTIVE  PAST MEDICAL & SURGICAL HISTORY:  Staph infection  No significant past surgical history    penicillins (Unknown)    Home Medications:    VITALS  Currently in sinus rhythm with vitals as below  ICU Vital Signs Last 24 Hrs  T(C): 36.8 (01 Jan 2019 00:00), Max: 37.3 (31 Dec 2018 07:00)  T(F): 98.2 (01 Jan 2019 00:00), Max: 99.2 (31 Dec 2018 07:00)  HR: 111 (01 Jan 2019 00:00) (101 - 113)  BP: 91/63 (01 Jan 2019 00:00) (79/59 - 108/73)  BP(mean): 72 (01 Jan 2019 00:00) (65 - 85)  ABP: --  ABP(mean): --  RR: 19 (01 Jan 2019 00:00) (17 - 26)  SpO2: 100% (01 Jan 2019 00:00) (99% - 100%)    Adult Advanced Hemodynamics Last 24 Hrs  CVP(mm Hg): --  CVP(cm H2O): --  CO: --  CI: --  PA: --  PA(mean): --  PCWP: --  SVR: --  SVRI: --  PVR: --  PVRI: --  LABS                        9.8    7.7   )-----------( 286      ( 31 Dec 2018 04:30 )             31.9   12-31    144  |  105  |  32.0<H>  ----------------------------<  170<H>  4.5   |  26.0  |  0.51    Ca    8.2<L>      31 Dec 2018 04:30  Mg     2.3     12-31    TPro  6.5<L>  /  Alb  3.1<L>  /  TBili  1.5  /  DBili  x   /  AST  111<H>  /  ALT  107<H>  /  AlkPhos  236<H>  12-31  CAPILLARY BLOOD GLUCOSE      POCT Blood Glucose.: 181 mg/dL (01 Jan 2019 00:20)        Mode: standby,30% trach collar sat 100%.    IN/OUT    12-30-18 @ 07:01  -  12-31-18 @ 07:00  --------------------------------------------------------  IN: 1880 mL / OUT: 1725 mL / NET: 155 mL    12-31-18 @ 07:01  -  01-01-19 @ 01:39  --------------------------------------------------------  IN: 930 mL / OUT: 1300 mL / NET: -370 mL      IMAGING  personally reviewed imaging     CURRENT MEDICATIONS  MEDICATIONS  (STANDING):  ALPRAZolam 0.25 milliGRAM(s) Oral every 12 hours  ascorbic acid 500 milliGRAM(s) Oral daily  aspirin 325 milliGRAM(s) Oral daily  atorvastatin 10 milliGRAM(s) Oral at bedtime  carvedilol 3.125 milliGRAM(s) Oral every 12 hours  chlorhexidine 0.12% Liquid 5 milliLiter(s) Oral Mucosa every 4 hours  clopidogrel Tablet 75 milliGRAM(s) Oral daily  enoxaparin Injectable 40 milliGRAM(s) SubCutaneous daily  ferrous    sulfate Liquid 300 milliGRAM(s) Enteral Tube daily  folic acid 1 milliGRAM(s) Oral daily  furosemide   Injectable 20 milliGRAM(s) IV Push <User Schedule>  insulin lispro (HumaLOG) corrective regimen sliding scale   SubCutaneous every 6 hours  melatonin 5 milliGRAM(s) Oral at bedtime  micafungin IVPB 100 milliGRAM(s) IV Intermittent every 24 hours  multivitamin   Solution 5 milliLiter(s) Enteral Tube daily  pantoprazole  Injectable 40 milliGRAM(s) IV Push every 12 hours  potassium chloride   Powder 20 milliEquivalent(s) Oral daily  QUEtiapine 25 milliGRAM(s) Oral <User Schedule>  saccharomyces boulardii 250 milliGRAM(s) Oral two times a day  senna Syrup 10 milliLiter(s) Oral at bedtime  spironolactone 12.5 milliGRAM(s) Oral daily  sucralfate suspension 1 Gram(s) Oral two times a day    MEDICATIONS  (PRN):  acetaminophen    Suspension .. 650 milliGRAM(s) Oral every 6 hours PRN Temp greater or equal to 38.5C (101.3F)  HYDROmorphone   Tablet 2 milliGRAM(s) Oral every 4 hours PRN Moderate Pain (4 - 6)  HYDROmorphone   Tablet 4 milliGRAM(s) Oral every 4 hours PRN Severe Pain (7 - 10)  ondansetron Injectable 4 milliGRAM(s) IV Push every 6 hours PRN Nausea and/or Vomiting  QUEtiapine 12.5 milliGRAM(s) Oral every 8 hours PRN agitation      Invasive Lines & Indwelling Catheters  + pacing wires

## 2019-01-01 NOTE — PROGRESS NOTE ADULT - RS GEN PE MLT RESP DETAILS PC
good air movement/breath sounds equal
airway patent/respirations non-labored/breath sounds equal/good air movement
airway patent/respirations non-labored/good air movement/breath sounds equal

## 2019-01-01 NOTE — PROGRESS NOTE ADULT - RESPIRATORY
detailed exam
Breath Sounds equal & clear to percussion & auscultation, no accessory muscle use
Breath Sounds equal & clear to percussion & auscultation, no accessory muscle use
detailed exam

## 2019-01-01 NOTE — PROGRESS NOTE ADULT - SUBJECTIVE AND OBJECTIVE BOX
[facility]  INFECTIOUS DISEASES AND INTERNAL MEDICINE at [place]  =======================================================  Perfecto Santizo MD  Diplomates American Board of Internal Medicine and Infectious Diseases  =======================================================    PATRICK LYLE [id number]    Follow up: Fungemia    Afebrile  Awake and alert follows, answers questions.   Has NG tube and on trach collar with O2, off vent. Afebrile  Daughter at bedside.     Allergies:  penicillins (Unknown)    Antibiotics:    micafungin IVPB 100 milliGRAM(s) IV Intermittent every 24 hours     REVIEW OF SYSTEMS:  as above     Physical Exam:  Vital Signs Last 24 Hrs  T(C): 36.9 (01 Jan 2019 08:30), Max: 37.3 (01 Jan 2019 04:33)  T(F): 98.4 (01 Jan 2019 08:30), Max: 99.2 (01 Jan 2019 04:33)  HR: 110 (01 Jan 2019 09:00) (101 - 111)  BP: 91/60 (01 Jan 2019 08:00) (87/63 - 98/67)  BP(mean): 71 (01 Jan 2019 08:00) (71 - 84)  RR: 18 (01 Jan 2019 04:00) (17 - 23)  SpO2: 100% (01 Jan 2019 09:00) (99% - 100%)  GEN: NAD , awake   HEENT: normocephalic and atraumatic. EOMI. PERRL. + NGT  NECK: Supple. + Trach and NGT  LUNGS: Coarse BS B/L  HEART: Regular rate and rhythm   ABDOMEN: Soft, nontender, and nondistended.  Positive bowel sounds.    : + Mckeon  EXTREMITIES: Without any edema.  MSK: no joint swelling  NEUROLOGIC: Awake, alert follows commands  PSYCHIATRIC: Appropriate affect .  SKIN: Sternal wound dressing, + wound vac, Leg dressings in place      Labs:  12-31    144  |  105  |  32.0<H>  ----------------------------<  170<H>  4.5   |  26.0  |  0.51    Ca    8.2<L>      31 Dec 2018 04:30  Mg     2.3     12-31    TPro  6.5<L>  /  Alb  3.1<L>  /  TBili  1.5  /  DBili  x   /  AST  111<H>  /  ALT  107<H>  /  AlkPhos  236<H>  12-31                        9.8    7.7   )-----------( 286      ( 31 Dec 2018 04:30 )             31.9     LIVER FUNCTIONS - ( 31 Dec 2018 04:30 )  Alb: 3.1 g/dL / Pro: 6.5 g/dL / ALK PHOS: 236 U/L / ALT: 107 U/L / AST: 111 U/L / GGT: x           ABG - ( 01 Jan 2019 06:49 )  pH, Arterial: 7.52  pH, Blood: x     /  pCO2: 37    /  pO2: 98    / HCO3: 31    / Base Excess: 6.8   /  SaO2: 99        RECENT CULTURES:  12-25 @ 06:38 .Blood     No growth at 5 days.    [facility]  12-24 @ 03:01 .Blood     No growth at 5 days.    12-21 @ 10:03 .Blood     No growth at 5 days.    12-20 @ 11:09 .Blood Candida parapsilosis    Growth in aerobic bottle: Candida parapsilosis  Aerobic Bottle: 1 day ;23:51 Hours to positivity  Anaerobic Bottle: No growth at 5 days.    12-20 @ 07:25 .Blood Candida parapsilosis    Growth in aerobic bottle: Candida parapsilosis  Aerobic Bottle: 02 days;05:48 Hours to positivity  Anaerobic Bottle: No growth at 5 days.    12-18 @ 15:02 .Surgical Swab     No growth at 5 days.    No WBC's seen.  No organisms seen

## 2019-01-02 LAB
ALBUMIN SERPL ELPH-MCNC: 2.9 G/DL — LOW (ref 3.3–5.2)
ALP SERPL-CCNC: 232 U/L — HIGH (ref 40–120)
ALT FLD-CCNC: 77 U/L — HIGH
ANION GAP SERPL CALC-SCNC: 12 MMOL/L — SIGNIFICANT CHANGE UP (ref 5–17)
AST SERPL-CCNC: 82 U/L — HIGH
BILIRUB SERPL-MCNC: 1.6 MG/DL — SIGNIFICANT CHANGE UP (ref 0.4–2)
BUN SERPL-MCNC: 32 MG/DL — HIGH (ref 8–20)
CALCIUM SERPL-MCNC: 8.3 MG/DL — LOW (ref 8.6–10.2)
CHLORIDE SERPL-SCNC: 103 MMOL/L — SIGNIFICANT CHANGE UP (ref 98–107)
CO2 SERPL-SCNC: 27 MMOL/L — SIGNIFICANT CHANGE UP (ref 22–29)
CREAT SERPL-MCNC: 0.43 MG/DL — LOW (ref 0.5–1.3)
CULTURE RESULTS: SIGNIFICANT CHANGE UP
GLUCOSE BLDC GLUCOMTR-MCNC: 164 MG/DL — HIGH (ref 70–99)
GLUCOSE BLDC GLUCOMTR-MCNC: 190 MG/DL — HIGH (ref 70–99)
GLUCOSE BLDC GLUCOMTR-MCNC: 198 MG/DL — HIGH (ref 70–99)
GLUCOSE BLDC GLUCOMTR-MCNC: 203 MG/DL — HIGH (ref 70–99)
GLUCOSE BLDC GLUCOMTR-MCNC: 218 MG/DL — HIGH (ref 70–99)
GLUCOSE SERPL-MCNC: 180 MG/DL — HIGH (ref 70–115)
HBV CORE AB SER-ACNC: SIGNIFICANT CHANGE UP
HBV SURFACE AB SER-ACNC: ABNORMAL
HBV SURFACE AG SER-ACNC: SIGNIFICANT CHANGE UP
HCT VFR BLD CALC: 31.5 % — LOW (ref 42–52)
HCV AB S/CO SERPL IA: 0.52 S/CO — SIGNIFICANT CHANGE UP
HCV AB SERPL-IMP: SIGNIFICANT CHANGE UP
HGB BLD-MCNC: 9.5 G/DL — LOW (ref 14–18)
MAGNESIUM SERPL-MCNC: 2.2 MG/DL — SIGNIFICANT CHANGE UP (ref 1.6–2.6)
MCHC RBC-ENTMCNC: 26.1 PG — LOW (ref 27–31)
MCHC RBC-ENTMCNC: 30.2 G/DL — LOW (ref 32–36)
MCV RBC AUTO: 86.5 FL — SIGNIFICANT CHANGE UP (ref 80–94)
PHOSPHATE SERPL-MCNC: 4.2 MG/DL — SIGNIFICANT CHANGE UP (ref 2.4–4.7)
PLATELET # BLD AUTO: 265 K/UL — SIGNIFICANT CHANGE UP (ref 150–400)
POTASSIUM SERPL-MCNC: 4.5 MMOL/L — SIGNIFICANT CHANGE UP (ref 3.5–5.3)
POTASSIUM SERPL-SCNC: 4.5 MMOL/L — SIGNIFICANT CHANGE UP (ref 3.5–5.3)
PROT SERPL-MCNC: 6.2 G/DL — LOW (ref 6.6–8.7)
RBC # BLD: 3.64 M/UL — LOW (ref 4.6–6.2)
RBC # FLD: 17 % — HIGH (ref 11–15.6)
SODIUM SERPL-SCNC: 142 MMOL/L — SIGNIFICANT CHANGE UP (ref 135–145)
SPECIMEN SOURCE: SIGNIFICANT CHANGE UP
WBC # BLD: 6.5 K/UL — SIGNIFICANT CHANGE UP (ref 4.8–10.8)
WBC # FLD AUTO: 6.5 K/UL — SIGNIFICANT CHANGE UP (ref 4.8–10.8)

## 2019-01-02 PROCEDURE — 99232 SBSQ HOSP IP/OBS MODERATE 35: CPT

## 2019-01-02 PROCEDURE — 71045 X-RAY EXAM CHEST 1 VIEW: CPT | Mod: 26

## 2019-01-02 RX ADMIN — ATORVASTATIN CALCIUM 10 MILLIGRAM(S): 80 TABLET, FILM COATED ORAL at 21:31

## 2019-01-02 RX ADMIN — Medication 0.25 MILLIGRAM(S): at 21:31

## 2019-01-02 RX ADMIN — CHLORHEXIDINE GLUCONATE 5 MILLILITER(S): 213 SOLUTION TOPICAL at 14:32

## 2019-01-02 RX ADMIN — PANTOPRAZOLE SODIUM 40 MILLIGRAM(S): 20 TABLET, DELAYED RELEASE ORAL at 17:23

## 2019-01-02 RX ADMIN — SPIRONOLACTONE 12.5 MILLIGRAM(S): 25 TABLET, FILM COATED ORAL at 06:38

## 2019-01-02 RX ADMIN — Medication 4: at 07:48

## 2019-01-02 RX ADMIN — HYDROMORPHONE HYDROCHLORIDE 2 MILLIGRAM(S): 2 INJECTION INTRAMUSCULAR; INTRAVENOUS; SUBCUTANEOUS at 01:57

## 2019-01-02 RX ADMIN — Medication 4: at 17:22

## 2019-01-02 RX ADMIN — Medication 325 MILLIGRAM(S): at 13:26

## 2019-01-02 RX ADMIN — Medication 5 MILLIGRAM(S): at 21:31

## 2019-01-02 RX ADMIN — CLOPIDOGREL BISULFATE 75 MILLIGRAM(S): 75 TABLET, FILM COATED ORAL at 13:24

## 2019-01-02 RX ADMIN — PANTOPRAZOLE SODIUM 40 MILLIGRAM(S): 20 TABLET, DELAYED RELEASE ORAL at 06:37

## 2019-01-02 RX ADMIN — Medication 2: at 00:34

## 2019-01-02 RX ADMIN — QUETIAPINE FUMARATE 12.5 MILLIGRAM(S): 200 TABLET, FILM COATED ORAL at 01:58

## 2019-01-02 RX ADMIN — CARVEDILOL PHOSPHATE 3.12 MILLIGRAM(S): 80 CAPSULE, EXTENDED RELEASE ORAL at 13:26

## 2019-01-02 RX ADMIN — Medication 2: at 14:37

## 2019-01-02 RX ADMIN — CHLORHEXIDINE GLUCONATE 5 MILLILITER(S): 213 SOLUTION TOPICAL at 21:30

## 2019-01-02 RX ADMIN — Medication 300 MILLIGRAM(S): at 13:25

## 2019-01-02 RX ADMIN — SENNA PLUS 10 MILLILITER(S): 8.6 TABLET ORAL at 21:31

## 2019-01-02 RX ADMIN — CHLORHEXIDINE GLUCONATE 5 MILLILITER(S): 213 SOLUTION TOPICAL at 17:23

## 2019-01-02 RX ADMIN — CHLORHEXIDINE GLUCONATE 5 MILLILITER(S): 213 SOLUTION TOPICAL at 01:58

## 2019-01-02 RX ADMIN — Medication 250 MILLIGRAM(S): at 17:24

## 2019-01-02 RX ADMIN — Medication 20 MILLIGRAM(S): at 17:23

## 2019-01-02 RX ADMIN — Medication 1 GRAM(S): at 06:38

## 2019-01-02 RX ADMIN — Medication 1 GRAM(S): at 17:24

## 2019-01-02 RX ADMIN — Medication 20 MILLIEQUIVALENT(S): at 13:24

## 2019-01-02 RX ADMIN — CHLORHEXIDINE GLUCONATE 5 MILLILITER(S): 213 SOLUTION TOPICAL at 13:18

## 2019-01-02 RX ADMIN — Medication 250 MILLIGRAM(S): at 06:38

## 2019-01-02 RX ADMIN — HYDROMORPHONE HYDROCHLORIDE 2 MILLIGRAM(S): 2 INJECTION INTRAMUSCULAR; INTRAVENOUS; SUBCUTANEOUS at 02:00

## 2019-01-02 RX ADMIN — Medication 20 MILLIGRAM(S): at 06:37

## 2019-01-02 RX ADMIN — QUETIAPINE FUMARATE 25 MILLIGRAM(S): 200 TABLET, FILM COATED ORAL at 21:30

## 2019-01-02 RX ADMIN — Medication 500 MILLIGRAM(S): at 13:25

## 2019-01-02 RX ADMIN — CHLORHEXIDINE GLUCONATE 5 MILLILITER(S): 213 SOLUTION TOPICAL at 06:37

## 2019-01-02 RX ADMIN — MICAFUNGIN SODIUM 105 MILLIGRAM(S): 100 INJECTION, POWDER, LYOPHILIZED, FOR SOLUTION INTRAVENOUS at 14:31

## 2019-01-02 RX ADMIN — Medication 1 MILLIGRAM(S): at 13:25

## 2019-01-02 RX ADMIN — Medication 5 MILLILITER(S): at 13:26

## 2019-01-02 RX ADMIN — ENOXAPARIN SODIUM 40 MILLIGRAM(S): 100 INJECTION SUBCUTANEOUS at 13:26

## 2019-01-02 NOTE — PROGRESS NOTE ADULT - CONSTITUTIONAL
detailed exam
Well-developed, well nourished
Well-developed, well nourished
detailed exam

## 2019-01-02 NOTE — PROGRESS NOTE ADULT - SUBJECTIVE AND OBJECTIVE BOX
SUBJECTIVE   patient admits increasing anxiety this evening   redirectable     INTERIM HISTORY SIGNIFICANT FOR   trach collar x 36 hours +   still maintained on TC     Patient is a 48y old  Male who presents with a chief complaint of Emergent CABG cardiogenic shock (01 Jan 2019 01:37)    HPI:  This is a 48 y/o male no significant PMH; recent ABT (doxycycline) r/t cellulitis to right groin per pt. Pt presented to the ED with 10/10 chest pain that he reported started at 0930; he drank water with no relief; pain started to progress to B/L shoulders. Per pt spouse, she reports he has had chest pain x2-3 days and this morning he vomited which he attributed to reflux.  She brought him into the emergency department.    Pt has significant EKG changes ST elevations in leads V1-V5 with reciprocal changes in II, III, AVF.  Pt rec'd asa and plavix load in ED. (29 Nov 2018 11:32)    OBJECTIVE  PAST MEDICAL & SURGICAL HISTORY:  Staph infection  No significant past surgical history    penicillins (Unknown)    Home Medications:    VITALS  Currently in sinus rhythm with vitals as below  ICU Vital Signs Last 24 Hrs  T(C): 37 (01 Jan 2019 17:00), Max: 37.3 (01 Jan 2019 04:33)  T(F): 98.6 (01 Jan 2019 17:00), Max: 99.2 (01 Jan 2019 04:33)  HR: 108 (01 Jan 2019 20:00) (99 - 113)  BP: 98/63 (01 Jan 2019 20:00) (84/57 - 105/75)  BP(mean): 76 (01 Jan 2019 20:00) (66 - 85)  ABP: --  ABP(mean): --  RR: 17 (01 Jan 2019 20:00) (16 - 22)  SpO2: 100% (01 Jan 2019 20:00) (98% - 100%)    Adult Advanced Hemodynamics Last 24 Hrs  CVP(mm Hg): --  CVP(cm H2O): --  CO: --  CI: --  PA: --  PA(mean): --  PCWP: --  SVR: --  SVRI: --  PVR: --  PVRI: --  LABS                        9.8    7.7   )-----------( 286      ( 31 Dec 2018 04:30 )             31.9   12-31    144  |  105  |  32.0<H>  ----------------------------<  170<H>  4.5   |  26.0  |  0.51    Ca    8.2<L>      31 Dec 2018 04:30  Mg     2.3     12-31    TPro  6.5<L>  /  Alb  3.1<L>  /  TBili  1.5  /  DBili  x   /  AST  111<H>  /  ALT  107<H>  /  AlkPhos  236<H>  12-31  CAPILLARY BLOOD GLUCOSE      POCT Blood Glucose.: 164 mg/dL (02 Jan 2019 00:29)      ABG - ( 01 Jan 2019 06:49 )  pH, Arterial: 7.52  pH, Blood: x     /  pCO2: 37    /  pO2: 98    / HCO3: 31    / Base Excess: 6.8   /  SaO2: 99                Mode: standby    IN/OUT    12-31-18 @ 07:01 - 01-01-19 @ 07:00  --------------------------------------------------------  IN: 1500 mL / OUT: 2250 mL / NET: -750 mL    01-01-19 @ 07:01  - 01-02-19 @ 01:04  --------------------------------------------------------  IN: 830 mL / OUT: 1050 mL / NET: -220 mL      IMAGING  personally reviewed imaging     CURRENT MEDICATIONS  MEDICATIONS  (STANDING):  ascorbic acid 500 milliGRAM(s) Oral daily  aspirin 325 milliGRAM(s) Oral daily  atorvastatin 10 milliGRAM(s) Oral at bedtime  carvedilol 3.125 milliGRAM(s) Oral every 12 hours  chlorhexidine 0.12% Liquid 5 milliLiter(s) Oral Mucosa every 4 hours  clopidogrel Tablet 75 milliGRAM(s) Oral daily  enoxaparin Injectable 40 milliGRAM(s) SubCutaneous daily  ferrous    sulfate Liquid 300 milliGRAM(s) Enteral Tube daily  folic acid 1 milliGRAM(s) Oral daily  furosemide   Injectable 20 milliGRAM(s) IV Push <User Schedule>  insulin lispro (HumaLOG) corrective regimen sliding scale   SubCutaneous every 6 hours  melatonin 5 milliGRAM(s) Oral at bedtime  micafungin IVPB 100 milliGRAM(s) IV Intermittent every 24 hours  multivitamin   Solution 5 milliLiter(s) Enteral Tube daily  pantoprazole  Injectable 40 milliGRAM(s) IV Push every 12 hours  potassium chloride   Powder 20 milliEquivalent(s) Oral daily  QUEtiapine 25 milliGRAM(s) Oral <User Schedule>  saccharomyces boulardii 250 milliGRAM(s) Oral two times a day  senna Syrup 10 milliLiter(s) Oral at bedtime  spironolactone 12.5 milliGRAM(s) Oral daily  sucralfate suspension 1 Gram(s) Oral two times a day    MEDICATIONS  (PRN):  acetaminophen    Suspension .. 650 milliGRAM(s) Oral every 6 hours PRN Temp greater or equal to 38.5C (101.3F)  ALPRAZolam 0.25 milliGRAM(s) Oral every 12 hours PRN anxiety  HYDROmorphone   Tablet 2 milliGRAM(s) Oral every 4 hours PRN Moderate Pain (4 - 6)  HYDROmorphone   Tablet 4 milliGRAM(s) Oral every 4 hours PRN Severe Pain (7 - 10)  ondansetron Injectable 4 milliGRAM(s) IV Push every 6 hours PRN Nausea and/or Vomiting  QUEtiapine 12.5 milliGRAM(s) Oral every 8 hours PRN agitation

## 2019-01-02 NOTE — PROGRESS NOTE ADULT - SUBJECTIVE AND OBJECTIVE BOX
Patient about to work with PT, but unable to ambulate.  Stood for about 3 seconds and needed to sit due to spinning sensation. BP is lower in -105.  Reports no pain.   Still has not had MBS and as per primary team want to wait until decannulated.     FUNCTIONAL PROGRESS  1/2  Bed Mobility  Bed Mobility Training Rehab Potential: good, to achieve stated therapy goals  Bed Mobility Training Supine-to-Sit: minimum assist (75% patient effort);  1 person assist;  bed rails  Bed Mobility Training Limitations: decreased ability to use arms for pushing/pulling;  decreased strength    Bed-Chair Transfer Training  Bed-to-Chair Transfer Training Rehab Potential: good, to achieve stated therapy goals  Bed-to-Chair Transfer Training Symptoms Noted During/After Treatment: dizziness  Transfer Training Bed-to-Chair Transfer: minimum assist (75% patient effort);  1 person assist;  rolling walker  Bed-to-Chair Transfer Training Transfer Safety Analysis: impaired balance;  decreased strength;  rolling walker    Sit-Stand Transfer Training  Sit-to-Stand Transfer Training Rehab Potential: good, to achieve stated therapy goals  Sit-to-Stand Transfer Training Symptoms Noted During/After Treatment: dizziness  Transfer Training Sit-to-Stand Transfer: minimum assist (75% patient effort);  1 person assist;  rolling walker  Transfer Training Stand-to-Sit Transfer: minimum assist (75% patient effort);  1 person assist;  rolling walker  Sit-to-Stand Transfer Training Transfer Safety Analysis: impaired balance;  decreased strength;  rolling walker    Gait Training  Gait Training Rehab Potential: good, to achieve stated therapy goals  Gait Training Treatment not Performed: c/o dizziness sitting to standing; to follow later for gait training   Gait Training Symptoms Noted During/After Treatment: dizziness      REVIEW OF SYSTEMS  Constitutional - No fever,  +fatigue  HEENT - No vertigo, No neck pain  Neurological - No headaches, No memory loss, +loss of strength, No numbness, No tremors  Skin - No rashes, +lesions   Musculoskeletal - No joint pain, No joint swelling, No muscle pain  Psychiatric - +depression, No anxiety    VITALS  T(C): 37.2 (01-02-19 @ 08:00), Max: 37.9 (01-02-19 @ 00:00)  HR: 121 (01-02-19 @ 10:00) (89 - 121)  BP: 99/60 (01-02-19 @ 10:00) (81/53 - 105/75)  RR: 18 (01-02-19 @ 10:00) (17 - 20)  SpO2: 99% (01-02-19 @ 10:00) (99% - 100%)  Wt(kg): --    MEDICATIONS   acetaminophen    Suspension .. 650 milliGRAM(s) every 6 hours PRN  ALPRAZolam 0.25 milliGRAM(s) every 12 hours PRN  ascorbic acid 500 milliGRAM(s) daily  aspirin 325 milliGRAM(s) daily  atorvastatin 10 milliGRAM(s) at bedtime  carvedilol 3.125 milliGRAM(s) every 12 hours  chlorhexidine 0.12% Liquid 5 milliLiter(s) every 4 hours  clopidogrel Tablet 75 milliGRAM(s) daily  enoxaparin Injectable 40 milliGRAM(s) daily  ferrous    sulfate Liquid 300 milliGRAM(s) daily  folic acid 1 milliGRAM(s) daily  furosemide   Injectable 20 milliGRAM(s) <User Schedule>  HYDROmorphone   Tablet 2 milliGRAM(s) every 4 hours PRN  HYDROmorphone   Tablet 4 milliGRAM(s) every 4 hours PRN  insulin lispro (HumaLOG) corrective regimen sliding scale   every 6 hours  melatonin 5 milliGRAM(s) at bedtime  micafungin IVPB 100 milliGRAM(s) every 24 hours  multivitamin   Solution 5 milliLiter(s) daily  ondansetron Injectable 4 milliGRAM(s) every 6 hours PRN  pantoprazole  Injectable 40 milliGRAM(s) every 12 hours  potassium chloride   Powder 20 milliEquivalent(s) daily  QUEtiapine 12.5 milliGRAM(s) every 8 hours PRN  QUEtiapine 25 milliGRAM(s) <User Schedule>  saccharomyces boulardii 250 milliGRAM(s) two times a day  senna Syrup 10 milliLiter(s) at bedtime  spironolactone 12.5 milliGRAM(s) daily  sucralfate suspension 1 Gram(s) two times a day      RECENT LABS/IMAGING  CBC Full  -  ( 02 Jan 2019 05:01 )  WBC Count : 6.5 K/uL  Hemoglobin : 9.5 g/dL  Hematocrit : 31.5 %  Platelet Count - Automated : 265 K/uL  Mean Cell Volume : 86.5 fl  Mean Cell Hemoglobin : 26.1 pg  Mean Cell Hemoglobin Concentration : 30.2 g/dL  Auto Neutrophil # : x  Auto Lymphocyte # : x  Auto Monocyte # : x  Auto Eosinophil # : x  Auto Basophil # : x  Auto Neutrophil % : x  Auto Lymphocyte % : x  Auto Monocyte % : x  Auto Eosinophil % : x  Auto Basophil % : x    01-02    142  |  103  |  32.0<H>  ----------------------------<  180<H>  4.5   |  27.0  |  0.43<L>    Ca    8.3<L>      02 Jan 2019 05:01  Phos  4.2     01-02  Mg     2.2     01-02    TPro  6.2<L>  /  Alb  2.9<L>  /  TBili  1.6  /  DBili  x   /  AST  82<H>  /  ALT  77<H>  /  AlkPhos  232<H>  01-02    ------------------------------------------------------------------------------------  PHYSICAL EXAM  Constitutional - NAD, Comfortable  Chest - Breathing comfortably, No wheezing, + trach collar   Extremities - BLE swelling, BLE incisions + staples CDI  Neurologic Exam -                    Cognitive - Awake, Alert, AAO to self, place, date	     Communication - Able to mouth words     Motor -                      LEFT    UE - ShAB 1/5, EF 4/5, EE 4/5,  4/5                    RIGHT UE - ShAB 5/5, EF 5/5, EE 5/5,  5/5                    LEFT    LE - HF 5/5, KE 5/5, DF 5/5, PF 5/5                    RIGHT LE - HF 5/5, KE 5/5, DF 5/5, PF 5/5         Sensory - Intact to LT  Psychiatric - Affect flat	  ----------------------------------------------------------------------------------------  ASSESSMENT/PLAN  48y Male with functional deficits after STEMI, cardiac arrest s/p CABG, followed by complicated hospital course including need for trach, ileus/tube feeds, GI bleed, sepsis.   Pain - Tylenol, Dilaudid  Sleep - Melatonin Recommend DC Ambien  Mood - Xanax, Recommend DC of Seroquel and suggest SSRI  CAD - ASA, Plavix, Lipitor  DVT PPX - Lovenox    Candida - Mycamine   Pulm- Tolerating trach collar, progressing as tolerated  Rehab - MBS planned after decnnunlation.   Continue to recommend ACUTE inpatient rehabilitation for the functional deficits consisting of 3 hours of therapy/day & 24 hour RN/daily PMR physician for comorbid medical management. Will continue to follow for ongoing rehab needs and recommendations.    Continue bedside therapy as well as OOB throughout the day with mobilization by staff to maintain cardiopulmonary function and prevention of secondary complications related to debility.

## 2019-01-02 NOTE — PROGRESS NOTE ADULT - SUBJECTIVE AND OBJECTIVE BOX
[facility]  INFECTIOUS DISEASES AND INTERNAL MEDICINE at [place]  =======================================================  Perfecto Santizo MD  Diplomates American Board of Internal Medicine and Infectious Diseases  =======================================================    PATRICK LYLE [id number]    Follow up: Fungemia    Afebrile  Awake and alert, answers questions.   NG tube and trach collar with O2, off vent. Afebrile    Allergies:  penicillins (Unknown)    Antibiotics:    micafungin IVPB 100 milliGRAM(s) IV Intermittent every 24 hours     REVIEW OF SYSTEMS:  as above     Physical Exam:  Vital Signs Last 24 Hrs  T(C): 37.2 (02 Jan 2019 08:00), Max: 37.9 (02 Jan 2019 00:00)  T(F): 98.9 (02 Jan 2019 08:00), Max: 100.3 (02 Jan 2019 00:00)  HR: 89 (02 Jan 2019 08:03) (89 - 113)  BP: 94/61 (02 Jan 2019 04:00) (81/53 - 105/75)  BP(mean): 73 (02 Jan 2019 04:00) (63 - 85)  RR: 17 (01 Jan 2019 20:00) (17 - 20)  SpO2: 100% (02 Jan 2019 08:03) (98% - 100%)  GEN: NAD , awake   HEENT: normocephalic and atraumatic. EOMI. PERRL. + NGT  NECK: Supple. + Trach and NGT  LUNGS: Coarse BS B/L  HEART: Regular rate and rhythm   ABDOMEN: Soft, nontender, and nondistended.  Positive bowel sounds.    : + Mckeon  EXTREMITIES: Without any edema.  MSK: no joint swelling  NEUROLOGIC: Awake, alert follows commands  PSYCHIATRIC: Appropriate affect .  SKIN: Sternal wound dressing, + wound vac, Leg dressings in place    Labs:  01-02    142  |  103  |  32.0<H>  ----------------------------<  180<H>  4.5   |  27.0  |  0.43<L>    Ca    8.3<L>      02 Jan 2019 05:01  Phos  4.2     01-02  Mg     2.2     01-02    TPro  6.2<L>  /  Alb  2.9<L>  /  TBili  1.6  /  DBili  x   /  AST  82<H>  /  ALT  77<H>  /  AlkPhos  232<H>  01-02                      9.5    6.5   )-----------( 265      ( 02 Jan 2019 05:01 )             31.5     LIVER FUNCTIONS - ( 02 Jan 2019 05:01 )  Alb: 2.9 g/dL / Pro: 6.2 g/dL / ALK PHOS: 232 U/L / ALT: 77 U/L / AST: 82 U/L / GGT: x           ABG - ( 01 Jan 2019 06:49 )  pH, Arterial: 7.52  pH, Blood: x     /  pCO2: 37    /  pO2: 98    / HCO3: 31    / Base Excess: 6.8   /  SaO2: 99        RECENT CULTURES:  12-25 @ 06:38 .Blood     No growth at 5 days.    [facility]  12-24 @ 03:01 .Blood     No growth at 5 days.    12-21 @ 10:03 .Blood     No growth at 5 days.    12-20 @ 11:09 .Blood Candida parapsilosis    Growth in aerobic bottle: Candida parapsilosis  Aerobic Bottle: 1 day ;23:51 Hours to positivity  Anaerobic Bottle: No growth at 5 days.    12-20 @ 07:25 .Blood Candida parapsilosis    Growth in aerobic bottle: Candida parapsilosis  Aerobic Bottle: 02 days;05:48 Hours to positivity  Anaerobic Bottle: No growth at 5 days.    CXR: 1/2/19  Findings:  No change in tracheostomy tube or NG tube. Patchy bilateral airspace   disease left greater than right, unchanged since the prior study. No   evidence of pneumothorax or pleural effusion..    Impression:  Stable exam without significant change since the previous study..

## 2019-01-02 NOTE — PROGRESS NOTE ADULT - CARDIOVASCULAR DETAILS
positive S2/positive S1
tachycardia
tachycardia
positive S2/positive S1
tachycardia/positive S2/positive S1
tachycardia

## 2019-01-02 NOTE — PROGRESS NOTE ADULT - GASTROINTESTINAL DETAILS
soft/nontender
no distention/soft/nontender
nontender/no distention
soft/nontender
soft/no distention/nontender
soft/nontender
no guarding/nontender/no distention/no rebound tenderness/no rigidity/no masses palpable/soft

## 2019-01-02 NOTE — PROGRESS NOTE ADULT - CONSTITUTIONAL DETAILS
no distress
well-groomed/well-nourished/no distress
no distress

## 2019-01-02 NOTE — PROGRESS NOTE ADULT - [facility] HE PE MLT D E PC
regular rate and rhythm/no rub/no murmur
regular rate and rhythm
no murmur
no murmur/regular rate and rhythm/no rub
no rub/no murmur/regular rate and rhythm
regular rate and rhythm
no rub/no murmur/regular rate and rhythm

## 2019-01-02 NOTE — PROGRESS NOTE ADULT - SUBJECTIVE AND OBJECTIVE BOX
vascular Surgery follow up    - patient seen and examined  -right groin wound vac removed  - excellent granulation, superficial   - no active drainage   - wound edges jay well.    ---- Discontinue vac and start localized wound care (lightly moisten 4x4 to dry 4x4)  ---- If dressing starts to oversaturate again, will restart vac therapy

## 2019-01-02 NOTE — PROGRESS NOTE ADULT - ASSESSMENT
46 yo Male c/o CP for 2-3 days prior to admission, then 10/10 chest pain approximately 9:30 am yesterday, followed by vomiting.  Wife drove pt to hospital, ruled in for STEMI. Urgent Cath, pt found to have multiple occlusions; stent to LAD and D2; stent to LAD thrombosed,  impella placed for support.  Pt then had VF arrest, shock x 1, return to NSR.  Upon transfer to OR for Urgent CABG, pt with asystolic arrest, chest opened intra-op with CPR in progress. Pt underwent C4V (LAD, Diag, OM and PDA) with inability to wean from CPB therefore requiring VA ECMO (central cannulation) and IABP, he was transferred to CICU.  12/3 s/p ECMO explant and IABP d/c'd, placement of impella via R groin sheath.   12/6 Impella removal via right femoral cutdown with primary repair of femoral artery and stent placed to external iliac artery for dissection.  12/14 + ileus.  12/15 ileus improved, septic and cardiogenic shock.    12/18 + UGIB.   12/19 EGD (+ bleeding stomach ulcer requiring clip x4)  12/21 ileus, made NPO   12/22 RUQ sono consistent with cholecystis, abd exam benign, is unlikely clinically significant per GI and ACS  12/24 tolerating tube feeds at goal, PS/CPAP alternating with trach collar trials as tolerated  12/26 trach collar for 8 hours  12/27 trach collar for 12 hours  12/28 trach collar for 14 hrs, KUB done for continued nausea- negative   12/29 Trach collar for 15 hours , meropenem abx course completed   12/30 oral temp 100.5; off meropenem, remains on micafungin; trach collar for 15 hours rests on cpap overnight   12/31 trach collar at 6 AM since on   1/1 remains on trach collar     Hospital course notable for (in addition to above): acute systolic heart failure, vent dependent respiratory failure (inability to wean also contributed to by severe agitation, now s/p trach), fungemia, acute blood loss anemia, hypokalemia, ileus, sinusitis.

## 2019-01-02 NOTE — PROGRESS NOTE ADULT - ASSESSMENT
46y/o man with no significant PMH admitted on 11/29/18 for CP for 2-3 days prior to admission,   found with STEMI. Urgent Cath, pt found to have multiple occlusions; VF arrest, shock x 1, return to NSR.  Upon transfer to OR for Urgent CABG, pt again VF arrest, chest opened intra-op with CPR in progress.  s/p ECMO and Impella; 12/3 ECMO explanted and Impella placed via right groin sheath.    Has been on antibiotics :  Vancomycin 11/29 to 12/20  Cipro 11/29 to 12/4  Zosyn 12/4 to 12/12 and 12/15 to 12/16  Meropenem 12/16 to present  Micafungin 12/18 to present     s/p CABG   S/P tracheostomy     - Had low grade fever in last 24hrs  - Blood cultures with Candida Parapsilosis 12/18 and 12/20   - Repeat blood cultures no growth 12/21 and there after  - Continue Micafungin 100mg daily  - Recommend DARION to rule out endocarditis due to fungemia  - Ophthalmology evaluation for candida enophthalmias   - LFTs lower today, hep panel pending.   - Recommend RUQ ultrasound   Will follow.

## 2019-01-03 DIAGNOSIS — F51.5 NIGHTMARE DISORDER: ICD-10-CM

## 2019-01-03 LAB
GLUCOSE BLDC GLUCOMTR-MCNC: 166 MG/DL — HIGH (ref 70–99)
GLUCOSE BLDC GLUCOMTR-MCNC: 182 MG/DL — HIGH (ref 70–99)
GLUCOSE BLDC GLUCOMTR-MCNC: 183 MG/DL — HIGH (ref 70–99)
GLUCOSE BLDC GLUCOMTR-MCNC: 188 MG/DL — HIGH (ref 70–99)

## 2019-01-03 PROCEDURE — 99232 SBSQ HOSP IP/OBS MODERATE 35: CPT

## 2019-01-03 PROCEDURE — 99233 SBSQ HOSP IP/OBS HIGH 50: CPT | Mod: 24

## 2019-01-03 RX ORDER — ATORVASTATIN CALCIUM 80 MG/1
40 TABLET, FILM COATED ORAL AT BEDTIME
Qty: 0 | Refills: 0 | Status: DISCONTINUED | OUTPATIENT
Start: 2019-01-03 | End: 2019-01-09

## 2019-01-03 RX ADMIN — ENOXAPARIN SODIUM 40 MILLIGRAM(S): 100 INJECTION SUBCUTANEOUS at 11:51

## 2019-01-03 RX ADMIN — Medication 2: at 05:18

## 2019-01-03 RX ADMIN — CHLORHEXIDINE GLUCONATE 5 MILLILITER(S): 213 SOLUTION TOPICAL at 11:50

## 2019-01-03 RX ADMIN — MICAFUNGIN SODIUM 105 MILLIGRAM(S): 100 INJECTION, POWDER, LYOPHILIZED, FOR SOLUTION INTRAVENOUS at 13:45

## 2019-01-03 RX ADMIN — Medication 2: at 23:55

## 2019-01-03 RX ADMIN — PANTOPRAZOLE SODIUM 40 MILLIGRAM(S): 20 TABLET, DELAYED RELEASE ORAL at 17:23

## 2019-01-03 RX ADMIN — Medication 325 MILLIGRAM(S): at 11:53

## 2019-01-03 RX ADMIN — Medication 20 MILLIGRAM(S): at 05:01

## 2019-01-03 RX ADMIN — Medication 1 GRAM(S): at 05:01

## 2019-01-03 RX ADMIN — QUETIAPINE FUMARATE 12.5 MILLIGRAM(S): 200 TABLET, FILM COATED ORAL at 00:03

## 2019-01-03 RX ADMIN — CHLORHEXIDINE GLUCONATE 5 MILLILITER(S): 213 SOLUTION TOPICAL at 05:00

## 2019-01-03 RX ADMIN — CARVEDILOL PHOSPHATE 3.12 MILLIGRAM(S): 80 CAPSULE, EXTENDED RELEASE ORAL at 23:56

## 2019-01-03 RX ADMIN — Medication 500 MILLIGRAM(S): at 11:54

## 2019-01-03 RX ADMIN — ATORVASTATIN CALCIUM 40 MILLIGRAM(S): 80 TABLET, FILM COATED ORAL at 21:48

## 2019-01-03 RX ADMIN — Medication 1 MILLIGRAM(S): at 11:54

## 2019-01-03 RX ADMIN — PANTOPRAZOLE SODIUM 40 MILLIGRAM(S): 20 TABLET, DELAYED RELEASE ORAL at 05:01

## 2019-01-03 RX ADMIN — CLOPIDOGREL BISULFATE 75 MILLIGRAM(S): 75 TABLET, FILM COATED ORAL at 11:53

## 2019-01-03 RX ADMIN — HYDROMORPHONE HYDROCHLORIDE 4 MILLIGRAM(S): 2 INJECTION INTRAMUSCULAR; INTRAVENOUS; SUBCUTANEOUS at 02:04

## 2019-01-03 RX ADMIN — Medication 250 MILLIGRAM(S): at 05:01

## 2019-01-03 RX ADMIN — Medication 5 MILLIGRAM(S): at 21:49

## 2019-01-03 RX ADMIN — Medication 250 MILLIGRAM(S): at 17:23

## 2019-01-03 RX ADMIN — Medication 0.25 MILLIGRAM(S): at 21:48

## 2019-01-03 RX ADMIN — Medication 2: at 17:22

## 2019-01-03 RX ADMIN — QUETIAPINE FUMARATE 25 MILLIGRAM(S): 200 TABLET, FILM COATED ORAL at 21:49

## 2019-01-03 RX ADMIN — Medication 1 GRAM(S): at 17:23

## 2019-01-03 RX ADMIN — Medication 2: at 00:03

## 2019-01-03 RX ADMIN — Medication 300 MILLIGRAM(S): at 17:22

## 2019-01-03 RX ADMIN — Medication 5 MILLILITER(S): at 11:53

## 2019-01-03 RX ADMIN — Medication 2: at 11:51

## 2019-01-03 RX ADMIN — SENNA PLUS 10 MILLILITER(S): 8.6 TABLET ORAL at 21:48

## 2019-01-03 RX ADMIN — CHLORHEXIDINE GLUCONATE 5 MILLILITER(S): 213 SOLUTION TOPICAL at 02:05

## 2019-01-03 RX ADMIN — HYDROMORPHONE HYDROCHLORIDE 4 MILLIGRAM(S): 2 INJECTION INTRAMUSCULAR; INTRAVENOUS; SUBCUTANEOUS at 03:03

## 2019-01-03 RX ADMIN — Medication 20 MILLIEQUIVALENT(S): at 11:54

## 2019-01-03 RX ADMIN — CARVEDILOL PHOSPHATE 3.12 MILLIGRAM(S): 80 CAPSULE, EXTENDED RELEASE ORAL at 11:55

## 2019-01-03 RX ADMIN — SPIRONOLACTONE 12.5 MILLIGRAM(S): 25 TABLET, FILM COATED ORAL at 05:01

## 2019-01-03 NOTE — PROGRESS NOTE ADULT - SUBJECTIVE AND OBJECTIVE BOX
Brief Hospital Course:    STEMI, emergently to cath lab, left main thrombus, cardiac arrest requiring defib, intubated in cath lab, impella placed which clotted off on way to OR and was subsequently removed in OR. Crashed onto CPB for CABG d/t hemodynamic collapse. TO CTICU post CABG with central ECMO and open chest.  12/3 ECMO explanted and IABP removed, impella inserted   impella removed via R femoral cutdown with repair and external iliac stent  12/10 s/p trach for prolonged respiratory failure   abd distention, TF held, 2L NGT output, +ileus on AXR, gastrographin study done  12/15 ileus resolved, resumed for meds via NGT. Respiratory distress, febrile, septic and cardiogenic shock requiring restarting of abx, epinephrine and norepinephrine infusions   bilat pleural CTs d/c'd, more awake and alert, zosyn changed to meropenem    mycofungin added for candida parapisolsis fungemia, epinephrine weaned off, vac placed to R groin, + UGIB, started on pantoprazole infusion   EGD (+ bleeding stomach ulcer, injected with epi and hemoclip placed x 4)   rising LFTs, N/V, tube feeds held, AXR with ileus    abd US Splenomegaly. Gallbladder wall thickening with sludge and pericholecystic edema suggesting acute cholecystitis, surgery consulted > NTD   tolerating tube feeds back at goal, CPAP vs trach collar trials as tolerated  1/3/19 tolerating trach collar since 6am on 18      Subjective/review of Systems: pt now sleeping but awoke asking (talking "around" trach) if someone stabbed him in the mouth, knows he is in hospital. c/o tongue, jaw and throat pain. Denies CP, palpitations, SOB, chills, diaphoresis, N/V, abd pain, diarrhea, numbness/tingling, dizziness/lightheadedness, HA, itchiness/rash.    Review of Systems: negative x 10 systems except as above      Patient is a 48y old  Male who presents with a chief complaint of Emergent CABG cardiogenic shock (2019 01:00)    HPI:  This is a 46 y/o male no significant PMH; recent ABT (doxycycline) r/t cellulitis to right groin per pt. Pt presented to the ED with 10/10 chest pain that he reported started at 0930; he drank water with no relief; pain started to progress to B/L shoulders. Per pt spouse, she reports he has had chest pain x2-3 days and this morning he vomited which he attributed to reflux.  She brought him into the emergency department.    Pt has significant EKG changes ST elevations in leads V1-V5 with reciprocal changes in II, III, AVF.  Pt rec'd asa and plavix load in ED. (2018 11:32)    PAST MEDICAL & SURGICAL HISTORY:  Staph infection  No significant past surgical history    FAMILY HISTORY:  Family history of myocardial infarction (Mother): mother;       Vitals   ICU Vital Signs Last 24 Hrs  T(C): 38.2 (2019 00:01), Max: 38.2 (2019 00:01)  T(F): 100.8 (2019 00:01), Max: 100.8 (2019 00:01)  HR: 112 (2019 01:00) (89 - 121), ST  BP: 83/58 (2019 00:00) (83/58 - 112/74)  BP(mean): 66 (2019 00:00) (66 - 90)  RR: 25 (2019 01:00) (13 - 27)  SpO2: 100% (2019 01:00) (93% - 100%) on 28% aerosolized trach collar      I&O's Detail    2019 07:01  -  2019 07:00  --------------------------------------------------------  Total IN: 1250 mL  Total OUT: 1450 mL  Total NET: -200 mL    2019 07:01  -  2019 02:50  --------------------------------------------------------  IN:    Enteral Tube Flush: 330 mL    Nepro: 1080 mL  Total IN: 1410 mL    OUT:    Voided: 1100 mL  Total OUT: 1100 mL    Total NET: 310 mL      LABS                        9.5    6.5   )-----------( 265      ( 2019 05:01 )             31.5         142  |  103  |  32.0<H>  ----------------------------<  180<H>  4.5   |  27.0  |  0.43<L>    Ca    8.3<L>      2019 05:01  Phos  4.2       Mg     2.2       TPro  6.2<L>  /  Alb  2.9<L>  /  TBili  1.6  /  DBili  x   /  AST  82<H>  /  ALT  77<H>  /  AlkPhos  232<H>             ABG - ( 2019 06:49 )  pH, Arterial: 7.52  /  pCO2: 37    /  pO2: 98    / HCO3: 31    / Base Excess: 6.8   /  SaO2: 99        POCT Blood Glucose.: 182 mg/dL (19 @ 23:59)  POCT Blood Glucose.: 203 mg/dL (19 @ 17:20)  POCT Blood Glucose.: 198 mg/dL (19 @ 14:33)  POCT Blood Glucose.: 190 mg/dL (19 @ 13:30)  POCT Blood Glucose.: 218 mg/dL (19 @ 07:46)      MEDICATIONS  (STANDING):  ascorbic acid 500 milliGRAM(s) Oral daily  aspirin 325 milliGRAM(s) Oral daily  atorvastatin 10 milliGRAM(s) Oral at bedtime  carvedilol 3.125 milliGRAM(s) Oral every 12 hours  chlorhexidine 0.12% Liquid 5 milliLiter(s) Oral Mucosa every 4 hours  clopidogrel Tablet 75 milliGRAM(s) Oral daily  enoxaparin Injectable 40 milliGRAM(s) SubCutaneous daily  ferrous    sulfate Liquid 300 milliGRAM(s) Enteral Tube daily  folic acid 1 milliGRAM(s) Oral daily  furosemide   Injectable 20 milliGRAM(s) IV Push <User Schedule>  insulin lispro (HumaLOG) corrective regimen sliding scale   SubCutaneous every 6 hours  melatonin 5 milliGRAM(s) Oral at bedtime  micafungin IVPB 100 milliGRAM(s) IV Intermittent every 24 hours  multivitamin   Solution 5 milliLiter(s) Enteral Tube daily  pantoprazole  Injectable 40 milliGRAM(s) IV Push every 12 hours  potassium chloride   Powder 20 milliEquivalent(s) Oral daily  QUEtiapine 25 milliGRAM(s) Oral <User Schedule>  saccharomyces boulardii 250 milliGRAM(s) Oral two times a day  senna Syrup 10 milliLiter(s) Oral at bedtime  spironolactone 12.5 milliGRAM(s) Oral daily  sucralfate suspension 1 Gram(s) Oral two times a day    MEDICATIONS  (PRN):  acetaminophen    Suspension .. 650 milliGRAM(s) Oral every 6 hours PRN Temp greater or equal to 38.5C (101.3F)  ALPRAZolam 0.25 milliGRAM(s) Oral every 12 hours PRN anxiety  HYDROmorphone   Tablet 2 milliGRAM(s) Oral every 4 hours PRN Moderate Pain (4 - 6)  HYDROmorphone   Tablet 4 milliGRAM(s) Oral every 4 hours PRN Severe Pain (7 - 10)  ondansetron Injectable 4 milliGRAM(s) IV Push every 6 hours PRN Nausea and/or Vomiting  QUEtiapine 12.5 milliGRAM(s) Oral every 8 hours PRN agitation    Allergies: penicillins (Unknown)      Physical Exam:   Constitutional: NAD, well-groomed, well-developed  HEENT: PERRLA, EOMI, no drainage or redness. + NGT. dry mouth. able to phonate around trach without speaking valve or occlusion.  Neck: supple,  No JVD, + trach, + secretions, good cough  Respiratory: Breath Sounds equal & clear bilaterally to auscultation, no accessory muscle use noted  Chest: midsternal incision well approximated, no heat, erythema or d/c, stable, V wires isolated.  Cardiovascular: mild tachycardia, regular rhythm, normal S1, S2; no murmurs or rub  Gastrointestinal: Soft, non-tender, non distended, + bowel sounds  Extremities: GALLARDO x 4, trace edema to bilat feet, no BLE edema, no cyanosis, no clubbing. BLE SVG sites well approximated, no heat, erythema or d/c, every other staple still in place. Right groin with dressing in place.  Neurological: A+O x 3   Skin: warm, dry, well perfused    Code Status: full code      time spent: __37__minutes (reviewing chart including medication, labs and imaging results, discussions with interdisciplinary team, discussing goals of care/advanced directives, counseling patient and/or family, non-inclusive of procedures)

## 2019-01-03 NOTE — PROGRESS NOTE ADULT - PROBLEM SELECTOR PLAN 7
continue aspirin, plavix and statin for graft patency   Lovenox and SCDs for DVT prophylaxis  no history of DM, continue FS Q6 with coverage, will consult with endocrine though suspect elevated glucoses d/t continuos tube feeding (consider switch to bolus feeds)

## 2019-01-03 NOTE — PROGRESS NOTE ADULT - ASSESSMENT
48yoM with R groin wound vac removed yesterday.   - WTD dressing change per nursing  - vascular will sign off  - rest of care per primary team  - please contact vascular PRN for any questions

## 2019-01-03 NOTE — PROGRESS NOTE ADULT - SUBJECTIVE AND OBJECTIVE BOX
HPI/OVERNIGHT EVENTS:  No acute events overnight. R groin wound vac removed yesterday. no active drainage.     MEDICATIONS  (STANDING):  ascorbic acid 500 milliGRAM(s) Oral daily  aspirin 325 milliGRAM(s) Oral daily  atorvastatin 40 milliGRAM(s) Oral at bedtime  carvedilol 3.125 milliGRAM(s) Oral every 12 hours  clopidogrel Tablet 75 milliGRAM(s) Oral daily  enoxaparin Injectable 40 milliGRAM(s) SubCutaneous daily  ferrous    sulfate Liquid 300 milliGRAM(s) Enteral Tube daily  folic acid 1 milliGRAM(s) Oral daily  insulin lispro (HumaLOG) corrective regimen sliding scale   SubCutaneous every 6 hours  melatonin 5 milliGRAM(s) Oral at bedtime  micafungin IVPB 100 milliGRAM(s) IV Intermittent every 24 hours  multivitamin   Solution 5 milliLiter(s) Enteral Tube daily  pantoprazole  Injectable 40 milliGRAM(s) IV Push every 12 hours  potassium chloride   Powder 20 milliEquivalent(s) Oral daily  QUEtiapine 25 milliGRAM(s) Oral <User Schedule>  saccharomyces boulardii 250 milliGRAM(s) Oral two times a day  senna Syrup 10 milliLiter(s) Oral at bedtime  spironolactone 12.5 milliGRAM(s) Oral daily  sucralfate suspension 1 Gram(s) Oral two times a day    MEDICATIONS  (PRN):  acetaminophen    Suspension .. 650 milliGRAM(s) Oral every 6 hours PRN Temp greater or equal to 38.5C (101.3F)  ALPRAZolam 0.25 milliGRAM(s) Oral every 12 hours PRN anxiety  HYDROmorphone   Tablet 2 milliGRAM(s) Oral every 4 hours PRN Moderate Pain (4 - 6)  HYDROmorphone   Tablet 4 milliGRAM(s) Oral every 4 hours PRN Severe Pain (7 - 10)  ondansetron Injectable 4 milliGRAM(s) IV Push every 6 hours PRN Nausea and/or Vomiting  QUEtiapine 12.5 milliGRAM(s) Oral every 8 hours PRN agitation      Vital Signs Last 24 Hrs  T(C): 36.7 (03 Jan 2019 10:30), Max: 38.2 (03 Jan 2019 00:01)  T(F): 98.1 (03 Jan 2019 10:30), Max: 100.8 (03 Jan 2019 00:01)  HR: 114 (03 Jan 2019 12:00) (102 - 119)  BP: 98/66 (03 Jan 2019 12:00) (83/58 - 105/69)  BP(mean): 77 (03 Jan 2019 12:00) (66 - 83)  RR: 22 (03 Jan 2019 12:00) (13 - 36)  SpO2: 100% (03 Jan 2019 12:00) (99% - 100%)    gen: nad, a&ox3  cv: rrr  resp: nonlabored breathing  gi: soft, nd, nttp  msk: good superficial granulation with no active drainage. wound edges jay well        I&O's Detail    02 Jan 2019 07:01  -  03 Jan 2019 07:00  --------------------------------------------------------  IN:    Enteral Tube Flush: 480 mL    Nepro: 1440 mL  Total IN: 1920 mL    OUT:    Voided: 1650 mL  Total OUT: 1650 mL    Total NET: 270 mL      03 Jan 2019 07:01  -  03 Jan 2019 13:50  --------------------------------------------------------  IN:    Nepro: 120 mL  Total IN: 120 mL    OUT:    Nasoenteral Tube: 340 mL    Voided: 250 mL  Total OUT: 590 mL    Total NET: -470 mL          LABS:                        9.5    6.5   )-----------( 265      ( 02 Jan 2019 05:01 )             31.5     01-02    142  |  103  |  32.0<H>  ----------------------------<  180<H>  4.5   |  27.0  |  0.43<L>    Ca    8.3<L>      02 Jan 2019 05:01  Phos  4.2     01-02  Mg     2.2     01-02    TPro  6.2<L>  /  Alb  2.9<L>  /  TBili  1.6  /  DBili  x   /  AST  82<H>  /  ALT  77<H>  /  AlkPhos  232<H>  01-02

## 2019-01-03 NOTE — PROGRESS NOTE ADULT - SUBJECTIVE AND OBJECTIVE BOX
[facility]  INFECTIOUS DISEASES AND INTERNAL MEDICINE at [place]  =======================================================  Perfecto Santizo MD  Diplomates American Board of Internal Medicine and Infectious Diseases  =======================================================    PATRICK LYLE [id number]    Follow up: Fungemia    Afebrile  Awake and alert, answers questions.   NG tube in place but he was decannulated this morning with no complication.   Afebrile    Allergies:  penicillins (Unknown)    Antibiotics:    micafungin IVPB 100 milliGRAM(s) IV Intermittent every 24 hours     REVIEW OF SYSTEMS:  as above     Physical Exam:  Vital Signs Last 24 Hrs  T(C): 36.7 (03 Jan 2019 10:30), Max: 38.2 (03 Jan 2019 00:01)  T(F): 98.1 (03 Jan 2019 10:30), Max: 100.8 (03 Jan 2019 00:01)  HR: 114 (03 Jan 2019 12:00) (102 - 120)  BP: 98/66 (03 Jan 2019 12:00) (83/58 - 111/77)  BP(mean): 77 (03 Jan 2019 12:00) (66 - 90)  RR: 22 (03 Jan 2019 12:00) (13 - 36)  SpO2: 100% (03 Jan 2019 12:00) (99% - 100%)  GEN: NAD , awake   HEENT: normocephalic and atraumatic. EOMI. PERRL. + NGT  NECK: Supple. + Trach and NGT  LUNGS: Coarse BS B/L  HEART: Regular rate and rhythm   ABDOMEN: Soft, nontender, and nondistended.  Positive bowel sounds.    : + Mckeon  EXTREMITIES: Without any edema.  MSK: no joint swelling  NEUROLOGIC: Awake, alert follows commands  PSYCHIATRIC: Appropriate affect .  SKIN: Sternal wound dressing, + wound vac, Leg dressings in place    Labs:  01-02    142  |  103  |  32.0<H>  ----------------------------<  180<H>  4.5   |  27.0  |  0.43<L>    Ca    8.3<L>      02 Jan 2019 05:01  Phos  4.2     01-02  Mg     2.2     01-02    TPro  6.2<L>  /  Alb  2.9<L>  /  TBili  1.6  /  DBili  x   /  AST  82<H>  /  ALT  77<H>  /  AlkPhos  232<H>  01-02                        9.5    6.5   )-----------( 265      ( 02 Jan 2019 05:01 )             31.5     LIVER FUNCTIONS - ( 02 Jan 2019 05:01 )  Alb: 2.9 g/dL / Pro: 6.2 g/dL / ALK PHOS: 232 U/L / ALT: 77 U/L / AST: 82 U/L / GGT: x           RECENT CULTURES:  12-25 @ 06:38 .Blood     No growth at 5 days.    [facility]  12-24 @ 03:01 .Blood     No growth at 5 days.    12-21 @ 10:03 .Blood     No growth at 5 days.    CXR: 1/2/19  Findings:  No change in tracheostomy tube or NG tube. Patchy bilateral airspace   disease left greater than right, unchanged since the prior study. No   evidence of pneumothorax or pleural effusion..    Impression:  Stable exam without significant change since the previous study..

## 2019-01-03 NOTE — PROGRESS NOTE ADULT - PROBLEM SELECTOR PLAN 1
coreg started 12/23, unable to up titrate of add ACEI/ARB d/t SBP only in 90-100s  continue Lasix 20 IVP BID and aldactone

## 2019-01-03 NOTE — PROGRESS NOTE ADULT - ASSESSMENT
46 yo Male c/o CP for 2-3 days prior to admission, then 10/10 chest pain approximately 9:30 am yesterday, followed by vomiting.  Wife drove pt to hospital, ruled in for STEMI. Urgent Cath, pt found to have multiple occlusions; stent to LAD and D2; stent to LAD thrombosed,  impella placed for support.  Pt then had VF arrest, shock x 1, return to NSR.  Upon transfer to OR for Urgent CABG, pt with asystolic arrest, chest opened intra-op with CPR in progress. Pt underwent C4V (LAD, Diag, OM and PDA) with inability to wean from CPB therefore requiring VA ECMO (central cannulation) and IABP, he was transferred to CICU.  12/3 s/p ECMO explant and IABP d/c'd, placement of impella via R groin sheath.   12/6 Impella removal via right femoral cutdown with primary repair of femoral artery and stent placed to external iliac artery for dissection.    Hospital course notable for (in addition to above):cardiogenic and septic shock (both since resolved), acute systolic heart failure, vent dependent respiratory failure (inability to wean also contributed to by severe agitation, now s/p trach), fungemia, acute blood loss anemia, GIB (bleeding stomach ulcer), hypokalemia, ileus, sinusitis.

## 2019-01-03 NOTE — CHART NOTE - NSCHARTNOTEFT_GEN_A_CORE
Patient Singh was decannulated this AM with Dr. Barrett and Dr. Yancey at the bedside after being on trach collar on room air since 12/30/18 AM. After decannulation, a wide based gauze dressing was applied to the stoma site. SpO2 remained 100%. He was educated by Dr. Yancey about compressing stoma site lightly to be able to speak. He was also educated on managing his secretions with bedside suction he has been using. RN informed and present at the bedside. Size 6 and 7 trach available at the bedside in precaution for change in status.

## 2019-01-03 NOTE — PROGRESS NOTE ADULT - PROBLEM SELECTOR PLAN 3
s/p trach  maintained on trach collar since 12/31 6AM  awaiting decision on capping trial vs downsizing/changing trach

## 2019-01-03 NOTE — PROGRESS NOTE ADULT - PROBLEM SELECTOR PLAN 8
remains a concern as pt has had prolonged NGT in place  patient will likely be decannulated, no plan for PEG tube at this time      11. scrotal erosion  stoma powder and cabalon no sting barrier were being used, however noted by RNs that it was drying lesions rather than healing, now transitioned to cabalon and medihoney

## 2019-01-03 NOTE — PROGRESS NOTE ADULT - ASSESSMENT
48y/o man with no significant PMH admitted on 11/29/18 for CP for 2-3 days prior to admission,   found with STEMI. Urgent Cath, pt found to have multiple occlusions; VF arrest, shock x 1, return to NSR.  Upon transfer to OR for Urgent CABG, pt again VF arrest, chest opened intra-op with CPR in progress.  s/p ECMO and Impella; 12/3 ECMO explanted and Impella placed via right groin sheath.    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   - If febrile, CT of sinuses is recommended.    Will follow.

## 2019-01-03 NOTE — PROGRESS NOTE ADULT - PROBLEM SELECTOR PLAN 9
review medication with clinical pharmacist to look for possible contributory meds  possibly contributed to ICU PTSD, consider psych eval  will d/w Dr Barrett re: changing seroquel to SSRI as suggested by Dr Davis (rehab medicine)

## 2019-01-03 NOTE — PROGRESS NOTE ADULT - SUBJECTIVE AND OBJECTIVE BOX
Patient is in chair, family at bedside.   Reports that he has no pain.  Continues to have left shoulder weakness.   encouraged to ROM with other UE.  Was decannulated and tolerating well.   Awaiting plan for MBS.  No dizziness OOB today.    FUNCTIONAL PROGRESS  1/3  Bed Mobility  Bed Mobility Training Rehab Potential: good, to achieve stated therapy goals  Bed Mobility Training Supine-to-Sit: contact guard;  verbal cues;  bed rails    Bed-Chair Transfer Training  Bed-to-Chair Transfer Training Rehab Potential: good, to achieve stated therapy goals  Transfer Training Bed-to-Chair Transfer: contact guard;  1 person assist;  rolling walker  Bed-to-Chair Transfer Training Transfer Safety Analysis: decreased strength;  rolling walker    Sit-Stand Transfer Training  Sit-to-Stand Transfer Training Rehab Potential: good, to achieve stated therapy goals  Transfer Training Sit-to-Stand Transfer: minimum assist (75% patient effort);  1 person assist;  rolling walker  Transfer Training Stand-to-Sit Transfer: contact guard;  1 person assist;  rolling walker    Gait Training  Gait Training Rehab Potential: good, to achieve stated therapy goals  Gait Analysis: 2-point gait   impaired balance;  Bed to Chair;  5 feet;  rolling walker      REVIEW OF SYSTEMS  Constitutional - No fever,  +fatigue  HEENT - No vertigo, No neck pain  Neurological - No headaches, No memory loss, +loss of strength, No numbness, No tremors  Skin - No rashes, No lesions   Musculoskeletal - No joint pain, No joint swelling, No muscle pain  Psychiatric - +depression, No anxiety    VITALS  T(C): 38.2 (01-03-19 @ 00:01), Max: 38.2 (01-03-19 @ 00:01)  HR: 119 (01-03-19 @ 10:00) (102 - 120)  BP: 105/69 (01-03-19 @ 10:00) (83/58 - 112/74)  RR: 26 (01-03-19 @ 10:00) (13 - 36)  SpO2: 100% (01-03-19 @ 10:00) (93% - 100%)  Wt(kg): --    MEDICATIONS   acetaminophen    Suspension .. 650 milliGRAM(s) every 6 hours PRN  ALPRAZolam 0.25 milliGRAM(s) every 12 hours PRN  ascorbic acid 500 milliGRAM(s) daily  aspirin 325 milliGRAM(s) daily  atorvastatin 40 milliGRAM(s) at bedtime  carvedilol 3.125 milliGRAM(s) every 12 hours  chlorhexidine 0.12% Liquid 5 milliLiter(s) every 4 hours  clopidogrel Tablet 75 milliGRAM(s) daily  enoxaparin Injectable 40 milliGRAM(s) daily  ferrous    sulfate Liquid 300 milliGRAM(s) daily  folic acid 1 milliGRAM(s) daily  HYDROmorphone   Tablet 2 milliGRAM(s) every 4 hours PRN  HYDROmorphone   Tablet 4 milliGRAM(s) every 4 hours PRN  insulin lispro (HumaLOG) corrective regimen sliding scale   every 6 hours  melatonin 5 milliGRAM(s) at bedtime  micafungin IVPB 100 milliGRAM(s) every 24 hours  multivitamin   Solution 5 milliLiter(s) daily  ondansetron Injectable 4 milliGRAM(s) every 6 hours PRN  pantoprazole  Injectable 40 milliGRAM(s) every 12 hours  potassium chloride   Powder 20 milliEquivalent(s) daily  QUEtiapine 12.5 milliGRAM(s) every 8 hours PRN  QUEtiapine 25 milliGRAM(s) <User Schedule>  saccharomyces boulardii 250 milliGRAM(s) two times a day  senna Syrup 10 milliLiter(s) at bedtime  spironolactone 12.5 milliGRAM(s) daily  sucralfate suspension 1 Gram(s) two times a day      RECENT LABS/IMAGING  CBC Full  -  ( 02 Jan 2019 05:01 )  WBC Count : 6.5 K/uL  Hemoglobin : 9.5 g/dL  Hematocrit : 31.5 %  Platelet Count - Automated : 265 K/uL  Mean Cell Volume : 86.5 fl  Mean Cell Hemoglobin : 26.1 pg  Mean Cell Hemoglobin Concentration : 30.2 g/dL  Auto Neutrophil # : x  Auto Lymphocyte # : x  Auto Monocyte # : x  Auto Eosinophil # : x  Auto Basophil # : x  Auto Neutrophil % : x  Auto Lymphocyte % : x  Auto Monocyte % : x  Auto Eosinophil % : x  Auto Basophil % : x    01-02    142  |  103  |  32.0<H>  ----------------------------<  180<H>  4.5   |  27.0  |  0.43<L>    Ca    8.3<L>      02 Jan 2019 05:01  Phos  4.2     01-02  Mg     2.2     01-02    TPro  6.2<L>  /  Alb  2.9<L>  /  TBili  1.6  /  DBili  x   /  AST  82<H>  /  ALT  77<H>  /  AlkPhos  232<H>  01-02      ------------------------------------------------------------------------------------  PHYSICAL EXAM  Constitutional - NAD, Comfortable  Chest - Breathing comfortably, No wheezing, +wound VAC  Extremities - BLE swelling, BLE incisions + staples CDI  Neurologic Exam -                    Cognitive - AAOx 3     Communication - Hypophonic     Motor -                      LEFT    UE - ShAB 1/5, EF 4/5, EE 4/5,  4/5                    RIGHT UE - ShAB 5/5, EF 5/5, EE 5/5,  5/5                    LEFT    LE - HF 5/5, KE 5/5, DF 5/5, PF 5/5                    RIGHT LE - HF 5/5, KE 5/5, DF 5/5, PF 5/5         Sensory - Intact to LT  Psychiatric - Affect flat	  ----------------------------------------------------------------------------------------  ASSESSMENT/PLAN  48y Male with functional deficits after STEMI, cardiac arrest s/p CABG, followed by complicated hospital course including need for trach, ileus/tube feeds, GI bleed, sepsis.   Pain - Tylenol, Dilaudid  Sleep - Melatonin   Mood - Xanax, Recommend DC of Seroquel and suggest SSRI  CAD - ASA, Plavix, Lipitor  DVT PPX - Lovenox    Candida - Mycamine   Pulm- Tolerating trach collar, progressing as tolerated  Rehab - MBS planned 	  Continue to recommend ACUTE inpatient rehabilitation for the functional deficits consisting of 3 hours of therapy/day & 24 hour RN/daily PMR physician for comorbid medical management. Will continue to follow for ongoing rehab needs and recommendations.    Continue bedside therapy as well as OOB throughout the day with mobilization by staff to maintain cardiopulmonary function and prevention of secondary complications related to debility.

## 2019-01-04 LAB
ANION GAP SERPL CALC-SCNC: 12 MMOL/L — SIGNIFICANT CHANGE UP (ref 5–17)
ANION GAP SERPL CALC-SCNC: 13 MMOL/L — SIGNIFICANT CHANGE UP (ref 5–17)
BUN SERPL-MCNC: 32 MG/DL — HIGH (ref 8–20)
BUN SERPL-MCNC: 33 MG/DL — HIGH (ref 8–20)
CALCIUM SERPL-MCNC: 8.4 MG/DL — LOW (ref 8.6–10.2)
CALCIUM SERPL-MCNC: 8.4 MG/DL — LOW (ref 8.6–10.2)
CHLORIDE SERPL-SCNC: 102 MMOL/L — SIGNIFICANT CHANGE UP (ref 98–107)
CHLORIDE SERPL-SCNC: 102 MMOL/L — SIGNIFICANT CHANGE UP (ref 98–107)
CO2 SERPL-SCNC: 27 MMOL/L — SIGNIFICANT CHANGE UP (ref 22–29)
CO2 SERPL-SCNC: 28 MMOL/L — SIGNIFICANT CHANGE UP (ref 22–29)
CREAT SERPL-MCNC: 0.46 MG/DL — LOW (ref 0.5–1.3)
CREAT SERPL-MCNC: 0.5 MG/DL — SIGNIFICANT CHANGE UP (ref 0.5–1.3)
GAS PNL BLDA: SIGNIFICANT CHANGE UP
GLUCOSE BLDC GLUCOMTR-MCNC: 150 MG/DL — HIGH (ref 70–99)
GLUCOSE BLDC GLUCOMTR-MCNC: 154 MG/DL — HIGH (ref 70–99)
GLUCOSE BLDC GLUCOMTR-MCNC: 173 MG/DL — HIGH (ref 70–99)
GLUCOSE BLDC GLUCOMTR-MCNC: 190 MG/DL — HIGH (ref 70–99)
GLUCOSE BLDC GLUCOMTR-MCNC: 193 MG/DL — HIGH (ref 70–99)
GLUCOSE SERPL-MCNC: 175 MG/DL — HIGH (ref 70–115)
GLUCOSE SERPL-MCNC: 188 MG/DL — HIGH (ref 70–115)
HCT VFR BLD CALC: 30.4 % — LOW (ref 42–52)
HGB BLD-MCNC: 9 G/DL — LOW (ref 14–18)
MAGNESIUM SERPL-MCNC: 2.2 MG/DL — SIGNIFICANT CHANGE UP (ref 1.6–2.6)
MAGNESIUM SERPL-MCNC: 2.3 MG/DL — SIGNIFICANT CHANGE UP (ref 1.6–2.6)
MCHC RBC-ENTMCNC: 25.6 PG — LOW (ref 27–31)
MCHC RBC-ENTMCNC: 29.6 G/DL — LOW (ref 32–36)
MCV RBC AUTO: 86.6 FL — SIGNIFICANT CHANGE UP (ref 80–94)
PLATELET # BLD AUTO: 278 K/UL — SIGNIFICANT CHANGE UP (ref 150–400)
POTASSIUM SERPL-MCNC: 3.9 MMOL/L — SIGNIFICANT CHANGE UP (ref 3.5–5.3)
POTASSIUM SERPL-MCNC: 4.4 MMOL/L — SIGNIFICANT CHANGE UP (ref 3.5–5.3)
POTASSIUM SERPL-SCNC: 3.9 MMOL/L — SIGNIFICANT CHANGE UP (ref 3.5–5.3)
POTASSIUM SERPL-SCNC: 4.4 MMOL/L — SIGNIFICANT CHANGE UP (ref 3.5–5.3)
RBC # BLD: 3.51 M/UL — LOW (ref 4.6–6.2)
RBC # FLD: 17.7 % — HIGH (ref 11–15.6)
SODIUM SERPL-SCNC: 141 MMOL/L — SIGNIFICANT CHANGE UP (ref 135–145)
SODIUM SERPL-SCNC: 143 MMOL/L — SIGNIFICANT CHANGE UP (ref 135–145)
WBC # BLD: 7.9 K/UL — SIGNIFICANT CHANGE UP (ref 4.8–10.8)
WBC # FLD AUTO: 7.9 K/UL — SIGNIFICANT CHANGE UP (ref 4.8–10.8)

## 2019-01-04 PROCEDURE — 99232 SBSQ HOSP IP/OBS MODERATE 35: CPT

## 2019-01-04 PROCEDURE — 71045 X-RAY EXAM CHEST 1 VIEW: CPT | Mod: 26

## 2019-01-04 PROCEDURE — 99233 SBSQ HOSP IP/OBS HIGH 50: CPT

## 2019-01-04 RX ORDER — CAPTOPRIL 12.5 MG/1
3.12 TABLET ORAL
Qty: 0 | Refills: 0 | Status: DISCONTINUED | OUTPATIENT
Start: 2019-01-04 | End: 2019-01-07

## 2019-01-04 RX ORDER — AMIODARONE HYDROCHLORIDE 400 MG/1
200 TABLET ORAL DAILY
Qty: 0 | Refills: 0 | Status: DISCONTINUED | OUTPATIENT
Start: 2019-01-04 | End: 2019-01-11

## 2019-01-04 RX ORDER — INSULIN LISPRO 100/ML
VIAL (ML) SUBCUTANEOUS
Qty: 0 | Refills: 0 | Status: DISCONTINUED | OUTPATIENT
Start: 2019-01-04 | End: 2019-01-11

## 2019-01-04 RX ORDER — POTASSIUM CHLORIDE 20 MEQ
20 PACKET (EA) ORAL ONCE
Qty: 0 | Refills: 0 | Status: COMPLETED | OUTPATIENT
Start: 2019-01-04 | End: 2019-01-04

## 2019-01-04 RX ORDER — POTASSIUM CHLORIDE 20 MEQ
30 PACKET (EA) ORAL ONCE
Qty: 0 | Refills: 0 | Status: DISCONTINUED | OUTPATIENT
Start: 2019-01-04 | End: 2019-01-04

## 2019-01-04 RX ADMIN — Medication 1 MILLIGRAM(S): at 11:38

## 2019-01-04 RX ADMIN — CARVEDILOL PHOSPHATE 3.12 MILLIGRAM(S): 80 CAPSULE, EXTENDED RELEASE ORAL at 11:39

## 2019-01-04 RX ADMIN — SPIRONOLACTONE 12.5 MILLIGRAM(S): 25 TABLET, FILM COATED ORAL at 05:03

## 2019-01-04 RX ADMIN — AMIODARONE HYDROCHLORIDE 200 MILLIGRAM(S): 400 TABLET ORAL at 09:29

## 2019-01-04 RX ADMIN — Medication 1 GRAM(S): at 16:40

## 2019-01-04 RX ADMIN — PANTOPRAZOLE SODIUM 40 MILLIGRAM(S): 20 TABLET, DELAYED RELEASE ORAL at 05:02

## 2019-01-04 RX ADMIN — Medication 250 MILLIGRAM(S): at 16:41

## 2019-01-04 RX ADMIN — HYDROMORPHONE HYDROCHLORIDE 4 MILLIGRAM(S): 2 INJECTION INTRAMUSCULAR; INTRAVENOUS; SUBCUTANEOUS at 12:36

## 2019-01-04 RX ADMIN — Medication 300 MILLIGRAM(S): at 11:39

## 2019-01-04 RX ADMIN — Medication 325 MILLIGRAM(S): at 11:38

## 2019-01-04 RX ADMIN — Medication 5 MILLIGRAM(S): at 22:12

## 2019-01-04 RX ADMIN — HYDROMORPHONE HYDROCHLORIDE 2 MILLIGRAM(S): 2 INJECTION INTRAMUSCULAR; INTRAVENOUS; SUBCUTANEOUS at 05:02

## 2019-01-04 RX ADMIN — HYDROMORPHONE HYDROCHLORIDE 4 MILLIGRAM(S): 2 INJECTION INTRAMUSCULAR; INTRAVENOUS; SUBCUTANEOUS at 01:35

## 2019-01-04 RX ADMIN — Medication 20 MILLIEQUIVALENT(S): at 11:38

## 2019-01-04 RX ADMIN — Medication 2: at 11:51

## 2019-01-04 RX ADMIN — Medication 500 MILLIGRAM(S): at 11:38

## 2019-01-04 RX ADMIN — CLOPIDOGREL BISULFATE 75 MILLIGRAM(S): 75 TABLET, FILM COATED ORAL at 11:38

## 2019-01-04 RX ADMIN — Medication 250 MILLIGRAM(S): at 05:03

## 2019-01-04 RX ADMIN — SENNA PLUS 10 MILLILITER(S): 8.6 TABLET ORAL at 22:12

## 2019-01-04 RX ADMIN — Medication 2: at 16:41

## 2019-01-04 RX ADMIN — ENOXAPARIN SODIUM 40 MILLIGRAM(S): 100 INJECTION SUBCUTANEOUS at 11:38

## 2019-01-04 RX ADMIN — QUETIAPINE FUMARATE 25 MILLIGRAM(S): 200 TABLET, FILM COATED ORAL at 22:12

## 2019-01-04 RX ADMIN — ATORVASTATIN CALCIUM 40 MILLIGRAM(S): 80 TABLET, FILM COATED ORAL at 22:15

## 2019-01-04 RX ADMIN — HYDROMORPHONE HYDROCHLORIDE 4 MILLIGRAM(S): 2 INJECTION INTRAMUSCULAR; INTRAVENOUS; SUBCUTANEOUS at 11:36

## 2019-01-04 RX ADMIN — HYDROMORPHONE HYDROCHLORIDE 4 MILLIGRAM(S): 2 INJECTION INTRAMUSCULAR; INTRAVENOUS; SUBCUTANEOUS at 00:03

## 2019-01-04 RX ADMIN — CAPTOPRIL 3.12 MILLIGRAM(S): 12.5 TABLET ORAL at 21:17

## 2019-01-04 RX ADMIN — PANTOPRAZOLE SODIUM 40 MILLIGRAM(S): 20 TABLET, DELAYED RELEASE ORAL at 16:41

## 2019-01-04 RX ADMIN — HYDROMORPHONE HYDROCHLORIDE 2 MILLIGRAM(S): 2 INJECTION INTRAMUSCULAR; INTRAVENOUS; SUBCUTANEOUS at 06:31

## 2019-01-04 RX ADMIN — Medication 20 MILLIEQUIVALENT(S): at 02:17

## 2019-01-04 RX ADMIN — Medication 1 GRAM(S): at 05:03

## 2019-01-04 RX ADMIN — MICAFUNGIN SODIUM 105 MILLIGRAM(S): 100 INJECTION, POWDER, LYOPHILIZED, FOR SOLUTION INTRAVENOUS at 12:40

## 2019-01-04 RX ADMIN — Medication 2: at 05:20

## 2019-01-04 RX ADMIN — Medication 5 MILLILITER(S): at 11:38

## 2019-01-04 NOTE — PROGRESS NOTE ADULT - ASSESSMENT
48 yo Male c/o CP for 2-3 days prior to admission, then 10/10 chest pain approximately 9:30 am yesterday, followed by vomiting.  Wife drove pt to hospital, ruled in for STEMI. Urgent Cath, pt found to have multiple occlusions; stent to LAD and D2; stent to LAD thrombosed,  impella placed for support.  Pt then had VF arrest, shock x 1, return to NSR.  Upon transfer to OR for Urgent CABG, pt with asystolic arrest, chest opened intra-op with CPR in progress. Pt underwent C4V (LAD, Diag, OM and PDA) with inability to wean from CPB therefore requiring VA ECMO (central cannulation) and IABP, he was transferred to CICU.  12/3 s/p ECMO explant and IABP d/c'd, placement of impella via R groin sheath.   12/6 Impella removal via right femoral cutdown with primary repair of femoral artery and stent placed to external iliac artery for dissection.    Hospital course notable for (in addition to above):cardiogenic and septic shock (both since resolved), acute systolic heart failure, vent dependent respiratory failure (inability to wean also contributed to by severe agitation,  s/p trach), fungemia, acute blood loss anemia, GIB (bleeding stomach ulcer), hypokalemia, ileus, sinusitis.    Yesterday, Jan 4, patient was successfully decannulated and is oxygenating 100% on RA,  he has also been ambulating daily with PT.  Plans are for transfer to floor next 48 hours with eventual plan to discharge home vs rehab possibly next week.    s/p Anterior STEMI with failed primary PCI , cardiogenic shock , s/p emergent CABG x 4 , acute systolic heart failure now resolved , EF 25%   Continue convert captopril to lisinopril 5 mg daily   Increase carvedilol as tolerated as patient is tachycardia   continue aldactone  Lasix PRN   Will need lifevest before discharge   continue amiodarone for now .

## 2019-01-04 NOTE — SWALLOW BEDSIDE ASSESSMENT ADULT - SWALLOW EVAL: DIAGNOSIS
Oral & pharyngeal stages deemed WFL, no overt s/s penetration/aspiration noted, however, due to overall hx, will conservatively rx to initiate Soft with Thin liquids.

## 2019-01-04 NOTE — PROGRESS NOTE ADULT - ASSESSMENT
48y/o man with no significant PMH admitted on 11/29/18 for CP for 2-3 days prior to admission,   found with STEMI. Urgent Cath, pt found to have multiple occlusions; VF arrest, shock x 1, return to NSR.  Upon transfer to OR for Urgent CABG, pt again VF arrest, chest opened intra-op with CPR in progress.  s/p ECMO and Impella; 12/3 ECMO explanted and Impella placed via right groin sheath.    Has been on antibiotics :  Vancomycin 11/29 to 12/20  Cipro 11/29 to 12/4  Zosyn 12/4 to 12/12 and 12/15 to 12/16  Meropenem 12/16 to present  Micafungin 12/18 to present     s/p CABG   S/P tracheostomy     - Had low grade fever in last 24hrs  - Blood cultures with Candida Parapsilosis 12/18 and 12/20   - Repeat blood cultures no growth 12/21 and there after  - Continue Micafungin 100mg daily  - Recommend DARION to rule out endocarditis if no vegetations can stop micafungin (completed 2 weeks yesterday)  - Ophthalmology evaluation for candida enophthalmias, no blurred vision  - If Temperature higher than 100.8 repeat blood cultures  - Recommend removing NGT when possible due to concern for sinusitis.   - If febrile, CT of sinuses is recommended.    Will follow.

## 2019-01-04 NOTE — PROGRESS NOTE ADULT - PROBLEM SELECTOR PLAN 9
review medication with clinical pharmacist to look for possible contributory meds  possibly contributed to ICU PTSD, consider psych eval  will d/w Dr Barrett re: changing seroquel to SSRI as suggested by Dr Davis (rehab medicine) review medication with clinical pharmacist to look for possible contributory meds  possibly contributed to ICU PTSD, consider psych eval  will d/w Dr Barrett re: changing seroquel to SSRI as suggested by Dr Davis (rehab medicine)    10. scrotal erosion  stoma powder and cabalon no sting barrier were being used, however noted by RNs that it was drying lesions rather than healing, now transitioned to cabalon and medihoney

## 2019-01-04 NOTE — PROGRESS NOTE ADULT - PROBLEM SELECTOR PLAN 8
remains a concern as pt has had prolonged NGT in place  patient was successfully decannulated, no plan for PEG tube at this time  speech and swallow eval later today    11. scrotal erosion  stoma powder and cabalon no sting barrier were being used, however noted by RNs that it was drying lesions rather than healing, now transitioned to cabjaimen and medihoney remains a concern as pt has had prolonged NGT in place  patient was successfully decannulated, no plan for PEG tube at this time  speech and swallow eval later today

## 2019-01-04 NOTE — PROGRESS NOTE ADULT - PROBLEM SELECTOR PLAN 1
coreg started 12/23, unable to up titrate or add ACEI/ARB d/t SBP only in 90-100s  Lasix d/c'd, aldactone dose halfed to 12.5 mg

## 2019-01-04 NOTE — CHART NOTE - NSCHARTNOTEFT_GEN_A_CORE
Source: Patient [ ]  Family [x ]   other [x ]    Enteral /Parenteral Nutrition: Diet, NPO with Tube Feed:   Tube Feeding Modality: Nasogastric  Nepro  Total Volume for 24 Hours (mL): 1440  Continuous  Starting Tube Feed Rate {mL per Hour}: 60  Until Goal Tube Feed Rate (mL per Hour): 60  Tube Feed Duration (in Hours): 24  Tube Feed Start Time: 09:35  No Carb Prosource (1pkg = 15gms Protein)     Qty per Day:  2 (12-28-18 @ 09:34)      Current Weight:   (12/24) 200#  (12/13) 246#  (12/6) 199#    % Weight Change No current wt, pt in chair during assessment, unsure of accuracy of wt's, No further edema noted. will continue to monitor trends     Pertinent Medications: MEDICATIONS  (STANDING):  amiodarone    Tablet 200 milliGRAM(s) Oral daily  ascorbic acid 500 milliGRAM(s) Oral daily  aspirin 325 milliGRAM(s) Oral daily  atorvastatin 40 milliGRAM(s) Oral at bedtime  carvedilol 3.125 milliGRAM(s) Oral every 12 hours  clopidogrel Tablet 75 milliGRAM(s) Oral daily  enoxaparin Injectable 40 milliGRAM(s) SubCutaneous daily  ferrous    sulfate Liquid 300 milliGRAM(s) Enteral Tube daily  folic acid 1 milliGRAM(s) Oral daily  insulin lispro (HumaLOG) corrective regimen sliding scale   SubCutaneous every 6 hours  melatonin 5 milliGRAM(s) Oral at bedtime  micafungin IVPB 100 milliGRAM(s) IV Intermittent every 24 hours  multivitamin   Solution 5 milliLiter(s) Enteral Tube daily  pantoprazole  Injectable 40 milliGRAM(s) IV Push every 12 hours  potassium chloride   Powder 20 milliEquivalent(s) Oral daily  QUEtiapine 25 milliGRAM(s) Oral <User Schedule>  saccharomyces boulardii 250 milliGRAM(s) Oral two times a day  senna Syrup 10 milliLiter(s) Oral at bedtime  spironolactone 12.5 milliGRAM(s) Oral daily  sucralfate suspension 1 Gram(s) Oral two times a day    MEDICATIONS  (PRN):  acetaminophen    Suspension .. 650 milliGRAM(s) Oral every 6 hours PRN Temp greater or equal to 38.5C (101.3F)  ALPRAZolam 0.25 milliGRAM(s) Oral every 12 hours PRN anxiety  HYDROmorphone   Tablet 2 milliGRAM(s) Oral every 4 hours PRN Moderate Pain (4 - 6)  HYDROmorphone   Tablet 4 milliGRAM(s) Oral every 4 hours PRN Severe Pain (7 - 10)  ondansetron Injectable 4 milliGRAM(s) IV Push every 6 hours PRN Nausea and/or Vomiting  QUEtiapine 12.5 milliGRAM(s) Oral every 8 hours PRN agitation    Pertinent Labs: CBC Full  -  ( 04 Jan 2019 04:40 )  WBC Count : 7.9 K/uL  Hemoglobin : 9.0 g/dL  Hematocrit : 30.4 %  Platelet Count - Automated : 278 K/uL  Mean Cell Volume : 86.6 fl  Mean Cell Hemoglobin : 25.6 pg  Mean Cell Hemoglobin Concentration : 29.6 g/dL  Auto Neutrophil # : x  Auto Lymphocyte # : x  Auto Monocyte # : x  Auto Eosinophil # : x  Auto Basophil # : x  Auto Neutrophil % : x  Auto Lymphocyte % : x  Auto Monocyte % : x  Auto Eosinophil % : x  Auto Basophil % : x        Skin: Surgical sites, healing. Trach site, s/p removal     Nutrition focused physical exam conducted - found signs of malnutrition [ ]absent [x ]present    Subcutaneous fat loss: [x ] Orbital fat pads region, [ ]Buccal fat region, [x ]Triceps region,  [ ]Ribs region    Muscle wasting: [x ]Temples region, [x ]Clavicle region, [ x]Shoulder region, [ ]Scapula region, [ ]Interosseous region,  [ ]thigh region, [ x]Calf region    Estimated Needs:   [ x] no change since previous assessment  [ ] recalculated:     Current Nutrition Diagnosis:  Pt remains at nutrition risk secondary to moderate-acute protein calorie malnutrition related to increased nutrient needs in setting recent STEMI, emergent C4V with ECMO and IABP (now removed), hypoxic resp failure, s/p trach as evidenced by +fluid accumulation in all 4 extremities (now  improved) meeting approximately 75% estimated nutrition needs > 7 days.  Pt awake and alert, Pt continues to tolerate enteral feeds of Nepro @ 60ml/hr ([id number]) 1200ml/day 2160 kcal, 97gm protein.  Pt now off vent support, trach removed. Pending Speech and swallow evaluation today noted. Pt is thirsty and would like to eat, Recommendations below:     Recommendations below:   1. Rx: MVI and Vit. C daily via tolerated route   2. Check wt's daily to monitor trends   3. Monitor Glucose levels.   4. PO diet per SLP Recommendations   5. Glucerna shakes TID (as/if diet advanced) consistency per SLP         Monitoring and Evaluation:   [x ] PO intake [x ] Tolerance to diet prescription [X] Weights  [X] Follow up per protocol [X] Labs:

## 2019-01-04 NOTE — PROGRESS NOTE ADULT - SUBJECTIVE AND OBJECTIVE BOX
CARDIOLOGY PROGRESS NOTE   ([facility])                             Reason for follow up: Anterior STEMI, cardiogenic shock, cardiac arrest, s/p CABG , ECMO/Impella                            Overnight:      Subjective: feels well , walked with PT, ate  Review of symptoms: Cardiac:  No chest pain. No dyspnea. No palpitations.  Respiratory:no cough. No dyspnea  Gastrointestinal: No diarrhea. No abdominal pain. No bleeding.     Past medical history: No updates.     	  Vitals:  T(C): 36.3 (01-04-19 @ 18:05), Max: 36.8 (01-04-19 @ 00:01)  HR: 113 (01-04-19 @ 18:30) (102 - 115)  BP: 105/69 (01-04-19 @ 18:30) (80/56 - 106/77)  RR: 17 (01-04-19 @ 18:30) (13 - 36)  SpO2: 100% (01-04-19 @ 18:30) (97% - 100%)  Wt(kg): --  I&O's Summary    03 Jan 2019 07:01  -  04 Jan 2019 07:00  --------------------------------------------------------  IN: 1410 mL / OUT: 1090 mL / NET: 320 mL    04 Jan 2019 07:01  -  04 Jan 2019 19:41  --------------------------------------------------------  IN: 1220 mL / OUT: 750 mL / NET: 470 mL          PHYSICAL EXAM:  Appearance: Comfortable. No acute distress  HEENT:  NG tube in place   Neck: Supple, No JVD, no carotid bruit  Neurologic: A & O x 3, no focal deficits  Lymphatic: No cervical lymphadenopathy  Cardiovascular: Normal S1 S2, No murmur, rubs/gallops  Respiratory: Lungs clear to auscultation  Gastrointestinal:  Soft, Non-tender, + BS, no HSM  Lower Extremities: No edema, DP and PT +2  Psychiatry: Patient is calm. No agitation. Mood & affect appropriate  Skin: No rashes, No ecchymoses, No cyanosis    CURRENT MEDICATIONS:  amiodarone    Tablet 200 milliGRAM(s) Oral daily  captopril 3.125 milliGRAM(s) Oral daily  carvedilol 3.125 milliGRAM(s) Oral every 12 hours  spironolactone 12.5 milliGRAM(s) Oral daily    micafungin IVPB  aspirin  melatonin  QUEtiapine  pantoprazole  Injectable  senna Syrup  sucralfate suspension  atorvastatin  insulin lispro (HumaLOG) corrective regimen sliding scale  ascorbic acid  clopidogrel Tablet  enoxaparin Injectable  ferrous    sulfate Liquid  folic acid  multivitamin   Solution  potassium chloride   Powder      LABS:	 	                            9.0    7.9   )-----------( 278      ( 04 Jan 2019 04:40 )             30.4     01-04    141  |  102  |  32.0<H>  ----------------------------<  188<H>  4.4   |  27.0  |  0.50    Ca    8.4<L>      04 Jan 2019 04:40  Mg     2.3     01-04          TELEMETRY: 	  Sinus tachycardia  ECG:  	sinus tachycardia, septal and lateral infarct  CXR: no pulmonary congestion     DIAGNOSTIC TESTING:  [ ] Echocardiogram:    < from: TTE Echo Complete w/Doppler (12.26.18 @ 21:02) >   1. Technically limited study.   2. Endocardial visualization was enhanced with intravenous echo contrast.   3. Normal left ventricular internal cavity size.   4. Severely decreased global left ventricular systolic function.   5. Left ventricular ejection fraction, by visual estimation, is 20 to   25%.   6. Multiple left ventricular regional wall motion abnormalities exist.   See wall motion findings.   7.The left ventricular diastolic function could not be assessed in this   study.   8. Moderately reduced RV systolic function.   9. The left atrium is normal in size.  10. The mitral valve leaflets are tethered due to reduced systolic   function and elevated LVDP.  11. Trace tricuspid regurgitation.    < end of copied text >    [ ]  Catheterization:  < from: Cardiac Cath Lab - Adult (11.29.18 @ 11:22) >  EF calculated by contrast ventriculography was 30 %.  VALVES: MITRAL VALVE: The mitral valve exhibited no regurgitation.  CORONARY VESSELS: The coronary circulation is right dominant.  LM:   --  Ostial LM: There was a 20 % stenosis.  LAD:   --  Proximal LAD: There was a tubular 80 % stenosis at a site with  no prior intervention, in the distal third of the vessel segment. The  lesion was hazy, eccentric, and associated with a moderate filling defect  consistent with thrombus. There was FAZAL grade 0 flow through the vessel  (no flow) and a large vascular territory distal to the lesion. This lesion  is a likely culprit for the patient's recent myocardial infarction. It  appears amenable to percutaneous intervention.  --  Mid LAD: There was a 100 % stenosis.  --  D1: The vessel was small to medium sized. There was a 100 % stenosis.  --  D2: The vessel was large sized. There was a 100 % stenosis.  CX:   --  Circumflex: The vessel was medium sized. There was a tubular 80 %  stenosis in the middle third of the vessel segment. The lesion was  eccentric.  --  OM1: The vessel was medium to large sized. There was a tubular 90 %  stenosis in the middle third of the vessel segment. The lesion was hazy,  concentric, and without evidence of thrombus. There was FAZAL grade 1 flow  through the vessel (slow flow without perfusion).  RCA:   --  RCA: The vessel was large sized (dominant).  --  Proximal RCA: There was a tubular 80 % stenosis in the middle third of  the vessel segment.  --  Distal RCA: There was a tubular 99 % stenosis.  --  RPDA: The vessel was medium sized. Angiography showed mild  atherosclerosis with no flow limiting lesions.  --  RPLS: The vessel was medium to large sized. Angiography showed mild  atherosclerosis with no flow limiting lesions. There was a tubular 99 %  stenosis in the middle third of thevessel segment.  COMPLICATIONS: Ventricular fibrillation occurred, developed after no reflow  in the LAD and after appearance of filling defect in the left main.  Impella CP 3.5 was already in placed and patient was shocked with 300 J to  NSR  DIAGNOSTIC IMPRESSIONS: Severe Multivessel CAD with LAD being culprit for  anterior STEMI  Severe LV systolic dysfunction  Elevated left ventricular filling pressure  DIAGNOSTIC RECOMMENDATIONS: Given anterior STEMI, decision was to proceed  with primary PCI of the LAD and diagonal 2 for more timely reperfusion of  the LAD territory.  INTERVENTIONAL IMPRESSIONS: 100% LAD lesion was crossed using 0.014  Runthrough wire, 100% D2 lesion was crossed using a 0.014 BMW universal  wire . Multiple balloon angioplasty of the LAD and D2 was performed using  a 1.5 , 2.0 (LAD and diagonal 2) and 2.5 balllon (LAD) . This appeared to  be a bifurcation lesion Mejia 1,1,1 with both LAD and diagonal 2 being  equally sized vessel. Kissing balloon angioplasty was performed. There  were temporary flow in both vessels but then no reflow.  ITA stenting of LAD using 2.75 x 28 mm stent was done resulting in flow in  the LAD but absence of flow D2. D2 lesion was recrossed with 0.014 whisper  wire and ITA stenting was placed in the D2 restoring Diagonal flow but  resulted in loss of flow in the LAD stent. LAD stent was recrossed with a  0.014 runthrough wire and angioplasty performed with no reflow however.  Filling defect appeared in the left main that appeared to be a thrombus (  likely proximal thrombus extension from LAD) . Decision was to placed  Impella CP 3.5 L support and transfer the patient to OR for emergency CABG  INTERVENTIONAL RECOMMENDATIONS: Emergency CABG with grafts to the LAD, D2,  OM1 and RPDA.  Prepared and signed by  Patricio Shen MD    < end of copied text >

## 2019-01-04 NOTE — SWALLOW BEDSIDE ASSESSMENT ADULT - SWALLOW EVAL: RECOMMENDED FEEDING/EATING TECHNIQUES
position upright (90 degrees)/maintain upright posture during/after eating for 30 mins/no straws/oral hygiene/small sips/bites

## 2019-01-04 NOTE — PROGRESS NOTE ADULT - PROBLEM SELECTOR PLAN 2
blood cultures negative 12/24 and12/25  continue micafungin as per ID for positive blood cultures Candida Parapsilosis 12/18 and 12/20

## 2019-01-04 NOTE — PROGRESS NOTE ADULT - PROBLEM SELECTOR PLAN 4
tolerating nepro TF at goal 60cc/hr   continue prosource  plan for speech and swallow eval later today

## 2019-01-04 NOTE — PROGRESS NOTE ADULT - ASSESSMENT
48 yo Male c/o CP for 2-3 days prior to admission, then 10/10 chest pain approximately 9:30 am yesterday, followed by vomiting.  Wife drove pt to hospital, ruled in for STEMI. Urgent Cath, pt found to have multiple occlusions; stent to LAD and D2; stent to LAD thrombosed,  impella placed for support.  Pt then had VF arrest, shock x 1, return to NSR.  Upon transfer to OR for Urgent CABG, pt with asystolic arrest, chest opened intra-op with CPR in progress. Pt underwent C4V (LAD, Diag, OM and PDA) with inability to wean from CPB therefore requiring VA ECMO (central cannulation) and IABP, he was transferred to CICU.  12/3 s/p ECMO explant and IABP d/c'd, placement of impella via R groin sheath.   12/6 Impella removal via right femoral cutdown with primary repair of femoral artery and stent placed to external iliac artery for dissection.    Hospital course notable for (in addition to above):cardiogenic and septic shock (both since resolved), acute systolic heart failure, vent dependent respiratory failure (inability to wean also contributed to by severe agitation, now s/p trach), fungemia, acute blood loss anemia, GIB (bleeding stomach ulcer), hypokalemia, ileus, sinusitis.    Yesterday, Jan 4, patient was successfully decannulated and is oxygenating 100% on RA,  he has also been ambulating daily with PT.  Plans are for transfer to floor next 48 hours with eventual plan to discharge home vs rehab possibly next week.

## 2019-01-04 NOTE — PROGRESS NOTE ADULT - SUBJECTIVE AND OBJECTIVE BOX
[facility]  INFECTIOUS DISEASES AND INTERNAL MEDICINE at [place]  =======================================================  Perfecto Santizo MD  Diplomates American Board of Internal Medicine and Infectious Diseases  =======================================================    PATRICK LYLE [id number]    Follow up: Fungemia    Afebrile  Awake and alert, answers questions.   NG tube in place but he was decannulated yesterday, talking normally, was able to drink water today.   Afebrile    Allergies:  penicillins (Unknown)    Antibiotics:    micafungin IVPB 100 milliGRAM(s) IV Intermittent every 24 hours     REVIEW OF SYSTEMS:  as above     Physical Exam:  Vital Signs Last 24 Hrs  T(C): 36.4 (04 Jan 2019 12:50), Max: 36.8 (04 Jan 2019 00:01)  T(F): 97.5 (04 Jan 2019 12:50), Max: 98.2 (04 Jan 2019 00:01)  HR: 106 (04 Jan 2019 14:00) (105 - 115)  BP: 106/77 (04 Jan 2019 09:00) (80/56 - 106/77)  BP(mean): 63 (04 Jan 2019 01:00) (63 - 81)  RR: 25 (04 Jan 2019 14:00) (13 - 36)  SpO2: 100% (04 Jan 2019 14:00) (97% - 100%)  GEN: NAD , awake   HEENT: normocephalic and atraumatic. EOMI. PERRL. + NGT  NECK: Supple. + Trach and NGT  LUNGS: Coarse BS B/L  HEART: Regular rate and rhythm   ABDOMEN: Soft, nontender, and nondistended.  Positive bowel sounds.    : + Mckeon  EXTREMITIES: Without any edema.  MSK: no joint swelling  NEUROLOGIC: Awake, alert follows commands  PSYCHIATRIC: Appropriate affect .  SKIN: Sternal wound dressing, + wound vac, Leg dressings in place    Labs:  01-04    141  |  102  |  32.0<H>  ----------------------------<  188<H>  4.4   |  27.0  |  0.50    Ca    8.4<L>      04 Jan 2019 04:40  Mg     2.3     01-04                        9.0    7.9   )-----------( 278      ( 04 Jan 2019 04:40 )             30.4     ABG - ( 04 Jan 2019 06:23 )  pH, Arterial: 7.51  pH, Blood: x     /  pCO2: 36    /  pO2: 78    / HCO3: 29    / Base Excess: 5.6   /  SaO2: 97        RECENT CULTURES:  12-25 @ 06:38 .Blood     No growth at 5 days.    [facility]  12-24 @ 03:01 .Blood     No growth at 5 days.    CXR: 1/2/19  Findings:  No change in tracheostomy tube or NG tube. Patchy bilateral airspace   disease left greater than right, unchanged since the prior study. No   evidence of pneumothorax or pleural effusion..    Impression:  Stable exam without significant change since the previous study..

## 2019-01-04 NOTE — PROGRESS NOTE ADULT - PROBLEM SELECTOR PLAN 6
improving  continue with PT and OT  acute rehab consulted, pt may be candidate for rehab vs home next week

## 2019-01-04 NOTE — PROGRESS NOTE ADULT - PROBLEM SELECTOR PLAN 3
s/p trach  maintained on trach collar since 12/31 6AM  successfully decannulated Bruno 3, oxygenating 100% on RA

## 2019-01-04 NOTE — SWALLOW BEDSIDE ASSESSMENT ADULT - ASR SWALLOW ASPIRATION MONITOR
fever/pneumonia/change of breathing pattern/position upright (90Y)/cough/gurgly voice/oral hygiene/throat clearing/upper respiratory infection gurgly voice/change of breathing pattern/cough/fever/pneumonia/oral hygiene/position upright (90Y)/throat clearing/upper respiratory infection/if any s/s aspiration noted d/c PO & resume NPO

## 2019-01-04 NOTE — PROGRESS NOTE ADULT - SUBJECTIVE AND OBJECTIVE BOX
Patient is in bed, family at bedside.   Reports no pain.   Passed MBS and started on Soft and thins.  Plan for OOB today with therapy.   Provided exercises in bed, reinforced with family.    FUNCTIONAL PROGRESS  1/3  Gait Training  Gait Training Rehab Potential: good, to achieve stated therapy goals  Gait Training: supervsion;  supervision;  verbal cues;  1 person + 1 person to manage equipment;  chair follow;  rolling walker;  150 feet  Gait Analysis: 2-point gait   150 feet;  rolling walker    REVIEW OF SYSTEMS  Constitutional - No fever,  +fatigue  Neurological - No headaches, No memory loss, +loss of strength, No numbness, No tremors  Skin - No rashes, +lesions   Musculoskeletal - No joint pain, No joint swelling, No muscle pain  Psychiatric - No depression, No anxiety    VITALS  T(C): 36.4 (01-04-19 @ 09:04), Max: 36.8 (01-04-19 @ 00:01)  HR: 114 (01-04-19 @ 10:00) (105 - 114)  BP: 106/77 (01-04-19 @ 09:00) (80/56 - 106/77)  RR: 27 (01-04-19 @ 10:00) (13 - 36)  SpO2: 99% (01-04-19 @ 10:00) (97% - 100%)  Wt(kg): --    MEDICATIONS   acetaminophen    Suspension .. 650 milliGRAM(s) every 6 hours PRN  ALPRAZolam 0.25 milliGRAM(s) every 12 hours PRN  amiodarone    Tablet 200 milliGRAM(s) daily  ascorbic acid 500 milliGRAM(s) daily  aspirin 325 milliGRAM(s) daily  atorvastatin 40 milliGRAM(s) at bedtime  carvedilol 3.125 milliGRAM(s) every 12 hours  clopidogrel Tablet 75 milliGRAM(s) daily  enoxaparin Injectable 40 milliGRAM(s) daily  ferrous    sulfate Liquid 300 milliGRAM(s) daily  folic acid 1 milliGRAM(s) daily  HYDROmorphone   Tablet 2 milliGRAM(s) every 4 hours PRN  HYDROmorphone   Tablet 4 milliGRAM(s) every 4 hours PRN  insulin lispro (HumaLOG) corrective regimen sliding scale   every 6 hours  melatonin 5 milliGRAM(s) at bedtime  micafungin IVPB 100 milliGRAM(s) every 24 hours  multivitamin   Solution 5 milliLiter(s) daily  ondansetron Injectable 4 milliGRAM(s) every 6 hours PRN  pantoprazole  Injectable 40 milliGRAM(s) every 12 hours  potassium chloride   Powder 20 milliEquivalent(s) daily  QUEtiapine 12.5 milliGRAM(s) every 8 hours PRN  QUEtiapine 25 milliGRAM(s) <User Schedule>  saccharomyces boulardii 250 milliGRAM(s) two times a day  senna Syrup 10 milliLiter(s) at bedtime  spironolactone 12.5 milliGRAM(s) daily  sucralfate suspension 1 Gram(s) two times a day      RECENT LABS/IMAGING  CBC Full  -  ( 04 Jan 2019 04:40 )  WBC Count : 7.9 K/uL  Hemoglobin : 9.0 g/dL  Hematocrit : 30.4 %  Platelet Count - Automated : 278 K/uL  Mean Cell Volume : 86.6 fl  Mean Cell Hemoglobin : 25.6 pg  Mean Cell Hemoglobin Concentration : 29.6 g/dL  Auto Neutrophil # : x  Auto Lymphocyte # : x  Auto Monocyte # : x  Auto Eosinophil # : x  Auto Basophil # : x  Auto Neutrophil % : x  Auto Lymphocyte % : x  Auto Monocyte % : x  Auto Eosinophil % : x  Auto Basophil % : x    01-04    141  |  102  |  32.0<H>  ----------------------------<  188<H>  4.4   |  27.0  |  0.50    Ca    8.4<L>      04 Jan 2019 04:40  Mg     2.3     01-04      ----------------------------------------------------------------------------  PHYSICAL EXAM  Constitutional - NAD, Comfortable  Extremities - BLE swelling, BLE incisions + staples CDI  Neurologic Exam -                    Cognitive - AAOx 3     Communication - Improved     Motor -                      LEFT    UE - ShAB 1/5, EF 4/5, EE 4/5,  4/5                    RIGHT UE - ShAB 5/5, EF 5/5, EE 5/5,  5/5                    LEFT    LE - HF 5/5, KE 5/5, DF 5/5, PF 5/5                    RIGHT LE - HF 5/5, KE 5/5, DF 5/5, PF 5/5         Sensory - Intact to LT  Psychiatric - Affect flat	  ----------------------------------------------------------------------------------------  ASSESSMENT/PLAN  48y Male with functional deficits after STEMI, cardiac arrest s/p CABG, followed by complicated hospital course including need for trach, ileus/tube feeds, GI bleed, sepsis.   Pain - Tylenol, Dilaudid  Sleep - Melatonin   Mood - Xanax, Recommend DC of Seroquel and suggest SSRI  CAD - ASA, Plavix, Lipitor  DVT PPX - Lovenox    Candida - Mycamine   Pulm- Tolerating trach collar, progressing as tolerated  Rehab - Patient ambulated 150 feet supervision. Recommend DC HOME with HOME CARE. 	    Continue bedside therapy as well as OOB throughout the day with mobilization by staff to maintain cardiopulmonary function and prevention of secondary complications related to debility.

## 2019-01-04 NOTE — PROGRESS NOTE ADULT - SUBJECTIVE AND OBJECTIVE BOX
Subjective:  47yo Male improving daily.  Pt was decannulated yesterday and is doing quite well on room air.   Pt has no c/o pain and is visibly excited to be able to communicate with everyone.    Past Medical History:  ST elevation myocardial infarction (STEMI)  Family history of myocardial infarction (Mother)  No pertinent family history in first degree relatives  Handoff  MEWS Score  Staph infection  No pertinent past medical history  Acute gastric ulcer with hemorrhage  Chronic respiratory failure with hypoxia  Cardiogenic shock  Hematemesis, presence of nausea not specified  Chronic respiratory failure with hypoxia  Cardiogenic shock  ST elevation myocardial infarction (STEMI), unspecified artery  Nightmare  Sinusitis  Moderate protein-calorie malnutrition  Respiratory failure, post-operative  Abdominal pain  Fungemia  Gastric ulcer with hemorrhage but without obstruction, acute  Hematemesis, presence of nausea not specified  GI bleed  Coronary artery disease involving native coronary artery of native heart, angina presence unspecified  Critical illness myopathy  Hypernatremia  Shock  Hypoxia  Ileus  Septic shock  Malnutrition  Right ventricular dysfunction  Acute overt combined systolic and diastolic congestive heart failure  Klebsiella pneumonia  Pulmonary edema  Agitation  Acute respiratory failure with hypoxia  Other encephalopathy  Fever, unspecified fever cause  Thrombocytopenia  Anemia due to blood loss  Acute systolic heart failure  Respiratory failure  Cardiogenic shock  Staph infection  Ventricular fibrillation  Coronary artery disease involving native coronary artery of native heart with unstable angina pectoris  Dissection of left main coronary artery  ST elevation myocardial infarction involving left anterior descending (LAD) coronary artery  STEMI (ST elevation myocardial infarction)  EGD w control of hemorrhage w image guidance  Tracheostomy  Exploration of femoral artery  Insertion of Impella device  ECMO decannulation  Exploration and washout of mediastinum  Intra-aortic balloon pump removal  Arterial cannulation  Central line placement  Cameron Michelle placement  CABG  ECMO (extracorporeal membrane oxygenation)  No significant past surgical history  CHEST PAIN  90+        acetaminophen    Suspension .. 650 milliGRAM(s) Oral every 6 hours PRN  ALPRAZolam 0.25 milliGRAM(s) Oral every 12 hours PRN  ascorbic acid 500 milliGRAM(s) Oral daily  aspirin 325 milliGRAM(s) Oral daily  atorvastatin 40 milliGRAM(s) Oral at bedtime  carvedilol 3.125 milliGRAM(s) Oral every 12 hours  clopidogrel Tablet 75 milliGRAM(s) Oral daily  enoxaparin Injectable 40 milliGRAM(s) SubCutaneous daily  ferrous    sulfate Liquid 300 milliGRAM(s) Enteral Tube daily  folic acid 1 milliGRAM(s) Oral daily  HYDROmorphone   Tablet 2 milliGRAM(s) Oral every 4 hours PRN  HYDROmorphone   Tablet 4 milliGRAM(s) Oral every 4 hours PRN  insulin lispro (HumaLOG) corrective regimen sliding scale   SubCutaneous every 6 hours  melatonin 5 milliGRAM(s) Oral at bedtime  micafungin IVPB 100 milliGRAM(s) IV Intermittent every 24 hours  multivitamin   Solution 5 milliLiter(s) Enteral Tube daily  ondansetron Injectable 4 milliGRAM(s) IV Push every 6 hours PRN  pantoprazole  Injectable 40 milliGRAM(s) IV Push every 12 hours  potassium chloride   Powder 20 milliEquivalent(s) Oral daily  QUEtiapine 12.5 milliGRAM(s) Oral every 8 hours PRN  QUEtiapine 25 milliGRAM(s) Oral <User Schedule>  saccharomyces boulardii 250 milliGRAM(s) Oral two times a day  senna Syrup 10 milliLiter(s) Oral at bedtime  spironolactone 12.5 milliGRAM(s) Oral daily  sucralfate suspension 1 Gram(s) Oral two times a day  MEDICATIONS  (PRN):  acetaminophen    Suspension .. 650 milliGRAM(s) Oral every 6 hours PRN Temp greater or equal to 38.5C (101.3F)  ALPRAZolam 0.25 milliGRAM(s) Oral every 12 hours PRN anxiety  HYDROmorphone   Tablet 2 milliGRAM(s) Oral every 4 hours PRN Moderate Pain (4 - 6)  HYDROmorphone   Tablet 4 milliGRAM(s) Oral every 4 hours PRN Severe Pain (7 - 10)  ondansetron Injectable 4 milliGRAM(s) IV Push every 6 hours PRN Nausea and/or Vomiting  QUEtiapine 12.5 milliGRAM(s) Oral every 8 hours PRN agitation                            9.5    6.5   )-----------( 265      ( 02 Jan 2019 05:01 )             31.5   01-04    143  |  102  |  33.0<H>  ----------------------------<  175<H>  3.9   |  28.0  |  0.46<L>    Ca    8.4<L>      04 Jan 2019 00:56  Phos  4.2     01-02  Mg     2.2     01-04    TPro  6.2<L>  /  Alb  2.9<L>  /  TBili  1.6  /  DBili  x   /  AST  82<H>  /  ALT  77<H>  /  AlkPhos  232<H>  01-02            Objective:  T(C): 36.8 (01-04-19 @ 00:01), Max: 36.8 (01-04-19 @ 00:01)  HR: 107 (01-04-19 @ 03:00) (105 - 119)  BP: 83/53 (01-04-19 @ 01:00) (80/56 - 105/69)  RR: 20 (01-04-19 @ 03:00) (15 - 29)  SpO2: 98% (01-04-19 @ 03:00) (97% - 100%)  Wt(kg): --CAPILLARY BLOOD GLUCOSE      POCT Blood Glucose.: 173 mg/dL (03 Jan 2019 23:52)  POCT Blood Glucose.: 166 mg/dL (03 Jan 2019 17:20)  POCT Blood Glucose.: 183 mg/dL (03 Jan 2019 11:50)  POCT Blood Glucose.: 188 mg/dL (03 Jan 2019 05:17)  I&O's Summary    02 Jan 2019 07:01  -  03 Jan 2019 07:00  --------------------------------------------------------  IN: 1920 mL / OUT: 1650 mL / NET: 270 mL    03 Jan 2019 07:01  -  04 Jan 2019 03:14  --------------------------------------------------------  IN: 1080 mL / OUT: 1090 mL / NET: -10 mL        Lines:  PIV    Physical Exam:  Neuro: A0X3, non focal  Pulm: CtA b/l Unlabored  CV: s1/s2   reg  Abd: soft nt/nd  Ext: warm, no edema, well perfused

## 2019-01-05 DIAGNOSIS — S31.109A UNSPECIFIED OPEN WOUND OF ABDOMINAL WALL, UNSPECIFIED QUADRANT WITHOUT PENETRATION INTO PERITONEAL CAVITY, INITIAL ENCOUNTER: ICD-10-CM

## 2019-01-05 DIAGNOSIS — N50.9 DISORDER OF MALE GENITAL ORGANS, UNSPECIFIED: ICD-10-CM

## 2019-01-05 LAB
GLUCOSE BLDC GLUCOMTR-MCNC: 141 MG/DL — HIGH (ref 70–99)
GLUCOSE BLDC GLUCOMTR-MCNC: 145 MG/DL — HIGH (ref 70–99)
GLUCOSE BLDC GLUCOMTR-MCNC: 181 MG/DL — HIGH (ref 70–99)
GLUCOSE BLDC GLUCOMTR-MCNC: 235 MG/DL — HIGH (ref 70–99)

## 2019-01-05 PROCEDURE — 99232 SBSQ HOSP IP/OBS MODERATE 35: CPT | Mod: 24

## 2019-01-05 PROCEDURE — 99232 SBSQ HOSP IP/OBS MODERATE 35: CPT

## 2019-01-05 RX ORDER — ALPRAZOLAM 0.25 MG
0.5 TABLET ORAL EVERY 6 HOURS
Qty: 0 | Refills: 0 | Status: DISCONTINUED | OUTPATIENT
Start: 2019-01-05 | End: 2019-01-09

## 2019-01-05 RX ORDER — ALPRAZOLAM 0.25 MG
0.25 TABLET ORAL ONCE
Qty: 0 | Refills: 0 | Status: DISCONTINUED | OUTPATIENT
Start: 2019-01-05 | End: 2019-01-05

## 2019-01-05 RX ORDER — ACETAMINOPHEN 500 MG
650 TABLET ORAL ONCE
Qty: 0 | Refills: 0 | Status: COMPLETED | OUTPATIENT
Start: 2019-01-05 | End: 2019-01-05

## 2019-01-05 RX ORDER — DIPHENHYDRAMINE HCL 50 MG
50 CAPSULE ORAL ONCE
Qty: 0 | Refills: 0 | Status: COMPLETED | OUTPATIENT
Start: 2019-01-05 | End: 2019-01-05

## 2019-01-05 RX ORDER — BENZOCAINE AND MENTHOL 5; 1 G/100ML; G/100ML
1 LIQUID ORAL THREE TIMES A DAY
Qty: 0 | Refills: 0 | Status: DISCONTINUED | OUTPATIENT
Start: 2019-01-05 | End: 2019-01-11

## 2019-01-05 RX ADMIN — Medication 1 GRAM(S): at 17:20

## 2019-01-05 RX ADMIN — Medication 0.5 MILLIGRAM(S): at 12:25

## 2019-01-05 RX ADMIN — AMIODARONE HYDROCHLORIDE 200 MILLIGRAM(S): 400 TABLET ORAL at 06:20

## 2019-01-05 RX ADMIN — Medication 0.25 MILLIGRAM(S): at 00:40

## 2019-01-05 RX ADMIN — SENNA PLUS 10 MILLILITER(S): 8.6 TABLET ORAL at 20:20

## 2019-01-05 RX ADMIN — Medication 1 GRAM(S): at 06:20

## 2019-01-05 RX ADMIN — SPIRONOLACTONE 12.5 MILLIGRAM(S): 25 TABLET, FILM COATED ORAL at 08:02

## 2019-01-05 RX ADMIN — MICAFUNGIN SODIUM 105 MILLIGRAM(S): 100 INJECTION, POWDER, LYOPHILIZED, FOR SOLUTION INTRAVENOUS at 14:04

## 2019-01-05 RX ADMIN — Medication 325 MILLIGRAM(S): at 12:24

## 2019-01-05 RX ADMIN — Medication 50 MILLIGRAM(S): at 14:56

## 2019-01-05 RX ADMIN — ATORVASTATIN CALCIUM 40 MILLIGRAM(S): 80 TABLET, FILM COATED ORAL at 20:20

## 2019-01-05 RX ADMIN — Medication 4: at 08:01

## 2019-01-05 RX ADMIN — CLOPIDOGREL BISULFATE 75 MILLIGRAM(S): 75 TABLET, FILM COATED ORAL at 12:24

## 2019-01-05 RX ADMIN — Medication 2: at 17:52

## 2019-01-05 RX ADMIN — Medication 650 MILLIGRAM(S): at 20:20

## 2019-01-05 RX ADMIN — PANTOPRAZOLE SODIUM 40 MILLIGRAM(S): 20 TABLET, DELAYED RELEASE ORAL at 06:20

## 2019-01-05 RX ADMIN — Medication 0.25 MILLIGRAM(S): at 09:43

## 2019-01-05 RX ADMIN — CARVEDILOL PHOSPHATE 3.12 MILLIGRAM(S): 80 CAPSULE, EXTENDED RELEASE ORAL at 12:24

## 2019-01-05 RX ADMIN — Medication 5 MILLIGRAM(S): at 20:20

## 2019-01-05 RX ADMIN — Medication 650 MILLIGRAM(S): at 21:01

## 2019-01-05 RX ADMIN — QUETIAPINE FUMARATE 25 MILLIGRAM(S): 200 TABLET, FILM COATED ORAL at 20:20

## 2019-01-05 RX ADMIN — Medication 5 MILLILITER(S): at 12:24

## 2019-01-05 RX ADMIN — Medication 20 MILLIEQUIVALENT(S): at 12:24

## 2019-01-05 RX ADMIN — CAPTOPRIL 3.12 MILLIGRAM(S): 12.5 TABLET ORAL at 17:21

## 2019-01-05 RX ADMIN — Medication 250 MILLIGRAM(S): at 17:21

## 2019-01-05 RX ADMIN — HYDROMORPHONE HYDROCHLORIDE 2 MILLIGRAM(S): 2 INJECTION INTRAMUSCULAR; INTRAVENOUS; SUBCUTANEOUS at 01:40

## 2019-01-05 RX ADMIN — HYDROMORPHONE HYDROCHLORIDE 2 MILLIGRAM(S): 2 INJECTION INTRAMUSCULAR; INTRAVENOUS; SUBCUTANEOUS at 02:30

## 2019-01-05 RX ADMIN — ENOXAPARIN SODIUM 40 MILLIGRAM(S): 100 INJECTION SUBCUTANEOUS at 12:24

## 2019-01-05 RX ADMIN — Medication 0.25 MILLIGRAM(S): at 09:08

## 2019-01-05 RX ADMIN — Medication 500 MILLIGRAM(S): at 12:24

## 2019-01-05 RX ADMIN — PANTOPRAZOLE SODIUM 40 MILLIGRAM(S): 20 TABLET, DELAYED RELEASE ORAL at 17:21

## 2019-01-05 RX ADMIN — Medication 250 MILLIGRAM(S): at 06:20

## 2019-01-05 NOTE — PROGRESS NOTE ADULT - PROBLEM SELECTOR PLAN 1
ccontinue low dose coreg  low dose captopril started 1/4/19  Lasix d/c'd, dose PRN for now  continue low dose aldactone   Life Vest upon d/c per cardiology

## 2019-01-05 NOTE — PROGRESS NOTE ADULT - SUBJECTIVE AND OBJECTIVE BOX
Brief Hospital Course:    STEMI, emergently to cath lab, left main thrombus, cardiac arrest requiring defib, intubated in cath lab, impella placed which clotted off on way to OR and was subsequently removed in OR. Crashed onto CPB for CABG d/t hemodynamic collapse. TO CTICU post CABG with central ECMO and open chest.  12/3 ECMO explanted and IABP removed, impella inserted   impella removed via R femoral cutdown with repair and external iliac stent  12/10 s/p trach for prolonged respiratory failure   abd distention, TF held, 2L NGT output, +ileus on AXR, gastrographin study done  12/15 ileus resolved, resumed for meds via NGT. Respiratory distress, febrile, septic and cardiogenic shock requiring restarting of abx, epinephrine and norepinephrine infusions   bilat pleural CTs d/c'd, more awake and alert, zosyn changed to meropenem    mycofungin added for candida parapisolsis fungemia, epinephrine weaned off, vac placed to R groin, + UGIB, started on pantoprazole infusion   EGD (+ bleeding stomach ulcer, injected with epi and hemoclip placed x 4)   rising LFTs, N/V, tube feeds held, AXR with ileus    abd US Splenomegaly. Gallbladder wall thickening with sludge and pericholecystic edema suggesting acute cholecystitis, surgery consulted > NTD   tolerating tube feeds back at goal, CPAP vs trach collar trials as tolerated  1/3/19 decannulated after tolerating trach collar since 6am on 18  1/ passed bedside swallow eval (soft with thi n liquids), lasix d/c'd, low dose captopril added    Subjective: restless, "can't stop moving my hands", difficulty sleeping. Denies CP, SOB, dizziness, N/V, fever/chills, abd pain, other c/o.    Review of Systems: negative x 10 systems except as noted above      Patient is a 48y old  Male who presents with a chief complaint of Anterior STEMI, cardiogenic shock (2019 19:41)    HPI:  This is a 46 y/o male no significant PMH; recent ABT (doxycycline) r/t cellulitis to right groin per pt. Pt presented to the ED with 10/10 chest pain that he reported started at 0930; he drank water with no relief; pain started to progress to B/L shoulders. Per pt spouse, she reports he has had chest pain x2-3 days and this morning he vomited which he attributed to reflux.  She brought him into the emergency department.    Pt has significant EKG changes ST elevations in leads V1-V5 with reciprocal changes in II, III, AVF.  Pt rec'd asa and plavix load in ED. (2018 11:32)    PAST MEDICAL & SURGICAL HISTORY:  Staph infection  No significant past surgical history    FAMILY HISTORY:  Family history of myocardial infarction (Mother): mother;       Vitals   ICU Vital Signs Last 24 Hrs  T(C): 37.6 (2019 00:00), Max: 37.6 (2019 00:00)  T(F): 99.6 (2019 00:00), Max: 99.6 (2019 00:00)  HR: 111 (2019 01:00) (102 - 115), ST  BP: 90/62 (2019 01:00) (74/50 - 106/77)  BP(mean): 71 (2019 01:00) (57 - 80)  RR: 22 (2019 01:00) (12 - 36)  SpO2: 100% (2019 01:00) (97% - 100%) on room air      I&O's Detail    2019 07:01  -  2019 07:00  --------------------------------------------------------  Total IN: 1410 mL  Total OUT: 1090 mL  Total NET: 320 mL    2019 07:01  -  2019 01:32  --------------------------------------------------------  IN:    Enteral Tube Flush: 120 mL    Nepro: 1080 mL    Oral Fluid: 305 mL    Solution: 100 mL  Total IN: 1605 mL    OUT:    Voided: 900 mL  Total OUT: 900 mL    Total NET: 705 mL      LABS                        9.0    7.9   )-----------( 278      ( 2019 04:40 )             30.4         141  |  102  |  32.0<H>  ----------------------------<  188<H>  4.4   |  27.0  |  0.50    Ca    8.4<L>      2019 04:40  Mg     2.3         ABG - ( 2019 06:23 )  pH, Arterial: 7.51  /  pCO2: 36    /  pO2: 78    / HCO3: 29    / Base Excess: 5.6   /  SaO2: 97        POCT Blood Glucose.: 150 mg/dL (19 @ 21:55)  POCT Blood Glucose.: 154 mg/dL (19 @ 16:36)  POCT Blood Glucose.: 190 mg/dL (19 @ 11:49)  POCT Blood Glucose.: 193 mg/dL (19 @ 05:19)      < from: Xray Chest 1 View- PORTABLE-Routine (19 @ 05:29) >  FINDINGS:   2018 chest available for review.  NG tube tip beyond GE junction.  The lungs  are clear.  No pleural abnormality is seen.  The heart and mediastinum are within normal limits following open heart surgery.   Visualized osseous structures are intact.  IMPRESSION:    Status post open heart surgery.   No evidence of active chest pathology.   Catheters in place.  < end of copied text >      MEDICATIONS  (STANDING):  amiodarone    Tablet 200 milliGRAM(s) Oral daily  ascorbic acid 500 milliGRAM(s) Oral daily  aspirin 325 milliGRAM(s) Oral daily  atorvastatin 40 milliGRAM(s) Oral at bedtime  captopril 3.125 milliGRAM(s) Oral daily  carvedilol 3.125 milliGRAM(s) Oral every 12 hours  clopidogrel Tablet 75 milliGRAM(s) Oral daily  enoxaparin Injectable 40 milliGRAM(s) SubCutaneous daily  ferrous    sulfate Liquid 300 milliGRAM(s) Enteral Tube daily  folic acid 1 milliGRAM(s) Oral daily  insulin lispro (HumaLOG) corrective regimen sliding scale   SubCutaneous Before meals and at bedtime  melatonin 5 milliGRAM(s) Oral at bedtime  micafungin IVPB 100 milliGRAM(s) IV Intermittent every 24 hours  multivitamin   Solution 5 milliLiter(s) Enteral Tube daily  pantoprazole  Injectable 40 milliGRAM(s) IV Push every 12 hours  potassium chloride   Powder 20 milliEquivalent(s) Oral daily  QUEtiapine 25 milliGRAM(s) Oral <User Schedule>  saccharomyces boulardii 250 milliGRAM(s) Oral two times a day  senna Syrup 10 milliLiter(s) Oral at bedtime  spironolactone 12.5 milliGRAM(s) Oral daily  sucralfate suspension 1 Gram(s) Oral two times a day    MEDICATIONS  (PRN):  acetaminophen    Suspension .. 650 milliGRAM(s) Oral every 6 hours PRN Temp greater or equal to 38.5C (101.3F)  ALPRAZolam 0.25 milliGRAM(s) Oral every 12 hours PRN anxiety  HYDROmorphone   Tablet 2 milliGRAM(s) Oral every 4 hours PRN Moderate Pain (4 - 6)  HYDROmorphone   Tablet 4 milliGRAM(s) Oral every 4 hours PRN Severe Pain (7 - 10)  ondansetron Injectable 4 milliGRAM(s) IV Push every 6 hours PRN Nausea and/or Vomiting  QUEtiapine 12.5 milliGRAM(s) Oral every 8 hours PRN agitation    Allergies: penicillins (Unknown)      Physical Exam:   Constitutional: NAD, well-groomed, well-developed  HEENT: PERRLA, EOMI, no drainage or redness, NGT in place  Neck: supple,  No JVD, trach stoma with gauze tapped over it (C/D/I)  Respiratory: Breath Sounds equal & clear bilaterally to auscultation, no accessory muscle use noted  Chest: midsternal incision well approximated, stable, no erythema, d/c or heat, healing well. Pacing wires isolated.  Cardiovascular: tachycardic, regular rhythm, normal S1, S2; no murmurs or rub  Gastrointestinal: Soft, non-tender, non distended, + bowel sounds  Extremities: GALLARDO x 4, no peripheral edema, no cyanosis, no clubbing. Bilat SVG sites well approximated, no erythema, d/c or heat, healing well, minimal resolving ecchymosis. Right groin with dressing C/D/I.  Neurological: A+O x 3; speech clear and intact; no sensory, motor  deficits, normal reflexes  Skin: warm, dry, well perfused      Code Status: full code      Critical care time spent: ____minutes (reviewing chart including medication, labs and imaging results, discussions with interdisciplinary team, discussing goals of care/advanced directives, counseling patient and/or family, non-inclusive of procedures)    Case including assessment/plan of care discussed with                    [facility] attending.

## 2019-01-05 NOTE — PROGRESS NOTE ADULT - PROBLEM SELECTOR PLAN 8
remains a concern as pt has had prolonged NGT in place  patient was successfully decannulated, no plan for PEG tube at this time  speech and swallow eval completed, will d/c NGT once tolerating adequate PO intake

## 2019-01-05 NOTE — PROGRESS NOTE ADULT - PROBLEM SELECTOR PLAN 5
right groin wound granulating nicely  continue with wet to dry dressing changes by nursing (vascular signed off, will reconsult if needed)

## 2019-01-05 NOTE — PROGRESS NOTE ADULT - PROBLEM SELECTOR PLAN 4
improving  will discuss transitioning tube feeds to nocturnal vs d/c and allow full PO feeds with Dr Barrett on am rounds

## 2019-01-05 NOTE — PROGRESS NOTE ADULT - PROBLEM SELECTOR PLAN 9
possibly contributed to ICU PTSD, consider psych eval  consider changing seroquel to SSRI as suggested by Dr Davis (rehab medicine)  will d/w Dr Barrett re: transfer to floor to promote better sleep environment

## 2019-01-05 NOTE — PROGRESS NOTE ADULT - PROBLEM SELECTOR PLAN 7
continue aspirin, plavix and statin for graft patency   Lovenox and SCDs for DVT prophylaxis  no history of DM, continue FSAC+HS with coverage for now

## 2019-01-05 NOTE — PROGRESS NOTE ADULT - PROBLEM SELECTOR PLAN 3
resolved  decannulated 1/3/19  tolerating room air without incident  continue dry sterile dressing to trach stoma

## 2019-01-05 NOTE — PROGRESS NOTE ADULT - SUBJECTIVE AND OBJECTIVE BOX
[facility]  INFECTIOUS DISEASES AND INTERNAL MEDICINE at [place]  =======================================================  Perfecto Santizo MD  Diplomates American Board of Internal Medicine and Infectious Diseases  =======================================================    PATRICK LYLE [id number]    Follow up: Fungemia    Afebrile  Awake and alert, answers questions.   reports feeling anxious when he sleeps.       Allergies:  penicillins (Unknown)    REVIEW OF SYSTEMS:  as above     =======================================================  Antibiotics:  micafungin IVPB 100 milliGRAM(s) IV Intermittent every 24 hours    Other medications:  amiodarone    Tablet 200 milliGRAM(s) Oral daily  ascorbic acid 500 milliGRAM(s) Oral daily  aspirin 325 milliGRAM(s) Oral daily  atorvastatin 40 milliGRAM(s) Oral at bedtime  captopril 3.125 milliGRAM(s) Oral <User Schedule>  carvedilol 3.125 milliGRAM(s) Oral every 12 hours  clopidogrel Tablet 75 milliGRAM(s) Oral daily  enoxaparin Injectable 40 milliGRAM(s) SubCutaneous daily  insulin lispro (HumaLOG) corrective regimen sliding scale   SubCutaneous Before meals and at bedtime  melatonin 5 milliGRAM(s) Oral at bedtime  multivitamin   Solution 5 milliLiter(s) Enteral Tube daily  pantoprazole  Injectable 40 milliGRAM(s) IV Push every 12 hours  potassium chloride   Powder 20 milliEquivalent(s) Oral daily  QUEtiapine 25 milliGRAM(s) Oral <User Schedule>  saccharomyces boulardii 250 milliGRAM(s) Oral two times a day  senna Syrup 10 milliLiter(s) Oral at bedtime  spironolactone 12.5 milliGRAM(s) Oral <User Schedule>  sucralfate suspension 1 Gram(s) Oral two times a day    =======================================================  Physical Exam:  T(F): 98.4 (05 Jan 2019 04:00), Max: 99.6 (05 Jan 2019 00:00)  HR: 108 (05 Jan 2019 11:00)  BP: 93/67 (05 Jan 2019 11:00)  RR: 25 (05 Jan 2019 11:00)  SpO2: 100% (05 Jan 2019 11:00) (97% - 100%)    GEN: NAD , awake   HEENT: normocephalic and atraumatic. EOMI. PERRL. + NGT  NECK: Supple.    LUNGS: Coarse BS B/L  HEART: Regular rate and rhythm   ABDOMEN: Soft, nontender, and nondistended.  Positive bowel sounds.    : + Mckeon  EXTREMITIES: Without any edema.  MSK: no joint swelling  NEUROLOGIC: Awake, alert follows commands  PSYCHIATRIC: Appropriate affect .  SKIN: Sternal wound dressing, + wound vac, Leg dressings in place      Labs:                        9.0    7.9   )-----------( 278      ( 04 Jan 2019 04:40 )             30.4     01-04    141  |  102  |  32.0<H>  ----------------------------<  188<H>  4.4   |  27.0  |  0.50    Ca    8.4<L>      04 Jan 2019 04:40  Mg     2.3     01-04          Culture - Blood (collected 12-25-18 @ 06:38)  Source: .Blood  Final Report (12-30-18 @ 07:00):    No growth at 5 days.    Culture - Blood (collected 12-24-18 @ 03:01)  Source: .Blood  Final Report (12-29-18 @ 03:02):    No growth at 5 days.

## 2019-01-05 NOTE — PROGRESS NOTE ADULT - PROBLEM SELECTOR PLAN 10
scrotal erosion  stoma powder and cabalon no sting barrier were being used, however noted by RNs that it was drying lesions rather than healing, now transitioned to cabalon and medihoney

## 2019-01-05 NOTE — PROGRESS NOTE ADULT - ASSESSMENT
46 yo Male c/o CP for 2-3 days prior to admission, then 10/10 chest pain approximately 9:30 am yesterday, followed by vomiting.  Wife drove pt to hospital, ruled in for STEMI. Urgent Cath, pt found to have multiple occlusions; stent to LAD and D2; stent to LAD thrombosed,  impella placed for support.  Pt then had VF arrest, shock x 1, return to NSR.  Upon transfer to OR for Urgent CABG, pt with asystolic arrest, chest opened intra-op with CPR in progress. Pt underwent C4V (LAD, Diag, OM and PDA) with inability to wean from CPB therefore requiring VA ECMO (central cannulation) and IABP, he was transferred to CICU.  12/3 s/p ECMO explant and IABP d/c'd, placement of impella via R groin sheath.   12/6 Impella removal via right femoral cutdown with primary repair of femoral artery and stent placed to external iliac artery for dissection.  Hospital course notable for (in addition to above):cardiogenic and septic shock (both since resolved), acute systolic heart failure, vent dependent respiratory failure (inability to wean also contributed to by severe agitation, now s/p trach), fungemia, acute blood loss anemia, GIB (bleeding stomach ulcer), hypokalemia, ileus, sinusitis.  1/3 decannulated.  1/4 passed bedside swallow eval.

## 2019-01-05 NOTE — PROGRESS NOTE ADULT - NSHPATTENDINGPLANDISCUSS_GEN_ALL_CORE
CTICU team
CT ICU PA
CTICU team
Dr. Barrett
BECKIE REDMAN's
CTTYLER PA/NP

## 2019-01-06 DIAGNOSIS — I21.3 ST ELEVATION (STEMI) MYOCARDIAL INFARCTION OF UNSPECIFIED SITE: ICD-10-CM

## 2019-01-06 LAB
GLUCOSE BLDC GLUCOMTR-MCNC: 137 MG/DL — HIGH (ref 70–99)
GLUCOSE BLDC GLUCOMTR-MCNC: 144 MG/DL — HIGH (ref 70–99)
GLUCOSE BLDC GLUCOMTR-MCNC: 204 MG/DL — HIGH (ref 70–99)

## 2019-01-06 PROCEDURE — 99232 SBSQ HOSP IP/OBS MODERATE 35: CPT

## 2019-01-06 RX ORDER — PANTOPRAZOLE SODIUM 20 MG/1
40 TABLET, DELAYED RELEASE ORAL
Qty: 0 | Refills: 0 | Status: DISCONTINUED | OUTPATIENT
Start: 2019-01-06 | End: 2019-01-06

## 2019-01-06 RX ORDER — PANTOPRAZOLE SODIUM 20 MG/1
40 TABLET, DELAYED RELEASE ORAL EVERY 12 HOURS
Qty: 0 | Refills: 0 | Status: DISCONTINUED | OUTPATIENT
Start: 2019-01-06 | End: 2019-01-07

## 2019-01-06 RX ORDER — QUETIAPINE FUMARATE 200 MG/1
25 TABLET, FILM COATED ORAL ONCE
Qty: 0 | Refills: 0 | Status: COMPLETED | OUTPATIENT
Start: 2019-01-06 | End: 2019-01-06

## 2019-01-06 RX ORDER — QUETIAPINE FUMARATE 200 MG/1
25 TABLET, FILM COATED ORAL AT BEDTIME
Qty: 0 | Refills: 0 | Status: DISCONTINUED | OUTPATIENT
Start: 2019-01-06 | End: 2019-01-07

## 2019-01-06 RX ADMIN — QUETIAPINE FUMARATE 25 MILLIGRAM(S): 200 TABLET, FILM COATED ORAL at 21:09

## 2019-01-06 RX ADMIN — Medication 1 GRAM(S): at 06:01

## 2019-01-06 RX ADMIN — AMIODARONE HYDROCHLORIDE 200 MILLIGRAM(S): 400 TABLET ORAL at 06:01

## 2019-01-06 RX ADMIN — CARVEDILOL PHOSPHATE 3.12 MILLIGRAM(S): 80 CAPSULE, EXTENDED RELEASE ORAL at 00:58

## 2019-01-06 RX ADMIN — SENNA PLUS 10 MILLILITER(S): 8.6 TABLET ORAL at 21:09

## 2019-01-06 RX ADMIN — HYDROMORPHONE HYDROCHLORIDE 4 MILLIGRAM(S): 2 INJECTION INTRAMUSCULAR; INTRAVENOUS; SUBCUTANEOUS at 19:37

## 2019-01-06 RX ADMIN — Medication 250 MILLIGRAM(S): at 06:01

## 2019-01-06 RX ADMIN — SPIRONOLACTONE 12.5 MILLIGRAM(S): 25 TABLET, FILM COATED ORAL at 11:02

## 2019-01-06 RX ADMIN — HYDROMORPHONE HYDROCHLORIDE 4 MILLIGRAM(S): 2 INJECTION INTRAMUSCULAR; INTRAVENOUS; SUBCUTANEOUS at 21:09

## 2019-01-06 RX ADMIN — QUETIAPINE FUMARATE 25 MILLIGRAM(S): 200 TABLET, FILM COATED ORAL at 00:58

## 2019-01-06 RX ADMIN — PANTOPRAZOLE SODIUM 40 MILLIGRAM(S): 20 TABLET, DELAYED RELEASE ORAL at 06:01

## 2019-01-06 RX ADMIN — CAPTOPRIL 3.12 MILLIGRAM(S): 12.5 TABLET ORAL at 18:29

## 2019-01-06 RX ADMIN — MICAFUNGIN SODIUM 105 MILLIGRAM(S): 100 INJECTION, POWDER, LYOPHILIZED, FOR SOLUTION INTRAVENOUS at 14:06

## 2019-01-06 RX ADMIN — Medication 1 GRAM(S): at 21:09

## 2019-01-06 RX ADMIN — CARVEDILOL PHOSPHATE 3.12 MILLIGRAM(S): 80 CAPSULE, EXTENDED RELEASE ORAL at 13:55

## 2019-01-06 RX ADMIN — Medication 4: at 11:03

## 2019-01-06 RX ADMIN — Medication 5 MILLILITER(S): at 14:44

## 2019-01-06 RX ADMIN — CLOPIDOGREL BISULFATE 75 MILLIGRAM(S): 75 TABLET, FILM COATED ORAL at 13:58

## 2019-01-06 RX ADMIN — ENOXAPARIN SODIUM 40 MILLIGRAM(S): 100 INJECTION SUBCUTANEOUS at 13:55

## 2019-01-06 RX ADMIN — PANTOPRAZOLE SODIUM 40 MILLIGRAM(S): 20 TABLET, DELAYED RELEASE ORAL at 18:29

## 2019-01-06 RX ADMIN — ATORVASTATIN CALCIUM 40 MILLIGRAM(S): 80 TABLET, FILM COATED ORAL at 21:10

## 2019-01-06 RX ADMIN — Medication 325 MILLIGRAM(S): at 13:58

## 2019-01-06 RX ADMIN — Medication 250 MILLIGRAM(S): at 18:28

## 2019-01-06 NOTE — PROGRESS NOTE ADULT - ASSESSMENT
T2DM- continue sliding scale insulin  BS under excellent control    STEMI s/p CAbG cont supportive care

## 2019-01-06 NOTE — PROGRESS NOTE ADULT - SUBJECTIVE AND OBJECTIVE BOX
Pt is post cath on 11/29 and emergent CABG x4  complicated by placement of ECMO now explanted, Acute respiratory failure s/p tracheostomy on 12/10 now decannulated, 12/19 s/p EGD secondary to acute blood loss anemia s/p clipping of a bleeding ulcer now resolved and an ilues now tolerating po feeds after passing swallow study with notcurnal feeds and resolved fever spikes secondary to sepitc shock now only remains on micafungin for candida in the sputum.   PT  remains in a rate controlled afib c/o feeling of anxiety when he closes his eyes to go to sleep.  Standing xanax ordered per attending and seroquel dose adjusted.    T(C): 37.3 (01-05-19 @ 20:00)  T(F): 99.1 (01-05-19 @ 20:00)  HR: 109 (01-05-19 @ 23:00)  BP: 94/61 (01-05-19 @ 23:00)  BP(mean): 69 (01-05-19 @ 23:00)  RR: 17 (01-05-19 @ 23:00)  SpO2: 99% (01-05-19 @ 23:00)      Physical Exam:  Gen: A&Ox3, extremely anxious  Pulm:  CTA b/l, trach site clean dry and covered with dry sterile dressing  CV:  S1S2, RRR, + sternum stable , no erythema, no drainage noted and stable.  Abd: +BS, soft, NT, ND, + NGT remains in place with nocutnal feeds,   Ext:  +DP b/l, no c/c/e  Incision:  c/d/i no click, no exudate    I&O's Detail    04 Jan 2019 07:01  -  05 Jan 2019 07:00  --------------------------------------------------------  IN:    Enteral Tube Flush: 150 mL    Nepro: 1440 mL    Oral Fluid: 305 mL    Solution: 100 mL  Total IN: 1995 mL    OUT:    Voided: 1300 mL  Total OUT: 1300 mL    Total NET: 695 mL      05 Jan 2019 07:01  -  06 Jan 2019 00:55  --------------------------------------------------------  IN:    Enteral Tube Flush: 340 mL    Nepro: 300 mL    Oral Fluid: 480 mL    Solution: 100 mL  Total IN: 1220 mL    OUT:  Total OUT: 0 mL    Total NET: 1220 mL                              9.0    7.9   )-----------( 278      ( 04 Jan 2019 04:40 )             30.4   01-04    141  |  102  |  32.0<H>  ----------------------------<  188<H>  4.4   |  27.0  |  0.50    Ca    8.4<L>      04 Jan 2019 04:40  Mg     2.3     01-04      ABG - ( 04 Jan 2019 06:23 )  pH: 7.51  /  pCO2: 36    /  pO2: 78    / HCO3: 29    / Base Excess: 5.6   /  SaO2: 97   /  Lactate: x        CAPILLARY BLOOD GLUCOSE      POCT Blood Glucose.: 141 mg/dL (05 Jan 2019 20:56)        Medications:  acetaminophen    Suspension .. 650 milliGRAM(s) Oral every 6 hours PRN  ALPRAZolam 0.5 milliGRAM(s) Oral every 6 hours PRN  amiodarone    Tablet 200 milliGRAM(s) Oral daily  ascorbic acid 500 milliGRAM(s) Oral daily  aspirin 325 milliGRAM(s) Oral daily  atorvastatin 40 milliGRAM(s) Oral at bedtime  benzocaine 15 mG/menthol 3.6 mG (Sugar-Free) Lozenge 1 Lozenge Oral three times a day PRN  captopril 3.125 milliGRAM(s) Oral <User Schedule>  carvedilol 3.125 milliGRAM(s) Oral every 12 hours  clopidogrel Tablet 75 milliGRAM(s) Oral daily  enoxaparin Injectable 40 milliGRAM(s) SubCutaneous daily  HYDROmorphone   Tablet 2 milliGRAM(s) Oral every 4 hours PRN  HYDROmorphone   Tablet 4 milliGRAM(s) Oral every 4 hours PRN  insulin lispro (HumaLOG) corrective regimen sliding scale   SubCutaneous Before meals and at bedtime  melatonin 5 milliGRAM(s) Oral at bedtime  micafungin IVPB 100 milliGRAM(s) IV Intermittent every 24 hours  multivitamin   Solution 5 milliLiter(s) Enteral Tube daily  ondansetron Injectable 4 milliGRAM(s) IV Push every 6 hours PRN  pantoprazole  Injectable 40 milliGRAM(s) IV Push every 12 hours  potassium chloride   Powder 20 milliEquivalent(s) Oral daily  QUEtiapine 12.5 milliGRAM(s) Oral every 8 hours PRN  QUEtiapine 25 milliGRAM(s) Oral <User Schedule>  QUEtiapine 25 milliGRAM(s) Oral once  saccharomyces boulardii 250 milliGRAM(s) Oral two times a day  senna Syrup 10 milliLiter(s) Oral at bedtime  spironolactone 12.5 milliGRAM(s) Oral <User Schedule>  sucralfate suspension 1 Gram(s) Oral two times a day        Assessment:  Pt is a 48y year old Male  s/p CABG x 4 , ECMO, IMPella, tracheostomy, EGD w control of hemorrhage w image guidance, Exploration of femoral artery,Exploration and washout of mediastinum, Intra-aortic balloon pump removal now decannulated HD stable and doing well. Patent currently stable, NAD.    Plan:  Continue ASA/Plavix/Amio/ACE and Coreg  Gentle diuresis  OOB to chair, increase ambulation  Continue Xanax /seroquel for anxiety/insominia, consider increasing nightitme seroquel dose  Increase PO intake, continue calorie counts, consider decreasing or eliminating nocturnal feeds  Lovenox for dvt prohylaxis  Protonix for GI prophylaxis  continue micafungin per ID  discuss with attending on am rounds Pt is post cath on 11/29 and emergent CABG x4  complicated by placement of ECMO now explanted, Acute respiratory failure s/p tracheostomy on 12/10 now decannulated, 12/19 s/p EGD secondary to acute blood loss anemia s/p clipping of a bleeding ulcer now resolved and an ilues now tolerating po feeds after passing swallow study with notcurnal feeds and resolved fever spikes secondary to sepitc shock now only remains on micafungin for candida in the sputum.   PT  remains in a rate controlled afib c/o feeling of anxiety when he closes his eyes to go to sleep.  Standing xanax ordered per attending and seroquel dose adjusted.    T(C): 37.3 (01-05-19 @ 20:00)  T(F): 99.1 (01-05-19 @ 20:00)  HR: 109 (01-05-19 @ 23:00)  BP: 94/61 (01-05-19 @ 23:00)  BP(mean): 69 (01-05-19 @ 23:00)  RR: 17 (01-05-19 @ 23:00)  SpO2: 99% (01-05-19 @ 23:00)      Physical Exam:  Gen: A&Ox3, extremely anxious  Pulm:  CTA b/l, trach site clean dry and covered with dry sterile dressing  CV:  S1S2, RRR, + sternum stable , no erythema, no drainage noted and stable.  Abd: +BS, soft, NT, ND, + NGT remains in place with nocutnal feeds,   Ext:  +DP b/l, no c/c/e  Incision:  c/d/i no click, no exudate    I&O's Detail    04 Jan 2019 07:01  -  05 Jan 2019 07:00  --------------------------------------------------------  IN:    Enteral Tube Flush: 150 mL    Nepro: 1440 mL    Oral Fluid: 305 mL    Solution: 100 mL  Total IN: 1995 mL    OUT:    Voided: 1300 mL  Total OUT: 1300 mL    Total NET: 695 mL      05 Jan 2019 07:01  -  06 Jan 2019 00:55  --------------------------------------------------------  IN:    Enteral Tube Flush: 340 mL    Nepro: 300 mL    Oral Fluid: 480 mL    Solution: 100 mL  Total IN: 1220 mL    OUT:  Total OUT: 0 mL    Total NET: 1220 mL                              9.0    7.9   )-----------( 278      ( 04 Jan 2019 04:40 )             30.4   01-04    141  |  102  |  32.0<H>  ----------------------------<  188<H>  4.4   |  27.0  |  0.50    Ca    8.4<L>      04 Jan 2019 04:40  Mg     2.3     01-04      ABG - ( 04 Jan 2019 06:23 )  pH: 7.51  /  pCO2: 36    /  pO2: 78    / HCO3: 29    / Base Excess: 5.6   /  SaO2: 97   /  Lactate: x        CAPILLARY BLOOD GLUCOSE      POCT Blood Glucose.: 141 mg/dL (05 Jan 2019 20:56)        Medications:  acetaminophen    Suspension .. 650 milliGRAM(s) Oral every 6 hours PRN  ALPRAZolam 0.5 milliGRAM(s) Oral every 6 hours PRN  amiodarone    Tablet 200 milliGRAM(s) Oral daily  ascorbic acid 500 milliGRAM(s) Oral daily  aspirin 325 milliGRAM(s) Oral daily  atorvastatin 40 milliGRAM(s) Oral at bedtime  benzocaine 15 mG/menthol 3.6 mG (Sugar-Free) Lozenge 1 Lozenge Oral three times a day PRN  captopril 3.125 milliGRAM(s) Oral <User Schedule>  carvedilol 3.125 milliGRAM(s) Oral every 12 hours  clopidogrel Tablet 75 milliGRAM(s) Oral daily  enoxaparin Injectable 40 milliGRAM(s) SubCutaneous daily  HYDROmorphone   Tablet 2 milliGRAM(s) Oral every 4 hours PRN  HYDROmorphone   Tablet 4 milliGRAM(s) Oral every 4 hours PRN  insulin lispro (HumaLOG) corrective regimen sliding scale   SubCutaneous Before meals and at bedtime  melatonin 5 milliGRAM(s) Oral at bedtime  micafungin IVPB 100 milliGRAM(s) IV Intermittent every 24 hours  multivitamin   Solution 5 milliLiter(s) Enteral Tube daily  ondansetron Injectable 4 milliGRAM(s) IV Push every 6 hours PRN  pantoprazole  Injectable 40 milliGRAM(s) IV Push every 12 hours  potassium chloride   Powder 20 milliEquivalent(s) Oral daily  QUEtiapine 12.5 milliGRAM(s) Oral every 8 hours PRN  QUEtiapine 25 milliGRAM(s) Oral <User Schedule>  QUEtiapine 25 milliGRAM(s) Oral once  saccharomyces boulardii 250 milliGRAM(s) Oral two times a day  senna Syrup 10 milliLiter(s) Oral at bedtime  spironolactone 12.5 milliGRAM(s) Oral <User Schedule>  sucralfate suspension 1 Gram(s) Oral two times a day        Assessment:  Pt is a 48y year old Male  s/p CABG x 4 , ECMO, IMPella, tracheostomy, EGD w control of hemorrhage w image guidance, Exploration of femoral artery,Exploration and washout of mediastinum, Intra-aortic balloon pump removal now decannulated HD stable and doing well. Patent currently stable, NAD.    Plan:  Continue ASA/Plavix/Amio/ACE and Coreg  Will need life vest prior to D/c , ef 25%, VT per cardiology  Gentle diuresis  OOB to chair, increase ambulation  Continue Xanax /seroquel for anxiety/insominia, consider increasing nightitme seroquel dose  Increase PO intake, continue calorie counts, consider decreasing or eliminating nocturnal feeds  Lovenox for dvt prohylaxis  Protonix for GI prophylaxis  continue micafungin per ID  consider downgrading to telemetry floor   discuss with attending on am rounds

## 2019-01-06 NOTE — PROGRESS NOTE ADULT - SUBJECTIVE AND OBJECTIVE BOX
INTERVAL HPI/OVERNIGHT EVENTS:  follow up T2DM   eating ok - appetite is good   BS i\ under good control   MEDICATIONS  (STANDING):  amiodarone    Tablet 200 milliGRAM(s) Oral daily  aspirin 325 milliGRAM(s) Oral daily  atorvastatin 40 milliGRAM(s) Oral at bedtime  captopril 3.125 milliGRAM(s) Oral <User Schedule>  carvedilol 3.125 milliGRAM(s) Oral every 12 hours  clopidogrel Tablet 75 milliGRAM(s) Oral daily  enoxaparin Injectable 40 milliGRAM(s) SubCutaneous daily  insulin lispro (HumaLOG) corrective regimen sliding scale   SubCutaneous Before meals and at bedtime  micafungin IVPB 100 milliGRAM(s) IV Intermittent every 24 hours  multivitamin   Solution 5 milliLiter(s) Enteral Tube daily  pantoprazole  Injectable 40 milliGRAM(s) IV Push every 12 hours  QUEtiapine 25 milliGRAM(s) Oral <User Schedule>  saccharomyces boulardii 250 milliGRAM(s) Oral two times a day  senna Syrup 10 milliLiter(s) Oral at bedtime  spironolactone 12.5 milliGRAM(s) Oral <User Schedule>  sucralfate suspension 1 Gram(s) Oral two times a day    MEDICATIONS  (PRN):  acetaminophen    Suspension .. 650 milliGRAM(s) Oral every 6 hours PRN Temp greater or equal to 38.5C (101.3F)  ALPRAZolam 0.5 milliGRAM(s) Oral every 6 hours PRN anxiety  benzocaine 15 mG/menthol 3.6 mG (Sugar-Free) Lozenge 1 Lozenge Oral three times a day PRN Sore Throat  HYDROmorphone   Tablet 2 milliGRAM(s) Oral every 4 hours PRN Moderate Pain (4 - 6)  HYDROmorphone   Tablet 4 milliGRAM(s) Oral every 4 hours PRN Severe Pain (7 - 10)  ondansetron Injectable 4 milliGRAM(s) IV Push every 6 hours PRN Nausea and/or Vomiting  QUEtiapine 12.5 milliGRAM(s) Oral every 8 hours PRN agitation      Allergies    penicillins (Unknown)    Intolerances        Review of systems:    Vital Signs Last 24 Hrs  T(C): 36.8 (06 Jan 2019 16:30), Max: 37.3 (05 Jan 2019 20:00)  T(F): 98.3 (06 Jan 2019 16:30), Max: 99.1 (05 Jan 2019 20:00)  HR: 103 (06 Jan 2019 18:00) (103 - 117)  BP: 91/61 (06 Jan 2019 18:00) (82/52 - 108/72)  BP(mean): 71 (06 Jan 2019 18:00) (62 - 86)  RR: 22 (06 Jan 2019 18:00) (17 - 32)  SpO2: 99% (06 Jan 2019 18:00) (94% - 100%)    PHYSICAL EXAM:      Constitutional: NAD, well-groomed, well-developed  HEENT: PERRLA, EOMI, no exophalmos  Neck: bandaged     Respiratory: CTAB  Cardiovascular: S1 and S2, RRR, no M/G/R  Gastrointestinal: BS+, soft, no organomeglag or mass  Extremities: No peripheral edema, no pedal lesions  Skin: No rashes, no acanthosis sutures removed from legs today- no infection or erythema         LABS:                CAPILLARY BLOOD GLUCOSE  CAPILLARY BLOOD GLUCOSE      POCT Blood Glucose.: 137 mg/dL (06 Jan 2019 18:25)  POCT Blood Glucose.: 204 mg/dL (06 Jan 2019 10:42)  POCT Blood Glucose.: 141 mg/dL (05 Jan 2019 20:56)      RADIOLOGY & ADDITIONAL TESTS:

## 2019-01-07 DIAGNOSIS — F41.8 OTHER SPECIFIED ANXIETY DISORDERS: ICD-10-CM

## 2019-01-07 LAB
ALLERGY+IMMUNOLOGY DIAG STUDY NOTE: SIGNIFICANT CHANGE UP
ANION GAP SERPL CALC-SCNC: 14 MMOL/L — SIGNIFICANT CHANGE UP (ref 5–17)
BLD GP AB SCN SERPL QL: ABNORMAL
BUN SERPL-MCNC: 22 MG/DL — HIGH (ref 8–20)
CALCIUM SERPL-MCNC: 8 MG/DL — LOW (ref 8.6–10.2)
CHLORIDE SERPL-SCNC: 97 MMOL/L — LOW (ref 98–107)
CK SERPL-CCNC: 35 U/L — SIGNIFICANT CHANGE UP (ref 30–200)
CO2 SERPL-SCNC: 18 MMOL/L — LOW (ref 22–29)
CREAT SERPL-MCNC: 0.64 MG/DL — SIGNIFICANT CHANGE UP (ref 0.5–1.3)
DAT IGG-SP REAG RBC-IMP: ABNORMAL
DIR ANTIGLOB POLYSPECIFIC INTERPRETATION: ABNORMAL
GLUCOSE BLDC GLUCOMTR-MCNC: 136 MG/DL — HIGH (ref 70–99)
GLUCOSE BLDC GLUCOMTR-MCNC: 202 MG/DL — HIGH (ref 70–99)
GLUCOSE BLDC GLUCOMTR-MCNC: 205 MG/DL — HIGH (ref 70–99)
GLUCOSE SERPL-MCNC: 152 MG/DL — HIGH (ref 70–115)
HCT VFR BLD CALC: 25.3 % — LOW (ref 42–52)
HGB BLD-MCNC: 7.6 G/DL — LOW (ref 14–18)
IAT COMP-SP REAG SERPL QL: ABNORMAL
MAGNESIUM SERPL-MCNC: 2.3 MG/DL — SIGNIFICANT CHANGE UP (ref 1.6–2.6)
MCHC RBC-ENTMCNC: 26.1 PG — LOW (ref 27–31)
MCHC RBC-ENTMCNC: 30 G/DL — LOW (ref 32–36)
MCV RBC AUTO: 86.9 FL — SIGNIFICANT CHANGE UP (ref 80–94)
PLATELET # BLD AUTO: 293 K/UL — SIGNIFICANT CHANGE UP (ref 150–400)
POTASSIUM SERPL-MCNC: 4.6 MMOL/L — SIGNIFICANT CHANGE UP (ref 3.5–5.3)
POTASSIUM SERPL-SCNC: 4.6 MMOL/L — SIGNIFICANT CHANGE UP (ref 3.5–5.3)
RBC # BLD: 2.91 M/UL — LOW (ref 4.6–6.2)
RBC # FLD: 20.2 % — HIGH (ref 11–15.6)
SODIUM SERPL-SCNC: 129 MMOL/L — LOW (ref 135–145)
TROPONIN T SERPL-MCNC: 0.04 NG/ML — SIGNIFICANT CHANGE UP (ref 0–0.06)
TYPE + AB SCN PNL BLD: SIGNIFICANT CHANGE UP
WBC # BLD: 11.2 K/UL — HIGH (ref 4.8–10.8)
WBC # FLD AUTO: 11.2 K/UL — HIGH (ref 4.8–10.8)

## 2019-01-07 PROCEDURE — 99223 1ST HOSP IP/OBS HIGH 75: CPT

## 2019-01-07 PROCEDURE — 93306 TTE W/DOPPLER COMPLETE: CPT | Mod: 26

## 2019-01-07 PROCEDURE — 99232 SBSQ HOSP IP/OBS MODERATE 35: CPT

## 2019-01-07 RX ORDER — HYDROMORPHONE HYDROCHLORIDE 2 MG/ML
4 INJECTION INTRAMUSCULAR; INTRAVENOUS; SUBCUTANEOUS EVERY 4 HOURS
Qty: 0 | Refills: 0 | Status: DISCONTINUED | OUTPATIENT
Start: 2019-01-07 | End: 2019-01-11

## 2019-01-07 RX ORDER — PANTOPRAZOLE SODIUM 20 MG/1
40 TABLET, DELAYED RELEASE ORAL EVERY 12 HOURS
Qty: 0 | Refills: 0 | Status: DISCONTINUED | OUTPATIENT
Start: 2019-01-08 | End: 2019-01-11

## 2019-01-07 RX ORDER — SPIRONOLACTONE 25 MG/1
12.5 TABLET, FILM COATED ORAL
Qty: 0 | Refills: 0 | Status: DISCONTINUED | OUTPATIENT
Start: 2019-01-08 | End: 2019-01-08

## 2019-01-07 RX ORDER — CARVEDILOL PHOSPHATE 80 MG/1
3.12 CAPSULE, EXTENDED RELEASE ORAL
Qty: 0 | Refills: 0 | Status: DISCONTINUED | OUTPATIENT
Start: 2019-01-07 | End: 2019-01-11

## 2019-01-07 RX ORDER — CAPTOPRIL 12.5 MG/1
3.12 TABLET ORAL
Qty: 0 | Refills: 0 | Status: DISCONTINUED | OUTPATIENT
Start: 2019-01-07 | End: 2019-01-11

## 2019-01-07 RX ORDER — TRAZODONE HCL 50 MG
50 TABLET ORAL AT BEDTIME
Qty: 0 | Refills: 0 | Status: DISCONTINUED | OUTPATIENT
Start: 2019-01-07 | End: 2019-01-09

## 2019-01-07 RX ORDER — HYDROMORPHONE HYDROCHLORIDE 2 MG/ML
2 INJECTION INTRAMUSCULAR; INTRAVENOUS; SUBCUTANEOUS EVERY 4 HOURS
Qty: 0 | Refills: 0 | Status: DISCONTINUED | OUTPATIENT
Start: 2019-01-07 | End: 2019-01-11

## 2019-01-07 RX ORDER — ALBUMIN HUMAN 25 %
250 VIAL (ML) INTRAVENOUS ONCE
Qty: 0 | Refills: 0 | Status: COMPLETED | OUTPATIENT
Start: 2019-01-07 | End: 2019-01-07

## 2019-01-07 RX ORDER — SUCRALFATE 1 G
1 TABLET ORAL
Qty: 0 | Refills: 0 | Status: DISCONTINUED | OUTPATIENT
Start: 2019-01-07 | End: 2019-01-11

## 2019-01-07 RX ORDER — SODIUM CHLORIDE 9 MG/ML
3 INJECTION INTRAMUSCULAR; INTRAVENOUS; SUBCUTANEOUS EVERY 8 HOURS
Qty: 0 | Refills: 0 | Status: DISCONTINUED | OUTPATIENT
Start: 2019-01-07 | End: 2019-01-11

## 2019-01-07 RX ORDER — ACETAMINOPHEN 500 MG
650 TABLET ORAL EVERY 6 HOURS
Qty: 0 | Refills: 0 | Status: DISCONTINUED | OUTPATIENT
Start: 2019-01-07 | End: 2019-01-11

## 2019-01-07 RX ORDER — SENNA PLUS 8.6 MG/1
2 TABLET ORAL AT BEDTIME
Qty: 0 | Refills: 0 | Status: DISCONTINUED | OUTPATIENT
Start: 2019-01-07 | End: 2019-01-09

## 2019-01-07 RX ORDER — MULTIVIT-MIN/FERROUS GLUCONATE 9 MG/15 ML
1 LIQUID (ML) ORAL DAILY
Qty: 0 | Refills: 0 | Status: DISCONTINUED | OUTPATIENT
Start: 2019-01-07 | End: 2019-01-11

## 2019-01-07 RX ADMIN — MICAFUNGIN SODIUM 105 MILLIGRAM(S): 100 INJECTION, POWDER, LYOPHILIZED, FOR SOLUTION INTRAVENOUS at 14:27

## 2019-01-07 RX ADMIN — CARVEDILOL PHOSPHATE 3.12 MILLIGRAM(S): 80 CAPSULE, EXTENDED RELEASE ORAL at 01:42

## 2019-01-07 RX ADMIN — Medication 250 MILLIGRAM(S): at 06:22

## 2019-01-07 RX ADMIN — SODIUM CHLORIDE 3 MILLILITER(S): 9 INJECTION INTRAMUSCULAR; INTRAVENOUS; SUBCUTANEOUS at 14:27

## 2019-01-07 RX ADMIN — Medication 50 MILLIGRAM(S): at 22:54

## 2019-01-07 RX ADMIN — ATORVASTATIN CALCIUM 40 MILLIGRAM(S): 80 TABLET, FILM COATED ORAL at 22:53

## 2019-01-07 RX ADMIN — PANTOPRAZOLE SODIUM 40 MILLIGRAM(S): 20 TABLET, DELAYED RELEASE ORAL at 06:22

## 2019-01-07 RX ADMIN — Medication 325 MILLIGRAM(S): at 12:35

## 2019-01-07 RX ADMIN — Medication 0.5 MILLIGRAM(S): at 17:40

## 2019-01-07 RX ADMIN — AMIODARONE HYDROCHLORIDE 200 MILLIGRAM(S): 400 TABLET ORAL at 06:22

## 2019-01-07 RX ADMIN — PANTOPRAZOLE SODIUM 40 MILLIGRAM(S): 20 TABLET, DELAYED RELEASE ORAL at 17:42

## 2019-01-07 RX ADMIN — Medication 5 MILLILITER(S): at 12:35

## 2019-01-07 RX ADMIN — Medication 1 GRAM(S): at 18:53

## 2019-01-07 RX ADMIN — Medication 250 MILLIGRAM(S): at 17:41

## 2019-01-07 RX ADMIN — Medication 4: at 07:35

## 2019-01-07 RX ADMIN — SPIRONOLACTONE 12.5 MILLIGRAM(S): 25 TABLET, FILM COATED ORAL at 07:34

## 2019-01-07 RX ADMIN — SODIUM CHLORIDE 3 MILLILITER(S): 9 INJECTION INTRAMUSCULAR; INTRAVENOUS; SUBCUTANEOUS at 22:14

## 2019-01-07 RX ADMIN — CARVEDILOL PHOSPHATE 3.12 MILLIGRAM(S): 80 CAPSULE, EXTENDED RELEASE ORAL at 12:35

## 2019-01-07 RX ADMIN — Medication 125 MILLILITER(S): at 16:53

## 2019-01-07 RX ADMIN — Medication 1 GRAM(S): at 06:22

## 2019-01-07 RX ADMIN — Medication 4: at 22:53

## 2019-01-07 RX ADMIN — Medication 2: at 18:04

## 2019-01-07 RX ADMIN — CLOPIDOGREL BISULFATE 75 MILLIGRAM(S): 75 TABLET, FILM COATED ORAL at 12:35

## 2019-01-07 RX ADMIN — ENOXAPARIN SODIUM 40 MILLIGRAM(S): 100 INJECTION SUBCUTANEOUS at 12:35

## 2019-01-07 NOTE — PROGRESS NOTE ADULT - SUBJECTIVE AND OBJECTIVE BOX
Subjective:  47yo Male resting comfortable today, less c/o anxiety or restlessness.     Past Medical History:  ST elevation myocardial infarction (STEMI)  Family history of myocardial infarction (Mother)  No pertinent family history in first degree relatives  Handoff  MEWS Score  Staph infection  No pertinent past medical history  Acute gastric ulcer with hemorrhage  Chronic respiratory failure with hypoxia  Cardiogenic shock  Hematemesis, presence of nausea not specified  Chronic respiratory failure with hypoxia  Cardiogenic shock  ST elevation myocardial infarction (STEMI), unspecified artery  ST elevation myocardial infarction (STEMI), unspecified artery  Scrotal disorder  Wound of groin  Nightmare  Sinusitis  Moderate protein-calorie malnutrition  Respiratory failure, post-operative  Abdominal pain  Fungemia  Gastric ulcer with hemorrhage but without obstruction, acute  Hematemesis, presence of nausea not specified  GI bleed  Coronary artery disease involving native coronary artery of native heart, angina presence unspecified  Critical illness myopathy  Hypernatremia  Shock  Hypoxia  Ileus  Septic shock  Malnutrition  Right ventricular dysfunction  Acute overt combined systolic and diastolic congestive heart failure  Klebsiella pneumonia  Pulmonary edema  Agitation  Acute respiratory failure with hypoxia  Other encephalopathy  Fever, unspecified fever cause  Thrombocytopenia  Anemia due to blood loss  Acute systolic heart failure  Respiratory failure  Cardiogenic shock  Staph infection  Ventricular fibrillation  Coronary artery disease involving native coronary artery of native heart with unstable angina pectoris  Dissection of left main coronary artery  ST elevation myocardial infarction involving left anterior descending (LAD) coronary artery  STEMI (ST elevation myocardial infarction)  EGD w control of hemorrhage w image guidance  Tracheostomy  Exploration of femoral artery  Insertion of Impella device  ECMO decannulation  Exploration and washout of mediastinum  Intra-aortic balloon pump removal  Arterial cannulation  Central line placement  Pierce Michelle placement  CABG  ECMO (extracorporeal membrane oxygenation)  No significant past surgical history  CHEST PAIN  90+        acetaminophen    Suspension .. 650 milliGRAM(s) Oral every 6 hours PRN  ALPRAZolam 0.5 milliGRAM(s) Oral every 6 hours PRN  amiodarone    Tablet 200 milliGRAM(s) Oral daily  aspirin 325 milliGRAM(s) Oral daily  atorvastatin 40 milliGRAM(s) Oral at bedtime  benzocaine 15 mG/menthol 3.6 mG (Sugar-Free) Lozenge 1 Lozenge Oral three times a day PRN  captopril 3.125 milliGRAM(s) Oral <User Schedule>  carvedilol 3.125 milliGRAM(s) Oral every 12 hours  clopidogrel Tablet 75 milliGRAM(s) Oral daily  enoxaparin Injectable 40 milliGRAM(s) SubCutaneous daily  HYDROmorphone   Tablet 2 milliGRAM(s) Oral every 4 hours PRN  HYDROmorphone   Tablet 4 milliGRAM(s) Oral every 4 hours PRN  insulin lispro (HumaLOG) corrective regimen sliding scale   SubCutaneous Before meals and at bedtime  micafungin IVPB 100 milliGRAM(s) IV Intermittent every 24 hours  multivitamin   Solution 5 milliLiter(s) Enteral Tube daily  ondansetron Injectable 4 milliGRAM(s) IV Push every 6 hours PRN  pantoprazole  Injectable 40 milliGRAM(s) IV Push every 12 hours  QUEtiapine 12.5 milliGRAM(s) Oral every 8 hours PRN  QUEtiapine 25 milliGRAM(s) Oral <User Schedule>  QUEtiapine 25 milliGRAM(s) Oral at bedtime PRN  saccharomyces boulardii 250 milliGRAM(s) Oral two times a day  senna Syrup 10 milliLiter(s) Oral at bedtime  spironolactone 12.5 milliGRAM(s) Oral <User Schedule>  sucralfate suspension 1 Gram(s) Oral two times a day  MEDICATIONS  (PRN):  acetaminophen    Suspension .. 650 milliGRAM(s) Oral every 6 hours PRN Temp greater or equal to 38.5C (101.3F)  ALPRAZolam 0.5 milliGRAM(s) Oral every 6 hours PRN anxiety  benzocaine 15 mG/menthol 3.6 mG (Sugar-Free) Lozenge 1 Lozenge Oral three times a day PRN Sore Throat  HYDROmorphone   Tablet 2 milliGRAM(s) Oral every 4 hours PRN Moderate Pain (4 - 6)  HYDROmorphone   Tablet 4 milliGRAM(s) Oral every 4 hours PRN Severe Pain (7 - 10)  ondansetron Injectable 4 milliGRAM(s) IV Push every 6 hours PRN Nausea and/or Vomiting  QUEtiapine 12.5 milliGRAM(s) Oral every 8 hours PRN agitation  QUEtiapine 25 milliGRAM(s) Oral at bedtime PRN agitation/restlessness/difficulty sleeping            Objective:  T(C): 36.7 (01-07-19 @ 00:00), Max: 37.3 (01-06-19 @ 06:00)  HR: 120 (01-07-19 @ 00:00) (103 - 120)  BP: 85/60 (01-07-19 @ 00:00) (85/60 - 108/72)  RR: 19 (01-07-19 @ 00:00) (18 - 32)  SpO2: 95% (01-07-19 @ 00:00) (94% - 99%)  Wt(kg): --CAPILLARY BLOOD GLUCOSE      POCT Blood Glucose.: 144 mg/dL (06 Jan 2019 21:05)  POCT Blood Glucose.: 137 mg/dL (06 Jan 2019 18:25)  POCT Blood Glucose.: 204 mg/dL (06 Jan 2019 10:42)  I&O's Summary    05 Jan 2019 07:01  -  06 Jan 2019 07:00  --------------------------------------------------------  IN: 1620 mL / OUT: 1100 mL / NET: 520 mL    06 Jan 2019 07:01  -  07 Jan 2019 00:34  --------------------------------------------------------  IN: 100 mL / OUT: 300 mL / NET: -200 mL        Lines:  PIV    Mckeon:  No    Physical Exam:  Neuro: A0X3, non focal  Pulm: CtA b/l Unlabored  CV: s1/s2 + PW  reg  Abd: soft nt/nd  Ext: warm, no edema, well perfused

## 2019-01-07 NOTE — PROGRESS NOTE ADULT - PROBLEM SELECTOR PLAN 4
improving  Nocturnal TFs discontinued.  Calorie count in progress  Wife to bring food from home, continue soft diet with thin liquids

## 2019-01-07 NOTE — BEHAVIORAL HEALTH ASSESSMENT NOTE - OTHER
pt sitting in chair- unable to assess Pt reports that he does not remember much of his hospitalization

## 2019-01-07 NOTE — BEHAVIORAL HEALTH ASSESSMENT NOTE - NSBHSUICPROTECTFACT_PSY_A_CORE
Responsibility to family and others/High spirituality/Future oriented/Supportive social network or family/Identifies reasons for living/Engaged in work or school/High frustration tolerance

## 2019-01-07 NOTE — BEHAVIORAL HEALTH ASSESSMENT NOTE - NSBHCONSULTRECOMMENDOTHER_PSY_A_CORE FT
D/C Seroquel PRN/bedtime  Start Trazodone 50mg at bedtime  Psych to follow to reassess anxiety is protracted and determine need for SSRI  No psychiatric contraindications to D/C

## 2019-01-07 NOTE — BEHAVIORAL HEALTH ASSESSMENT NOTE - HPI (INCLUDE ILLNESS QUALITY, SEVERITY, DURATION, TIMING, CONTEXT, MODIFYING FACTORS, ASSOCIATED SIGNS AND SYMPTOMS)
Pt is 48 y.o  with 7 children- 3 in college, 4 HS-elementary school presented to hospital with mid sternal CP x 2-3 days on Nov 29th, 2018. In ED ST elevations in V1-V5 with reciprocal changes in inferior leads. Code STEMI activated and pt was taken to cath lab emergently. Attempted bifurcated stent to LAD and D2 with development of acute thrombus in LAD stent, attempted to re balloon with subsequent dissection of LM. Impella placed. VF arrest, shock x1 with return of NSR. Pt was emergently intubated, received CABG x 4. Pt had extensive hospitalization including ST elevation myocardial infarction (STEMI),Staph infection, Acute gastric ulcer with hemorrhage, Chronic respiratory failure with hypoxia, Cardiogenic shock, Septic shock, Right ventricular dysfunction, Acute overt combined systolic and diastolic congestive heart failure, Klebsiella pneumonia, Pulmonary edema, Agitation, Thrombocytopenia, Anemia due to blood loss, Acute systolic heart failure, Ventricular fibrillation, Dissection of left main coronary artery, Tracheostomy, Insertion of Impella device, ECMO. Pt reports that prior to sleeping well last night he was anxious and felt like someone was going to hurt him, he reports that yesterday he felt like he could put his thoughts together. Today, he reports feeling calm and hopeful stating he is focusing his thoughts and energy towards his recovery. Currently he denies depression, anxiety, SI/HI or plan, [place] or psychosis. Pt verbalized that if he continues to sleep well he feel that will help him build strength.

## 2019-01-07 NOTE — BEHAVIORAL HEALTH ASSESSMENT NOTE - RISK ASSESSMENT
Low- no prior psychiatric hx, reports that current symptoms are resolving. States that he has minimal psychosocial stressors and is financially stable so he can focus on his health and recovery.

## 2019-01-07 NOTE — PROGRESS NOTE ADULT - PROBLEM SELECTOR PLAN 9
possibly contributed to ICU PTSD, Psych consulted, to see pt today.  consider changing seroquel to SSRI as suggested by Dr Davis (rehab medicine)  Plan for transfer to floor prior to d/c home possibly contributed to ICU PTSD, Psych consulted, to see pt today.  Plan for transfer to floor prior to d/c home

## 2019-01-07 NOTE — PROGRESS NOTE ADULT - SUBJECTIVE AND OBJECTIVE BOX
Patient in chair, reports that he just ambulated about 5 minuted prior and is a bit fatigued.   Plan for NGT removal today. Tolerating his diet without difficulty.   Left UE strength improving.     FUNCTIONAL PROGRESS    Bed Mobility  Bed Mobility Training Supine-to-Sit: minimum assist (75% patient effort)    Sit-Stand Transfer Training  Transfer Training Sit-to-Stand Transfer: supervision  Transfer Training Stand-to-Sit Transfer: supervision    Gait Training  Gait Trainin feet RW S  Gait Analysis: decreased gait velocity and activity tolerance, incidental standing rest breaks due to fatigue, decreased tiffanie step length    Bed Mobility  Bed Mobility Training Rehab Potential: good, to achieve stated therapy goals  Bed Mobility Training Rolling/Turning: supervsion;  supervision;  verbal cues;  bed rails  Bed Mobility Training Scooting: supervsion;  supervision;  verbal cues;  bed rails  Bed Mobility Training Bridging: supervsion;  verbal cues;  supervision;  bed rails  Bed Mobility Training Sit-to-Supine: minimum assist (75% patient effort);  1 person assist;  verbal cues;  bed rails  Bed Mobility Training Supine-to-Sit: minimum assist (75% patient effort);  1 person assist;  verbal cues;  bed rails  Bed Mobility Training Limitations: decreased ability to use arms for pushing/pulling;  decreased ability to use legs for bridging/pushing;  impaired ability to control trunk for mobility;  decreased strength    Bed-Chair Transfer Training  Bed-to-Chair Transfer Training Rehab Potential: good, to achieve stated therapy goals  Transfer Training Bed-to-Chair Transfer: contact guard;  1 person assist;  verbal cues;  weight-bearing as tolerated   rolling walker  Transfer Training Chair-to-Bed Transfer: contact guard;  verbal cues;  1 person assist;  weight-bearing as tolerated   rolling walker  Bed-to-Chair Transfer Training Transfer Safety Analysis: decreased balance;  decreased weight-shifting ability;  decreased strength;  impaired balance;  rolling walker    Sit-Stand Transfer Training  Sit-to-Stand Transfer Training Rehab Potential: good, to achieve stated therapy goals  Transfer Training Sit-to-Stand Transfer: supervsion;  supervision;  verbal cues;  weight-bearing as tolerated   rolling walker  Transfer Training Stand-to-Sit Transfer: supervsion;  supervision;  verbal cues;  weight-bearing as tolerated   rolling walker  Sit-to-Stand Transfer Training Transfer Safety Analysis: decreased balance;  impaired balance;  decreased strength;  rolling walker    Toilet Transfer Training  Toilet Transfer Training Rehab Potential: good, to achieve stated therapy goals  Transfer Training Toilet Transfer: contact guard;  1 person assist;  verbal cues;  weight-bearing as tolerated   rolling walker  Toilet Transfer Training Transfer Safety Analysis: decreased balance;  decreased weight-shifting ability;  impaired balance;  decreased strength;  rolling walker    Lower Body Dressing Training  Lower Body Dressing Training Rehab Potential: good, to achieve stated therapy goals  Lower Body Dressing Training Assistance: independent        REVIEW OF SYSTEMS  Constitutional - No fever,  +fatigue  HEENT - No vertigo, No neck pain  Neurological - No headaches, No memory loss, +loss of strength, No numbness, No tremors  Skin - No rashes, +lesions   Musculoskeletal - No joint pain, No joint swelling, No muscle pain  Psychiatric - +depression, No anxiety    VITALS  T(C): 37.2 (19 @ 08:01), Max: 37.2 (19 @ 08:01)  HR: 110 (19 @ 08:00) (103 - 120)  BP: 93/58 (19 @ 08:00) (85/60 - 108/72)  RR: 25 (19 @ 08:00) (19 - 31)  SpO2: 100% (19 @ 08:00) (95% - 100%)  Wt(kg): --    MEDICATIONS   acetaminophen    Suspension .. 650 milliGRAM(s) every 6 hours PRN  ALPRAZolam 0.5 milliGRAM(s) every 6 hours PRN  amiodarone    Tablet 200 milliGRAM(s) daily  aspirin 325 milliGRAM(s) daily  atorvastatin 40 milliGRAM(s) at bedtime  benzocaine 15 mG/menthol 3.6 mG (Sugar-Free) Lozenge 1 Lozenge three times a day PRN  captopril 3.125 milliGRAM(s) <User Schedule>  carvedilol 3.125 milliGRAM(s) every 12 hours  clopidogrel Tablet 75 milliGRAM(s) daily  enoxaparin Injectable 40 milliGRAM(s) daily  insulin lispro (HumaLOG) corrective regimen sliding scale   Before meals and at bedtime  micafungin IVPB 100 milliGRAM(s) every 24 hours  multivitamin   Solution 5 milliLiter(s) daily  ondansetron Injectable 4 milliGRAM(s) every 6 hours PRN  pantoprazole  Injectable 40 milliGRAM(s) every 12 hours  QUEtiapine 12.5 milliGRAM(s) every 8 hours PRN  QUEtiapine 25 milliGRAM(s) <User Schedule>  QUEtiapine 25 milliGRAM(s) at bedtime PRN  saccharomyces boulardii 250 milliGRAM(s) two times a day  senna Syrup 10 milliLiter(s) at bedtime  spironolactone 12.5 milliGRAM(s) <User Schedule>  sucralfate suspension 1 Gram(s) two times a day      RECENT LABS/IMAGING            ----------------------------------------------------------------------------  PHYSICAL EXAM  Constitutional - NAD, Comfortable  Extremities - BLE swelling, BLE incisions + staples CDI  Neurologic Exam -                    Cognitive - AAOx 3     Communication - Improved     Motor -                      LEFT    UE - ShAB 2/5, EF 4/5, EE 4/5,  4/5                    RIGHT UE - ShAB 5/5, EF 5/5, EE 5/5,  5/5                    LEFT    LE - HF 5/5, KE 5/5, DF 5/5, PF 5/5                    RIGHT LE - HF 5/5, KE 5/5, DF 5/5, PF 5/5         Sensory - Intact to LT  Psychiatric - Affect flat/depressed	  ----------------------------------------------------------------------------------------  ASSESSMENT/PLAN  48y Male with functional deficits after STEMI, cardiac arrest s/p CABG, followed by complicated hospital course including need for trach, ileus/tube feeds, GI bleed, sepsis.   Pain - Tylenol, Dilaudid  Sleep - Melatonin   Depression - No evidence of psychosis or anxiety -- Therefore recommend DC Xanax, and Seroquel. Suggest SSRI - no evidence of increase happiness with idea of going home  CAD - ASA, Plavix, Lipitor  DVT PPX - Lovenox    Candida - Mycamine   Pulm- Tolerating trach collar, progressing as tolerated  Rehab - Patient is supervision/independent.  Recommend DC HOME with HOME CARE. Continue bedside therapy as well as OOB throughout the day with mobilization by staff to maintain cardiopulmonary function and prevention of secondary complications related to debility.

## 2019-01-07 NOTE — BEHAVIORAL HEALTH ASSESSMENT NOTE - SUMMARY
Pt is 48 y.o male with no previous psychiatric history, experienced extensive hospitalization post MI. Reports that prior to sleeping well last night, he felt nervous and that someone was going to hurt him. Today he reports he feels calm and that he can put his thoughts together. He reports that he is hopeful and focused on recovery to get back to his family.

## 2019-01-07 NOTE — BEHAVIORAL HEALTH ASSESSMENT NOTE - NSBHCHARTREVIEWVS_PSY_A_CORE FT
ICU Vital Signs Last 24 Hrs  T(C): 36.7 (07 Jan 2019 12:00), Max: 37.2 (07 Jan 2019 08:01)  T(F): 98 (07 Jan 2019 12:00), Max: 99 (07 Jan 2019 08:01)  HR: 110 (07 Jan 2019 08:00) (103 - 120)  BP: 93/58 (07 Jan 2019 08:00) (85/60 - 96/62)  BP(mean): 71 (07 Jan 2019 08:00) (68 - 72)  ABP: --  ABP(mean): --  RR: 25 (07 Jan 2019 08:00) (19 - 31)  SpO2: 100% (07 Jan 2019 08:00) (95% - 100%)

## 2019-01-08 DIAGNOSIS — F41.9 ANXIETY DISORDER, UNSPECIFIED: ICD-10-CM

## 2019-01-08 LAB
ALBUMIN SERPL ELPH-MCNC: 3.5 G/DL — SIGNIFICANT CHANGE UP (ref 3.3–5.2)
ALP SERPL-CCNC: 155 U/L — HIGH (ref 40–120)
ALT FLD-CCNC: 33 U/L — SIGNIFICANT CHANGE UP
ANION GAP SERPL CALC-SCNC: 14 MMOL/L — SIGNIFICANT CHANGE UP (ref 5–17)
AST SERPL-CCNC: 46 U/L — HIGH
BASOPHILS # BLD AUTO: 0 K/UL — SIGNIFICANT CHANGE UP (ref 0–0.2)
BASOPHILS NFR BLD AUTO: 0.3 % — SIGNIFICANT CHANGE UP (ref 0–2)
BILIRUB SERPL-MCNC: 1.9 MG/DL — SIGNIFICANT CHANGE UP (ref 0.4–2)
BLD GP AB SCN SERPL QL: SIGNIFICANT CHANGE UP
BUN SERPL-MCNC: 19 MG/DL — SIGNIFICANT CHANGE UP (ref 8–20)
CALCIUM SERPL-MCNC: 8.2 MG/DL — LOW (ref 8.6–10.2)
CHLORIDE SERPL-SCNC: 98 MMOL/L — SIGNIFICANT CHANGE UP (ref 98–107)
CO2 SERPL-SCNC: 23 MMOL/L — SIGNIFICANT CHANGE UP (ref 22–29)
CREAT SERPL-MCNC: 0.61 MG/DL — SIGNIFICANT CHANGE UP (ref 0.5–1.3)
EOSINOPHIL # BLD AUTO: 0.1 K/UL — SIGNIFICANT CHANGE UP (ref 0–0.5)
EOSINOPHIL NFR BLD AUTO: 0.7 % — SIGNIFICANT CHANGE UP (ref 0–5)
GLUCOSE BLDC GLUCOMTR-MCNC: 117 MG/DL — HIGH (ref 70–99)
GLUCOSE BLDC GLUCOMTR-MCNC: 133 MG/DL — HIGH (ref 70–99)
GLUCOSE BLDC GLUCOMTR-MCNC: 141 MG/DL — HIGH (ref 70–99)
GLUCOSE BLDC GLUCOMTR-MCNC: 171 MG/DL — HIGH (ref 70–99)
GLUCOSE BLDC GLUCOMTR-MCNC: 178 MG/DL — HIGH (ref 70–99)
GLUCOSE SERPL-MCNC: 98 MG/DL — SIGNIFICANT CHANGE UP (ref 70–115)
HCT VFR BLD CALC: 27.5 % — LOW (ref 42–52)
HGB BLD-MCNC: 8.4 G/DL — LOW (ref 14–18)
LYMPHOCYTES # BLD AUTO: 2 K/UL — SIGNIFICANT CHANGE UP (ref 1–4.8)
LYMPHOCYTES # BLD AUTO: 23.2 % — SIGNIFICANT CHANGE UP (ref 20–55)
MAGNESIUM SERPL-MCNC: 2.2 MG/DL — SIGNIFICANT CHANGE UP (ref 1.6–2.6)
MCHC RBC-ENTMCNC: 26.9 PG — LOW (ref 27–31)
MCHC RBC-ENTMCNC: 30.5 G/DL — LOW (ref 32–36)
MCV RBC AUTO: 88.1 FL — SIGNIFICANT CHANGE UP (ref 80–94)
MONOCYTES # BLD AUTO: 0.6 K/UL — SIGNIFICANT CHANGE UP (ref 0–0.8)
MONOCYTES NFR BLD AUTO: 6.5 % — SIGNIFICANT CHANGE UP (ref 3–10)
NEUTROPHILS # BLD AUTO: 5.8 K/UL — SIGNIFICANT CHANGE UP (ref 1.8–8)
NEUTROPHILS NFR BLD AUTO: 67.3 % — SIGNIFICANT CHANGE UP (ref 37–73)
PLATELET # BLD AUTO: 293 K/UL — SIGNIFICANT CHANGE UP (ref 150–400)
POTASSIUM SERPL-MCNC: 4.2 MMOL/L — SIGNIFICANT CHANGE UP (ref 3.5–5.3)
POTASSIUM SERPL-SCNC: 4.2 MMOL/L — SIGNIFICANT CHANGE UP (ref 3.5–5.3)
PROT SERPL-MCNC: 6.6 G/DL — SIGNIFICANT CHANGE UP (ref 6.6–8.7)
RBC # BLD: 3.12 M/UL — LOW (ref 4.6–6.2)
RBC # FLD: 20.9 % — HIGH (ref 11–15.6)
SODIUM SERPL-SCNC: 135 MMOL/L — SIGNIFICANT CHANGE UP (ref 135–145)
TYPE + AB SCN PNL BLD: SIGNIFICANT CHANGE UP
WBC # BLD: 8.6 K/UL — SIGNIFICANT CHANGE UP (ref 4.8–10.8)
WBC # FLD AUTO: 8.6 K/UL — SIGNIFICANT CHANGE UP (ref 4.8–10.8)

## 2019-01-08 PROCEDURE — 71260 CT THORAX DX C+: CPT | Mod: 26

## 2019-01-08 PROCEDURE — 99232 SBSQ HOSP IP/OBS MODERATE 35: CPT | Mod: 24

## 2019-01-08 PROCEDURE — 99233 SBSQ HOSP IP/OBS HIGH 50: CPT

## 2019-01-08 PROCEDURE — 71045 X-RAY EXAM CHEST 1 VIEW: CPT | Mod: 26

## 2019-01-08 PROCEDURE — 99232 SBSQ HOSP IP/OBS MODERATE 35: CPT

## 2019-01-08 RX ORDER — FUROSEMIDE 40 MG
40 TABLET ORAL DAILY
Qty: 0 | Refills: 0 | Status: DISCONTINUED | OUTPATIENT
Start: 2019-01-08 | End: 2019-01-08

## 2019-01-08 RX ORDER — TRAZODONE HCL 50 MG
50 TABLET ORAL ONCE
Qty: 0 | Refills: 0 | Status: COMPLETED | OUTPATIENT
Start: 2019-01-08 | End: 2019-01-08

## 2019-01-08 RX ORDER — FUROSEMIDE 40 MG
20 TABLET ORAL
Qty: 0 | Refills: 0 | Status: DISCONTINUED | OUTPATIENT
Start: 2019-01-08 | End: 2019-01-10

## 2019-01-08 RX ORDER — SPIRONOLACTONE 25 MG/1
25 TABLET, FILM COATED ORAL DAILY
Qty: 0 | Refills: 0 | Status: DISCONTINUED | OUTPATIENT
Start: 2019-01-08 | End: 2019-01-10

## 2019-01-08 RX ORDER — ALPRAZOLAM 0.25 MG
0.5 TABLET ORAL ONCE
Qty: 0 | Refills: 0 | Status: DISCONTINUED | OUTPATIENT
Start: 2019-01-08 | End: 2019-01-08

## 2019-01-08 RX ADMIN — SENNA PLUS 2 TABLET(S): 8.6 TABLET ORAL at 00:07

## 2019-01-08 RX ADMIN — Medication 325 MILLIGRAM(S): at 11:22

## 2019-01-08 RX ADMIN — Medication 50 MILLIGRAM(S): at 21:55

## 2019-01-08 RX ADMIN — ONDANSETRON 4 MILLIGRAM(S): 8 TABLET, FILM COATED ORAL at 05:00

## 2019-01-08 RX ADMIN — Medication 250 MILLIGRAM(S): at 05:43

## 2019-01-08 RX ADMIN — CARVEDILOL PHOSPHATE 3.12 MILLIGRAM(S): 80 CAPSULE, EXTENDED RELEASE ORAL at 05:43

## 2019-01-08 RX ADMIN — SODIUM CHLORIDE 3 MILLILITER(S): 9 INJECTION INTRAMUSCULAR; INTRAVENOUS; SUBCUTANEOUS at 21:53

## 2019-01-08 RX ADMIN — PANTOPRAZOLE SODIUM 40 MILLIGRAM(S): 20 TABLET, DELAYED RELEASE ORAL at 05:43

## 2019-01-08 RX ADMIN — AMIODARONE HYDROCHLORIDE 200 MILLIGRAM(S): 400 TABLET ORAL at 05:43

## 2019-01-08 RX ADMIN — Medication 20 MILLIGRAM(S): at 11:23

## 2019-01-08 RX ADMIN — Medication 250 MILLIGRAM(S): at 18:19

## 2019-01-08 RX ADMIN — Medication 1 TABLET(S): at 11:23

## 2019-01-08 RX ADMIN — Medication 1 GRAM(S): at 18:19

## 2019-01-08 RX ADMIN — HYDROMORPHONE HYDROCHLORIDE 4 MILLIGRAM(S): 2 INJECTION INTRAMUSCULAR; INTRAVENOUS; SUBCUTANEOUS at 20:18

## 2019-01-08 RX ADMIN — SENNA PLUS 2 TABLET(S): 8.6 TABLET ORAL at 21:55

## 2019-01-08 RX ADMIN — CLOPIDOGREL BISULFATE 75 MILLIGRAM(S): 75 TABLET, FILM COATED ORAL at 11:22

## 2019-01-08 RX ADMIN — PANTOPRAZOLE SODIUM 40 MILLIGRAM(S): 20 TABLET, DELAYED RELEASE ORAL at 18:19

## 2019-01-08 RX ADMIN — ENOXAPARIN SODIUM 40 MILLIGRAM(S): 100 INJECTION SUBCUTANEOUS at 21:55

## 2019-01-08 RX ADMIN — ATORVASTATIN CALCIUM 40 MILLIGRAM(S): 80 TABLET, FILM COATED ORAL at 21:55

## 2019-01-08 RX ADMIN — Medication 50 MILLIGRAM(S): at 03:12

## 2019-01-08 RX ADMIN — SODIUM CHLORIDE 3 MILLILITER(S): 9 INJECTION INTRAMUSCULAR; INTRAVENOUS; SUBCUTANEOUS at 13:30

## 2019-01-08 RX ADMIN — CAPTOPRIL 3.12 MILLIGRAM(S): 12.5 TABLET ORAL at 18:30

## 2019-01-08 RX ADMIN — MICAFUNGIN SODIUM 105 MILLIGRAM(S): 100 INJECTION, POWDER, LYOPHILIZED, FOR SOLUTION INTRAVENOUS at 15:45

## 2019-01-08 RX ADMIN — Medication 0.5 MILLIGRAM(S): at 11:22

## 2019-01-08 RX ADMIN — Medication 0.5 MILLIGRAM(S): at 08:34

## 2019-01-08 RX ADMIN — CARVEDILOL PHOSPHATE 3.12 MILLIGRAM(S): 80 CAPSULE, EXTENDED RELEASE ORAL at 18:30

## 2019-01-08 RX ADMIN — SODIUM CHLORIDE 3 MILLILITER(S): 9 INJECTION INTRAMUSCULAR; INTRAVENOUS; SUBCUTANEOUS at 05:41

## 2019-01-08 RX ADMIN — HYDROMORPHONE HYDROCHLORIDE 4 MILLIGRAM(S): 2 INJECTION INTRAMUSCULAR; INTRAVENOUS; SUBCUTANEOUS at 21:10

## 2019-01-08 RX ADMIN — Medication 2: at 21:54

## 2019-01-08 RX ADMIN — Medication 1 GRAM(S): at 05:43

## 2019-01-08 RX ADMIN — Medication 0.5 MILLIGRAM(S): at 00:07

## 2019-01-08 NOTE — PROGRESS NOTE ADULT - PROBLEM SELECTOR PLAN 2
blood cultures negative 12/24 and12/25  continue micafungin as per ID for positive blood cultures Candida Parapsilosis 12/18 and 12/20  ID recommending DARION to r/o endocarditis however, WBCs normal, afebrile, will d/w Dr Barrett Worsening pulmonary edema on CT chest/CXR  Continue low dose coreg and captopril  Will need standing diuretics resumed for worsening pulmonary edema  continue aldactone   Life Vest upon d/c per cardiology

## 2019-01-08 NOTE — PROGRESS NOTE ADULT - PROBLEM SELECTOR PLAN 7
continue aspirin, plavix and statin for graft patency   Lovenox and SCDs for DVT prophylaxis  no history of DM, continue FS AC+HS with coverage for now improving  continue with PT and OT  d/c planning for home next week

## 2019-01-08 NOTE — PROGRESS NOTE ADULT - PROBLEM SELECTOR PLAN 6
improving  continue with PT and OT  d/c planning for home next week right groin wound granulating nicely  continue with wet to dry dressing changes by nursing (vascular signed off, will reconsult if needed)

## 2019-01-08 NOTE — PROGRESS NOTE ADULT - PROBLEM SELECTOR PLAN 9
possibly contributed to ICU PTSD, Psych consulted, to see pt today.  Plan for transfer to floor prior to d/c home NGT now removed  patient was successfully decannulated, no plan for PEG tube at this time  speech and swallow eval completed, tolerating adequate PO intake

## 2019-01-08 NOTE — PROGRESS NOTE ADULT - PROBLEM SELECTOR PLAN 5
right groin wound granulating nicely  continue with wet to dry dressing changes by nursing (vascular signed off, will reconsult if needed) improving  Nocturnal TFs discontinued.  Calorie count in progress  Wife to bring food from home, continue soft diet with thin liquids

## 2019-01-08 NOTE — PROGRESS NOTE ADULT - ASSESSMENT
46y/o man with no significant PMH admitted on 11/29/18 for CP for 2-3 days prior to admission,   found with STEMI. Urgent Cath, pt found to have multiple occlusions; VF arrest, shock x 1, return to NSR.  Upon transfer to OR for Urgent CABG, pt again VF arrest, chest opened intra-op with CPR in progress.  s/p ECMO and Impella; 12/3 ECMO explanted and Impella placed via right groin sheath.    Has been on antibiotics :  Vancomycin 11/29 to 12/20  Cipro 11/29 to 12/4  Zosyn 12/4 to 12/12 and 12/15 to 12/16  Meropenem 12/16  and stopped 12/29  Micafungin 12/18 to present     s/p CABG   S/P tracheostomy     - No fever  - No more leukocytosis  - Blood cultures with Candida Parapsilosis 12/18 and 12/20   - Repeat blood cultures no growth 12/21 and there after  - No plan for DARION as per primary team.   - Ophthalmology evaluation for candida enophthalmias, no blurred vision  - On Micafungin, completed 2 weeks of IV   - Recommending to switch to oral fluconazole 400mg daily to complete total 6 weeks.   - If Temperature higher than 100.8 repeat blood cultures    Will sign off, please call with any question.

## 2019-01-08 NOTE — PROGRESS NOTE ADULT - SUBJECTIVE AND OBJECTIVE BOX
[facility]  INFECTIOUS DISEASES AND INTERNAL MEDICINE at [place]  =======================================================  Perfecto Santizo MD  Diplomates American Board of Internal Medicine and Infectious Diseases  =======================================================    PATRICK LYLE [id number]    Follow up: Fungemia    Afebrile  Awake and alert, answers questions.   NG tube and trach have been removed. started drinking and eating with no issue.   Afebrile    Allergies:  penicillins (Unknown)    Antibiotics:    micafungin IVPB 100 milliGRAM(s) IV Intermittent every 24 hours     REVIEW OF SYSTEMS:  as above     Physical Exam:  Vital Signs Last 24 Hrs  T(C): 36.6 (08 Jan 2019 10:24), Max: 36.9 (08 Jan 2019 04:57)  T(F): 97.9 (08 Jan 2019 10:24), Max: 98.5 (08 Jan 2019 04:57)  HR: 102 (08 Jan 2019 10:24) (101 - 115)  BP: 99/70 (08 Jan 2019 10:24) (85/62 - 101/70)  BP(mean): 83 (07 Jan 2019 20:00) (70 - 83)  RR: 18 (08 Jan 2019 10:24) (17 - 29)  SpO2: 98% (08 Jan 2019 04:57) (95% - 100%)  GEN: NAD , awake   HEENT: normocephalic and atraumatic. EOMI. PERRL.   NECK: Supple.   LUNGS: Coarse BS B/L  HEART: Regular rate and rhythm   ABDOMEN: Soft, nontender, and nondistended.  Positive bowel sounds.    : + Mckeon  EXTREMITIES: Without any edema.  MSK: no joint swelling  NEUROLOGIC: Awake, alert follows commands  PSYCHIATRIC: Appropriate affect .  SKIN: Sternal wound dressing, + wound vac, Leg dressings in place    Labs:  01-08    135  |  98  |  19.0  ----------------------------<  98  4.2   |  23.0  |  0.61    Ca    8.2<L>      08 Jan 2019 06:05  Mg     2.2     01-08    TPro  6.6  /  Alb  3.5  /  TBili  1.9  /  DBili  x   /  AST  46<H>  /  ALT  33  /  AlkPhos  155<H>  01-08                      8.4    8.6   )-----------( 293      ( 08 Jan 2019 06:05 )             27.5     LIVER FUNCTIONS - ( 08 Jan 2019 06:05 )  Alb: 3.5 g/dL / Pro: 6.6 g/dL / ALK PHOS: 155 U/L / ALT: 33 U/L / AST: 46 U/L / GGT: x           CARDIAC MARKERS ( 07 Jan 2019 17:00 )  x     / 0.04 ng/mL / 35 U/L / x     / x        CXR: 1/2/19  Findings:  No change in tracheostomy tube or NG tube. Patchy bilateral airspace   disease left greater than right, unchanged since the prior study. No   evidence of pneumothorax or pleural effusion..    Impression:  Stable exam without significant change since the previous study..

## 2019-01-08 NOTE — PROGRESS NOTE ADULT - PROBLEM SELECTOR PROBLEM 7
Coronary artery disease involving native coronary artery of native heart, angina presence unspecified Critical illness myopathy

## 2019-01-08 NOTE — PROGRESS NOTE ADULT - PROBLEM SELECTOR PLAN 1
Worsening pulmonary edema on CT chest/CXR  Continue low dose coreg and captopril  Will need standing diuretics resumed for worsening pulmonary edema  continue aldactone   Life Vest upon d/c per cardiology Appreciate psych evaluation, switched to trazadone  Patient remains sleepless at night with multiple panic attacks.  Please eval for PTSD and benefit of additional medications/SSRI.  Additional dose xanax ordered.   Psych to followup today.

## 2019-01-08 NOTE — PROGRESS NOTE ADULT - SUBJECTIVE AND OBJECTIVE BOX
Patient reports he is fatigued as he did not sleep all night and was awake with his eyes opened.   Unable to verify with RN flowsheet.  Reports no pain. Had NGT removed.   Despite good new to going home, patient is depressed.     FUNCTIONAL PROGRESS      Bed Mobility  Bed Mobility Training Supine-to-Sit: minimum assist (75% patient effort)    Sit-Stand Transfer Training  Transfer Training Sit-to-Stand Transfer: supervision  Transfer Training Stand-to-Sit Transfer: supervision    Gait Training  Gait Trainin feet RW S  Gait Analysis: decreased gait velocity and activity tolerance, incidental standing rest breaks due to fatigue, decreased tiffanie step length    Therapeutic Exercise  Therapeutic Exercise Detail: LAQ and ankle pumps         REVIEW OF SYSTEMS  Constitutional - No fever,  +fatigue  HEENT - No vertigo, No neck pain  Neurological - No headaches, +memory loss, No loss of strength, No numbness, No tremors  Skin - No rashes, No lesions   Musculoskeletal - No joint pain, No joint swelling, No muscle pain  Psychiatric - +depression, No anxiety    VITALS  T(C): 36.6 (19 @ 10:24), Max: 36.9 (19 @ 04:57)  HR: 102 (19 @ 10:24) (101 - 115)  BP: 99/70 (19 @ 10:24) (85/62 - 101/70)  RR: 18 (19 @ 10:24) (17 - 29)  SpO2: 98% (19 @ 04:57) (95% - 100%)  Wt(kg): --    MEDICATIONS   acetaminophen   Tablet .. 650 milliGRAM(s) every 6 hours PRN  ALPRAZolam 0.5 milliGRAM(s) every 6 hours PRN  amiodarone    Tablet 200 milliGRAM(s) daily  aspirin 325 milliGRAM(s) daily  atorvastatin 40 milliGRAM(s) at bedtime  benzocaine 15 mG/menthol 3.6 mG (Sugar-Free) Lozenge 1 Lozenge three times a day PRN  captopril 3.125 milliGRAM(s) <User Schedule>  carvedilol 3.125 milliGRAM(s) <User Schedule>  clopidogrel Tablet 75 milliGRAM(s) daily  enoxaparin Injectable 40 milliGRAM(s) daily  furosemide    Tablet 40 milliGRAM(s) daily  HYDROmorphone   Tablet 2 milliGRAM(s) every 4 hours PRN  HYDROmorphone   Tablet 4 milliGRAM(s) every 4 hours PRN  insulin lispro (HumaLOG) corrective regimen sliding scale   Before meals and at bedtime  micafungin IVPB 100 milliGRAM(s) every 24 hours  multivitamin/minerals 1 Tablet(s) daily  ondansetron Injectable 4 milliGRAM(s) every 6 hours PRN  pantoprazole    Tablet 40 milliGRAM(s) every 12 hours  saccharomyces boulardii 250 milliGRAM(s) two times a day  senna 2 Tablet(s) at bedtime  sodium chloride 0.9% lock flush 3 milliLiter(s) every 8 hours  spironolactone 25 milliGRAM(s) daily  sucralfate 1 Gram(s) two times a day  traZODone 50 milliGRAM(s) at bedtime      RECENT LABS/IMAGING  CBC Full  -  ( 2019 06:05 )  WBC Count : 8.6 K/uL  Hemoglobin : 8.4 g/dL  Hematocrit : 27.5 %  Platelet Count - Automated : 293 K/uL  Mean Cell Volume : 88.1 fl  Mean Cell Hemoglobin : 26.9 pg  Mean Cell Hemoglobin Concentration : 30.5 g/dL  Auto Neutrophil # : 5.8 K/uL  Auto Lymphocyte # : 2.0 K/uL  Auto Monocyte # : 0.6 K/uL  Auto Eosinophil # : 0.1 K/uL  Auto Basophil # : 0.0 K/uL  Auto Neutrophil % : 67.3 %  Auto Lymphocyte % : 23.2 %  Auto Monocyte % : 6.5 %  Auto Eosinophil % : 0.7 %  Auto Basophil % : 0.3 %        135  |  98  |  19.0  ----------------------------<  98  4.2   |  23.0  |  0.61    Ca    8.2<L>      2019 06:05  Mg     2.2         TPro  6.6  /  Alb  3.5  /  TBili  1.9  /  DBili  x   /  AST  46<H>  /  ALT  33  /  AlkPhos  155<H>                ----------------------------------------------------------------------------  PHYSICAL EXAM  Constitutional - NAD, Comfortable  Extremities - No calf tenderness  Neurologic Exam -                    Cognitive - AAOx 3     Communication - Limited due to mood     Motor -                      LEFT    UE - ShAB 2/5, EF 4/5, EE 4/5,  4/5                    RIGHT UE - ShAB 5/5, EF 5/5, EE 5/5,  5/5                    LEFT    LE - HF 5/5, KE 5/5, DF 5/5, PF 5/5                    RIGHT LE - HF 5/5, KE 5/5, DF 5/5, PF 5/5         Sensory - Intact to LT  Psychiatric - Affect flat/depressed	  ----------------------------------------------------------------------------------------  ASSESSMENT/PLAN  48y Male with functional deficits after STEMI, cardiac arrest s/p CABG, followed by complicated hospital course including need for trach, ileus/tube feeds, GI bleed, sepsis.   Pain - Tylenol, Dilaudid  Sleep - Recommend Melatonin 6mg Q8Pm to assist with initiation of sleep, Started on Trazodone may need increased, if does not help, may be activating patient and will need DC.   Depression - Consider Prozac 10mg HS   CAD - ASA, Plavix, Lipitor  DVT PPX - Lovenox    Candida - Mycamine   Rehab - Patient is supervision/independent.  Recommend DC HOME with HOME CARE. Continue bedside therapy as well as OOB throughout the day with mobilization by staff to maintain cardiopulmonary function and prevention of secondary complications related to debility.

## 2019-01-08 NOTE — PROGRESS NOTE ADULT - PROBLEM SELECTOR PLAN 4
improving  Nocturnal TFs discontinued.  Calorie count in progress  Wife to bring food from home, continue soft diet with thin liquids resolved  decannulated 1/3/19  tolerating room air without incident  continue dry sterile dressing to trach stoma

## 2019-01-08 NOTE — PROGRESS NOTE ADULT - PROBLEM SELECTOR PLAN 3
resolved  decannulated 1/3/19  tolerating room air without incident  continue dry sterile dressing to trach stoma blood cultures negative 12/24 and12/25  continue micafungin as per ID for positive blood cultures Candida Parapsilosis 12/18 and 12/20  ID recommending DARION to r/o endocarditis however, WBCs normal, afebrile, will d/w Dr Barrett blood cultures negative 12/24 and12/25  continue micafungin as per ID for positive blood cultures Candida Parapsilosis 12/18 and 12/20  *To continue for 4-6 weeks with PICC line per Dr Barrett*

## 2019-01-08 NOTE — PROGRESS NOTE ADULT - PROBLEM SELECTOR PLAN 8
remains a concern as pt has had prolonged NGT in place  patient was successfully decannulated, no plan for PEG tube at this time  speech and swallow eval completed, tolerating adequate PO intake continue aspirin, plavix and statin for graft patency   Lovenox and SCDs for DVT prophylaxis  no history of DM, continue FS AC+HS with coverage for now

## 2019-01-08 NOTE — PROGRESS NOTE ADULT - SUBJECTIVE AND OBJECTIVE BOX
Subjective: "I didn't sleep at all last night" Family member at bedside describes multiple panic attacks overnight. Patient appears anxious.     VITAL SIGNS  Vital Signs Last 24 Hrs  T(C): 36.9 (19 @ 04:57), Max: 36.9 (19 @ 04:57)  T(F): 98.5 (19 @ 04:57), Max: 98.5 (19 @ 04:57)  HR: 115 (19 @ 04:57) (101 - 115)  BP: 93/64 (19 @ 04:57) (85/62 - 101/70)  RR: 19 (19 @ 04:57) (17 - 29)  SpO2: 98% (19 @ 04:57) (95% - 100%)  on (O2)              Telemetry/Alarms:  SR with PVCs, PACs 100-110  LVEF: 25-30%    MEDICATIONS  acetaminophen   Tablet .. 650 milliGRAM(s) Oral every 6 hours PRN  ALPRAZolam 0.5 milliGRAM(s) Oral every 6 hours PRN  amiodarone    Tablet 200 milliGRAM(s) Oral daily  aspirin 325 milliGRAM(s) Oral daily  atorvastatin 40 milliGRAM(s) Oral at bedtime  benzocaine 15 mG/menthol 3.6 mG (Sugar-Free) Lozenge 1 Lozenge Oral three times a day PRN  captopril 3.125 milliGRAM(s) Oral <User Schedule>  carvedilol 3.125 milliGRAM(s) Oral <User Schedule>  clopidogrel Tablet 75 milliGRAM(s) Oral daily  enoxaparin Injectable 40 milliGRAM(s) SubCutaneous daily  HYDROmorphone   Tablet 2 milliGRAM(s) Oral every 4 hours PRN  HYDROmorphone   Tablet 4 milliGRAM(s) Oral every 4 hours PRN  insulin lispro (HumaLOG) corrective regimen sliding scale   SubCutaneous Before meals and at bedtime  micafungin IVPB 100 milliGRAM(s) IV Intermittent every 24 hours  multivitamin/minerals 1 Tablet(s) Oral daily  ondansetron Injectable 4 milliGRAM(s) IV Push every 6 hours PRN  pantoprazole    Tablet 40 milliGRAM(s) Oral every 12 hours  saccharomyces boulardii 250 milliGRAM(s) Oral two times a day  senna 2 Tablet(s) Oral at bedtime  sodium chloride 0.9% lock flush 3 milliLiter(s) IV Push every 8 hours  spironolactone 12.5 milliGRAM(s) Oral <User Schedule>  sucralfate 1 Gram(s) Oral two times a day  traZODone 50 milliGRAM(s) Oral at bedtime      PHYSICAL EXAM  General: well nourished, well developed, mild anxiety, restless  Neurology: alert and oriented x 3, nonfocal, no gross deficits  Respiratory: diminished right base laterally, + healing trach site  CV: tachy regular, normal S1, S2  Abdomen: soft, nontender, nondistended, positive bowel sounds, last bowel movement   Extremities: warm, well perfused. trace edema. + DP pulses  Incisions: midline sternal incision, c/d/i. sternum stable. scant clear drainage noted from midsternal incision. R LE open incision healing well lower leg, L LE lower and thigh open incision healing well.        @ 07:01  -   @ 07:00  --------------------------------------------------------  IN: 770 mL / OUT: 500 mL / NET: 270 mL        Weights:  Daily Height in cm: 175.26 (2019 22:40)    Daily Weight in k.8 (2019 04:00)  Admit Wt: Drug Dosing Weight  Height (cm): 175.26 (2019 22:40)  Weight (kg): 82.5 (2019 22:40)  BMI (kg/m2): 26.9 (2019 22:40)  BSA (m2): 1.98 (2019 22:40)    All laboratory results, radiology and medications reviewed.    LABS      135  |  98  |  19.0  ----------------------------<  98  4.2   |  23.0  |  0.61    Ca    8.2<L>      2019 06:05  Mg     2.2         TPro  6.6  /  Alb  3.5  /  TBili  1.9  /  DBili  x   /  AST  46<H>  /  ALT  33  /  AlkPhos  155<H>                                   8.4    8.6   )-----------( 293      ( 2019 06:05 )             27.5            Bilirubin Total, Serum: 1.9 mg/dL ( @ 06:05)    CAPILLARY BLOOD GLUCOSE      POCT Blood Glucose.: 133 mg/dL (2019 08:11)  POCT Blood Glucose.: 202 mg/dL (2019 22:51)  POCT Blood Glucose.: 136 mg/dL (2019 12:09)           Today's CXR: worsening pulmonary edema      PAST MEDICAL & SURGICAL HISTORY:  Staph infection  No significant past surgical history       ASSESSMENT  48y Male was admitted on (Date) from (home/other facility) with    Preop course:    On (Date), pt underwent EGD w control of hemorrhage w image guidance  Tracheostomy  Exploration of femoral artery  Insertion of Impella device  ECMO decannulation  Exploration and washout of mediastinum  Intra-aortic balloon pump removal  Arterial cannulation  Central line placement  [place] placement  CABG  ECMO (extracorporeal membrane oxygenation)  .    Postoperative Course/Issues:     PLAN  Neuro: Pain management  Pulm: Encourage coughing, deep breathing and use of incentive spirometry. Wean off supplemental oxygen as able. Daily CXR.   Cardio: Continue Aspirin, Lipitor. (BB)  GI: Tolerating diet. Continue stool softeners.  Renal: Keep negative fluid balance. (Diuretics) Trend BUN/Cr. Supplement electrolytes prn.   :   Vasc: Lovenox/SCDs for DVT prophylaxis  Heme: Stable H/H. Trend CBC daily.   Endo:  ID: Off antibiotics. Stable.  Therapy: PT daily as tolerated.  Tubes/Wires:   Disposition: Aim to D/C to home on  Discussed with Cardiothoracic Team at AM rounds. Subjective: "I didn't sleep at all last night" Family member at bedside describes multiple panic attacks overnight. Patient appears anxious.     VITAL SIGNS  Vital Signs Last 24 Hrs  T(C): 36.9 (19 @ 04:57), Max: 36.9 (19 @ 04:57)  T(F): 98.5 (19 @ 04:57), Max: 98.5 (19 @ 04:57)  HR: 115 (19 @ 04:57) (101 - 115)  BP: 93/64 (19 @ 04:57) (85/62 - 101/70)  RR: 19 (19 @ 04:57) (17 - 29)  SpO2: 98% (19 @ 04:57) (95% - 100%)  on (O2)              Telemetry/Alarms:  SR with PVCs, PACs 100-110  LVEF: 25-30%    MEDICATIONS  acetaminophen   Tablet .. 650 milliGRAM(s) Oral every 6 hours PRN  ALPRAZolam 0.5 milliGRAM(s) Oral every 6 hours PRN  amiodarone    Tablet 200 milliGRAM(s) Oral daily  aspirin 325 milliGRAM(s) Oral daily  atorvastatin 40 milliGRAM(s) Oral at bedtime  benzocaine 15 mG/menthol 3.6 mG (Sugar-Free) Lozenge 1 Lozenge Oral three times a day PRN  captopril 3.125 milliGRAM(s) Oral <User Schedule>  carvedilol 3.125 milliGRAM(s) Oral <User Schedule>  clopidogrel Tablet 75 milliGRAM(s) Oral daily  enoxaparin Injectable 40 milliGRAM(s) SubCutaneous daily  HYDROmorphone   Tablet 2 milliGRAM(s) Oral every 4 hours PRN  HYDROmorphone   Tablet 4 milliGRAM(s) Oral every 4 hours PRN  insulin lispro (HumaLOG) corrective regimen sliding scale   SubCutaneous Before meals and at bedtime  micafungin IVPB 100 milliGRAM(s) IV Intermittent every 24 hours  multivitamin/minerals 1 Tablet(s) Oral daily  ondansetron Injectable 4 milliGRAM(s) IV Push every 6 hours PRN  pantoprazole    Tablet 40 milliGRAM(s) Oral every 12 hours  saccharomyces boulardii 250 milliGRAM(s) Oral two times a day  senna 2 Tablet(s) Oral at bedtime  sodium chloride 0.9% lock flush 3 milliLiter(s) IV Push every 8 hours  spironolactone 12.5 milliGRAM(s) Oral <User Schedule>  sucralfate 1 Gram(s) Oral two times a day  traZODone 50 milliGRAM(s) Oral at bedtime      PHYSICAL EXAM  General: well nourished, well developed, mild anxiety, restless  Neurology: alert and oriented x 3, nonfocal, no gross deficits  Respiratory: diminished right base laterally, + healing trach site  CV: tachy regular, normal S1, S2  Abdomen: soft, nontender, nondistended, positive bowel sounds, last bowel movement   Extremities: warm, well perfused. trace edema. + DP pulses  Incisions: midline sternal incision, c/d/i. sternum stable. scant clear drainage noted from midsternal incision. R LE open incision healing well lower leg, L LE lower and thigh open incision healing well.   R groin wound with superficial yellow slough, some granulation       @ 07:01  -   @ 07:00  --------------------------------------------------------  IN: 770 mL / OUT: 500 mL / NET: 270 mL        Weights:  Daily Height in cm: 175.26 (2019 22:40)    Daily Weight in k.8 (2019 04:00)  Admit Wt: Drug Dosing Weight  Height (cm): 175.26 (2019 22:40)  Weight (kg): 82.5 (2019 22:40)  BMI (kg/m2): 26.9 (2019 22:40)  BSA (m2): 1.98 (2019 22:40)    All laboratory results, radiology and medications reviewed.    LABS      135  |  98  |  19.0  ----------------------------<  98  4.2   |  23.0  |  0.61    Ca    8.2<L>      2019 06:05  Mg     2.2         TPro  6.6  /  Alb  3.5  /  TBili  1.9  /  DBili  x   /  AST  46<H>  /  ALT  33  /  AlkPhos  155<H>                                   8.4    8.6   )-----------( 293      ( 2019 06:05 )             27.5            Bilirubin Total, Serum: 1.9 mg/dL ( @ 06:05)    CAPILLARY BLOOD GLUCOSE      POCT Blood Glucose.: 133 mg/dL (2019 08:11)  POCT Blood Glucose.: 202 mg/dL (2019 22:51)  POCT Blood Glucose.: 136 mg/dL (2019 12:09)           Today's CXR: worsening pulmonary edema      PAST MEDICAL & SURGICAL HISTORY:  Staph infection  No significant past surgical history

## 2019-01-08 NOTE — PROGRESS NOTE ADULT - PROBLEM SELECTOR PROBLEM 8
Sinusitis Coronary artery disease involving native coronary artery of native heart, angina presence unspecified

## 2019-01-08 NOTE — PROGRESS NOTE ADULT - ASSESSMENT
48 yo Male c/o CP for 2-3 days prior to admission, then 10/10 chest pain approximately 9:30 am day prior to admission, followed by vomiting.  Wife drove pt to hospital, ruled in for STEMI. Urgent Cath, pt found to have multiple occlusions; stent to LAD and D2; stent to LAD thrombosed,  impella placed for support.  Pt then had VF arrest, shock x 1, return to NSR.  Upon transfer to OR for Urgent CABG, pt with asystolic arrest, chest opened intra-op with CPR in progress. Pt underwent 11/29 C4V (LAD, Diag, OM and PDA) with inability to wean from CPB therefore requiring VA ECMO (central cannulation) and IABP, he was transferred to CICU.  12/3 s/p ECMO explant and IABP d/c'd, placement of impella via R groin sheath.   12/6 Impella removal via right femoral cutdown with primary repair of femoral artery and stent placed to external iliac artery for dissection.  Hospital course notable for (in addition to above):cardiogenic and septic shock (both since resolved), acute systolic heart failure, vent dependent respiratory failure (inability to wean also contributed to by severe agitation, now s/p trach 12/10), fungemia, acute blood loss anemia, GIB (bleeding stomach ulcer), hypokalemia, ileus, sinusitis.  1/3 decannulated.  1/4 passed bedside swallow eval. 46 yo Male c/o CP for 2-3 days prior to admission, then 10/10 chest pain approximately 9:30 am day prior to admission, followed by vomiting.  Wife drove pt to hospital, ruled in for STEMI. Urgent Cath, pt found to have multiple occlusions; stent to LAD and D2; stent to LAD thrombosed,  impella placed for support.  Pt then had VF arrest, shock x 1, return to NSR.  Upon transfer to OR for Urgent CABG, pt with asystolic arrest, chest opened intra-op with CPR in progress. Pt underwent 11/29 C4V (LAD, Diag, OM and PDA) with inability to wean from CPB therefore requiring VA ECMO (central cannulation) and IABP, he was transferred to CICU.  12/3 s/p ECMO explant and IABP d/c'd, placement of impella via R groin sheath.   12/6 Impella removal via right femoral cutdown with primary repair of femoral artery and stent placed to external iliac artery for dissection.  Hospital course notable for (in addition to above):cardiogenic and septic shock (both since resolved), acute systolic heart failure, vent dependent respiratory failure (inability to wean also contributed to by severe agitation, now s/p trach 12/10), fungemia, acute blood loss anemia, GIB (bleeding stomach ulcer), hypokalemia, ileus, sinusitis.  1/3 decannulated.  1/4 passed bedside swallow eval.

## 2019-01-09 ENCOUNTER — TRANSCRIPTION ENCOUNTER (OUTPATIENT)
Age: 49
End: 2019-01-09

## 2019-01-09 DIAGNOSIS — F43.22 ADJUSTMENT DISORDER WITH ANXIETY: ICD-10-CM

## 2019-01-09 LAB
ANION GAP SERPL CALC-SCNC: 14 MMOL/L — SIGNIFICANT CHANGE UP (ref 5–17)
BUN SERPL-MCNC: 21 MG/DL — HIGH (ref 8–20)
CALCIUM SERPL-MCNC: 8.3 MG/DL — LOW (ref 8.6–10.2)
CHLORIDE SERPL-SCNC: 98 MMOL/L — SIGNIFICANT CHANGE UP (ref 98–107)
CO2 SERPL-SCNC: 22 MMOL/L — SIGNIFICANT CHANGE UP (ref 22–29)
CREAT SERPL-MCNC: 0.74 MG/DL — SIGNIFICANT CHANGE UP (ref 0.5–1.3)
GLUCOSE BLDC GLUCOMTR-MCNC: 126 MG/DL — HIGH (ref 70–99)
GLUCOSE BLDC GLUCOMTR-MCNC: 137 MG/DL — HIGH (ref 70–99)
GLUCOSE BLDC GLUCOMTR-MCNC: 140 MG/DL — HIGH (ref 70–99)
GLUCOSE BLDC GLUCOMTR-MCNC: 154 MG/DL — HIGH (ref 70–99)
GLUCOSE SERPL-MCNC: 141 MG/DL — HIGH (ref 70–115)
HBA1C BLD-MCNC: 4.8 % — SIGNIFICANT CHANGE UP (ref 4–5.6)
HCT VFR BLD CALC: 28.3 % — LOW (ref 42–52)
HGB BLD-MCNC: 8.3 G/DL — LOW (ref 14–18)
MAGNESIUM SERPL-MCNC: 2.2 MG/DL — SIGNIFICANT CHANGE UP (ref 1.6–2.6)
MCHC RBC-ENTMCNC: 26.3 PG — LOW (ref 27–31)
MCHC RBC-ENTMCNC: 29.3 G/DL — LOW (ref 32–36)
MCV RBC AUTO: 89.8 FL — SIGNIFICANT CHANGE UP (ref 80–94)
PLATELET # BLD AUTO: 338 K/UL — SIGNIFICANT CHANGE UP (ref 150–400)
POTASSIUM SERPL-MCNC: 4.5 MMOL/L — SIGNIFICANT CHANGE UP (ref 3.5–5.3)
POTASSIUM SERPL-SCNC: 4.5 MMOL/L — SIGNIFICANT CHANGE UP (ref 3.5–5.3)
RBC # BLD: 3.15 M/UL — LOW (ref 4.6–6.2)
RBC # FLD: 21.6 % — HIGH (ref 11–15.6)
SODIUM SERPL-SCNC: 134 MMOL/L — LOW (ref 135–145)
WBC # BLD: 10.4 K/UL — SIGNIFICANT CHANGE UP (ref 4.8–10.8)
WBC # FLD AUTO: 10.4 K/UL — SIGNIFICANT CHANGE UP (ref 4.8–10.8)

## 2019-01-09 PROCEDURE — 99232 SBSQ HOSP IP/OBS MODERATE 35: CPT

## 2019-01-09 PROCEDURE — 71045 X-RAY EXAM CHEST 1 VIEW: CPT | Mod: 26

## 2019-01-09 RX ORDER — SERTRALINE 25 MG/1
25 TABLET, FILM COATED ORAL DAILY
Qty: 0 | Refills: 0 | Status: DISCONTINUED | OUTPATIENT
Start: 2019-01-09 | End: 2019-01-11

## 2019-01-09 RX ORDER — ATORVASTATIN CALCIUM 80 MG/1
40 TABLET, FILM COATED ORAL
Qty: 0 | Refills: 0 | Status: DISCONTINUED | OUTPATIENT
Start: 2019-01-09 | End: 2019-01-11

## 2019-01-09 RX ORDER — TRAZODONE HCL 50 MG
100 TABLET ORAL AT BEDTIME
Qty: 0 | Refills: 0 | Status: DISCONTINUED | OUTPATIENT
Start: 2019-01-09 | End: 2019-01-09

## 2019-01-09 RX ORDER — TRAZODONE HCL 50 MG
100 TABLET ORAL
Qty: 0 | Refills: 0 | Status: DISCONTINUED | OUTPATIENT
Start: 2019-01-09 | End: 2019-01-11

## 2019-01-09 RX ORDER — SODIUM CHLORIDE 9 MG/ML
1000 INJECTION INTRAMUSCULAR; INTRAVENOUS; SUBCUTANEOUS
Qty: 0 | Refills: 0 | Status: DISCONTINUED | OUTPATIENT
Start: 2019-01-09 | End: 2019-01-09

## 2019-01-09 RX ORDER — SENNA PLUS 8.6 MG/1
2 TABLET ORAL
Qty: 0 | Refills: 0 | Status: DISCONTINUED | OUTPATIENT
Start: 2019-01-09 | End: 2019-01-11

## 2019-01-09 RX ADMIN — CARVEDILOL PHOSPHATE 3.12 MILLIGRAM(S): 80 CAPSULE, EXTENDED RELEASE ORAL at 06:35

## 2019-01-09 RX ADMIN — Medication 2: at 12:42

## 2019-01-09 RX ADMIN — SENNA PLUS 2 TABLET(S): 8.6 TABLET ORAL at 22:27

## 2019-01-09 RX ADMIN — CARVEDILOL PHOSPHATE 3.12 MILLIGRAM(S): 80 CAPSULE, EXTENDED RELEASE ORAL at 19:49

## 2019-01-09 RX ADMIN — PANTOPRAZOLE SODIUM 40 MILLIGRAM(S): 20 TABLET, DELAYED RELEASE ORAL at 06:35

## 2019-01-09 RX ADMIN — Medication 325 MILLIGRAM(S): at 12:42

## 2019-01-09 RX ADMIN — CAPTOPRIL 3.12 MILLIGRAM(S): 12.5 TABLET ORAL at 19:49

## 2019-01-09 RX ADMIN — SERTRALINE 25 MILLIGRAM(S): 25 TABLET, FILM COATED ORAL at 19:06

## 2019-01-09 RX ADMIN — SODIUM CHLORIDE 75 MILLILITER(S): 9 INJECTION INTRAMUSCULAR; INTRAVENOUS; SUBCUTANEOUS at 17:53

## 2019-01-09 RX ADMIN — MICAFUNGIN SODIUM 105 MILLIGRAM(S): 100 INJECTION, POWDER, LYOPHILIZED, FOR SOLUTION INTRAVENOUS at 15:06

## 2019-01-09 RX ADMIN — Medication 1 GRAM(S): at 17:49

## 2019-01-09 RX ADMIN — SODIUM CHLORIDE 3 MILLILITER(S): 9 INJECTION INTRAMUSCULAR; INTRAVENOUS; SUBCUTANEOUS at 06:36

## 2019-01-09 RX ADMIN — AMIODARONE HYDROCHLORIDE 200 MILLIGRAM(S): 400 TABLET ORAL at 06:34

## 2019-01-09 RX ADMIN — Medication 0.5 MILLIGRAM(S): at 23:07

## 2019-01-09 RX ADMIN — ONDANSETRON 4 MILLIGRAM(S): 8 TABLET, FILM COATED ORAL at 12:35

## 2019-01-09 RX ADMIN — CLOPIDOGREL BISULFATE 75 MILLIGRAM(S): 75 TABLET, FILM COATED ORAL at 12:42

## 2019-01-09 RX ADMIN — ATORVASTATIN CALCIUM 40 MILLIGRAM(S): 80 TABLET, FILM COATED ORAL at 22:26

## 2019-01-09 RX ADMIN — SPIRONOLACTONE 25 MILLIGRAM(S): 25 TABLET, FILM COATED ORAL at 06:35

## 2019-01-09 RX ADMIN — Medication 1 GRAM(S): at 06:35

## 2019-01-09 RX ADMIN — SODIUM CHLORIDE 3 MILLILITER(S): 9 INJECTION INTRAMUSCULAR; INTRAVENOUS; SUBCUTANEOUS at 15:17

## 2019-01-09 RX ADMIN — Medication 20 MILLIGRAM(S): at 15:07

## 2019-01-09 RX ADMIN — Medication 250 MILLIGRAM(S): at 17:49

## 2019-01-09 RX ADMIN — Medication 20 MILLIGRAM(S): at 06:35

## 2019-01-09 RX ADMIN — Medication 250 MILLIGRAM(S): at 06:35

## 2019-01-09 RX ADMIN — SODIUM CHLORIDE 3 MILLILITER(S): 9 INJECTION INTRAMUSCULAR; INTRAVENOUS; SUBCUTANEOUS at 21:49

## 2019-01-09 RX ADMIN — Medication 1 TABLET(S): at 12:42

## 2019-01-09 RX ADMIN — PANTOPRAZOLE SODIUM 40 MILLIGRAM(S): 20 TABLET, DELAYED RELEASE ORAL at 17:49

## 2019-01-09 RX ADMIN — Medication 0.5 MILLIGRAM(S): at 06:40

## 2019-01-09 RX ADMIN — Medication 0.5 MILLIGRAM(S): at 15:07

## 2019-01-09 NOTE — DISCHARGE NOTE ADULT - CARE PROVIDER_API CALL
Oleksandr Barrett), CTS Surgery  [address] [place] [address]  Phone: [phone]  Fax: [phone]    Patricio Shen), Cardiology; Cardiovascular Disease; Interventional Cardiology  [address] [facility] [facility] [address]  [place], [place] [id number]  Phone: [phone]  Fax: [phone]    Patrick Santizo), Infectious Disease; Internal Medicine  [address] [facility] [address]  [address]  [place] [address]  Phone: [phone]  Fax: [phone]

## 2019-01-09 NOTE — PROGRESS NOTE ADULT - ASSESSMENT
46 yo Male c/o CP for 2-3 days prior to admission, then 10/10 chest pain approximately 9:30 am day prior to admission, followed by vomiting.  Wife drove pt to hospital, ruled in for STEMI. Urgent Cath, pt found to have multiple occlusions; stent to LAD and D2; stent to LAD thrombosed,  impella placed for support.  Pt then had VF arrest, shock x 1, return to NSR.  Upon transfer to OR for Urgent CABG, pt with asystolic arrest, chest opened intra-op with CPR in progress. Pt underwent 11/29 C4V (LAD, Diag, OM and PDA) with inability to wean from CPB therefore requiring VA ECMO (central cannulation) and IABP, he was transferred to CICU.  12/3 s/p ECMO explant and IABP d/c'd, placement of impella via R groin sheath.   12/6 Impella removal via right femoral cutdown with primary repair of femoral artery and stent placed to external iliac artery for dissection.  Hospital course notable for (in addition to above):cardiogenic and septic shock (both since resolved), acute systolic heart failure, vent dependent respiratory failure (inability to wean also contributed to by severe agitation, now s/p trach 12/10), fungemia, acute blood loss anemia, GIB (bleeding stomach ulcer), hypokalemia, ileus, sinusitis.  1/3 decannulated.  1/4 passed bedside swallow eval.

## 2019-01-09 NOTE — DISCHARGE NOTE ADULT - PATIENT PORTAL LINK FT
You can access the [facility], offered by [facility], by registering with the following website: [email or web address]

## 2019-01-09 NOTE — DISCHARGE NOTE ADULT - PLAN OF CARE
rest and recover Please come to ED or Call Cardio thoracic office at [phone] if Chest pain, Shortness of Breath,  Nausea & vomiting, oozing from wounds, 2 lb increase in weight in 24 hours.   No Ointments creams lotions to wounds Please follow up with Dr. Barrett on  Please follow up with your Cardiologist and Primary Care Physician 2-4 weeks from discharge.    Please call the Cardiothoracic Surgery office at [phone] if you are experiencing any shortness of breath, chest pain, fevers or chills, drainage from the incisions, persistent nausea, vomiting or if you have any questions about your medications. If the symptoms are severe, call 911 and go to the nearest hospital. You can also call [phone] for an emergency [facility] ambulance, which will take you to the closest [facility].    If you need any assistance for making any appointments for a new consult or referral in any specialty, please call our [facility] Clinical Coordination Center at [phone]. Please follow up with Dr. Barrett on 1/25/19 at 10:00> Please come in at 9:00AM for CXR.  Please follow up with your Cardiologist and Primary Care Physician 2-4 weeks from discharge.    Please call the Cardiothoracic Surgery office at [phone] if you are experiencing any shortness of breath, chest pain, fevers or chills, drainage from the incisions, persistent nausea, vomiting or if you have any questions about your medications. If the symptoms are severe, call 911 and go to the nearest hospital. You can also call [phone] for an emergency [facility] ambulance, which will take you to the closest [facility].    If you need any assistance for making any appointments for a new consult or referral in any specialty, please call our [facility] Clinical Coordination Center at [phone].

## 2019-01-09 NOTE — PROGRESS NOTE ADULT - PROBLEM SELECTOR PLAN 9
NGT now removed  patient was successfully decannulated, no plan for PEG tube at this time  speech and swallow eval completed, tolerating adequate PO intake

## 2019-01-09 NOTE — CHART NOTE - NSCHARTNOTEFT_GEN_A_CORE
called by RN that BP 78/58, asymptomatic. asked RN to repeat BP 30 mins later which was 82/64 but pt was now reporting "funny feeling in my head". pt seen and examined, appears anxious, fine crackles at b/l bases, A&ox3, non focal. family and pt report he has minimal appetite and poor PO intake but is drinking some sips of water. D/W dr. aragon, no IVF at this time. Ok to give pt PO ativan and trazodone as ordered. RN instructed to repeat BP one hour after administration of meds.    per family and pt request bedtime medications will be changed to 8PM for future, so that pt has minimal interruptions and can fall asleep earlier.

## 2019-01-09 NOTE — PROGRESS NOTE ADULT - REASON FOR ADMISSION
Chest Pain
STEMI
STEMI
chest pain
emergent CABG
+STEMI
Acute Anterior STEMI, cardiogenic shock ,
Acute anterolateral STEMI
Anterior STEMI, cardiogenic shock
Anterior STEMI, cardiogenic shock, MVD
CAD
CHEST PAIN
STEMI
chest pain
cp
STEMI
STEMI
cp
heart disease and fungemia.
sepsis
STEMI/ARREST
chest pain
Chest Pain
cp
cp
Pulmonary edema
Pulmonary edema
chest pain
emergent CABG
s/p emeregnt CABG with ECMO
Pulmonary edema
STEMI
gastric ulcer
Emergent CABG cardiogenic shock
Emergent CABG cardiogenic shock
chest pain
emergent CABG and ECMO
Chest Pain
Chest Pain
Emergent CABG cardiogenic shock
STEMI
STEMI
chest pain
Chest Pain
Emergent CABG cardiogenic shock

## 2019-01-09 NOTE — PROGRESS NOTE ADULT - ASSESSMENT
46y/o man with no significant PMH admitted on 11/29/18 for CP for 2-3 days prior to admission,   found with STEMI. Urgent Cath, pt found to have multiple occlusions; VF arrest, shock x 1, return to NSR.  Upon transfer to OR for Urgent CABG, pt again VF arrest, chest opened intra-op with CPR in progress.  s/p ECMO and Impella; 12/3 ECMO explanted and Impella placed via right groin sheath.    Has been on antibiotics :  Vancomycin 11/29 to 12/20  Cipro 11/29 to 12/4  Zosyn 12/4 to 12/12 and 12/15 to 12/16  Meropenem 12/16  and stopped 12/29  Micafungin 12/18 to present     s/p CABG   S/P tracheostomy     - No fever  - No more leukocytosis  - Blood cultures with Candida Parapsilosis 12/18 and 12/20   - Repeat blood cultures no growth 12/21 and there after  - No plan for DARION as per primary team but since he is a very high risk patient with treat him with 6 weeks of Micafungin.   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

## 2019-01-09 NOTE — DISCHARGE NOTE ADULT - HOME CARE AGENCY
[facility] AT [place] / [facility] [phone] [facility] AT HOME INFUSION  [facility] [phone]  START OF CARE 1/11/2019

## 2019-01-09 NOTE — DISCHARGE NOTE ADULT - NS AS ACTIVITY OBS
Showering allowed/Walking-Indoors allowed/Do not drive or operate machinery/Walking-Outdoors allowed/No Heavy lifting/straining/Do not make important decisions Walking-Indoors allowed/Do not drive or operate machinery/Stairs allowed/Walking-Outdoors allowed/Showering allowed/No Heavy lifting/straining/Do not make important decisions

## 2019-01-09 NOTE — PROGRESS NOTE ADULT - SUBJECTIVE AND OBJECTIVE BOX
Subjective:  Pt in chair NAD, family at bedside.  c/o fatigue.  but happy to be going home Friday     VITAL SIGNS  T(C): 36.6 (19 @ 10:41), Max: 37.3 (19 @ 05:04)  HR: 103 (19 @ 10:41) (101 - 109)  BP: 97/71 (19 @ 10:41) (82/60 - 97/71)  RR: 18 (19 @ 10:41) (16 - 19)  SpO2: 96% (19 @ 05:04) (96% - 97%) RA             Daily     Daily Weight in k (2019 03:53)  Admit Wt: Drug Dosing Weight  Height (cm): 175.26 (2019 22:40)  Weight (kg): 82.5 (2019 22:40)    Telemetry:   SR - ST   LVEF: 25-30%    MEDICATIONS  acetaminophen   Tablet .. 650 milliGRAM(s) Oral every 6 hours PRN  ALPRAZolam 0.5 milliGRAM(s) Oral every 6 hours PRN  amiodarone    Tablet 200 milliGRAM(s) Oral daily  aspirin 325 milliGRAM(s) Oral daily  atorvastatin 40 milliGRAM(s) Oral at bedtime  benzocaine 15 mG/menthol 3.6 mG (Sugar-Free) Lozenge 1 Lozenge Oral three times a day PRN  captopril 3.125 milliGRAM(s) Oral <User Schedule>  carvedilol 3.125 milliGRAM(s) Oral <User Schedule>  clopidogrel Tablet 75 milliGRAM(s) Oral daily  enoxaparin Injectable 40 milliGRAM(s) SubCutaneous daily  furosemide    Tablet 20 milliGRAM(s) Oral <User Schedule>  HYDROmorphone   Tablet 2 milliGRAM(s) Oral every 4 hours PRN  HYDROmorphone   Tablet 4 milliGRAM(s) Oral every 4 hours PRN  insulin lispro (HumaLOG) corrective regimen sliding scale   SubCutaneous Before meals and at bedtime  micafungin IVPB 100 milliGRAM(s) IV Intermittent every 24 hours  multivitamin/minerals 1 Tablet(s) Oral daily  ondansetron Injectable 4 milliGRAM(s) IV Push every 6 hours PRN  pantoprazole    Tablet 40 milliGRAM(s) Oral every 12 hours  saccharomyces boulardii 250 milliGRAM(s) Oral two times a day  senna 2 Tablet(s) Oral at bedtime  sodium chloride 0.9% lock flush 3 milliLiter(s) IV Push every 8 hours  spironolactone 25 milliGRAM(s) Oral daily  sucralfate 1 Gram(s) Oral two times a day  traZODone 50 milliGRAM(s) Oral at bedtime    MEDICATIONS  (PRN):  acetaminophen   Tablet .. 650 milliGRAM(s) Oral every 6 hours PRN Mild Pain (1 - 3)  ALPRAZolam 0.5 milliGRAM(s) Oral every 6 hours PRN anxiety  benzocaine 15 mG/menthol 3.6 mG (Sugar-Free) Lozenge 1 Lozenge Oral three times a day PRN Sore Throat  HYDROmorphone   Tablet 2 milliGRAM(s) Oral every 4 hours PRN Moderate Pain (4 - 6)  HYDROmorphone   Tablet 4 milliGRAM(s) Oral every 4 hours PRN Severe Pain (7 - 10)  ondansetron Injectable 4 milliGRAM(s) IV Push every 6 hours PRN Nausea and/or Vomiting        PHYSICAL EXAM  General: well nourished, well developed, mild anxiety, restless  Neurology: alert and oriented x 3, nonfocal, no gross deficits  Respiratory: diminished right base laterally, + healing trach site  CV: tachy regular, normal S1, S2  Abdomen: soft, nontender, nondistended, positive bowel sounds, last bowel movement 1/7  Extremities: warm, well perfused. trace edema. + DP pulses  Incisions: midline sternal incision, c/d/i. sternum stable. scant clear drainage noted from midsternal incision. R LE open incision healing well lower leg, L LE lower and thigh open incision healing well.   R groin wound with superficial yellow slough, some granulation      I&O's Detail    2019 07:01  -  2019 07:00  --------------------------------------------------------  IN:    Oral Fluid: 240 mL  Total IN: 240 mL    OUT:    Voided: 1050 mL  Total OUT: 1050 mL    Total NET: -810 mL      2019 07:01  -  2019 14:04  --------------------------------------------------------  IN:    Oral Fluid: 480 mL  Total IN: 480 mL    OUT:  Total OUT: 0 mL    Total NET: 480 mL          LABS      134<L>  |  98  |  21.0<H>  ----------------------------<  141<H>  4.5   |  22.0  |  0.74    Ca    8.3<L>      2019 06:20  Mg     2.2         TPro  6.6  /  Alb  3.5  /  TBili  1.9  /  DBili  x   /  AST  46<H>  /  ALT  33  /  AlkPhos  155<H>                                   8.3    10.4  )-----------( 338      ( 2019 06:20 )             28.3                LIVER FUNCTIONS - ( 2019 06:05 )  Alb: 3.5 g/dL / Pro: 6.6 g/dL / ALK PHOS: 155 U/L / ALT: 33 U/L / AST: 46 U/L / GGT: x              CAPILLARY BLOOD GLUCOSE      POCT Blood Glucose.: 154 mg/dL (2019 12:41)  POCT Blood Glucose.: 140 mg/dL (2019 08:24)  POCT Blood Glucose.: 178 mg/dL (2019 21:26)  POCT Blood Glucose.: 117 mg/dL (2019 17:04)           Today's CXR:  < from: Xray Chest 1 View- PORTABLE-Routine (19 @ 05:06) >  Single frontal view of the chest demonstrates moderate CHF, unchanged.   Mediastinal sternotomy wires. The cardiomediastinal silhouette is   enlarged. No acute osseous abnormalities. Overlying EKG leads and wires   are noted.    IMPRESSION: Moderate CHF, unchanged.    < end of copied text >      PAST MEDICAL & SURGICAL HISTORY:  Staph infection  No significant past surgical history

## 2019-01-09 NOTE — PROGRESS NOTE BEHAVIORAL HEALTH - NSBHFUPINTERVALHXFT_PSY_A_CORE
Patient interviewed and chart was reviewed.  Family was at bedside (wife, sister and brother in law).  Patient reports that he is feeling ok now surrounded by family. He admits that he gets anxious particularly when family is not around.  He describes events that brought him to hospital.  He describes episodes of panic attack that have occurred several times (shortness of breath, hot flashes, palpitations, shaking, depersonalization, fear of dying at times) that have occurred without warning.  Patient denies depressed mood.  Denies any S/H I/I/P. No psychotic sx's were elicited.  Patient has received Xanax 0.5 mg PRN last two morning which he describes as somewhat helpful. Trazodone has reportedly not helped with insomnia.   Support and psychoeducation was provided. Instructed patient in use of grounding/relaxation exercise.  Discussed changes in medication regimen which patient has agreed with.

## 2019-01-09 NOTE — PROGRESS NOTE ADULT - SUBJECTIVE AND OBJECTIVE BOX
Patient in chair. Reports he had a better night's sleep by ensuring he did not sleep during the day yesterday.  Feels more energized, affect continues to be flat.     FUNCTIONAL PROGRESS  1/8    Bed Mobility  Bed Mobility Training Sit-to-Supine: minimum assist (75% patient effort);  1 person assist  Bed Mobility Training Supine-to-Sit: minimum assist (75% patient effort);  1 person assist    Sit-Stand Transfer Training  Transfer Training Sit-to-Stand Transfer: minimum assist (75% patient effort);  1 person assist;  weight-bearing as tolerated   rolling walker  Transfer Training Stand-to-Sit Transfer: minimum assist (75% patient effort);  1 person assist;  weight-bearing as tolerated   rolling walker  Sit-to-Stand Transfer Training Transfer Safety Analysis: decreased balance;  decreased strength    Gait Training  Gait Training: minimum assist (75% patient effort);  1 person assist;  weight-bearing as tolerated   rolling walker;  75 feet  Gait Analysis: 2-point gait   decreased step length;  decreased strength;  impaired balance;  75 feet;  rolling walker  Gait Number of Times:: x 2          REVIEW OF SYSTEMS  Constitutional - No fever,  +fatigue  HEENT - No vertigo, No neck pain  Neurological - No headaches, No memory loss, +loss of strength, No numbness, No tremors  Skin - No rashes, No lesions   Musculoskeletal - No joint pain, No joint swelling, No muscle pain  Psychiatric - +depression, No anxiety    VITALS  T(C): 36.6 (01-09-19 @ 10:41), Max: 37.3 (01-09-19 @ 05:04)  HR: 103 (01-09-19 @ 10:41) (101 - 109)  BP: 97/71 (01-09-19 @ 10:41) (82/60 - 97/71)  RR: 18 (01-09-19 @ 10:41) (16 - 19)  SpO2: 96% (01-09-19 @ 05:04) (96% - 97%)  Wt(kg): --    MEDICATIONS   acetaminophen   Tablet .. 650 milliGRAM(s) every 6 hours PRN  ALPRAZolam 0.5 milliGRAM(s) every 6 hours PRN  amiodarone    Tablet 200 milliGRAM(s) daily  aspirin 325 milliGRAM(s) daily  atorvastatin 40 milliGRAM(s) at bedtime  benzocaine 15 mG/menthol 3.6 mG (Sugar-Free) Lozenge 1 Lozenge three times a day PRN  captopril 3.125 milliGRAM(s) <User Schedule>  carvedilol 3.125 milliGRAM(s) <User Schedule>  clopidogrel Tablet 75 milliGRAM(s) daily  enoxaparin Injectable 40 milliGRAM(s) daily  furosemide    Tablet 20 milliGRAM(s) <User Schedule>  HYDROmorphone   Tablet 2 milliGRAM(s) every 4 hours PRN  HYDROmorphone   Tablet 4 milliGRAM(s) every 4 hours PRN  insulin lispro (HumaLOG) corrective regimen sliding scale   Before meals and at bedtime  micafungin IVPB 100 milliGRAM(s) every 24 hours  multivitamin/minerals 1 Tablet(s) daily  ondansetron Injectable 4 milliGRAM(s) every 6 hours PRN  pantoprazole    Tablet 40 milliGRAM(s) every 12 hours  saccharomyces boulardii 250 milliGRAM(s) two times a day  senna 2 Tablet(s) at bedtime  sodium chloride 0.9% lock flush 3 milliLiter(s) every 8 hours  spironolactone 25 milliGRAM(s) daily  sucralfate 1 Gram(s) two times a day  traZODone 50 milliGRAM(s) at bedtime      RECENT LABS/IMAGING  CBC Full  -  ( 09 Jan 2019 06:20 )  WBC Count : 10.4 K/uL  Hemoglobin : 8.3 g/dL  Hematocrit : 28.3 %  Platelet Count - Automated : 338 K/uL  Mean Cell Volume : 89.8 fl  Mean Cell Hemoglobin : 26.3 pg  Mean Cell Hemoglobin Concentration : 29.3 g/dL  Auto Neutrophil # : x  Auto Lymphocyte # : x  Auto Monocyte # : x  Auto Eosinophil # : x  Auto Basophil # : x  Auto Neutrophil % : x  Auto Lymphocyte % : x  Auto Monocyte % : x  Auto Eosinophil % : x  Auto Basophil % : x    01-09    134<L>  |  98  |  21.0<H>  ----------------------------<  141<H>  4.5   |  22.0  |  0.74    Ca    8.3<L>      09 Jan 2019 06:20  Mg     2.2     01-09    TPro  6.6  /  Alb  3.5  /  TBili  1.9  /  DBili  x   /  AST  46<H>  /  ALT  33  /  AlkPhos  155<H>  01-08    --------------------------------------------------------------------  PHYSICAL EXAM  Constitutional - NAD, Comfortable  Extremities - No calf tenderness  Neurologic Exam -                    Motor -                      LEFT    UE - ShAB 2/5, EF 4/5, EE 4/5,  4/5                    RIGHT UE - ShAB 5/5, EF 5/5, EE 5/5,  5/5      Sensory - Intact to LT  Psychiatric - Affect flat/depressed	  ----------------------------------------------------------------------------------------  ASSESSMENT/PLAN  48y Male with functional deficits after STEMI, cardiac arrest s/p CABG, followed by complicated hospital course including need for trach, ileus/tube feeds, GI bleed, sepsis. 	  Pain - Tylenol, Dilaudid  Sleep - Trazodone  Depression - Consider Prozac 10mg HS   CAD - ASA, Plavix, Lipitor  DVT PPX - Lovenox    Candida - Mycamine   Rehab - Patient continues to maintain high functional status. Continue to recommend DC HOME with HOME CARE. Continue bedside therapy as well as OOB throughout the day with mobilization by staff to maintain cardiopulmonary function and prevention of secondary complications related to debility.

## 2019-01-09 NOTE — PROGRESS NOTE ADULT - PROBLEM SELECTOR PLAN 8
continue aspirin, plavix and statin for graft patency   Lovenox and SCDs for DVT prophylaxis  no history of DM, continue FS AC+HS with coverage for now

## 2019-01-09 NOTE — DISCHARGE NOTE ADULT - CARE PLAN
Principal Discharge DX:	ST elevation myocardial infarction (STEMI), unspecified artery  Goal:	rest and recover  Assessment and plan of treatment:	Please come to ED or Call Cardio thoracic office at [phone] if Chest pain, Shortness of Breath,  Nausea & vomiting, oozing from wounds, 2 lb increase in weight in 24 hours.   No Ointments creams lotions to wounds Principal Discharge DX:	ST elevation myocardial infarction (STEMI), unspecified artery  Goal:	rest and recover  Assessment and plan of treatment:	Please follow up with Dr. Barrett on  Please follow up with your Cardiologist and Primary Care Physician 2-4 weeks from discharge.    Please call the Cardiothoracic Surgery office at [phone] if you are experiencing any shortness of breath, chest pain, fevers or chills, drainage from the incisions, persistent nausea, vomiting or if you have any questions about your medications. If the symptoms are severe, call 911 and go to the nearest hospital. You can also call [phone] for an emergency [facility] ambulance, which will take you to the closest [facility].    If you need any assistance for making any appointments for a new consult or referral in any specialty, please call our [facility] Clinical Coordination Center at [phone]. Principal Discharge DX:	ST elevation myocardial infarction (STEMI), unspecified artery  Goal:	rest and recover  Assessment and plan of treatment:	Please follow up with Dr. Barrett on 1/25/19 at 10:00> Please come in at 9:00AM for CXR.  Please follow up with your Cardiologist and Primary Care Physician 2-4 weeks from discharge.    Please call the Cardiothoracic Surgery office at [phone] if you are experiencing any shortness of breath, chest pain, fevers or chills, drainage from the incisions, persistent nausea, vomiting or if you have any questions about your medications. If the symptoms are severe, call 911 and go to the nearest hospital. You can also call [phone] for an emergency [facility] ambulance, which will take you to the closest [facility].    If you need any assistance for making any appointments for a new consult or referral in any specialty, please call our [facility] Clinical Coordination Center at [phone].

## 2019-01-09 NOTE — DISCHARGE NOTE ADULT - HOSPITAL COURSE
11/29 Emergent C4V/ECMO A/w CP x 2-3 days. STEMI, 11/29 Taken to cath-LM thrombus, VF arrested > defib. Intubated in cath lab. Impella clotted off on way to OR, removed in OR. Cardiogenic shock, emergently placed on CPB and underwent C4 all vein to LAD, Diag, OM and PDA  To CTICU w/ central ECMO and R IABP, came out on epi, prim, levo, amio.  12/3 ECMO de-bryce; R IABP removal, insert R impella 12/6 Impella removed bedside, Sheath removed intraop by vasc, after primary closure weak pulses were noted distal, angiogram revealed dissection in ext. iliac, stent placed, repeat angio good flow distally.  12/9 cardiogenic shock on CPAP trial, echo <20%, inotropic support increased, full vent support, Lasix gtt,  12/10 trached 12/14 ileus, weaned off epi > hypotensive, + Levo. 12/15 ileus resolved. 12/16 septic/cardiogenic shock, abx epi restarted. bilat pleural CTs d/c’d. 12/18 micafungin added for candida in sputum cx, epi weaned off, vac applied to R groin. 12/18 UGIB.  12/19 EGD-bleeding stomach ulcer, inject w epi, 4 clips 12/22 RUQ sono splenomegaly and cholecystitis  12/30 tm 100.5, Ambulated around unit Bruno 2 with PT, 1/3 decannulated 1/4 passed s/s 1/7 PW and NGT removed, anxious and diaphoretic Hct 25 from 30 PRBCx1 and stat TTE.  1/8 CT chest: pulm edema, substernal collection decreased in size 48 yo Male c/o CP for 2-3 days prior to admission, then 10/10 chest pain approximately 9:30 am day prior to admission, followed by vomiting.  Wife drove pt to hospital, ruled in for STEMI. Urgent Cath, pt found to have multiple occlusions; stent to LAD and D2; stent to LAD thrombosed,  impella placed for support.  Pt then had VF arrest, shock x 1, return to NSR.  Upon transfer to OR for Urgent CABG, pt with asystolic arrest, chest opened intra-op with CPR in progress. Pt underwent 11/29 C4V (LAD, Diag, OM and PDA) with inability to wean from CPB therefore requiring VA ECMO (central cannulation) and IABP, he was transferred to CICU.  12/3 s/p ECMO explant and IABP d/c'd, placement of impella via R groin sheath.   12/6 Impella removal via right femoral cutdown with primary repair of femoral artery and stent placed to external iliac artery for dissection.  Hospital course notable for (in addition to above):cardiogenic and septic shock (both since resolved), acute systolic heart failure, vent dependent respiratory failure (inability to wean also contributed to by severe agitation, now s/p trach 12/10), fungemia, acute blood loss anemia, GIB (bleeding stomach ulcer), hypokalemia, ileus, sinusitis.  1/3 decannulated.  1/4 passed bedside swallow eval.  Psych eval for anxiety/PTSD

## 2019-01-09 NOTE — PROGRESS NOTE ADULT - PROBLEM SELECTOR PLAN 3
blood cultures negative 12/24 and12/25  continue micafungin as per ID for positive blood cultures Candida Parapsilosis 12/18 and 12/20  *To continue for 4-6 weeks with PICC line per Dr Barrett*  spoke with Dr Santizo. She will fax Rx over to pharmacy

## 2019-01-09 NOTE — PROGRESS NOTE ADULT - PROBLEM SELECTOR PLAN 1
Appreciate psych evaluation, switched to trazadone  Patient remains sleepless at night with multiple panic attacks.  Please eval for PTSD and benefit of additional medications/SSRI.  Additional dose xanax ordered.   Psych to followup today. recommended to d/c xanax, start ativan for anxiety, increase trazadone and start zoloft     will discuss with Dr Arnold WOODS plans in progress for Friday discharge

## 2019-01-09 NOTE — PROGRESS NOTE ADULT - PROBLEM SELECTOR PLAN 2
Worsening pulmonary edema on CT chest/CXR  Continue low dose coreg and captopril  Will need standing diuretics resumed for worsening pulmonary edema  continue aldactone   Life Vest upon d/c per cardiology, stan jensen aware

## 2019-01-09 NOTE — DISCHARGE NOTE ADULT - MEDICATION SUMMARY - MEDICATIONS TO TAKE
I will START or STAY ON the medications listed below when I get home from the hospital:    DME  -- Hospital BED  -- Indication: For bed    DME  -- rolling walker   -- Indication: For Walker    DME  -- cammode   -- Indication: For Commode    DME  -- Shower chair   -- Indication: For Shower chair    spironolactone 25 mg oral tablet  -- 0.5 tab(s) by mouth once a day  -- Indication: For Diuretic    acetaminophen 325 mg oral tablet  -- 2 tab(s) by mouth every 6 hours, As needed, Mild Pain (1 - 3)  -- Indication: For Pain    aspirin 325 mg oral tablet  -- 1 tab(s) by mouth once a day  -- Indication: For Antiplatelet    Dilaudid 2 mg oral tablet  -- 1 tab(s) by mouth every 4 hours, As needed, Moderate Pain (4 - 6) MDD:6  -- Indication: For Pain    enalapril 2.5 mg oral tablet  -- 0.5 tab(s) by mouth once a day   -- Do not take this drug if you are pregnant.  It is very important that you take or use this exactly as directed.  Do not skip doses or discontinue unless directed by your doctor.  Some non-prescription drugs may aggravate your condition.  Read all labels carefully.  If a warning appears, check with your doctor before taking.    -- Indication: For Heart failure    amiodarone 200 mg oral tablet  -- 1 tab(s) by mouth once a day  -- Indication: For Antiarrhythmic    LORazepam 0.5 mg oral tablet  -- 1 tab(s) by mouth every 8 hours, As needed, Anxiety MDD:3 tabs  -- Indication: For Anxiety    traZODone 100 mg oral tablet  -- 1 tab(s) by mouth once a day (at bedtime)   -- Indication: For Sleep    sertraline 50 mg oral tablet  -- 1 tab(s) by mouth once a day   -- It is very important that you take or use this exactly as directed.  Do not skip doses or discontinue unless directed by your doctor.  May cause drowsiness.  Alcohol may intensify this effect.  Use care when operating dangerous machinery.  Obtain medical advice before taking any non-prescription drugs as some may affect the action of this medication.    -- Indication: For Depression    micafungin 100 mg intravenous injection  -- 100 milligram(s) intravenous every 24 hours  -- Indication: For Antifungal    atorvastatin 40 mg oral tablet  -- 1 tab(s) by mouth once a day   -- Indication: For Hyperlipidemia    clopidogrel 75 mg oral tablet  -- 1 tab(s) by mouth once a day  -- Indication: For Antiplatelet    carvedilol 3.125 mg oral tablet  -- 1 tab(s) by mouth 2 times a day   -- Indication: For beta blocker    furosemide 20 mg oral tablet  -- 1 tab(s) by mouth once a day  -- Indication: For Diuretic    senna oral tablet  -- 2 tab(s) by mouth   -- Indication: For laxative    sucralfate 1 g oral tablet  -- 1 tab(s) by mouth 2 times a day  -- Indication: For Antacid    pantoprazole 40 mg oral delayed release tablet  -- 1 tab(s) by mouth once a day   -- Indication: For GERD    Multiple Vitamins with Minerals oral tablet  -- 1 tab(s) by mouth once a day  -- Indication: For Supplement

## 2019-01-09 NOTE — PROGRESS NOTE ADULT - SUBJECTIVE AND OBJECTIVE BOX
[facility]  INFECTIOUS DISEASES AND INTERNAL MEDICINE at [place]  =======================================================  Perfecto Santizo MD  Diplomates American Board of Internal Medicine and Infectious Diseases  =======================================================    PATRICK LYLE [id number]    Follow up: Fungemia    Afebrile  Awake and alert.  NG tube and trach have been removed. drinking and eating with no issue.   Afebrile    Allergies:  penicillins (Unknown)    Antibiotics:    micafungin IVPB 100 milliGRAM(s) IV Intermittent every 24 hours     REVIEW OF SYSTEMS:  as above     Physical Exam:  ICU Vital Signs Last 24 Hrs  T(C): 36.6 (09 Jan 2019 10:41), Max: 37.3 (09 Jan 2019 05:04)  T(F): 97.9 (09 Jan 2019 10:41), Max: 99.1 (09 Jan 2019 05:04)  HR: 103 (09 Jan 2019 10:41) (101 - 109)  BP: 97/71 (09 Jan 2019 10:41) (82/60 - 97/71)  RR: 18 (09 Jan 2019 10:41) (16 - 19)  SpO2: 96% (09 Jan 2019 05:04) (96% - 97%)  GEN: NAD , awake   HEENT: normocephalic and atraumatic. EOMI. PERRL.   NECK: Supple.   LUNGS: Coarse BS B/L  HEART: Regular rate and rhythm   ABDOMEN: Soft, nontender, and nondistended.  Positive bowel sounds.    : + Mckeon  EXTREMITIES: Without any edema.  MSK: no joint swelling  NEUROLOGIC: Awake, alert follows commands  PSYCHIATRIC: Appropriate affect .  SKIN: Sternal wound dressing, + wound vac, Leg dressings in place    Labs:  01-09    134<L>  |  98  |  21.0<H>  ----------------------------<  141<H>  4.5   |  22.0  |  0.74    Ca    8.3<L>      09 Jan 2019 06:20  Mg     2.2     01-09    TPro  6.6  /  Alb  3.5  /  TBili  1.9  /  DBili  x   /  AST  46<H>  /  ALT  33  /  AlkPhos  155<H>  01-08                        8.3    10.4  )-----------( 338      ( 09 Jan 2019 06:20 )             28.3     LIVER FUNCTIONS - ( 08 Jan 2019 06:05 )  Alb: 3.5 g/dL / Pro: 6.6 g/dL / ALK PHOS: 155 U/L / ALT: 33 U/L / AST: 46 U/L / GGT: x           CARDIAC MARKERS ( 07 Jan 2019 17:00 )  x     / 0.04 ng/mL / 35 U/L / x     / x        RECENT CULTURES:  Blood culture first negative on 1/21    CXR: 1/2/19  Findings:  No change in tracheostomy tube or NG tube. Patchy bilateral airspace   disease left greater than right, unchanged since the prior study. No   evidence of pneumothorax or pleural effusion..    Impression:  Stable exam without significant change since the previous study..

## 2019-01-10 LAB
GLUCOSE BLDC GLUCOMTR-MCNC: 145 MG/DL — HIGH (ref 70–99)
GLUCOSE BLDC GLUCOMTR-MCNC: 159 MG/DL — HIGH (ref 70–99)
GLUCOSE BLDC GLUCOMTR-MCNC: 162 MG/DL — HIGH (ref 70–99)
GLUCOSE BLDC GLUCOMTR-MCNC: 169 MG/DL — HIGH (ref 70–99)
GLUCOSE BLDC GLUCOMTR-MCNC: 175 MG/DL — HIGH (ref 70–99)

## 2019-01-10 PROCEDURE — 36573 INSJ PICC RS&I 5 YR+: CPT

## 2019-01-10 PROCEDURE — 99232 SBSQ HOSP IP/OBS MODERATE 35: CPT

## 2019-01-10 RX ORDER — MULTIVIT-MIN/FERROUS GLUCONATE 9 MG/15 ML
1 LIQUID (ML) ORAL
Qty: 0 | Refills: 0 | COMMUNITY
Start: 2019-01-10

## 2019-01-10 RX ORDER — ATORVASTATIN CALCIUM 80 MG/1
1 TABLET, FILM COATED ORAL
Qty: 30 | Refills: 1 | OUTPATIENT
Start: 2019-01-10 | End: 2019-03-10

## 2019-01-10 RX ORDER — AMIODARONE HYDROCHLORIDE 400 MG/1
1 TABLET ORAL
Qty: 30 | Refills: 1 | OUTPATIENT
Start: 2019-01-10 | End: 2019-03-10

## 2019-01-10 RX ORDER — ASPIRIN/CALCIUM CARB/MAGNESIUM 324 MG
1 TABLET ORAL
Qty: 30 | Refills: 1 | OUTPATIENT
Start: 2019-01-10 | End: 2019-03-10

## 2019-01-10 RX ORDER — MICAFUNGIN SODIUM 100 MG/1
100 INJECTION, POWDER, LYOPHILIZED, FOR SOLUTION INTRAVENOUS
Qty: 0 | Refills: 0 | COMMUNITY
Start: 2019-01-10

## 2019-01-10 RX ORDER — ACETAMINOPHEN 500 MG
2 TABLET ORAL
Qty: 0 | Refills: 0 | COMMUNITY
Start: 2019-01-10

## 2019-01-10 RX ORDER — CLOPIDOGREL BISULFATE 75 MG/1
1 TABLET, FILM COATED ORAL
Qty: 30 | Refills: 1 | OUTPATIENT
Start: 2019-01-10 | End: 2019-03-10

## 2019-01-10 RX ORDER — SENNA PLUS 8.6 MG/1
2 TABLET ORAL
Qty: 0 | Refills: 0 | COMMUNITY
Start: 2019-01-10

## 2019-01-10 RX ORDER — CARVEDILOL PHOSPHATE 80 MG/1
1 CAPSULE, EXTENDED RELEASE ORAL
Qty: 60 | Refills: 1 | OUTPATIENT
Start: 2019-01-10 | End: 2019-03-10

## 2019-01-10 RX ORDER — FUROSEMIDE 40 MG
20 TABLET ORAL DAILY
Qty: 0 | Refills: 0 | Status: DISCONTINUED | OUTPATIENT
Start: 2019-01-10 | End: 2019-01-11

## 2019-01-10 RX ORDER — HYDROMORPHONE HYDROCHLORIDE 2 MG/ML
1 INJECTION INTRAMUSCULAR; INTRAVENOUS; SUBCUTANEOUS
Qty: 30 | Refills: 0 | OUTPATIENT
Start: 2019-01-10 | End: 2019-01-14

## 2019-01-10 RX ORDER — SERTRALINE 25 MG/1
1 TABLET, FILM COATED ORAL
Qty: 30 | Refills: 1 | OUTPATIENT
Start: 2019-01-10 | End: 2019-03-10

## 2019-01-10 RX ORDER — PANTOPRAZOLE SODIUM 20 MG/1
1 TABLET, DELAYED RELEASE ORAL
Qty: 30 | Refills: 0 | OUTPATIENT
Start: 2019-01-10 | End: 2019-02-08

## 2019-01-10 RX ORDER — SUCRALFATE 1 G
1 TABLET ORAL
Qty: 0 | Refills: 0 | COMMUNITY
Start: 2019-01-10

## 2019-01-10 RX ORDER — SPIRONOLACTONE 25 MG/1
12.5 TABLET, FILM COATED ORAL DAILY
Qty: 0 | Refills: 0 | Status: DISCONTINUED | OUTPATIENT
Start: 2019-01-10 | End: 2019-01-11

## 2019-01-10 RX ADMIN — Medication 100 MILLIGRAM(S): at 22:35

## 2019-01-10 RX ADMIN — ATORVASTATIN CALCIUM 40 MILLIGRAM(S): 80 TABLET, FILM COATED ORAL at 21:42

## 2019-01-10 RX ADMIN — Medication 1 GRAM(S): at 18:29

## 2019-01-10 RX ADMIN — MICAFUNGIN SODIUM 105 MILLIGRAM(S): 100 INJECTION, POWDER, LYOPHILIZED, FOR SOLUTION INTRAVENOUS at 13:30

## 2019-01-10 RX ADMIN — Medication 0.5 MILLIGRAM(S): at 09:11

## 2019-01-10 RX ADMIN — Medication 250 MILLIGRAM(S): at 18:29

## 2019-01-10 RX ADMIN — CARVEDILOL PHOSPHATE 3.12 MILLIGRAM(S): 80 CAPSULE, EXTENDED RELEASE ORAL at 18:29

## 2019-01-10 RX ADMIN — SERTRALINE 25 MILLIGRAM(S): 25 TABLET, FILM COATED ORAL at 11:30

## 2019-01-10 RX ADMIN — ONDANSETRON 4 MILLIGRAM(S): 8 TABLET, FILM COATED ORAL at 19:13

## 2019-01-10 RX ADMIN — PANTOPRAZOLE SODIUM 40 MILLIGRAM(S): 20 TABLET, DELAYED RELEASE ORAL at 06:45

## 2019-01-10 RX ADMIN — Medication 250 MILLIGRAM(S): at 06:45

## 2019-01-10 RX ADMIN — Medication 325 MILLIGRAM(S): at 11:30

## 2019-01-10 RX ADMIN — Medication 0.5 MILLIGRAM(S): at 18:34

## 2019-01-10 RX ADMIN — AMIODARONE HYDROCHLORIDE 200 MILLIGRAM(S): 400 TABLET ORAL at 06:45

## 2019-01-10 RX ADMIN — PANTOPRAZOLE SODIUM 40 MILLIGRAM(S): 20 TABLET, DELAYED RELEASE ORAL at 18:29

## 2019-01-10 RX ADMIN — CLOPIDOGREL BISULFATE 75 MILLIGRAM(S): 75 TABLET, FILM COATED ORAL at 11:29

## 2019-01-10 RX ADMIN — SODIUM CHLORIDE 3 MILLILITER(S): 9 INJECTION INTRAMUSCULAR; INTRAVENOUS; SUBCUTANEOUS at 13:37

## 2019-01-10 RX ADMIN — SODIUM CHLORIDE 3 MILLILITER(S): 9 INJECTION INTRAMUSCULAR; INTRAVENOUS; SUBCUTANEOUS at 21:37

## 2019-01-10 RX ADMIN — Medication 20 MILLIGRAM(S): at 18:29

## 2019-01-10 RX ADMIN — CARVEDILOL PHOSPHATE 3.12 MILLIGRAM(S): 80 CAPSULE, EXTENDED RELEASE ORAL at 08:34

## 2019-01-10 RX ADMIN — SENNA PLUS 2 TABLET(S): 8.6 TABLET ORAL at 21:42

## 2019-01-10 RX ADMIN — Medication 1 GRAM(S): at 06:45

## 2019-01-10 RX ADMIN — Medication 1 TABLET(S): at 11:30

## 2019-01-10 RX ADMIN — Medication 2: at 18:27

## 2019-01-10 RX ADMIN — SPIRONOLACTONE 25 MILLIGRAM(S): 25 TABLET, FILM COATED ORAL at 06:45

## 2019-01-10 RX ADMIN — Medication 2: at 11:36

## 2019-01-10 RX ADMIN — Medication 100 MILLIGRAM(S): at 00:13

## 2019-01-10 RX ADMIN — SODIUM CHLORIDE 3 MILLILITER(S): 9 INJECTION INTRAMUSCULAR; INTRAVENOUS; SUBCUTANEOUS at 06:43

## 2019-01-10 RX ADMIN — Medication 2: at 08:33

## 2019-01-10 NOTE — PROGRESS NOTE BEHAVIORAL HEALTH - NSBHCHARTREVIEWLAB_PSY_A_CORE FT
8.3    10.4  )-----------( 338      ( 09 Jan 2019 06:20 )             28.3     01-09    134<L>  |  98  |  21.0<H>  ----------------------------<  141<H>  4.5   |  22.0  |  0.74    Ca    8.3<L>      09 Jan 2019 06:20  Mg     2.2     01-09    TPro  6.6  /  Alb  3.5  /  TBili  1.9  /  DBili  x   /  AST  46<H>  /  ALT  33  /  AlkPhos  155<H>  01-08    LIVER FUNCTIONS - ( 08 Jan 2019 06:05 )  Alb: 3.5 g/dL / Pro: 6.6 g/dL / ALK PHOS: 155 U/L / ALT: 33 U/L / AST: 46 U/L / GGT: x
8.3    10.4  )-----------( 338      ( 09 Jan 2019 06:20 )             28.3     01-09    134<L>  |  98  |  21.0<H>  ----------------------------<  141<H>  4.5   |  22.0  |  0.74    Ca    8.3<L>      09 Jan 2019 06:20  Mg     2.2     01-09

## 2019-01-10 NOTE — BRIEF OPERATIVE NOTE - PRE-OP
<<-----Click on this checkbox to enter Pre-Op Dx

## 2019-01-10 NOTE — PROGRESS NOTE ADULT - PROBLEM SELECTOR PLAN 2
Stable pulmonary edema on CT chest/CXR  Continue low dose coreg and captopril  Will need standing diuretics resumed for worsening pulmonary edema  continue aldactone   Life Vest upon d/c per cardiology, stan jensen aware

## 2019-01-10 NOTE — BRIEF OPERATIVE NOTE - PROCEDURE
<<-----Click on this checkbox to enter Procedure Peripherally inserted central catheter (PICC) line with ultrasound guidance  01/10/2019    Active  [id number]

## 2019-01-10 NOTE — PROGRESS NOTE BEHAVIORAL HEALTH - RISK ASSESSMENT
Low- no prior psychiatric hx, reports that current symptoms are resolving. States that he has minimal psychosocial stressors and is financially stable so he can focus on his health and recovery, denies any S/H I/I/P, no prior suicide attempt, denies depressive sx's, no substance use, good family support, remains future oriented.
Low- no prior psychiatric hx, reports that current symptoms are resolving. States that he has minimal psychosocial stressors and is financially stable so he can focus on his health and recovery, denies any S/H I/I/P, no prior suicide attempt, denies depressive sx's, no substance use, good family support, remains future oriented.

## 2019-01-10 NOTE — PROGRESS NOTE BEHAVIORAL HEALTH - NSBHCHARTREVIEWVS_PSY_A_CORE FT
Vital Signs Last 24 Hrs  T(C): 36.6 (09 Jan 2019 10:41), Max: 37.3 (09 Jan 2019 05:04)  T(F): 97.9 (09 Jan 2019 10:41), Max: 99.1 (09 Jan 2019 05:04)  HR: 103 (09 Jan 2019 10:41) (101 - 109)  BP: 97/71 (09 Jan 2019 10:41) (82/60 - 97/71)  BP(mean): --  RR: 18 (09 Jan 2019 10:41) (16 - 19)  SpO2: 96% (09 Jan 2019 05:04) (96% - 97%)
Vital Signs Last 24 Hrs  T(C): 36.7 (10 Bruno 2019 06:30), Max: 36.7 (10 Bruno 2019 06:30)  T(F): 98.1 (10 Bruno 2019 06:30), Max: 98.1 (10 Bruno 2019 06:30)  HR: 90 (10 Bruno 2019 15:00) (90 - 102)  BP: 90/67 (10 Bruno 2019 15:00) (78/58 - 113/82)  BP(mean): --  RR: 16 (10 Bruno 2019 15:00) (16 - 25)  SpO2: 95% (10 Bruno 2019 15:00) (95% - 96%)

## 2019-01-10 NOTE — PROGRESS NOTE BEHAVIORAL HEALTH - NSBHFUPINTERVALHXFT_PSY_A_CORE
Patient interviewed and chart was reviewed.  Spouse at bedside.  Reports increased anxiety and poor sleep last night.  He states he is afraid to fall asleep and feels restless at night. Patient feels sleep disturbances have worsened in the past 3 days. He is able to describes events that brought him to hospital.  He describes episodes of panic attack that have occurred several times (shortness of breath, hot flashes, palpitations, shaking, depersonalization, fear of dying at times) that have occurred without warning.  Patient denies depressed mood.  Denies any S/H I/I/P. No psychotic sx's were elicited.  Patient has received Ativan  0.5 mg last evening and again this morning. He reports after receiving Ativan he was able to sleep for several hours.

## 2019-01-10 NOTE — PROGRESS NOTE ADULT - PROBLEM SELECTOR PLAN 3
blood cultures negative 12/24 and12/25  continue micafungin as per ID for positive blood cultures Candida Parapsilosis 12/18 and 12/20  *To continue for 6 weeks with PICC line per Dr Barrett* (end date 2/8/19) followed by 4-6 months oral antifungals  spoke with Dr Santizo. She will fax Rx over to pharmacy

## 2019-01-10 NOTE — PROGRESS NOTE ADULT - SUBJECTIVE AND OBJECTIVE BOX
Subjective: "I feel better, I got a little sleep" NAD sitting in chair. Denies CP, SOB.    VITAL SIGNS  Vital Signs Last 24 Hrs  T(C): 36.7 (01-10-19 @ 06:30), Max: 36.7 (01-10-19 @ 06:30)  T(F): 98.1 (01-10-19 @ 06:30), Max: 98.1 (01-10-19 @ 06:30)  HR: 102 (01-10-19 @ 08:25) (94 - 102)  BP: 92/64 (01-10-19 @ 08:25) (78/58 - 113/82)  RR: 20 (01-10-19 @ 08:25) (17 - 25)  SpO2: 96% (01-10-19 @ 08:25) (95% - 96%)  on RA             Telemetry/Alarms:  SR/ST, few desats to lowest 83%  LVEF: 25-30    MEDICATIONS  acetaminophen   Tablet .. 650 milliGRAM(s) Oral every 6 hours PRN  amiodarone    Tablet 200 milliGRAM(s) Oral daily  aspirin 325 milliGRAM(s) Oral daily  atorvastatin 40 milliGRAM(s) Oral <User Schedule>  benzocaine 15 mG/menthol 3.6 mG (Sugar-Free) Lozenge 1 Lozenge Oral three times a day PRN  captopril 3.125 milliGRAM(s) Oral <User Schedule>  carvedilol 3.125 milliGRAM(s) Oral <User Schedule>  clopidogrel Tablet 75 milliGRAM(s) Oral daily  HYDROmorphone   Tablet 2 milliGRAM(s) Oral every 4 hours PRN  HYDROmorphone   Tablet 4 milliGRAM(s) Oral every 4 hours PRN  insulin lispro (HumaLOG) corrective regimen sliding scale   SubCutaneous Before meals and at bedtime  LORazepam     Tablet 0.5 milliGRAM(s) Oral every 8 hours PRN  micafungin IVPB 100 milliGRAM(s) IV Intermittent every 24 hours  multivitamin/minerals 1 Tablet(s) Oral daily  ondansetron Injectable 4 milliGRAM(s) IV Push every 6 hours PRN  pantoprazole    Tablet 40 milliGRAM(s) Oral every 12 hours  saccharomyces boulardii 250 milliGRAM(s) Oral two times a day  senna 2 Tablet(s) Oral <User Schedule>  sertraline 25 milliGRAM(s) Oral daily  sodium chloride 0.9% lock flush 3 milliLiter(s) IV Push every 8 hours  spironolactone 25 milliGRAM(s) Oral daily  sucralfate 1 Gram(s) Oral two times a day  traZODone 100 milliGRAM(s) Oral <User Schedule>      PHYSICAL EXAM  General: well nourished, well developed, no acute distress  Neurology: alert and oriented x 3, nonfocal, no gross deficits  Respiratory: few crackles left base otherwise essentially clear, + trach stoma nearly closed with tan secretions.  CV: regular rate and rhythm, normal S1, S2  Abdomen: soft, nontender, nondistended, positive bowel sounds, last bowel movement   Extremities: warm, well perfused. no edema. + DP pulses, B/L LE open vein harvest sites (right lower, left full leg) healing well.  Incisions: midline sternal incision, c/d/i. sternum stable. NO drainage. R groin incision with some granulation, wet to dry dressing with tan slough       @ :01  -  01-10 @ 07:00  --------------------------------------------------------  IN: 480 mL / OUT: 0 mL / NET: 480 mL    01-10 @ 07:01  -  01-10 @ 11:16  --------------------------------------------------------  IN: 240 mL / OUT: 0 mL / NET: 240 mL        Weights:  Daily     Daily Weight in k.3 (10 Bruno 2019 06:42)  Admit Wt: Drug Dosing Weight  Height (cm): 175.26 (2019 22:40)  Weight (kg): 82.5 (2019 22:40)  BMI (kg/m2): 26.9 (2019 22:40)  BSA (m2): 1.98 (2019 22:40)    All laboratory results, radiology and medications reviewed.    LABS      134<L>  |  98  |  21.0<H>  ----------------------------<  141<H>  4.5   |  22.0  |  0.74    Ca    8.3<L>      2019 06:20  Mg     2.2                                        8.3    10.4  )-----------( 338      ( 2019 06:20 )             28.3              CAPILLARY BLOOD GLUCOSE      POCT Blood Glucose.: 175 mg/dL (10 Bruno 2019 08:11)  POCT Blood Glucose.: 137 mg/dL (2019 21:45)  POCT Blood Glucose.: 126 mg/dL (2019 17:15)  POCT Blood Glucose.: 154 mg/dL (2019 12:41)           Today's CXR: < from: Xray Chest 1 View- PORTABLE-Routine (19 @ 05:06) >    Single frontal view of the chest demonstrates moderate CHF, unchanged.   Mediastinal sternotomy wires. The cardiomediastinal silhouette is   enlarged. No acute osseous abnormalities. Overlying EKG leads and wires   are noted.    IMPRESSION: Moderate CHF, unchanged.    < end of copied text >    Today's EKG: < from: 12 Lead ECG (18 @ 04:43) >    Diagnosis Line Sinus tachycardia with Fusion complexes  Possible Left atrial enlargement  Septal infarct , age undetermined  Lateral infarct , age undetermined  Abnormal ECG    < end of copied text >      PAST MEDICAL & SURGICAL HISTORY:  Staph infection  No significant past surgical history

## 2019-01-10 NOTE — BRIEF OPERATIVE NOTE - POST-OP DX
Acute gastric ulcer with hemorrhage  12/19/2018    Active  Edmar Melendez  Cardiogenic shock  11/29/2018  Requiring ECMO and IABP  Active  Froy Johnson  Chronic respiratory failure with hypoxia  12/10/2018    Active  Rubens Grant
Acute gastric ulcer with hemorrhage  12/19/2018    Active  Edmar Melendez  Cardiogenic shock  11/29/2018  Requiring ECMO and IABP  Active  Froy Johnson  Chronic respiratory failure with hypoxia  12/10/2018    Active  Rubens Grant  Fungemia  01/10/2019    Active  Devonte Cortes
Cardiogenic shock  11/29/2018  Requiring ECMO and IABP  Active  Froy Johnson
Cardiogenic shock  11/29/2018  Requiring ECMO and IABP  Active  Froy Johnson
Cardiogenic shock  11/29/2018  Requiring ECMO and IABP  Active  Froy Johnson  Chronic respiratory failure with hypoxia  12/10/2018    Active  Rubens Grant
Cardiogenic shock  11/29/2018  Requiring ECMO and IABP  Active  Froy Johnson

## 2019-01-10 NOTE — PROGRESS NOTE BEHAVIORAL HEALTH - NSBHATTESTSEENBY_PSY_A_CORE
NP with telephonic supervision from Attending Psychiatrist
attending Psychiatrist without NP/Trainee

## 2019-01-10 NOTE — PROGRESS NOTE BEHAVIORAL HEALTH - NSBHCONSULTFOLLOWAFTERCARE_PSY_A_CORE FT
Refer to outpatient psychiatrist when medically cleared.
Refer to outpatient psychiatrist when medically cleared.

## 2019-01-10 NOTE — PROGRESS NOTE BEHAVIORAL HEALTH - NSBHFUPINTERVALCCFT_PSY_A_CORE
Patient stated, " I didn't sleep at all last night and I feel more anxious today from not sleeping."

## 2019-01-10 NOTE — BRIEF OPERATIVE NOTE - PRE-OP DX
Cardiogenic shock  11/29/2018    Active  Froy Johnson
Cardiogenic shock  11/29/2018    Active  Froy Johnson  Chronic respiratory failure with hypoxia  12/10/2018    Active  Rubens Grant
Cardiogenic shock  11/29/2018    Active  Froy Johnson  Chronic respiratory failure with hypoxia  12/10/2018    Active  Rubens Grant  Fungemia  01/10/2019    Active  Devonte Cortes  Hematemesis, presence of nausea not specified  12/19/2018    Active  Edmar Melendez
Cardiogenic shock  11/29/2018    Active  Froy Johnson  Chronic respiratory failure with hypoxia  12/10/2018    Active  Rubens Grant  Hematemesis, presence of nausea not specified  12/19/2018    Active  Edmar Melendez
Cardiogenic shock  11/29/2018    Active  rFoy Johnson
Cardiogenic shock  11/29/2018    Active  Froy Johnson

## 2019-01-10 NOTE — CHART NOTE - NSCHARTNOTEFT_GEN_A_CORE
Vascular & Interventional Radiology Pre-Procedure Note    Procedure Name: _PICC line insertion_    HPI: 48y Male with __fungemia, for long term antifungal therapy_____    Allergies: penicillins (Unknown)    Medications (Abx/Cardiac/Anticoagulation/Blood Products)    amiodarone    Tablet: 200 milliGRAM(s) Oral (01-10 @ 06:45)  captopril: 3.125 milliGRAM(s) Oral (01-09 @ 19:49)  carvedilol: 3.125 milliGRAM(s) Oral (01-10 @ 08:34)  clopidogrel Tablet: 75 milliGRAM(s) Oral (01-10 @ 11:29)  enoxaparin Injectable: 40 milliGRAM(s) SubCutaneous (01-08 @ 21:55)  furosemide    Tablet: 20 milliGRAM(s) Oral (01-09 @ 15:07)  micafungin IVPB: 105 mL/Hr IV Intermittent (01-10 @ 13:30)  spironolactone: 25 milliGRAM(s) Oral (01-10 @ 06:45)    Data:    T(C): 36.7  HR: 90  BP: 90/67  RR: 16  SpO2: 95%    Exam  NA    -WBC 10.4 / HgB 8.3 / Hct 28.3 / Plt 338  -Na 134 / Cl 98 / BUN 21.0 / Glucose 141  -K 4.5 / CO2 22.0 / Cr 0.74  -ALT -- / Alk Phos -- / T.Bili --      Plan:   -48y Male presents for ____PICC line insertion.

## 2019-01-10 NOTE — BRIEF OPERATIVE NOTE - PROCEDURE POST
<<-----Click on this checkbox to enter Post-Op Dx

## 2019-01-10 NOTE — BRIEF OPERATIVE NOTE - ASSISTANT(S)
Burns
None
Taran Carey PA-C, Froy Johnson PA-C, Rubens Grant PA-C, Carlos Stephens PA-C
Tea
none
Naseem Joya

## 2019-01-10 NOTE — PROGRESS NOTE ADULT - SUBJECTIVE AND OBJECTIVE BOX
Patient in chair.   Reports he walked to the bathroom with the nurse.  Feels fatigued.  Slept well again last night.  Reports no pain.     FUNCTIONAL PROGRESS  1/9  Bed Mobility  Bed Mobility Training Sit-to-Supine: independent  Bed Mobility Training Supine-to-Sit: independent    Sit-Stand Transfer Training  Transfer Training Sit-to-Stand Transfer: contact guard;  weight-bearing as tolerated   rolling walker  Transfer Training Stand-to-Sit Transfer: contact guard;  weight-bearing as tolerated   rolling walker  Sit-to-Stand Transfer Training Transfer Safety Analysis: decreased balance    Gait Training  Gait Training: contact guard;  full weight-bearing   rolling walker;  25 feet  Gait Analysis: 2-point gait   shuffling;  decreased stride length;  impaired balance;  25 feet;  rolling walker  Gait Number of Times:: x 2        REVIEW OF SYSTEMS  Constitutional - No fever,  +fatigue  HEENT - No vertigo, No neck pain  Neurological - No headaches, No memory loss, +loss of strength, No numbness, No tremors  Skin - No rashes, +lesions   Musculoskeletal - No joint pain, No joint swelling, No muscle pain  Psychiatric - +depression, No anxiety    VITALS  T(C): 36.7 (01-10-19 @ 06:30), Max: 36.7 (01-10-19 @ 06:30)  HR: 93 (01-10-19 @ 12:05) (93 - 102)  BP: 92/64 (01-10-19 @ 08:25) (78/58 - 113/82)  RR: 16 (01-10-19 @ 12:05) (16 - 25)  SpO2: 96% (01-10-19 @ 08:25) (95% - 96%)  Wt(kg): --    MEDICATIONS   acetaminophen   Tablet .. 650 milliGRAM(s) every 6 hours PRN  amiodarone    Tablet 200 milliGRAM(s) daily  aspirin 325 milliGRAM(s) daily  atorvastatin 40 milliGRAM(s) <User Schedule>  benzocaine 15 mG/menthol 3.6 mG (Sugar-Free) Lozenge 1 Lozenge three times a day PRN  captopril 3.125 milliGRAM(s) <User Schedule>  carvedilol 3.125 milliGRAM(s) <User Schedule>  clopidogrel Tablet 75 milliGRAM(s) daily  furosemide    Tablet 20 milliGRAM(s) daily  HYDROmorphone   Tablet 2 milliGRAM(s) every 4 hours PRN  HYDROmorphone   Tablet 4 milliGRAM(s) every 4 hours PRN  insulin lispro (HumaLOG) corrective regimen sliding scale   Before meals and at bedtime  LORazepam     Tablet 0.5 milliGRAM(s) every 8 hours PRN  micafungin IVPB 100 milliGRAM(s) every 24 hours  multivitamin/minerals 1 Tablet(s) daily  ondansetron Injectable 4 milliGRAM(s) every 6 hours PRN  pantoprazole    Tablet 40 milliGRAM(s) every 12 hours  saccharomyces boulardii 250 milliGRAM(s) two times a day  senna 2 Tablet(s) <User Schedule>  sertraline 25 milliGRAM(s) daily  sodium chloride 0.9% lock flush 3 milliLiter(s) every 8 hours  spironolactone 25 milliGRAM(s) daily  sucralfate 1 Gram(s) two times a day  traZODone 100 milliGRAM(s) <User Schedule>      RECENT LABS/IMAGING  CBC Full  -  ( 09 Jan 2019 06:20 )  WBC Count : 10.4 K/uL  Hemoglobin : 8.3 g/dL  Hematocrit : 28.3 %  Platelet Count - Automated : 338 K/uL  Mean Cell Volume : 89.8 fl  Mean Cell Hemoglobin : 26.3 pg  Mean Cell Hemoglobin Concentration : 29.3 g/dL  Auto Neutrophil # : x  Auto Lymphocyte # : x  Auto Monocyte # : x  Auto Eosinophil # : x  Auto Basophil # : x  Auto Neutrophil % : x  Auto Lymphocyte % : x  Auto Monocyte % : x  Auto Eosinophil % : x  Auto Basophil % : x    01-09    134<L>  |  98  |  21.0<H>  ----------------------------<  141<H>  4.5   |  22.0  |  0.74    Ca    8.3<L>      09 Jan 2019 06:20  Mg     2.2     01-09        --------------------------------------------------------------------  PHYSICAL EXAM  Constitutional - NAD, Comfortable  Extremities - No calf tenderness  Neurologic Exam -                    Motor -                      LEFT    UE - ShAB 2/5, EF 4/5, EE 4/5,  4/5                    RIGHT UE - ShAB 5/5, EF 5/5, EE 5/5,  5/5      Sensory - Intact to LT  Psychiatric - Affect flat/depressed	  ----------------------------------------------------------------------------------------  ASSESSMENT/PLAN  48y Male with functional deficits after STEMI, cardiac arrest s/p CABG, followed by complicated hospital course including need for trach, ileus/tube feeds, GI bleed, sepsis. 	  Pain - Tylenol, Dilaudid - recommend DC  Sleep - Trazodone  Depression - Zoloft  CAD - ASA, Plavix, Lipitor  DVT PPX - Lovenox    Candida - Mycamine   Rehab - Continue to recommend DC HOME with HOME CARE. Continue bedside therapy as well as OOB throughout the day with mobilization by staff to maintain cardiopulmonary function and prevention of secondary complications related to debility.

## 2019-01-10 NOTE — PROGRESS NOTE BEHAVIORAL HEALTH - SUMMARY
Pt is 48 y.o male with no previous psychiatric history, experienced extensive hospitalization post MI. Patient has been experiencing increase in anxiety since MI and surgical procedures.  He reports new onset of panic attacks triggered by loneliness or without any specific triggers at times.   Patient is currently anxious and restless. He reports increased anxiety HS and now perseverates over poor sleep hs. Patient does not feel Trazodone is helping. Patient also reports poor appetite. Supportive counseling provided  Case discussed with Dr Estrada  Recommend  Discontinue Trazadone 100 mg hs  Start Remeron 7.5 mg hs  Continue Zoloft 25 mg daily  Continue low dose Ativan 0.5 mg prn anxiety   Please have SW schedule an appointment with [facility] for outpatient supportive counseling and medication management.
Pt is 48 y.o male with no previous psychiatric history, experienced extensive hospitalization post MI. Patient has been experiencing increase in anxiety since MI and surgical procedures.  He reports new onset of panic attacks triggered by loneliness or without any specific triggers at times.  He has been been taking Xanax 0.5 mg in am PRN for anxiety.   Patient with dx of Adjustment disorder with anxiety r/o panic disorder.  Patient would benefit from changing Xanax 0.5 mg PRN to Ativan 1 mg PO Q 6hrs PRN for anxiety. Would start Sertraline 25 mg daily to target panic disorder.  Will increase  Trazodone to 100 mg at night to target insomnia (as 50 mg has not been effective).  Relaxation exercise discussed with patient and family.   No depressive sx's or psychotic sx's were elicted.  Would refer to outpatient psychiatrist to continue to follow when medically cleared.

## 2019-01-10 NOTE — PROGRESS NOTE BEHAVIORAL HEALTH - NSBHCONSULTMEDAGITATION_PSY_A_CORE FT
Change Xanax to Ativan 1 mg PO Q 6hrs PRN anxiety and Trazodone 100 mg Q HS PRN insomnia (increased from 50 mg)
Change Xanax to Ativan 1 mg PO Q 6hrs PRN anxiety and Trazodone 100 mg Q HS PRN insomnia (increased from 50 mg)

## 2019-01-10 NOTE — PROGRESS NOTE ADULT - PROBLEM SELECTOR PLAN 1
Appreciate psych evaluation, switched to trazadone  Had a better night last night on higher dose with Ativan prn dose.  Added Zoloft 25 mg yesterday with plan to discharge on 50 mg.  will discuss with Dr Arnold WOODS plans in progress for Friday discharge

## 2019-01-10 NOTE — PROGRESS NOTE ADULT - ASSESSMENT
48 yo Male c/o CP for 2-3 days prior to admission, then 10/10 chest pain approximately 9:30 am day prior to admission, followed by vomiting.  Wife drove pt to hospital, ruled in for STEMI. Urgent Cath, pt found to have multiple occlusions; stent to LAD and D2; stent to LAD thrombosed,  impella placed for support.  Pt then had VF arrest, shock x 1, return to NSR.  Upon transfer to OR for Urgent CABG, pt with asystolic arrest, chest opened intra-op with CPR in progress. Pt underwent 11/29 C4V (LAD, Diag, OM and PDA) with inability to wean from CPB therefore requiring [facility] ECMO (central cannulation) and IABP, he was transferred to CICU.  12/3 s/p ECMO explant and IABP d/c'd, placement of impella via R groin sheath.   12/6 Impella removal via right femoral cutdown with primary repair of femoral artery and stent placed to external iliac artery for dissection.  Hospital course notable for (in addition to above):cardiogenic and septic shock (both since resolved), acute systolic heart failure, vent dependent respiratory failure (inability to wean also contributed to by severe agitation, now s/p trach 12/10), fungemia, acute blood loss anemia, GIB (bleeding stomach ulcer), hypokalemia, ileus, sinusitis.  1/3 decannulated.  1/4 passed bedside swallow eval.

## 2019-01-11 VITALS
HEART RATE: 90 BPM | TEMPERATURE: 98 F | SYSTOLIC BLOOD PRESSURE: 104 MMHG | DIASTOLIC BLOOD PRESSURE: 60 MMHG | RESPIRATION RATE: 18 BRPM

## 2019-01-11 LAB — GLUCOSE BLDC GLUCOMTR-MCNC: 155 MG/DL — HIGH (ref 70–99)

## 2019-01-11 PROCEDURE — 86901 BLOOD TYPING SEROLOGIC RH(D): CPT

## 2019-01-11 PROCEDURE — C1725: CPT

## 2019-01-11 PROCEDURE — 82947 ASSAY GLUCOSE BLOOD QUANT: CPT

## 2019-01-11 PROCEDURE — 80053 COMPREHEN METABOLIC PANEL: CPT

## 2019-01-11 PROCEDURE — 99152 MOD SED SAME PHYS/QHP 5/>YRS: CPT

## 2019-01-11 PROCEDURE — C1769: CPT

## 2019-01-11 PROCEDURE — 87086 URINE CULTURE/COLONY COUNT: CPT

## 2019-01-11 PROCEDURE — 94668 MNPJ CHEST WALL SBSQ: CPT

## 2019-01-11 PROCEDURE — 82803 BLOOD GASES ANY COMBINATION: CPT

## 2019-01-11 PROCEDURE — 97535 SELF CARE MNGMENT TRAINING: CPT

## 2019-01-11 PROCEDURE — 82248 BILIRUBIN DIRECT: CPT

## 2019-01-11 PROCEDURE — 85730 THROMBOPLASTIN TIME PARTIAL: CPT

## 2019-01-11 PROCEDURE — 82310 ASSAY OF CALCIUM: CPT

## 2019-01-11 PROCEDURE — C9601: CPT | Mod: LD

## 2019-01-11 PROCEDURE — 82330 ASSAY OF CALCIUM: CPT

## 2019-01-11 PROCEDURE — P9016: CPT

## 2019-01-11 PROCEDURE — 86803 HEPATITIS C AB TEST: CPT

## 2019-01-11 PROCEDURE — P9047: CPT

## 2019-01-11 PROCEDURE — 97116 GAIT TRAINING THERAPY: CPT

## 2019-01-11 PROCEDURE — P9059: CPT

## 2019-01-11 PROCEDURE — 99153 MOD SED SAME PHYS/QHP EA: CPT

## 2019-01-11 PROCEDURE — 82150 ASSAY OF AMYLASE: CPT

## 2019-01-11 PROCEDURE — P9012: CPT

## 2019-01-11 PROCEDURE — 85384 FIBRINOGEN ACTIVITY: CPT

## 2019-01-11 PROCEDURE — 80048 BASIC METABOLIC PNL TOTAL CA: CPT

## 2019-01-11 PROCEDURE — 84100 ASSAY OF PHOSPHORUS: CPT

## 2019-01-11 PROCEDURE — 80076 HEPATIC FUNCTION PANEL: CPT

## 2019-01-11 PROCEDURE — 94770: CPT

## 2019-01-11 PROCEDURE — 76937 US GUIDE VASCULAR ACCESS: CPT

## 2019-01-11 PROCEDURE — 82306 VITAMIN D 25 HYDROXY: CPT

## 2019-01-11 PROCEDURE — 74018 RADEX ABDOMEN 1 VIEW: CPT

## 2019-01-11 PROCEDURE — 84134 ASSAY OF PREALBUMIN: CPT

## 2019-01-11 PROCEDURE — 71250 CT THORAX DX C-: CPT

## 2019-01-11 PROCEDURE — 92526 ORAL FUNCTION THERAPY: CPT

## 2019-01-11 PROCEDURE — 31720 CLEARANCE OF AIRWAYS: CPT

## 2019-01-11 PROCEDURE — 86900 BLOOD TYPING SEROLOGIC ABO: CPT

## 2019-01-11 PROCEDURE — 93005 ELECTROCARDIOGRAM TRACING: CPT

## 2019-01-11 PROCEDURE — 83605 ASSAY OF LACTIC ACID: CPT

## 2019-01-11 PROCEDURE — C1874: CPT

## 2019-01-11 PROCEDURE — 87150 DNA/RNA AMPLIFIED PROBE: CPT

## 2019-01-11 PROCEDURE — 87340 HEPATITIS B SURFACE AG IA: CPT

## 2019-01-11 PROCEDURE — 82962 GLUCOSE BLOOD TEST: CPT

## 2019-01-11 PROCEDURE — 87102 FUNGUS ISOLATION CULTURE: CPT

## 2019-01-11 PROCEDURE — 87633 RESP VIRUS 12-25 TARGETS: CPT

## 2019-01-11 PROCEDURE — 86704 HEP B CORE ANTIBODY TOTAL: CPT

## 2019-01-11 PROCEDURE — 93306 TTE W/DOPPLER COMPLETE: CPT

## 2019-01-11 PROCEDURE — 94002 VENT MGMT INPAT INIT DAY: CPT

## 2019-01-11 PROCEDURE — 76700 US EXAM ABDOM COMPLETE: CPT

## 2019-01-11 PROCEDURE — C1889: CPT

## 2019-01-11 PROCEDURE — 86706 HEP B SURFACE ANTIBODY: CPT

## 2019-01-11 PROCEDURE — 81001 URINALYSIS AUTO W/SCOPE: CPT

## 2019-01-11 PROCEDURE — 97530 THERAPEUTIC ACTIVITIES: CPT

## 2019-01-11 PROCEDURE — 94799 UNLISTED PULMONARY SVC/PX: CPT

## 2019-01-11 PROCEDURE — 92960 CARDIOVERSION ELECTRIC EXT: CPT

## 2019-01-11 PROCEDURE — 87449 NOS EACH ORGANISM AG IA: CPT

## 2019-01-11 PROCEDURE — 36592 COLLECT BLOOD FROM PICC: CPT

## 2019-01-11 PROCEDURE — 83036 HEMOGLOBIN GLYCOSYLATED A1C: CPT

## 2019-01-11 PROCEDURE — 83735 ASSAY OF MAGNESIUM: CPT

## 2019-01-11 PROCEDURE — 83690 ASSAY OF LIPASE: CPT

## 2019-01-11 PROCEDURE — 99232 SBSQ HOSP IP/OBS MODERATE 35: CPT

## 2019-01-11 PROCEDURE — 94760 N-INVAS EAR/PLS OXIMETRY 1: CPT

## 2019-01-11 PROCEDURE — 70450 CT HEAD/BRAIN W/O DYE: CPT

## 2019-01-11 PROCEDURE — 86923 COMPATIBILITY TEST ELECTRIC: CPT

## 2019-01-11 PROCEDURE — 93312 ECHO TRANSESOPHAGEAL: CPT

## 2019-01-11 PROCEDURE — 97163 PT EVAL HIGH COMPLEX 45 MIN: CPT

## 2019-01-11 PROCEDURE — 84484 ASSAY OF TROPONIN QUANT: CPT

## 2019-01-11 PROCEDURE — 84132 ASSAY OF SERUM POTASSIUM: CPT

## 2019-01-11 PROCEDURE — 87070 CULTURE OTHR SPECIMN AEROBIC: CPT

## 2019-01-11 PROCEDURE — 83615 LACTATE (LD) (LDH) ENZYME: CPT

## 2019-01-11 PROCEDURE — 84295 ASSAY OF SERUM SODIUM: CPT

## 2019-01-11 PROCEDURE — 88300 SURGICAL PATH GROSS: CPT

## 2019-01-11 PROCEDURE — 74019 RADEX ABDOMEN 2 VIEWS: CPT

## 2019-01-11 PROCEDURE — 71260 CT THORAX DX C+: CPT

## 2019-01-11 PROCEDURE — 94003 VENT MGMT INPAT SUBQ DAY: CPT

## 2019-01-11 PROCEDURE — 85014 HEMATOCRIT: CPT

## 2019-01-11 PROCEDURE — 87493 C DIFF AMPLIFIED PROBE: CPT

## 2019-01-11 PROCEDURE — C1887: CPT

## 2019-01-11 PROCEDURE — 36430 TRANSFUSION BLD/BLD COMPNT: CPT

## 2019-01-11 PROCEDURE — 87116 MYCOBACTERIA CULTURE: CPT

## 2019-01-11 PROCEDURE — C9606: CPT | Mod: LD

## 2019-01-11 PROCEDURE — 85610 PROTHROMBIN TIME: CPT

## 2019-01-11 PROCEDURE — C1894: CPT

## 2019-01-11 PROCEDURE — 87798 DETECT AGENT NOS DNA AMP: CPT

## 2019-01-11 PROCEDURE — 86965 POOLING BLOOD PLATELETS: CPT

## 2019-01-11 PROCEDURE — 80202 ASSAY OF VANCOMYCIN: CPT

## 2019-01-11 PROCEDURE — 87486 CHLMYD PNEUM DNA AMP PROBE: CPT

## 2019-01-11 PROCEDURE — 92523 SPEECH SOUND LANG COMPREHEN: CPT

## 2019-01-11 PROCEDURE — 82550 ASSAY OF CK (CPK): CPT

## 2019-01-11 PROCEDURE — 97167 OT EVAL HIGH COMPLEX 60 MIN: CPT

## 2019-01-11 PROCEDURE — 76000 FLUOROSCOPY <1 HR PHYS/QHP: CPT

## 2019-01-11 PROCEDURE — 82553 CREATINE MB FRACTION: CPT

## 2019-01-11 PROCEDURE — P9045: CPT

## 2019-01-11 PROCEDURE — 86850 RBC ANTIBODY SCREEN: CPT

## 2019-01-11 PROCEDURE — P9040: CPT

## 2019-01-11 PROCEDURE — 93458 L HRT ARTERY/VENTRICLE ANGIO: CPT | Mod: 59

## 2019-01-11 PROCEDURE — 86870 RBC ANTIBODY IDENTIFICATION: CPT

## 2019-01-11 PROCEDURE — 83970 ASSAY OF PARATHORMONE: CPT

## 2019-01-11 PROCEDURE — 77002 NEEDLE LOCALIZATION BY XRAY: CPT

## 2019-01-11 PROCEDURE — 94640 AIRWAY INHALATION TREATMENT: CPT

## 2019-01-11 PROCEDURE — 87206 SMEAR FLUORESCENT/ACID STAI: CPT

## 2019-01-11 PROCEDURE — 85027 COMPLETE CBC AUTOMATED: CPT

## 2019-01-11 PROCEDURE — P9037: CPT

## 2019-01-11 PROCEDURE — 99285 EMERGENCY DEPT VISIT HI MDM: CPT

## 2019-01-11 PROCEDURE — 92610 EVALUATE SWALLOWING FUNCTION: CPT

## 2019-01-11 PROCEDURE — L8699: CPT

## 2019-01-11 PROCEDURE — 84145 PROCALCITONIN (PCT): CPT

## 2019-01-11 PROCEDURE — 36415 COLL VENOUS BLD VENIPUNCTURE: CPT

## 2019-01-11 PROCEDURE — 93320 DOPPLER ECHO COMPLETE: CPT

## 2019-01-11 PROCEDURE — 97110 THERAPEUTIC EXERCISES: CPT

## 2019-01-11 PROCEDURE — 87186 SC STD MICRODIL/AGAR DIL: CPT

## 2019-01-11 PROCEDURE — 87015 SPECIMEN INFECT AGNT CONCNTJ: CPT

## 2019-01-11 PROCEDURE — 71045 X-RAY EXAM CHEST 1 VIEW: CPT

## 2019-01-11 PROCEDURE — 99239 HOSP IP/OBS DSCHRG MGMT >30: CPT | Mod: 24

## 2019-01-11 PROCEDURE — 84478 ASSAY OF TRIGLYCERIDES: CPT

## 2019-01-11 PROCEDURE — 97112 NEUROMUSCULAR REEDUCATION: CPT

## 2019-01-11 PROCEDURE — 87040 BLOOD CULTURE FOR BACTERIA: CPT

## 2019-01-11 PROCEDURE — 82435 ASSAY OF BLOOD CHLORIDE: CPT

## 2019-01-11 PROCEDURE — 76942 ECHO GUIDE FOR BIOPSY: CPT

## 2019-01-11 PROCEDURE — 93325 DOPPLER ECHO COLOR FLOW MAPG: CPT

## 2019-01-11 PROCEDURE — C1751: CPT

## 2019-01-11 PROCEDURE — 33990 INSJ PERQ VAD L HRT ARTERIAL: CPT

## 2019-01-11 PROCEDURE — 86880 COOMBS TEST DIRECT: CPT

## 2019-01-11 PROCEDURE — 87581 M.PNEUMON DNA AMP PROBE: CPT

## 2019-01-11 RX ORDER — FUROSEMIDE 40 MG
1 TABLET ORAL
Qty: 30 | Refills: 0 | OUTPATIENT
Start: 2019-01-11 | End: 2019-02-09

## 2019-01-11 RX ORDER — AMIODARONE HYDROCHLORIDE 400 MG/1
1 TABLET ORAL
Qty: 30 | Refills: 1 | OUTPATIENT
Start: 2019-01-11 | End: 2019-03-11

## 2019-01-11 RX ORDER — TRAZODONE HCL 50 MG
1 TABLET ORAL
Qty: 30 | Refills: 0 | OUTPATIENT
Start: 2019-01-11 | End: 2019-02-09

## 2019-01-11 RX ORDER — SPIRONOLACTONE 25 MG/1
0.5 TABLET, FILM COATED ORAL
Qty: 15 | Refills: 0 | OUTPATIENT
Start: 2019-01-11 | End: 2019-02-09

## 2019-01-11 RX ORDER — CARVEDILOL PHOSPHATE 80 MG/1
1 CAPSULE, EXTENDED RELEASE ORAL
Qty: 60 | Refills: 1 | OUTPATIENT
Start: 2019-01-11 | End: 2019-03-11

## 2019-01-11 RX ADMIN — SODIUM CHLORIDE 3 MILLILITER(S): 9 INJECTION INTRAMUSCULAR; INTRAVENOUS; SUBCUTANEOUS at 05:56

## 2019-01-11 RX ADMIN — PANTOPRAZOLE SODIUM 40 MILLIGRAM(S): 20 TABLET, DELAYED RELEASE ORAL at 05:55

## 2019-01-11 RX ADMIN — CARVEDILOL PHOSPHATE 3.12 MILLIGRAM(S): 80 CAPSULE, EXTENDED RELEASE ORAL at 05:54

## 2019-01-11 RX ADMIN — Medication 20 MILLIGRAM(S): at 05:54

## 2019-01-11 RX ADMIN — SPIRONOLACTONE 12.5 MILLIGRAM(S): 25 TABLET, FILM COATED ORAL at 05:55

## 2019-01-11 RX ADMIN — Medication 1 GRAM(S): at 05:54

## 2019-01-11 RX ADMIN — AMIODARONE HYDROCHLORIDE 200 MILLIGRAM(S): 400 TABLET ORAL at 05:55

## 2019-01-11 RX ADMIN — Medication 250 MILLIGRAM(S): at 05:54

## 2019-01-11 RX ADMIN — Medication 2: at 08:26

## 2019-01-11 RX ADMIN — Medication 0.5 MILLIGRAM(S): at 03:18

## 2019-01-11 NOTE — PROGRESS NOTE ADULT - PROBLEM SELECTOR PLAN 2
Stable pulmonary edema on CT chest/CXR.  Continue low dose coreg and captopril.  Continue diuretics for pulmonary edema.  Life Vest upon d/c per cardiology>pt fitted yesterday.

## 2019-01-11 NOTE — CHART NOTE - NSCHARTNOTEFT_GEN_A_CORE
Nutrition note:     Aware pt to be discharged later today. Reviewed and reinforced importance of HBV protein foods to promote healing. Reviewed foods high in protein. Encouraged pt to drink 2 Glucerna shakes daily between meals to increase caloric and protein intake. Samples of Supplements provided. RD to remain available PRN.

## 2019-01-11 NOTE — PROGRESS NOTE ADULT - PROBLEM SELECTOR PLAN 3
Blood cultures negative 12/24 and12/25.  Continue micafungin as per ID for positive blood cultures Candida Parapsilosis 12/18 and 12/20  *To continue for 6 weeks with PICC line per Dr Barrett* (end date 2/8/19) followed by 4-6 months oral antifungals.  Dr Santizo faxed Rx over to pharmacy.

## 2019-01-11 NOTE — PROGRESS NOTE ADULT - PROBLEM SELECTOR PLAN 1
Appreciate psych evaluation, switched to trazadone  Had a better night last night on higher dose with Ativan prn dose.  Continue Zoloft.

## 2019-01-11 NOTE — PROGRESS NOTE ADULT - PROBLEM SELECTOR PLAN 6
Right groin wound granulating nicely.  Continue with wet to dry dressing changes by nursing (vascular signed off, will reconsult if needed)

## 2019-01-11 NOTE — PROGRESS NOTE ADULT - SUBJECTIVE AND OBJECTIVE BOX
Patient in recliner.   Feels well today and slept well.  States plan is for home.  Reports no pain.  Dorman shave some SOB at rest.     FUNCTIONAL PROGRESS  1/10    Bed Mobility  Bed Mobility Training Sit-to-Supine: minimum assist (75% patient effort);  1 person assist  Bed Mobility Training Supine-to-Sit: minimum assist (75% patient effort);  moderate assist (50% patient effort);  1 person assist  Bed Mobility Training Limitations: decreased ability to use arms for pushing/pulling;  decreased ability to use legs for bridging/pushing;  impaired ability to control trunk for mobility;  fatigue    Sit-Stand Transfer Training  Transfer Training Sit-to-Stand Transfer: minimum assist (75% patient effort);  1 person assist;  full weight-bearing   rolling walker  Transfer Training Stand-to-Sit Transfer: minimum assist (75% patient effort);  1 person assist  Sit-to-Stand Transfer Training Transfer Safety Analysis: decreased balance    Gait Training  Gait Training: minimum assist (75% patient effort);  1 person assist;  full weight-bearing   rolling walker;  3 steps, pt. deferred further amb and requesting to return to bed due to dizziness and fatigue    1/9 1/9  Bed Mobility  Bed Mobility Training Sit-to-Supine: independent  Bed Mobility Training Supine-to-Sit: independent    Sit-Stand Transfer Training  Transfer Training Sit-to-Stand Transfer: contact guard;  weight-bearing as tolerated   rolling walker  Transfer Training Stand-to-Sit Transfer: contact guard;  weight-bearing as tolerated   rolling walker  Sit-to-Stand Transfer Training Transfer Safety Analysis: decreased balance    Gait Training  Gait Training: contact guard;  full weight-bearing   rolling walker;  25 feet  Gait Analysis: 2-point gait   shuffling;  decreased stride length;  impaired balance;  25 feet;  rolling walker  Gait Number of Times:: x 2            REVIEW OF SYSTEMS  Constitutional - No fever,  +fatigue  HEENT - No vertigo, No neck pain  Neurological - No headaches, No memory loss, +loss of strength, No numbness, No tremors  Skin - No rashes, +lesions   Musculoskeletal - No joint pain, No joint swelling, No muscle pain  Psychiatric - +depression, No anxiety    VITALS  T(C): 36.5 (01-11-19 @ 05:51), Max: 36.5 (01-10-19 @ 15:00)  HR: 96 (01-11-19 @ 05:51) (80 - 102)  BP: 92/60 (01-11-19 @ 05:51) (82/79 - 97/74)  RR: 18 (01-11-19 @ 05:51) (16 - 22)  SpO2: 97% (01-11-19 @ 05:51) (95% - 100%)  Wt(kg): --    MEDICATIONS   acetaminophen   Tablet .. 650 milliGRAM(s) every 6 hours PRN  amiodarone    Tablet 200 milliGRAM(s) daily  aspirin 325 milliGRAM(s) daily  atorvastatin 40 milliGRAM(s) <User Schedule>  benzocaine 15 mG/menthol 3.6 mG (Sugar-Free) Lozenge 1 Lozenge three times a day PRN  captopril 3.125 milliGRAM(s) <User Schedule>  carvedilol 3.125 milliGRAM(s) <User Schedule>  clopidogrel Tablet 75 milliGRAM(s) daily  furosemide    Tablet 20 milliGRAM(s) daily  HYDROmorphone   Tablet 2 milliGRAM(s) every 4 hours PRN  HYDROmorphone   Tablet 4 milliGRAM(s) every 4 hours PRN  insulin lispro (HumaLOG) corrective regimen sliding scale   Before meals and at bedtime  LORazepam     Tablet 0.5 milliGRAM(s) every 8 hours PRN  micafungin IVPB 100 milliGRAM(s) every 24 hours  multivitamin/minerals 1 Tablet(s) daily  ondansetron Injectable 4 milliGRAM(s) every 6 hours PRN  pantoprazole    Tablet 40 milliGRAM(s) every 12 hours  saccharomyces boulardii 250 milliGRAM(s) two times a day  senna 2 Tablet(s) <User Schedule>  sertraline 25 milliGRAM(s) daily  sodium chloride 0.9% lock flush 3 milliLiter(s) every 8 hours  spironolactone 12.5 milliGRAM(s) daily  sucralfate 1 Gram(s) two times a day  traZODone 100 milliGRAM(s) <User Schedule>      RECENT LABS/IMAGING            --------------------------------------------------------------------  PHYSICAL EXAM  Constitutional - NAD, Comfortable  Extremities - No calf tenderness  Neurologic Exam -                    Motor -                      LEFT    UE - ShAB 2/5, EF 4/5, EE 4/5,  4/5                    RIGHT UE - ShAB 5/5, EF 5/5, EE 5/5,  5/5      Sensory - Intact to LT  Psychiatric - Affect flat/depressed	  ----------------------------------------------------------------------------------------  ASSESSMENT/PLAN  48y Male with functional deficits after STEMI, cardiac arrest s/p CABG, followed by complicated hospital course including need for trach, ileus/tube feeds, GI bleed, sepsis. 	  Pain - Tylenol, Dilaudid - recommend DC  Sleep - Trazodone  Depression - Zoloft  CAD - ASA, Plavix, Lipitor  DVT PPX - Lovenox    Candida - Mycamine s/p PICC placement   Rehab - Continue to recommend DC HOME with HOME CARE.    Will sign off, please reconsult for additional rehab dispo needs if functional status changes.

## 2019-01-11 NOTE — PROGRESS NOTE ADULT - SUBJECTIVE AND OBJECTIVE BOX
Subjective: Pt sitting up in chair.  Denies CP or SOB.  NAD noted.  Family at bedside      Tele:  SR                        T(F): 97.9 (19 @ 10:09), Max: 97.9 (19 @ 10:09)  HR: 90 (19 @ 10:09) (80 - 102)  BP: 104/60 (19 @ 10:09) (82/79 - 104/60)  RR: 18 (19 @ 10:09) (16 - 22)  SpO2: 97% (19 @ 05:51) (95% - 100%) on room air at rest.    Daily     Daily Weight in k.6 (2019 06:18)    LV EF: 25-30%    Allergies:  penicillins (Unknown)                     CAPILLARY BLOOD GLUCOSE  POCT Blood Glucose.: 155 mg/dL (2019 08:06)  POCT Blood Glucose.: 145 mg/dL (10 Bruno 2019 21:45)  POCT Blood Glucose.: 162 mg/dL (10 Bruno 2019 20:36)  POCT Blood Glucose.: 169 mg/dL (10 Bruno 2019 18:23)  POCT Blood Glucose.: 159 mg/dL (10 Bruno 2019 11:35)           I&O's Detail    10 Bruno 2019 07:01  -  2019 07:00  --------------------------------------------------------  IN:    Oral Fluid: 720 mL    Solution: 100 mL  Total IN: 820 mL    OUT:    Voided: 400 mL  Total OUT: 400 mL    Total NET: 420 mL      CHEST TUBE:  [ ] YES [x ] NO  OUTPUT:     per 24 hours    AIR LEAKS:  [ ] YES [ ] NO      TERE DRAIN:   [ ] YES [x ] NO  OUTPUT:     per 24 hours    EPICARDIAL WIRES:  [ ] YES [x ] NO      BOWEL MOVEMENT:  [x] YES [ ] NO        Active Medications:  acetaminophen   Tablet .. 650 milliGRAM(s) Oral every 6 hours PRN  amiodarone    Tablet 200 milliGRAM(s) Oral daily  aspirin 325 milliGRAM(s) Oral daily  atorvastatin 40 milliGRAM(s) Oral <User Schedule>  benzocaine 15 mG/menthol 3.6 mG (Sugar-Free) Lozenge 1 Lozenge Oral three times a day PRN  captopril 3.125 milliGRAM(s) Oral <User Schedule>  carvedilol 3.125 milliGRAM(s) Oral <User Schedule>  clopidogrel Tablet 75 milliGRAM(s) Oral daily  furosemide    Tablet 20 milliGRAM(s) Oral daily  HYDROmorphone   Tablet 2 milliGRAM(s) Oral every 4 hours PRN  HYDROmorphone   Tablet 4 milliGRAM(s) Oral every 4 hours PRN  insulin lispro (HumaLOG) corrective regimen sliding scale   SubCutaneous Before meals and at bedtime  LORazepam     Tablet 0.5 milliGRAM(s) Oral every 8 hours PRN  micafungin IVPB 100 milliGRAM(s) IV Intermittent every 24 hours  multivitamin/minerals 1 Tablet(s) Oral daily  ondansetron Injectable 4 milliGRAM(s) IV Push every 6 hours PRN  pantoprazole    Tablet 40 milliGRAM(s) Oral every 12 hours  saccharomyces boulardii 250 milliGRAM(s) Oral two times a day  senna 2 Tablet(s) Oral <User Schedule>  sertraline 25 milliGRAM(s) Oral daily  sodium chloride 0.9% lock flush 3 milliLiter(s) IV Push every 8 hours  spironolactone 12.5 milliGRAM(s) Oral daily  sucralfate 1 Gram(s) Oral two times a day  traZODone 100 milliGRAM(s) Oral <User Schedule>      Physical Exam:    Neuro: AAOX3.  No focal defecits.    Pulm: Diminished BLL.    CV: RRR.  +S1+S2.    Abd: Soft/NT/ND.  +BS.  +BM 1/10.    Extremities: warm, well perfused. no edema. + DP pulses, B/L LE open vein harvest sites (right lower, left full leg) healing well.    Incision(s): Midline sternal incision, c/d/i. sternum stable. No drainage. R groin incision with some granulation, wet to dry dressing with tan slough.                      PAST MEDICAL & SURGICAL HISTORY:  Staph infection  No significant past surgical history

## 2019-01-11 NOTE — PROGRESS NOTE ADULT - PROBLEM SELECTOR PROBLEM 10
Sinusitis
GI bleed
Malnutrition
Scrotal disorder
Cardiogenic shock
Sinusitis
GI bleed
Cardiogenic shock
Coronary artery disease involving native coronary artery of native heart, angina presence unspecified
Coronary artery disease involving native coronary artery of native heart, angina presence unspecified
Scrotal disorder
Sinusitis
Scrotal disorder
Malnutrition
Sinusitis
Sinusitis

## 2019-01-11 NOTE — PROGRESS NOTE ADULT - PROBLEM SELECTOR PLAN 10
Scrotal erosion  stoma powder and cabalon no sting barrier were being used, however noted by RNs that it was drying lesions rather than healing, now transitioned to cabalon and medihoney    Plan for dc home today.  Discussed with Dr. Barrett in AM rounds.

## 2019-01-14 ENCOUNTER — APPOINTMENT (OUTPATIENT)
Age: 49
End: 2019-01-14
Payer: MEDICAID

## 2019-01-14 VITALS
BODY MASS INDEX: 25.48 KG/M2 | HEIGHT: 69 IN | WEIGHT: 172 LBS | RESPIRATION RATE: 15 BRPM | HEART RATE: 80 BPM | SYSTOLIC BLOOD PRESSURE: 90 MMHG | OXYGEN SATURATION: 97 % | DIASTOLIC BLOOD PRESSURE: 62 MMHG

## 2019-01-14 DIAGNOSIS — I25.2 OLD MYOCARDIAL INFARCTION: ICD-10-CM

## 2019-01-14 PROCEDURE — 99024 POSTOP FOLLOW-UP VISIT: CPT

## 2019-01-14 NOTE — HISTORY OF PRESENT ILLNESS
[FreeTextEntry1] : This is a 48 YOM with no prior medical history, + strong family hx of CAD/MI. Pt underwent an emergent C4V with need for ECMO on 11/29/18. Hospital course complex and complicated with several episodes of cardiogenic shock, sepsis,  prolonged intubation requiring trach, stomach bleeding ulcer, and need for an iliac stent post sheath removal. Pt discharged home with [facility]. Extremely supportive family including brother who is a physician at [facility] [employer] [facility] [employer] [facility] [place] Initial [facility] visit. \par CC "I get nauseous and vomit occasionally" Pt denies CP, SOB, worsening cough, fever, palpitations. Pt does endorse dizziness with position change. He denies abd pain or distension, dose endorse constipation, no diarrhea.

## 2019-01-14 NOTE — REASON FOR VISIT
[Follow-Up: _____] : a [unfilled] follow-up visit [Family Member] : family member [FreeTextEntry1] : FOLLOW YOUR HEART- [facility]- [employer]\par \par

## 2019-01-14 NOTE — PHYSICAL EXAM
[Sclera] : the sclera and conjunctiva were normal [Respiration, Rhythm And Depth] : normal respiratory rhythm and effort [Auscultation Breath Sounds / Voice Sounds] : lungs were clear to auscultation bilaterally [Heart Rate And Rhythm] : heart rate was normal and rhythm regular [Heart Sounds] : normal S1 and S2 [2+] : right 2+ [1+] : left 1+ [Bowel Sounds] : normal bowel sounds [Abdomen Soft] : soft [Abdomen Tenderness] : non-tender [FreeTextEntry2] : B/L LE with trace edema, B/L calves soft, NT [Skin Turgor] : normal skin turgor [] : no rash [FreeTextEntry1] : Right femoral groin site superficial wound- pink granulation tissue, no erythema, drainage or warmth, site cleansed and gauze drsg changed. No odor. B/L SVG harvest sites with healing scars, edges well approx, no erythema, drainage or warmth noted.  [Oriented To Time, Place, And Person] : oriented to person, place, and time [Impaired Insight] : insight and judgment were intact [Affect] : the affect was normal

## 2019-01-14 NOTE — REVIEW OF SYSTEMS
[Lower Ext Edema] : lower extremity edema [Cough] : cough [Vomiting] : vomiting [Constipation] : constipation [Negative] : Musculoskeletal [Dizziness] : dizziness [Anxiety] : anxiety [Shortness Of Breath] : no shortness of breath [Wheezing] : no wheezing [Abdominal Pain] : no abdominal pain [Diarrhea] : no diarrhea [Heartburn] : no heartburn [Melena] : no melena [Suicidal] : not suicidal [Sleep Disturbances] : no sleep disturbances [Depression] : no depression [FreeTextEntry7] : Last BM 3-4 days ago [de-identified] : with position changes, resolves quickly

## 2019-01-14 NOTE — ASSESSMENT
[FreeTextEntry1] : This is a 48 YOM with no prior medical history, + strong family hx of CAD/MI. Pt underwent an emergent C4V with need for ECMO on 11/29/18. Hospital course complex and complicated with several episodes of cardiogenic shock, sepsis,  prolonged intubation requiring trach, stomach bleeding ulcer, and need for an iliac stent post sheath removal. Pt discharged home with [facility] and [facility] care services. Extremely supportive family including brother who is a physician at [facility] [place] Initial [facility] visit. Pt recovering well at home. Main concern is nausea currently, which improves with Zofran. Pt vomited "water" after taking a few sips this AM then symptoms resolved with Zofran and pt has tolerated some solid food without any issues. Pt has not had a BM in several days despite use of Colace. Pt denies any abdominal discomfort or pain at this time and endorses flatus. Pt remains afebrile. Pt dtr and son administering IV Micafungin accordingly. Spoke with Dr. Barrett during pt visit and updated him on BP and pt status. Pt to remain on current dosing of medications, will cont to monitor closely and increase Enalapril slowly as BP allows. Spoke with pt brother yesterday regarding pt symptoms. Called [facility] and ordered hepatic panel to be drawn tomorrow. CBC and BMP drawn today as per pt and pt son.

## 2019-01-15 LAB — SAO2 % BLDA: 100 % — HIGH (ref 95–99)

## 2019-01-17 ENCOUNTER — APPOINTMENT (OUTPATIENT)
Age: 49
End: 2019-01-17
Payer: MEDICAID

## 2019-01-17 VITALS
RESPIRATION RATE: 16 BRPM | HEART RATE: 78 BPM | DIASTOLIC BLOOD PRESSURE: 60 MMHG | SYSTOLIC BLOOD PRESSURE: 88 MMHG | OXYGEN SATURATION: 98 %

## 2019-01-17 PROCEDURE — 99024 POSTOP FOLLOW-UP VISIT: CPT

## 2019-01-17 NOTE — REVIEW OF SYSTEMS
[Lower Ext Edema] : lower extremity edema [Shortness Of Breath] : no shortness of breath [Wheezing] : no wheezing [Cough] : cough [SOB on Exertion] : no shortness of breath during exertion [Abdominal Pain] : no abdominal pain [Vomiting] : no vomiting [Constipation] : no constipation [Diarrhea] : no diarrhea [Heartburn] : no heartburn [Melena] : no melena [Dizziness] : dizziness [Suicidal] : not suicidal [Sleep Disturbances] : no sleep disturbances [Anxiety] : anxiety [Depression] : no depression [Negative] : Musculoskeletal [FreeTextEntry5] : improving [FreeTextEntry6] : non productive [FreeTextEntry7] : last BM 1/16/19 [de-identified] : with position changes, resolves quickly

## 2019-01-17 NOTE — PHYSICAL EXAM
[Sclera] : the sclera and conjunctiva were normal [Respiration, Rhythm And Depth] : normal respiratory rhythm and effort [Auscultation Breath Sounds / Voice Sounds] : lungs were clear to auscultation bilaterally [Heart Rate And Rhythm] : heart rate was normal and rhythm regular [Heart Sounds] : normal S1 and S2 [2+] : right 2+ [1+] : left 1+ [FreeTextEntry2] : B/L LE with trace edema, B/L calves soft, NT [Bowel Sounds] : normal bowel sounds [Abdomen Soft] : soft [Abdomen Tenderness] : non-tender [Skin Turgor] : normal skin turgor [] : no rash [FreeTextEntry1] : Right femoral groin site superficial wound- pink granulation tissue, no erythema, drainage or warmth, site cleansed and gauze drsg changed. No odor. B/L SVG harvest sites with healing scars, edges well approx, no erythema, drainage or warmth noted.  [Oriented To Time, Place, And Person] : oriented to person, place, and time [Impaired Insight] : insight and judgment were intact [Affect] : the affect was normal

## 2019-01-17 NOTE — HISTORY OF PRESENT ILLNESS
[FreeTextEntry1] : This is a 48 YOM with no prior medical history, + strong family hx of CAD/MI. Pt underwent an emergent C4V with need for ECMO on 11/29/18. Hospital course complex and complicated with several episodes of cardiogenic shock, sepsis,  prolonged intubation requiring trach, stomach bleeding ulcer, and need for an iliac stent post sheath removal. Pt discharged home with [facility] and [facility] care services. Extremely supportive family including brother who is a physician at [facility] [place]. [facility] visit. \par CC "I'm feeling better but coughing a little more" Pt denies CP, worsening SOB, fever, palpitations. Pt does continue to endorse dizziness with position change that resolves quickly. Denies n/v/d/c at this time.

## 2019-01-17 NOTE — ASSESSMENT
[FreeTextEntry1] : This is a 48 YOM with no prior medical history, + strong family hx of CAD/MI. Pt underwent an emergent C4V with need for ECMO on 11/29/18. Hospital course complex and complicated with several episodes of cardiogenic shock, sepsis,  prolonged intubation requiring trach, stomach bleeding ulcer, and need for an iliac stent post sheath removal. Pt discharged home with [facility]. Extremely supportive family including brother who is a physician [place] [facility] [place] Initial [facility] visit. Pt recovering well at home. GI symptoms improved, pt tolerating more PO food, no further vomiting and BM yesterday with no issues. Pt c/o cough non-productive that kept him up last night. Denies fever, chills, SOB. Does endorse mild chest discomfort when coughing hard. PT to eval patient tomorrow. During visit pt's brother came. He had spoken to Dr. Barrett last evening with an update.

## 2019-01-24 ENCOUNTER — APPOINTMENT (OUTPATIENT)
Age: 49
End: 2019-01-24

## 2019-01-24 ENCOUNTER — OUTPATIENT (OUTPATIENT)
Dept: OUTPATIENT SERVICES | Facility: HOSPITAL | Age: 49
LOS: 1 days | End: 2019-01-24
Payer: MEDICAID

## 2019-01-24 VITALS — HEART RATE: 90 BPM | SYSTOLIC BLOOD PRESSURE: 85 MMHG | DIASTOLIC BLOOD PRESSURE: 66 MMHG | RESPIRATION RATE: 188 BRPM

## 2019-01-24 VITALS — DIASTOLIC BLOOD PRESSURE: 62 MMHG | SYSTOLIC BLOOD PRESSURE: 86 MMHG | RESPIRATION RATE: 18 BRPM | HEART RATE: 88 BPM

## 2019-01-24 DIAGNOSIS — R07.9 CHEST PAIN, UNSPECIFIED: ICD-10-CM

## 2019-01-24 DIAGNOSIS — I21.3 ST ELEVATION (STEMI) MYOCARDIAL INFARCTION OF UNSPECIFIED SITE: ICD-10-CM

## 2019-01-24 PROCEDURE — 71046 X-RAY EXAM CHEST 2 VIEWS: CPT | Mod: 26

## 2019-01-24 PROCEDURE — 71046 X-RAY EXAM CHEST 2 VIEWS: CPT

## 2019-01-24 PROCEDURE — 93306 TTE W/DOPPLER COMPLETE: CPT | Mod: 26

## 2019-01-24 PROCEDURE — 93306 TTE W/DOPPLER COMPLETE: CPT

## 2019-01-25 ENCOUNTER — APPOINTMENT (OUTPATIENT)
Dept: CARDIOTHORACIC SURGERY | Facility: CLINIC | Age: 49
End: 2019-01-25
Payer: MEDICAID

## 2019-01-28 ENCOUNTER — APPOINTMENT (OUTPATIENT)
Dept: CARDIOTHORACIC SURGERY | Facility: CLINIC | Age: 49
End: 2019-01-28
Payer: MEDICAID

## 2019-01-28 VITALS
BODY MASS INDEX: 26.22 KG/M2 | RESPIRATION RATE: 16 BRPM | HEART RATE: 105 BPM | DIASTOLIC BLOOD PRESSURE: 55 MMHG | OXYGEN SATURATION: 99 % | WEIGHT: 177 LBS | SYSTOLIC BLOOD PRESSURE: 126 MMHG | HEIGHT: 69 IN

## 2019-01-28 VITALS — SYSTOLIC BLOOD PRESSURE: 102 MMHG | DIASTOLIC BLOOD PRESSURE: 72 MMHG

## 2019-01-28 PROCEDURE — 99024 POSTOP FOLLOW-UP VISIT: CPT

## 2019-01-30 LAB
CULTURE RESULTS: SIGNIFICANT CHANGE UP
SPECIMEN SOURCE: SIGNIFICANT CHANGE UP

## 2019-02-05 ENCOUNTER — NON-APPOINTMENT (OUTPATIENT)
Age: 49
End: 2019-02-05

## 2019-02-05 ENCOUNTER — APPOINTMENT (OUTPATIENT)
Dept: CARDIOLOGY | Facility: CLINIC | Age: 49
End: 2019-02-05
Payer: MEDICAID

## 2019-02-05 VITALS
HEIGHT: 69 IN | HEART RATE: 93 BPM | OXYGEN SATURATION: 95 % | BODY MASS INDEX: 26.66 KG/M2 | WEIGHT: 180 LBS | DIASTOLIC BLOOD PRESSURE: 65 MMHG | SYSTOLIC BLOOD PRESSURE: 94 MMHG

## 2019-02-05 DIAGNOSIS — Z87.09 PERSONAL HISTORY OF OTHER DISEASES OF THE RESPIRATORY SYSTEM: ICD-10-CM

## 2019-02-05 DIAGNOSIS — R11.0 NAUSEA: ICD-10-CM

## 2019-02-05 DIAGNOSIS — F32.9 OTHER COMPLICATIONS OF PROCEDURES, NOT ELSEWHERE CLASSIFIED, INITIAL ENCOUNTER: ICD-10-CM

## 2019-02-05 DIAGNOSIS — Z92.81 PERSONAL HISTORY OF EXTRACORPOREAL MEMBRANE OXYGENATION (ECMO): ICD-10-CM

## 2019-02-05 DIAGNOSIS — T81.89XA OTHER COMPLICATIONS OF PROCEDURES, NOT ELSEWHERE CLASSIFIED, INITIAL ENCOUNTER: ICD-10-CM

## 2019-02-05 DIAGNOSIS — K21.9 GASTRO-ESOPHAGEAL REFLUX DISEASE W/OUT ESOPHAGITIS: ICD-10-CM

## 2019-02-05 DIAGNOSIS — Z82.49 FAMILY HISTORY OF ISCHEMIC HEART DISEASE AND OTHER DISEASES OF THE CIRCULATORY SYSTEM: ICD-10-CM

## 2019-02-05 DIAGNOSIS — Z86.79 PERSONAL HISTORY OF OTHER DISEASES OF THE CIRCULATORY SYSTEM: ICD-10-CM

## 2019-02-05 PROCEDURE — 93000 ELECTROCARDIOGRAM COMPLETE: CPT

## 2019-02-05 PROCEDURE — 99205 OFFICE O/P NEW HI 60 MIN: CPT

## 2019-02-05 RX ORDER — LORAZEPAM 0.5 MG/1
0.5 TABLET ORAL
Refills: 0 | Status: ACTIVE | COMMUNITY

## 2019-02-05 RX ORDER — ONDANSETRON HYDROCHLORIDE 4 MG/1
4 TABLET, FILM COATED ORAL
Refills: 0 | Status: ACTIVE | COMMUNITY

## 2019-02-05 RX ORDER — PANTOPRAZOLE 40 MG/1
40 TABLET, DELAYED RELEASE ORAL DAILY
Refills: 3 | Status: ACTIVE | COMMUNITY

## 2019-02-05 RX ORDER — AMIODARONE HYDROCHLORIDE 200 MG/1
200 TABLET ORAL DAILY
Qty: 30 | Refills: 2 | Status: DISCONTINUED | COMMUNITY
End: 2019-02-05

## 2019-02-05 NOTE — REASON FOR VISIT
[Consultation] : a consultation regarding [Coronary Artery Disease] : coronary artery disease [Heart Failure] : congestive heart failure [Cardiomyopathy] : cardiomyopathy [Ventricular Tachycardia] : ventricular tachycardia [Family Member] : family member

## 2019-02-05 NOTE — HISTORY OF PRESENT ILLNESS
[FreeTextEntry1] : Mr. Courtney Winters is a 48 year old man who presents for evaluation of heart failure. He was doing well without any significant known medical history until November when he developed about a week of dyspnea and chest pain. This climaxed on the morning of  following breakfast. He presented at [facility] were he was found to be in the midst of an anterior STEMI. He was taken emergently to the cath lab. Coronary angiography showed a 20% ostial left main stenosis, 80% stenosis with filling defect likely thrombus, mid % stenosis, 100% D1 stenosis, 100% D2 stenosis, 80% tubular stenosis of the circumflex, 80% proximal RCA stenosis, and 99% distal RCA stenosis. A ITA stent was placed in the LAD.  During the catheterization he developed ventricular fibrillation. An Impella CP was placed and he was defibrillated. He was then transferred to the OR for emergent CABG with grafts to the LAD, diagonal, 1st obtuse marginal branch, and PDA. He could not be weaned from bypass and was placed on VA ECMO, which was centrally cannulated. ECMO was removed on 12/3 and the Impella was removed on . His hospital course was notable for septic shock and development of candidemia. He could not be weaned from the ventilator and underwent tracheostomy on 12/10, which was decannulated on 1/3. He developed GI bleeding from a stomach ulcer and had ileus. He was discharged on  on low dose neurohormonal antagonists. \par \par Notably, he has a strong family history of coronary artery disease. His mother  in her 50s due to coronary artery disease and his two siblings both suffer from coronary artery disease. He is a non-smoker and does not drink alcohol. 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 He notes that he had annual health screening as part of his work requirement, but had not known hypertension, diabetes, or other ailments apart from a long history of GERD. \par \par Since discharge a month ago, Mr. Winters has had gradual improvement in his functional capacity. He continues to be very weak and can only walk 2-3 steps without stopping due to dyspnea and fatigue. He has nightly PND and sleeps with the head of the bed elevated. He rarely has dizziness, but yesterday had an episode of severe dizziness after taking enalapril. He has sternal pain that is slightly improved. He notes swelling in his legs that is worse in the evening. He has no fevers or chills and his LifeVest has not discharged. He has no abdominal fullness or pain and has no blood in his stool. \par \par He had an EKG today, which I read, which shows normal sinus rhythm with normal axis. The limb leads are low voltage. There is evidence of an anterior and inferior infarct. The QTc is not prolonged. \par \par I reviewed the following additional studies:\par \par Echocardiogram from 2019, showing LVDD of 4.7 cm and normal wall thickness. The LV systolic function was severely reduced with LVEF of less than 20%. The septum was the most active, extending just to the apex, with severe hypokinesis/akinesis of the inferior, anterior, and lateral walls. The RV size and systolic function were preserved. There were no significant valvular lesions. \par \par Chest CT from 2019, showing decreasing size of substernal hematoma without evidence of osteomyelitis or infection. He had bilateral patchy confluent alveolar infiltrates predominating in left upper lobe and bilateral pleural effusions.\par \par Abdominal ultrasound from December, showing splenomegaly and gallbladder wall thickening with sludge and pericholecystic edema suggesting acute cholecystitis. \par \par Lab evaluation showing blood type O. Creatinine of 0.74. \par \par Microbiology showing Candida parapsilosis, last positive on .

## 2019-02-05 NOTE — ASSESSMENT
[FreeTextEntry1] : Mr. Singh Valdez is a 48 year old man who is recovering from recent acute myocardial infarction leading to emergent bypass surgery with inability to initially wean from bypass leading to VA ECMO. He now has chronic systolic heart failure due to a non-dilated ischemic cardiomyopathy. While he is functionally improved since discharge, he remains debilitated, weak, and is volume overloaded with NYHA class IV symptoms. He is on low doses of neurohormonal antagonists due to symptomatic hypotension. He has no clinical evidence of ongoing ischemia or arrhythmias. He is receiving ongoing treatment for candidemia.

## 2019-02-05 NOTE — PHYSICAL EXAM
[Normal Conjunctiva] : the conjunctiva exhibited no abnormalities [Normal Oral Mucosa] : normal oral mucosa [Heart Rate And Rhythm] : heart rate and rhythm were normal [Heart Sounds] : normal S1 and S2 [Arterial Pulses Normal] : the arterial pulses were normal [Respiration, Rhythm And Depth] : normal respiratory rhythm and effort [Bowel Sounds] : normal bowel sounds [Abdomen Soft] : soft [Nail Clubbing] : no clubbing of the fingernails [Cyanosis, Localized] : no localized cyanosis [Nail Splinter Hemorrhages] : no splinter hemorrhages of the nails [Skin Color & Pigmentation] : normal skin color and pigmentation [Skin Turgor] : normal skin turgor [] : no rash [Oriented To Time, Place, And Person] : oriented to person, place, and time [Impaired Insight] : insight and judgment were intact [Affect] : the affect was normal [FreeTextEntry1] : In wheelchair

## 2019-02-05 NOTE — DISCUSSION/SUMMARY
[FreeTextEntry1] : - For now, I will increase the furosemide to 40 mg twice daily. \par - If he has had no events of VT on his LifeVest and the VT he experienced was primarily during his acute presentation, we will discontinue the amiodarone. I will discuss with Dr. Alanis and Dr. Ibrahim. Dr. Santizo is planning on starting an oral antifungal, which could potentially increase the QT interval, especially in combination with amiodarone. \par - He will have weekly labs drawn by his home care service. I will have us receive copies of these labs. \par - He will return to our clinic in two weeks (and every two weeks thereafter) for adjustment in diuretics. We will attempt to gradually increase the neurohormonal antagonists as tolerated. If we are unable to make progress with oral medical therapy, or if he has an interim decline, he may need a right heart catheterization for hemodynamic assessment and need for possible inotropic support with consideration for more advanced therapies. He is blood type O.

## 2019-02-06 RX ORDER — FLUCONAZOLE 200 MG/1
200 TABLET ORAL
Qty: 60 | Refills: 1 | Status: ACTIVE | COMMUNITY
Start: 2019-02-06 | End: 1900-01-01

## 2019-02-06 RX ORDER — MICAFUNGIN SODIUM 20 MG/ML
100 INJECTION, POWDER, LYOPHILIZED, FOR SOLUTION INTRAVENOUS
Refills: 0 | Status: DISCONTINUED | COMMUNITY
End: 2019-02-06

## 2019-02-13 ENCOUNTER — NON-APPOINTMENT (OUTPATIENT)
Age: 49
End: 2019-02-13

## 2019-02-13 ENCOUNTER — APPOINTMENT (OUTPATIENT)
Dept: CARDIOLOGY | Facility: CLINIC | Age: 49
End: 2019-02-13
Payer: MEDICAID

## 2019-02-13 VITALS — SYSTOLIC BLOOD PRESSURE: 87 MMHG | DIASTOLIC BLOOD PRESSURE: 61 MMHG

## 2019-02-13 VITALS
HEIGHT: 69 IN | OXYGEN SATURATION: 100 % | DIASTOLIC BLOOD PRESSURE: 54 MMHG | WEIGHT: 175 LBS | HEART RATE: 91 BPM | BODY MASS INDEX: 25.92 KG/M2 | SYSTOLIC BLOOD PRESSURE: 82 MMHG

## 2019-02-13 PROCEDURE — 93000 ELECTROCARDIOGRAM COMPLETE: CPT

## 2019-02-13 PROCEDURE — 99215 OFFICE O/P EST HI 40 MIN: CPT

## 2019-02-13 RX ORDER — SPIRONOLACTONE 25 MG/1
25 TABLET ORAL DAILY
Qty: 45 | Refills: 1 | Status: ACTIVE | COMMUNITY
Start: 1900-01-01 | End: 1900-01-01

## 2019-02-13 RX ORDER — FUROSEMIDE 20 MG/1
20 TABLET ORAL TWICE DAILY
Qty: 180 | Refills: 1 | Status: ACTIVE | COMMUNITY
Start: 1900-01-01 | End: 1900-01-01

## 2019-02-13 RX ORDER — CARVEDILOL 3.12 MG/1
3.12 TABLET, FILM COATED ORAL TWICE DAILY
Qty: 180 | Refills: 1 | Status: ACTIVE | COMMUNITY
Start: 1900-01-01 | End: 1900-01-01

## 2019-02-13 RX ORDER — TRAZODONE HYDROCHLORIDE 100 MG/1
100 TABLET ORAL DAILY
Refills: 0 | Status: DISCONTINUED | COMMUNITY
End: 2019-02-13

## 2019-02-13 RX ORDER — CLOPIDOGREL BISULFATE 75 MG/1
75 TABLET, FILM COATED ORAL DAILY
Qty: 90 | Refills: 3 | Status: ACTIVE | COMMUNITY
Start: 1900-01-01 | End: 1900-01-01

## 2019-02-13 RX ORDER — ATORVASTATIN CALCIUM 40 MG/1
40 TABLET, FILM COATED ORAL
Qty: 90 | Refills: 1 | Status: ACTIVE | COMMUNITY
Start: 1900-01-01 | End: 1900-01-01

## 2019-02-13 RX ORDER — ASPIRIN 325 MG/1
325 TABLET, FILM COATED ORAL DAILY
Qty: 90 | Refills: 2 | Status: ACTIVE | COMMUNITY
Start: 1900-01-01 | End: 1900-01-01

## 2019-02-13 NOTE — CARDIOLOGY SUMMARY
[___] : [unfilled] [LVEF ___%] : LVEF [unfilled]% [Severe] : severe LV dysfunction [Moderate] : moderate mitral regurgitation

## 2019-02-15 LAB
ALBUMIN SERPL ELPH-MCNC: 3.8 G/DL
ALP BLD-CCNC: 135 U/L
ALT SERPL-CCNC: 14 U/L
ANION GAP SERPL CALC-SCNC: 17 MMOL/L
AST SERPL-CCNC: 23 U/L
BILIRUB SERPL-MCNC: 0.6 MG/DL
BUN SERPL-MCNC: 12 MG/DL
CALCIUM SERPL-MCNC: 9.2 MG/DL
CHLORIDE SERPL-SCNC: 100 MMOL/L
CO2 SERPL-SCNC: 24 MMOL/L
CREAT SERPL-MCNC: 0.71 MG/DL
GLUCOSE SERPL-MCNC: 134 MG/DL
POTASSIUM SERPL-SCNC: 3.9 MMOL/L
PROT SERPL-MCNC: 6.4 G/DL
SODIUM SERPL-SCNC: 141 MMOL/L

## 2019-02-20 ENCOUNTER — APPOINTMENT (OUTPATIENT)
Dept: CARDIOLOGY | Facility: CLINIC | Age: 49
End: 2019-02-20
Payer: MEDICAID

## 2019-02-20 VITALS
HEART RATE: 95 BPM | WEIGHT: 170 LBS | BODY MASS INDEX: 25.18 KG/M2 | DIASTOLIC BLOOD PRESSURE: 71 MMHG | OXYGEN SATURATION: 99 % | SYSTOLIC BLOOD PRESSURE: 101 MMHG | HEIGHT: 69 IN

## 2019-02-20 PROCEDURE — 99214 OFFICE O/P EST MOD 30 MIN: CPT

## 2019-02-20 RX ORDER — SERTRALINE HYDROCHLORIDE 50 MG/1
50 TABLET, FILM COATED ORAL
Refills: 0 | Status: ACTIVE | COMMUNITY

## 2019-02-20 RX ORDER — SUCRALFATE 1 G/1
1 TABLET ORAL
Refills: 0 | Status: ACTIVE | COMMUNITY

## 2019-02-20 RX ORDER — ENALAPRIL MALEATE 2.5 MG/1
2.5 TABLET ORAL DAILY
Refills: 0 | Status: ACTIVE | COMMUNITY

## 2019-02-20 NOTE — HISTORY OF PRESENT ILLNESS
[FreeTextEntry1] : \par Mr. Winters is a 49 y/o male who presents for clinic follow up of heart failure. Fairly new diagnosis of ICM HFrEF <20% due to an acute AWSTEMI in November 2019. Catheterization complicated by Vfib requiring emergent CABG. Could not be weaned from bypass and was placed on ECMO eventually removed on 12/3 and impella removed on 12//6. Hospital course notable for septic and cardiogenic shock with development of candidemia. Unable to be weaned from ventilator and underwent tracheostomy and was decannulated on 1/3. Developed GI bleeding due to a stomach ulcer and had an ileus. Discharged on 1/11 on low dose neurohormonal antagonists. s/p PICC line removal and course of IV abx,  now on oral Fluconazole following discontinuation of amiodarone.\par \par \par He was last seen on Feb 5th by Dr. Luke at which time he was noted to be volume overloaded with Class IV symptoms and his diuretics were increased. He was subsequently evaluated by his PMD who recommended to further increase Lasix to 40 mg BID which he trialed for a few days but unable to tolerate due to feelings of asphyxiation. Has resumed Lasix 20 mg BID. He notes a 10 lb weight loss since his last visit. Reports improved activity tolerance and is now able to ambulate 40-50 feet before stopping due to dyspnea. He is unable to tolerate walking up stairs at this point due to SOB. Endorses orthopnea requiring up to 3 pillows with nightly and at times is sleeping in a recliner. Reports PND, awakening 2-3x a night. He reports a dry non-productive cough with scant hemoptysis x3 days that is worse when recumbent w/ associated sternal pain; cough alleviated with use of Nyquil. He notes LE edema to be unchanged, left slightly greater than right. He continues to wear his  lifevest regularly and it has not discharged. Reports good appetite. Adheres to a low sodium diet and reports only drinking fluid throughout the day when taking his medications.. He denies palpitations, abdominal bloating/distention, fevel, chills, dizziness and syncope. \par \par \par He had an EKG today, which shows normal sinus rhythm. The QTc is not prolonged.\par \par

## 2019-02-20 NOTE — ASSESSMENT
[FreeTextEntry1] : Mr. Singh Valdez is a 48 year old man who is recovering from recent acute myocardial infarction leading to emergent bypass surgery with inability to initially wean from bypass leading to VA ECMO. He now has chronic systolic heart failure due to a non-dilated ischemic cardiomyopathy. Overall improved and stable since last visit but remains volume overloaded on exam. ACC/AHA Stage C, NYHA Class IV, low normotensive on low doses of neurohormonal antagonists.

## 2019-02-20 NOTE — PHYSICAL EXAM
[Normal Conjunctiva] : the conjunctiva exhibited no abnormalities [Respiration, Rhythm And Depth] : normal respiratory rhythm and effort [Heart Rate And Rhythm] : heart rate and rhythm were normal [Heart Sounds] : normal S1 and S2 [Arterial Pulses Normal] : the arterial pulses were normal [Bowel Sounds] : normal bowel sounds [Abdomen Soft] : soft [Nail Clubbing] : no clubbing of the fingernails [Cyanosis, Localized] : no localized cyanosis [Skin Color & Pigmentation] : normal skin color and pigmentation [Skin Turgor] : normal skin turgor [Oriented To Time, Place, And Person] : oriented to person, place, and time [Impaired Insight] : insight and judgment were intact [Affect] : the affect was normal [General Appearance - Well Developed] : well developed [Well Groomed] : well groomed [General Appearance - In No Acute Distress] : no acute distress [No Oral Pallor] : no oral pallor [No Oral Cyanosis] : no oral cyanosis [2+] : left 2+ [Abdomen Tenderness] : non-tender [] : no hepato-splenomegaly [Mood] : the mood was normal [FreeTextEntry1] : sternotomy site incision healed with scant scabs. B/L LE harvest sites closed with yellow scabs, no evidence of drainage. No erythema.

## 2019-02-20 NOTE — DISCUSSION/SUMMARY
[FreeTextEntry1] : 1. Chronic Systolic Heart Failure\par - Continue with current diuretic regimen, Lasix 20 mg BID as patient is responding well and continuing to diurese\par - Increase Enalapril to 2.5 mg daily\par - Labs today (bmp, proBNP, mag) to assess renal function and electrolyte stores\par - Continue to monitor and log daily weight and BP\par - Follow up with HF NP in two weeks for ongoing medication uptitration and reassessment of volume status\par - If we are unable to make progress with oral medical therapy, or if he has an interim decline, he may need a right heart catheterization for hemodynamic assessment and need for possible inotropic support with consideration for more advanced therapies. He is blood type O.\par \par 2. CAD s/p CABG\par - No s/s of ischemia\par - Continue current medications including ASA, statin, plavix, beta blocker, ACE-I, and MRA.\par \par 3. Candidemia \par - s/p PICC line removal now on oral Fluconazole. Reported to be afebrile.\par - Continue to follow with infectious disease

## 2019-02-20 NOTE — REASON FOR VISIT
[Cardiomyopathy] : cardiomyopathy [Coronary Artery Disease] : coronary artery disease [Heart Failure] : congestive heart failure [Ventricular Tachycardia] : ventricular tachycardia [Family Member] : family member [Follow-Up - Clinic] : a clinic follow-up of [FreeTextEntry1] : INSTRUCTIONS:\par \par 1. Please continue lasix 20mg TWICE a day\par \par 2. Please INCREASE your ENALAPRIL to 2.5mg daily\par \par 3. Labs to be drawn today. We will follow up with you regarding the results.\par \par 4. Follow up with nurse practitioner in 2 weeks.\par \par 5. Please continue to monitor your weight and blood pressure daily.

## 2019-02-21 ENCOUNTER — RESULT REVIEW (OUTPATIENT)
Age: 49
End: 2019-02-21

## 2019-02-21 LAB
ANION GAP SERPL CALC-SCNC: 13 MMOL/L
BUN SERPL-MCNC: 15 MG/DL
CALCIUM SERPL-MCNC: 9.7 MG/DL
CHLORIDE SERPL-SCNC: 101 MMOL/L
CO2 SERPL-SCNC: 25 MMOL/L
CREAT SERPL-MCNC: 0.63 MG/DL
GLUCOSE SERPL-MCNC: 120 MG/DL
MAGNESIUM SERPL-MCNC: 1.8 MG/DL
NT-PROBNP SERPL-MCNC: 4477 PG/ML
POTASSIUM SERPL-SCNC: 4.3 MMOL/L
SODIUM SERPL-SCNC: 139 MMOL/L

## 2019-03-01 ENCOUNTER — OUTPATIENT (OUTPATIENT)
Dept: OUTPATIENT SERVICES | Facility: HOSPITAL | Age: 49
LOS: 1 days | End: 2019-03-01

## 2019-03-03 NOTE — HISTORY OF PRESENT ILLNESS
[FreeTextEntry1] : Mr. Courtney Winters is a 48 year old man who initially presented at  [facility] with late presentation anterior STEMI . He was taken emergently to the cath lab. Coronary angiography showed a 20% ostial left main stenosis, 80% stenosis with filling defect likely thrombus, mid % stenosis, 100% D1 stenosis, 100% D2 stenosis, 80% tubular stenosis of the circumflex, 80% proximal RCA stenosis, and 99% distal RCA stenosis. A ITA stent was placed in D!1 followed by ITA to LAD ( bifurcation lesion) but during PCI developed acute LAD stent thrombosis and extension of thrombus in the left main , developed cardiogenic shock requiring Impella placement and developed Vfib requiring defibrillation. . He was then transferred to the OR for emergent CABG with grafts to the LAD, diagonal, 1st obtuse marginal branch, and PDA. He could not be weaned from bypass and was placed on VA ECMO, which was centrally cannulated. ECMO was removed on 12/3 and the Impella was removed on 12/6. His hospital course was notable for septic shock and development of candidemia. He could not be weaned from the ventilator and underwent tracheostomy on 12/10, which was decannulated on 1/3. He developed GI bleeding from a stomach ulcer and had ileus. He was discharged on January 11th on low dose neurohormonal antagonists. \par He has followed with Dr. Johann Adams (CT surgery) and Dr. Luke  (heart failure) . He c/o of SOB on mild exertion with orthopnea and PND. No chest pain. He is currently on lasix 20 mg daily . \par \par

## 2019-03-03 NOTE — REASON FOR VISIT
[Follow-Up - From Hospitalization] : follow-up of a recent hospitalization for [Coronary Artery Disease] : coronary artery disease [CABG Follow-up] : bypass graft [Heart Failure] : congestive heart failure

## 2019-03-03 NOTE — ASSESSMENT
[FreeTextEntry1] : 49 y/o male with recent acute AMI , cardiogenic shock , emergent CABG , placed on VAECMO after bypass , currently in Chronic systolic heart failure FC III-IV due to ischemic cardiomyopathy \par will increase diuretics to furosemide 20 mg twice daily \par continue carvedilol 3.125 mg twice daily \par continue enalapril 2.5 mg twice daily \par continue aldactone 25 mg \par Discussed with Dr. Luke further management , agree with increasing diuretics and will see patient next week \par

## 2019-03-03 NOTE — PHYSICAL EXAM
[Normal Conjunctiva] : the conjunctiva exhibited no abnormalities [Normal Oral Mucosa] : normal oral mucosa [No Oral Pallor] : no oral pallor [No Oral Cyanosis] : no oral cyanosis [Heart Sounds] : normal S1 and S2 [Murmurs] : no murmurs present [Abdomen Soft] : soft [Abdomen Tenderness] : non-tender [] : no hepato-splenomegaly [Abnormal Walk] : normal gait [Nail Clubbing] : no clubbing of the fingernails [Cyanosis, Localized] : no localized cyanosis [Skin Color & Pigmentation] : normal skin color and pigmentation [Skin Turgor] : normal skin turgor [Oriented To Time, Place, And Person] : oriented to person, place, and time [Affect] : the affect was normal [Mood] : the mood was normal [FreeTextEntry1] : +1 edema

## 2019-03-05 ENCOUNTER — EMERGENCY (EMERGENCY)
Facility: HOSPITAL | Age: 49
LOS: 1 days | Discharge: HOME | End: 2019-03-05
Attending: EMERGENCY MEDICINE
Payer: MEDICAID

## 2019-03-05 ENCOUNTER — APPOINTMENT (OUTPATIENT)
Dept: CARDIOLOGY | Facility: CLINIC | Age: 49
End: 2019-03-05

## 2019-03-05 VITALS
TEMPERATURE: 98 F | SYSTOLIC BLOOD PRESSURE: 83 MMHG | DIASTOLIC BLOOD PRESSURE: 61 MMHG | RESPIRATION RATE: 26 BRPM | OXYGEN SATURATION: 100 % | WEIGHT: 166.89 LBS | HEART RATE: 88 BPM | HEIGHT: 69 IN

## 2019-03-05 VITALS
DIASTOLIC BLOOD PRESSURE: 71 MMHG | OXYGEN SATURATION: 99 % | RESPIRATION RATE: 23 BRPM | SYSTOLIC BLOOD PRESSURE: 89 MMHG | HEART RATE: 88 BPM

## 2019-03-05 DIAGNOSIS — I50.23 ACUTE ON CHRONIC SYSTOLIC (CONGESTIVE) HEART FAILURE: ICD-10-CM

## 2019-03-05 LAB
ALBUMIN SERPL ELPH-MCNC: 3.8 G/DL — SIGNIFICANT CHANGE UP (ref 3.3–5.2)
ALBUMIN SERPL ELPH-MCNC: 3.9 G/DL — SIGNIFICANT CHANGE UP (ref 3.3–5.2)
ALLERGY+IMMUNOLOGY DIAG STUDY NOTE: SIGNIFICANT CHANGE UP
ALP SERPL-CCNC: 193 U/L — HIGH (ref 40–120)
ALP SERPL-CCNC: 209 U/L — HIGH (ref 40–120)
ALT FLD-CCNC: 130 U/L — HIGH
ALT FLD-CCNC: 138 U/L — HIGH
ANION GAP SERPL CALC-SCNC: 18 MMOL/L — HIGH (ref 5–17)
ANION GAP SERPL CALC-SCNC: 19 MMOL/L — HIGH (ref 5–17)
APPEARANCE UR: CLEAR — SIGNIFICANT CHANGE UP
APTT BLD: 34.8 SEC — SIGNIFICANT CHANGE UP (ref 27.5–36.3)
AST SERPL-CCNC: 276 U/L — HIGH
AST SERPL-CCNC: 300 U/L — HIGH
BACTERIA # UR AUTO: ABNORMAL
BASOPHILS # BLD AUTO: 0 K/UL — SIGNIFICANT CHANGE UP (ref 0–0.2)
BASOPHILS NFR BLD AUTO: 0.6 % — SIGNIFICANT CHANGE UP (ref 0–2)
BILIRUB SERPL-MCNC: 1 MG/DL — SIGNIFICANT CHANGE UP (ref 0.4–2)
BILIRUB SERPL-MCNC: 1 MG/DL — SIGNIFICANT CHANGE UP (ref 0.4–2)
BILIRUB UR-MCNC: NEGATIVE — SIGNIFICANT CHANGE UP
BLD GP AB SCN SERPL QL: ABNORMAL
BUN SERPL-MCNC: 18 MG/DL — SIGNIFICANT CHANGE UP (ref 8–20)
BUN SERPL-MCNC: 18 MG/DL — SIGNIFICANT CHANGE UP (ref 8–20)
CALCIUM SERPL-MCNC: 9.3 MG/DL — SIGNIFICANT CHANGE UP (ref 8.6–10.2)
CALCIUM SERPL-MCNC: 9.6 MG/DL — SIGNIFICANT CHANGE UP (ref 8.6–10.2)
CHLORIDE SERPL-SCNC: 95 MMOL/L — LOW (ref 98–107)
CHLORIDE SERPL-SCNC: 97 MMOL/L — LOW (ref 98–107)
CO2 SERPL-SCNC: 18 MMOL/L — LOW (ref 22–29)
CO2 SERPL-SCNC: 20 MMOL/L — LOW (ref 22–29)
COLOR SPEC: YELLOW — SIGNIFICANT CHANGE UP
CREAT SERPL-MCNC: 0.65 MG/DL — SIGNIFICANT CHANGE UP (ref 0.5–1.3)
CREAT SERPL-MCNC: 0.79 MG/DL — SIGNIFICANT CHANGE UP (ref 0.5–1.3)
DIFF PNL FLD: NEGATIVE — SIGNIFICANT CHANGE UP
DIR ANTIGLOB POLYSPECIFIC INTERPRETATION: SIGNIFICANT CHANGE UP
EOSINOPHIL # BLD AUTO: 0 K/UL — SIGNIFICANT CHANGE UP (ref 0–0.5)
EOSINOPHIL NFR BLD AUTO: 0.1 % — SIGNIFICANT CHANGE UP (ref 0–5)
EPI CELLS # UR: SIGNIFICANT CHANGE UP
GLUCOSE SERPL-MCNC: 102 MG/DL — SIGNIFICANT CHANGE UP (ref 70–115)
GLUCOSE SERPL-MCNC: 125 MG/DL — HIGH (ref 70–115)
GLUCOSE UR QL: NEGATIVE MG/DL — SIGNIFICANT CHANGE UP
HCT VFR BLD CALC: 38.3 % — LOW (ref 42–52)
HGB BLD-MCNC: 12.1 G/DL — LOW (ref 14–18)
HYALINE CASTS # UR AUTO: ABNORMAL /LPF
INR BLD: 1.61 RATIO — HIGH (ref 0.88–1.16)
KETONES UR-MCNC: NEGATIVE — SIGNIFICANT CHANGE UP
LEUKOCYTE ESTERASE UR-ACNC: ABNORMAL
LIDOCAIN IGE QN: 31 U/L — SIGNIFICANT CHANGE UP (ref 22–51)
LYMPHOCYTES # BLD AUTO: 1.7 K/UL — SIGNIFICANT CHANGE UP (ref 1–4.8)
LYMPHOCYTES # BLD AUTO: 24.6 % — SIGNIFICANT CHANGE UP (ref 20–55)
MAGNESIUM SERPL-MCNC: 2.1 MG/DL — SIGNIFICANT CHANGE UP (ref 1.6–2.6)
MCHC RBC-ENTMCNC: 26.5 PG — LOW (ref 27–31)
MCHC RBC-ENTMCNC: 31.6 G/DL — LOW (ref 32–36)
MCV RBC AUTO: 83.8 FL — SIGNIFICANT CHANGE UP (ref 80–94)
MONOCYTES # BLD AUTO: 0.7 K/UL — SIGNIFICANT CHANGE UP (ref 0–0.8)
MONOCYTES NFR BLD AUTO: 10.2 % — HIGH (ref 3–10)
NEUTROPHILS # BLD AUTO: 4.5 K/UL — SIGNIFICANT CHANGE UP (ref 1.8–8)
NEUTROPHILS NFR BLD AUTO: 64.2 % — SIGNIFICANT CHANGE UP (ref 37–73)
NITRITE UR-MCNC: NEGATIVE — SIGNIFICANT CHANGE UP
NT-PROBNP SERPL-SCNC: 5442 PG/ML — HIGH (ref 0–300)
PH UR: 5 — SIGNIFICANT CHANGE UP (ref 5–8)
PLATELET # BLD AUTO: 233 K/UL — SIGNIFICANT CHANGE UP (ref 150–400)
POTASSIUM SERPL-MCNC: 5.2 MMOL/L — SIGNIFICANT CHANGE UP (ref 3.5–5.3)
POTASSIUM SERPL-MCNC: 5.6 MMOL/L — HIGH (ref 3.5–5.3)
POTASSIUM SERPL-SCNC: 5.2 MMOL/L — SIGNIFICANT CHANGE UP (ref 3.5–5.3)
POTASSIUM SERPL-SCNC: 5.6 MMOL/L — HIGH (ref 3.5–5.3)
PROT SERPL-MCNC: 6.8 G/DL — SIGNIFICANT CHANGE UP (ref 6.6–8.7)
PROT SERPL-MCNC: 7.2 G/DL — SIGNIFICANT CHANGE UP (ref 6.6–8.7)
PROT UR-MCNC: 30 MG/DL
PROTHROM AB SERPL-ACNC: 18.8 SEC — HIGH (ref 10–12.9)
RBC # BLD: 4.57 M/UL — LOW (ref 4.6–6.2)
RBC # FLD: 16.8 % — HIGH (ref 11–15.6)
RBC CASTS # UR COMP ASSIST: SIGNIFICANT CHANGE UP /HPF (ref 0–4)
SODIUM SERPL-SCNC: 132 MMOL/L — LOW (ref 135–145)
SODIUM SERPL-SCNC: 135 MMOL/L — SIGNIFICANT CHANGE UP (ref 135–145)
SP GR SPEC: 1.02 — SIGNIFICANT CHANGE UP (ref 1.01–1.02)
TROPONIN T SERPL-MCNC: <0.01 NG/ML — SIGNIFICANT CHANGE UP (ref 0–0.06)
TSH SERPL-MCNC: 4.51 UIU/ML — HIGH (ref 0.27–4.2)
TYPE + AB SCN PNL BLD: SIGNIFICANT CHANGE UP
UROBILINOGEN FLD QL: 8 MG/DL
WBC # BLD: 7 K/UL — SIGNIFICANT CHANGE UP (ref 4.8–10.8)
WBC # FLD AUTO: 7 K/UL — SIGNIFICANT CHANGE UP (ref 4.8–10.8)
WBC UR QL: SIGNIFICANT CHANGE UP

## 2019-03-05 PROCEDURE — 85027 COMPLETE CBC AUTOMATED: CPT

## 2019-03-05 PROCEDURE — 99284 EMERGENCY DEPT VISIT MOD MDM: CPT | Mod: 25

## 2019-03-05 PROCEDURE — 85610 PROTHROMBIN TIME: CPT

## 2019-03-05 PROCEDURE — 93010 ELECTROCARDIOGRAM REPORT: CPT

## 2019-03-05 PROCEDURE — 83690 ASSAY OF LIPASE: CPT

## 2019-03-05 PROCEDURE — 81001 URINALYSIS AUTO W/SCOPE: CPT

## 2019-03-05 PROCEDURE — 36415 COLL VENOUS BLD VENIPUNCTURE: CPT

## 2019-03-05 PROCEDURE — 86900 BLOOD TYPING SEROLOGIC ABO: CPT

## 2019-03-05 PROCEDURE — 93005 ELECTROCARDIOGRAM TRACING: CPT

## 2019-03-05 PROCEDURE — 83880 ASSAY OF NATRIURETIC PEPTIDE: CPT

## 2019-03-05 PROCEDURE — 87086 URINE CULTURE/COLONY COUNT: CPT

## 2019-03-05 PROCEDURE — 71045 X-RAY EXAM CHEST 1 VIEW: CPT | Mod: 26

## 2019-03-05 PROCEDURE — 84484 ASSAY OF TROPONIN QUANT: CPT

## 2019-03-05 PROCEDURE — 86901 BLOOD TYPING SEROLOGIC RH(D): CPT

## 2019-03-05 PROCEDURE — 86880 COOMBS TEST DIRECT: CPT

## 2019-03-05 PROCEDURE — 93306 TTE W/DOPPLER COMPLETE: CPT

## 2019-03-05 PROCEDURE — 99285 EMERGENCY DEPT VISIT HI MDM: CPT | Mod: 25

## 2019-03-05 PROCEDURE — 86850 RBC ANTIBODY SCREEN: CPT

## 2019-03-05 PROCEDURE — 96374 THER/PROPH/DIAG INJ IV PUSH: CPT | Mod: XU

## 2019-03-05 PROCEDURE — 84443 ASSAY THYROID STIM HORMONE: CPT

## 2019-03-05 PROCEDURE — 85730 THROMBOPLASTIN TIME PARTIAL: CPT

## 2019-03-05 PROCEDURE — 83735 ASSAY OF MAGNESIUM: CPT

## 2019-03-05 PROCEDURE — 71045 X-RAY EXAM CHEST 1 VIEW: CPT

## 2019-03-05 PROCEDURE — 86870 RBC ANTIBODY IDENTIFICATION: CPT

## 2019-03-05 PROCEDURE — 93306 TTE W/DOPPLER COMPLETE: CPT | Mod: 26

## 2019-03-05 PROCEDURE — 80053 COMPREHEN METABOLIC PANEL: CPT

## 2019-03-05 RX ORDER — ONDANSETRON 8 MG/1
4 TABLET, FILM COATED ORAL ONCE
Qty: 0 | Refills: 0 | Status: COMPLETED | OUTPATIENT
Start: 2019-03-05 | End: 2019-03-05

## 2019-03-05 RX ORDER — SODIUM CHLORIDE 9 MG/ML
250 INJECTION INTRAMUSCULAR; INTRAVENOUS; SUBCUTANEOUS ONCE
Qty: 0 | Refills: 0 | Status: COMPLETED | OUTPATIENT
Start: 2019-03-05 | End: 2019-03-05

## 2019-03-05 RX ORDER — FUROSEMIDE 40 MG
20 TABLET ORAL ONCE
Qty: 0 | Refills: 0 | Status: COMPLETED | OUTPATIENT
Start: 2019-03-05 | End: 2019-03-05

## 2019-03-05 RX ORDER — PANTOPRAZOLE SODIUM 20 MG/1
40 TABLET, DELAYED RELEASE ORAL
Qty: 0 | Refills: 0 | Status: DISCONTINUED | OUTPATIENT
Start: 2019-03-05 | End: 2019-03-10

## 2019-03-05 RX ORDER — SENNA PLUS 8.6 MG/1
1 TABLET ORAL ONCE
Qty: 0 | Refills: 0 | Status: COMPLETED | OUTPATIENT
Start: 2019-03-05 | End: 2019-03-05

## 2019-03-05 RX ORDER — SPIRONOLACTONE 25 MG/1
12.5 TABLET, FILM COATED ORAL ONCE
Qty: 0 | Refills: 0 | Status: COMPLETED | OUTPATIENT
Start: 2019-03-05 | End: 2019-03-05

## 2019-03-05 RX ORDER — CLOPIDOGREL BISULFATE 75 MG/1
75 TABLET, FILM COATED ORAL ONCE
Qty: 0 | Refills: 0 | Status: COMPLETED | OUTPATIENT
Start: 2019-03-05 | End: 2019-03-05

## 2019-03-05 RX ADMIN — SPIRONOLACTONE 12.5 MILLIGRAM(S): 25 TABLET, FILM COATED ORAL at 10:40

## 2019-03-05 RX ADMIN — PANTOPRAZOLE SODIUM 40 MILLIGRAM(S): 20 TABLET, DELAYED RELEASE ORAL at 10:40

## 2019-03-05 RX ADMIN — SENNA PLUS 1 TABLET(S): 8.6 TABLET ORAL at 10:41

## 2019-03-05 RX ADMIN — ONDANSETRON 4 MILLIGRAM(S): 8 TABLET, FILM COATED ORAL at 09:00

## 2019-03-05 RX ADMIN — SODIUM CHLORIDE 333.33 MILLILITER(S): 9 INJECTION INTRAMUSCULAR; INTRAVENOUS; SUBCUTANEOUS at 04:53

## 2019-03-05 RX ADMIN — CLOPIDOGREL BISULFATE 75 MILLIGRAM(S): 75 TABLET, FILM COATED ORAL at 10:39

## 2019-03-05 RX ADMIN — Medication 20 MILLIGRAM(S): at 10:40

## 2019-03-05 NOTE — CONSULT NOTE ADULT - PROBLEM SELECTOR RECOMMENDATION 9
Pt known to advanced heart failure team with Dr. jones and Dr. Shen [facility] cardiology and Dr. Barrett CT surgery [facility].  Pt is undergoing work up for advanced heart failure options and is currently being optimized medically at this time for heart failure.   Repeat labs if stable plan for discharge home with follow up lab work this thursday and results to be faxed to office.   Please call the Cardiothoracic Surgery office at [phone] if you are experiencing any shortness of breath, chest pain, fevers or chills, drainage from the incisions, persistent nausea, vomiting or if you have any questions about your medications. If the symptoms are severe, call 911 and go to the nearest hospital. You can also call [phone] for an emergency [facility] ambulance, which will take you to the closest [facility]. Pt known to advanced heart failure team with Dr. jones and Dr. Shen [facility] cardiology and Dr. Aragon CT surgery [facility].  Pt is undergoing work up for advanced heart failure options and is currently being optimized medically at this time for heart failure.   Repeat labs are stable plan for discharge home with follow up lab work this thursday to monitor LFT's and results to be faxed to office.   As per Dr. aragon pt must take fluconazole for piror fungemia during hospital visit. Will continue to monitor LFT's.   Please call the Cardiothoracic Surgery office at [phone] if you are experiencing any shortness of breath, chest pain, fevers or chills, drainage from the incisions, persistent nausea, vomiting or if you have any questions about your medications. If the symptoms are severe, call [phone] and go to the nearest hospital. You can also call [phone] for an emergency [facility] ambulance, which will take you to the closest [facility].

## 2019-03-05 NOTE — ED ADULT NURSE REASSESSMENT NOTE - NS ED NURSE REASSESS COMMENT FT1
Assumed pt care at 0715.  Pt a&ox4, remains hypotensive at baseline, denies any c/o pain, no acute s/s of respiratory distress noted or reported at this time, will continue to monitor

## 2019-03-05 NOTE — ED PROVIDER NOTE - PROGRESS NOTE DETAILS
Ct consult obtained requesting emergent bedside echo to determine CT vs medical admission spoke with on call echo tech coming for emergent bedside echo . am attending aware of plan of care pt seen and eval by CT sx ЮЛИЯ Shaw working with Dr Barrett he said d/w Dr Chao case extensively and given repeat labs and will set up for outpt f/u appt and recommend d/c

## 2019-03-05 NOTE — CONSULT NOTE ADULT - SUBJECTIVE AND OBJECTIVE BOX
Surgeon: Dr. Barrett     Consult requesting by: Dr. rpince     HISTORY OF PRESENT ILLNESS:  48 yo Male c/o CP for 2-3 days prior to admission, then 10/10 chest pain approximately 9:30 am day prior to admission, followed by vomiting.  Wife drove pt to hospital, ruled in for STEMI. Urgent Cath, pt found to have multiple occlusions; stent to LAD and D2; stent to LAD thrombosed,  impella placed for support.  Pt then had VF arrest, shock x 1, return to NSR.  Upon transfer to OR for Urgent CABG, pt with asystolic arrest, chest opened intra-op with CPR in progress. Pt underwent  C4V (LAD, Diag, OM and PDA) with inability to wean from CPB therefore requiring VA ECMO (central cannulation) and IABP, he was transferred to CICU.  12/3 s/p ECMO explant and IABP d/c'd, placement of impella via R groin sheath.    Impella removal via right femoral cutdown with primary repair of femoral artery and stent placed to external iliac artery for dissection.  Hospital course notable for (in addition to above):cardiogenic and septic shock (both since resolved), acute systolic heart failure, vent dependent respiratory failure (inability to wean also contributed to by severe agitation, now s/p trach 12/10), fungemia, acute blood loss anemia, GIB (bleeding stomach ulcer), hypokalemia, ileus, sinusitis.  1/3 decannulated.   passed bedside swallow eval.   Pt discharged home     3/4 Pt presenting to [facility] ED with c/o nausea and vomiting. Pt had a follow up visit scheduled with Dr. Luke today for further workup regarding possible advanced heart failure options. However pt presented to [facility] ED. Pt with K 5.6 and slight elevations of LFT's. pt has been on fluconazole for a fungemia on prior hospital visit which may explain elevated LFT's. Will repeat CMP and monitor K. Pt has been complaining of SOB when he lays down which has been his baseline over the last week. Pt endorses making urine and denies any headache syncope, chest pain, palpitations, SOB, difficulty breathing, nausea, vomiting, fevers, chills, abdominal discomfort, urinary retention. Dr. Barrett to evaluate pt today. If K is stable plan will be to discharge pt home and he will follow up Thursday for     PAST MEDICAL & SURGICAL HISTORY:  CAD (coronary artery disease)  Staph infection      MEDICATIONS  (STANDING):    MEDICATIONS  (PRN):    Antiplatelet therapy:                           Last dose/amt:    Allergies    penicillins (Unknown)    Intolerances        SOCIAL HISTORY:  Smoker: [ ] Yes  [ ] No        PACK YEARS:                         WHEN QUIT?  ETOH use: [ ] Yes  [ ] No              FREQUENCY / QUANTITY:  Ilicit Drug use:  [ ] Yes  [ ] No  Occupation:  Live with:  Assisted device use:    FAMILY HISTORY:  Family history of myocardial infarction (Mother): mother;       Review of Systems  CONSTITUTIONAL:  Fevers[ ] chills[ ] sweats[ ] fatigue[ ] weight loss[ ] weight gain [ ]                                     NEGATIVE [ ]   NEURO:  parathesias[ ] seizures [ ]  syncope [ ]  confusion [ ]                                                                                NEGATIVE[ ]   EYES: glasses[ ]  blurry vision[ ]  discharge[ ] pain[ ] glaucoma [ ]                                                                          NEGATIVE[ ]   ENMT:  difficulty hearing [ ]  vertigo[ ]  dysphagia[ ] epistaxis[ ] recent dental work [ ]                                    NEGATIVE[ ]   CV:  chest pain[ ] palpitations[ ] SAMAYOA [ ] diaphoresis [ ]                                                                                           NEGATIVE[ ]   RESPIRATORY:  wheezing[ ] SOB[ ] cough [ ] sputum[ ] hemoptysis[ ]                                                                  NEGATIVE[ ]   GI:  nausea[ ]  vommiting [ ]  diarrhea[ ] constipation [ ] melena [ ]                                                                      NEGATIVE[ ]   : hematuria[ ]  dysuria[ ] urgency[ ] incontinence[ ]                                                                                            NEGATIVE[ ]   MUSKULOSKELETAL:  arthritis[ ]  joint swelling [ ] muscle weakness [ ]                                                                NEGATIVE[ ]   SKIN/BREAST:  rash[ ] itching [ ]  hair loss[ ] masses[ ]                                                                                              NEGATIVE[ ]   PSYCH:  dementia [ ] depresion [ ] anxiety[ ]                                                                                                               NEGATIVE[ ]   HEME/LYMPH:  bruises easily[ ] enlarged lymph nodes[ ] tender lymph nodes[ ]                                               NEGATIVE[ ]   ENDOCRINE:  cold intolerance[ ] heat intolerance[ ] polydipsia[ ]                                                                          NEGATIVE[ ]     PHYSICAL EXAM  Vital Signs Last 24 Hrs  T(C): 36.6 (05 Mar 2019 04:49), Max: 36.6 (05 Mar 2019 04:49)  T(F): 97.9 (05 Mar 2019 04:49), Max: 97.9 (05 Mar 2019 04:49)  HR: 89 (05 Mar 2019 06:29) (85 - 89)  BP: 96/65 (05 Mar 2019 06:29) (83/61 - 96/65)  BP(mean): --  RR: 22 (05 Mar 2019 04:49) (22 - 26)  SpO2: 100% (05 Mar 2019 04:49) (98% - 100%)    CONSTITUTIONAL:                                                                          WNL[ ]   Neuro: WNL[ ] Normal exam oriented to person/place & time with no focal motor or sensory  deficits. Other __________                    Eyes: WNL[ ]   Normal exam of conjunctiva & lids, pupils equally reactive. Other______________________________     ENT: WNL[ ]    Normal exam of nasal/oral mucosa with absence of cyanosis. Other_____________________________  Neck: WNL[ ]  Normal exam of jugular veins, trachea & thyroid. Other_________________________________________  Chest: WNL[ ] Normal lung exam with good air movement absence of wheezes, rales, or rhonchi: Other_________________________________________                                                                                CV:  Auscultation: normal [ ] S3[ ] S4[ ] Irregular [ ] Rub[ ] Clicks[ ]    Murmurs none:[ ]systolic [ ]  diastolic [ ] holosystolic [ ]  Carotids: No Bruits[ ] Other____________ Abdominal Aorta: normal [ ] nonpalpable[ ]Other___________                                                                                      GI: WNL[ ] Normal exam of abdomen, liver & spleen with no noted masses or tenderness. Other______________________                                                                                                        Extremities: WNL[ ] Normal no evidence of cyanosis or deformity Edema: none[ ]trace[ ]1+[ ]2+[ ]3+[ ]4+[ ]  Lower Extremity Pulses: Right[ ] Left[ ]Varicosities[ ]  SKIN :WNL[ ] Normal exam to inspection & palation. Other:____________________                                                          LABS:                        12.1   7.0   )-----------( 233      ( 05 Mar 2019 03:41 )             38.3         132<L>  |  95<L>  |  18.0  ----------------------------<  125<H>  5.6<H>   |  18.0<L>  |  0.79    Ca    9.6      05 Mar 2019 03:41  Mg     2.1         TPro  7.2  /  Alb  3.9  /  TBili  1.0  /  DBili  x   /  AST  276<H>  /  ALT  130<H>  /  AlkPhos  209<H>      PT/INR - ( 05 Mar 2019 03:41 )   PT: 18.8 sec;   INR: 1.61 ratio         PTT - ( 05 Mar 2019 03:41 )  PTT:34.8 sec  Urinalysis Basic - ( 05 Mar 2019 04:29 )    Color: Yellow / Appearance: Clear / S.025 / pH: x  Gluc: x / Ketone: Negative  / Bili: Negative / Urobili: 8 mg/dL   Blood: x / Protein: 30 mg/dL / Nitrite: Negative   Leuk Esterase: Trace / RBC: 0-5 /HPF / WBC 3-5   Sq Epi: x / Non Sq Epi: Few / Bacteria: Few      CARDIAC MARKERS ( 05 Mar 2019 03:41 )  x     / <0.01 ng/mL / x     / x     / x            Serum Pro-Brain Natriuretic Peptide: 5442 pg/mL ( @ 06:32)    Thyroid Stimulating Hormone, Serum: 4.51 uIU/mL ( @ 03:41)      Cardiac Cath:    TTE / DARION:      Assessment:          Plan: Surgeon: Dr. Barrett     Consult requesting by: Dr. prince     HISTORY OF PRESENT ILLNESS:  48 yo Male c/o CP for 2-3 days prior to admission, then 10/10 chest pain approximately 9:30 am day prior to admission, followed by vomiting.  Wife drove pt to hospital, ruled in for STEMI. Urgent Cath, pt found to have multiple occlusions; stent to LAD and D2; stent to LAD thrombosed,  impella placed for support.  Pt then had VF arrest, shock x 1, return to NSR.  Upon transfer to OR for Urgent CABG, pt with asystolic arrest, chest opened intra-op with CPR in progress. Pt underwent  C4V (LAD, Diag, OM and PDA) with inability to wean from CPB therefore requiring VA ECMO (central cannulation) and IABP, he was transferred to CICU.  12/3 s/p ECMO explant and IABP d/c'd, placement of impella via R groin sheath.    Impella removal via right femoral cutdown with primary repair of femoral artery and stent placed to external iliac artery for dissection.  Hospital course notable for (in addition to above):cardiogenic and septic shock (both since resolved), acute systolic heart failure, vent dependent respiratory failure (inability to wean also contributed to by severe agitation, now s/p trach 12/10), fungemia, acute blood loss anemia, GIB (bleeding stomach ulcer), hypokalemia, ileus, sinusitis.  1/3 decannulated.   passed bedside swallow eval.   Pt discharged home     3/4 Pt presenting to [facility] ED with c/o nausea and vomiting. Pt had a follow up visit scheduled with Dr. Luke today for further workup regarding possible advanced heart failure options. However pt presented to [facility] ED. Pt with K 5.6 and slight elevations of LFT's. pt has been on fluconazole for a fungemia on prior hospital visit which may explain elevated LFT's. Will repeat CMP and monitor K. Pt has been complaining of SOB when he lays down which has been his baseline over the last week. Pt endorses making urine and denies any headache syncope, chest pain, palpitations, SOB, difficulty breathing, nausea, vomiting, fevers, chills, abdominal discomfort, urinary retention. Dr. Barrett to evaluate pt today. If K is stable plan will be to discharge pt home and he will follow up Thursday for repeat lab work.     PAST MEDICAL & SURGICAL HISTORY:  CAD (coronary artery disease)  Staph infection      MEDICATIONS  (STANDING):    MEDICATIONS  (PRN):    Antiplatelet therapy:                           Last dose/amt:    Allergies    penicillins (Unknown)    Intolerances        SOCIAL HISTORY:  Smoker: [ ] Yes  [ ] No        PACK YEARS:                         WHEN QUIT?  ETOH use: [ ] Yes  [ ] No              FREQUENCY / QUANTITY:  Ilicit Drug use:  [ ] Yes  [ ] No  Occupation:  Live with:  Assisted device use:    FAMILY HISTORY:  Family history of myocardial infarction (Mother): mother;       Review of Systems  CONSTITUTIONAL:  Fevers[ ] chills[ ] sweats[ ] fatigue[ ] weight loss[ ] weight gain [ ]                                     NEGATIVE [x ]   NEURO:  parathesias[ ] seizures [ ]  syncope [ ]  confusion [ ]                                                                                NEGATIVE[x ]   EYES: glasses[ ]  blurry vision[ ]  discharge[ ] pain[ ] glaucoma [ ]                                                                          NEGATIVE[x ]   ENMT:  difficulty hearing [ ]  vertigo[ ]  dysphagia[ ] epistaxis[ ] recent dental work [ ]                                    NEGATIVE[x ]   CV:  chest pain[ ] palpitations[ ] SAMAYOA [ ] diaphoresis [ ]                                                                                           NEGATIVE[x ]   RESPIRATORY:  wheezing[ ] SOB[ ] cough [ ] sputum[ ] hemoptysis[ ]                                                                  NEGATIVE[ x]   GI:  nausea[ x]  vommiting [x ]  diarrhea[ ] constipation [ ] melena [ ]                                                                      NEGATIVE[ ]   : hematuria[ ]  dysuria[ ] urgency[ ] incontinence[ ]                                                                                            NEGATIVE[ x]   MUSKULOSKELETAL:  arthritis[ ]  joint swelling [ ] muscle weakness [ ]                                                                NEGATIVE[ x]   SKIN/BREAST:  rash[ ] itching [ ]  hair loss[ ] masses[ ]                                                                                              NEGATIVE[x ]   PSYCH:  dementia [ ] depresion [ ] anxiety[ ]                                                                                                               NEGATIVE[x ]   HEME/LYMPH:  bruises easily[ ] enlarged lymph nodes[ ] tender lymph nodes[ ]                                               NEGATIVE[x ]   ENDOCRINE:  cold intolerance[ ] heat intolerance[ ] polydipsia[ ]                                                                          NEGATIVE[x ]     PHYSICAL EXAM  Vital Signs Last 24 Hrs  T(C): 36.6 (05 Mar 2019 04:49), Max: 36.6 (05 Mar 2019 04:49)  T(F): 97.9 (05 Mar 2019 04:49), Max: 97.9 (05 Mar 2019 04:49)  HR: 89 (05 Mar 2019 06:29) (85 - 89)  BP: 96/65 (05 Mar 2019 06:29) (83/61 - 96/65)  BP(mean): --  RR: 22 (05 Mar 2019 04:49) (22 - 26)  SpO2: 100% (05 Mar 2019 04:49) (98% - 100%)    CONSTITUTIONAL:                                                                        Neuro: A+O x 3, non-focal, speech clear and intact  HEENT: PERRL, EOMI, oral mucosa pink and moist  Neck: supple, no JVD  CV: regular rate, regular rhythm, +S1S2, no murmurs or rub  Pulm/chest: lung sounds CTA and equal bilaterally, no accessory muscle use noted  Abd: soft, NT, ND, +BS  Ext: GALLARDO x 4, no C/C/E  Skin: warm, well perfused , b/l dorsal aspect or feet with erythema tracking to ankle.       LABS:                        12.1   7.0   )-----------( 233      ( 05 Mar 2019 03:41 )             38.3     03-05    132<L>  |  95<L>  |  18.0  ----------------------------<  125<H>  5.6<H>   |  18.0<L>  |  0.79    Ca    9.6      05 Mar 2019 03:41  Mg     2.1     03-05    TPro  7.2  /  Alb  3.9  /  TBili  1.0  /  DBili  x   /  AST  276<H>  /  ALT  130<H>  /  AlkPhos  209<H>  03-05    PT/INR - ( 05 Mar 2019 03:41 )   PT: 18.8 sec;   INR: 1.61 ratio         PTT - ( 05 Mar 2019 03:41 )  PTT:34.8 sec  Urinalysis Basic - ( 05 Mar 2019 04:29 )    Color: Yellow / Appearance: Clear / S.025 / pH: x  Gluc: x / Ketone: Negative  / Bili: Negative / Urobili: 8 mg/dL   Blood: x / Protein: 30 mg/dL / Nitrite: Negative   Leuk Esterase: Trace / RBC: 0-5 /HPF / WBC 3-5   Sq Epi: x / Non Sq Epi: Few / Bacteria: Few      CARDIAC MARKERS ( 05 Mar 2019 03:41 )  x     / <0.01 ng/mL / x     / x     / x                    Serum Pro-Brain Natriuretic Peptide: 5442 pg/mL ( @ 06:32)    Thyroid Stimulating Hormone, Serum: 4.51 uIU/mL ( @ 03:41)      Cardiac Cath:    TTE / DARION:    < from: TTE Echo Complete w/Doppler (19 @ 05:58) >  Summary:   1. Left ventricular ejection fraction, by visual estimation, is <20%.   2. Severely decreased global left ventricular systolic function.   3. LV Ejection Fraction by Steele's Method with a biplane EF of 18 %.   4. Normal left ventricular internal cavity size.   5. Severely reduced RV systolic function.   6. Large pleural effusion in both left and right lateral regions.   7. Estimated pulmonary artery systolic pressure is 35.0 mmHg assuminga   right atrial pressure of 8 mmHg, which is consistent with borderline   pulmonary hypertension.   8. There are multiple prior echocardiograms available for comparison   purposes, the most recent being 2019.   9. Endocardial visualization was enhanced with intravenous echo contrast.    LV Ejection Fraction by Steele's Method with a biplane EF of 18 %.     [id number] Russell uLndy MD, Electronically signed on 3/5/2019 at 7:02:42 AM              *** Final ***              < end of copied text >      < from: TTE Echo Complete w/Doppler (19 @ 11:18) >  Summary:   1. Technically difficult study.   2. Endocardial visualization was enhanced with intravenous echo   contrast. There is no obvious LV thrombus.   3. Severely decreased global left ventricular systolic function.   4. Left ventricular ejection fraction, by visual estimation, is <20%.   5. Entire apex, mid anterolateral segment, mid inferoseptal segment, and   mid anterior segment are abnormal as described above.   6. The mitral valve leaflets are tethered which is due to reduced   systolic function and elevated LVEDP.   7. Mild to moderate mitral valve regurgitation.   8. Mildly enlarged left atrium.   9. Trace tricuspid regurgitation.  10. Trace pulmonic valve regurgitation.  11. There is no evidence of pericardial effusion.    [id number] Jony Cruz MD, Electronically signed on 2019 at 9:56:27 AM              *** Final ***                  JONY CRUZ   This document has been electronically signed. 2019 11:18AM        < end of copied text > Surgeon: Dr. Barrett     Consult requesting by: Dr. prince     HISTORY OF PRESENT ILLNESS:  48 yo Male c/o CP for 2-3 days prior to admission, then 10/10 chest pain approximately 9:30 am day prior to admission, followed by vomiting.  Wife drove pt to hospital, ruled in for STEMI. Urgent Cath, pt found to have multiple occlusions; stent to LAD and D2; stent to LAD thrombosed,  impella placed for support.  Pt then had VF arrest, shock x 1, return to NSR.  Upon transfer to OR for Urgent CABG, pt with asystolic arrest, chest opened intra-op with CPR in progress. Pt underwent  C4V (LAD, Diag, OM and PDA) with inability to wean from CPB therefore requiring VA ECMO (central cannulation) and IABP, he was transferred to CICU.  12/3 s/p ECMO explant and IABP d/c'd, placement of impella via R groin sheath.    Impella removal via right femoral cutdown with primary repair of femoral artery and stent placed to external iliac artery for dissection.  Hospital course notable for (in addition to above):cardiogenic and septic shock (both since resolved), acute systolic heart failure, vent dependent respiratory failure (inability to wean also contributed to by severe agitation, now s/p trach 12/10), fungemia, acute blood loss anemia, GIB (bleeding stomach ulcer), hypokalemia, ileus, sinusitis.  1/3 decannulated.   passed bedside swallow eval.   Pt discharged home     3/4 Pt presenting to [facility] ED with c/o nausea and vomiting. Pt had a follow up visit scheduled with Dr. Luke today for further workup regarding possible advanced heart failure options. However pt presented to [facility] ED. Pt with K 5.6 and slight elevations of LFT's. pt has been on fluconazole for a fungemia on prior hospital visit which may explain elevated LFT's. Will repeat CMP and monitor K. Pt has been complaining of SOB when he lays down which has been his baseline over the last week. Pt endorses making urine and denies any headache syncope, chest pain, palpitations, SOB, difficulty breathing, nausea, vomiting, fevers, chills, abdominal discomfort, urinary retention. Dr. Barrett to evaluate pt today. If K is stable plan will be to discharge pt home and he will follow up Thursday for repeat lab work.     PAST MEDICAL & SURGICAL HISTORY:  CAD (coronary artery disease)  Staph infection      MEDICATIONS  (STANDING):    MEDICATIONS  (PRN):    Antiplatelet therapy:        plavix to be taken stat now                    Last dose/amt: 75mg PO    Allergies    penicillins (Unknown)    Intolerances        SOCIAL HISTORY:  Smoker: [ ] Yes  [x ] No        PACK YEARS:                         WHEN QUIT?  ETOH use: [ ] Yes  [x ] No              FREQUENCY / QUANTITY:  Ilicit Drug use:  [ ] Yes  [x ] No  Live with: daughter and wife at home   Assisted device use: none    FAMILY HISTORY:  Family history of myocardial infarction (Mother): mother;       Review of Systems  CONSTITUTIONAL:  Fevers[ ] chills[ ] sweats[ ] fatigue[ ] weight loss[ ] weight gain [ ]                                     NEGATIVE [x ]   NEURO:  parathesias[ ] seizures [ ]  syncope [ ]  confusion [ ]                                                                                NEGATIVE[x ]   EYES: glasses[ ]  blurry vision[ ]  discharge[ ] pain[ ] glaucoma [ ]                                                                          NEGATIVE[x ]   ENMT:  difficulty hearing [ ]  vertigo[ ]  dysphagia[ ] epistaxis[ ] recent dental work [ ]                                    NEGATIVE[x ]   CV:  chest pain[ ] palpitations[ ] SAMAYOA [ ] diaphoresis [ ]                                                                                           NEGATIVE[x ]   RESPIRATORY:  wheezing[ ] SOB[ ] cough [ ] sputum[ ] hemoptysis[ ]                                                                  NEGATIVE[ x]   GI:  nausea[ x]  vommiting [x ]  diarrhea[ ] constipation [ ] melena [ ]                                                                      NEGATIVE[ ]   : hematuria[ ]  dysuria[ ] urgency[ ] incontinence[ ]                                                                                            NEGATIVE[ x]   MUSKULOSKELETAL:  arthritis[ ]  joint swelling [ ] muscle weakness [ ]                                                                NEGATIVE[ x]   SKIN/BREAST:  rash[ ] itching [ ]  hair loss[ ] masses[ ]                                                                                              NEGATIVE[x ]   PSYCH:  dementia [ ] depresion [ ] anxiety[ ]                                                                                                               NEGATIVE[x ]   HEME/LYMPH:  bruises easily[ ] enlarged lymph nodes[ ] tender lymph nodes[ ]                                               NEGATIVE[x ]   ENDOCRINE:  cold intolerance[ ] heat intolerance[ ] polydipsia[ ]                                                                          NEGATIVE[x ]     PHYSICAL EXAM  Vital Signs Last 24 Hrs  T(C): 36.6 (05 Mar 2019 04:49), Max: 36.6 (05 Mar 2019 04:49)  T(F): 97.9 (05 Mar 2019 04:49), Max: 97.9 (05 Mar 2019 04:49)  HR: 89 (05 Mar 2019 06:29) (85 - 89)  BP: 96/65 (05 Mar 2019 06:29) (83/61 - 96/65)  BP(mean): --  RR: 22 (05 Mar 2019 04:49) (22 - 26)  SpO2: 100% (05 Mar 2019 04:49) (98% - 100%)    CONSTITUTIONAL:                                                                        Neuro: A+O x 3, non-focal, speech clear and intact  HEENT: PERRL, EOMI, oral mucosa pink and moist  Neck: supple, no JVD  CV: regular rate, regular rhythm, +S1S2, no murmurs or rub  Pulm/chest: lung sounds CTA and equal bilaterally, no accessory muscle use noted  Abd: soft, NT, ND, +BS  Ext: GALLARDO x 4, no C/C/E  Skin: warm, well perfused , b/l dorsal aspect or feet with erythema tracking to ankle.       LABS:                        12.1   7.0   )-----------( 233      ( 05 Mar 2019 03:41 )             38.3     03-05    132<L>  |  95<L>  |  18.0  ----------------------------<  125<H>  5.6<H>   |  18.0<L>  |  0.79    Ca    9.6      05 Mar 2019 03:41  Mg     2.1     03-05    TPro  7.2  /  Alb  3.9  /  TBili  1.0  /  DBili  x   /  AST  276<H>  /  ALT  130<H>  /  AlkPhos  209<H>  03-05    PT/INR - ( 05 Mar 2019 03:41 )   PT: 18.8 sec;   INR: 1.61 ratio         PTT - ( 05 Mar 2019 03:41 )  PTT:34.8 sec  Urinalysis Basic - ( 05 Mar 2019 04:29 )    Color: Yellow / Appearance: Clear / S.025 / pH: x  Gluc: x / Ketone: Negative  / Bili: Negative / Urobili: 8 mg/dL   Blood: x / Protein: 30 mg/dL / Nitrite: Negative   Leuk Esterase: Trace / RBC: 0-5 /HPF / WBC 3-5   Sq Epi: x / Non Sq Epi: Few / Bacteria: Few      CARDIAC MARKERS ( 05 Mar 2019 03:41 )  x     / <0.01 ng/mL / x     / x     / x                    Serum Pro-Brain Natriuretic Peptide: 5442 pg/mL ( @ 06:32)    Thyroid Stimulating Hormone, Serum: 4.51 uIU/mL ( @ 03:41)      Cardiac Cath:    TTE / DARION:    < from: TTE Echo Complete w/Doppler (19 @ 05:58) >  Summary:   1. Left ventricular ejection fraction, by visual estimation, is <20%.   2. Severely decreased global left ventricular systolic function.   3. LV Ejection Fraction by Steele's Method with a biplane EF of 18 %.   4. Normal left ventricular internal cavity size.   5. Severely reduced RV systolic function.   6. Large pleural effusion in both left and right lateral regions.   7. Estimated pulmonary artery systolic pressure is 35.0 mmHg assuminga   right atrial pressure of 8 mmHg, which is consistent with borderline   pulmonary hypertension.   8. There are multiple prior echocardiograms available for comparison   purposes, the most recent being 2019.   9. Endocardial visualization was enhanced with intravenous echo contrast.    LV Ejection Fraction by Steele's Method with a biplane EF of 18 %.     [id number] Russell Lundy MD, Electronically signed on 3/5/2019 at 7:02:42 AM              *** Final ***              < end of copied text >      < from: TTE Echo Complete w/Doppler (19 @ 11:18) >  Summary:   1. Technically difficult study.   2. Endocardial visualization was enhanced with intravenous echo   contrast. There is no obvious LV thrombus.   3. Severely decreased global left ventricular systolic function.   4. Left ventricular ejection fraction, by visual estimation, is <20%.   5. Entire apex, mid anterolateral segment, mid inferoseptal segment, and   mid anterior segment are abnormal as described above.   6. The mitral valve leaflets are tethered which is due to reduced   systolic function and elevated LVEDP.   7. Mild to moderate mitral valve regurgitation.   8. Mildly enlarged left atrium.   9. Trace tricuspid regurgitation.  10. Trace pulmonic valve regurgitation.  11. There is no evidence of pericardial effusion.    [id number] Jony Ackerman MD, Electronically signed on 2019 at 9:56:27 AM              *** Final ***                  JONY SEDAGHAT   This document has been electronically signed. 2019 11:18AM        < end of copied text >

## 2019-03-05 NOTE — CONSULT NOTE ADULT - ASSESSMENT
46 yo Male c/o CP for 2-3 days prior to admission, then 10/10 chest pain approximately 9:30 am day prior to admission, followed by vomiting.  Wife drove pt to hospital, ruled in for STEMI. Urgent Cath, pt found to have multiple occlusions; stent to LAD and D2; stent to LAD thrombosed,  impella placed for support.  Pt then had VF arrest, shock x 1, return to NSR.  Upon transfer to OR for Urgent CABG, pt with asystolic arrest, chest opened intra-op with CPR in progress. Pt underwent 11/29 C4V (LAD, Diag, OM and PDA) with inability to wean from CPB therefore requiring VA ECMO (central cannulation) and IABP, he was transferred to CICU.  12/3 s/p ECMO explant and IABP d/c'd, placement of impella via R groin sheath.   12/6 Impella removal via right femoral cutdown with primary repair of femoral artery and stent placed to external iliac artery for dissection.  Hospital course notable for (in addition to above):cardiogenic and septic shock (both since resolved), acute systolic heart failure, vent dependent respiratory failure (inability to wean also contributed to by severe agitation, now s/p trach 12/10), fungemia, acute blood loss anemia, GIB (bleeding stomach ulcer), hypokalemia, ileus, sinusitis.  1/3 decannulated.  1/4 passed bedside swallow eval.  1/11 Pt discharged home     3/4 Pt presenting to [facility] ED with c/o nausea and vomiting. Pt had a follow up visit scheduled with Dr. Luke today for further workup regarding possible advanced heart failure options. However pt presented to [facility] ED. Pt with K 5.6 and slight elevations of LFT's. pt has been on fluconazole for a fungemia on prior hospital visit which may explain elevated LFT's. Will repeat CMP and monitor K. Pt has been complaining of SOB when he lays down which has been his baseline over the last week. Pt endorses making urine and denies any headache syncope, chest pain, palpitations, SOB, difficulty breathing, nausea, vomiting, fevers, chills, abdominal discomfort, urinary retention. Dr. Barrett to evaluate pt today. If K is stable plan will be to discharge pt home and he will follow up Thursday for repeat lab work.

## 2019-03-05 NOTE — CONSULT NOTE ADULT - SUBJECTIVE AND OBJECTIVE BOX
CARDIOLOGY CONSULTATION NOTE ([facility])  Consult requested by:  Edmar Connors    Reason for Consultation: S/p CABG, chronic systolic heart failure, N/V, cough    History obtained by: Patient and medical record     obtained: No    Chief complaint:    Patient is a 48y old  Male who presents with a chief complaint of nausea, vomiting.     HPI:  47 y/o male with history of late presentation anterior STEMI in 2018 , multivessel disease on cath, s/p primary PCI to LAD and D1 with acute LAD stent thrombosis complicated by cardiogenic shock , vfib , need for impella support followed by emergent CABG (SVG to LAD, diagonal , OM and PDA) central VA ECMO , prolonged hospital stay with respiratory failure requiring trach, septic shock, fungemia, GIB  , eventual decanulation, removal of impella. He was currently followed by me and heart failure team ( Dr. Luke) in [facility]. He is in chronic systolic heart failure FC III/IV and has been followed closely with adjustment of diuretics dose and other HF meds.   He is been on fluconazole treatment . He presented to ED with 1 day history of nausea and vomiting and frequent dry cough particularly when he lays down. SOB has been at baseline and he is able to do home activities . He feels thirsty .   K+ in ED was elevated 5.6 and LFT , , AlK phos 193. (LFT on  were within normal) .  He has bilateral foot redness that started as a patch and spread over his feet. No paresthesia . no Fever.   he is wearing his Lifevest and had no shocks        REVIEW OF SYMPTOMS: Cardiovascular: SOB;  Respiratory:  cough  Genitourinary:  No dysuria, no hematuria; Gastrointestinal:  + nausea, + vomiting. No diarrhea.  No abdominal pain. No dark color stool, no melena ; Neurological: No headache, no dizziness, no slurred speech;  Psychiatric: No agitation, no anxiety.  ALL OTHER REVIEW OF SYSTEMS ARE NEGATIVE.    ALLERGIES: Allergies    penicillins (Unknown)    Intolerances      CURRENT MEDICATIONS:    Home Medications:  acetaminophen 325 mg oral tablet: 2 tab(s) orally every 6 hours, As needed, Mild Pain (1 - 3) (10 Bruno 2019 20:06)  micafungin 100 mg intravenous injection: 100 milligram(s) intravenous every 24 hours (10 Bruno 2019 20:06)  Multiple Vitamins with Minerals oral tablet: 1 tab(s) orally once a day (10 Bruno 2019 20:06)  senna oral tablet: 2 tab(s) orally  (10 Bruno 2019 20:06)  sucralfate 1 g oral tablet: 1 tab(s) orally 2 times a day (10 Bruno 2019 20:06)  Carvedilol 3.125 mg twice daily   Enalapril 2.5 mg 1/2 tab daily  spironolactone 25 mg daily  Atorvastatin 40 mg daily   mg daily  clopidogrel 75 mg daily         PAST MEDICAL HISTORY  CAD (coronary artery disease)  Staph infection  chronic systolic heart failure       PAST SURGICAL HISTORY  as per HPI      FAMILY HISTORY:  Family history of myocardial infarction (Mother): mother;       SOCIAL HISTORY:  Denies smoking/alcohol/drugs      Vital Signs Last 24 Hrs  T(C): 36.6 (05 Mar 2019 04:49), Max: 36.6 (05 Mar 2019 04:49)  T(F): 97.9 (05 Mar 2019 04:49), Max: 97.9 (05 Mar 2019 04:49)  HR: 88 (05 Mar 2019 10:22) (85 - 89)  BP: 89/71 (05 Mar 2019 10:22) (83/61 - 96/65)  BP(mean): --  RR: 23 (05 Mar 2019 10:22) (22 - 26)  SpO2: 99% (05 Mar 2019 10:22) (98% - 100%)      PHYSICAL EXAM:  Constitutional: sitting in bed, appears anxious ,   No acute distress.   HEENT: Atraumatic and normcephalic , neck is supple . + JVD 8 cm above sternal reji. No carotid bruit.   CNS: A&Ox3. No focal deficits.  Lymph Nodes: Cervical : Not palpable.  Respiratory: diminished air entry on left lung base.  Cardiovascular: S1S2 RRR. No murmur/rubs or gallop.  Gastrointestinal: Soft non-tender and non distended . +Bowel sounds, no HSM  Extremities: mild bilateral LE edema , DP and PT +2 , red maculopapular patches over both feet, warm extremities.   Psychiatric: Calm . no agitation., mood appropriate      Intake and output:     LABS:                        12.1   7.0   )-----------( 233      ( 05 Mar 2019 03:41 )             38.3     03-05    135  |  97<L>  |  18.0  ----------------------------<  102  5.2   |  20.0<L>  |  0.65    Ca    9.3      05 Mar 2019 09:04  Mg     2.1     03-05    TPro  6.8  /  Alb  3.8  /  TBili  1.0  /  DBili  x   /  AST  300<H>  /  ALT  138<H>  /  AlkPhos  193<H>  03-05    CARDIAC MARKERS ( 05 Mar 2019 03:41 )  x     / <0.01 ng/mL / x     / x     / x        ;p-BNP=Serum Pro-Brain Natriuretic Peptide: 5442 pg/mL ( @ 06:32)    PT/INR - ( 05 Mar 2019 03:41 )   PT: 18.8 sec;   INR: 1.61 ratio         PTT - ( 05 Mar 2019 03:41 )  PTT:34.8 sec      INTERPRETATION OF TELEMETRY:  ECG: NSR, low voltage, old anterior MI    RADIOLOGY & ADDITIONAL STUDIES:    X-ray:      ECHO FINDINGS:     STRESS  FINDINGS:     Catheterization Findings CARDIOLOGY CONSULTATION NOTE ([facility])  Consult requested by:  Edmar Connors    Reason for Consultation: S/p CABG, chronic systolic heart failure, N/V, cough    History obtained by: Patient and medical record     obtained: No    Chief complaint:    Patient is a 48y old  Male who presents with a chief complaint of nausea, vomiting.     HPI:  49 y/o male with history of late presentation anterior STEMI in 2018 , multivessel disease on cath, s/p primary PCI to LAD and D1 with acute LAD stent thrombosis complicated by cardiogenic shock , vfib , need for impella support followed by emergent CABG (SVG to LAD, diagonal , OM and PDA) central VA ECMO , prolonged hospital stay with respiratory failure requiring trach, septic shock, fungemia, GIB  , eventual decanulation, removal of impella. He was currently followed by me and heart failure team ( Dr. Luke) in [facility]. He is in chronic systolic heart failure FC III/IV and has been followed closely with adjustment of diuretics dose and other HF meds.   He is been on fluconazole treatment . He presented to ED with 1 day history of nausea and vomiting and frequent dry cough particularly when he lays down. SOB has been at baseline and he is able to do home activities . He feels thirsty .   K+ in ED was elevated 5.6 and LFT , , AlK phos 193. (LFT on  were within normal) .  He has bilateral foot redness that started as a patch and spread over his feet. No paresthesia . no Fever.   he is wearing his Lifevest and had no shocks        REVIEW OF SYMPTOMS: Cardiovascular: SOB;  Respiratory:  cough  Genitourinary:  No dysuria, no hematuria; Gastrointestinal:  + nausea, + vomiting. No diarrhea.  No abdominal pain. No dark color stool, no melena ; Neurological: No headache, no dizziness, no slurred speech;  Psychiatric: No agitation, no anxiety.  ALL OTHER REVIEW OF SYSTEMS ARE NEGATIVE.    ALLERGIES: Allergies    penicillins (Unknown)    Intolerances      CURRENT MEDICATIONS:    Home Medications:  acetaminophen 325 mg oral tablet: 2 tab(s) orally every 6 hours, As needed, Mild Pain (1 - 3) (10 Bruno 2019 20:06)  micafungin 100 mg intravenous injection: 100 milligram(s) intravenous every 24 hours (10 Bruno 2019 20:06)  Multiple Vitamins with Minerals oral tablet: 1 tab(s) orally once a day (10 Bruno 2019 20:06)  senna oral tablet: 2 tab(s) orally  (10 Bruno 2019 20:06)  sucralfate 1 g oral tablet: 1 tab(s) orally 2 times a day (10 Bruno 2019 20:06)  Carvedilol 3.125 mg twice daily   Enalapril 2.5 mg 1/2 tab daily  spironolactone 25 mg daily  Atorvastatin 40 mg daily   mg daily  clopidogrel 75 mg daily         PAST MEDICAL HISTORY  CAD (coronary artery disease)  Staph infection  chronic systolic heart failure       PAST SURGICAL HISTORY  as per HPI      FAMILY HISTORY:  Family history of myocardial infarction (Mother): mother;       SOCIAL HISTORY:  Denies smoking/alcohol/drugs      Vital Signs Last 24 Hrs  T(C): 36.6 (05 Mar 2019 04:49), Max: 36.6 (05 Mar 2019 04:49)  T(F): 97.9 (05 Mar 2019 04:49), Max: 97.9 (05 Mar 2019 04:49)  HR: 88 (05 Mar 2019 10:22) (85 - 89)  BP: 89/71 (05 Mar 2019 10:22) (83/61 - 96/65)  BP(mean): --  RR: 23 (05 Mar 2019 10:22) (22 - 26)  SpO2: 99% (05 Mar 2019 10:22) (98% - 100%)      PHYSICAL EXAM:  Constitutional: sitting in bed, appears anxious ,   No acute distress.   HEENT: Atraumatic and normcephalic , neck is supple . + JVD 8 cm above sternal reji. No carotid bruit.   CNS: A&Ox3. No focal deficits.  Lymph Nodes: Cervical : Not palpable.  Respiratory: diminished air entry on left lung base.  Cardiovascular: S1S2 RRR. No murmur/rubs or gallop.  Gastrointestinal: Soft non-tender and non distended . +Bowel sounds, no HSM  Extremities: mild bilateral LE edema , DP and PT +2 , red maculopapular patches over both feet, warm extremities.   Psychiatric: Calm . no agitation., mood appropriate      Intake and output:     LABS:                        12.1   7.0   )-----------( 233      ( 05 Mar 2019 03:41 )             38.3     03-05    135  |  97<L>  |  18.0  ----------------------------<  102  5.2   |  20.0<L>  |  0.65    Ca    9.3      05 Mar 2019 09:04  Mg     2.1     03-    TPro  6.8  /  Alb  3.8  /  TBili  1.0  /  DBili  x   /  AST  300<H>  /  ALT  138<H>  /  AlkPhos  193<H>  03    CARDIAC MARKERS ( 05 Mar 2019 03:41 )  x     / <0.01 ng/mL / x     / x     / x        ;p-BNP=Serum Pro-Brain Natriuretic Peptide: 5442 pg/mL ( @ 06:32)    PT/INR - ( 05 Mar 2019 03:41 )   PT: 18.8 sec;   INR: 1.61 ratio         PTT - ( 05 Mar 2019 03:41 )  PTT:34.8 sec      INTERPRETATION OF TELEMETRY:  ECG: NSR, low voltage, old anterior MI    RADIOLOGY & ADDITIONAL STUDIES:    X-ray:  < from: Xray Chest 1 View AP/PA. (19 @ 01:32) >  The patient is status post median sternotomy.    There are small right and small to moderate-sized left pleural effusions   with adjacent atelectasis and/or pneumonia, left worse than right. There   is also mild pulmonary edema and/or vascular congestion. The heart size   is mildly enlarged. Multilevel degenerative changes are noted within the   imaged potions of the spine.    IMPRESSION: Mild congestive heart failure with layering pleural   effusions. There is adjacent atelectasis and/or pneumonia adjacent to the   lung bases, left worse than right.    < end of copied text >      ECHO FINDINGS:   < from: TTE Echo Complete w/Doppler (19 @ 05:58) >   1. Left ventricular ejection fraction, by visual estimation, is <20%.   2. Severely decreased global left ventricular systolic function.   3. LV Ejection Fraction by Steele's Method with a biplane EF of 18 %.   4. Normal left ventricular internal cavity size.   5. Severely reduced RV systolic function.   6. Large pleural effusion in both left and right lateral regions.   7. Estimated pulmonary artery systolic pressure is 35.0 mmHg assuminga   right atrial pressure of 8 mmHg, which is consistent with borderline   pulmonary hypertension.   8. There are multiple prior echocardiograms available for comparison   purposes, the most recent being 2019.   9. Endocardial visualization was enhanced with intravenous echo contrast.    LV Ejection Fraction by Steele's Method with a biplane EF of 18 %.    < end of copied text >      STRESS  FINDINGS:     Catheterization Findings  < from: Cardiac Cath Lab - Adult (18 @ 11:22) >  100% LAD lesion was crossed using 0.014  Runthrough wire, 100% D2 lesion was crossed using a 0.014 BMW universal  wire . Multiple balloon angioplasty of the LAD and D2 was performed using  a 1.5 , 2.0 (LAD and diagonal 2) and 2.5 balllon (LAD) . This appeared to  be a bifurcation lesion Mejia 1,1,1 with both LAD and diagonal 2 being  equally sized vessel. Kissing balloon angioplasty was performed. There  were temporary flow in both vessels but then no reflow.  ITA stenting of LAD using 2.75 x 28 mm stent was done resulting in flow in  the LAD but absence of flow D2. D2 lesion was recrossed with 0.014 whisper  wire and ITA stenting was placed in the D2 restoring Diagonal flow but  resulted in loss of flow in the LAD stent. LAD stent was recrossed with a  0.014 runthrough wire and angioplasty performed with no reflow however.  Filling defect appeared in the left main that appeared to be a thrombus (  likely proximal thrombus extension from LAD) . Decision was to placed  Impella CP 3.5 L support and transfer the patient to OR for emergency CABG  INTERVENTIONAL RECOMMENDATIONS: Emergency CABG with grafts to the LAD, D2,  OM1 and RPDA.    < end of copied text >

## 2019-03-05 NOTE — ED ADULT NURSE REASSESSMENT NOTE - NS ED NURSE REASSESS COMMENT FT1
MD at pt bedside POC discussed with pt and family who verbalize understanding and agree, pt safety and comfort measures maintained. Pt medicated as ordered tolerating well.

## 2019-03-05 NOTE — ED ADULT NURSE REASSESSMENT NOTE - NS ED NURSE REASSESS COMMENT FT1
Bedside ECHO being performed at this time by technician Ynes,, pt tolerating well, MD Birch at bedside POC discussed with pt and family alongside cardiology PA Che, pt and family verbalize understanding and agree, pt safety and comfort measure maintained.

## 2019-03-05 NOTE — CONSULT NOTE ADULT - PROVIDER SPECIALTY LIST ADULT
1. Have you been to the ER, urgent care clinic since your last visit? Hospitalized since your last visit? No    2. Have you seen or consulted any other health care providers outside of the [address] [place] [address] since your last visit? Include any pap smears or colon screening. No    Verbal order per Dr. Priya Baptiste- injection of b12 and FLU administered. Patient instructed to remain in clinic for 15 minutes and to report any adverse reactions immediately. Most recent VIS given. Cardiology

## 2019-03-05 NOTE — ED PROVIDER NOTE - CARE PLAN
Principal Discharge DX:	CHF (congestive heart failure)  Secondary Diagnosis:	Pleural effusion on left  Secondary Diagnosis:	Dyspnea

## 2019-03-05 NOTE — ED PROVIDER NOTE - OBJECTIVE STATEMENT
A 48 year old male pt with a hx of CABG, on Furosemide, followed by Dr. Alanis presents to the ED c/o cough, nausea, vomiting. Pt states that since yesterday he has been experiencing cough, nausea, and vomiting. Pt has not had any recent sick contacts. denies fever. denies HA or neck pain. no chest pain. no abd pain. no urinary f/u/d. no back pain. no motor or sensory deficits. denies illicit drug use. no recent travel. no rash. no other acute issues symptoms or concerns

## 2019-03-05 NOTE — ED ADULT NURSE NOTE - OBJECTIVE STATEMENT
Non-Productive dry Cough with associated mid sternal chest discomfort x 2days, reports discomfot in Right hip

## 2019-03-05 NOTE — CHART NOTE - NSCHARTNOTEFT_GEN_A_CORE
CT surgery service line contacted by patient's daughter to inform us that patient is in ER. He is known to cardiac surgery service for CABG

## 2019-03-05 NOTE — ED ADULT TRIAGE NOTE - CHIEF COMPLAINT QUOTE
Patient A&Ox4 complaining of cough, shortness of breath & nausea x2 days. Patient stated took 325mg ASA at appx 2100 this evening. Patient stated has pain to chest when coughs, denies any dizziness. Patient actively vomiting during assessment. MD called to bedside for eval.

## 2019-03-05 NOTE — CONSULT NOTE ADULT - ASSESSMENT
49 y/o male with history of anterior STEMI s/p primary PCI to LAD and D1 with acute LAD stent thrombosi , cardiogenic shock , Vfib requiring emergent cabg, ECMO, Imeplla , respiratory failure s/p Trach, fungemia , septic shock , GI bleeding all events during recent hospitalization   Currently Chronic systolic heart failure EF < 20 % followed by heart failure team in [facility] for possible advanced heart failure therapies (LVAD, transplant)   presented today with nausea/vomiting, elevated LFTs and hyperkalemia.   Currently on prolonged fluconazole therapy because of fungemia history.   SOB at baseline FC III, appears clinically dehydrated (dry tongue, thirsty)   has bilateral pleural effusion ( present on prior X-ray)   LFT elevated as compared to recent labs (on 2/2014 when they were normal)     Recommend:   Repeat metabolic panel in ED to check K+ if K+ > 5.5 consider discontinuing aldactone  LFTs elevation can be 2ry to fluconazole , follow LFT as outpatient on Thursday   continue lasix 20 mg twice daily and follow labs this week   Continue ACEI , BB   See HF team for follow up this week.   discussed with Dr. Arnodl Leggett who will see the patient today   Discussed with patient , daughter and wife.

## 2019-03-06 DIAGNOSIS — Z71.89 OTHER SPECIFIED COUNSELING: ICD-10-CM

## 2019-03-06 LAB
CULTURE RESULTS: NO GROWTH — SIGNIFICANT CHANGE UP
SPECIMEN SOURCE: SIGNIFICANT CHANGE UP

## 2019-03-12 PROBLEM — I25.10 ATHEROSCLEROTIC HEART DISEASE OF NATIVE CORONARY ARTERY WITHOUT ANGINA PECTORIS: Chronic | Status: ACTIVE | Noted: 2019-03-05

## 2019-03-13 ENCOUNTER — OUTPATIENT (OUTPATIENT)
Dept: OUTPATIENT SERVICES | Facility: HOSPITAL | Age: 49
LOS: 1 days | End: 2019-03-13

## 2019-03-13 ENCOUNTER — APPOINTMENT (OUTPATIENT)
Dept: CARDIOLOGY | Facility: CLINIC | Age: 49
End: 2019-03-13
Payer: MEDICAID

## 2019-03-13 ENCOUNTER — APPOINTMENT (OUTPATIENT)
Dept: ULTRASOUND IMAGING | Facility: CLINIC | Age: 49
End: 2019-03-13
Payer: MEDICAID

## 2019-03-13 VITALS
DIASTOLIC BLOOD PRESSURE: 60 MMHG | HEIGHT: 69 IN | HEART RATE: 98 BPM | OXYGEN SATURATION: 86 % | RESPIRATION RATE: 16 BRPM | SYSTOLIC BLOOD PRESSURE: 84 MMHG | BODY MASS INDEX: 25.18 KG/M2 | WEIGHT: 170 LBS

## 2019-03-13 DIAGNOSIS — K59.00 CONSTIPATION, UNSPECIFIED: ICD-10-CM

## 2019-03-13 DIAGNOSIS — R74.8 ABNORMAL LEVELS OF OTHER SERUM ENZYMES: ICD-10-CM

## 2019-03-13 DIAGNOSIS — I50.9 HEART FAILURE, UNSPECIFIED: ICD-10-CM

## 2019-03-13 DIAGNOSIS — I25.10 ATHEROSCLEROTIC HEART DISEASE OF NATIVE CORONARY ARTERY W/OUT ANGINA PECTORIS: ICD-10-CM

## 2019-03-13 DIAGNOSIS — B37.7 CANDIDAL SEPSIS: ICD-10-CM

## 2019-03-13 DIAGNOSIS — I50.22 CHRONIC SYSTOLIC (CONGESTIVE) HEART FAILURE: ICD-10-CM

## 2019-03-13 DIAGNOSIS — I25.5 HEART FAILURE, UNSPECIFIED: ICD-10-CM

## 2019-03-13 PROCEDURE — 99213 OFFICE O/P EST LOW 20 MIN: CPT

## 2019-03-13 PROCEDURE — 76700 US EXAM ABDOM COMPLETE: CPT | Mod: 26

## 2019-03-13 RX ORDER — DOXEPIN HYDROCHLORIDE 10 MG/1
10 CAPSULE ORAL
Refills: 0 | Status: DISCONTINUED | COMMUNITY
End: 2019-03-13

## 2019-03-13 RX ORDER — SENNOSIDES 8.6 MG TABLETS 8.6 MG/1
TABLET ORAL TWICE DAILY
Refills: 0 | Status: ACTIVE | COMMUNITY

## 2019-03-15 ENCOUNTER — EMERGENCY (EMERGENCY)
Facility: HOSPITAL | Age: 49
LOS: 1 days | End: 2019-03-15
Attending: EMERGENCY MEDICINE
Payer: MEDICAID

## 2019-03-15 PROCEDURE — 92950 HEART/LUNG RESUSCITATION CPR: CPT

## 2019-03-15 PROCEDURE — 99285 EMERGENCY DEPT VISIT HI MDM: CPT | Mod: 25

## 2019-03-15 NOTE — ED PROVIDER NOTE - PHYSICAL EXAMINATION
PT WITH ACTIVE ACLS  Const: INTUBATED BY EMS  HEAD;NC AT  EYES: PUPILS FIXED  NECK: Neck is short, no injury to neck noted  CHEST: B/L BREATH SOUNDS  SYMM CHEST RISE WITH AMBU BAG B/L BREATH SOUNDS AT AXILARRY  PT ON JIM 3  CHEST WITH BLOOD AROUND RIM OF JIM COMPRESSOR,   EXTREMITIES: DISTAL PULSES PALPABLE WITH JIM COMPRESSION  WOUND ON RIGHT GROIN OLD AND HEALED  B/L LE WITH NON PITTING EDEMA   SKIN: LE WITH HYPERPIGMENTATION, HEALING WOUND ON MEDIAL ASPECT OF RIGHT AND LEFT DISTAL EXTREMITIES  NEURO:

## 2019-03-15 NOTE — ED PROVIDER NOTE - OBJECTIVE STATEMENT
Source: EMS  48 y.o M presents to ED BIBA for cardiac arrest secondary to respiratory arrest at home. Pt had LifeVest, was shocked twice at home and twice again en route. Pt initially in V-fib on EMS arrival. CPR started with Valente device at 10:04.   Issac Skinner called MD JEFFREY at bedside on patient arrival.

## 2019-03-15 NOTE — ED PROVIDER NOTE - CLINICAL SUMMARY MEDICAL DECISION MAKING FREE TEXT BOX
48 y.o M with recent hx of CABG, coded twice over the last two months on ECHO, d/c recently, developed recurrent vomiting, shocked twice by LVAD following which pt was SOB. ACLS protocol followed for over an hour with unsuccessful resuscitation.

## 2019-03-16 NOTE — ED ADULT NURSE NOTE - OBJECTIVE STATEMENT
Pt arrived in cardiac arrest with noreen in place, pt rec'd 6 epi and 1 bicarb prior to arrival, ACLS continued upon arrival to hospital, refer to code sheet documentation

## 2019-03-16 NOTE — ED ADULT NURSE NOTE - CHIEF COMPLAINT QUOTE
As per EMS "pt was complaining of difficulty breathing and family said pt was in respiratory distress, his life vest shocked him twice prior to arrival, when he got in the ambulance it shocked him two more times, and I shocked him once when he went into vfib", pt rec'd 6 epi and 1 bicarb prior to arrival, pt intubated with 7.5 tube, pt arrived with noreen in place and in PEA, pt has IO in left tibial and left humeral, code team initiated

## 2019-03-16 NOTE — DISCHARGE NOTE FOR THE EXPIRED PATIENT - HOSPITAL COURSE
Pt arrived in cardiac arrest with noreen in place and intubated with 7.5 tube, ACLS continued upon arrival to hospital, refer to code documentation, time of death called at 2252 by MD Cotter and pt taken to [facility] at 0310

## 2019-03-25 ENCOUNTER — APPOINTMENT (OUTPATIENT)
Dept: CARDIOLOGY | Facility: CLINIC | Age: 49
End: 2019-03-25

## 2019-04-01 PROBLEM — R74.8 ELEVATED LIVER ENZYMES: Status: ACTIVE | Noted: 2019-03-11

## 2019-04-01 PROBLEM — B37.7 CANDIDEMIA: Status: ACTIVE | Noted: 2019-02-05

## 2019-04-01 PROBLEM — I25.10 CORONARY ARTERY DISEASE, ANGINA PRESENCE UNSPECIFIED, UNSPECIFIED VESSEL OR LESION TYPE, UNSPECIFIED WHETHER NATIVE OR TRANSPLANTED HEART: Status: ACTIVE | Noted: 2019-01-14

## 2019-04-01 PROBLEM — I50.9 ACC/AHA STAGE C CONGESTIVE HEART FAILURE DUE TO ISCHEMIC CARDIOMYOPATHY: Status: ACTIVE | Noted: 2019-02-05

## 2019-04-01 PROBLEM — I50.22 CHRONIC SYSTOLIC HEART FAILURE: Status: ACTIVE | Noted: 2019-02-05

## 2019-04-01 NOTE — ASSESSMENT
[FreeTextEntry1] : 49 y/o male with recent acute AMI , cardiogenic shock , emergent CABG , placed on VAECMO after bypass , currently in Chronic systolic heart failure FC III-IV due to ischemic cardiomyopathy \par on  furosemide 20 mg twice daily \par continue carvedilol 3.125 mg twice daily \par continue enalapril 2.5 mg twice daily \par continue aldactone 25 mg \par Comprehensive metabolic panel before following with with Dr. Luke in 2 weeks\par Will follow with me in 1 month\par will refer to cardiac rehabilitation \par will repeat echo next visit and if EF < 35% , will discuss ICD ( 3 months on medical therapy )

## 2019-04-01 NOTE — PHYSICAL EXAM
[Normal Conjunctiva] : the conjunctiva exhibited no abnormalities [Normal Oral Mucosa] : normal oral mucosa [No Oral Pallor] : no oral pallor [No Oral Cyanosis] : no oral cyanosis [Heart Sounds] : normal S1 and S2 [Murmurs] : no murmurs present [Abdomen Soft] : soft [Abdomen Tenderness] : non-tender [] : no hepato-splenomegaly [Abnormal Walk] : normal gait [Nail Clubbing] : no clubbing of the fingernails [Cyanosis, Localized] : no localized cyanosis [Skin Color & Pigmentation] : normal skin color and pigmentation [Skin Turgor] : normal skin turgor [Oriented To Time, Place, And Person] : oriented to person, place, and time [Affect] : the affect was normal [Mood] : the mood was normal [FreeTextEntry1] : red maculopapular patches\par over both feet, warm extremities.

## 2019-04-01 NOTE — HISTORY OF PRESENT ILLNESS
[FreeTextEntry1] : Mr. Courtney Winters is a 48 year old man who initially presented at  [place] with late presentation anterior STEMI . He was taken emergently to the cath lab. Coronary angiography showed a 20% ostial left main stenosis, 80% stenosis with filling defect likely thrombus (formed during the procedure), mid % stenosis, 100% D1 stenosis, 100% D2 stenosis, 80% tubular stenosis of the circumflex, 80% proximal RCA stenosis, and 99% distal RCA stenosis. A ITA stent was placed in D!1 followed by ITA to LAD ( bifurcation lesion) but during PCI developed acute LAD stent thrombosis and extension of thrombus in the left main , developed cardiogenic shock requiring Impella placement and developed Vfib requiring defibrillation. . He was then transferred to the OR for emergent CABG with grafts to the LAD, diagonal, 1st obtuse marginal branch, and PDA. He could not be weaned from bypass and was placed on VA ECMO, which was centrally cannulated. ECMO was removed on 12/3 and the Impella was removed on 12/6. His hospital course was notable for septic shock and development of candidemia. He could not be weaned from the ventilator and underwent tracheostomy on 12/10, which was decannulated on 1/3. He developed GI bleeding from a stomach ulcer and had ileus. He was discharged on January 11th on low dose neurohormonal antagonists. \par He has followed with Dr. Johann Leggett (CT surgery) and Dr. Luke  (heart failure) . He c/o of SOB on mild exertion with orthopnea and PND. No chest pain. He is currently on lasix 20 mg daily . \par He presented recently to [facility] with nausea and vomiting and has elevated LFT 7-8 times upper limits as well as hyperkalemia. He was treated for nausea and hyperkalemia in the ED. Elevated LFTs thought 2ry to fluconazole (due to candidemia during hospitalization\par Repeat labs on March 7th showed K+ 5.2 but elevated remains elevated.but stable (>298, ->208, AlK 209->212\par Patient saw HF team in [facility] last on 2/21/2019\par Currently nausea improved, SOB improved, occasionally c/o of cough when laying flat.\par His BP remains borderline SBP 80-90 but no symptoms. \par He walks mainly in the house.\par No shocks from lifevest

## 2019-04-17 ENCOUNTER — APPOINTMENT (OUTPATIENT)
Dept: CARDIOLOGY | Facility: CLINIC | Age: 49
End: 2019-04-17

## 2019-09-06 NOTE — ED ADULT NURSE NOTE - NSIMPLEMENTINTERV_GEN_ALL_ED
3
Implemented All Universal Safety Interventions:  [place] to call system. Call bell, personal items and telephone within reach. Instruct patient to call for assistance. Room bathroom lighting operational. Non-slip footwear when patient is off stretcher. Physically safe environment: no spills, clutter or unnecessary equipment. Stretcher in lowest position, wheels locked, appropriate side rails in place.

## 2021-03-22 NOTE — ED PROVIDER NOTE - SEVERITY
"Pt ambulated to restroom, pt reported diarrhea and moderate nausea, and cramping \" like period cramps\". Pt educated that these symptoms are normal but to call [facility] or come to ER if pain worsens and becomes sharp and if symptoms persist.   "
Facial swelling noted, assessed for allergic reaction. No itching/redness and no SOA reported.   
PAIN SCALE 10 OF 10.

## 2021-12-09 NOTE — PROGRESS NOTE ADULT - PROBLEM SELECTOR PLAN 7
no no no no no PRN transfusions for hemodynamics  as per attending transfuse for Hct less than 27 no no no no

## 2022-10-31 NOTE — ED ADULT NURSE NOTE - PLAN OF CARE DISCUSSED WITH:
Spoke to patient's son via phone conversation - voiced understanding    >  YOUR PROCEDURE IS SCHEDULED ON: 11/2/2022 @ 8:00 am  >  You will need to arrive at 7:00 am from home and check in at the Diagnostic Imaging Check In desk.   >  Do not eat or drink after midnight.    >  Make arrangements for transportation, as you should not drive immediately after.  >  Take coreg and synthroid morning of procedure with a sip of water  >  Patient to hold Xarelto for 2 day prior to procedure  >  Patient to have PT/INR drawn 2-4 days prior to procedure Patient

## 2022-12-28 NOTE — PROGRESS NOTE ADULT - SUBJECTIVE AND OBJECTIVE BOX
<-- Click to add NO significant Past Surgical History [facility]  INFECTIOUS DISEASES AND INTERNAL MEDICINE at [place]  =======================================================  Perfecto Santizo MD  Diplomates American Board of Internal Medicine and Infectious Diseases  =======================================================    [id number]  [facility]     follow up for:  post operative fevers and pn eumonia  S/p trach with purulent discharge from trach and around it.   Febrile. Awake and responsive.   Diarrhea is better, c diff negative   on MERREM/Vanco    REVIEW OF SYSTEMS:  unable to obtain due to medical condition    Antibiotics:  Vancomycin and Merrem     Physical Exam:   ICU Vital Signs Last 24 Hrs  T(C): 38.2 (18 Dec 2018 13:00), Max: 39 (18 Dec 2018 07:00)  T(F): 100.8 (18 Dec 2018 13:00), Max: 102.2 (18 Dec 2018 07:00)  HR: 106 (18 Dec 2018 14:30) (92 - 118)  BP: --  BP(mean): --  ABP: 114/71 (18 Dec 2018 14:30) (-5/-5 - 141/91)  ABP(mean): 84 (18 Dec 2018 14:30) (-5 - 109)  RR: 23 (18 Dec 2018 14:30) (15 - 31)  SpO2: 100% (18 Dec 2018 14:30) (98% - 100%)      General:  awake and alert , has trach OOB TO CHAIR  Eye: Pupils are equal, round and reactive to light,   HENT: Normocephalic, trach+ with pus around it  Neck: Supple, No lymphadenopathy.  RIGHT neck IJ line  Respiratory: Lungs with coarse rhonchi bilaterally.   midline chest with dressing in place.   + CHEST tubes in place  Cardiovascular: Normal rate, Regular rhythm  Gastrointestinal: Soft, Non-tender, Non-distended, Normal bowel sounds.  Genitourinary: + Mckeon with light color urine  Lymphatics: No lymphadenopathy neck  Musculoskeletal: edema in all extremities   Integumentary: No rash.  Neurologic: Alert,  OOB TO CHAIR    Labs:                         9.7    10.1  )-----------( 355      ( 18 Dec 2018 04:04 )             31.5   12-18    141  |  99  |  24.0<H>  ----------------------------<  158<H>  3.7   |  28.0  |  0.52    Ca    8.1<L>      18 Dec 2018 04:04  Mg     2.2     12-18                      RECENT CULTURES:  12-14 @ 13:53 .Catheter left IJ (internal jugular) catheter tip     No growth at 2 days.    12-12 @ 16:19 .Sputum     Few Routine respiratory keara present    Few White blood cells  Few Gram Positive Cocci in Pairs and Chains    12-11 @ 20:24 .Broncial bronchial fluid       12-11 @ 10:49 .Broncial bronchial fluid     Rare Candida albicans  Rare Routine respiratory keara present    Numerous white blood cells  No organisms seen    12-09 @ 18:56 .Blood     No growth at 5 days.    12-08 @ 19:40 .Sputum Klebsiella pneumoniae    Moderate Klebsiella pneumoniae  No Routine respiratory keara present    Moderate WBC's  Few Yeast  Few Gram positive cocci in pairs    12-07 @ 22:28 .Urine     No growth    12-07 @ 22:26 .Blood     No growth at 5 days.    12-07 @ 22:25 .Blood     No growth at 5 days.    12-05 @ 10:21 .Blood     No growth at 5 days.    12-04 @ 10:22 .Blood     No growth at 5 days.    12-04 @ 10:21 .Sputum     Few Candida albicans  No Routine respiratory keara present    Few White blood cells  No organisms seen

## 2023-02-26 NOTE — PROGRESS NOTE ADULT - PROBLEM SELECTOR PLAN 3
Pt was being treated for groin staph infection with doxy prior to admission.  Rx was close to completion.  No visible signs of groin infection at this time. Pulmonary embolism

## 2023-06-09 NOTE — PROGRESS NOTE ADULT - PROBLEM SELECTOR PLAN 2
Patient scheduled for CLINIC IN PERSON VISIT follow up on 7/12/23. Labs are scheduled for 7/5/23. Please place NON fasting lab orders. .    blood cultures negative 12/24 and12/25  continue micafungin as per ID for positive blood cultures Candida Parapsilosis 12/18 and 12/20

## 2023-07-31 NOTE — PROGRESS NOTE ADULT - PROBLEM SELECTOR PLAN 1
would  consider changing  Lasix 40mg q8h with metolazone  with close monitoring of na bun creat  continue ventilatory support see ambulance record recommend increase in diuresis if CVP >14.   Avoid agitation.  Other possibility patient is ischemic.   IVC check. Evaluate for dynamic LVOT obstruction.  ct trach. ct current vent settings. -CPAP.  Add antinanginal meds. beta-blocker and ranexa . If recurrent episodes and elevated cardiac enzymes, then recommend cardiac cath.   Possible Underlying pneumonia.  CT scan of chest once xray returns to baseline to evaluate for PNA/atelecvtasis.

## 2023-08-17 NOTE — PROGRESS NOTE ADULT - ASSESSMENT
(0) No aphasia; normal 48 yo Male c/o CP for 2-3 days prior to admission, then 10/10 chest pain approximately 9:30 am yesterday, followed by vomiting.  Wife drove pt to hospital, ruled in for STEMI. Urgent Cath, pt found to have multiple occlusions; stent to LAD and D2; stent to LAD thrombosed,  impella placed for support.  Pt then had VF arrest, shock x 1, return to NSR.  Upon transfer to OR for Urgent CABG, pt with asystolic arrest, chest opened intra-op with CPR in progress. Pt underwent C4V (LAD, Diag, OM and PDA) with inability to wean from CPB therefore requiring VA ECMO (central cannulation) and IABP. , he was transferred to CICU on multiple gtts, now s/p explant 12/3 with placement of impella via R groin sheath. 12/4 ID consulted started on broad spectrum abx, 12/5 Impella weaned. 12/6 Impella removal via right femoral cutdown with primary repair of femoral artery and stent placed to external illiac artery for dissection, PRBC x1. 12/8 Pt underwent head CT, no acute intracranial process noted, chest CT b/l pneumothoraces R-20%, L-10%, b/l chest tubes in place, confirmed infiltrates on right lower and left upper lung.  ABX Vanco and Zosyn course extended, sputum + Klebsiella pending sensitivity; significant agitation causing profound increase in LVEDP now controlled with precedex, seroquel, and prn prop; tolerating high pressure CPAP on 12/12    Hospital course notable for persistent cardiogenic shock/LV failure, vent dependent respiratory failure (inability to wean also contributed to by severe agitation, now s/p trach), sepsis secondary to kelbsiella PNA currently on vanco/zosyn

## 2023-09-09 NOTE — DISCHARGE NOTE ADULT - IF YOU ARE A SMOKER, IT IS IMPORTANT FOR YOUR HEALTH TO STOP SMOKING. PLEASE BE AWARE THAT SECOND HAND SMOKE IS ALSO HARMFUL.

## 2024-01-01 NOTE — CONSULT NOTE ADULT - ATTENDING COMMENTS
Patient seen and examined with resident and agree with recommendations.   Rehab - Medically being optimized.   Recommend ACUTE inpatient rehabilitation for the functional deficits consisting of 3 hours of therapy/day & 24 hour RN/daily PMR physician for comorbid medical management. Will continue to follow for ongoing rehab needs and recommendations. Patient will be able to tolerate 3 hours a day.    Continue bedside therapy as well as OOB throughout the day with mobilization throughout the day with staff to maintain cardiopulmonary function and prevention of secondary complications related to debility.
most likely ileus not sbo  abd soft , mild ttp, no hernias noted , +bms today  labs reviewed  plan   I recommend gastrograffin challenge, 100cc gastrograffin via ngt , clamp ngt and kub 4 hours after. ACS surgery will follow.   once contrast is in the colon , then start ngt feeds recommended. minimize narcotics as well.
Seen and examined.  Agree w/ above.  Details per resident note.  Re-consulted due to abdominal distention and concerns for ileus and also elevated LFTs.  Patient w/ trach but able to mouth words and communicate.  Denies abdominal pain.  Denies N/V.  +flatus and BM.  Vitals, labs, imaging reviewed.  NAD.  Abdomen softly distended, NT.  As has evidence of GI function, may resume TF.  Re-called later in afternoon after RUQ sono done and shows thick GB wall, sludge and pericholecystic fluid.  Clinically remains the same and on repeat abdominal exam abdomen was softly distended and NT.  Clinically does not correlate with acute cholecystitis.  Would trend LFTs, serial abdominal exams and monitor for now.
Above H& P reviewed and independently verified. Acute STEMI, with LM dissection, and cardiogenic shock. He would be taken emergently to OR for emergency CABG. This was discussed in detail with the patient's family.
-Watery bowel movement or no bowel movement in 24 hours

## 2024-04-28 NOTE — PROGRESS NOTE ADULT - ASSESSMENT
46y/o man with no significant PMH admitted on 11/29/18 for CP for 2-3 days prior to admission,   found with STEMI. Urgent Cath, pt found to have multiple occlusions; VF arrest, shock x 1, return to NSR.  Upon transfer to OR for Urgent CABG, pt again VF arrest, chest opened intra-op with CPR in progress.  s/p ECMO and Impella; 12/3 ECMO explanted and Impella placed via right groin sheath.    Has been on antibiotics :  Vancomycin 11/29 to 12/20  Cipro 11/29 to 12/4  Zosyn 12/4 to 12/12 and 12/15 to 12/16  Meropenem 12/26 to present  Micafungin 12/18 to present      Fungemia  Pneumonia  Fever post op  s/p CABG   S/P tracheostomy       - Blood cultures with Candida Parapsilosis  - Repeat blood cultures pending  - Continue Micafungin  - Needs DARION to r/o endocarditis   - Ophthalmology eval  - Low grade fever  - On Meropenem since 12/16 today day 7  - Trend fever  - Trend leukocytosis    Will Follow Normal rate, regular rhythm.  Heart sounds S1, S2.  No murmurs, rubs or gallops.

## 2024-08-16 NOTE — PROGRESS NOTE ADULT - PROBLEM SELECTOR PLAN 2
EDUCATION POST BIOLOGICAL/CHEMOTHERAPY INFUSION  Call the triage nurse at your clinic or seek medical attention if you have chills and/or temperature greater than or equal to 100.5, uncontrolled nausea/vomiting, diarrhea, constipation, dizziness, shortness of breath, chest pain, heart palpitations, weakness or any other new or concerning symptoms, questions or concerns.  You can not have any live virus vaccines prior to or during treatment or up to 6 months post infusion.  If you have an upcoming surgery, medical procedure or dental procedure during treatment, this should be discussed with your ordering physician and your surgeon/dentist.  If you are having any concerning symptom, if you are unsure if you should get your next infusion or wish to speak to a provider before your next infusion, please call your care coordinator or triage nurse at your clinic to notify them so we can adequately serve you.     As above

## 2025-01-06 NOTE — BRIEF OPERATIVE NOTE - TYPE OF ANESTHESIA
NEUROLOGY PATIENT PRE-VISIT PLANNING     Patient was NOT contacted to complete PVP.  Note: Patient will not be contacted if there is no indication to call.     Patient Appointment is scheduled as: Established Patient     Is visit type and length scheduled correctly? Yes    [employer] Patient is checked in Patient Demographics? Yes    3.   Is referral attached to visit? Yes    4. Were records received from referring provider? Yes    4. Patient was NOT contacted to have someone accompany them to visit.     5. Is this appointment scheduled as a Hospital Follow-Up?  No    6. Does the patient require any pre procedure or post procedure follow up? No    7. If any orders were placed at last visit or intended to be done for this visit do we have Results/Consult Notes? No  Labs - Labs were not ordered at last office visit.  Imaging - Imaging was not ordered at last office visit.  Referrals - No referrals were ordered at last office visit.  Note: If patient appointment is for lab or imaging review and patient did not complete the studies, check with provider if OK to reschedule patient until completed.    8. If patient appointment is for Botox - is order pended for provider? N/A    9. Was Plan Assessment from last Neurology Office Visit Reviewed?  Yes                             
General
General
Local without sedation
General